# Patient Record
Sex: FEMALE | Race: WHITE | NOT HISPANIC OR LATINO | Employment: FULL TIME | URBAN - METROPOLITAN AREA
[De-identification: names, ages, dates, MRNs, and addresses within clinical notes are randomized per-mention and may not be internally consistent; named-entity substitution may affect disease eponyms.]

---

## 2017-03-01 ENCOUNTER — LAB REQUISITION (OUTPATIENT)
Dept: LAB | Facility: HOSPITAL | Age: 32
End: 2017-03-01
Payer: COMMERCIAL

## 2017-03-01 DIAGNOSIS — N39.0 URINARY TRACT INFECTION: ICD-10-CM

## 2017-03-01 PROCEDURE — 87086 URINE CULTURE/COLONY COUNT: CPT | Performed by: FAMILY MEDICINE

## 2017-03-02 LAB — BACTERIA UR CULT: NORMAL

## 2017-03-08 LAB
BILIRUB UR QL STRIP: NEGATIVE
CLARITY UR: NORMAL
COLOR UR: YELLOW
GLUCOSE (HISTORICAL): NEGATIVE
HGB UR QL STRIP.AUTO: NEGATIVE
KETONES UR STRIP-MCNC: NEGATIVE MG/DL
LEUKOCYTE ESTERASE UR QL STRIP: NORMAL
NITRITE UR QL STRIP: NEGATIVE
PH UR STRIP.AUTO: 6 [PH]
PROT UR STRIP-MCNC: NORMAL MG/DL
SP GR UR STRIP.AUTO: 1.03
UROBILINOGEN UR QL STRIP.AUTO: 0.2

## 2017-03-09 PROCEDURE — 87086 URINE CULTURE/COLONY COUNT: CPT | Performed by: INTERNAL MEDICINE

## 2017-03-10 ENCOUNTER — LAB REQUISITION (OUTPATIENT)
Dept: LAB | Facility: HOSPITAL | Age: 32
End: 2017-03-10
Payer: COMMERCIAL

## 2017-03-10 ENCOUNTER — ALLSCRIPTS OFFICE VISIT (OUTPATIENT)
Dept: OTHER | Facility: OTHER | Age: 32
End: 2017-03-10

## 2017-03-10 DIAGNOSIS — R30.0 DYSURIA: ICD-10-CM

## 2017-03-10 LAB
CLUE CELL (HISTORICAL): NORMAL
HYPHAL YEAST (HISTORICAL): NORMAL
KOH PREP (HISTORICAL): NORMAL
TRICHOMONAS (HISTORICAL): NORMAL

## 2017-03-11 LAB — BACTERIA UR CULT: NORMAL

## 2017-03-13 ENCOUNTER — GENERIC CONVERSION - ENCOUNTER (OUTPATIENT)
Dept: OTHER | Facility: OTHER | Age: 32
End: 2017-03-13

## 2017-03-16 ENCOUNTER — LAB CONVERSION - ENCOUNTER (OUTPATIENT)
Dept: OTHER | Facility: OTHER | Age: 32
End: 2017-03-16

## 2017-03-16 LAB
A. VAGINALIS BY PCR (HISTORICAL): NOT DETECTED
A.VAGINALIS LOG (CELL/ML) (HISTORICAL): <3.25
BVAB 2 (HISTORICAL): DETECTED
C. ALBICANS, DNA OR PCR (HISTORICAL): NOT DETECTED
CANDIDA GENUS (HISTORICAL): NOT DETECTED
CHLAMYDIA, LIQUID-BASED (HISTORICAL): NOT DETECTED
GARDNERELLA BY MOL. METHOD (HISTORICAL): >5.25
GARDNERELLA BY MOL. METHOD (HISTORICAL): DETECTED
GC BY MOL. METHOD (HISTORICAL): NOT DETECTED
LACTOBACILLUS (SPECIES) (HISTORICAL): <3.25
LACTOBACILLUS (SPECIES) (HISTORICAL): NOT DETECTED
MEGASPHAERA TYPE 1 (HISTORICAL): DETECTED
TRICHOMONAS (HISTORICAL): NOT DETECTED

## 2017-03-29 ENCOUNTER — LAB REQUISITION (OUTPATIENT)
Dept: LAB | Facility: HOSPITAL | Age: 32
End: 2017-03-29
Payer: COMMERCIAL

## 2017-03-29 DIAGNOSIS — Z20.2 CONTACT WITH AND (SUSPECTED) EXPOSURE TO INFECTIONS WITH A PREDOMINANTLY SEXUAL MODE OF TRANSMISSION: ICD-10-CM

## 2017-03-29 LAB
CHLAMYDIA DNA CVX QL NAA+PROBE: NORMAL
N GONORRHOEA DNA GENITAL QL NAA+PROBE: NORMAL

## 2017-03-29 PROCEDURE — 87591 N.GONORRHOEAE DNA AMP PROB: CPT | Performed by: FAMILY MEDICINE

## 2017-03-29 PROCEDURE — 87491 CHLMYD TRACH DNA AMP PROBE: CPT | Performed by: FAMILY MEDICINE

## 2017-03-30 ENCOUNTER — GENERIC CONVERSION - ENCOUNTER (OUTPATIENT)
Dept: OTHER | Facility: OTHER | Age: 32
End: 2017-03-30

## 2017-05-17 ENCOUNTER — LAB REQUISITION (OUTPATIENT)
Dept: LAB | Facility: HOSPITAL | Age: 32
End: 2017-05-17
Payer: COMMERCIAL

## 2017-05-17 DIAGNOSIS — Z20.2 CONTACT WITH AND (SUSPECTED) EXPOSURE TO INFECTIONS WITH A PREDOMINANTLY SEXUAL MODE OF TRANSMISSION: ICD-10-CM

## 2017-05-17 LAB
BILIRUB UR QL STRIP: NEGATIVE
CLARITY UR: CLEAR
COLOR UR: YELLOW
GLUCOSE UR STRIP-MCNC: NEGATIVE MG/DL
HGB UR QL STRIP.AUTO: NEGATIVE
KETONES UR STRIP-MCNC: NEGATIVE MG/DL
LEUKOCYTE ESTERASE UR QL STRIP: NEGATIVE
NITRITE UR QL STRIP: NEGATIVE
PH UR STRIP.AUTO: 6.5 [PH] (ref 4.5–8)
PROT UR STRIP-MCNC: NEGATIVE MG/DL
SP GR UR STRIP.AUTO: 1.02 (ref 1–1.03)
UROBILINOGEN UR QL STRIP.AUTO: 0.2 E.U./DL

## 2017-05-17 PROCEDURE — 87086 URINE CULTURE/COLONY COUNT: CPT | Performed by: FAMILY MEDICINE

## 2017-05-17 PROCEDURE — 81003 URINALYSIS AUTO W/O SCOPE: CPT | Performed by: FAMILY MEDICINE

## 2017-05-17 PROCEDURE — 87491 CHLMYD TRACH DNA AMP PROBE: CPT | Performed by: FAMILY MEDICINE

## 2017-05-17 PROCEDURE — 87591 N.GONORRHOEAE DNA AMP PROB: CPT | Performed by: FAMILY MEDICINE

## 2017-05-18 ENCOUNTER — ALLSCRIPTS OFFICE VISIT (OUTPATIENT)
Dept: OTHER | Facility: OTHER | Age: 32
End: 2017-05-18

## 2017-05-18 ENCOUNTER — LAB REQUISITION (OUTPATIENT)
Dept: LAB | Facility: HOSPITAL | Age: 32
End: 2017-05-18
Payer: COMMERCIAL

## 2017-05-18 DIAGNOSIS — F41.8 OTHER SPECIFIED ANXIETY DISORDERS: ICD-10-CM

## 2017-05-18 DIAGNOSIS — E66.9 OBESITY: ICD-10-CM

## 2017-05-18 DIAGNOSIS — Z11.3 ENCOUNTER FOR SCREENING FOR INFECTIONS WITH PREDOMINANTLY SEXUAL MODE OF TRANSMISSION: ICD-10-CM

## 2017-05-18 LAB — BACTERIA UR CULT: NORMAL

## 2017-05-18 PROCEDURE — 87480 CANDIDA DNA DIR PROBE: CPT | Performed by: OBSTETRICS & GYNECOLOGY

## 2017-05-18 PROCEDURE — 87510 GARDNER VAG DNA DIR PROBE: CPT | Performed by: OBSTETRICS & GYNECOLOGY

## 2017-05-18 PROCEDURE — 87660 TRICHOMONAS VAGIN DIR PROBE: CPT | Performed by: OBSTETRICS & GYNECOLOGY

## 2017-05-19 LAB
CHLAMYDIA DNA CVX QL NAA+PROBE: NORMAL
N GONORRHOEA DNA GENITAL QL NAA+PROBE: NORMAL

## 2017-05-20 LAB
CANDIDA RRNA VAG QL PROBE: NEGATIVE
G VAGINALIS RRNA GENITAL QL PROBE: POSITIVE
T VAGINALIS RRNA GENITAL QL PROBE: NEGATIVE

## 2017-05-24 ENCOUNTER — LAB REQUISITION (OUTPATIENT)
Dept: LAB | Facility: HOSPITAL | Age: 32
End: 2017-05-24
Payer: COMMERCIAL

## 2017-05-24 DIAGNOSIS — J02.9 ACUTE PHARYNGITIS: ICD-10-CM

## 2017-05-24 PROCEDURE — 87070 CULTURE OTHR SPECIMN AEROBIC: CPT | Performed by: FAMILY MEDICINE

## 2017-05-27 LAB — BACTERIA THROAT CULT: NORMAL

## 2017-06-01 ENCOUNTER — LAB REQUISITION (OUTPATIENT)
Dept: LAB | Facility: HOSPITAL | Age: 32
End: 2017-06-01
Payer: COMMERCIAL

## 2017-06-01 DIAGNOSIS — Z11.3 ENCOUNTER FOR SCREENING FOR INFECTIONS WITH PREDOMINANTLY SEXUAL MODE OF TRANSMISSION: ICD-10-CM

## 2017-06-01 PROCEDURE — 80074 ACUTE HEPATITIS PANEL: CPT | Performed by: FAMILY MEDICINE

## 2017-06-02 ENCOUNTER — LAB (OUTPATIENT)
Dept: LAB | Facility: HOSPITAL | Age: 32
End: 2017-06-02
Payer: COMMERCIAL

## 2017-06-02 DIAGNOSIS — Z11.3 ENCOUNTER FOR SCREENING FOR INFECTIONS WITH PREDOMINANTLY SEXUAL MODE OF TRANSMISSION: ICD-10-CM

## 2017-06-02 PROCEDURE — 36415 COLL VENOUS BLD VENIPUNCTURE: CPT

## 2017-06-02 PROCEDURE — 86592 SYPHILIS TEST NON-TREP QUAL: CPT

## 2017-06-02 PROCEDURE — 86694 HERPES SIMPLEX NES ANTBDY: CPT

## 2017-06-02 PROCEDURE — 87389 HIV-1 AG W/HIV-1&-2 AB AG IA: CPT

## 2017-06-02 PROCEDURE — 86696 HERPES SIMPLEX TYPE 2 TEST: CPT

## 2017-06-02 PROCEDURE — 86695 HERPES SIMPLEX TYPE 1 TEST: CPT

## 2017-06-03 LAB
HSV1 IGG SER IA-ACNC: <0.91 INDEX (ref 0–0.9)
HSV2 IGG SER IA-ACNC: <0.91 INDEX (ref 0–0.9)

## 2017-06-04 LAB
HAV IGM SER QL: NORMAL
HBV CORE IGM SER QL: NORMAL
HBV SURFACE AG SER QL: NORMAL
HCV AB SER QL: NORMAL

## 2017-06-05 LAB
HIV 1+2 AB+HIV1 P24 AG SERPL QL IA: NORMAL
RPR SER QL: NORMAL

## 2017-06-06 ENCOUNTER — GENERIC CONVERSION - ENCOUNTER (OUTPATIENT)
Dept: OTHER | Facility: OTHER | Age: 32
End: 2017-06-06

## 2017-06-06 LAB — HSV1+2 IGM SER IA-ACNC: <0.91 RATIO (ref 0–0.9)

## 2017-06-08 ENCOUNTER — ALLSCRIPTS OFFICE VISIT (OUTPATIENT)
Dept: OTHER | Facility: OTHER | Age: 32
End: 2017-06-08

## 2017-06-23 ENCOUNTER — APPOINTMENT (OUTPATIENT)
Dept: LAB | Facility: HOSPITAL | Age: 32
End: 2017-06-23
Payer: COMMERCIAL

## 2017-06-23 DIAGNOSIS — F41.8 OTHER SPECIFIED ANXIETY DISORDERS: ICD-10-CM

## 2017-06-23 DIAGNOSIS — E66.9 OBESITY: ICD-10-CM

## 2017-06-23 LAB
EST. AVERAGE GLUCOSE BLD GHB EST-MCNC: 97 MG/DL
HBA1C MFR BLD: 5 % (ref 4.2–6.3)

## 2017-06-23 PROCEDURE — 83036 HEMOGLOBIN GLYCOSYLATED A1C: CPT

## 2017-06-23 PROCEDURE — 36415 COLL VENOUS BLD VENIPUNCTURE: CPT

## 2017-06-26 ENCOUNTER — GENERIC CONVERSION - ENCOUNTER (OUTPATIENT)
Dept: OTHER | Facility: OTHER | Age: 32
End: 2017-06-26

## 2017-07-20 ENCOUNTER — ALLSCRIPTS OFFICE VISIT (OUTPATIENT)
Dept: OTHER | Facility: OTHER | Age: 32
End: 2017-07-20

## 2017-07-20 DIAGNOSIS — E66.9 OBESITY: ICD-10-CM

## 2017-07-20 DIAGNOSIS — R10.31 RIGHT LOWER QUADRANT PAIN: ICD-10-CM

## 2017-07-20 DIAGNOSIS — N89.8 OTHER SPECIFIED NONINFLAMMATORY DISORDER OF VAGINA: ICD-10-CM

## 2017-07-21 ENCOUNTER — GENERIC CONVERSION - ENCOUNTER (OUTPATIENT)
Dept: OTHER | Facility: OTHER | Age: 32
End: 2017-07-21

## 2017-07-21 ENCOUNTER — TRANSCRIBE ORDERS (OUTPATIENT)
Dept: LAB | Facility: HOSPITAL | Age: 32
End: 2017-07-21

## 2017-07-21 ENCOUNTER — APPOINTMENT (OUTPATIENT)
Dept: LAB | Facility: CLINIC | Age: 32
End: 2017-07-21
Payer: COMMERCIAL

## 2017-07-21 ENCOUNTER — TRANSCRIBE ORDERS (OUTPATIENT)
Dept: LAB | Facility: CLINIC | Age: 32
End: 2017-07-21

## 2017-07-21 ENCOUNTER — APPOINTMENT (OUTPATIENT)
Dept: LAB | Facility: HOSPITAL | Age: 32
End: 2017-07-21
Payer: COMMERCIAL

## 2017-07-21 DIAGNOSIS — E66.9 OBESITY: ICD-10-CM

## 2017-07-21 DIAGNOSIS — N89.8 OTHER SPECIFIED NONINFLAMMATORY DISORDER OF VAGINA: ICD-10-CM

## 2017-07-21 DIAGNOSIS — N89.8 OTHER SPECIFIED NONINFLAMMATORY DISORDER OF VAGINA: Primary | ICD-10-CM

## 2017-07-21 LAB
ALBUMIN SERPL BCP-MCNC: 3.6 G/DL (ref 3.5–5)
ALP SERPL-CCNC: 62 U/L (ref 46–116)
ALT SERPL W P-5'-P-CCNC: 31 U/L (ref 12–78)
ANION GAP SERPL CALCULATED.3IONS-SCNC: 9 MMOL/L (ref 4–13)
AST SERPL W P-5'-P-CCNC: 16 U/L (ref 5–45)
BILIRUB SERPL-MCNC: 0.49 MG/DL (ref 0.2–1)
BUN SERPL-MCNC: 8 MG/DL (ref 5–25)
CALCIUM SERPL-MCNC: 8.8 MG/DL (ref 8.3–10.1)
CHLORIDE SERPL-SCNC: 107 MMOL/L (ref 100–108)
CHOLEST SERPL-MCNC: 183 MG/DL (ref 50–200)
CO2 SERPL-SCNC: 27 MMOL/L (ref 21–32)
CREAT SERPL-MCNC: 0.73 MG/DL (ref 0.6–1.3)
GFR SERPL CREATININE-BSD FRML MDRD: >60 ML/MIN/1.73SQ M
GLUCOSE P FAST SERPL-MCNC: 80 MG/DL (ref 65–99)
HDLC SERPL-MCNC: 36 MG/DL (ref 40–60)
LDLC SERPL CALC-MCNC: 130 MG/DL (ref 0–100)
POTASSIUM SERPL-SCNC: 3.9 MMOL/L (ref 3.5–5.3)
PROT SERPL-MCNC: 7.6 G/DL (ref 6.4–8.2)
SODIUM SERPL-SCNC: 143 MMOL/L (ref 136–145)
TRIGL SERPL-MCNC: 85 MG/DL
TSH SERPL DL<=0.05 MIU/L-ACNC: 1.39 UIU/ML (ref 0.36–3.74)

## 2017-07-21 PROCEDURE — 87510 GARDNER VAG DNA DIR PROBE: CPT

## 2017-07-21 PROCEDURE — 80053 COMPREHEN METABOLIC PANEL: CPT

## 2017-07-21 PROCEDURE — 87491 CHLMYD TRACH DNA AMP PROBE: CPT

## 2017-07-21 PROCEDURE — 87660 TRICHOMONAS VAGIN DIR PROBE: CPT

## 2017-07-21 PROCEDURE — 87109 MYCOPLASMA: CPT

## 2017-07-21 PROCEDURE — 36415 COLL VENOUS BLD VENIPUNCTURE: CPT

## 2017-07-21 PROCEDURE — 84443 ASSAY THYROID STIM HORMONE: CPT

## 2017-07-21 PROCEDURE — 87255 GENET VIRUS ISOLATE HSV: CPT

## 2017-07-21 PROCEDURE — 87480 CANDIDA DNA DIR PROBE: CPT

## 2017-07-21 PROCEDURE — 80061 LIPID PANEL: CPT

## 2017-07-21 PROCEDURE — 87591 N.GONORRHOEAE DNA AMP PROB: CPT

## 2017-07-22 LAB
CANDIDA RRNA VAG QL PROBE: NEGATIVE
G VAGINALIS RRNA GENITAL QL PROBE: NEGATIVE
T VAGINALIS RRNA GENITAL QL PROBE: NEGATIVE

## 2017-07-24 ENCOUNTER — GENERIC CONVERSION - ENCOUNTER (OUTPATIENT)
Dept: OTHER | Facility: OTHER | Age: 32
End: 2017-07-24

## 2017-07-24 LAB
CHLAMYDIA DNA CVX QL NAA+PROBE: NORMAL
N GONORRHOEA DNA GENITAL QL NAA+PROBE: NORMAL

## 2017-07-25 ENCOUNTER — GENERIC CONVERSION - ENCOUNTER (OUTPATIENT)
Dept: OTHER | Facility: OTHER | Age: 32
End: 2017-07-25

## 2017-07-25 LAB
BILIRUB UR QL STRIP: NEGATIVE
CLARITY UR: NORMAL
COLOR UR: NORMAL
GLUCOSE (HISTORICAL): NEGATIVE
HGB UR QL STRIP.AUTO: NEGATIVE
KETONES UR STRIP-MCNC: NEGATIVE MG/DL
LEUKOCYTE ESTERASE UR QL STRIP: NEGATIVE
NITRITE UR QL STRIP: NEGATIVE
PH UR STRIP.AUTO: 6.5 [PH]
PROT UR STRIP-MCNC: NORMAL MG/DL
SP GR UR STRIP.AUTO: 1.02
UROBILINOGEN UR QL STRIP.AUTO: 0.2

## 2017-07-26 ENCOUNTER — GENERIC CONVERSION - ENCOUNTER (OUTPATIENT)
Dept: OTHER | Facility: OTHER | Age: 32
End: 2017-07-26

## 2017-07-26 ENCOUNTER — LAB REQUISITION (OUTPATIENT)
Dept: LAB | Facility: HOSPITAL | Age: 32
End: 2017-07-26
Payer: COMMERCIAL

## 2017-07-26 DIAGNOSIS — N89.8 OTHER SPECIFIED NONINFLAMMATORY DISORDER OF VAGINA: ICD-10-CM

## 2017-07-26 LAB
AMORPH URATE CRY URNS QL MICRO: NORMAL /HPF
BACTERIA UR QL AUTO: NORMAL /HPF
BILIRUB UR QL STRIP: ABNORMAL
BILIRUB UR QL STRIP: NORMAL
CLARITY UR: ABNORMAL
CLARITY UR: NORMAL
COLOR UR: ABNORMAL
COLOR UR: YELLOW
GLUCOSE (HISTORICAL): NEGATIVE
GLUCOSE UR STRIP-MCNC: NEGATIVE MG/DL
HGB UR QL STRIP.AUTO: NEGATIVE
HGB UR QL STRIP.AUTO: NORMAL
KETONES UR STRIP-MCNC: ABNORMAL MG/DL
KETONES UR STRIP-MCNC: NEGATIVE MG/DL
LEUKOCYTE ESTERASE UR QL STRIP: ABNORMAL
LEUKOCYTE ESTERASE UR QL STRIP: NORMAL
NITRITE UR QL STRIP: NEGATIVE
NITRITE UR QL STRIP: NEGATIVE
NON-SQ EPI CELLS URNS QL MICRO: NORMAL /HPF
PH UR STRIP.AUTO: 6 [PH]
PH UR STRIP.AUTO: 6 [PH] (ref 4.5–8)
PROT UR STRIP-MCNC: NEGATIVE MG/DL
PROT UR STRIP-MCNC: NORMAL MG/DL
RBC #/AREA URNS AUTO: NORMAL /HPF
SP GR UR STRIP.AUTO: 1.02
SP GR UR STRIP.AUTO: 1.03 (ref 1–1.03)
UROBILINOGEN UR QL STRIP.AUTO: 0.2
UROBILINOGEN UR QL STRIP.AUTO: 1 E.U./DL
WBC #/AREA URNS AUTO: NORMAL /HPF

## 2017-07-26 PROCEDURE — 87086 URINE CULTURE/COLONY COUNT: CPT | Performed by: FAMILY MEDICINE

## 2017-07-26 PROCEDURE — 81001 URINALYSIS AUTO W/SCOPE: CPT | Performed by: FAMILY MEDICINE

## 2017-07-27 LAB — MISCELLANEOUS LAB TEST RESULT: NORMAL

## 2017-07-29 LAB — BACTERIA UR CULT: NORMAL

## 2017-07-31 ENCOUNTER — GENERIC CONVERSION - ENCOUNTER (OUTPATIENT)
Dept: OTHER | Facility: OTHER | Age: 32
End: 2017-07-31

## 2017-07-31 LAB
M HOMINIS SPEC QL CULT: NEGATIVE
U UREALYTICUM SPEC QL CULT: NEGATIVE

## 2017-08-01 ENCOUNTER — TRANSCRIBE ORDERS (OUTPATIENT)
Dept: ADMINISTRATIVE | Facility: HOSPITAL | Age: 32
End: 2017-08-01

## 2017-08-01 ENCOUNTER — HOSPITAL ENCOUNTER (OUTPATIENT)
Dept: CT IMAGING | Facility: HOSPITAL | Age: 32
Discharge: HOME | End: 2017-08-01
Payer: COMMERCIAL

## 2017-08-01 ENCOUNTER — ALLSCRIPTS OFFICE VISIT (OUTPATIENT)
Dept: OTHER | Facility: OTHER | Age: 32
End: 2017-08-01

## 2017-08-01 DIAGNOSIS — R10.31 RIGHT LOWER QUADRANT PAIN: ICD-10-CM

## 2017-08-01 DIAGNOSIS — R10.9 ABDOMINAL PAIN, UNSPECIFIED LOCATION: Primary | ICD-10-CM

## 2017-08-01 DIAGNOSIS — R10.9 ABDOMINAL PAIN, UNSPECIFIED LOCATION: ICD-10-CM

## 2017-08-01 PROCEDURE — 74177 CT ABD & PELVIS W/CONTRAST: CPT

## 2017-08-01 RX ADMIN — IOHEXOL 50 ML: 240 INJECTION, SOLUTION INTRATHECAL; INTRAVASCULAR; INTRAVENOUS; ORAL at 19:56

## 2017-08-01 RX ADMIN — IOHEXOL 100 ML: 350 INJECTION, SOLUTION INTRAVENOUS at 19:56

## 2017-08-02 ENCOUNTER — GENERIC CONVERSION - ENCOUNTER (OUTPATIENT)
Dept: OTHER | Facility: OTHER | Age: 32
End: 2017-08-02

## 2017-08-21 ENCOUNTER — ALLSCRIPTS OFFICE VISIT (OUTPATIENT)
Dept: OTHER | Facility: OTHER | Age: 32
End: 2017-08-21

## 2017-09-26 ENCOUNTER — LAB REQUISITION (OUTPATIENT)
Dept: LAB | Facility: HOSPITAL | Age: 32
End: 2017-09-26
Payer: COMMERCIAL

## 2017-09-26 ENCOUNTER — GENERIC CONVERSION - ENCOUNTER (OUTPATIENT)
Dept: OTHER | Facility: OTHER | Age: 32
End: 2017-09-26

## 2017-09-26 DIAGNOSIS — R35.0 FREQUENCY OF MICTURITION: ICD-10-CM

## 2017-09-26 LAB
BACTERIA UR QL AUTO: ABNORMAL /HPF
BILIRUB UR QL STRIP: NEGATIVE
BILIRUB UR QL STRIP: NORMAL
CLARITY UR: CLEAR
CLARITY UR: NORMAL
COLOR UR: YELLOW
COLOR UR: YELLOW
GLUCOSE (HISTORICAL): NEGATIVE
GLUCOSE UR STRIP-MCNC: NEGATIVE MG/DL
HGB UR QL STRIP.AUTO: NEGATIVE
HGB UR QL STRIP.AUTO: NEGATIVE
HYALINE CASTS #/AREA URNS LPF: ABNORMAL /LPF
KETONES UR STRIP-MCNC: NEGATIVE MG/DL
KETONES UR STRIP-MCNC: NEGATIVE MG/DL
LEUKOCYTE ESTERASE UR QL STRIP: ABNORMAL
LEUKOCYTE ESTERASE UR QL STRIP: NORMAL
NITRITE UR QL STRIP: NEGATIVE
NITRITE UR QL STRIP: NEGATIVE
NON-SQ EPI CELLS URNS QL MICRO: ABNORMAL /HPF
PH UR STRIP.AUTO: 6 [PH]
PH UR STRIP.AUTO: 6 [PH] (ref 4.5–8)
PROT UR STRIP-MCNC: NEGATIVE MG/DL
PROT UR STRIP-MCNC: NORMAL MG/DL
RBC #/AREA URNS AUTO: ABNORMAL /HPF
SP GR UR STRIP.AUTO: 1.02
SP GR UR STRIP.AUTO: 1.02 (ref 1–1.03)
UROBILINOGEN UR QL STRIP.AUTO: 0.2
UROBILINOGEN UR QL STRIP.AUTO: 0.2 E.U./DL
WBC #/AREA URNS AUTO: ABNORMAL /HPF

## 2017-09-26 PROCEDURE — 81001 URINALYSIS AUTO W/SCOPE: CPT | Performed by: PHYSICIAN ASSISTANT

## 2017-09-26 PROCEDURE — 87086 URINE CULTURE/COLONY COUNT: CPT | Performed by: PHYSICIAN ASSISTANT

## 2017-09-27 LAB — BACTERIA UR CULT: NORMAL

## 2017-09-28 ENCOUNTER — GENERIC CONVERSION - ENCOUNTER (OUTPATIENT)
Dept: OTHER | Facility: OTHER | Age: 32
End: 2017-09-28

## 2017-10-05 ENCOUNTER — LAB REQUISITION (OUTPATIENT)
Dept: LAB | Facility: HOSPITAL | Age: 32
End: 2017-10-05
Payer: COMMERCIAL

## 2017-10-05 ENCOUNTER — GENERIC CONVERSION - ENCOUNTER (OUTPATIENT)
Dept: OTHER | Facility: OTHER | Age: 32
End: 2017-10-05

## 2017-10-05 DIAGNOSIS — N39.0 URINARY TRACT INFECTION: ICD-10-CM

## 2017-10-05 DIAGNOSIS — R30.9 PAINFUL MICTURITION: ICD-10-CM

## 2017-10-05 LAB
BILIRUB UR QL STRIP: NORMAL
CLARITY UR: NORMAL
COLOR UR: YELLOW
GLUCOSE (HISTORICAL): NEGATIVE
HGB UR QL STRIP.AUTO: NEGATIVE
KETONES UR STRIP-MCNC: NEGATIVE MG/DL
LEUKOCYTE ESTERASE UR QL STRIP: NORMAL
NITRITE UR QL STRIP: NEGATIVE
PH UR STRIP.AUTO: 5 [PH]
PROT UR STRIP-MCNC: 15 MG/DL
SP GR UR STRIP.AUTO: 1.02
UROBILINOGEN UR QL STRIP.AUTO: 0.2

## 2017-10-05 PROCEDURE — 87591 N.GONORRHOEAE DNA AMP PROB: CPT | Performed by: FAMILY MEDICINE

## 2017-10-05 PROCEDURE — 87086 URINE CULTURE/COLONY COUNT: CPT | Performed by: FAMILY MEDICINE

## 2017-10-05 PROCEDURE — 87186 SC STD MICRODIL/AGAR DIL: CPT | Performed by: FAMILY MEDICINE

## 2017-10-05 PROCEDURE — 87077 CULTURE AEROBIC IDENTIFY: CPT | Performed by: FAMILY MEDICINE

## 2017-10-05 PROCEDURE — 87491 CHLMYD TRACH DNA AMP PROBE: CPT | Performed by: FAMILY MEDICINE

## 2017-10-07 ENCOUNTER — GENERIC CONVERSION - ENCOUNTER (OUTPATIENT)
Dept: OTHER | Facility: OTHER | Age: 32
End: 2017-10-07

## 2017-10-07 LAB — BACTERIA UR CULT: ABNORMAL

## 2017-10-09 LAB
CHLAMYDIA DNA CVX QL NAA+PROBE: NORMAL
N GONORRHOEA DNA GENITAL QL NAA+PROBE: NORMAL

## 2017-10-23 NOTE — PROCEDURES
Assessment  1  Encounter for IUD removal (V25 12) (R90 558)    Plan  Encounter for IUD removal    · Follow-up PRN Evaluation and Treatment  Follow-up  Status: Complete  Done:  74Fma7337   Ordered; For: Encounter for IUD removal; Ordered By: Tristin Holloway Performed:  Due: 28NCM7904  SocHx: Family planning    · Prenatal One Daily 27-0 8 MG Oral Tablet; TAKE 1 TABLET DAILY AS DIRECTED   Rx By: Tristin Holloway; Dispense: 28 Days ; #:28 Tablet; Refill: 1;For: SocHx: Family planning; HOA = N; Record; Last Updated By: Abdulaziz Stephen; 8/21/2017 3:59:53 PM    Discussion/Summary  Discussion Summary:   We discussed her IUD use and her desire now to try to conceive  She is aware that the IUD was removed without difficulty  She has been advised of healthy lifestyle choices such as diet, rest, avoidance of smoking and alcohol  She will begin use of multivitamins or PreNatals  She will observe for the return of her menses and WCB should she miss her menses  Chief Complaint  The pt  presents today for removal of her Mirena IUD  Active Problems  1  Abdominal pain, RLQ (right lower quadrant) (789 03) (R10 31)   2  Depression with anxiety (300 4) (F41 8)   3  Eczema (692 9) (L30 9)   4  IUD contraception (V45 51) (Z97 5)   5  Obesity (BMI 30-39 9) (278 00) (E66 9)   6  Painful urination (788 1) (R30 9)   7  Pap smear abnormality of cervix with LGSIL (795 03) (R87 612)   8  Presence of contraceptive device (V45 59) (Z30 9)   9  Screening for STD (sexually transmitted disease) (V74 5) (Z11 3)   10  Urinary tract infection, site unspecified (599 0) (N39 0)   11  Vaginal irritation (623 9) (N89 8)   12  Manoj-Parkinson-White (WPW) syndrome (426 7) (I45 6)    Current Meds  1  Escitalopram Oxalate 5 MG Oral Tablet; Take 1 tablet daily; Therapy: 27GIM7137 to (Evaluate:08Mar2017); Last Rx:16Toy5571 Ordered   2  LORazepam 0 5 MG Oral Tablet; Take 1/2 to 1 tablet po up to TID prn anxiety;    Therapy: 85SVI7908 to (Last Rx:74Nvb3402) Ordered  3  Saxenda 18 MG/3ML Subcutaneous Solution Pen-injector; Inject 0 6 mg SC once daily x   7 days, 1 2 mg SC daily for week 2, 1 8 mg daily for week 3,   2 4 mg daily for week 4, then increase to 3 mg; Therapy: 15FDS0028 to ([id number])  Requested for: 57XNZ4460; Last   Rx:08Jun2017; Status: ACTIVE - Transmit to Pharmacy - Awaiting Verification Ordered  4  Nitrofurantoin Monohyd Macro 100 MG Oral Capsule; TAKE 1 CAPSULE TWICE DAILY   UNTIL GONE;   Therapy: 24ESU5397 to (Evaluate:91Hwa8460)  Requested for: 43Cbh2892; Last   Rx:93Qwg0780; Status: ACTIVE - Retrospective Authorization Ordered  5  Nystatin-Triamcinolone 526239-5 1 UNIT/GM-% External Cream; APPLY SPARINGLY TO   AFFECTED AREA(S) TWICE DAILY; Therapy: 74OTE8704 to (Evaluate:10Aug2017)  Requested for: 43Yra2070; Last   Rx:60Guz9068; Status: ACTIVE - Retrospective Authorization Ordered  6  Mirena IUD; Therapy: (Recorded:00Oco5831) to Recorded    Allergies  1  Codeine Derivatives   2  Erythromycin Base TABS   3  Sulfa Drugs    Procedure  Procedure: removal of Mirena IUD  Indications for the procedure include desired fertility  Risks and benefits were discussed with the    Procedure Note:   a speculum was placed in the vagina,-- the IUD strings were visualized-- and-- the strings were grasped with forceps and the IUD was removed  The IUD was discarded  Post-Procedure:   the patient tolerated the procedure well  Complications: none  Follow-up in the office as needed  History of Present Illness  The pt  relates that she has had the IUD now for about 3 yrs  She usually does not have any period  She is now ready to try to conceive again and desires to have the IUD removed  Review of Systems  Constitutional : denies fever, fatigue, recent weight gain or loss  : as noted in HPI     Signatures   Electronically signed by : Cheir Bourgeois;  Aug 21 2017  4:16PM EST                       (Author)    Electronically signed by : Oracio Ordaz DO; Aug 22 2017  8:14AM EST

## 2017-10-30 ENCOUNTER — TRANSCRIBE ORDERS (OUTPATIENT)
Dept: LAB | Facility: CLINIC | Age: 32
End: 2017-10-30

## 2017-10-30 ENCOUNTER — APPOINTMENT (OUTPATIENT)
Dept: LAB | Facility: CLINIC | Age: 32
End: 2017-10-30
Payer: COMMERCIAL

## 2017-10-30 DIAGNOSIS — N89.8 OTHER SPECIFIED NONINFLAMMATORY DISORDERS OF VAGINA (CODE): ICD-10-CM

## 2017-10-30 DIAGNOSIS — Z36.89 ENCOUNTER FOR OTHER SPECIFIED ANTENATAL SCREENING (CODE): ICD-10-CM

## 2017-10-30 DIAGNOSIS — N91.2 AMENORRHEA: ICD-10-CM

## 2017-10-30 LAB — B-HCG SERPL-ACNC: 2489 MIU/ML

## 2017-10-30 PROCEDURE — 36415 COLL VENOUS BLD VENIPUNCTURE: CPT

## 2017-10-30 PROCEDURE — 84702 CHORIONIC GONADOTROPIN TEST: CPT

## 2017-10-31 ENCOUNTER — GENERIC CONVERSION - ENCOUNTER (OUTPATIENT)
Dept: OTHER | Facility: OTHER | Age: 32
End: 2017-10-31

## 2017-11-20 ENCOUNTER — ALLSCRIPTS OFFICE VISIT (OUTPATIENT)
Dept: OTHER | Facility: OTHER | Age: 32
End: 2017-11-20

## 2017-11-20 ENCOUNTER — LAB REQUISITION (OUTPATIENT)
Dept: LAB | Facility: HOSPITAL | Age: 32
End: 2017-11-20
Payer: COMMERCIAL

## 2017-11-20 DIAGNOSIS — Z32.01 ENCOUNTER FOR PREGNANCY TEST, RESULT POSITIVE: ICD-10-CM

## 2017-11-20 LAB
ABO GROUP BLD: NORMAL
B-HCG SERPL-ACNC: ABNORMAL MIU/ML
BLD GP AB SCN SERPL QL: NEGATIVE
PROGEST SERPL-MCNC: 16.7 NG/ML
RH BLD: POSITIVE
SPECIMEN EXPIRATION DATE: NORMAL

## 2017-11-20 PROCEDURE — 84144 ASSAY OF PROGESTERONE: CPT | Performed by: OBSTETRICS & GYNECOLOGY

## 2017-11-20 PROCEDURE — 86901 BLOOD TYPING SEROLOGIC RH(D): CPT | Performed by: OBSTETRICS & GYNECOLOGY

## 2017-11-20 PROCEDURE — 86900 BLOOD TYPING SEROLOGIC ABO: CPT | Performed by: OBSTETRICS & GYNECOLOGY

## 2017-11-20 PROCEDURE — 84702 CHORIONIC GONADOTROPIN TEST: CPT | Performed by: OBSTETRICS & GYNECOLOGY

## 2017-11-20 PROCEDURE — 86850 RBC ANTIBODY SCREEN: CPT | Performed by: OBSTETRICS & GYNECOLOGY

## 2017-11-21 ENCOUNTER — GENERIC CONVERSION - ENCOUNTER (OUTPATIENT)
Dept: OTHER | Facility: OTHER | Age: 32
End: 2017-11-21

## 2017-11-21 ENCOUNTER — HOSPITAL ENCOUNTER (OUTPATIENT)
Dept: ULTRASOUND IMAGING | Facility: HOSPITAL | Age: 32
Discharge: HOME | End: 2017-11-21
Attending: OBSTETRICS & GYNECOLOGY
Payer: COMMERCIAL

## 2017-11-21 DIAGNOSIS — Z36.89 ENCOUNTER FOR OTHER SPECIFIED ANTENATAL SCREENING (CODE): ICD-10-CM

## 2017-11-21 PROCEDURE — 76801 OB US < 14 WKS SINGLE FETUS: CPT

## 2017-11-25 ENCOUNTER — GENERIC CONVERSION - ENCOUNTER (OUTPATIENT)
Dept: OTHER | Facility: OTHER | Age: 32
End: 2017-11-25

## 2017-12-01 ENCOUNTER — ALLSCRIPTS OFFICE VISIT (OUTPATIENT)
Dept: OTHER | Facility: OTHER | Age: 32
End: 2017-12-01

## 2017-12-01 ENCOUNTER — LAB REQUISITION (OUTPATIENT)
Dept: LAB | Facility: HOSPITAL | Age: 32
End: 2017-12-01
Payer: COMMERCIAL

## 2017-12-01 DIAGNOSIS — Z34.91 ENCOUNTER FOR SUPERVISION OF NORMAL PREGNANCY IN FIRST TRIMESTER: ICD-10-CM

## 2017-12-01 LAB
ABO GROUP BLD: NORMAL
BASOPHILS # BLD AUTO: 0.06 THOUSANDS/ΜL (ref 0–0.1)
BASOPHILS NFR BLD AUTO: 1 % (ref 0–1)
BILIRUB UR QL STRIP: NEGATIVE
BLD GP AB SCN SERPL QL: NEGATIVE
CLARITY UR: CLEAR
COLOR UR: YELLOW
EOSINOPHIL # BLD AUTO: 0.09 THOUSAND/ΜL (ref 0–0.61)
EOSINOPHIL NFR BLD AUTO: 1 % (ref 0–6)
ERYTHROCYTE [DISTWIDTH] IN BLOOD BY AUTOMATED COUNT: 12.9 % (ref 11.6–15.1)
GLUCOSE UR STRIP-MCNC: NEGATIVE MG/DL
HBV SURFACE AG SER QL: NORMAL
HCT VFR BLD AUTO: 41 % (ref 34.8–46.1)
HGB BLD-MCNC: 13.7 G/DL (ref 11.5–15.4)
HGB UR QL STRIP.AUTO: NEGATIVE
KETONES UR STRIP-MCNC: NEGATIVE MG/DL
LEUKOCYTE ESTERASE UR QL STRIP: NEGATIVE
LYMPHOCYTES # BLD AUTO: 2.98 THOUSANDS/ΜL (ref 0.6–4.47)
LYMPHOCYTES NFR BLD AUTO: 36 % (ref 14–44)
MCH RBC QN AUTO: 29.5 PG (ref 26.8–34.3)
MCHC RBC AUTO-ENTMCNC: 33.4 G/DL (ref 31.4–37.4)
MCV RBC AUTO: 88 FL (ref 82–98)
MONOCYTES # BLD AUTO: 0.63 THOUSAND/ΜL (ref 0.17–1.22)
MONOCYTES NFR BLD AUTO: 8 % (ref 4–12)
NEUTROPHILS # BLD AUTO: 4.62 THOUSANDS/ΜL (ref 1.85–7.62)
NEUTS SEG NFR BLD AUTO: 54 % (ref 43–75)
NITRITE UR QL STRIP: NEGATIVE
NRBC BLD AUTO-RTO: 0 /100 WBCS
PH UR STRIP.AUTO: 7.5 [PH] (ref 4.5–8)
PLATELET # BLD AUTO: 372 THOUSANDS/UL (ref 149–390)
PMV BLD AUTO: 10.5 FL (ref 8.9–12.7)
PROT UR STRIP-MCNC: NEGATIVE MG/DL
RBC # BLD AUTO: 4.64 MILLION/UL (ref 3.81–5.12)
RH BLD: POSITIVE
RUBV IGG SERPL IA-ACNC: 8.8 IU/ML
SP GR UR STRIP.AUTO: 1.02 (ref 1–1.03)
SPECIMEN EXPIRATION DATE: NORMAL
UROBILINOGEN UR QL STRIP.AUTO: 0.2 E.U./DL
WBC # BLD AUTO: 8.4 THOUSAND/UL (ref 4.31–10.16)

## 2017-12-01 PROCEDURE — 87086 URINE CULTURE/COLONY COUNT: CPT | Performed by: NURSE PRACTITIONER

## 2017-12-01 PROCEDURE — 81003 URINALYSIS AUTO W/O SCOPE: CPT | Performed by: NURSE PRACTITIONER

## 2017-12-01 PROCEDURE — 80081 OBSTETRIC PANEL INC HIV TSTG: CPT | Performed by: NURSE PRACTITIONER

## 2017-12-02 LAB — BACTERIA UR CULT: NORMAL

## 2017-12-04 LAB
HIV 1+2 AB+HIV1 P24 AG SERPL QL IA: NORMAL
RPR SER QL: NORMAL

## 2017-12-08 ENCOUNTER — GENERIC CONVERSION - ENCOUNTER (OUTPATIENT)
Dept: OBGYN CLINIC | Facility: CLINIC | Age: 32
End: 2017-12-08

## 2017-12-27 ENCOUNTER — GENERIC CONVERSION - ENCOUNTER (OUTPATIENT)
Dept: OTHER | Facility: OTHER | Age: 32
End: 2017-12-27

## 2017-12-27 ENCOUNTER — ALLSCRIPTS OFFICE VISIT (OUTPATIENT)
Dept: PERINATAL CARE | Facility: CLINIC | Age: 32
End: 2017-12-27
Payer: COMMERCIAL

## 2017-12-27 PROCEDURE — 76813 OB US NUCHAL MEAS 1 GEST: CPT | Performed by: OBSTETRICS & GYNECOLOGY

## 2017-12-29 ENCOUNTER — GENERIC CONVERSION - ENCOUNTER (OUTPATIENT)
Dept: OTHER | Facility: OTHER | Age: 32
End: 2017-12-29

## 2017-12-29 ENCOUNTER — LAB REQUISITION (OUTPATIENT)
Dept: LAB | Facility: HOSPITAL | Age: 32
End: 2017-12-29
Payer: COMMERCIAL

## 2017-12-29 DIAGNOSIS — Z34.01 ENCOUNTER FOR SUPERVISION OF NORMAL FIRST PREGNANCY IN FIRST TRIMESTER: ICD-10-CM

## 2017-12-29 PROCEDURE — 87591 N.GONORRHOEAE DNA AMP PROB: CPT | Performed by: OBSTETRICS & GYNECOLOGY

## 2017-12-29 PROCEDURE — 87624 HPV HI-RISK TYP POOLED RSLT: CPT | Performed by: OBSTETRICS & GYNECOLOGY

## 2017-12-29 PROCEDURE — G0145 SCR C/V CYTO,THINLAYER,RESCR: HCPCS | Performed by: OBSTETRICS & GYNECOLOGY

## 2017-12-29 PROCEDURE — 87491 CHLMYD TRACH DNA AMP PROBE: CPT | Performed by: OBSTETRICS & GYNECOLOGY

## 2018-01-01 LAB
CHLAMYDIA DNA CVX QL NAA+PROBE: NORMAL
N GONORRHOEA DNA GENITAL QL NAA+PROBE: NORMAL

## 2018-01-02 ENCOUNTER — ALLSCRIPTS OFFICE VISIT (OUTPATIENT)
Dept: OTHER | Facility: OTHER | Age: 33
End: 2018-01-02

## 2018-01-02 DIAGNOSIS — R00.2 PALPITATIONS: ICD-10-CM

## 2018-01-02 DIAGNOSIS — I45.6 PRE-EXCITATION SYNDROME: ICD-10-CM

## 2018-01-03 NOTE — CONSULTS
Assessment   1  Manoj-Parkinson-White (WPW) syndrome (426 7) (I45 6)   2  Palpitations (785 1) (R00 2)   3  Tachycardia (785 0) (R00 0)    Plan   Palpitations, Manoj-Parkinson-White (WPW) syndrome    · ECHO COMPLETE WITH CONTRAST IF INDICATED; Status:Need Information - Financial    Authorization; Requested ZBM:[id number]; Perform:[facility]; BHC:82CQI4721;ALLSEHT; For:Palpitations, Manoj-Parkinson-White (WPW) syndrome; Ordered By:Rajeev Coates;   · HOLTER MONITOR - 48 HOUR; Status:Hold For - Scheduling; Requested    AUI:62WCY8830; Perform:[facility]; PCV:31PXM7786;WVHYHXI; For:Palpitations, Manoj-Parkinson-White (WPW) syndrome; Ordered By:Rajeev Coates;    Discussion/Summary      It is my impression that the patient has a history of WPW status post ablation 16 years ago  She has had palpitations since then which I suspect is on the basis of premature beats  She has symptomatology that has increased in the past several weeks during pregnancy  She also gets a racing of the heart rate but not in a range that makes me think that she is having a pathologic tachycardia  I have ordered a Holter monitor to see if we can  the palpitations and heart racing  I also ordered an echocardiogram to rule out any structural heart disease which is doubtful based on her exam and previous normal echocardiograms  I will follow up with her by phone regarding these results  I did tell her if we cannot demonstrate the racing heartbeat on her Holter monitor to get an EKG or rhythm strip during the tachycardia in our office which would be helpful to exclude a pathologic tachycardia  Chief Complaint   Patient here for NP evaluation of WPW  She did have an ablation in the past 15 years ago  History of Present Illness   Cardiology HPI Free Text Note Form [facility]: The patient is a 33 year ago WF with a hx of WPW s/p ablation at [facility] at age 14  She is now 13 weeks pregnant   She has had intermittent palpitations since then but has had increase of those in the past 4-6 weeks  She does feel both racing and skipping  She has had her HR checked and it has been in the 120-130 range when it is racing  She does get these symptoms every other day  The skips she feels in her throat and getting blurry vision with some lightheadedness  It only lasts a few minutes  The racing lasts longer and is not attended by these symptoms  She does get some GARCIA  She denies edema  She denies chest pain  Review of Systems           Cardiac: rhythm problems-- and-- palpitations present , but-- no chest pain,-- no fainting/blackouts,-- no heart murmur present,-- no signs of swelling,-- no syncope/fainting,-- no AM fatigue-- and-- no witnessed apnea episodes  Skin: No complaints of nonhealing sores or skin rash  Genitourinary: patient is pregnant-- (13 weeks), but-- no recurrent urinary tract infections,-- no frequent urination at night,-- no difficult urination,-- no blood in urine,-- no kidney stones,-- no loss of bladder control,-- no kidney problems,-- no birth control or hormone replacement therapy-- and-- not post menopausal      Psychological: anxiety, but-- no depression,-- no panic attacks,-- no difficulty concentrating-- and-- no palpitations present  General: lack of energy/fatigue, but-- no trouble sleeping,-- no appetite changes,-- no changes in weight,-- no fever,-- no night sweats-- and-- no frequent infections  Respiratory: shortness of breath, but-- no cough/sputum,-- no wheezing,-- no phlegm-- and-- no hemoptysis  HEENT: No complaints of serious problems, hearing problems, nose problems, throat problems, or snoring  Gastrointestinal: heartburn, but-- no liver problems,-- no nausea,-- no vomiting,-- no bloody stools,-- no diarrhea,-- no constipation-- and-- no rectal bleeding      Hematologic: No complaints of bleeding disorders, anemia, blood clots, or excessive brusing        Neurological: headaches-- and-- dizziness, but-- no numbness,-- no tingling,-- no weakness,-- no seizures,-- no diplopia-- and-- no daytime sleepiness      Musculoskeletal: No complaints of arthritis, back pain, or painfull swelling  ROS reviewed  Active Problems   1  Encounter for supervision of normal first pregnancy in first trimester (V22 0) (Z34 01)   2  Encounter for ultrasound to determine fetal location (V28 3) (Z36 89)   3  Family planning (V25 09) (Z30 09)   4  IUD contraception (V45 51) (Z97 5)   5  Obesity (BMI 30-39 9) (278 00) (E66 9)   6  Presence of contraceptive device (V45 59) (Z30 9)   7  Manoj-Parkinson-White (WPW) syndrome (426 7) (I45 6)    Past Medical History    · History of Exposure to chlamydia (V01 6) (Z20 2)   · History of Manoj-Parkinson-White (WPW) syndrome (V12 59) (Z86 79)   · History of Sore throat (462) (J02 9)   · History of Yeast infection (112 9) (B37 9)     The active problems and past medical history were reviewed and updated today  Surgical History    · History of Catheter Ablation   · History of Colposcopy   · History of Complete Colonoscopy   · History of Surgically Induced      The surgical history was reviewed and updated today  Family History   Mother    · Family history of Bipolar Disorder   · Family history of Hypertension (V17 49)  Father    · Family history of Glucose Intolerance   · Family history of Hyperlipidemia   · Family history of Hypertension (V17 49)  Maternal Grandmother    · Family history of Breast Cancer (V16 3)  Paternal Grandmother    · Family history of Crohn's Disease  Paternal Grandfather    · Family history of Prostate Cancer (V16 42)  Paternal Aunt    · Family history of Colon Cancer (V16 0)  Family History Reviewed: The family history was reviewed and updated today         Social History    · Being A Social Drinker   · Denied: History of Drug Use   · Family planning (V25 09) (Z30 09)   · IUD contraception (V45 51) (Z97 5)   · Marital History - Single   · Never A Smoker   · Presence of contraceptive device (V45 59) (Z30 9)  The social history was reviewed and updated today  The social history was reviewed and is unchanged  Current Meds    1  Prenatal One Daily 27-0 8 MG Oral Tablet; TAKE 1 TABLET DAILY AS DIRECTED; Therapy: 70Vnn2684 to (Evaluate:2017); Last Rx:23Jzt8823 Ordered     The medication list was reviewed and updated today  Allergies   1  Codeine Derivatives   2  Erythromycin Base TABS   3  Sulfa Drugs    Vitals   Signs   Heart Rate: 80  Pulse Quality: Normal, L Radial  Respiration Quality: Normal  Respiration: 16  Systolic: 279  Diastolic: 68  Height: 5 ft 2 in  Weight: 203 lb 4 oz  BMI Calculated: 37 18  BSA Calculated: 1 92    Physical Exam        Constitutional      General appearance: No acute distress, well appearing and well nourished  Eyes      Conjunctiva and Sclera examination: Conjunctiva pink, sclera anicteric  Ears, Nose, Mouth, and Throat - Oropharynx: Clear, nares are clear, mucous membranes are moist       Neck      Neck and thyroid: Normal, supple, trachea midline, no thyromegaly  Pulmonary      Auscultation of lungs: Clear to auscultation, no rales, no rhonchi, no wheezing, good air movement  Cardiovascular      Auscultation of heart: Normal rate and rhythm, normal S1 and S2, no murmurs  Carotid pulses: Normal, 2+ bilaterally  Pedal pulses: Normal, 2+ bilaterally  Examination of extremities for edema and/or varicosities: Normal        Abdomen      Abdomen: Abnormal  -- pregnant    no mass or tenderness  Liver and spleen: No hepatomegaly or splenomegaly  Future Appointments      Date/Time Provider Specialty Site   2018 08:00 AM  Danish, Schedule  [facility]     End of Encounter Meds   1  Prenatal One Daily 27-0 8 MG Oral Tablet; TAKE 1 TABLET DAILY AS DIRECTED; Therapy: 54Aps5552 to (Evaluate:2017);  Last Rx: 94Omy8842 Ordered    Signatures    Electronically signed by : EUNICE Torres ; Jan 2 2018  4:00PM EST                       (Author)

## 2018-01-05 LAB — HPV RRNA GENITAL QL NAA+PROBE: ABNORMAL

## 2018-01-06 LAB
LAB AP GYN PRIMARY INTERPRETATION: NORMAL
Lab: NORMAL
PATH INTERP SPEC-IMP: NORMAL

## 2018-01-07 ENCOUNTER — GENERIC CONVERSION - ENCOUNTER (OUTPATIENT)
Dept: OTHER | Facility: OTHER | Age: 33
End: 2018-01-07

## 2018-01-09 NOTE — PROGRESS NOTES
Chief Complaint  Pt presents today for a hcg, progesterone and t&s  LMP 9/30  Pt had initial hcg at pcp  Given order to get dating u/s done as well  Pt aware we will call her with results  Active Problems    1  Abdominal pain, RLQ (right lower quadrant) (789 03) (R10 31)   2  Amenorrhea (626 0) (N91 2)   3  Depression with anxiety (300 4) (F41 8)   4  Eczema (692 9) (L30 9)   5  Encounter for IUD removal (V25 12) (Z30 432)   6  Encounter for ultrasound to determine fetal location (V28 3) (Z36 89)   7  Family planning (V25 09) (Z30 09)   8  Flu vaccine need (V04 81) (Z23)   9  Increased frequency of urination (788 41) (R35 0)   10  IUD contraception (V45 51) (Z97 5)   11  Obesity (BMI 30-39 9) (278 00) (E66 9)   12  Painful urination (788 1) (R30 9)   13  Pap smear abnormality of cervix with LGSIL (795 03) (R87 612)   14  Presence of contraceptive device (V45 59) (Z30 9)   15  Screening for STD (sexually transmitted disease) (V74 5) (Z11 3)   16  Urinary tract infection, site unspecified (599 0) (N39 0)   17  Vaginal irritation (623 9) (N89 8)   18  Manoj-Parkinson-White (WPW) syndrome (426 7) (I45 6)    Current Meds   1  Ciprofloxacin HCl - 500 MG Oral Tablet; Take 1 po bid for 2 days; Therapy: 18SHM3384 to (Last Rx:07Oct2017)  Requested for: 52OZE0797 Ordered   2  Ciprofloxacin HCl - 500 MG Oral Tablet; Take 1 po bid for 3 days; Therapy: 29MVQ8199 to (Last Rx:10Oct2017)  Requested for: 56NWL2480 Ordered   3  Escitalopram Oxalate 5 MG Oral Tablet; Take 1 tablet daily; Therapy: 50KAE0330 to (Evaluate:08Mar2017); Last Rx:80Aoe6687 Ordered   4  LORazepam 0 5 MG Oral Tablet; Take 1/2 to 1 tablet po up to TID prn anxiety; Therapy: 09VKH7225 to (Last Rx:77Wxh3724) Ordered   5  Mirena IUD; Therapy: (Recorded:01Yav8172) to Recorded   6   Nitrofurantoin Monohyd Macro 100 MG Oral Capsule (Macrobid); TAKE 1 CAPSULE   TWICE DAILY UNTIL GONE;   Therapy: 87BSS2002 to (Evaluate:30Mqo3878)  Requested for: 33Ozx6137; Last   Rx:50Mxv8253 Ordered   7  Nystatin-Triamcinolone 531966-6 1 UNIT/GM-% External Cream; APPLY SPARINGLY TO   AFFECTED AREA(S) TWICE DAILY; Therapy: 27YIY6763 to (Evaluate:10Aug2017)  Requested for: 98Gnl8588; Last   Rx:55Uzu4467 Ordered   8  Prenatal One Daily 27-0 8 MG Oral Tablet; TAKE 1 TABLET DAILY AS DIRECTED; Therapy: 76Gvw8909 to (Evaluate:16Oct2017); Last Rx:42Tdv2301 Ordered   9  Saxenda 18 MG/3ML Subcutaneous Solution Pen-injector; Inject 0 6 mg SC once daily x   7 days, 1 2 mg SC daily for week 2, 1 8 mg daily for week 3,   2 4 mg daily for week 4, then increase to 3 mg; Therapy: 16WTS8893 to ([phone])  Requested for: 03MBC5748; Last   Rx:08Jun2017; Status: ACTIVE - Transmit to Pharmacy - Awaiting Verification Ordered    Allergies    1  Codeine Derivatives   2  Erythromycin Base TABS   3  Sulfa Drugs    Plan  Encounter for ultrasound to determine fetal location    · US OB < 14 WEEKS SINGLE OR FIRST DESTINATION LEVEL 1; Status:Hold For -  Scheduling,Retrospective By Protocol Authorization;  Requested MGD:20UZQ9659;     Signatures   Electronically signed by : EUNICE Schwab ; Nov 21 2017  5:16PM EST

## 2018-01-09 NOTE — MISCELLANEOUS
Message  Pt called she is c/o of external itching and redness just had cultures done all negative no d/c, no odor  also c/o urinary frequency which is showing leukocytes and blood will treat for UTI  Will give her external cream to use as well  Active Problems    1  Depression with anxiety (300 4) (F41 8)   2  Eczema (692 9) (L30 9)   3  IUD contraception (V45 51) (Z97 5)   4  Obesity (BMI 30-39 9) (278 00) (E66 9)   5  Painful urination (788 1) (R30 9)   6  Pap smear abnormality of cervix with LGSIL (795 03) (R87 612)   7  Presence of contraceptive device (V45 59) (Z30 9)   8  Screening for STD (sexually transmitted disease) (V74 5) (Z11 3)   9  Vaginal irritation (623 9) (N89 8)   10  Manoj-Parkinson-White (WPW) syndrome (426 7) (I45 6)    Current Meds   1  Escitalopram Oxalate 5 MG Oral Tablet; Take 1 tablet daily; Therapy: 68DLG9980 to (Evaluate:08Mar2017); Last Rx:65Yot4415 Ordered   2  LORazepam 0 5 MG Oral Tablet; Take 1/2 to 1 tablet po up to TID prn anxiety; Therapy: 22IWZ7892 to (Last Rx:54Pnb0936) Ordered   3  Mirena IUD; Therapy: (Recorded:72Dwt7587) to Recorded   4  Saxenda 18 MG/3ML Subcutaneous Solution Pen-injector; Inject 0 6 mg SC once daily x   7 days, 1 2 mg SC daily for week 2, 1 8 mg daily for week 3,   2 4 mg daily for week 4, then increase to 3 mg; Therapy: 04GDD9127 to (William Carrillolpine)  Requested for: 07DRE6751; Last   Rx:08Jun2017; Status: ACTIVE - Transmit to Pharmacy - Awaiting Verification Ordered    Allergies    1  Codeine Derivatives   2  Erythromycin Base TABS   3   Sulfa Drugs    Signatures   Electronically signed by : Rafa Diaz, ; Jul 26 2017 11:27AM EST                       (Author)

## 2018-01-10 NOTE — RESULT NOTES
Verified Results  (1) CHLAMYDIA/GC AMPLIFIED DNA, PCR 17GZP1135 65[facility]     Test Name Result Flag Reference   CHLAMYDIA,AMPLIFIED DNA PROBE   C  trachomatis Amplified DNA Negative   C  trachomatis Amplified DNA Negative   N  GONORRHOEAE AMPLIFIED DNA   N  gonorrhoeae Amplified DNA Negative   N  gonorrhoeae Amplified DNA Negative

## 2018-01-10 NOTE — RESULT NOTES
Verified Results  US OB LESS THAN 14 WKS WITH TRANSVAGINAL 90AZL1746 01:46PM Ana Quiroga Order Number: [id number]    - Patient Instructions: To schedule this appointment, please contact Central Scheduling at [phone]  Test Name Result Flag Reference   US OB LESS THAN 14 WEEKS WITH TRANSVAGINAL (Report)     FIRST TRIMESTER OBSTETRIC ULTRASOUND, COMPLETE     INDICATION:  screening, pain  LMP is 2017     COMPARISON: None  TECHNIQUE:  Transabdominal ultrasound of the pelvis was performed  Additional transvaginal imaging was then performed to better assess the gestation, myometrial/endometrial architecture and ovarian parenchymal detail  The study includes volumetric    sweeps and traditional still imaging technique  FINDINGS:     A single live intrauterine gestation is identified  Cardiac activity is present  Heart rate of 160 bpm      YOLK SAC: Present and normal in size and appearance  MEAN GESTATIONAL SAC SIZE: 30 mm = 7 weeks 6 days    MEAN CROWN RUMP LENGTH: 16 mm = 8 weeks 0 days    FETAL ANATOMY: Appropriate for gestational age  AMNIOTIC FLUID/SAC SHAPE: Within expected normal range  PLACENTA: The placenta is appropriate for gestational age  There is no significant subchorionic fluid collection  UTERUS/ADNEXA:    The uterus and ovaries are within normal limits  Right ovarian corpus luteal cyst    The cervix remains closed  No free fluid present  IMPRESSION:     Single live intrauterine gestation at 8 weeks 0 days (range +/- 4)  FIONA of 7/3/2018         Workstation performed: [id number]     Signed by:   Yakelin Gómez MD   17

## 2018-01-10 NOTE — RESULT NOTES
Verified Results  (1) HCG QUANT 09TVF4481 10:14AM Duglas Serrano    Order Number: [id number]     Test Name Result Flag Reference   HCG QUANTITATIVE 2489 mIU/mL H <=6   Expected Ranges:     Approximate               Approximate HCG  Gestation age          Concentration ( mIU/mL)  _____________          ______________________   Elizabeth Schuyler                      HCG values  0 2-1                       5-50  1-2                           2-3                         100-5000  3-4                         500-34350  4-5                         1000-27841  5-6                         07878-045206  6-8                         21708-539522  8-12                        77761-375142

## 2018-01-10 NOTE — RESULT NOTES
Verified Results  (1) URINE CULTURE 00Gyl3812 09:44AM Dee Samuels   TW Order Number: [id number]     Test Name Result Flag Reference   CLINICAL REPORT (Report)     Test:        Urine culture  Specimen Type:   Urine  Specimen Date:   9/26/2017 9:44 AM  Result Date:    9/27/2017 1:59 PM  Result Status:   Final result  Resulting Lab:   [place] [address]            Tel: [phone]      CULTURE                                       ------------------                                   No Growth <1000 cfu/mL

## 2018-01-10 NOTE — RESULT NOTES
Verified Results  (1) HCG QUANT 54CSW2518 01:58PM Arthea Hy     Test Name Result Flag Reference   HCG QUANTITATIVE 28200 mIU/mL H <=6   Expected Ranges:     Approximate               Approximate HCG  Gestation age          Concentration ( mIU/mL)  _____________          ______________________   [place]                      HCG values  0 2-1                       5-50  1-2                           2-3                         100-5000  3-4                         500-77423  4-5                         1000-29820  5-6                         50210-685159  6-8                         44037-205627  8-12                        81483-985598     (1) PROGESTERONE 74GSZ3828 01:58PM Inocencia Cuello     Test Name Result Flag Reference   PROGESTERONE 16 70 ng/mL     Patients taking DHEA-S may have falsely elevated Progesterone levels  Recommend ordering Progesterone, [facility] [id number] to be done by [facility]  PROGESTERONE:     Serum progesterone 5-25 ng/mL should not be the sole determinant in excluding pregnancy  Menstruating Females:    Follicular phase 7 109-7 16 ng/mL   Luteal phase 2 25-24 2 ng/mL   Mid-luteal phase 8 76-21 6 ng/mL   Postmenopausal Females <0 200-0 901 ng/mL     Pregnant Females:   First trimester of pregnancy 11 4-41 0 ng/mL   Second trimester of pregnancy 13 8-156 ng/mL   Third trimester of pregnancy 51 4->200 ng/mL     (1) TYPE & SCREEN 94KEC4714 01:58PM Arthea Hy     Test Name Result Flag Reference   ABO GROUPING A     RH FACTOR Positive     ANTIBODY SCREEN Negative     SPECIMEN EXPIRATION DATE [id number]

## 2018-01-10 NOTE — RESULT NOTES
Verified Results  (1) URINE CULTURE [id number] 03:09PM Mel Coronel     Test Name Result Flag Reference   CLINICAL REPORT (Report)     Test:        Urine culture  Specimen Source:  Urine, Unspecified Source  Specimen Type:   Urine  Specimen Date:   3/29/2016 3:09 PM  Result Date:    3/30/2016 1:45 PM  Result Status:   Final result  Resulting Lab:   [place] [address]            Tel: [phone]                 CULTURE                                       ------------------                                   No Growth <1000 cfu/mL

## 2018-01-11 NOTE — RESULT NOTES
Verified Results  (1) TISSUE EXAM [id number] 70:29IP Mikhail Berkowitz     Test Name Result Flag Reference   LAB AP CASE REPORT (Report)     Surgical Pathology Report             Case: [id number]                   Authorizing Provider: BRYANT Reyes    Collected:      12/08/2016 1607        Pathologist:      Marcy Holden MD    Received:      12/12/2016 1026        Specimen:  Cervix, Cervical biopsy at 5 o'clock position   LAB AP FINAL DIAGNOSIS      A  Cervix, 5 o'clock, biopsy:    - Low grade squamous intraepithelial lesion (LGSIL, CED I, mild   dysplasia) with HPV cytopathic effect  Electronically signed by Marcy Holden MD on 12/14/2016 at 11:18 AM   LAB AP SURGICAL ADDITIONAL INFORMATION (Report)     These tests were developed and their performance characteristics   determined by [facility] or [facility]  They may not be cleared or approved by the [place]  Food and   Drug Administration  The FDA has determined that such clearance or   approval is not necessary  These tests are used for clinical purposes  They should not be regarded as investigational or for research  This   laboratory has been approved by [place] [address], designated as a high-complexity   laboratory and is qualified to perform these tests  - Interpretation performed at [address] [place] [address], [address] [place] [address]   LAB [facility] (Report)     A  The specimen is received in formalin, labeled with the patient's name   and hospital number, and is designated cervical biopsy at five o'clock   position, is a single irregularly shaped fragment of tan-brown and   glistening soft tissue measuring 0 4 cm in greatest dimension  Entirely   submitted  One cassette  Note: The estimated total formalin fixation time based upon information   provided by the submitting clinician and the standard processing schedule   is over 72 hours      RLR

## 2018-01-11 NOTE — RESULT NOTES
Verified Results  (1) SPECIAL TEST [id number] 07:15AM Reinaldo Jerihouse     Test Name Result Flag Reference   TEST RESULT      SEE WRITTEN REPORT FROM [facility]

## 2018-01-11 NOTE — RESULT NOTES
Verified Results  (1) HIV AG/AB Susi Maynard GEN [id number] 78:76FI Amandatiff Holloway   TW Order Number: [id number]     Test Name Result Flag Reference   HIV 1/2 AND P24 Non-Reactive  Non-Reactive   This test detects HIV 1, HIV2 and p24 Antigen       (1) RPR [id number] 58:90NK Naveen Payment Order Number: [id number]     Test Name Result Flag Reference   RPR Non-Reactive  Non-Reactive

## 2018-01-11 NOTE — RESULT NOTES
Verified Results  (1) HEMOGLOBIN A1C 74BLP8819 03:57PM Prachi Mata Order Number: [id number]     Test Name Result Flag Reference   HEMOGLOBIN A1C 5 0 %  4 2-6 3   EST  AVG   GLUCOSE 97 mg/dl

## 2018-01-11 NOTE — RESULT NOTES
Verified Results  (1) URINALYSIS w URINE C/S REFLEX (will reflex a microscopy if leukocytes, occult blood, or nitrites are not within normal limits) [id number] 62:03IM Felicia Avilez     Test Name Result Flag Reference   COLOR Yellow     CLARITY Cloudy     PH UA 6 5  4 5-8 0   LEUKOCYTE ESTERASE UA Large A Negative   NITRITE UA Negative  Negative   PROTEIN UA Negative mg/dl  Negative   GLUCOSE UA Negative mg/dl  Negative   KETONES UA Negative mg/dl  Negative   UROBILINOGEN UA 0 2 E U /dl  0 2, 1 0 E U /dl   BILIRUBIN UA Negative  Negative   BLOOD UA Negative  Negative   SPECIFIC GRAVITY UA 1 025  1 003-1 030     (1) URINALYSIS w URINE C/S REFLEX (will reflex a microscopy if leukocytes, occult blood, or nitrites are not within normal limits) [id number] 04:30WL Felicia Avilez     Test Name Result Flag Reference   BACTERIA Occasional /hpf  None Seen, Occasional   EPITHELIAL CELLS Occasional /hpf  None Seen, Occasional   RBC UA None Seen /hpf  None Seen   WBC UA 4-10 /hpf A None Seen     (1) URINALYSIS w URINE C/S REFLEX (will reflex a microscopy if leukocytes, occult blood, or nitrites are not within normal limits) [id number] 95:33JU Felicia Avilez     Test Name Result Flag Reference   CLINICAL REPORT (Report)     Test:        Urine culture  Specimen Type:   Urine  Specimen Date:   7/18/2016 7:16 PM  Result Date:    7/19/2016 6:21 PM  Result Status:   Final result  Resulting Lab:    [facility]            Tel: [phone]      CULTURE                                       ------------------                                   20,000-29,000 cfu/ml Mixed Contaminants X3     Kettenis- POC [id number] 42:02TJ Felicia Avilez     Test Name Result Flag Reference   CLUE CELL Pos     TRICHOMONAS Neg     YEAST WITH HYPHAE Neg     KOH PREP Pos whiff       Urine Dip Non-Automated- POC [id number] 11:80MI Felicia Avilez     Test Name Result Flag Reference Color Padma     Clarity Hazy     Leukocytes Pos +2     Nitrite Neg     Blood Neg     Bilirubin Neg     Urobilinogen Neg     Protein Neg     Ketone Neg     Glucose Neg         Plan  UTI (urinary tract infection), Vaginal discharge, Vaginal odor    · (1) URINALYSIS w URINE C/S REFLEX (will reflex a microscopy if leukocytes, occult  blood, or nitrites are not within normal limits); Status:Active; Requested for:04Alx7318;   Vaginal discharge, Vaginal odor    · *(Q) SURESWAB(R), VAGINOSIS/VAGINITIS PLUS; Status:Active;  Requested  for:36Wyh9344;

## 2018-01-11 NOTE — MISCELLANEOUS
Message  PC to pt  -- discussed culture results which do confirm BV  STD tests are negative  Pt  states that she is feeling better overall  Plan  UTI (urinary tract infection), Vaginal discharge, Vaginal odor    · (1) URINALYSIS w URINE C/S REFLEX (will reflex a microscopy if leukocytes, occult  blood, or nitrites are not within normal limits); Status:Active; Requested for:49Bjz9116;   Vaginal discharge, Vaginal odor    · *(Q) SURESWAB(R), VAGINOSIS/VAGINITIS PLUS; Status:Active;  Requested  for:30Bxo4034;     Signatures   Electronically signed by : Kiara Rice; Jul 22 2016  2:03PM EST                       (Author)

## 2018-01-12 VITALS — BODY MASS INDEX: 35.59 KG/M2 | WEIGHT: 193.38 LBS | HEIGHT: 62 IN

## 2018-01-12 NOTE — PROGRESS NOTES
Assessment    1  Bacterial vaginitis (616 10,041 9) (N76 0,B96 89)    Plan  Bacterial vaginitis    · Tinidazole 500 MG Oral Tablet; TAKE 4 TABLETS ONCE TODAY 4 TABLETS  TOMORROW   Rx By: Janet Severance; Dispense: 2 Days ; #:8 Tablet; Refill: 0; For: Bacterial vaginitis; HOA = N; Verified Transmission to [address] [place] [address]; Msg to Pharmacy: Pt  to avoid alcohol with treatment ; Last Updated By: System, [employer]; 3/10/2017 10:45:45 AM   · Abdifatah [place]urn [place]- POC; Status:Resulted - Requires Verification;   Done: 02HGB8101 10:30AM   Performed: In Office; TRT:46DAU1920;XGFWDPJ; Today; For:Bacterial vaginitis; Ordered By:Tammy Piper;  UTI (urinary tract infection)    · (1) URINE CULTURE; Source:Urine, Clean Catch; Status: In Progress - Specimen/Data  Collected;   Done: 41ADF5670   Perform:[facility] In Office Collection; KCO:00FON8211; Ordered; For:UTI (urinary tract infection); Ordered By:Martha Lowe;  Vaginal discharge    · (B) VAGINITIS/VAGINOSIS PROFILE W/O PAP, W/O HPV PLUS; Status:Active -  Retrospective By Protocol Authorization; Requested for:10Mar2017;    Perform:[employer]; Due:10Mar2018; Last Updated By:Josh Barros; 3/10/2017 11:08:18 AM;Ordered; For:Vaginal discharge; Ordered By:Sushma Piper;    Discussion/Summary  GYN Discussion and Summary:   Vaginitis-- Impression: bacterial vaginosis  Currently, the condition is unchanged  The differential diagnosis includes candidal vaginitis, bacterial vaginosis, trichomonal vaginitis and sexually transmitted disease  The diagnostic plan includes wet prep  Medication changes are as documented in orders  Patient discussion: discussed with the patient, 50 % of visit was spent in face to face counseling minute visit, greater than half of the time was spent on counseling, Discussed WM results ,treatment, STD concerns and she is aware that the culture results are pending                          Chief Complaint  Chief Complaint Free Text Note Form: Patient presents today c/o vaginal discharge & odor      History of Present Illness  HPI: The pt  relates that over the past several weeks she has again been experiencing a yellowish discharge which is associated with odor  She also had some urinary frequency and did a urine dip at work which did show positive for leukocytes  She is waiting for the urine culture results prior to starting any treatment  She is requesting repeat STD cultures because her partner was unfaithful  Review of Systems   Female ROS: vaginal discharge, vaginal odor and urinary frequency  Focused-Female:   Constitutional: No fever, no chills, feels well, no tiredness, no recent weight gain or loss  Genitourinary: vaginal discharge and vaginal odor, but as noted in HPI  Active Problems    1  Bacterial vaginitis (616 10,041 9) (N76 0,B96 89)   2  Depression with anxiety (300 4) (F41 8)   3  Eczema (692 9) (L30 9)   4  Encounter for gynecological examination with Papanicolaou smear of cervix   (V72 31,V76 2) (Z01 419,Z12 4)   5  Encounter for medical assessment (V70 9) (Z00 8)   6  Fatigue (780 79) (R53 83)   7  Increased frequency of urination (788 41) (R35 0)   8  IUD contraception (V45 51) (Z97 5)   9  Pap smear abnormality of cervix with LGSIL (795 03) (R87 612)   10  Presence of contraceptive device (V45 59) (Z30 9)   11  Screening for STD (sexually transmitted disease) (V74 5) (Z11 3)   12  Screening for STD (sexually transmitted disease) (V74 5) (Z11 3)   13  Skin rash (782 1) (R21)   14  Upper respiratory infection (465 9) (J06 9)   15  UTI (urinary tract infection) (599 0) (N39 0)   16  Vaginal discharge (623 5) (N89 8)   17  Vaginal odor (625 8) (N89 8)   18  Weight gain (783 1) (R63 5)   19  Manoj-Parkinson-White (WPW) syndrome (426 7) (I45 6)    Past Medical History    1  History of Manoj-Parkinson-White (WPW) syndrome (V12 59) (Z86 79)  Active Problems And Past Medical History Reviewed:    The active problems and past medical history were reviewed and updated today  Surgical History    1  History of Catheter Ablation   2  History of Colposcopy   3  History of Complete Colonoscopy   4  History of Surgically Induced     Family History  Mother    1  Family history of Bipolar Disorder   2  Family history of Hypertension (V17 49)  Father    3  Family history of Glucose Intolerance   4  Family history of Hyperlipidemia   5  Family history of Hypertension (V17 49)  Maternal Grandmother    6  Family history of Breast Cancer (V16 3)  Paternal Grandmother    9  Family history of Crohn's Disease  Paternal Grandfather    6  Family history of Prostate Cancer (V16 42)  Paternal Aunt    5  Family history of Colon Cancer (V16 0)    Social History    · Being A Social Drinker   · Denied: History of Drug Use   · IUD contraception (V45 51) (Z97 5)   · Marital History - Single   · Never A Smoker   · Presence of contraceptive device (V45 59) (Z30 9)  Social History Reviewed: The social history was reviewed and is unchanged  Current Meds   1  Escitalopram Oxalate 5 MG Oral Tablet; Take 1 tablet daily; Therapy: 46IAJ1242 to (Evaluate:2017); Last Rx:88Lxf1731 Ordered   2  Hydrocortisone 2 5 % External Cream; APPLY TO AFFECTED AREA TWICE DAILY AS   DIRECTED; Therapy: 70Uyz9514 to (Evaluate:2016)  Requested for: 2016; Last   Rx:03Uwo4608 Ordered   3  LORazepam 0 5 MG Oral Tablet; Take 1/2 to 1 tablet po up to TID prn anxiety; Therapy: 41MLM5245 to (Last Rx:35Mha6637) Ordered   4  Mirena IUD; Therapy: (Recorded:64Jqo9250) to Recorded  Medication List Reviewed: The medication list was reviewed and updated today  Allergies    1  Codeine Derivatives   2  Erythromycin Base TABS   3   Sulfa Drugs    Vitals  Vital Signs    Recorded: 67SRJ7977 00:38BX   Systolic 460   Diastolic 76   Height 5 ft 1 5 in   Weight 203 lb    BMI Calculated 37 74   BSA Calculated 1 91     Physical Exam    Constitutional   General appearance: No acute distress, well appearing and well nourished  Genitourinary   External genitalia: Normal and no lesions appreciated  Vagina: Abnormal   Small amount of tannish discharge  Urethra: Normal     Urethral meatus: Normal     Bladder: Normal, soft, non-tender and no prolapse or masses appreciated  Cervix: Normal, no palpable masses  IUD string noted at os  Uterus: Normal, non-tender, not enlarged, and no palpable masses  Adnexa/parametria: Normal, non-tender and no fullness or masses appreciated  Psychiatric   Orientation to person, place, and time: Normal     Mood and affect: Normal        Health Management  History of Cervical cancer screening   (1) THIN PREP PAP WITH IMAGING; every 1 year; Last 17WDK0200; Next Due: 73JHI5338;  Active    Signatures   Electronically signed by : Luis Maya; Mar 10 2017 11:25AM EST                       (Author)    Electronically signed by : Rosalie Jones DO; Mar 12 2017  7:29PM EST

## 2018-01-12 NOTE — RESULT NOTES
Verified Results  Kettenis- POC 38SBF7174 54:01VW Azyan Cevallos     Test Name Result Flag Reference   CLUE CELL Pos     TRICHOMONAS Neg     YEAST WITH HYPHAE Neg     KOH PREP Pos whiff         Plan  Bacterial vaginitis    · Tinidazole 500 MG Oral Tablet; TAKE 4 TABLETS ONCE TODAY 4 TABLETS  TOMORROW   · Wet Mount- POC; Status:Complete;   Done: 94JQB3802 10:30AM  UTI (urinary tract infection)    · (1) URINE CULTURE; Source:Urine, Clean Catch; Status: In Progress - Specimen/Data  Collected;   Done: 99DGC1760  Vaginal discharge    · (B) VAGINITIS/VAGINOSIS PROFILE W/O PAP, W/O HPV PLUS; Status:Active;   Requested for:10Mar2017;

## 2018-01-13 VITALS
HEIGHT: 62 IN | SYSTOLIC BLOOD PRESSURE: 110 MMHG | WEIGHT: 196 LBS | DIASTOLIC BLOOD PRESSURE: 88 MMHG | BODY MASS INDEX: 36.07 KG/M2 | TEMPERATURE: 98.6 F

## 2018-01-13 VITALS
SYSTOLIC BLOOD PRESSURE: 118 MMHG | WEIGHT: 208.13 LBS | TEMPERATURE: 100 F | BODY MASS INDEX: 38.3 KG/M2 | HEIGHT: 62 IN | DIASTOLIC BLOOD PRESSURE: 70 MMHG

## 2018-01-13 VITALS
DIASTOLIC BLOOD PRESSURE: 76 MMHG | HEIGHT: 62 IN | WEIGHT: 203 LBS | BODY MASS INDEX: 37.36 KG/M2 | SYSTOLIC BLOOD PRESSURE: 110 MMHG

## 2018-01-13 VITALS
SYSTOLIC BLOOD PRESSURE: 108 MMHG | BODY MASS INDEX: 37.73 KG/M2 | WEIGHT: 205 LBS | HEIGHT: 62 IN | DIASTOLIC BLOOD PRESSURE: 76 MMHG

## 2018-01-13 NOTE — RESULT NOTES
Verified Results  (1) VAGINITIS/ VAGINOSIS, DNA ( AFFIRM) 70Kwg5877 07:31AM Roslyn Del Real    Order Number: [id number]     Test Name Result Flag Reference   CANDIDA SPECIES Negative  Negative   GARDNERELLA VAGINALIS Negative  Negative   TRICHOMONAS VAGINALIS Negative  Negative   Performed at:  [facility] [place] [address]  [id number]  : Kaitlynn Romeo MD, Phone:  [phone]

## 2018-01-13 NOTE — MISCELLANEOUS
Message  PC to pt  to discuss results of cultures  Pt  informed that the culture did show BV and she states that she is doing better since she was treated  All other tests are negative        Signatures   Electronically signed by : BRYANT Carbajal; Mar 17 2017  1:17PM EST                       (Author)

## 2018-01-13 NOTE — RESULT NOTES
Verified Results  (1) CHLAMYDIA/GC AMPLIFIED DNA, PCR 78FFT9661 20:72NQ [facility]     Test Name Result Flag Reference   CHLAMYDIA,AMPLIFIED DNA PROBE   C  trachomatis Amplified DNA Negative   C  trachomatis Amplified DNA Negative   N  GONORRHOEAE AMPLIFIED DNA   N  gonorrhoeae Amplified DNA Negative   N  gonorrhoeae Amplified DNA Negative

## 2018-01-13 NOTE — RESULT NOTES
Verified Results  (1) URINALYSIS w URINE C/S REFLEX (will reflex a microscopy if leukocytes, occult blood, or nitrites are not within normal limits) 27Oct2016 08:41AM Pippa Benavides    Order Number: [id number]     Test Name Result Flag Reference   COLOR      This is a corrected result  Previous result was Yellow on 10/27/2016 at 1542 EDT   This is a corrected result  Previous result was Yellow on 10/27/2016 at 1542 EDT   CLARITY      This is a corrected result  Previous result was Turbid on 10/27/2016 at 1542 EDT   This is a corrected result  Previous result was Turbid on 10/27/2016 at 1542 EDT   [place] [address] UA   4 5-8 0   This is a corrected result  Previous result was 5 0 on 10/27/2016 at 1542 EDT   This is a corrected result  Previous result was 5 0 on 10/27/2016 at 1542 EDT   LEUKOCYTE ESTERASE UA   Negative   This is a corrected result  Previous result was Negative on 10/27/2016 at 1542 EDT   This is a corrected result  Previous result was Negative on 10/27/2016 at 1542 EDT   NITRITE UA   Negative   This is a corrected result  Previous result was Negative on 10/27/2016 at 1542 EDT   This is a corrected result  Previous result was Negative on 10/27/2016 at 1542 EDT   PROTEIN UA   Negative   This is a corrected result  Previous result was Negative mg/dl on 10/27/2016 at 1542 EDT mg/dl   This is a corrected result  Previous result was Negative mg/dl on 10/27/2016 at 1542 EDT   GLUCOSE UA   Negative   This is a corrected result  Previous result was Negative mg/dl on 10/27/2016 at 1542 EDT mg/dl   This is a corrected result  Previous result was Negative mg/dl on 10/27/2016 at 1542 EDT   KETONES UA   Negative   This is a corrected result  Previous result was Negative mg/dl on 10/27/2016 at 1542 EDT mg/dl   This is a corrected result  Previous result was Negative mg/dl on 10/27/2016 at 1542 EDT   UROBILINOGEN UA   0 2, 1 0 E U /dl   This is a corrected result   Previous result was 0 2 E U /dl on 10/27/2016 at 1542 EDT E U /dl   This is a corrected result  Previous result was 0 2 E U /dl on 10/27/2016 at 1542 EDT   BILIRUBIN UA   Negative   This is a corrected result  Previous result was Negative on 10/27/2016 at 1542 EDT   This is a corrected result  Previous result was Negative on 10/27/2016 at 1542 EDT   BLOOD UA   Negative   This is a corrected result  Previous result was Negative on 10/27/2016 at 1542 EDT   This is a corrected result  Previous result was Negative on 10/27/2016 at 1542 EDT   SPECIFIC GRAVITY UA   1 003-1 030   This is a corrected result  Previous result was 1 023 on 10/27/2016 at 1542 EDT   This is a corrected result   Previous result was 1 023 on 10/27/2016 at 1542 EDT

## 2018-01-13 NOTE — PROGRESS NOTES
Assessment   1  Depression with anxiety (300 4) (F41 8)    Plan  Depression with anxiety    · Start: Escitalopram Oxalate 5 MG Oral Tablet; Take 1 po daily for 2 weeks then increase  to 2 tabs po daily   · Start: LORazepam 0 5 MG Oral Tablet; Take 1/2 to 1 tablet po up to TID prn anxiety    Discussion/Summary    Patient is here to discuss recent increased depression and anxiety  She has been on Lexapro and Zoloft in the past  She states that her pattern is to discontinue medication as soon as she feels better  We discussed need for possibly a longer trial of medication  I've given her a prescription for Lexapro 5 mg to take one daily for the next 2 weeks  At that point she may increase to 2 per day or stay with one per day  Also gave her a small prescription for Ativan and she will use a half to one tablet as needed for anxiety  We did discuss addictive potential and drowsiness side effects  Warned her not to mix it with alcohol  I also 1  recommended increased exercise such as regular walks  Recommended [employer]'s free guided meditations online  1  We'll plan to recheck in about a month or sooner as needed  1 Amended By: Josefina Summers; Jul 19 2016 3:02 PM EST    Chief Complaint  discuss depressiom      History of Present Illness  HPI: Pt feeling depressed tried treatment in past  Recently cut herself because she was so frustrated  States it is a superficial cut of the left thigh  No SI does not feel worried she will cut herself again  Lots of anxiety and fatigue  Her son's dad cut off communication in December  Has boyfriend and good relationship  Planning for her and her son to move back in with parents to save up to buy home with her boyfriend  Strong FH depression  Her mom is bipolar    Review of Systems    Constitutional: No fever, no chills, feels well, no tiredness, no recent weight gain or loss     Cardiovascular: no complaints of slow or fast heart rate, no chest pain, no palpitations, no leg claudication or lower extremity edema  Respiratory: no complaints of shortness of breath, no wheezing, no dyspnea on exertion, no orthopnea or PND  Active Problems   1  Arthralgia (719 40) (M25 50)  2  Bacterial vaginitis (616 10,041 9) (N76 0,B96 89)  3  Eczema (692 9) (L30 9)  4  Encounter for medical assessment (V70 9) (Z00 8)  5  Fatigue (780 79) (R53 83)  6  IUD contraception (V45 51) (Z97 5)  7  Presence of contraceptive device (V45 59) (Z30 9)  8  Screening for STD (sexually transmitted disease) (V74 5) (Z11 3)  9  Skin rash (782 1) (R21)  10  UTI (urinary tract infection) (599 0) (N39 0)  11  Vaginal discharge (623 5) (N89 8)  12  Vaginal odor (625 8) (N89 8)  13  Weight gain (783 1) (R63 5)  14  Manoj-Parkinson-White (WPW) syndrome (426 7) (I45 6)    Family History  Mother   1  Family history of Bipolar Disorder  2  Family history of Hypertension (V17 49)  Father   3  Family history of Glucose Intolerance  4  Family history of Hyperlipidemia  5  Family history of Hypertension (V17 49)  Maternal Grandmother   6  Family history of Breast Cancer (V16 3)  Paternal Grandmother   9  Family history of Crohn's Disease  Paternal Grandfather   6  Family history of Prostate Cancer (V16 42)  Paternal Aunt   5  Family history of Colon Cancer (V16 0)    Social History    · Being A Social Drinker   · Denied: History of Drug Use   · IUD contraception (V45 51) (Z97 5)   · Marital History - Single   · Never A Smoker   · Presence of contraceptive device (V45 59) (Z30 9)    Surgical History   1  History of Catheter Ablation  2  History of Colposcopy  3  History of Complete Colonoscopy  4  History of Surgically Induced     Current Meds  1  Ciprofloxacin HCl - 500 MG Oral Tablet; TAKE 1 TABLET TWICE DAILY; Therapy: 21QVV9446 to (Evaluate:50Zfg6846)  Requested for: 41OTX5950; Last   Rx:60Wgi8937; Status: ACTIVE - Transmit to [facility] Verification Ordered  2   Hydrocortisone 2 5 % External Cream; APPLY TO AFFECTED AREA TWICE DAILY AS   DIRECTED; Therapy: 10Dzj8944 to (Evaluate:22Oct2016)  Requested for: 22Sam8959; Last   Rx:88Mwi8674 Ordered  3  Mirena IUD; Therapy: (Recorded:18Ncc4592) to Recorded  4  Naproxen Sodium 550 MG Oral Tablet; TAKE 1 TABLET EVERY 12 HOURS; Therapy: 86Kvb4502 to (Evaluate:20Sep2014)  Requested for: 31Opt0626; Last   Rx:01Ate1817 Ordered    Allergies   1  Codeine Derivatives  2  Erythromycin Base TABS  3  Sulfa Drugs    Vitals   Recorded: 25MZM9806 02:20PM Recorded: 18QKG6050 45:08RU   Systolic 033    Diastolic 74    Height  5 ft 1 5 in   Weight  200 lb    BMI Calculated  37 18   BSA Calculated  1 9     Physical Exam    Constitutional   General appearance: No acute distress, well appearing and well nourished  overweight  Pulmonary   Respiratory effort: No increased work of breathing or signs of respiratory distress  Auscultation of lungs: Clear to auscultation  Cardiovascular   Auscultation of heart: Normal rate and rhythm, normal S1 and S2, without murmurs           Signatures   Electronically signed by : Lucrecia Giordano MD; Jul 19 2016  3:02PM EST                       (Author)

## 2018-01-13 NOTE — RESULT NOTES
Verified Results  (1) URINE CULTURE 51Cto1936 08:10AM Linnea Rosaroi   TW Order Number: [id number]     Test Name Result Flag Reference   CLINICAL REPORT (Report) A    Test:        Urine culture  Specimen Type:   Urine  Specimen Date:   10/5/2017 8:10 AM  Result Date:    10/7/2017 3:06 PM  Result Status:   Final result  Abnormal:      Yes  Resulting Lab:   [facility] [address] [place] [address]            Tel: [phone]      CULTURE                                       ------------------                                   >100,000 cfu/ml Escherichia coli (Abnormal)      SUSCEPTIBILITY                                   ------------------                                                       Escherichia coli  METHOD                 KODY  -------------------------------------  --------------------------  AMPICILLIN ($$)             >16 00 ug/ml Resistant  AMPICILLIN + SULBACTAM ($)       >16/8 ug/ml  Resistant  AZTREONAM ($$$)             <=8 ug/ml   Susceptible  CEFAZOLIN ($)              >16 00 ug/ml Resistant  CEFOTAXIME ($)             <=2 ug/ml   Susceptible  CEFTAZIDIME ($$)            <=1 ug/ml   Susceptible  CEFTRIAXONE ($$)            <=8 00 ug/ml Susceptible  CEFUROXIME ($$)             16 ug/ml   Intermediate  CIPROFLOXACIN ($)            <=1 00 ug/ml Susceptible  GENTAMICIN ($$)             <=4 ug/ml   Susceptible  LEVOFLOXACIN ($)            <=2 00 ug/ml Susceptible  NITROFURANTOIN             64 ug/ml   Intermediate  PIPERACILLIN + TAZOBACTAM ($$$)     <=16 ug/ml  Susceptible  TETRACYCLINE              <=4 ug/ml   Susceptible  TOBRAMYCIN ($)             <=4 ug/ml   Susceptible  TRIMETHOPRIM + SULFAMETHOXAZOLE ($$$)  <=2/38 ug/ml Susceptible       Plan  Urinary tract infection, site unspecified    · Ciprofloxacin HCl - 500 MG Oral Tablet;  Take 1 po bid for 2 days

## 2018-01-13 NOTE — RESULT NOTES
Verified Results  (1) COMPREHENSIVE METABOLIC PANEL 76FHY3245 16:80IQ Macarena Braden    Order Number: [id number]     Test Name Result Flag Reference   SODIUM 143 mmol/L  136-145   POTASSIUM 3 9 mmol/L  3 5-5 3   CHLORIDE 107 mmol/L  100-108   CARBON DIOXIDE 27 mmol/L  21-32   ANION GAP (CALC) 9 mmol/L  4-13   BLOOD UREA NITROGEN 8 mg/dL  5-25   CREATININE 0 73 mg/dL  0 60-1 30   Standardized to IDMS reference method   CALCIUM 8 8 mg/dL  8 3-10 1   BILI, TOTAL 0 49 mg/dL  0 20-1 00   ALK PHOSPHATAS 62 U/L     ALT (SGPT) 31 U/L  12-78   AST(SGOT) 16 U/L  5-45   ALBUMIN 3 6 g/dL  3 5-5 0   TOTAL PROTEIN 7 6 g/dL  6 4-8 2   eGFR Non-African American      >60 0 ml/min/1 73sq m   [facility] Disease Education Program recommendations are as follows:  GFR calculation is accurate only with a steady state creatinine  Chronic Kidney disease less than 60 ml/min/1 73 sq  meters  Kidney failure less than 15 ml/min/1 73 sq  meters  GLUCOSE FASTING 80 mg/dL  65-99     (1) TSH WITH FT4 REFLEX 21Jul2017 08:26AM Macarena Braden    Order Number: [id number]     Test Name Result Flag Reference   TSH 1 390 uIU/mL  0 358-3 740   Patients undergoing fluorescein dye angiography may retain small amounts of fluorescein in the body for 48-72 hours post procedure  Samples containing fluorescein can produce falsely depressed TSH values  If the patient had this procedure,a specimen should be resubmitted post fluorescein clearance            The recommended reference ranges for TSH during pregnancy are as follows:  First trimester 0 1 to 2 5 uIU/mL  Second trimester  0 2 to 3 0 uIU/mL  Third trimester 0 3 to 3 0 uIU/m     (1) LIPID PANEL, FASTING 21Jul2017 08:26AM Macarena Braden    Order Number: [id number]     Test Name Result Flag Reference   CHOLESTEROL 183 mg/dL     HDL,DIRECT 36 mg/dL L 40-60   Specimen collection should occur prior to Metamizole administration due to the potential for falsely depressed results  LDL CHOLESTEROL CALCULATED 130 mg/dL H 0-100   Triglyceride:         Normal              <150 mg/dl       Borderline High    150-199 mg/dl       High               200-499 mg/dl       Very High          >499 mg/dl  Cholesterol:         Desirable        <200 mg/dl      Borderline High  200-239 mg/dl      High             >239 mg/dl  HDL Cholesterol:        High    >59 mg/dL      Low     <41 mg/dL  LDL CALCULATED:    This screening LDL is a calculated result  It does not have the accuracy of the Direct Measured LDL in the monitoring of patients with hyperlipidemia and/or statin therapy  Direct Measure LDL (JTE551) must be ordered separately in these patients  TRIGLYCERIDES 85 mg/dL  <=150   Specimen collection should occur prior to N-Acetylcysteine or Metamizole administration due to the potential for falsely depressed results

## 2018-01-13 NOTE — RESULT NOTES
Verified Results  Urine Dip Non-Automated- POC 49Lhq3044 08:21AM Yoko Grande     Test Name Result Flag Reference   Color Yellow     Clarity Transparent     Leukocytes small     Nitrite negative     Blood negative     Bilirubin negative     Urobilinogen 0 2     Protein negative     Ph 6     Specific Gravity 1 020     Ketone negative     Glucose negative         Plan  UTI (urinary tract infection)    · Ciprofloxacin HCl - 250 MG Oral Tablet; TAKE 1 TABLET EVERY 12 HOURS  DAILY   · (1) URINALYSIS (will reflex a microscopy if leukocytes, occult blood, protein or nitrites  are not within normal limits); Status: In Progress - Specimen/Data Collected;   Done:  83YUU0156   · (1) URINE CULTURE; Source:Urine, Clean Catch; Status: In Progress - Specimen/Data  Collected;   Done: 24BGB4156   · Urine Dip Non-Automated- POC; Status:Complete;   Done: 46BUU6324 08:21AM

## 2018-01-14 VITALS — WEIGHT: 195.8 LBS | BODY MASS INDEX: 36.03 KG/M2 | HEIGHT: 62 IN

## 2018-01-14 VITALS
BODY MASS INDEX: 36.07 KG/M2 | SYSTOLIC BLOOD PRESSURE: 110 MMHG | WEIGHT: 196 LBS | DIASTOLIC BLOOD PRESSURE: 88 MMHG | HEIGHT: 62 IN

## 2018-01-14 NOTE — RESULT NOTES
Verified Results  * CT ABDOMEN PELVIS W CONTRAST 06Cbk2660 05:03PM Bhaskar Guerrero Order Number: [id number]    - Patient Instructions: To schedule this appointment, please contact Central Scheduling at [phone]  Test Name Result Flag Reference   CT ABDOMEN PELVIS W CONTRAST (Report)     CT ABDOMEN AND PELVIS WITH IV CONTRAST     INDICATION: Right lower quadrant pain and bloating  COMPARISON: 2/7/2013     TECHNIQUE: CT examination of the abdomen and pelvis was performed  Reformatted images were created in axial, sagittal, and coronal planes  Radiation dose length product (DLP) for this visit: 702 9 mGy-cm   This examination, like all CT scans performed in the [facility], was performed utilizing techniques to minimize radiation dose exposure, including the use of iterative    reconstruction and automated exposure control  IV Contrast: 50 mL of iohexol (OMNIPAQUE)      Enteric Contrast: Enteric contrast was administered  FINDINGS:     ABDOMEN     LOWER CHEST: No significant abnormalities identified in the lower chest      LIVER/BILIARY TREE: Unremarkable  GALLBLADDER: No calcified gallstones  No pericholecystic inflammatory change  SPLEEN: Unremarkable  PANCREAS: Unremarkable  ADRENAL GLANDS: Unremarkable  KIDNEYS/URETERS: Unremarkable  No hydronephrosis  STOMACH AND BOWEL: Unremarkable  APPENDIX: No findings to suggest appendicitis  ABDOMINOPELVIC CAVITY: No ascites or free intraperitoneal air  No lymphadenopathy  VESSELS: Unremarkable for patient's age  PELVIS     REPRODUCTIVE ORGANS: IUD noted  URINARY BLADDER: Unremarkable  ABDOMINAL WALL/INGUINAL REGIONS: Unremarkable  OSSEOUS STRUCTURES: No acute fracture or destructive osseous lesion  IMPRESSION:     No acute inflammatory process identified  Workstation performed: [id number]     Signed by:    Rhiannon Kumar MD   8/1/17

## 2018-01-14 NOTE — RESULT NOTES
Verified Results  (1) URINE CULTURE 69Ocp2880 10:47AM Chestine Gray Order Number: [id number]     Test Name Result Flag Reference   CLINICAL REPORT (Report)     Test:        Urine culture  Specimen Source:  Urine, Unspecified Source  Specimen Type:   Urine  Specimen Date:   10/5/2016 10:47 AM  Result Date:    10/6/2016 2:03 PM  Result Status:   Final result  Resulting Lab:   [place] [address]            Tel: [phone]      CULTURE                                       ------------------                                   No Growth <1000 cfu/mL

## 2018-01-15 NOTE — RESULT NOTES
Verified Results  (1) [facility] CULTURE 37Eqx2837 07:31AM Hussein Vargas    Order Number: [id number]     Test Name Result Flag Reference   MYCOPLASMA HOMINIS Negative     UREAPLASMA UREALYTICUM Negative     Performed at:  [address]  [id number]  : Nam Ch MD, Phone:  [phone]

## 2018-01-16 NOTE — RESULT NOTES
Verified Results  (1) THIN PREP PAP WITH IMAGING 33RBY5048 03:50VZ Felicia Avilez     Test Name Result Flag Reference   LAB AP CASE REPORT (Report)     Gynecologic Cytology Report            Case: [id number]                  Authorizing Provider: BRYANT Aguirre    Collected:      11/08/2016           First Screen:     JITENDRA Vu   Received:      11/14/2016 1016        Pathologist:      Jp Myles DO                               Specimen:  LIQUID-BASED PAP, SCREENING, Cervix   LAB AP GYN PRIMARY INTERPRETATION      Epithelial cell abnormality  Electronically signed by Jp Myles DO on 11/22/2016 at 11:29 AM   LAB AP GYN INTERPRETATION      Low grade squamous intraepithelial lesion   LAB AP GYN SPECIMEN ADEQUACY      Satisfactory for evaluation  Partially obscuring blood  LAB AP GYN NOTE      Interpretation performed at [facility], [address] [address]   LAB AP GYN ADDITIONAL INFORMATION (Report)     [employer]'s FDA approved ,  and ThinPrep Imaging System are   utilized with strict adherence to the 's instruction manual to   prepare gynecologic and non-gynecologic cytology specimens for the   production of ThinPrep slides as well as for gynecologic ThinPrep imaging  These processes have been validated by our laboratory and/or by the     The Pap test is not a diagnostic procedure and should not be used as the   sole means to detect cervical cancer  It is only a screening procedure to   aid in the detection of cervical cancer and its precursors  Both   false-negative and false-positive results have been experienced  Your   patient's test result should be interpreted in this context together with   the history and clinical findings  LAB AP LMP          Plan  PMH: Cervical cancer screening    · (1) THIN PREP PAP WITH IMAGING ; every 1 year;  Last 10BYB0654; Status:Active

## 2018-01-16 NOTE — MISCELLANEOUS
Message  Pt called wants STD testing done ordered Urine test done will go to lab and give sample      Active Problems    1  Arthralgia (719 40) (M25 50)   2  Bacterial vaginitis (616 10,041 9) (N76 0,B96 89)   3  Depression with anxiety (300 4) (F41 8)   4  Eczema (692 9) (L30 9)   5  Encounter for medical assessment (V70 9) (Z00 8)   6  Fatigue (780 79) (R53 83)   7  Increased frequency of urination (788 41) (R35 0)   8  IUD contraception (V45 51) (Z97 5)   9  Presence of contraceptive device (V45 59) (Z30 9)   10  Screening for STD (sexually transmitted disease) (V74 5) (Z11 3)   11  Skin rash (782 1) (R21)   12  Upper respiratory infection (465 9) (J06 9)   13  UTI (urinary tract infection) (599 0) (N39 0)   14  Vaginal discharge (623 5) (N89 8)   15  Vaginal odor (625 8) (N89 8)   16  Weight gain (783 1) (R63 5)   17  Manoj-Parkinson-White (WPW) syndrome (426 7) (I45 6)    Current Meds   1  Escitalopram Oxalate 5 MG Oral Tablet; Take 1 tablet daily; Therapy: 39HFP3270 to (Evaluate:08Mar2017); Last Rx:64Lft3294 Ordered   2  Hydrocortisone 2 5 % External Cream; APPLY TO AFFECTED AREA TWICE DAILY AS   DIRECTED; Therapy: 43Wps4547 to (Evaluate:22Oct2016)  Requested for: 97Syx3278; Last   Rx:85Nvc2319 Ordered   3  LORazepam 0 5 MG Oral Tablet; Take 1/2 to 1 tablet po up to TID prn anxiety; Therapy: 81GCN7382 to (Last Rx:02Usp7866) Ordered   4  Mirena IUD; Therapy: (Recorded:81Ctb7812) to Recorded   5  Naproxen Sodium 550 MG Oral Tablet; TAKE 1 TABLET EVERY 12 HOURS; Therapy: 81Qgo5761 to (Evaluate:82Jpz2422)  Requested for: 81Whj5913; Last   Rx:39Uqf0005 Ordered    Allergies    1  Codeine Derivatives   2  Erythromycin Base TABS   3  Sulfa Drugs    Plan  Screening for STD (sexually transmitted disease), Vaginal discharge    · (1) CHLAMYDIA/GC AMPLIFIED DNA, PCR; Source:Urine, Unspecified Source;  Status:Active - Retrospective Authorization;  Requested BUS:53JNG0911;     Signatures   Electronically signed by Jose Elmore, ; Oct 21 2016  2:53PM EST                       (Author)

## 2018-01-16 NOTE — RESULT NOTES
Verified Results  (1) URINALYSIS w URINE C/S REFLEX (will reflex a microscopy if leukocytes, occult blood, or nitrites are not within normal limits) 27Oct2016 08:41AM Mel Coronel    Order Number: [id number]     Test Name Result Flag Reference   COLOR Yellow     CLARITY Turbid     PH UA 5 0  4 5-8 0   LEUKOCYTE ESTERASE UA Negative  Negative   NITRITE UA Negative  Negative   PROTEIN UA Negative mg/dl  Negative   GLUCOSE UA Negative mg/dl  Negative   KETONES UA Negative mg/dl  Negative   UROBILINOGEN UA 0 2 E U /dl  0 2, 1 0 E U /dl   BILIRUBIN UA Negative  Negative   BLOOD UA Negative  Negative   SPECIFIC GRAVITY UA 1 023  1 003-1 030

## 2018-01-16 NOTE — RESULT NOTES
Verified Results  (1) CHLAMYDIA/GC AMPLIFIED DNA, PCR 70Ncc7630 07:31AM Laly Cost     Test Name Result Flag Reference   CHLAMYDIA,AMPLIFIED DNA PROBE   C  trachomatis Amplified DNA Negative   C  trachomatis Amplified DNA Negative   N  GONORRHOEAE AMPLIFIED DNA   N  gonorrhoeae Amplified DNA Negative   N  gonorrhoeae Amplified DNA Negative       Plan  Painful urination    · Urine Dip Non-Automated- POC; Status:Complete;   Done: 55LEK9342 10:22AM

## 2018-01-16 NOTE — RESULT NOTES
Verified Results  (1) URINALYSIS (will reflex a microscopy if leukocytes, occult blood, protein or nitrites are not within normal limits) 05Oct2016 10:47AM Nina Phlegm   TW Order Number: [id number]     Test Name Result Flag Reference   COLOR Yellow     CLARITY Cloudy     SPECIFIC GRAVITY UA 1 024  1 003-1 030   PH UA 6 0  4 5-8 0   LEUKOCYTE ESTERASE UA Moderate A Negative   NITRITE UA Negative  Negative   PROTEIN UA Negative mg/dl  Negative   GLUCOSE UA Negative mg/dl  Negative   KETONES UA Negative mg/dl  Negative   UROBILINOGEN UA 0 2 E U /dl  0 2, 1 0 E U /dl   BILIRUBIN UA Negative  Negative   BLOOD UA Negative  Negative   BACTERIA Occasional /hpf  None Seen, Occasional   EPITHELIAL CELLS None Seen /hpf  None Seen, Occasional   RBC UA 2-4 /hpf A None Seen   WBC UA 10-20 /hpf A None Seen       Plan  Increased frequency of urination    · Urine Dip Non-Automated- POC; Status:Active - Perform Order; Requested  for:05Oct2016;   UTI (urinary tract infection)    · (1) URINE CULTURE; Source:Urine, Clean Catch; Status: In Progress - Specimen/Data  Collected;   Done: 93YTA9402

## 2018-01-16 NOTE — RESULT NOTES
Verified Results  (1) URINALYSIS (will reflex a microscopy if leukocytes, occult blood, protein or nitrites are not within normal limits) 70Dvm3092 09:44AM Zack Carson    Order Number: [id number]     Test Name Result Flag Reference   COLOR Yellow     CLARITY Clear     SPECIFIC GRAVITY UA 1 022  1 003-1 030   PH UA 6 0  4 5-8 0   LEUKOCYTE ESTERASE UA Small A Negative   NITRITE UA Negative  Negative   PROTEIN UA Negative mg/dl  Negative   GLUCOSE UA Negative mg/dl  Negative   KETONES UA Negative mg/dl  Negative   UROBILINOGEN UA 0 2 E U /dl  0 2, 1 0 E U /dl   BILIRUBIN UA Negative  Negative   BLOOD UA Negative  Negative   BACTERIA Occasional /hpf  None Seen, Occasional   EPITHELIAL CELLS None Seen /hpf  None Seen, Occasional   HYALINE CASTS None Seen /lpf  None Seen   RBC UA None Seen /hpf  None Seen, 0-5   WBC UA 4-10 /hpf A None Seen, 0-5, 5-55, 5-65

## 2018-01-16 NOTE — RESULT NOTES
Verified Results  (1) URINALYSIS (will reflex a microscopy if leukocytes, occult blood, protein or nitrites are not within normal limits) 02Sep2016 09:00AM Felice Ruano    Order Number: [id number]     Test Name Result Flag Reference   COLOR Yellow     CLARITY Clear     SPECIFIC GRAVITY UA 1 016  1 003-1 030   PH UA 6 0  4 5-8 0   LEUKOCYTE ESTERASE UA Small A Negative   NITRITE UA Negative  Negative   PROTEIN UA Negative mg/dl  Negative   GLUCOSE UA Negative mg/dl  Negative   KETONES UA Negative mg/dl  Negative   UROBILINOGEN UA 0 2 E U /dl  0 2, 1 0 E U /dl   BILIRUBIN UA Negative  Negative   BLOOD UA Negative  Negative   BACTERIA Occasional /hpf  None Seen, Occasional   EPITHELIAL CELLS None Seen /hpf  None Seen, Occasional   RBC UA None Seen /hpf  None Seen   WBC UA 4-10 /hpf A None Seen     (1) URINE CULTURE 02Sep2016 09:00AM Felice Ruaon    Order Number: [id number]     Test Name Result Flag Reference   CLINICAL REPORT (Report)     Test:        Urine culture  Specimen Type:   Urine  Specimen Date:   9/2/2016 9:00 AM  Result Date:    9/3/2016 1:04 PM  Result Status:   Final result  Resulting Lab:   [facility] [address] [place] [address]            Tel: [phone]      CULTURE                                       ------------------                                   50,000-59,000 cfu/ml Mixed Contaminants X4

## 2018-01-17 NOTE — MISCELLANEOUS
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 612)   6  Presence of contraceptive device (V45 59) (Z30 9)   7  Manoj-Parkinson-White (WPW) syndrome (426 7) (I45 6)   8  Yeast infection (112 9) (B37 9)    Current Meds   1  Escitalopram Oxalate 5 MG Oral Tablet; Take 1 tablet daily; Therapy: 95WUQ6444 to (Evaluate:08Mar2017); Last Rx:88Ykz9583 Ordered   2  Fluconazole 150 MG Oral Tablet; TAKE 1 TABLET DAILY AS DIRECTED; Therapy: 54CGZ6920 to 0488 74 98 26)  Requested for: 28Mar2017; Last   Rx:28Mar2017; Status: [address] to Pharmacy - Awaiting Verification Ordered   3  LORazepam 0 5 MG Oral Tablet; Take 1/2 to 1 tablet po up to TID prn anxiety; Therapy: 99CFZ6928 to (Last Rx:57Rkz6364) Ordered   4  Mirena IUD; Therapy: (Recorded:07Crf0993) to Recorded    Allergies    1  Codeine Derivatives   2  Erythromycin Base TABS   3   Sulfa Drugs    Plan  Yeast infection    · Fluconazole 150 MG Oral Tablet (Diflucan); TAKE 1 TABLET EVERY 3 DAYS X 4  DOSES    Signatures   Electronically signed by : Su Roberts, ; Mar 30 2017 10:34AM EST                       (Author)

## 2018-01-18 NOTE — RESULT NOTES
Verified Results  Urine Dip Non-Automated- POC 91WTA9762 09:35AM Genora Seal     Test Name Result Flag Reference   Color Yellow     Clarity Hazy     Leukocytes Trace     Nitrite negative     Blood negative     Bilirubin small     Urobilinogen 0 2     Protein trace     Ph 6     Specific Gravity 1 025     Ketone negative     Glucose negative         Plan  Increased frequency of urination    · (1) URINALYSIS (will reflex a microscopy if leukocytes, occult blood, protein or nitrites  are not within normal limits); Status: In Progress - Specimen/Data Collected;   Done:  45TCY8071   · (1) URINE CULTURE; Source:Urine, Clean Catch; Status: In Progress - Specimen/Data  Collected;   Done: 35VAJ2804

## 2018-01-18 NOTE — RESULT NOTES
Verified Results  (1) HIV AG/AB Marice Leonard GEN 35FBO4709 35:52FC Rl [place] Order Number: [id number]     Test Name Result Flag Reference   HIV 1/2 AND P24 Non-Reactive  Non-Reactive   This test detects HIV 1, HIV2 and p24 Antigen       (1) RPR 26HTW3846 26:94HB [facility]l [place] Order Number: [id number]     Test Name Result Flag Reference   RPR Non-Reactive  Non-Reactive

## 2018-01-23 VITALS
HEART RATE: 80 BPM | RESPIRATION RATE: 16 BRPM | HEIGHT: 62 IN | SYSTOLIC BLOOD PRESSURE: 104 MMHG | WEIGHT: 203.25 LBS | BODY MASS INDEX: 37.4 KG/M2 | DIASTOLIC BLOOD PRESSURE: 68 MMHG

## 2018-01-23 NOTE — MISCELLANEOUS
Message  Message Free Text Note Form: 1/76/18 2140: page states belly pain and 14 wks  LMOVM to call back  2147: pt calls back noting belly cramping but not menstrual like  Nausea/emesis, loose stools, works in [employer]  Advised small frequent sips; dehydration precautions reviewed   BEO      Signatures   Electronically signed by : EUNICE Monahan ; Jan 7 2018  3:58PM EST                       (Author)

## 2018-01-23 NOTE — PROGRESS NOTES
DEC 27 2017         RE: Arnoldsamanta Reveles                               To: Ob/Gyn Care   [facility]  Betty   MR#: [id number]                                    [address]   :  [address], [address] [place] [address] Street: [phone]                             Fax: [phone]   (Exam #: [id number])      The LMP of this 33year old,  G3, P1-0-1-1 patient was SEP 30 2017, giving   her an FIONA of 2018 and a current gestational age of 16 weeks 4 days   by dates  A sonographic examination was performed on DEC 27 2017 using   real time equipment  The ultrasound examination was performed using   abdominal technique  The patient has a BMI of 36 6  Her blood pressure   today was 105/73  Earliest US on record:  2018  8w0d  7/3/2018 FIONA Multiple   longitudinal and transverse sections revealed a acosta intrauterine   pregnancy with the fetus in variable presentation  The placenta is   anterior in implantation, grade 0 in appearance, and there is no placenta   previa  Cardiac motion was observed at 148 bpm       INDICATIONS      first trimester genetic screening   obesity      Exam Types      Level I   sequential screen      RESULTS      Fetus # 1 of 1   Variable presentation      MEASUREMENTS (* Included In Average GA)      CRL              7 5 cm        13 weeks 2 days*   Nuchal Trans    1 70 mm      THE AVERAGE GESTATIONAL AGE is 13 weeks 2 days +/- 7 days  ANATOMY COMMENTS      Anatomic detail is limited at this gestational age  The fetal cranium   appeared normal in shape and the nuchal translucency was normal in size   (1 7mm)  The nasal bone appears to be present  The intracranial anatomy   was unremarkable  Evaluation of the spine revealed no obvious evidence   for a neural tube defect  Anatomy of the fetal thorax appeared within   normal limits with a normal cardiac axis   The cardiac rhythm was regular   and documented with M-mode  Within the abdomen, the stomach & bladder   were visualized and the abdominal wall appeared intact  A three vessel   cord appears to be present  Active movement of the fetal body &   extremities was seen  There is no suspicion of a subchorionic bleed  The   placental cord insertion appeared normal    There is no suspicion of a   uterine myoma  Free fluid is not seen in the posterior cul-de-sac  ADNEXA      The left ovary appeared normal and measured 3 2 x 3 5 x 2 1 cm with a   volume of 12 3 cc  The right ovary appeared normal and measured 2 2 x 2 3   x 1 5 cm with a volume of 4 0 cc       AMNIOTIC FLUID         Largest Vertical Pocket = 3 2 cm   Amniotic Fluid: Normal      IMPRESSION      Green IUP   13 weeks and 2 days by this ultrasound  (FIONA=JUL 2 2018)   Variable presentation   Regular fetal heart rate of 148 bpm   Anterior placenta   No placenta previa      CONSULT COMMENT        Thank you very much for requesting a consultation on this very nice   patient for the indication genetic screening  This is the patient's 3rd   pregnancy  She has a history of a previous full-term vaginal delivery 10   years ago without complications  She has had 1 termination of pregnancy   as well  She has been treated for depression in the past but does not   currently require any medications  She had Manoj-Parkinson-White and   underwent an ablation when she was 13years old  She gets occasional   migraines  She has obesity with a BMI of 36  She denies the current use   of tobacco, alcohol, or drugs  She currently takes prenatal vitamins and   Diclegis p r n  Corinne Wilson She has allergies to erythromycin, sulfa, and codeine  Her family medical history is unremarkable  A review of systems is   otherwise negative  On exam, the patient appears well, in no acute   distress, and her abdomen is nontender        We discussed the options for genetic screening, including but not limited   to first trimester screening, second trimester screening, combined first   and second trimester screening, noninvasive prenatal testing (NIPT) for   patients at high risk and diagnostic screening through the use of CVS and   amniocentesis  We discussed the risks and benefits of each approach   including the sensitivities and false positive rates as well as the   difference between a screening test and a diagnostic test   At the   conclusion of our discussion the patient elected Sequential Screening to   delineate her risk for fetal aneuploidy  A maternal blood sample was   obtained on the day of the ultrasound  The first trimester portion of the   screening results, encompassing age, nuchal translucency, and biochemistry   should be available within one week of testing and will be reported from   [facility]   The second stage of sequential screening should be completed   between the 15th and 21st week of pregnancy (ideally between 16-18 weeks)  We will call the patient with any and all results and scan the results   under Genetic counseling  We discussed follow-up in detail and I recommend an anatomy ultrasound be   scheduled for 20 weeks gestation  Thank you very much for allowing us to participate in the care of this   very nice patient  Should you have any questions, please do not hesitate   to contact our office  Please note, in addition to the time spent discussing the results of the   ultrasound, I spent approximately 15 minutes of face-to-face time with the   patient, greater than 50% of which was spent in counseling and the   coordination of care for this patient  Portions of the record may have been created with voice recognition   software  Occasional wrong word or "sound a like" substitutions may have   occurred due to the inherent limitations of voice recognition software  Read the chart carefully and recognize, using context, where substitutions   have occurred        Vickey Mcmullen R D M S Florencia Nyhan, M D     Electronically signed 12/27/17 16:03

## 2018-01-24 VITALS
WEIGHT: 200 LBS | HEIGHT: 62 IN | BODY MASS INDEX: 36.8 KG/M2 | SYSTOLIC BLOOD PRESSURE: 105 MMHG | DIASTOLIC BLOOD PRESSURE: 73 MMHG

## 2018-01-24 VITALS
WEIGHT: 199.5 LBS | HEIGHT: 62 IN | SYSTOLIC BLOOD PRESSURE: 100 MMHG | DIASTOLIC BLOOD PRESSURE: 70 MMHG | BODY MASS INDEX: 36.71 KG/M2

## 2018-01-30 ENCOUNTER — OB ABSTRACT (OUTPATIENT)
Dept: OBGYN CLINIC | Facility: MEDICAL CENTER | Age: 33
End: 2018-01-30

## 2018-01-31 ENCOUNTER — ROUTINE PRENATAL (OUTPATIENT)
Dept: OBGYN CLINIC | Facility: MEDICAL CENTER | Age: 33
End: 2018-01-31

## 2018-01-31 VITALS — SYSTOLIC BLOOD PRESSURE: 98 MMHG | WEIGHT: 201.4 LBS | DIASTOLIC BLOOD PRESSURE: 66 MMHG | BODY MASS INDEX: 36.84 KG/M2

## 2018-01-31 DIAGNOSIS — R87.618 ABNORMAL PAPANICOLAOU SMEAR OF CERVIX WITH POSITIVE HUMAN PAPILLOMA VIRUS (HPV) TEST: ICD-10-CM

## 2018-01-31 DIAGNOSIS — Z34.92 SECOND TRIMESTER PREGNANCY: Primary | ICD-10-CM

## 2018-01-31 DIAGNOSIS — I45.6 WOLFF-PARKINSON-WHITE (WPW) SYNDROME: ICD-10-CM

## 2018-01-31 DIAGNOSIS — F41.8 DEPRESSION WITH ANXIETY: ICD-10-CM

## 2018-01-31 DIAGNOSIS — E66.9 OBESITY (BMI 30-39.9): ICD-10-CM

## 2018-01-31 PROBLEM — R00.0 TACHYCARDIA: Status: ACTIVE | Noted: 2018-01-02

## 2018-01-31 PROCEDURE — PNV: Performed by: OBSTETRICS & GYNECOLOGY

## 2018-01-31 RX ORDER — PNV NO.95/FERROUS FUM/FOLIC AC 28MG-0.8MG
1 TABLET ORAL DAILY
COMMUNITY
Start: 2017-08-21 | End: 2018-07-13 | Stop reason: ALTCHOICE

## 2018-01-31 NOTE — PROGRESS NOTES
Assessment  33 y o  [id number] at 17w4d presenting for routine prenatal visit  Plan  Diagnoses and all orders for this visit:    Second trimester pregnancy  Comments:  second part of sequential collected today  has appointmet at [facility] for level 2     Manoj-Parkinson-White (WPW) syndrome  Comments:  Has not gone for Holter or echocardiogram - encouraged to follow up and importance in pregnancy reviewed     Depression with anxiety    Obesity (BMI 30-39  9)    Abnormal Papanicolaou smear of cervix with positive human papilloma virus (HPV) test  Comments:  ASCUS and HPV+ - colposcopy deferred to 6 weeks PP     Other orders  -     Prenatal Vit-Fe Fumarate-FA (PRENATAL VITAMIN) 27-0 8 MG TABS; Take 1 tablet by mouth daily          ____________________________________________________________        Subjective    Radha Ken is a 33 y o  [id number] at 17w4d who presents for routine prenatal visit  She states occasionally feeling dizzy / also states palpitations resolved since stopping diclegis   She denies contractions, loss of fluid, or vaginal bleeding   Pregnancy Problems:  Patient Active Problem List   Diagnosis    Depression with anxiety    Eczema    Obesity (BMI 30-39  9)    Tachycardia    Manoj-Parkinson-White (WPW) syndrome    Second trimester pregnancy    Abnormal Papanicolaou smear of cervix with positive human papilloma virus (HPV) test         Objective     BP 98/66   Wt 91 4 kg (201 lb 6 4 oz)   LMP 09/30/2017 (Exact Date)   BMI 36 84 kg/m²   FHT: 136 BPM   Uterine Size: size equals dates

## 2018-02-11 ENCOUNTER — TELEPHONE (OUTPATIENT)
Dept: OBGYN CLINIC | Facility: CLINIC | Age: 33
End: 2018-02-11

## 2018-02-11 NOTE — TELEPHONE ENCOUNTER
Late Entry:  Pt pages to report that she has had a very stressful day  She reports she has been very busy and now that she is at home she has some lower abdominal cramping  She reports it is not regular and feels like menstrual cramps  She denies vaginal bleeding or loss of fluid  Pt advised to hydrate with 32 oz of water and observe cramping pattern  If no resolution to call back  Patient agreeable with care plan

## 2018-02-14 ENCOUNTER — ROUTINE PRENATAL (OUTPATIENT)
Dept: OBGYN CLINIC | Facility: MEDICAL CENTER | Age: 33
End: 2018-02-14

## 2018-02-14 ENCOUNTER — TELEPHONE (OUTPATIENT)
Dept: PERINATAL CARE | Facility: CLINIC | Age: 33
End: 2018-02-14

## 2018-02-14 VITALS — DIASTOLIC BLOOD PRESSURE: 74 MMHG | SYSTOLIC BLOOD PRESSURE: 132 MMHG | BODY MASS INDEX: 36.43 KG/M2 | WEIGHT: 199.2 LBS

## 2018-02-14 DIAGNOSIS — IMO0002 ABNORMAL FETAL HEART RATE: ICD-10-CM

## 2018-02-14 DIAGNOSIS — I45.6 WOLFF-PARKINSON-WHITE (WPW) SYNDROME: ICD-10-CM

## 2018-02-14 DIAGNOSIS — Z34.92 SECOND TRIMESTER PREGNANCY: Primary | ICD-10-CM

## 2018-02-14 PROCEDURE — 87480 CANDIDA DNA DIR PROBE: CPT | Performed by: OBSTETRICS & GYNECOLOGY

## 2018-02-14 PROCEDURE — 87510 GARDNER VAG DNA DIR PROBE: CPT | Performed by: OBSTETRICS & GYNECOLOGY

## 2018-02-14 PROCEDURE — 87660 TRICHOMONAS VAGIN DIR PROBE: CPT | Performed by: OBSTETRICS & GYNECOLOGY

## 2018-02-14 PROCEDURE — PNV: Performed by: OBSTETRICS & GYNECOLOGY

## 2018-02-14 NOTE — PROGRESS NOTES
Chet Lopez is a 33y o  year old  at 19w4d for routine prenatal visit    + FM, no vaginal bleeding, contractions, or LOF  Complaints: Yes / here as follow up from [address] [place] [address] (records pending)  States went there yesterday as she was not feeling fetal movement   States US performed there stated placenta previa and irregular fetal heart rate   States today she feel movement but upset at US findings as well as feeling overwhelmed secondary to life stressors  States in the past has been on meds for anxiety but not interested in meds at this time   Today we reviewed 13 week US that did not show placenta previa   I did hear and abnormal fetal heart rate - unable to tell baseline as there was consistently irregular heart rate - called MFM and patient scheduled for US this Friday   Affirm was collected at time of exam as patient states having pelvic cramps - making sure no infection     Encouraged increased hydration and small frequent meals as states has not been eating well

## 2018-02-16 ENCOUNTER — ROUTINE PRENATAL (OUTPATIENT)
Dept: PERINATAL CARE | Facility: CLINIC | Age: 33
End: 2018-02-16
Payer: COMMERCIAL

## 2018-02-16 VITALS
HEART RATE: 95 BPM | HEIGHT: 62 IN | WEIGHT: 202.8 LBS | DIASTOLIC BLOOD PRESSURE: 67 MMHG | BODY MASS INDEX: 37.32 KG/M2 | SYSTOLIC BLOOD PRESSURE: 101 MMHG

## 2018-02-16 DIAGNOSIS — O36.8390 FETAL ARRHYTHMIA AFFECTING PREGNANCY, ANTEPARTUM: ICD-10-CM

## 2018-02-16 DIAGNOSIS — Z36.86 ENCOUNTER FOR ANTENATAL SCREENING FOR CERVICAL LENGTH: ICD-10-CM

## 2018-02-16 DIAGNOSIS — O99.212 OBESITY AFFECTING PREGNANCY, ANTEPARTUM, SECOND TRIMESTER: Primary | ICD-10-CM

## 2018-02-16 PROBLEM — O44.02 PLACENTA PREVIA ANTEPARTUM IN SECOND TRIMESTER: Status: ACTIVE | Noted: 2018-02-16

## 2018-02-16 PROCEDURE — 76811 OB US DETAILED SNGL FETUS: CPT | Performed by: OBSTETRICS & GYNECOLOGY

## 2018-02-16 PROCEDURE — 76817 TRANSVAGINAL US OBSTETRIC: CPT | Performed by: OBSTETRICS & GYNECOLOGY

## 2018-02-16 PROCEDURE — 99212 OFFICE O/P EST SF 10 MIN: CPT | Performed by: OBSTETRICS & GYNECOLOGY

## 2018-02-16 NOTE — PROGRESS NOTES
Please refer to the [facility] ultrasound report for additional information regarding today's visit

## 2018-02-22 ENCOUNTER — ROUTINE PRENATAL (OUTPATIENT)
Dept: PERINATAL CARE | Facility: CLINIC | Age: 33
End: 2018-02-22
Payer: COMMERCIAL

## 2018-02-22 VITALS
BODY MASS INDEX: 37.17 KG/M2 | SYSTOLIC BLOOD PRESSURE: 103 MMHG | WEIGHT: 202 LBS | DIASTOLIC BLOOD PRESSURE: 68 MMHG | HEIGHT: 62 IN | HEART RATE: 94 BPM

## 2018-02-22 DIAGNOSIS — Z36.2 ENCOUNTER FOR OTHER ANTENATAL SCREENING FOLLOW-UP: Primary | ICD-10-CM

## 2018-02-22 DIAGNOSIS — O36.8390 FETAL ARRHYTHMIA AFFECTING PREGNANCY, ANTEPARTUM: ICD-10-CM

## 2018-02-22 DIAGNOSIS — Z3A.20 20 WEEKS GESTATION OF PREGNANCY: ICD-10-CM

## 2018-02-22 PROCEDURE — 99212 OFFICE O/P EST SF 10 MIN: CPT | Performed by: OBSTETRICS & GYNECOLOGY

## 2018-02-22 PROCEDURE — 76816 OB US FOLLOW-UP PER FETUS: CPT | Performed by: OBSTETRICS & GYNECOLOGY

## 2018-02-22 PROCEDURE — 76828 ECHO EXAM OF FETAL HEART: CPT | Performed by: OBSTETRICS & GYNECOLOGY

## 2018-02-22 NOTE — PROGRESS NOTES
Maternal-Fetal Medicine:  Ms Sherri Andersen was seen today in the  [address] today for anatomic survey ultrasound  Please see ultrasound report under "OB Procedures" tab in EPIC   Thank you for the referral and please don't hesitate to contact our office with any concerns or questions  No problem-specific Assessment & Plan notes found for this encounter  Please note, in addition to the time spent discussing the results of the ultrasound, I spent approximately 10 minutes of face-to-face time with the patient, greater than 50% of which was spent in counseling and the coordination of care for this patient      Sincerely,    Jimmie Tavares MD  Attending Physician, Maternal-Fetal Medicine

## 2018-02-23 ENCOUNTER — TELEPHONE (OUTPATIENT)
Dept: OBGYN CLINIC | Facility: MEDICAL CENTER | Age: 33
End: 2018-02-23

## 2018-02-23 DIAGNOSIS — Z20.828 EXPOSURE TO THE FLU: Primary | ICD-10-CM

## 2018-02-23 RX ORDER — OSELTAMIVIR PHOSPHATE 75 MG/1
75 CAPSULE ORAL DAILY
Qty: 10 CAPSULE | Refills: 0 | Status: SHIPPED | OUTPATIENT
Start: 2018-02-23 | End: 2018-03-01 | Stop reason: SDDI

## 2018-02-23 NOTE — TELEPHONE ENCOUNTER
Pt called in stating her son was diagnosed with the flu  Denies any sxs  Should pt be treated as well with tamiflu?

## 2018-02-26 DIAGNOSIS — Z20.828 EXPOSURE TO INFLUENZA: Primary | ICD-10-CM

## 2018-02-26 RX ORDER — OSELTAMIVIR PHOSPHATE 75 MG/1
75 CAPSULE ORAL 2 TIMES DAILY
Qty: 10 CAPSULE | Refills: 0 | Status: SHIPPED | OUTPATIENT
Start: 2018-02-26 | End: 2018-03-01 | Stop reason: SDDI

## 2018-03-01 ENCOUNTER — ROUTINE PRENATAL (OUTPATIENT)
Dept: PERINATAL CARE | Facility: CLINIC | Age: 33
End: 2018-03-01
Payer: COMMERCIAL

## 2018-03-01 ENCOUNTER — ROUTINE PRENATAL (OUTPATIENT)
Dept: OBGYN CLINIC | Facility: MEDICAL CENTER | Age: 33
End: 2018-03-01

## 2018-03-01 VITALS — SYSTOLIC BLOOD PRESSURE: 100 MMHG | DIASTOLIC BLOOD PRESSURE: 58 MMHG | WEIGHT: 202 LBS | BODY MASS INDEX: 36.95 KG/M2

## 2018-03-01 VITALS
SYSTOLIC BLOOD PRESSURE: 98 MMHG | BODY MASS INDEX: 37.65 KG/M2 | HEIGHT: 62 IN | HEART RATE: 87 BPM | DIASTOLIC BLOOD PRESSURE: 66 MMHG | WEIGHT: 204.6 LBS

## 2018-03-01 DIAGNOSIS — Z3A.21 21 WEEKS GESTATION OF PREGNANCY: Primary | ICD-10-CM

## 2018-03-01 DIAGNOSIS — IMO0002 ABNORMAL FETAL HEART RATE: ICD-10-CM

## 2018-03-01 DIAGNOSIS — O36.8390 FETAL ARRHYTHMIA AFFECTING PREGNANCY, ANTEPARTUM: ICD-10-CM

## 2018-03-01 DIAGNOSIS — O99.212 OBESITY AFFECTING PREGNANCY IN SECOND TRIMESTER: ICD-10-CM

## 2018-03-01 DIAGNOSIS — Z34.92 SECOND TRIMESTER PREGNANCY: Primary | ICD-10-CM

## 2018-03-01 PROCEDURE — 76815 OB US LIMITED FETUS(S): CPT | Performed by: OBSTETRICS & GYNECOLOGY

## 2018-03-01 PROCEDURE — 99212 OFFICE O/P EST SF 10 MIN: CPT | Performed by: OBSTETRICS & GYNECOLOGY

## 2018-03-01 PROCEDURE — PNV: Performed by: OBSTETRICS & GYNECOLOGY

## 2018-03-01 NOTE — PROGRESS NOTES
Merlinda Blizzard is a 33y o  year old  at 18w7d for routine prenatal visit    + FM, no vaginal bleeding, contractions, or LOF  Complaints: No   Most recent ultrasound and labs reviewed  Patient has a known placenta previa, following at [facility]   Also, fetal PACs - fetal ECHO and close follow up scheduled  Still waiting for records from [facility] - patient states she has a copy   Will bring at her next visit  Needs intermittent FMLA - will bring at next visit

## 2018-03-01 NOTE — PATIENT INSTRUCTIONS
Future Appointments  Date Time Provider Ngoc Johnston   3/8/2018 9:30 AM  US 2 [facility]   3/30/2018 9:30 AM Linna Koyanagi, CRNP [facility]   2018 9:30 AM Linna Koyanagi, CRNP [facility]   2018 10:30 AM  US [facility]   2018 11:30 AM [address]

## 2018-03-01 NOTE — PROGRESS NOTES
[address]: Ms Pepe Kolb was seen today at 21w5d for heart rate check for history of PACs  See ultrasound report under "OB Procedures" tab  Please don't hesitate to contact our office with any concerns or questions    Bob Cervantes MD

## 2018-03-06 ENCOUNTER — OFFICE VISIT (OUTPATIENT)
Dept: FAMILY MEDICINE CLINIC | Facility: CLINIC | Age: 33
End: 2018-03-06
Payer: COMMERCIAL

## 2018-03-06 ENCOUNTER — DOCUMENTATION (OUTPATIENT)
Dept: FAMILY MEDICINE CLINIC | Facility: CLINIC | Age: 33
End: 2018-03-06

## 2018-03-06 VITALS
HEIGHT: 62 IN | BODY MASS INDEX: 37.17 KG/M2 | DIASTOLIC BLOOD PRESSURE: 80 MMHG | SYSTOLIC BLOOD PRESSURE: 102 MMHG | WEIGHT: 202 LBS | TEMPERATURE: 100 F | HEART RATE: 112 BPM | OXYGEN SATURATION: 97 %

## 2018-03-06 DIAGNOSIS — B34.9 VIRAL ILLNESS: Primary | ICD-10-CM

## 2018-03-06 PROCEDURE — 99213 OFFICE O/P EST LOW 20 MIN: CPT | Performed by: FAMILY MEDICINE

## 2018-03-06 NOTE — PROGRESS NOTES
Assessment/Plan:    Karina Shay has dry cough which is likely due to viral illness  I recommended symptomatic treatment with Tylenol  She may use Robitussin DM as it is approved by her Ob  Encouraged increased water intake  I reassured her that her lungs were clear  Doubt this is influenza as she is not febrile  She should recheck if symptoms worsen  No problem-specific Assessment & Plan notes found for this encounter  There are no diagnoses linked to this encounter  Subjective:   Chief Complaint   Patient presents with    Cough        Patient ID: Gabi Rothman is a 33 y o  female  Patient complains of cough and fatigue which began yesterday  The cough is mostly dry and causes pain in the upper chest   She denies earache or sore throat or nasal congestion  She is 22 weeks pregnant and taking Tylenol for the discomfort  Pregnancy has been complicated by placenta previa and recent rapid fetal heart rate  She is going for weekly visits with her OBGYN and is scheduled to see the  cardiologist    She does note history rapid heart rate and cardiac ablation for WPW when she was a teenager  She denies palpitations, but notes her heart rate is elevated and this occurs whenever she gets sick  She felt feverish last night  No GI or urinary sx  Cough   This is a new problem  The current episode started yesterday  The problem has been gradually worsening  The cough is non-productive  Associated symptoms include chest pain and myalgias  Pertinent negatives include no chills, ear pain, fever, headaches, rash, sore throat, shortness of breath or wheezing  The following portions of the patient's history were reviewed and updated as appropriate: allergies, current medications, past family history, past medical history, past social history, past surgical history and problem list     Review of Systems   Constitutional: Positive for fatigue   Negative for activity change, chills and fever    HENT: Negative for congestion, ear pain, sinus pressure and sore throat  Eyes: Negative for pain and visual disturbance  Respiratory: Positive for cough  Negative for chest tightness, shortness of breath and wheezing  Cardiovascular: Positive for chest pain  Negative for palpitations and leg swelling  Gastrointestinal: Negative for abdominal pain, blood in stool, constipation, diarrhea, nausea and vomiting  Endocrine: Negative for polydipsia and polyuria  Genitourinary: Negative for difficulty urinating, dysuria, frequency and urgency  Musculoskeletal: Positive for myalgias  Negative for arthralgias and joint swelling  Skin: Negative for rash  Neurological: Negative for dizziness, weakness, numbness and headaches  Hematological: Negative for adenopathy  Does not bruise/bleed easily  Psychiatric/Behavioral: Negative for dysphoric mood  The patient is not nervous/anxious  Objective:      /80   Pulse (!) 136   Temp 100 °F (37 8 °C)   Ht 5' 2" (1 575 m)   Wt 91 6 kg (202 lb)   LMP 09/30/2017 (Exact Date)   SpO2 97%   BMI 36 95 kg/m²          Physical Exam   Constitutional: She appears well-developed and well-nourished  HENT:   Head: Normocephalic and atraumatic  Right Ear: External ear normal    Left Ear: External ear normal    Nose: Nose normal    Mouth/Throat: Oropharynx is clear and moist  No oropharyngeal exudate  Eyes: Conjunctivae and EOM are normal  Pupils are equal, round, and reactive to light  Neck: Normal range of motion  Neck supple  No thyromegaly present  Cardiovascular: Normal rate, regular rhythm and normal heart sounds  Pulmonary/Chest: No respiratory distress  She has no wheezes  She has no rales  Frequent dry cough   Abdominal:   Gravid uterus   Musculoskeletal: Normal range of motion  She exhibits no edema  Lymphadenopathy:     She has no cervical adenopathy  Neurological: She is alert  Nursing note and vitals reviewed

## 2018-03-07 NOTE — PROGRESS NOTES
Education  Baby & Me Education 1st Trimester:   First Trimester Education provided:   benefits of breastfeeding, importance of exclusive breastfeeding, early initiation of breastfeeding, exclusive breastfeeding for the first 6 months and Pregnancy Essentials Reference Guide given   The patient is planning on breastfeeding  Prenatal education provided by: Siri hugo      Signatures   Electronically signed by :  Adarsh Mobley; Dec  1 2017 11:44AM EST                       (Author)

## 2018-03-08 ENCOUNTER — ROUTINE PRENATAL (OUTPATIENT)
Dept: PERINATAL CARE | Facility: CLINIC | Age: 33
End: 2018-03-08
Payer: COMMERCIAL

## 2018-03-08 VITALS
HEART RATE: 91 BPM | HEIGHT: 62 IN | BODY MASS INDEX: 37.39 KG/M2 | SYSTOLIC BLOOD PRESSURE: 104 MMHG | WEIGHT: 203.2 LBS | DIASTOLIC BLOOD PRESSURE: 69 MMHG

## 2018-03-08 DIAGNOSIS — O36.8390 FETAL ARRHYTHMIA AFFECTING PREGNANCY, ANTEPARTUM: ICD-10-CM

## 2018-03-08 PROCEDURE — 93325 DOPPLER ECHO COLOR FLOW MAPG: CPT | Performed by: OBSTETRICS & GYNECOLOGY

## 2018-03-08 PROCEDURE — 76827 ECHO EXAM OF FETAL HEART: CPT | Performed by: OBSTETRICS & GYNECOLOGY

## 2018-03-08 PROCEDURE — 76825 ECHO EXAM OF FETAL HEART: CPT | Performed by: OBSTETRICS & GYNECOLOGY

## 2018-03-08 NOTE — PROGRESS NOTES
Yulia Beltran presents for fetal echocardiography  Please refer to the [facility] ultrasound report for additional information

## 2018-03-08 NOTE — LETTER
March 8, 2018     Abby Carroll MD  [address] [address]    Patient: Michelle Summers   YOB: 1985   Date of Visit: 3/8/2018       Dear Dr Nuvia Chery: Thank you for referring Michelle Summers to me for evaluation  Below are my notes for this consultation  If you have questions, please do not hesitate to call me  I look forward to following your patient along with you  Sincerely,        Carlos Byrne MD        CC: No Recipients  Carlos Byrne MD  3/8/2018 11:40 AM  Sign at close encounter  Claudell Loyal presents for fetal echocardiography  Please refer to the [facility] ultrasound report for additional information

## 2018-03-14 ENCOUNTER — ROUTINE PRENATAL (OUTPATIENT)
Dept: PERINATAL CARE | Facility: CLINIC | Age: 33
End: 2018-03-14
Payer: COMMERCIAL

## 2018-03-14 VITALS
HEART RATE: 92 BPM | SYSTOLIC BLOOD PRESSURE: 105 MMHG | HEIGHT: 62 IN | DIASTOLIC BLOOD PRESSURE: 73 MMHG | WEIGHT: 202 LBS | BODY MASS INDEX: 37.17 KG/M2

## 2018-03-14 DIAGNOSIS — IMO0002 ABNORMAL FETAL HEART RATE: ICD-10-CM

## 2018-03-14 DIAGNOSIS — O36.8320 MATERNAL CARE FOR ABNORMALITIES OF THE FETAL HEART RATE OR RHYTHM, SECOND TRIMESTER, NOT APPLICABLE OR UNSPECIFIED: Primary | ICD-10-CM

## 2018-03-14 DIAGNOSIS — O36.8390 FETAL ARRHYTHMIA AFFECTING PREGNANCY, ANTEPARTUM: ICD-10-CM

## 2018-03-14 DIAGNOSIS — E66.9 OBESITY (BMI 30-39.9): ICD-10-CM

## 2018-03-14 DIAGNOSIS — Z3A.23 23 WEEKS GESTATION OF PREGNANCY: ICD-10-CM

## 2018-03-14 PROCEDURE — 76815 OB US LIMITED FETUS(S): CPT | Performed by: OBSTETRICS & GYNECOLOGY

## 2018-03-14 PROCEDURE — 76828 ECHO EXAM OF FETAL HEART: CPT | Performed by: OBSTETRICS & GYNECOLOGY

## 2018-03-22 ENCOUNTER — ROUTINE PRENATAL (OUTPATIENT)
Dept: PERINATAL CARE | Facility: CLINIC | Age: 33
End: 2018-03-22
Payer: COMMERCIAL

## 2018-03-22 VITALS
WEIGHT: 203.2 LBS | BODY MASS INDEX: 37.39 KG/M2 | DIASTOLIC BLOOD PRESSURE: 78 MMHG | SYSTOLIC BLOOD PRESSURE: 112 MMHG | HEIGHT: 62 IN

## 2018-03-22 DIAGNOSIS — O44.42 LOW LYING PLACENTA NOS OR WITHOUT HEMORRHAGE, SECOND TRIMESTER: ICD-10-CM

## 2018-03-22 DIAGNOSIS — E66.9 OBESITY (BMI 30-39.9): ICD-10-CM

## 2018-03-22 DIAGNOSIS — IMO0002 ABNORMAL FETAL HEART RATE: ICD-10-CM

## 2018-03-22 DIAGNOSIS — Z3A.24 24 WEEKS GESTATION OF PREGNANCY: ICD-10-CM

## 2018-03-22 DIAGNOSIS — O36.8390 FETAL ARRHYTHMIA AFFECTING PREGNANCY, ANTEPARTUM: Primary | ICD-10-CM

## 2018-03-22 PROCEDURE — 76816 OB US FOLLOW-UP PER FETUS: CPT | Performed by: OBSTETRICS & GYNECOLOGY

## 2018-03-23 ENCOUNTER — ULTRASOUND (OUTPATIENT)
Dept: PERINATAL CARE | Facility: CLINIC | Age: 33
End: 2018-03-23
Payer: COMMERCIAL

## 2018-03-23 ENCOUNTER — TELEPHONE (OUTPATIENT)
Dept: OBGYN CLINIC | Facility: MEDICAL CENTER | Age: 33
End: 2018-03-23

## 2018-03-23 DIAGNOSIS — IMO0002 ABNORMAL FETAL HEART RATE: ICD-10-CM

## 2018-03-23 DIAGNOSIS — Z3A.24 24 WEEKS GESTATION OF PREGNANCY: ICD-10-CM

## 2018-03-23 DIAGNOSIS — O44.42 LOW LYING PLACENTA NOS OR WITHOUT HEMORRHAGE, SECOND TRIMESTER: Primary | ICD-10-CM

## 2018-03-23 DIAGNOSIS — O36.8390 FETAL ARRHYTHMIA AFFECTING PREGNANCY, ANTEPARTUM: ICD-10-CM

## 2018-03-23 DIAGNOSIS — E66.9 OBESITY (BMI 30-39.9): ICD-10-CM

## 2018-03-23 PROCEDURE — 76816 OB US FOLLOW-UP PER FETUS: CPT | Performed by: OBSTETRICS & GYNECOLOGY

## 2018-03-23 NOTE — PROGRESS NOTES
No further arrhythmia is noted  We can stop her weekly fetal heart rate checks in [facility]  She has a follow up [address] for growth and review of her low lying placenta in 4 weeks      Dolores Negrete MD

## 2018-03-23 NOTE — TELEPHONE ENCOUNTER
Pt called 24 weeks pregnant c/o itchy, rash on trunk x 4 days with no improvement using hydrocortisone and pepe alcala contractions x 2 days 1/hr  Pt just had MFM ultrasound 3/22/18  Pt advised to try Benadryl for rash and to stop applying anything to affected area until next appt  Pt to monitor contractions, stay hydrated and call back with any changes  Appt moved up to 3/27 per Dr Jesse Sol[address]

## 2018-03-27 ENCOUNTER — ROUTINE PRENATAL (OUTPATIENT)
Dept: OBGYN CLINIC | Facility: MEDICAL CENTER | Age: 33
End: 2018-03-27

## 2018-03-27 ENCOUNTER — HOSPITAL ENCOUNTER (OUTPATIENT)
Facility: HOSPITAL | Age: 33
Discharge: HOME | End: 2018-03-27
Attending: OBSTETRICS & GYNECOLOGY | Admitting: OBSTETRICS & GYNECOLOGY
Payer: COMMERCIAL

## 2018-03-27 VITALS
HEART RATE: 81 BPM | TEMPERATURE: 98.8 F | DIASTOLIC BLOOD PRESSURE: 64 MMHG | SYSTOLIC BLOOD PRESSURE: 111 MMHG | RESPIRATION RATE: 18 BRPM

## 2018-03-27 VITALS — BODY MASS INDEX: 37.84 KG/M2 | SYSTOLIC BLOOD PRESSURE: 112 MMHG | DIASTOLIC BLOOD PRESSURE: 60 MMHG | WEIGHT: 206.9 LBS

## 2018-03-27 DIAGNOSIS — IMO0002 ABNORMAL FETAL HEART RATE: ICD-10-CM

## 2018-03-27 DIAGNOSIS — O36.8390 FETAL ARRHYTHMIA AFFECTING PREGNANCY, ANTEPARTUM: ICD-10-CM

## 2018-03-27 DIAGNOSIS — N76.0 BV (BACTERIAL VAGINOSIS): Primary | ICD-10-CM

## 2018-03-27 DIAGNOSIS — E66.9 OBESITY (BMI 30-39.9): ICD-10-CM

## 2018-03-27 DIAGNOSIS — O44.42 LOW LYING PLACENTA NOS OR WITHOUT HEMORRHAGE, SECOND TRIMESTER: ICD-10-CM

## 2018-03-27 DIAGNOSIS — Z34.92 SECOND TRIMESTER PREGNANCY: Primary | ICD-10-CM

## 2018-03-27 DIAGNOSIS — B96.89 BV (BACTERIAL VAGINOSIS): Primary | ICD-10-CM

## 2018-03-27 PROCEDURE — 99202 OFFICE O/P NEW SF 15 MIN: CPT

## 2018-03-27 PROCEDURE — PNV: Performed by: OBSTETRICS & GYNECOLOGY

## 2018-03-27 RX ORDER — METRONIDAZOLE 7.5 MG/G
1 GEL VAGINAL
Qty: 70 G | Refills: 0 | Status: SHIPPED | OUTPATIENT
Start: 2018-03-27 | End: 2018-05-02

## 2018-03-27 RX ADMIN — SODIUM CHLORIDE, SODIUM LACTATE, POTASSIUM CHLORIDE, AND CALCIUM CHLORIDE 1000 ML: .6; .31; .03; .02 INJECTION, SOLUTION INTRAVENOUS at 13:10

## 2018-03-27 NOTE — PROGRESS NOTES
Rob Sigala is a 33y o  year old  at 25w3d for routine prenatal visit    + FM, no vaginal bleeding, contractions, or LOF  Complaints: Yes reports increased cramping, increased bowel movements, depression symptoms  States rash on abdomen is improving  No SI/HI, states she's not feeling herself  Decreased appetite and poor sleeping; reviewed recommendations of counseling and/or medications  Information given on counselor  Most recent ultrasound and labs reviewed  GTT and CBC at next visit  Offer tdap if available     NST done to evaluate uterine activity

## 2018-03-27 NOTE — PROGRESS NOTES
Addendum:  Patient was placed on the NST to evaluate for uterine contractions  She was noted to have contractions every 4-5 minutes  Sterile speculum exam was done which and sides were performed which revealed positive clue cells  There is no evidence of yeast infection  Cervical length was performed as well which revealed a cervical length of 30-40 mm  She was instructed proceed to Labor and delivery for monitoring, IV hydration and possible tocolytic  A prescription for Metrogel was sent to her pharmacy

## 2018-03-28 ENCOUNTER — DOCUMENTATION (OUTPATIENT)
Dept: OBGYN CLINIC | Facility: MEDICAL CENTER | Age: 33
End: 2018-03-28

## 2018-04-13 ENCOUNTER — ROUTINE PRENATAL (OUTPATIENT)
Dept: OBGYN CLINIC | Facility: MEDICAL CENTER | Age: 33
End: 2018-04-13
Payer: COMMERCIAL

## 2018-04-13 VITALS — SYSTOLIC BLOOD PRESSURE: 106 MMHG | DIASTOLIC BLOOD PRESSURE: 78 MMHG | BODY MASS INDEX: 37.42 KG/M2 | WEIGHT: 204.6 LBS

## 2018-04-13 DIAGNOSIS — O44.42 LOW LYING PLACENTA NOS OR WITHOUT HEMORRHAGE, SECOND TRIMESTER: ICD-10-CM

## 2018-04-13 DIAGNOSIS — IMO0002 ABNORMAL FETAL HEART RATE: ICD-10-CM

## 2018-04-13 DIAGNOSIS — O36.8390 FETAL ARRHYTHMIA AFFECTING PREGNANCY, ANTEPARTUM: ICD-10-CM

## 2018-04-13 DIAGNOSIS — E66.9 OBESITY (BMI 30-39.9): ICD-10-CM

## 2018-04-13 DIAGNOSIS — Z3A.24 24 WEEKS GESTATION OF PREGNANCY: ICD-10-CM

## 2018-04-13 DIAGNOSIS — Z23 NEED FOR TDAP VACCINATION: ICD-10-CM

## 2018-04-13 DIAGNOSIS — Z3A.27 27 WEEKS GESTATION OF PREGNANCY: Primary | ICD-10-CM

## 2018-04-13 PROBLEM — Z34.92 SECOND TRIMESTER PREGNANCY: Status: RESOLVED | Noted: 2018-01-31 | Resolved: 2018-04-13

## 2018-04-13 PROCEDURE — PNV: Performed by: NURSE PRACTITIONER

## 2018-04-13 PROCEDURE — 90715 TDAP VACCINE 7 YRS/> IM: CPT | Performed by: NURSE PRACTITIONER

## 2018-04-13 PROCEDURE — 90471 IMMUNIZATION ADMIN: CPT | Performed by: NURSE PRACTITIONER

## 2018-04-13 NOTE — PROGRESS NOTES
Shabbir Matos is a 33y o  year old  at 27w6d for routine prenatal visit    + FM, no vaginal bleeding, contractions, or LOF  Complaints: No   Most recent ultrasound and labs reviewed  Glucose and cbc drawn  Tdap given  Breast pump rx given    Has repeat mfm scan on   Fkc, ptl precautions given

## 2018-04-16 DIAGNOSIS — H57.9 EYE LESION: Primary | ICD-10-CM

## 2018-04-16 PROCEDURE — 87070 CULTURE OTHR SPECIMN AEROBIC: CPT | Performed by: FAMILY MEDICINE

## 2018-04-16 PROCEDURE — 87205 SMEAR GRAM STAIN: CPT | Performed by: FAMILY MEDICINE

## 2018-04-17 ENCOUNTER — TELEPHONE (OUTPATIENT)
Dept: OBGYN CLINIC | Facility: MEDICAL CENTER | Age: 33
End: 2018-04-17

## 2018-04-17 NOTE — TELEPHONE ENCOUNTER
Telephone call to patient to inform of missing 1hour GTT test  Patient advised that specimen will need to be re-collected at patient's earliest convenience    Patient also advised that she could go to lab if more convenient  Patient will call back later today with decision

## 2018-04-18 ENCOUNTER — ULTRASOUND (OUTPATIENT)
Dept: PERINATAL CARE | Facility: CLINIC | Age: 33
End: 2018-04-18
Payer: COMMERCIAL

## 2018-04-18 VITALS
DIASTOLIC BLOOD PRESSURE: 65 MMHG | WEIGHT: 205 LBS | HEART RATE: 92 BPM | HEIGHT: 62 IN | SYSTOLIC BLOOD PRESSURE: 96 MMHG | BODY MASS INDEX: 37.73 KG/M2

## 2018-04-18 DIAGNOSIS — Z36.89 ENCOUNTER FOR ULTRASOUND TO CHECK FETAL GROWTH: Primary | ICD-10-CM

## 2018-04-18 DIAGNOSIS — O44.40 ULTRASOUND SCAN TO RECHECK LOW LYING PLACENTA, ANTEPARTUM: ICD-10-CM

## 2018-04-18 DIAGNOSIS — O44.42 LOW LYING PLACENTA NOS OR WITHOUT HEMORRHAGE, SECOND TRIMESTER: ICD-10-CM

## 2018-04-18 DIAGNOSIS — E66.9 OBESITY (BMI 30-39.9): ICD-10-CM

## 2018-04-18 LAB
BACTERIA WND AEROBE CULT: NORMAL
GRAM STN SPEC: NORMAL

## 2018-04-18 PROCEDURE — 76816 OB US FOLLOW-UP PER FETUS: CPT | Performed by: OBSTETRICS & GYNECOLOGY

## 2018-04-18 PROCEDURE — 99212 OFFICE O/P EST SF 10 MIN: CPT | Performed by: OBSTETRICS & GYNECOLOGY

## 2018-04-18 PROCEDURE — 76817 TRANSVAGINAL US OBSTETRIC: CPT | Performed by: OBSTETRICS & GYNECOLOGY

## 2018-04-18 NOTE — PROGRESS NOTES
[address]: Ms Jaret Anderson was seen today at 28w4d for followup placental location ultrasound with fetal growth measurement  See ultrasound report under "OB Procedures" tab  Please note that she had her pregnancy episode accidentally resolved, and when I went to [place] it, since a new episode had been created already, we are charting under a new episode as opposed to the old one  Please don't hesitate to contact our office with any concerns or questions    Nilam Bee MD

## 2018-04-18 NOTE — PATIENT INSTRUCTIONS
Thank you for choosing [facility] for your  care today  If you have any questions about your ultrasound or care, please do not hesitate to contact us or your primary obstetrician  Please return in 6 weeks for your next ultrasound to check on the placental location

## 2018-04-19 DIAGNOSIS — Z34.93 THIRD TRIMESTER PREGNANCY: Primary | ICD-10-CM

## 2018-04-20 ENCOUNTER — TRANSCRIBE ORDERS (OUTPATIENT)
Dept: LAB | Facility: CLINIC | Age: 33
End: 2018-04-20

## 2018-04-20 ENCOUNTER — APPOINTMENT (OUTPATIENT)
Dept: LAB | Facility: CLINIC | Age: 33
End: 2018-04-20
Payer: COMMERCIAL

## 2018-04-20 LAB
BASOPHILS # BLD AUTO: 0.04 THOUSANDS/ΜL (ref 0–0.1)
BASOPHILS NFR BLD AUTO: 0 % (ref 0–1)
EOSINOPHIL # BLD AUTO: 0.11 THOUSAND/ΜL (ref 0–0.61)
EOSINOPHIL NFR BLD AUTO: 1 % (ref 0–6)
ERYTHROCYTE [DISTWIDTH] IN BLOOD BY AUTOMATED COUNT: 14 % (ref 11.6–15.1)
GLUCOSE 1H P 50 G GLC PO SERPL-MCNC: 104 MG/DL
HCT VFR BLD AUTO: 37.8 % (ref 34.8–46.1)
HGB BLD-MCNC: 12.5 G/DL (ref 11.5–15.4)
LYMPHOCYTES # BLD AUTO: 2.32 THOUSANDS/ΜL (ref 0.6–4.47)
LYMPHOCYTES NFR BLD AUTO: 22 % (ref 14–44)
MCH RBC QN AUTO: 29.7 PG (ref 26.8–34.3)
MCHC RBC AUTO-ENTMCNC: 33.1 G/DL (ref 31.4–37.4)
MCV RBC AUTO: 90 FL (ref 82–98)
MONOCYTES # BLD AUTO: 0.51 THOUSAND/ΜL (ref 0.17–1.22)
MONOCYTES NFR BLD AUTO: 5 % (ref 4–12)
NEUTROPHILS # BLD AUTO: 7.54 THOUSANDS/ΜL (ref 1.85–7.62)
NEUTS SEG NFR BLD AUTO: 72 % (ref 43–75)
NRBC BLD AUTO-RTO: 0 /100 WBCS
PLATELET # BLD AUTO: 287 THOUSANDS/UL (ref 149–390)
PMV BLD AUTO: 10.6 FL (ref 8.9–12.7)
RBC # BLD AUTO: 4.21 MILLION/UL (ref 3.81–5.12)
WBC # BLD AUTO: 10.61 THOUSAND/UL (ref 4.31–10.16)

## 2018-04-20 PROCEDURE — 85025 COMPLETE CBC W/AUTO DIFF WBC: CPT | Performed by: OBSTETRICS & GYNECOLOGY

## 2018-04-20 PROCEDURE — 82950 GLUCOSE TEST: CPT | Performed by: OBSTETRICS & GYNECOLOGY

## 2018-04-20 PROCEDURE — 36415 COLL VENOUS BLD VENIPUNCTURE: CPT | Performed by: OBSTETRICS & GYNECOLOGY

## 2018-04-23 ENCOUNTER — TELEPHONE (OUTPATIENT)
Dept: OBGYN CLINIC | Facility: MEDICAL CENTER | Age: 33
End: 2018-04-23

## 2018-04-23 NOTE — TELEPHONE ENCOUNTER
----- Message from Wellington Jeong MD sent at 4/20/2018  4:30 PM EDT -----  28 week labs normal, please advise

## 2018-04-25 ENCOUNTER — TELEPHONE (OUTPATIENT)
Dept: OBGYN CLINIC | Facility: MEDICAL CENTER | Age: 33
End: 2018-04-25

## 2018-04-25 ENCOUNTER — HOSPITAL ENCOUNTER (OUTPATIENT)
Facility: HOSPITAL | Age: 33
Discharge: HOME | End: 2018-04-25
Attending: OBSTETRICS & GYNECOLOGY | Admitting: OBSTETRICS & GYNECOLOGY
Payer: COMMERCIAL

## 2018-04-25 VITALS — RESPIRATION RATE: 16 BRPM | TEMPERATURE: 98.3 F

## 2018-04-25 PROBLEM — Z3A.29 29 WEEKS GESTATION OF PREGNANCY: Status: ACTIVE | Noted: 2018-04-25

## 2018-04-25 PROBLEM — Z34.90: Status: ACTIVE | Noted: 2018-04-25

## 2018-04-25 PROCEDURE — 76817 TRANSVAGINAL US OBSTETRIC: CPT | Performed by: OBSTETRICS & GYNECOLOGY

## 2018-04-25 PROCEDURE — 99212 OFFICE O/P EST SF 10 MIN: CPT

## 2018-04-25 NOTE — PROCEDURES
Ruben Casillas, a [id number] at 29w4d with an FIONA of 7/7/2018, by Last Menstrual Period, was seen at [address] [facility] for the following procedure(s): $Procedure Type: US - Transvaginal]                   Ultrasound Other  Fetal Presentation: Vertex  Cervical Length: 3 51 and 3 62cm  Funnel: No  Debris: No  Placenta Previa: No  Vasa Previa: No    Low lying placenta visualized  Per MFM, the placenta measures 11 4mm from the internal os  She will return in 4-6 weeks for followup placental location      D/w Dr Elsa Holden

## 2018-04-25 NOTE — PROGRESS NOTES
Triage Note - OB  Sarah Lighter 33 y o  female MRN: [id number]  Unit/Bed#: L&D 846-88 Encounter: [id number]    Chief Complaint: "baby is moving strangely"   FIONA: Estimated Date of Delivery: 18    HPI: Patient is a  at 29w4d here for the above complaint  Per patient, she often gets anxiety attacks, and when these occur she feels the baby shudder and then have several jerking movements  She states she has contractions on and off, but has not had a contraction in several days  She denies LOF, vaginal bleeding, or decreased fetal movement  She states she has chest tightness and shortness of breath with her anxiety attacks but currently denies chest pain, palpitations, shortness of breath, nausea, vomiting, abdominal pain, dysuria, hematuria or calf pain  Vitals:   Temp 98 3 °F (36 8 °C) (Oral)   Resp 16   LMP 2017 (Exact Date)   There is no height or weight on file to calculate BMI  Physical Exam  GEN: NAD   HEENT: ARMINDA GOMES  CARD: NRR, S1 and S2 normal, No MGR  PULM: CTA BL  ABD: Gravid, soft, non-tender, normoactive BS  SVE:      FHT: 130, moderate variability, 15x15 accels, no decels, Cat I  Baseline Rate: 130 bpm  TOCO: q 2-3 minutes       Labs: No results found for this or any previous visit (from the past 24 hour(s))  Imaging: Ultrasound Other  Fetal Presentation: Vertex  Cervical Length: 3 51 and 3 62cm  Funnel: No  Debris: No  Placenta Previa: No  Vasa Previa: No     Low lying placenta visualized  Per MFM, the placenta measures 11 4mm from the internal os  Lab, Imaging and other studies: I have personally reviewed pertinent reports  A/P: 32 yo  @ 29w4d, not in active labor  1) She will return in 4-6 weeks for followup placental location  2) Discharge instructions given to patient and labor precautions reviewed    3) D/W Dr Eulalio Bay MD  2018  7:11 PM

## 2018-04-25 NOTE — DISCHARGE INSTRUCTIONS
Early Labor Signs   WHAT YOU SHOULD KNOW:   Early labor signs are changes in your body that allow your baby to pass through your birth canal   AFTER YOU LEAVE:   Signs and symptoms of early labor:   · Lightening  occurs when your baby drops inside your pelvis  You may feel increased pressure in your pelvis  This may happen a few weeks to a few hours before your labor begins  · Contractions  are cramps and tightening that occur in your uterus to help move the baby through your birth canal  Contractions occur regularly and more often each time  Each one lasts about 30 to 70 seconds, and gets stronger and more painful until you deliver your baby  Contractions do not go away with movement  They start in your lower back and move to the front in your abdomen  · Effacement  occurs when your cervix softens and thins, so it can easily open for the baby  Your primary healthcare provider [facility] or obstetrician will examine your cervix for effacement  · Dilation  is widening of your cervix, also for the baby's passage  Your PHP or obstetrician will examine your cervix for dilation  Your cervix will be fully opened and ready for delivery when it is dilated to 10 centimeters  · Increased discharge  from your vagina may occur  It may be pink, clear, or slightly bloody  This discharge may also be called bloody show  Bloody show is a mucus plug that forms and blocks your cervix during pregnancy  · Rupture of membranes  is a sudden release of clear fluid from your vagina  It is also known as when your water breaks  Your PHP or obstetrician may need to break your water if it does not break on its own  False labor: You may have false labor signs, which are also called Sourav Fonseca contractions  False labor is common and may happen several weeks or days before your actual labor  The contractions are not regular, and do not get closer together  The pain is usually mild, does not worsen, and is felt only in front  Sourav Fonseca contractions may happen later in the day, and stop after you change position, walk, or rest   Contact your PHP or obstetrician if:   · You have pain in your lower back or abdomen  · You have bloody mucus or show  · You have questions or concerns about your condition or care  Seek care immediately or call 911 if:   · You have regular, painful contractions that are less than 5 minutes apart, and last 30 to 70 seconds each  · You have heavy vaginal bleeding  · You have a constant trickle or sudden gush of clear fluid from your vagina  · You notice a sudden decrease in your baby's movement  © 2014 [address] is for End User's use only and may not be sold, redistributed or otherwise used for commercial purposes  All illustrations and images included in CareNotes® are the copyrighted property of [employer]  or Lion Alvarez  The above information is an  only  It is not intended as medical advice for individual conditions or treatments  Talk to your doctor, nurse or pharmacist before following any medical regimen to see if it is safe and effective for you

## 2018-04-25 NOTE — TELEPHONE ENCOUNTER
Telephone call from patient  Patient very upset  States she is having a lot of stress in her life and had 2 panic attacks today  Also states she is worried about baby's movement and describing movement as not normal and "jerking, like she's having a seizure"  Advised to go to L&D      L&D advised of same

## 2018-05-02 ENCOUNTER — ROUTINE PRENATAL (OUTPATIENT)
Dept: OBGYN CLINIC | Facility: MEDICAL CENTER | Age: 33
End: 2018-05-02

## 2018-05-02 VITALS — BODY MASS INDEX: 38.04 KG/M2 | DIASTOLIC BLOOD PRESSURE: 60 MMHG | WEIGHT: 208 LBS | SYSTOLIC BLOOD PRESSURE: 124 MMHG

## 2018-05-02 DIAGNOSIS — Z3A.30 30 WEEKS GESTATION OF PREGNANCY: ICD-10-CM

## 2018-05-02 DIAGNOSIS — O44.42 LOW LYING PLACENTA NOS OR WITHOUT HEMORRHAGE, SECOND TRIMESTER: Primary | ICD-10-CM

## 2018-05-02 DIAGNOSIS — Z34.93: ICD-10-CM

## 2018-05-02 PROBLEM — Z3A.29 29 WEEKS GESTATION OF PREGNANCY: Status: RESOLVED | Noted: 2018-04-25 | Resolved: 2018-05-02

## 2018-05-02 PROCEDURE — PNV: Performed by: OBSTETRICS & GYNECOLOGY

## 2018-05-02 NOTE — PROGRESS NOTES
Pedro groves is a 33y o  year old  at 30w4d for routine prenatal visit    + FM, no vaginal bleeding, contractions, or LOF  Complaints: No   Most recent ultrasound and labs reviewed  Has follow-up with [facility] scheduled  PTL precautions     Had tdap

## 2018-05-11 ENCOUNTER — TELEPHONE (OUTPATIENT)
Dept: OBGYN CLINIC | Facility: MEDICAL CENTER | Age: 33
End: 2018-05-11

## 2018-05-11 ENCOUNTER — TELEPHONE (OUTPATIENT)
Dept: CARDIOLOGY CLINIC | Facility: CLINIC | Age: 33
End: 2018-05-11

## 2018-05-11 ENCOUNTER — HOSPITAL ENCOUNTER (OUTPATIENT)
Facility: HOSPITAL | Age: 33
Discharge: HOME | End: 2018-05-11
Attending: OBSTETRICS & GYNECOLOGY | Admitting: OBSTETRICS & GYNECOLOGY
Payer: COMMERCIAL

## 2018-05-11 VITALS
DIASTOLIC BLOOD PRESSURE: 55 MMHG | TEMPERATURE: 97.8 F | BODY MASS INDEX: 37.91 KG/M2 | WEIGHT: 206 LBS | SYSTOLIC BLOOD PRESSURE: 109 MMHG | HEIGHT: 62 IN | HEART RATE: 99 BPM

## 2018-05-11 DIAGNOSIS — Z34.93: ICD-10-CM

## 2018-05-11 PROBLEM — Z3A.31 31 WEEKS GESTATION OF PREGNANCY: Status: ACTIVE | Noted: 2018-05-11

## 2018-05-11 PROCEDURE — 99202 OFFICE O/P NEW SF 15 MIN: CPT

## 2018-05-11 PROCEDURE — 76817 TRANSVAGINAL US OBSTETRIC: CPT | Performed by: OBSTETRICS & GYNECOLOGY

## 2018-05-11 PROCEDURE — 99213 OFFICE O/P EST LOW 20 MIN: CPT | Performed by: OBSTETRICS & GYNECOLOGY

## 2018-05-11 NOTE — TELEPHONE ENCOUNTER
The patient called in this morning stating that she was having red tinged discharge and that she noted more cramping that usual   She also stated that she noticed that that baby is not as active as she normally is in the morning  After talking to the patient she did note that she did have intercourse that night before as well  She stated that the cramps were in the lower half of the abdomen and then shooting into the pelvis  I spoke with Dr Flakito Asher and she wants that patient to monitor herself for the discharge and try to lie down and get her fetal kick counts  She also stated that if the patient has more than 4 contractions in an hour she will need to go to L&D for evaluation or if she does not get her fetal kick count she is to call back to notify us and she will need to go to L&D for evaluation as well  I then spoke to the patient again and told her what she needs to do and she was alright with that and she will call if there are any other issues

## 2018-05-11 NOTE — TELEPHONE ENCOUNTER
Phone call from patient  She had the holter monitor Dr Lizabeth Najjar ordered at her last ov in January 2018  She did not have the holter monitor done at [facility]  Copy of holter monitor in Dr Nj Shown box  Please call patient with results    [phone]

## 2018-05-11 NOTE — PROGRESS NOTES
Triage Note - OB  Paralee Ahumada 33 y o  female MRN: [id number]  Unit/Bed#: L&D 324 Encounter: [id number]    OB TRIAGE NOTE  Paralee Ahumada  [id number]  2018  1:16 PM  L&D /L&D     ASSESS:  33 y o   31w6d, not in  labor  PLAN  #1   labor:   · Negative  CL 4 16-4 40cm  No placenta previa or low lying placenta  · No vaginal bleeding appreciated on exam   · No vaginal or urinary tract infections present  · Contractions every 2-4 minutes initially - resolved with oral rehydration  · FHT reactive  #2  Discharge:   · Discharge instructions given to patient  Encouraged patient to call with painful CTX, VB, LOF, decreased FM  · She has an appointment scheduled with [facility] for follow up regarding her low lying placenta  D/w Dr Magali Jain  ______________    SUBJECTIVE    FIONA: Estimated Date of Delivery: 18    HPI Chronology:  33 y o  Vicente Jaimes presents with complaint of vaginal bleeding that began around 0630 and ended around [phone]  Patient reports seeing blood tinged mucous but no clots were present  She did not need to use a pad  Around the same time, she also started having contractions every 2-4 minutes  She denies leakage of fluid or decreased fetal movement  She denies pelvic pain  She denies headache, lightheadedness, nausea, vomiting, chest pain, shortness of breath, abdominal pain, lower extremity tenderness  Vitals:   /55   Pulse 99   Temp 97 8 °F (36 6 °C) (Oral)   Ht 5' 2" (1 575 m)   Wt 93 4 kg (206 lb)   LMP 2017 (Exact Date)   BMI 37 68 kg/m²   Body mass index is 37 68 kg/m²  Review of Systems   Constitutional: Negative  Respiratory: Negative  Cardiovascular: Negative  Gastrointestinal: Negative  Genitourinary: Positive for vaginal bleeding  Musculoskeletal: Negative  Physical Exam   Constitutional: She is oriented to person, place, and time  She appears well-developed and well-nourished     Cardiovascular: Normal rate, regular rhythm and normal heart sounds  Pulmonary/Chest: Effort normal and breath sounds normal    Abdominal: Soft  Bowel sounds are normal    Genitourinary: No vaginal discharge found  Neurological: She is alert and oriented to person, place, and time  Vitals reviewed  SSE:  Cervix visually closed  Small amount of leukorrhea noted  KOH/wet mount negative for clue cells, trichomonads, or hyphae  Nitrazine negative    TVUS:  4 16, 4 17, 4 40cm  Vertex presentation  No placenta previa noted (pt has h/o of low-lying placenta 11 4 mm from internal os)    FHT:  Baseline Rate: 135 bpm  Variability: Moderate 6-25 bpm  Accelerations: 10 x 10 (<32 weeks), At variable times  TOCO:   Contraction Frequency (minutes): 2-3  Contraction Duration (seconds): 60-80  Contraction Quality: Mild    Labs: No results found for this or any previous visit (from the past 24 hour(s))  Lab, Imaging and other studies: I have personally reviewed pertinent reports          Daniel Walls MD  5/11/2018  1:16 PM

## 2018-05-11 NOTE — DISCHARGE INSTRUCTIONS
Pregnancy at 31 to 34 Weeks   AMBULATORY CARE:   What changes are happening to your body: You may continue to have symptoms such as shortness of breath, heartburn, contractions, or swelling of your ankles and feet  You may be gaining about 1 pound a week now  Seek care immediately if:   · You develop a severe headache that does not go away  · You have new or increased vision changes, such as blurred or spotted vision  · You have new or increased swelling in your face or hands  · You have vaginal spotting or bleeding  · Your water broke or you feel warm water gushing or trickling from your vagina  Contact your healthcare provider if:   · You have more than 5 contractions in 1 hour  · You notice any changes in your baby's movements  · You have abdominal cramps, pressure, or tightening  · You have a change in vaginal discharge  · You have chills or a fever  · You have vaginal itching, burning, or pain  · You have yellow, green, white, or foul-smelling vaginal discharge  · You have pain or burning when you urinate, less urine than usual, or pink or bloody urine  · You have questions or concerns about your condition or care  How to care for yourself at this stage of your pregnancy:   · Eat a variety of healthy foods  Healthy foods include fruits, vegetables, whole-grain breads, low-fat dairy foods, beans, lean meats, and fish  Drink liquids as directed  Ask how much liquid to drink each day and which liquids are best for you  Limit caffeine to less than 200 milligrams each day  Limit your intake of fish to 2 servings each week  Choose fish low in mercury such as canned light tuna, shrimp, salmon, cod, or tilapia  Do not  eat fish high in mercury such as swordfish, tilefish, enzo mackerel, and shark  · Manage heartburn  by eating 4 or 5 small meals each day instead of large meals  Avoid spicy food  · Manage swelling  by lying down and putting your feet up       · Take prenatal vitamins as directed  Your need for certain vitamins and minerals, such as folic acid, increases during pregnancy  Prenatal vitamins provide some of the extra vitamins and minerals you need  Prenatal vitamins may also help to decrease the risk of certain birth defects  · Talk to your healthcare provider about exercise  Moderate exercise can help you stay fit  Your healthcare provider will help you plan an exercise program that is safe for you during pregnancy  · Do not smoke  If you smoke, it is never too late to quit  Smoking increases your risk of a miscarriage and other health problems during your pregnancy  Smoking can cause your baby to be born too early or weigh less at birth  Ask your healthcare provider for information if you need help quitting  · Do not drink alcohol  Alcohol passes from your body to your baby through the placenta  It can affect your baby's brain development and cause fetal alcohol syndrome (FAS)  FAS is a group of conditions that causes mental, behavior, and growth problems  · Talk to your healthcare provider before you take any medicines  Many medicines may harm your baby if you take them when you are pregnant  Do not take any medicines, vitamins, herbs, or supplements without first talking to your healthcare provider  Never use illegal or street drugs (such as marijuana or cocaine) while you are pregnant  Safety tips during pregnancy:   · Avoid hot tubs and saunas  Do not use a hot tub or sauna while you are pregnant, especially during your first trimester  Hot tubs and saunas may raise your baby's temperature and increase the risk of birth defects  · Avoid toxoplasmosis  This is an infection caused by eating raw meat or being around infected cat feces  It can cause birth defects, miscarriages, and other problems  Wash your hands after you touch raw meat  Make sure any meat is well-cooked before you eat it  Avoid raw eggs and unpasteurized milk   Use gloves or ask someone else to clean your cat's litter box while you are pregnant  Changes that are happening with your baby:  By 34 weeks, your baby may weigh more than 5 pounds  Your baby will be about 12 ½ inches long from the top of the head to the rump (baby's bottom)  Your baby is gaining about ½ pound a week  Your baby's eyes open and close now  Your baby's kicks and movements are more forceful at this time  What you need to know about prenatal care: Your healthcare provider will check your blood pressure and weight  You may also need the following:  · A urine test  may also be done to check for sugar and protein  These can be signs of gestational diabetes or infection  Protein in your urine may also be a sign of preeclampsia  Preeclampsia is a condition that can develop during week 20 or later of your pregnancy  It causes high blood pressure, and it can cause problems with your kidneys and other organs  · A Tdap vaccine  may be recommended by your healthcare provider  · Fundal height  is a measurement of your uterus to check your baby's growth  This number is usually the same as the number of weeks that you have been pregnant  Your healthcare provider may also check your baby's position  · Your baby's heart rate  will be checked  © 2017 [address]  Information is for End User's use only and may not be sold, redistributed or otherwise used for commercial purposes  All illustrations and images included in CareNotes® are the copyrighted property of [employer]  or Lion Alvarez  The above information is an  only  It is not intended as medical advice for individual conditions or treatments  Talk to your doctor, nurse or pharmacist before following any medical regimen to see if it is safe and effective for you

## 2018-05-11 NOTE — PROCEDURES
tiff Peterson [id number] at [address] with an FIONA of 7/7/2018, by Last Menstrual Period, was seen at [facility] for the following procedure(s): $Procedure Type: US - Transvaginal]                   Ultrasound Other  Fetal Presentation: Vertex  Cervical Length: 4 16, 4 17, 4 40cm  Funnel: No  Debris: No  Placenta Previa: No  Vasa Previa: No       Low lying placenta measuring 11 4mm from internal os seems to have resolved  Patient has follow up appointment with [facility] for re-evaluation on 5/22/2018      Britton Godfrey MD  5/11/2018  2:48 PM

## 2018-05-11 NOTE — TELEPHONE ENCOUNTER
Returned call to patient  States that bleeding has stopped  She did complete kick count  States she has had 5 contractions in last 45 minutes    As per note, patient sent to L&D for eval

## 2018-05-12 NOTE — TELEPHONE ENCOUNTER
Called pt   ML about Holter findings    Will message  for appointment in one year    Didn't have echo    She will call if she wants to get this    Holter showed only 2 PACS and normal HR range  Kristan Black

## 2018-05-16 ENCOUNTER — ROUTINE PRENATAL (OUTPATIENT)
Dept: OBGYN CLINIC | Facility: MEDICAL CENTER | Age: 33
End: 2018-05-16

## 2018-05-16 VITALS — BODY MASS INDEX: 37.86 KG/M2 | SYSTOLIC BLOOD PRESSURE: 102 MMHG | DIASTOLIC BLOOD PRESSURE: 56 MMHG | WEIGHT: 207 LBS

## 2018-05-16 DIAGNOSIS — Z34.93: ICD-10-CM

## 2018-05-16 DIAGNOSIS — Z3A.32 32 WEEKS GESTATION OF PREGNANCY: Primary | ICD-10-CM

## 2018-05-16 PROBLEM — Z3A.31 31 WEEKS GESTATION OF PREGNANCY: Status: RESOLVED | Noted: 2018-05-11 | Resolved: 2018-05-16

## 2018-05-16 PROCEDURE — PNV: Performed by: NURSE PRACTITIONER

## 2018-05-16 NOTE — PROGRESS NOTES
Jodie Leal is a 33y o  year old  at 32w4d for routine prenatal visit    + FM, no vaginal bleeding, contractions, or LOF  Complaints: No, had diarrhea yesterday but feeling better today  Most recent ultrasound and labs reviewed  Had Tdap, given birth plan and pbv info  fkc, ptl precautions reviewed

## 2018-05-23 DIAGNOSIS — B96.89 BV (BACTERIAL VAGINOSIS): Primary | ICD-10-CM

## 2018-05-23 DIAGNOSIS — N76.0 BV (BACTERIAL VAGINOSIS): Primary | ICD-10-CM

## 2018-05-23 RX ORDER — METRONIDAZOLE 7.5 MG/G
GEL TOPICAL 2 TIMES DAILY
Qty: 45 G | Refills: 0 | Status: SHIPPED | OUTPATIENT
Start: 2018-05-23 | End: 2018-06-15 | Stop reason: HOSPADM

## 2018-05-30 ENCOUNTER — ROUTINE PRENATAL (OUTPATIENT)
Dept: OBGYN CLINIC | Facility: MEDICAL CENTER | Age: 33
End: 2018-05-30

## 2018-05-30 ENCOUNTER — ULTRASOUND (OUTPATIENT)
Dept: PERINATAL CARE | Facility: CLINIC | Age: 33
End: 2018-05-30
Payer: COMMERCIAL

## 2018-05-30 VITALS
WEIGHT: 209 LBS | DIASTOLIC BLOOD PRESSURE: 61 MMHG | HEART RATE: 98 BPM | BODY MASS INDEX: 38.46 KG/M2 | HEIGHT: 62 IN | SYSTOLIC BLOOD PRESSURE: 94 MMHG

## 2018-05-30 VITALS — SYSTOLIC BLOOD PRESSURE: 112 MMHG | BODY MASS INDEX: 37.68 KG/M2 | DIASTOLIC BLOOD PRESSURE: 56 MMHG | WEIGHT: 206 LBS

## 2018-05-30 DIAGNOSIS — IMO0002 ABNORMAL FETAL HEART RATE: ICD-10-CM

## 2018-05-30 DIAGNOSIS — O44.42 LOW LYING PLACENTA NOS OR WITHOUT HEMORRHAGE, SECOND TRIMESTER: ICD-10-CM

## 2018-05-30 DIAGNOSIS — Z03.72 PLACENTAL PROBLEM SUSPECTED BUT NOT FOUND: ICD-10-CM

## 2018-05-30 DIAGNOSIS — E66.9 OBESITY (BMI 30-39.9): ICD-10-CM

## 2018-05-30 DIAGNOSIS — Z3A.34 34 WEEKS GESTATION OF PREGNANCY: ICD-10-CM

## 2018-05-30 DIAGNOSIS — O36.8390 FETAL ARRHYTHMIA AFFECTING PREGNANCY, ANTEPARTUM: ICD-10-CM

## 2018-05-30 DIAGNOSIS — N89.8 VAGINAL DISCHARGE: ICD-10-CM

## 2018-05-30 DIAGNOSIS — Z34.93 THIRD TRIMESTER PREGNANCY: Primary | ICD-10-CM

## 2018-05-30 DIAGNOSIS — O99.213 OBESITY AFFECTING PREGNANCY, ANTEPARTUM, THIRD TRIMESTER: Primary | ICD-10-CM

## 2018-05-30 DIAGNOSIS — R10.2 PELVIC PRESSURE IN FEMALE: ICD-10-CM

## 2018-05-30 PROCEDURE — PNV: Performed by: OBSTETRICS & GYNECOLOGY

## 2018-05-30 PROCEDURE — 76816 OB US FOLLOW-UP PER FETUS: CPT | Performed by: OBSTETRICS & GYNECOLOGY

## 2018-05-30 PROCEDURE — 76817 TRANSVAGINAL US OBSTETRIC: CPT | Performed by: OBSTETRICS & GYNECOLOGY

## 2018-05-30 PROCEDURE — 87660 TRICHOMONAS VAGIN DIR PROBE: CPT | Performed by: OBSTETRICS & GYNECOLOGY

## 2018-05-30 PROCEDURE — 87480 CANDIDA DNA DIR PROBE: CPT | Performed by: OBSTETRICS & GYNECOLOGY

## 2018-05-30 PROCEDURE — 87510 GARDNER VAG DNA DIR PROBE: CPT | Performed by: OBSTETRICS & GYNECOLOGY

## 2018-05-30 NOTE — PROGRESS NOTES
Tanya Glass is a 33y o  year old  at 34w4d for routine prenatal visit    + FM, no vaginal bleeding, contractions, or LOF  Complaints: Yes - vaginal discharge - affirm collected   Most recent ultrasound and labs reviewed    Has repeat US today scheduled to follow placentation  Birth plan reviewed and signed

## 2018-05-30 NOTE — PATIENT INSTRUCTIONS
Kick Counts in Pregnancy   AMBULATORY CARE:   Kick counts  measure how much your baby is moving in your womb  A kick from your baby can be felt as a twist, turn, swish, roll, or jab  It is common to feel your baby kicking at 26 to 28 weeks of pregnancy  You may feel your baby kick as early as 20 weeks of pregnancy  Seek care immediately if:   · You feel your baby kick less as the day goes on      · You do not feel any kicks in a day  Contact your healthcare provider if:   · You feel a change in the number of kicks or movements of your baby  · You feel fewer than 10 kicks within 2 hours after counting twice  · You have questions or concerns about your baby's movements  Why measure kick counts:  Your baby's movement may provide information about your baby's health  He may move less, or not at all, if there are problems  He may move less if he does not have enough room to grow in your uterus (womb)  He may also move less if he is not getting enough oxygen or nutrition from the placenta  Tell your healthcare provider as soon as you feel a change in your baby's movements  Problems that are found earlier are easier to treat  When to measure kick counts:   · Measure kick counts at the same time every day  · Measure kick counts when your baby is awake and most active  Your baby may be most active in the evening  · Measure kick counts after a meal or snack  Your baby may be more active after you eat  Wait 2 hours after you drink liquids that contain caffeine  Caffeine can make your baby more active than usual     · You should not smoke while you are pregnant  Smoking increases the risk of health problems for you and for your baby during your pregnancy  If you do smoke, wait 2 hours to measure kick counts  Nicotine can make your baby more active than usual   How to measure kick counts:  Check that your baby is awake before you measure kick counts   You can wake up your baby by lightly pushing on your belly, walking, or drinking something cold  Your healthcare provider may tell you different ways to measure kick counts  He may tell you to do the following:  · Use a chart or clock to keep track of the time you start and finish counting  · Sit in a chair or lie on your left side  · Place your hands on the largest part of your belly  · Count until you reach 10 kicks  Write down how much time it takes to count 10 kicks  · It may take 30 minutes to 2 hours to count 10 kicks  It should not take more than 2 hours to count 10 kicks  · If you do not feel 10 kicks within 2 hours, wait 1 hour and count again  Your baby can sleep for up to 40 minutes at one time  Follow up with your healthcare provider as directed:  Write down your questions so you remember to ask them during your visits  © 2017 [address] Information is for End User's use only and may not be sold, redistributed or otherwise used for commercial purposes  All illustrations and images included in CareNotes® are the copyrighted property of A D A M , Inc  or Lion Alvarez  The above information is an  only  It is not intended as medical advice for individual conditions or treatments  Talk to your doctor, nurse or pharmacist before following any medical regimen to see if it is safe and effective for you

## 2018-05-30 NOTE — PROGRESS NOTES
A transvaginal ultrasound was performed  Sonographer note on use of High Level Disinfection Process (Trophon) for transvaginal probe# 1 used, serial [id number]    Samantha Beckford RDMS

## 2018-06-11 ENCOUNTER — TELEPHONE (OUTPATIENT)
Dept: OBGYN CLINIC | Facility: MEDICAL CENTER | Age: 33
End: 2018-06-11

## 2018-06-11 NOTE — LETTER
June 11, 2018     Patient: Sudheer Rodríguez   YOB: 1985   Date of Visit: 6/11/2018       To Whom it May Concern:    Sudheer Rodríguez is under my professional care  Please excuse from work today  If you have any questions or concerns, please don't hesitate to call           Sincerely,          Kendal Gordon RN        CC: No Recipients

## 2018-06-11 NOTE — TELEPHONE ENCOUNTER
Did not go to work today because she was feeling dizzy  Requesting note  Has appt  On 6/13-encouraged to keep that appt   Call back with any changes

## 2018-06-13 ENCOUNTER — ROUTINE PRENATAL (OUTPATIENT)
Dept: OBGYN CLINIC | Facility: MEDICAL CENTER | Age: 33
End: 2018-06-13

## 2018-06-13 VITALS — BODY MASS INDEX: 38.04 KG/M2 | WEIGHT: 208 LBS | SYSTOLIC BLOOD PRESSURE: 112 MMHG | DIASTOLIC BLOOD PRESSURE: 72 MMHG

## 2018-06-13 DIAGNOSIS — Z34.93 ENCOUNTER FOR PREGNANCY RELATED EXAMINATION IN THIRD TRIMESTER: Primary | ICD-10-CM

## 2018-06-13 DIAGNOSIS — E66.9 OBESITY (BMI 30-39.9): ICD-10-CM

## 2018-06-13 DIAGNOSIS — IMO0002 ABNORMAL FETAL HEART RATE: ICD-10-CM

## 2018-06-13 DIAGNOSIS — O36.8390 FETAL ARRHYTHMIA AFFECTING PREGNANCY, ANTEPARTUM: ICD-10-CM

## 2018-06-13 DIAGNOSIS — O99.213 OBESITY AFFECTING PREGNANCY, ANTEPARTUM, THIRD TRIMESTER: ICD-10-CM

## 2018-06-13 PROCEDURE — 87653 STREP B DNA AMP PROBE: CPT | Performed by: OBSTETRICS & GYNECOLOGY

## 2018-06-13 PROCEDURE — PNV: Performed by: OBSTETRICS & GYNECOLOGY

## 2018-06-13 NOTE — PROGRESS NOTES
Spring Ross is a 33y o  year old  at 37w2d for routine prenatal visit  GBS done Yes  PCN allergy No  Labor precautions given  [address] reviewed     CVX: /3, soft mid

## 2018-06-14 ENCOUNTER — HOSPITAL ENCOUNTER (OUTPATIENT)
Facility: HOSPITAL | Age: 33
Discharge: HOME | End: 2018-06-15
Attending: OBSTETRICS & GYNECOLOGY | Admitting: OBSTETRICS & GYNECOLOGY
Payer: COMMERCIAL

## 2018-06-14 VITALS
DIASTOLIC BLOOD PRESSURE: 57 MMHG | WEIGHT: 208 LBS | TEMPERATURE: 98.8 F | HEART RATE: 80 BPM | HEIGHT: 62 IN | BODY MASS INDEX: 38.28 KG/M2 | SYSTOLIC BLOOD PRESSURE: 100 MMHG | RESPIRATION RATE: 18 BRPM

## 2018-06-14 NOTE — LETTER
[address] AND DELIVERY  [facility]  Dept: [phone]    Kiki 15, 2018     Patient: Manuel Nichols   YOB: 1985   Date of Visit: 6/14/2018       To Whom it May Concern:    Manuel Nichols is under my professional care  She was seen in the hospital from 6/14/2018   to 06/15/18  She may return to work on 6/15/18  If you have any questions or concerns, please don't hesitate to call           Sincerely,          Rabia Wesley MD

## 2018-06-14 NOTE — LETTER
[address] AND DELIVERY  [facility]  Dept: [phone]    Kiki 15, 2018     Patient: Kaitlynn Godwin   YOB: 1985   Date of Visit: 6/14/2018       To Whom it May Concern:    Kaitlynn Godwin is under my professional care  She was seen in the hospital from 6/14/2018   to 06/15/18  She may return to work on 6/16/18  If you have any questions or concerns, please don't hesitate to call           Sincerely,          Zaki Ibarra MD

## 2018-06-15 LAB — GP B STREP DNA SPEC QL NAA+PROBE: NORMAL

## 2018-06-15 PROCEDURE — 99213 OFFICE O/P EST LOW 20 MIN: CPT

## 2018-06-15 NOTE — PROGRESS NOTES
Triage Note - OB  Saroj Mccormack 33 y o  female MRN: [id number]  Unit/Bed#: L&D 321-01 Encounter: [id number]    Chief Complaint:   Chief Complaint   Patient presents with    sharp pain     pt c/o of sharp pain and pressure since      FIONA: Estimated Date of Delivery: 18    HPI: 33 y o  female  at 36w5d presents with sharp pain to the left of her umbilicus that radiates to her vagina  She rates it a 3/10 and it is worse with walking and better with laying down  Pt also having contractions  Denies vaginal bleeding or loss of fluid  Reports positive fetal movement  Pt has hx of low-lying placenta that resolved  Pt also has hx of WPW syndrome s/p ablation at 16yo  Vitals: Last menstrual period 2017, unknown if currently breastfeeding  ,There is no height or weight on file to calculate BMI  Physical Exam  GEN: well developed and well nourished, alert, oriented times 3 and appears comfortable  Abd: soft, non tender and gravid  SVE: 2-3/60/-3, unchanged  After 2 hrs    FHR: 125bpm, moderate variability, positive accels, no decels, reactive  [place]: q2-3mins    Labs:   No visits with results within 1 Day(s) from this visit  Latest known visit with results is:   Routine Prenatal on 2018   Component Date Value    Candida Species 2018 Negative     Gardnerella vaginalis 2018 Negative     Trichomonas vaginalis 2018 Negative        Lab, Imaging and other studies: I have personally reviewed pertinent reports  A/P: 33 y o  female  at 44w9d with sharp vaginal pain/contractions, not in PTL  1)False labor  SVE 2-3/60/-3  2hr recheck, pt to ambulate  SVE: unchanged, contractions spaced out  Encouraged conservative treatment with tylenol, increased hydration, warm showers  2) Discharge instructions given to patient and labor precautions reviewed      D/w Dr Sakshi Saldivar MD  OBGYN, PGY-1  6/15/2018 2:12 AM

## 2018-06-22 ENCOUNTER — ROUTINE PRENATAL (OUTPATIENT)
Dept: OBGYN CLINIC | Facility: MEDICAL CENTER | Age: 33
End: 2018-06-22

## 2018-06-22 VITALS — WEIGHT: 211.6 LBS | DIASTOLIC BLOOD PRESSURE: 68 MMHG | SYSTOLIC BLOOD PRESSURE: 92 MMHG | BODY MASS INDEX: 38.7 KG/M2

## 2018-06-22 DIAGNOSIS — Z3A.37 37 WEEKS GESTATION OF PREGNANCY: ICD-10-CM

## 2018-06-22 DIAGNOSIS — Z34.93 THIRD TRIMESTER PREGNANCY: Primary | ICD-10-CM

## 2018-06-22 PROCEDURE — PNV: Performed by: OBSTETRICS & GYNECOLOGY

## 2018-06-22 NOTE — PROGRESS NOTES
Nicole Shetty is a 33y o  year old  at 37w6d for routine prenatal visit    + FM, no vaginal bleeding, contractions, or LOF  Complaints: irregular contractions, increased pressure  Most recent ultrasound and labs reviewed    cvx 3+/60/-2  Labor precautions

## 2018-06-29 ENCOUNTER — ROUTINE PRENATAL (OUTPATIENT)
Dept: OBGYN CLINIC | Facility: MEDICAL CENTER | Age: 33
End: 2018-06-29

## 2018-06-29 VITALS — WEIGHT: 209 LBS | DIASTOLIC BLOOD PRESSURE: 62 MMHG | BODY MASS INDEX: 38.23 KG/M2 | SYSTOLIC BLOOD PRESSURE: 110 MMHG

## 2018-06-29 DIAGNOSIS — O36.8390 FETAL ARRHYTHMIA AFFECTING PREGNANCY, ANTEPARTUM: ICD-10-CM

## 2018-06-29 DIAGNOSIS — E66.9 OBESITY (BMI 30-39.9): ICD-10-CM

## 2018-06-29 DIAGNOSIS — Z3A.38 38 WEEKS GESTATION OF PREGNANCY: ICD-10-CM

## 2018-06-29 DIAGNOSIS — O99.213 OBESITY AFFECTING PREGNANCY, ANTEPARTUM, THIRD TRIMESTER: Primary | ICD-10-CM

## 2018-06-29 DIAGNOSIS — IMO0002 ABNORMAL FETAL HEART RATE: ICD-10-CM

## 2018-06-29 PROCEDURE — PNV: Performed by: OBSTETRICS & GYNECOLOGY

## 2018-06-29 NOTE — PROGRESS NOTES
Beata Rice is a 33y o  year old  at 38w7d for routine prenatal visit    + FM, no vaginal bleeding, contractions, or LOF  Complaints: No   Most recent ultrasound and labs reviewed    Interested in stripping membranes  Will call if decides to come in [facility] [place]

## 2018-07-02 ENCOUNTER — ROUTINE PRENATAL (OUTPATIENT)
Dept: OBGYN CLINIC | Facility: MEDICAL CENTER | Age: 33
End: 2018-07-02

## 2018-07-02 VITALS — DIASTOLIC BLOOD PRESSURE: 74 MMHG | SYSTOLIC BLOOD PRESSURE: 108 MMHG | BODY MASS INDEX: 38.41 KG/M2 | WEIGHT: 210 LBS

## 2018-07-02 DIAGNOSIS — Z34.93 THIRD TRIMESTER PREGNANCY: Primary | ICD-10-CM

## 2018-07-02 PROCEDURE — PNV: Performed by: OBSTETRICS & GYNECOLOGY

## 2018-07-02 NOTE — PROGRESS NOTES
Magdy Vincent is a 33y o  year old  at 39w2d for routine prenatal visit    + FM, no vaginal bleeding or LOF  Complaints: Yes - occasional contractions / made appointment to be stripped as discussed with Dr ASH last visit   Most recent ultrasound and labs reviewed

## 2018-07-05 ENCOUNTER — ROUTINE PRENATAL (OUTPATIENT)
Dept: OBGYN CLINIC | Facility: MEDICAL CENTER | Age: 33
End: 2018-07-05

## 2018-07-05 VITALS — WEIGHT: 209 LBS | SYSTOLIC BLOOD PRESSURE: 110 MMHG | BODY MASS INDEX: 38.23 KG/M2 | DIASTOLIC BLOOD PRESSURE: 82 MMHG

## 2018-07-05 DIAGNOSIS — Z3A.39 39 WEEKS GESTATION OF PREGNANCY: ICD-10-CM

## 2018-07-05 DIAGNOSIS — E66.9 OBESITY (BMI 30-39.9): ICD-10-CM

## 2018-07-05 DIAGNOSIS — O36.8390 FETAL ARRHYTHMIA AFFECTING PREGNANCY, ANTEPARTUM: ICD-10-CM

## 2018-07-05 DIAGNOSIS — O99.213 OBESITY AFFECTING PREGNANCY, ANTEPARTUM, THIRD TRIMESTER: Primary | ICD-10-CM

## 2018-07-05 DIAGNOSIS — IMO0002 ABNORMAL FETAL HEART RATE: ICD-10-CM

## 2018-07-05 PROCEDURE — PNV: Performed by: OBSTETRICS & GYNECOLOGY

## 2018-07-05 NOTE — PROGRESS NOTES
Pedro groves is a 33y o  year old  at 39w5d for routine prenatal visit    + FM, no vaginal bleeding, contractions, or LOF  Complaints: Yes - contractions every 5-8 minutes, comes and goes with intensity  Concerned re: LOF, + pooling, nitrazine and ferning negative  Most recent ultrasound and labs reviewed    Labor precautions

## 2018-07-06 ENCOUNTER — ANESTHESIA EVENT (INPATIENT)
Dept: LABOR AND DELIVERY | Facility: HOSPITAL | Age: 33
End: 2018-07-06
Payer: COMMERCIAL

## 2018-07-06 ENCOUNTER — HOSPITAL ENCOUNTER (INPATIENT)
Facility: HOSPITAL | Age: 33
LOS: 2 days | Discharge: HOME | End: 2018-07-08
Attending: OBSTETRICS & GYNECOLOGY | Admitting: OBSTETRICS & GYNECOLOGY
Payer: COMMERCIAL

## 2018-07-06 ENCOUNTER — ANESTHESIA (INPATIENT)
Dept: LABOR AND DELIVERY | Facility: HOSPITAL | Age: 33
End: 2018-07-06
Payer: COMMERCIAL

## 2018-07-06 DIAGNOSIS — Z3A.39 39 WEEKS GESTATION OF PREGNANCY: ICD-10-CM

## 2018-07-06 PROBLEM — Z34.90: Status: RESOLVED | Noted: 2018-04-25 | Resolved: 2018-07-06

## 2018-07-06 PROBLEM — O42.90 PREMATURE RUPTURE OF MEMBRANES: Status: ACTIVE | Noted: 2018-07-06

## 2018-07-06 LAB
ABO GROUP BLD: NORMAL
APTT PPP: 27 SECONDS (ref 24–36)
APTT PPP: 39 SECONDS (ref 24–36)
BASE EXCESS BLDCOA CALC-SCNC: -4.6 MMOL/L (ref 3–11)
BASE EXCESS BLDCOV CALC-SCNC: -4.6 MMOL/L (ref 1–9)
BASOPHILS # BLD AUTO: 0.04 THOUSANDS/ΜL (ref 0–0.1)
BASOPHILS # BLD MANUAL: 0 THOUSAND/UL (ref 0–0.1)
BASOPHILS # BLD MANUAL: 0.09 THOUSAND/UL (ref 0–0.1)
BASOPHILS NFR BLD AUTO: 0 % (ref 0–1)
BASOPHILS NFR MAR MANUAL: 0 % (ref 0–1)
BASOPHILS NFR MAR MANUAL: 1 % (ref 0–1)
BLD GP AB SCN SERPL QL: NEGATIVE
EOSINOPHIL # BLD AUTO: 0.09 THOUSAND/ΜL (ref 0–0.61)
EOSINOPHIL # BLD MANUAL: 0 THOUSAND/UL (ref 0–0.4)
EOSINOPHIL # BLD MANUAL: 0.23 THOUSAND/UL (ref 0–0.4)
EOSINOPHIL NFR BLD AUTO: 1 % (ref 0–6)
EOSINOPHIL NFR BLD MANUAL: 0 % (ref 0–6)
EOSINOPHIL NFR BLD MANUAL: 1 % (ref 0–6)
ERYTHROCYTE [DISTWIDTH] IN BLOOD BY AUTOMATED COUNT: 14 % (ref 11.6–15.1)
ERYTHROCYTE [DISTWIDTH] IN BLOOD BY AUTOMATED COUNT: 14 % (ref 11.6–15.1)
ERYTHROCYTE [DISTWIDTH] IN BLOOD BY AUTOMATED COUNT: 14.2 % (ref 11.6–15.1)
FIBRINOGEN PPP-MCNC: 154 MG/DL (ref 227–495)
FIBRINOGEN PPP-MCNC: 445 MG/DL (ref 227–495)
HCO3 BLDCOA-SCNC: 25 MMOL/L (ref 17.3–27.3)
HCO3 BLDCOV-SCNC: 22.1 MMOL/L (ref 12.2–28.6)
HCT VFR BLD AUTO: 18.4 % (ref 34.8–46.1)
HCT VFR BLD AUTO: 35.4 % (ref 34.8–46.1)
HCT VFR BLD AUTO: 38.3 % (ref 34.8–46.1)
HGB BLD-MCNC: 11.7 G/DL (ref 11.5–15.4)
HGB BLD-MCNC: 12.7 G/DL (ref 11.5–15.4)
HGB BLD-MCNC: 5.9 G/DL (ref 11.5–15.4)
INR PPP: 1.03 (ref 0.86–1.17)
INR PPP: 1.68 (ref 0.86–1.17)
LYMPHOCYTES # BLD AUTO: 1.09 THOUSAND/UL (ref 0.6–4.47)
LYMPHOCYTES # BLD AUTO: 1.9 THOUSANDS/ΜL (ref 0.6–4.47)
LYMPHOCYTES # BLD AUTO: 10 % (ref 14–44)
LYMPHOCYTES # BLD AUTO: 12 % (ref 14–44)
LYMPHOCYTES # BLD AUTO: 2.35 THOUSAND/UL (ref 0.6–4.47)
LYMPHOCYTES NFR BLD AUTO: 18 % (ref 14–44)
MCH RBC QN AUTO: 29.4 PG (ref 26.8–34.3)
MCH RBC QN AUTO: 29.8 PG (ref 26.8–34.3)
MCH RBC QN AUTO: 29.8 PG (ref 26.8–34.3)
MCHC RBC AUTO-ENTMCNC: 32.1 G/DL (ref 31.4–37.4)
MCHC RBC AUTO-ENTMCNC: 33.1 G/DL (ref 31.4–37.4)
MCHC RBC AUTO-ENTMCNC: 33.2 G/DL (ref 31.4–37.4)
MCV RBC AUTO: 90 FL (ref 82–98)
MCV RBC AUTO: 90 FL (ref 82–98)
MCV RBC AUTO: 92 FL (ref 82–98)
MONOCYTES # BLD AUTO: 0 THOUSAND/UL (ref 0–1.22)
MONOCYTES # BLD AUTO: 0.09 THOUSAND/UL (ref 0–1.22)
MONOCYTES # BLD AUTO: 0.56 THOUSAND/ΜL (ref 0.17–1.22)
MONOCYTES NFR BLD AUTO: 5 % (ref 4–12)
MONOCYTES NFR BLD: 0 % (ref 4–12)
MONOCYTES NFR BLD: 1 % (ref 4–12)
NEUTROPHILS # BLD AUTO: 7.8 THOUSANDS/ΜL (ref 1.85–7.62)
NEUTROPHILS # BLD MANUAL: 20.89 THOUSAND/UL (ref 1.85–7.62)
NEUTROPHILS # BLD MANUAL: 7.81 THOUSAND/UL (ref 1.85–7.62)
NEUTS BAND NFR BLD MANUAL: 1 % (ref 0–8)
NEUTS BAND NFR BLD MANUAL: 1 % (ref 0–8)
NEUTS SEG NFR BLD AUTO: 75 % (ref 43–75)
NEUTS SEG NFR BLD AUTO: 85 % (ref 43–75)
NEUTS SEG NFR BLD AUTO: 88 % (ref 43–75)
NRBC BLD AUTO-RTO: 0 /100 WBCS
O2 CT VFR BLDCOA CALC: 12.5 ML/DL
OXYHGB MFR BLDCOA: 57 %
OXYHGB MFR BLDCOV: 64.9 %
PCO2 BLDCOA: 65.2 MM[HG] (ref 30–60)
PCO2 BLDCOV: 47 MM HG (ref 27–43)
PH BLDCOA: 7.2 [PH] (ref 7.23–7.43)
PH BLDCOV: 7.29 [PH] (ref 7.19–7.49)
PLATELET # BLD AUTO: 122 THOUSANDS/UL (ref 149–390)
PLATELET # BLD AUTO: 228 THOUSANDS/UL (ref 149–390)
PLATELET # BLD AUTO: 231 THOUSANDS/UL (ref 149–390)
PLATELET BLD QL SMEAR: ABNORMAL
PLATELET BLD QL SMEAR: ADEQUATE
PMV BLD AUTO: 11.3 FL (ref 8.9–12.7)
PMV BLD AUTO: 11.7 FL (ref 8.9–12.7)
PMV BLD AUTO: 12.1 FL (ref 8.9–12.7)
PO2 BLDCOA: 24.8 MM HG (ref 5–25)
PO2 BLDCOV: 27.4 MM HG (ref 15–45)
PROTHROMBIN TIME: 13.6 SECONDS (ref 11.8–14.2)
PROTHROMBIN TIME: 19.9 SECONDS (ref 11.8–14.2)
RBC # BLD AUTO: 2.01 MILLION/UL (ref 3.81–5.12)
RBC # BLD AUTO: 3.93 MILLION/UL (ref 3.81–5.12)
RBC # BLD AUTO: 4.26 MILLION/UL (ref 3.81–5.12)
RBC MORPH BLD: NORMAL
RBC MORPH BLD: NORMAL
RH BLD: POSITIVE
RPR SER QL: NORMAL
SAO2 % BLDCOV: 14.5 ML/DL
SPECIMEN EXPIRATION DATE: NORMAL
TOTAL CELLS COUNTED SPEC: 100
TOTAL CELLS COUNTED SPEC: 100
WBC # BLD AUTO: 10.39 THOUSAND/UL (ref 4.31–10.16)
WBC # BLD AUTO: 23.47 THOUSAND/UL (ref 4.31–10.16)
WBC # BLD AUTO: 9.08 THOUSAND/UL (ref 4.31–10.16)

## 2018-07-06 PROCEDURE — 59400 OBSTETRICAL CARE: CPT | Performed by: OBSTETRICS & GYNECOLOGY

## 2018-07-06 PROCEDURE — 99203 OFFICE O/P NEW LOW 30 MIN: CPT

## 2018-07-06 PROCEDURE — 85384 FIBRINOGEN ACTIVITY: CPT | Performed by: OBSTETRICS & GYNECOLOGY

## 2018-07-06 PROCEDURE — 82805 BLOOD GASES W/O2 SATURATION: CPT | Performed by: OBSTETRICS & GYNECOLOGY

## 2018-07-06 PROCEDURE — 86592 SYPHILIS TEST NON-TREP QUAL: CPT | Performed by: OBSTETRICS & GYNECOLOGY

## 2018-07-06 PROCEDURE — 86901 BLOOD TYPING SEROLOGIC RH(D): CPT | Performed by: OBSTETRICS & GYNECOLOGY

## 2018-07-06 PROCEDURE — 86850 RBC ANTIBODY SCREEN: CPT | Performed by: OBSTETRICS & GYNECOLOGY

## 2018-07-06 PROCEDURE — 86900 BLOOD TYPING SEROLOGIC ABO: CPT | Performed by: OBSTETRICS & GYNECOLOGY

## 2018-07-06 PROCEDURE — 85610 PROTHROMBIN TIME: CPT | Performed by: OBSTETRICS & GYNECOLOGY

## 2018-07-06 PROCEDURE — 85007 BL SMEAR W/DIFF WBC COUNT: CPT | Performed by: OBSTETRICS & GYNECOLOGY

## 2018-07-06 PROCEDURE — 85730 THROMBOPLASTIN TIME PARTIAL: CPT | Performed by: OBSTETRICS & GYNECOLOGY

## 2018-07-06 PROCEDURE — 86920 COMPATIBILITY TEST SPIN: CPT | Performed by: ANESTHESIOLOGY

## 2018-07-06 PROCEDURE — 85027 COMPLETE CBC AUTOMATED: CPT | Performed by: OBSTETRICS & GYNECOLOGY

## 2018-07-06 PROCEDURE — 85025 COMPLETE CBC W/AUTO DIFF WBC: CPT | Performed by: OBSTETRICS & GYNECOLOGY

## 2018-07-06 PROCEDURE — 6A550ZT PHERESIS OF CORD BLOOD STEM CELLS, SINGLE: ICD-10-PCS | Performed by: OBSTETRICS & GYNECOLOGY

## 2018-07-06 RX ORDER — SODIUM CHLORIDE, SODIUM LACTATE, POTASSIUM CHLORIDE, CALCIUM CHLORIDE 600; 310; 30; 20 MG/100ML; MG/100ML; MG/100ML; MG/100ML
125 INJECTION, SOLUTION INTRAVENOUS CONTINUOUS
Status: DISCONTINUED | OUTPATIENT
Start: 2018-07-06 | End: 2018-07-06

## 2018-07-06 RX ORDER — DOCUSATE SODIUM 100 MG/1
100 CAPSULE, LIQUID FILLED ORAL 2 TIMES DAILY
Status: DISCONTINUED | OUTPATIENT
Start: 2018-07-06 | End: 2018-07-08 | Stop reason: HOSPADM

## 2018-07-06 RX ORDER — DIAPER,BRIEF,INFANT-TODD,DISP
1 EACH MISCELLANEOUS AS NEEDED
Status: DISCONTINUED | OUTPATIENT
Start: 2018-07-06 | End: 2018-07-08 | Stop reason: HOSPADM

## 2018-07-06 RX ORDER — METHYLERGONOVINE MALEATE 0.2 MG/ML
INJECTION INTRAVENOUS
Status: COMPLETED
Start: 2018-07-06 | End: 2018-07-06

## 2018-07-06 RX ORDER — CARBOPROST TROMETHAMINE 250 UG/ML
INJECTION, SOLUTION INTRAMUSCULAR
Status: COMPLETED
Start: 2018-07-06 | End: 2018-07-06

## 2018-07-06 RX ORDER — OXYTOCIN/RINGER'S LACTATE 30/500 ML
PLASTIC BAG, INJECTION (ML) INTRAVENOUS
Status: COMPLETED
Start: 2018-07-06 | End: 2018-07-06

## 2018-07-06 RX ORDER — OXYTOCIN/RINGER'S LACTATE 30/500 ML
PLASTIC BAG, INJECTION (ML) INTRAVENOUS
Status: COMPLETED
Start: 2018-07-06 | End: 2018-07-07

## 2018-07-06 RX ORDER — ACETAMINOPHEN 325 MG/1
650 TABLET ORAL EVERY 6 HOURS PRN
Status: DISCONTINUED | OUTPATIENT
Start: 2018-07-06 | End: 2018-07-08 | Stop reason: HOSPADM

## 2018-07-06 RX ORDER — DIPHENHYDRAMINE HCL 25 MG
25 TABLET ORAL EVERY 6 HOURS PRN
Status: DISCONTINUED | OUTPATIENT
Start: 2018-07-06 | End: 2018-07-08 | Stop reason: HOSPADM

## 2018-07-06 RX ORDER — ROPIVACAINE HYDROCHLORIDE 2 MG/ML
INJECTION, SOLUTION EPIDURAL; INFILTRATION; PERINEURAL
Status: DISPENSED
Start: 2018-07-06 | End: 2018-07-07

## 2018-07-06 RX ORDER — ONDANSETRON 2 MG/ML
4 INJECTION INTRAMUSCULAR; INTRAVENOUS EVERY 8 HOURS PRN
Status: DISCONTINUED | OUTPATIENT
Start: 2018-07-06 | End: 2018-07-08 | Stop reason: HOSPADM

## 2018-07-06 RX ORDER — BUTORPHANOL TARTRATE 1 MG/ML
1 INJECTION, SOLUTION INTRAMUSCULAR; INTRAVENOUS ONCE
Status: COMPLETED | OUTPATIENT
Start: 2018-07-06 | End: 2018-07-06

## 2018-07-06 RX ORDER — IBUPROFEN 600 MG/1
600 TABLET ORAL EVERY 6 HOURS PRN
Status: DISCONTINUED | OUTPATIENT
Start: 2018-07-06 | End: 2018-07-08 | Stop reason: HOSPADM

## 2018-07-06 RX ORDER — ROPIVACAINE HYDROCHLORIDE 2 MG/ML
INJECTION, SOLUTION EPIDURAL; INFILTRATION; PERINEURAL AS NEEDED
Status: DISCONTINUED | OUTPATIENT
Start: 2018-07-06 | End: 2018-07-06 | Stop reason: SURG

## 2018-07-06 RX ORDER — ONDANSETRON 2 MG/ML
4 INJECTION INTRAMUSCULAR; INTRAVENOUS EVERY 6 HOURS PRN
Status: DISCONTINUED | OUTPATIENT
Start: 2018-07-06 | End: 2018-07-06

## 2018-07-06 RX ORDER — CALCIUM CARBONATE 200(500)MG
1000 TABLET,CHEWABLE ORAL DAILY PRN
Status: DISCONTINUED | OUTPATIENT
Start: 2018-07-06 | End: 2018-07-08 | Stop reason: HOSPADM

## 2018-07-06 RX ADMIN — SODIUM CHLORIDE, SODIUM LACTATE, POTASSIUM CHLORIDE, AND CALCIUM CHLORIDE 999 ML/HR: .6; .31; .03; .02 INJECTION, SOLUTION INTRAVENOUS at 14:39

## 2018-07-06 RX ADMIN — ACETAMINOPHEN 650 MG: 325 TABLET, FILM COATED ORAL at 22:37

## 2018-07-06 RX ADMIN — METHYLERGONOVINE MALEATE 0.2 MG: 0.2 INJECTION, SOLUTION INTRAMUSCULAR; INTRAVENOUS at 17:08

## 2018-07-06 RX ADMIN — MISOPROSTOL 1000 MCG: 100 TABLET ORAL at 17:11

## 2018-07-06 RX ADMIN — SODIUM CHLORIDE, SODIUM LACTATE, POTASSIUM CHLORIDE, AND CALCIUM CHLORIDE 125 ML/HR: .6; .31; .03; .02 INJECTION, SOLUTION INTRAVENOUS at 15:27

## 2018-07-06 RX ADMIN — SODIUM CHLORIDE, SODIUM LACTATE, POTASSIUM CHLORIDE, AND CALCIUM CHLORIDE 999 ML/HR: .6; .31; .03; .02 INJECTION, SOLUTION INTRAVENOUS at 17:45

## 2018-07-06 RX ADMIN — ROPIVACAINE HYDROCHLORIDE 5 ML: 2 INJECTION, SOLUTION EPIDURAL; INFILTRATION at 15:08

## 2018-07-06 RX ADMIN — BUTORPHANOL TARTRATE 1 MG: 1 INJECTION, SOLUTION INTRAMUSCULAR; INTRAVENOUS at 17:09

## 2018-07-06 RX ADMIN — DOCUSATE SODIUM 100 MG: 100 CAPSULE, LIQUID FILLED ORAL at 19:58

## 2018-07-06 RX ADMIN — Medication 250 UNITS: at 16:58

## 2018-07-06 RX ADMIN — ONDANSETRON HYDROCHLORIDE 4 MG: 2 INJECTION, SOLUTION INTRAVENOUS at 18:00

## 2018-07-06 RX ADMIN — CEFAZOLIN SODIUM 2000 MG: 2 SOLUTION INTRAVENOUS at 18:02

## 2018-07-06 RX ADMIN — IBUPROFEN 600 MG: 600 TABLET ORAL at 22:37

## 2018-07-06 RX ADMIN — CARBOPROST TROMETHAMINE 250 MCG: 250 INJECTION, SOLUTION INTRAMUSCULAR at 17:18

## 2018-07-06 RX ADMIN — ROPIVACAINE HYDROCHLORIDE 5 ML: 2 INJECTION, SOLUTION EPIDURAL; INFILTRATION at 15:06

## 2018-07-06 RX ADMIN — Medication 62.5 MILLI-UNITS/MIN: at 18:35

## 2018-07-06 RX ADMIN — Medication 999 UNITS: at 17:35

## 2018-07-06 NOTE — OB LABOR/OXYTOCIN SAFETY PROGRESS
Labor Progress Note - Aleksey Patel 33 y o  female MRN: [id number]    Unit/Bed#: L&D 322-01 Encounter: [id number]    Obstetric History       T1      L1     SAB0   TAB1   Ectopic0   Multiple0   Live Births1      Gestational Age: 37w11d     Contraction Frequency (minutes): 3  Contraction Quality: Mild  Tachysystole: No   Dilation: 5-6        Effacement (%): 80  Station: -1  Baseline Rate: 140 bpm     FHR Category: Category I          Notes/comments:      Pt unchanged, AROM forebag at 1057 for minimal pink fluid  Dr Teri John aware, pt will walk for comfort and return to bed for intermittent FHR tracing  Currently category 1 strip  Continue exp mgmt         Janene Pérez DO 2018 11:02 AM

## 2018-07-06 NOTE — ANESTHESIA PROCEDURE NOTES
Epidural Block    Patient location during procedure: OB  Start time: 7/6/2018 2:58 PM  Reason for block: primary anesthetic  Staffing  Anesthesiologist: Bennie Blind  Performed: anesthesiologist   Preanesthetic Checklist  Completed: patient identified, site marked, surgical consent, pre-op evaluation, timeout performed, IV checked, risks and benefits discussed and monitors and equipment checked  Epidural  Patient position: sitting  Prep: Betadine  Patient monitoring: frequent blood pressure checks  Approach: midline  Location: lumbar (1-5)  Injection technique: ANA saline  Needle  Needle type: Tuohy   Needle gauge: 18 G  Catheter type: side hole  Catheter size: 20 G  Test dose: negative  Assessment  Sensory level: [id number] aspiration for CSF, negative aspiration for heme and no paresthesia on injection  patient tolerated the procedure well with no immediate complications

## 2018-07-06 NOTE — H&P
H&P Exam - Obstetrics   Michaela Capps 33 y o  female MRN: [id number]  Unit/Bed#: L&D 322-01 Encounter: [id number]      History of Present Illness     Chief Complaint: Active labor    HPI:  Michaela Capps is a 33 y o   female with an FIONA of 2018, by Last Menstrual Period at 39w6d weeks gestation who is being admitted for premature rupture of membranes  Patient reports loss of fluid since 9 am yesterday, noted to be clear  Her pregnancy has been complicated by obesity, and WPW syndrome  She is not currently on any medical management  Contractions: yes  Loss of fluid: yes  Vaginal bleeding: no  Fetal movement: yes    She is a patient of [address]       PREGNANCY COMPLICATIONS:   1) Obesity  2) Manoj-Parkinson-White syndrome s/p ablation in    Patient evaluated by cardiology in this pregnancy, last seen on 2018, no further medical management at this time    OB History    Para Term  AB Living   3 1 1 0 1 1   SAB TAB Ectopic Multiple Live Births   0 1 0 0 1      # Outcome Date GA Lbr Jonathon/2nd Weight Sex Delivery Anes PTL Lv   3 Current            2 Term 07    M Vag-Spont EPI  ALEXY   1 TAB 01     TAB             Baby complications/comments: reactive on FHT and measuring approximately 7 pounds by [facility]    Review of Systems      Historical Information   Past Medical History:   Diagnosis Date    Abnormal Pap smear of cervix     Exposure to chlamydia     Exposure to chlamydia     Resolved 17    Sore throat     Resolved 17    Manoj-Parkinson-White (WPW) syndrome     Yeast infection     Resolved 17     Past Surgical History:   Procedure Laterality Date    COLONOSCOPY      COLPOSCOPY      INDUCED       OTHER SURGICAL HISTORY      catheter ablation- WPW age 13     Social History   History   Alcohol Use    Yes     Comment: social- per allscripts     History   Drug Use No     History   Smoking Status    Never Smoker   Smokeless Tobacco    Never Used     Family History:   Family History   Problem Relation Age of Onset    Bipolar disorder Mother     Hypertension Mother    Sarah Lease deficiency Father     Hyperlipidemia Father     Hypertension Father     No Known Problems Sister     No Known Problems Brother     Breast cancer Maternal Grandmother         dx approx age early 42's   Celina Cristina No Known Problems Maternal Grandfather     Crohn's disease Paternal Grandmother     Prostate cancer Paternal Grandfather     Colon cancer Paternal Aunt        Meds/Allergies      Prescriptions Prior to Admission   Medication    Prenatal Vit-Fe Fumarate-FA (PRENATAL VITAMIN) 27-0 8 MG TABS        Allergies   Allergen Reactions    Codeine Other (See Comments)     dry heaves    Sulfa Antibiotics Hives and Rash     Per pt as achild    Erythromycin Hives     Per as a child       OBJECTIVE:    Vitals: Temperature 98 1 °F (36 7 °C), temperature source Oral, resp  rate 18, height 5' 2" (1 575 m), weight 94 3 kg (208 lb), last menstrual period 09/30/2017, currently breastfeeding  Body mass index is 38 04 kg/m²      Physical Exam      Cervix:  Grossly ruptured, nitrazine positive  Dilation: 5-6  Effacement (%): 80  Station: -1    Fetal heart rate:    140/moderate variability/accels 15x15/ no decels    [place]:    Contractions every 4-5 minutes    GBS: negative    Prenatal Labs:   Blood Type:   Lab Results   Component Value Date/Time    ABO Grouping A 12/01/2017 01:36 PM     , D (Rh type):   Lab Results   Component Value Date/Time    Rh Factor Positive 12/01/2017 01:36 PM     , Antibody Screen:   Lab Results   Component Value Date/Time    Antibody Screen Negative 12/01/2017 01:36 PM    , HCT/HGB:   Lab Results   Component Value Date/Time    Hematocrit 38 3 07/06/2018 09:53 AM    Hematocrit 44 6 08/11/2014 09:40 PM    Hemoglobin 12 7 07/06/2018 09:53 AM    Hemoglobin 14 9 08/11/2014 09:40 PM      , MCV:   Lab Results   Component Value Date/Time    MCV 90 2018 09:53 AM    MCV 86 2014 09:40 PM      , Platelets:   Lab Results   Component Value Date/Time    Platelets 433  09:53 AM    Platelets 493 838 09:40 PM      , 1 hour Glucola:   Lab Results   Component Value Date/Time    Glucose 104 2018 09:54 AM   , 3 hour GTT: No results found for: UJPNNAO7RI, Varicella: No results found for: VARICELLAIGG    , Rubella:   Lab Results   Component Value Date/Time    Rubella IgG Quant 8 8 (L) 2017 01:36 PM        , VDRL/RPR:   Lab Results   Component Value Date/Time    RPR Non-Reactive 2017 01:36 PM      , Urine Culture/Screen:   Lab Results   Component Value Date/Time    Urine Culture No Growth <1000 cfu/mL 2017 01:36 PM       , Urine Drug Screen: No results found for: AMPHETUR, BARBTUR, BDZUR, THCUR, COCAINEUR, METHADONEUR, OPIATEUR, PCPUR, MTHAMUR, ECSTASYUR, TRICYCLICSUR, Hep B:   Lab Results   Component Value Date/Time    Hepatitis B Surface Ag Non-reactive 2017 01:36 PM     , Hep C: No components found for: HEPCSAG, EXTHEPCSAG   , HIV:   Lab Results   Component Value Date/Time    HIV-1/HIV-2 Ab Non-Reactive 2017 01:36 PM     , Chlamydia: No results found for: EXTCHLAMYDIA  , Gonorrhea:   Lab Results   Component Value Date/Time    N GONORRHOEAE, AMPLIFIED DNA Negative 2015 06:02 AM    N gonorrhoeae, DNA Probe N  gonorrhoeae Amplified DNA Negative 2017 10:09 PM     , Group B Strep:    Lab Results   Component Value Date/Time    Strep Grp B PCR Negative for Beta Hemolytic Strep Grp B by PCR 2018 09:50 AM          Invasive Devices          No matching active lines, drains, or airways            Assessment/Plan     ASSESSMENT:  32 yo  at 39w6d weeks gestation who is being admitted for premature rupture of membranes      PLAN:   1) Admit   2) CBC, RPR, Blood Type   3) Start with LR at 125 cc/hr, pitocin   4) GBS negative status    5) Analgesia and/or epidural at patient request   6) Anticipate    7) Discussed with Dr Pérez Mckeon      This patient will be an INPATIENT  and I certify the anticipated length of stay is >2 Midnights      Mandy Bravo MD  OBGYN PGY1  2018  10:24 AM

## 2018-07-06 NOTE — LETTER
[address] AND DELIVERY  Jocelin Camara  Dept: [phone]    July 7, 2018     Patient: Judson Norman   YOB: 1985   Date of Visit: 7/6/2018       To Whom it May Concern:      Kalie Longoria was here in the hospital for Judson Norman who is under my professional care  She was seen in the hospital from 7/6/2018 to 07/07/18  Please excuse her from missing her work  If you have any questions or concerns, please don't hesitate to call           Sincerely,          Sofie Campbell MD

## 2018-07-06 NOTE — ANESTHESIA PREPROCEDURE EVALUATION
Review of Systems/Medical History  Patient summary reviewed        Cardiovascular  Negative cardio ROS Dysrhythmias (WPW) ,    Pulmonary  Negative pulmonary ROS        GI/Hepatic  Negative GI/hepatic ROS          Negative  ROS        Endo/Other  Negative endo/other ROS      GYN       Hematology  Negative hematology ROS      Musculoskeletal  Negative musculoskeletal ROS        Neurology  Negative neurology ROS      Psychology   Negative psychology ROS              Physical Exam    Airway    Mallampati score: I  TM Distance: >3 FB  Neck ROM: full     Dental       Cardiovascular  Comment: Negative ROS, Rhythm: regular, Rate: normal, Cardiovascular exam normal    Pulmonary  Pulmonary exam normal     Other Findings        Anesthesia Plan  ASA Score- 2     Anesthesia Type- epidural with ASA Monitors  Additional Monitors:   Airway Plan:         Plan Factors-    Induction-     Postoperative Plan-     Informed Consent- Anesthetic plan and risks discussed with patient

## 2018-07-06 NOTE — DISCHARGE SUMMARY
Discharge Summary - Timi Delaney 33 y o  female MRN: [id number]    Unit/Bed#: L&D 322-01 Encounter: [id number]    Admission Date: 2018     Discharge Date: 18    Admitting Diagnosis:   1  Pregnancy at 39w6d  2  PROM  3  Obesity  4  WPW syndrome s/p ablation   5  Single fetus    Discharge Diagnosis: same, delivered    Procedures: spontaneous vaginal delivery    Attending: Georgia Fleming MD    Hospital Course:     Timi Delaney is a 33 y o   at 39w6d wks who was initially admitted for PROM  She was 5-/-1  She had a forebag that was AROMed for minimal pink fluid  She delivered a viable female  on 2018 at 1656  Weight 6lbs 15oz via spontaneous vaginal delivery  Apgars were 9 (1 min) and 9 (5 min)  Patient tolerated the procedure well and was transferred to recovery in stable condition  Her postpartum course was complicated by postpartum hemorrhage requiring pitocin, methergine, hemabate, cytotec, and pitocin  Preoperative hemaglobin was 12 7, postoperative was 9 5  Her postpartum pain was well controlled with oral analgesics  Pt complained of Right calf pain on PPD #1, VSS and LE dopplers were negative for VTE  On day of discharge, she was ambulating and able to reasonably perform all ADLs  She was voiding and had appropriate bowel function  Pain was well controlled  She was discharged home on postpartum day #2 without complications  Patient was instructed to follow up with her OB as an outpatient and was given appropriate warnings to call provider if she develops signs of infection or uncontrolled pain  Complications: none apparent    Condition at discharge: stable     Discharge instructions/Information to patient and family:   See after visit summary for information provided to patient and family  Provisions for Follow-Up Care:  See after visit summary for information related to follow-up care and any pertinent home health orders        Disposition: Home    Planned Readmission: No    Ellen Mock MD  OBGYN, PGY-2  7/8/2018 7:15 AM

## 2018-07-06 NOTE — OB LABOR/OXYTOCIN SAFETY PROGRESS
Labor Progress Note - Theron Emmanuel 33 y o  female MRN: [id number]    Unit/Bed#: L&D 322-01 Encounter: [id number]    Obstetric History       T1      L1     SAB0   TAB1   Ectopic0   Multiple0   Live Births1      Gestational Age: 37w11d     Contraction Frequency (minutes): 1-3  Contraction Quality: Moderate  Tachysystole: No   Dilation: Lip/rim (Comment)        Effacement (%): 100  Station: 1  Baseline Rate: 120 bpm     FHR Category: Category I        Feeling pressure, will try to push through lip   Notes/comments:             Reji Lehman MD 2018 4:11 PM

## 2018-07-06 NOTE — OB LABOR/OXYTOCIN SAFETY PROGRESS
Labor Progress Note - Aleksey Patel 33 y o  female MRN: [id number]    Unit/Bed#: L&D 322-01 Encounter: [id number]    Obstetric History       T1      L1     SAB0   TAB1   Ectopic0   Multiple0   Live Births1      Gestational Age: 37w11d     Contraction Frequency (minutes): 1 5-3  Contraction Quality: Mild  Tachysystole: No   Dilation: 6        Effacement (%): 90  Station: -2  Baseline Rate: 140 bpm     FHTs: Category II, intermittent lates resolved with repositioning  CTXs: q 3-5 minutes  Consider pitocin when category I           Notes/comments:             Mikey Mac MD 2018 1:58 PM

## 2018-07-06 NOTE — L&D DELIVERY NOTE
Delivery Summary - OB/GYN   Abram Guidry 33 y o  female MRN: [id number]  Unit/Bed#: L&D 322-01 Encounter: [id number]    Pre-delivery Diagnosis:   1  39w6d pregnancy  2  PROM    Post-delivery Diagnosis: same, delivered  3  Postpartum hemorrhage    Attending: Khushboo     Assistant(s): Geneva Morfin    Procedure: , insertion of Bakri Balloon    Anesthesia: epidural    Estimated Blood Loss:  1000 mL    Specimens:   1  Arterial and venous cord gases  2  Cord blood  3  Segment of umbilical cord  4  Placenta to storage     Complications:  None apparent    Findings:  1  Viable female  delivered at 1656 weighing 6lbs 15oz; Apgar scores of 9 at one minute and 9 at five minutes  2  Spontaneous delivery of placenta with paracentrally inserted 3-vessel cord at 1703  3  No lacerations  4  Postpartum hemorrhage due to atony     Umbilical Cord Gases     pH Base Excess   Arterial   Results from last 7 days  Lab Units 18  1658   PH COA  7 201*       Results from last 7 days  Lab Units 18  1658   BASE EXC COA mmol/L -4 6*      Venous   Results from last 7 days  Lab Units 18  1658   PH COV  7 291       Results from last 7 days  Lab Units 18  1658   BASE EXC COV mmol/L -4 6*             Disposition: Patient tolerated the procedure well and was recovering in labor and delivery room with family and  before being transferred to the post-partum floor  Procedure Details     Description of procedure    At 1472 patient delivered a viable female , weighing 6lbs 15 oz, Apgars of 9 (1 min) and 9 (5 min)  The fetal vertex delivered spontaneously  There was no nuchal cord  The anterior shoulder delivered atraumatically with maternal expulsive forces and the assistance of downward traction  The posterior shoulder delivered with maternal expulsive forces and the assistance of upward traction  The remainder of the fetus delivered spontaneously       Upon delivery, the infant was placed on the mothers abdomen and the cord was clamped and cut after a 30 second delay  The infant was noted to cry spontaneously and was moving all extremities appropriately  There was no evidence for injury  Awaiting nurse resuscitators evaluated the  at bedside  Arterial and venous cord blood gases and cord blood was collected for analysis  These were promptly sent to the lab  In the immediate post-partum, 30 units of IV pitocin was administered  The placenta delivered spontaneously at 1703 and was noted to have a paracentrally inserted 3 vessel cord  The vagina, cervix, and perineum were inspected and there was noted to be intact  There was noted to be a postpartum hemorrhage  Upon identifying the hemorrhage, the uterus was rigorously massaged  The pitocin infusion was increased  Methergine was administered at 1708 followed by Cytotec 1 mg rectally at 1711  With massage, the bleeding would improve but return  Help was called to the room  At 1718, hemabate was administered  A large Purvi was used to identify the cervix  The edge of the cervix was grasped with sponge sticks and followed around the perimeter  There were no lacerations noted  There were no lacerations noted in the vagina as well  The uterine fundus was firm, but the lower uterine segment remained atonic  A joshi catheter was placed at 1725 followed by a Bakri Balloon at 1728  This was performed under ultrasound guidance  Vaginal packing was placed to help provide stability  A total of 160 cc was placed into the balloon  At the conclusion of the delivery, all needle, sponge, and instrument counts were noted to be correct

## 2018-07-07 ENCOUNTER — APPOINTMENT (INPATIENT)
Dept: NON INVASIVE DIAGNOSTICS | Facility: HOSPITAL | Age: 33
End: 2018-07-07
Payer: COMMERCIAL

## 2018-07-07 LAB
BASOPHILS # BLD AUTO: 0.03 THOUSANDS/ΜL (ref 0–0.1)
BASOPHILS NFR BLD AUTO: 0 % (ref 0–1)
EOSINOPHIL # BLD AUTO: 0.06 THOUSAND/ΜL (ref 0–0.61)
EOSINOPHIL NFR BLD AUTO: 0 % (ref 0–6)
ERYTHROCYTE [DISTWIDTH] IN BLOOD BY AUTOMATED COUNT: 14 % (ref 11.6–15.1)
HCT VFR BLD AUTO: 29.2 % (ref 34.8–46.1)
HGB BLD-MCNC: 9.5 G/DL (ref 11.5–15.4)
LYMPHOCYTES # BLD AUTO: 3.03 THOUSANDS/ΜL (ref 0.6–4.47)
LYMPHOCYTES NFR BLD AUTO: 18 % (ref 14–44)
MCH RBC QN AUTO: 29.2 PG (ref 26.8–34.3)
MCHC RBC AUTO-ENTMCNC: 32.5 G/DL (ref 31.4–37.4)
MCV RBC AUTO: 90 FL (ref 82–98)
MONOCYTES # BLD AUTO: 1.34 THOUSAND/ΜL (ref 0.17–1.22)
MONOCYTES NFR BLD AUTO: 8 % (ref 4–12)
NEUTROPHILS # BLD AUTO: 12.69 THOUSANDS/ΜL (ref 1.85–7.62)
NEUTS SEG NFR BLD AUTO: 74 % (ref 43–75)
NRBC BLD AUTO-RTO: 0 /100 WBCS
PLATELET # BLD AUTO: 209 THOUSANDS/UL (ref 149–390)
PMV BLD AUTO: 11.4 FL (ref 8.9–12.7)
RBC # BLD AUTO: 3.25 MILLION/UL (ref 3.81–5.12)
WBC # BLD AUTO: 17.15 THOUSAND/UL (ref 4.31–10.16)

## 2018-07-07 PROCEDURE — 85025 COMPLETE CBC W/AUTO DIFF WBC: CPT | Performed by: OBSTETRICS & GYNECOLOGY

## 2018-07-07 PROCEDURE — 93971 EXTREMITY STUDY: CPT | Performed by: SURGERY

## 2018-07-07 PROCEDURE — 93971 EXTREMITY STUDY: CPT

## 2018-07-07 RX ORDER — OXYCODONE HYDROCHLORIDE AND ACETAMINOPHEN 5; 325 MG/1; MG/1
1 TABLET ORAL EVERY 4 HOURS PRN
Status: DISCONTINUED | OUTPATIENT
Start: 2018-07-07 | End: 2018-07-08 | Stop reason: HOSPADM

## 2018-07-07 RX ORDER — OXYCODONE HYDROCHLORIDE AND ACETAMINOPHEN 5; 325 MG/1; MG/1
2 TABLET ORAL EVERY 4 HOURS PRN
Status: DISCONTINUED | OUTPATIENT
Start: 2018-07-07 | End: 2018-07-08 | Stop reason: HOSPADM

## 2018-07-07 RX ADMIN — CEFAZOLIN SODIUM 2000 MG: 2 SOLUTION INTRAVENOUS at 17:27

## 2018-07-07 RX ADMIN — OXYCODONE HYDROCHLORIDE AND ACETAMINOPHEN 1 TABLET: 5; 325 TABLET ORAL at 20:03

## 2018-07-07 RX ADMIN — OXYCODONE HYDROCHLORIDE AND ACETAMINOPHEN 1 TABLET: 5; 325 TABLET ORAL at 07:13

## 2018-07-07 RX ADMIN — DOCUSATE SODIUM 100 MG: 100 CAPSULE, LIQUID FILLED ORAL at 17:27

## 2018-07-07 RX ADMIN — CEFAZOLIN SODIUM 2000 MG: 2 SOLUTION INTRAVENOUS at 02:07

## 2018-07-07 RX ADMIN — CEFAZOLIN SODIUM 2000 MG: 2 SOLUTION INTRAVENOUS at 11:00

## 2018-07-07 RX ADMIN — IBUPROFEN 600 MG: 600 TABLET ORAL at 17:51

## 2018-07-07 RX ADMIN — IBUPROFEN 600 MG: 600 TABLET ORAL at 05:19

## 2018-07-07 RX ADMIN — OXYCODONE HYDROCHLORIDE AND ACETAMINOPHEN 2 TABLET: 5; 325 TABLET ORAL at 02:22

## 2018-07-07 RX ADMIN — DOCUSATE SODIUM 100 MG: 100 CAPSULE, LIQUID FILLED ORAL at 11:01

## 2018-07-07 NOTE — LACTATION NOTE
This note was copied from a baby's chart  CONSULT - LACTATION  Baby Girl Alirio Marcelo 1 days female MRN: [id number]    [facility] NURSERY Room / Bed: L&D 322(N)/L&D 322(N) Encounter: [id number]    Maternal Information     MOTHER:  Erasto Schneider  Maternal Age: 33 y o    OB History: #: 1, Date: 01, Sex: None, Weight: None, GA: None, Delivery: Therapeutic , Apgar1: None, Apgar5: None, Living: None, Birth Comments: None    #: 2, Date: 07, Sex: Male, Weight: None, GA: None, Delivery: Vaginal, Spontaneous Delivery, Apgar1: None, Apgar5: None, Living: Living, Birth Comments: None    #: 3, Date: 18, Sex: Female, Weight: 3147 g (6 lb 15 oz), GA: 39w6d, Delivery: Vaginal, Spontaneous Delivery, Apgar1: 9, Apgar5: 9, Living: Living, Birth Comments: None   Previouse breast reduction surgery?  No    Lactation history:   Has patient previously breast fed: Yes   How long had patient previously breast fed: Did not breast feed first child   Previous breast feeding complications:       Past Surgical History:   Procedure Laterality Date    COLONOSCOPY      COLPOSCOPY      INDUCED       OTHER SURGICAL HISTORY      catheter ablation- WPW age 13       Birth information:  YOB: 2017   Time of birth: 4:56 PM   Sex: female   Delivery type: Vaginal, Spontaneous Delivery   Birth Weight: 3147 g (6 lb 15 oz)   Percent of Weight Change: 0%     Gestational Age: 37w11d   [unfilled]    Assessment     Breast and nipple assessment: normal assessment     Assessment: normal assessment    Feeding assessment: feeding well  LATCH:  Latch: Grasps breast, tongue down, lips flanged, rhythmic sucking   Audible Swallowing: Spontaneous and intermittent (24 hours old)   Type of Nipple: Everted (After stimulation)   Comfort (Breast/Nipple): Soft/non-tender   Hold (Positioning): Full assist, teach one side, mother does other, staff holds   [facility] Score: 9          Feeding recommendations:  breast feed on demand    Rick Halsted, RN 7/7/2018 1:37 PM

## 2018-07-07 NOTE — PROGRESS NOTES
Patient c/o pain in right calf  Dr Andreas Sotelo is notified of positive homans sign & is in room to examine patient  Swelling is noted in both legs, however, swelling is equal bilaterally  Dr Andreas Sotelo measures both calves & both are equal  No warmth or redness in right calf noted  Dr Andreas Sotelo orders lower limb venous duplex dopplers  Patient

## 2018-07-07 NOTE — PROGRESS NOTES
Evaluated patient at bedside for right calf pain  She states that the pain feels dull and crampy on right side and travels down the leg  Denies swelling or erythema on one side more than the other  Denies chest pain, shortness of breath  BP 94/52 (BP Location: Right arm)   Pulse 95   Temp 98 1 °F (36 7 °C) (Oral)   Resp 16   Ht 5' 2" (1 575 m)   Wt 94 3 kg (208 lb)   LMP 09/30/2017 (Exact Date)   SpO2 98%   Breastfeeding? Yes   BMI 38 04 kg/m²     Physical exam  General: resting comfortably  Heart: RRR  Lung: CTAB  Abd: uterus firm at umbilicus  Extremities: both calves width measured 44cm  Negative Benita's sign bilaterally  No warm or erythema noted  A/P: Will order LE doppler for right calf pain  Continue to monitor for worsening signs/symptoms

## 2018-07-07 NOTE — LACTATION NOTE
This note was copied from a baby's chart  Fortino Angelucci is a primalacta with a [age] year old child who was bottle fed  Called to patient room by primary nurse because Fortino Angelucci was not able to achieve a deep latch because infant would not open mouth  Gave suggestions on how to accomplish deep latch by starting latch with infant's nose at the nipple  Then, stroke the upper lip with the nipple  As infant opens mouth, insert nipple in on an upward angle so that the nipple impacts with the soft palate to increase comfort with the feeding and to keep infant interested in the feeding longer  Spent time working on different positions that would facilitate better transfer of breastmilk  Information on hand expression given  Discussed benefits of knowing how to manually express breast including stimulating milk supply, softening nipple for latch and evacuating breast in the event of engorgement  Met with mother  Provided mother with Ready, Set, Baby booklet  Discussed Skin to Skin contact an benefits to mom and baby  Talked about the delay of the first bath until baby has adjusted  Spoke about the benefits of rooming in  Feeding on cue and what that means for recognizing infant's hunger  Avoidance of pacifiers for the first month discussed  Talked about exclusive breastfeeding for the first 6 months  Positioning and latch reviewed as well as showing images of other feeding positions  Discussed the properties of a good latch in any position  Reviewed hand/manual expression  Discussed s/s that baby is getting enough milk and some s/s that breastfeeding dyad may need further help  Gave information on common concerns, what to expect the first few weeks after delivery, preparing for other caregivers, and how partners can help  Resources for support also provided  [employer]cor[employer] MOB and FOB to call for assistance, questions and concerns  Extension number for inpatient lactation support provided

## 2018-07-07 NOTE — PROGRESS NOTES
Progress Note - OB/GYN   Karthik Cheung 33 y o  female MRN: [id number]  Unit/Bed#: L&D 322-01 Encounter: [id number]    Assessment:  Post partum Day #1 s/p , stable    Plan:  1  Continue routine post partum care   -Encourage ambulation   -Encourage breastfeeding  2  [facility] with THEO atony: EBL: 1000cc   -s/p methergine x1, cytotec x1, hemabate x1, Bakri balloon in place with 160cc   -predelivery hb: 12 7 -> 11 7 -> 9 5   -coags wnl   -Will remove fluid gradually from bakri balloon throughout the day   -Continue to monitor for sxs and vitals  3  Joshi still in place   -adequate urine output: 68cc/hr   -Consider removing joshi once bakri balloon is removed   -Continue to monitor for sxs and vitals    Subjective/Objective   Chief Complaint:     Post delivery, patient states when she went to clean herself up in the bathroom, she had a moment of lightheadedness  However, she doesn't have these symptoms while in bed  Subjective:     Pain: yes, cramping, improved with meds  Tolerating PO: yes  Voiding: joshi in place  Flatus: yes  BM: no  Ambulating: yes  Breastfeeding:  Yes  Chest pain: no  Shortness of breath: no  Leg pain: no  Lochia: minimal    Objective:     Vitals: BP 94/52 (BP Location: Right arm)   Pulse 95   Temp 98 1 °F (36 7 °C) (Oral)   Resp 16   Ht 5' 2" (1 575 m)   Wt 94 3 kg (208 lb)   LMP 2017 (Exact Date)   SpO2 98%   Breastfeeding?  Yes   BMI 38 04 kg/m²       Intake/Output Summary (Last 24 hours) at 18 0736  Last data filed at 18 0530   Gross per 24 hour   Intake                0 ml   Output             1550 ml   Net            -1550 ml       Lab Results   Component Value Date    WBC 17 15 (H) 2018    HGB 9 5 (L) 2018    HCT 29 2 (L) 2018    MCV 90 2018     2018       Current Facility-Administered Medications   Medication Dose Route Frequency    acetaminophen (TYLENOL) tablet 650 mg  650 mg Oral Q6H PRN    benzocaine-menthol-lanolin-aloe (DERMOPLAST) 20-0 5 % topical spray   Topical 4x Daily PRN    calcium carbonate (TUMS) chewable tablet 1,000 mg  1,000 mg Oral Daily PRN    ceFAZolin (ANCEF) IVPB (premix) 2,000 mg  2,000 mg Intravenous Q8H    diphenhydrAMINE (BENADRYL) tablet 25 mg  25 mg Oral Q6H PRN    docusate sodium (COLACE) capsule 100 mg  100 mg Oral BID    hydrocortisone 1 % cream 1 application  1 application Topical PRN    ibuprofen (MOTRIN) tablet 600 mg  600 mg Oral Q6H PRN    ondansetron (ZOFRAN) injection 4 mg  4 mg Intravenous Q8H PRN    oxyCODONE-acetaminophen (PERCOCET) 5-325 mg per tablet 1 tablet  1 tablet Oral Q4H PRN    oxyCODONE-acetaminophen (PERCOCET) 5-325 mg per tablet 2 tablet  2 tablet Oral Q4H PRN    witch hazel-glycerin (TUCKS) topical pad 1 pad  1 pad Topical PRN       Physical Exam:     Gen: AAOx3, NAD  CV: RRR  Lungs: CTA b/l  Abd: Soft, non-tender, non-distended, no rebound or guarding  Uterine fundus firm and non-tender, at umbilicus  Ext: Non tender    Danisha Orellana, PGY-2  OB/GYN  7/7/2018  7:36 AM

## 2018-07-08 VITALS
HEIGHT: 62 IN | BODY MASS INDEX: 38.28 KG/M2 | WEIGHT: 208 LBS | TEMPERATURE: 98.4 F | HEART RATE: 80 BPM | DIASTOLIC BLOOD PRESSURE: 49 MMHG | RESPIRATION RATE: 18 BRPM | SYSTOLIC BLOOD PRESSURE: 99 MMHG | OXYGEN SATURATION: 98 %

## 2018-07-08 PROCEDURE — 90707 MMR VACCINE SC: CPT | Performed by: OBSTETRICS & GYNECOLOGY

## 2018-07-08 RX ORDER — DOCUSATE SODIUM 100 MG/1
100 CAPSULE, LIQUID FILLED ORAL 2 TIMES DAILY
Qty: 10 CAPSULE | Refills: 0 | Status: SHIPPED | OUTPATIENT
Start: 2018-07-08 | End: 2018-11-26

## 2018-07-08 RX ORDER — ACETAMINOPHEN 325 MG/1
650 TABLET ORAL EVERY 6 HOURS PRN
Qty: 30 TABLET | Refills: 0 | Status: SHIPPED | OUTPATIENT
Start: 2018-07-08 | End: 2018-11-26

## 2018-07-08 RX ORDER — DIAPER,BRIEF,INFANT-TODD,DISP
1 EACH MISCELLANEOUS AS NEEDED
Qty: 30 G | Refills: 0 | Status: SHIPPED | OUTPATIENT
Start: 2018-07-08 | End: 2018-07-13 | Stop reason: ALTCHOICE

## 2018-07-08 RX ORDER — IBUPROFEN 600 MG/1
600 TABLET ORAL EVERY 6 HOURS PRN
Qty: 30 TABLET | Refills: 0 | Status: SHIPPED | OUTPATIENT
Start: 2018-07-08 | End: 2018-11-26

## 2018-07-08 RX ADMIN — OXYCODONE HYDROCHLORIDE AND ACETAMINOPHEN 1 TABLET: 5; 325 TABLET ORAL at 01:20

## 2018-07-08 RX ADMIN — MEASLES, MUMPS, AND RUBELLA VIRUS VACCINE LIVE 0.5 ML: 1000; 12500; 1000 INJECTION, POWDER, LYOPHILIZED, FOR SUSPENSION SUBCUTANEOUS at 11:28

## 2018-07-08 RX ADMIN — DOCUSATE SODIUM 100 MG: 100 CAPSULE, LIQUID FILLED ORAL at 09:50

## 2018-07-08 RX ADMIN — IBUPROFEN 600 MG: 600 TABLET ORAL at 05:34

## 2018-07-08 NOTE — PROGRESS NOTES
Progress Note - OB/GYN   Theron Emmanuel 33 y o  female MRN: [id number]  Unit/Bed#: L&D 313-01 Encounter: [id number]    Assessment:  Post partum Day #2 s/p , stable    Plan:  1  Continue routine post partum care   -Encourage ambulation   -Encourage breastfeeding  2  [facility] with [facility]: EBL: 1000cc   -s/p methergine x1, cytotec x1, hemabate x1, s/p Bakri balloon    -predelivery hb: 12 7 -> 11 7 -> 9 5   -coags wnl  3  Right calf pain reported yesterday: LE dopplers negative for VTE  4  Dispo: VSS, d/c home today    Subjective/Objective   Chief Complaint:     Post delivery, no complaints this AM    Subjective:     Pain: yes, cramping, improved with meds  Tolerating PO: yes  Voiding: yes  Flatus: yes  BM: no  Ambulating: yes  Breastfeeding:  Yes  Chest pain: no  Shortness of breath: no  Leg pain: no  Lochia: minimal    Objective:     Vitals: BP 94/58 (BP Location: Right arm)   Pulse 87   Temp 97 8 °F (36 6 °C) (Oral)   Resp 16   Ht 5' 2" (1 575 m)   Wt 94 3 kg (208 lb)   LMP 2017 (Exact Date)   SpO2 98%   Breastfeeding?  Yes   BMI 38 04 kg/m²       Intake/Output Summary (Last 24 hours) at 18 0711  Last data filed at 18 1601   Gross per 24 hour   Intake                0 ml   Output             1220 ml   Net            -1220 ml       Lab Results   Component Value Date    WBC 17 15 (H) 2018    HGB 9 5 (L) 2018    HCT 29 2 (L) 2018    MCV 90 2018     2018       Current Facility-Administered Medications   Medication Dose Route Frequency    acetaminophen (TYLENOL) tablet 650 mg  650 mg Oral Q6H PRN    benzocaine-menthol-lanolin-aloe (DERMOPLAST) 20-0 5 % topical spray   Topical 4x Daily PRN    calcium carbonate (TUMS) chewable tablet 1,000 mg  1,000 mg Oral Daily PRN    diphenhydrAMINE (BENADRYL) tablet 25 mg  25 mg Oral Q6H PRN    docusate sodium (COLACE) capsule 100 mg  100 mg Oral BID    hydrocortisone 1 % cream 1 application  1 application Topical PRN  ibuprofen (MOTRIN) tablet 600 mg  600 mg Oral Q6H PRN    ondansetron (ZOFRAN) injection 4 mg  4 mg Intravenous Q8H PRN    oxyCODONE-acetaminophen (PERCOCET) 5-325 mg per tablet 1 tablet  1 tablet Oral Q4H PRN    oxyCODONE-acetaminophen (PERCOCET) 5-325 mg per tablet 2 tablet  2 tablet Oral Q4H PRN    witch hazel-glycerin (TUCKS) topical pad 1 pad  1 pad Topical PRN       Physical Exam:     Gen: AAOx3, NAD  CV: RRR  Lungs: CTA b/l  Abd: Soft, non-tender, non-distended, no rebound or guarding  Uterine fundus firm and non-tender, at umbilicus  Ext: Non tender, negative christine's sign    Zainab Paul MD  OBGYN, PGY-2  7/8/2018 7:13 AM

## 2018-07-08 NOTE — DISCHARGE INSTRUCTIONS
Vaginal Delivery   WHAT YOU SHOULD KNOW:   A vaginal delivery is the birth of your baby through your vagina (birth canal)  AFTER YOU LEAVE:   Medicines:  · NSAIDs  help decrease swelling and pain or fever  This medicine is available with or without a doctor's order  NSAIDs can cause stomach bleeding or kidney problems in certain people  If you take blood thinner medicine, always ask your healthcare provider if NSAIDs are safe for you  Always read the medicine label and follow directions  · Take your medicine as directed  Call your healthcare provider if you think your medicine is not helping or if you have side effects  Tell him if you are allergic to any medicine  Keep a list of the medicines, vitamins, and herbs you take  Include the amounts, and when and why you take them  Bring the list or the pill bottles to follow-up visits  Carry your medicine list with you in case of an emergency  Follow up with your primary healthcare provider:  Most women need to return 6 weeks after a vaginal delivery  Ask about how to care for your wounds or stitches  Write down your questions so you remember to ask them during your visits  Activity:  Rest as much as possible  Try to keep all activities short  You may be able to do some exercise soon after you have your baby  Talk with your primary healthcare provider before you start exercising  If you work outside the home, ask when you can return to your job  Kegel exercises:  Kegel exercises may help your vaginal and rectal muscles heal faster  You can do Kegel exercises by tightening and relaxing the muscles around your vagina  Kegel exercises help make the muscles stronger  Breast care:  When your milk comes in, your breasts may feel full and hard  Ask how to care for your breasts, even if you are not breastfeeding  Constipation:  Do not try to push the bowel movement out if it is too hard   High-fiber foods, extra liquids, and regular exercise can help you prevent constipation  Examples of high-fiber foods are fruit and bran  Prune juice and water are good liquids to drink  Regular exercise helps your digestive system work  You may also be told to take over-the-counter fiber and stool softener medicines  Take these items as directed  Hemorrhoids:  Pregnancy can cause severe hemorrhoids  You may have rectal pain because of the hemorrhoids  Ask how to prevent or treat hemorrhoids  Perineum care: Your perineum is the area between your vagina and anus  Keep the area clean and dry to help it heal and to prevent infection  Wash the area gently with soap and water when you bathe or shower  Rinse your perineum with warm water when you use the toilet  Your primary healthcare provider may suggest you use a warm sitz bath to help decrease pain  A sitz bath is a bathtub or basin filled to hip level  Stay in the sitz bath for 20 to 30 minutes, or as directed  Vaginal discharge: You will have vaginal discharge, called lochia, after your delivery  The lochia is bright red the first day or two after the birth  By the fourth day, the amount decreases, and it turns red-brown  Use a sanitary pad rather than a tampon to prevent a vaginal infection  It is normal to have lochia up to 8 weeks after your baby is born  Monthly periods: Your period may start again within 7 to 12 weeks after your baby is born  If you are breastfeeding, it may take longer for your period to start again  You can still get pregnant again even though you do not have your monthly period  Talk with your primary healthcare provider about a birth control method that will be good for you if you do not want to get pregnant  Mood changes: Many new mothers have some kind of mood changes after delivery  Some of these changes occur because of lack of sleep, hormone changes, and caring for a new baby  Some mood changes can be more serious, such as postpartum depression   Talk with your primary healthcare provider if you feel unable to care for yourself or your baby  Sexual activity:  You may need to avoid sex for 6 to 7 weeks after you have your baby  You may notice you have a decreased desire for sex, or sex may be painful  You may need to use a vaginal lubricant (gel) to help make sex more comfortable  Contact your primary healthcare provider if:   · You have heavy vaginal bleeding that fills 1 or more sanitary pads in 1 hour  · You have a fever  · Your pain does not go away, or gets worse  · The skin between your vagina and rectum is swollen, warm, or red  · You have swollen, hard, or painful breasts  · You feel very sad or depressed  · You feel more tired than usual      · You have questions or concerns about your condition or care  Seek care immediately or call 911 if:   · You have pus or yellow drainage coming from your vagina or wound  · You are urinating very little, or not at all  · Your arm or leg feels warm, tender, and painful  It may look swollen and red  · You feel lightheaded, have sudden and worsening chest pain, or trouble breathing  You may have more pain when you take deep breaths or cough, or you may cough up blood  © 2014 [address] is for End User's use only and may not be sold, redistributed or otherwise used for commercial purposes  All illustrations and images included in CareNotes® are the copyrighted property of A D A M , Inc  or Lion Alvarez  The above information is an  only  It is not intended as medical advice for individual conditions or treatments  Talk to your doctor, nurse or pharmacist before following any medical regimen to see if it is safe and effective for you  Postpartum Perineal Care   WHAT YOU NEED TO KNOW:   Postpartum perineal care is care for your perineum after you have a baby  The perineum is your vagina and anus     DISCHARGE INSTRUCTIONS:   Care for your perineum:  Healthcare providers will give you a small squirt bottle and show you how to use it  Do the following after you use the toilet and before you put on a new pad:  · Remove the soiled pad    · Use the squirt bottle to rinse your perineum from front to back while you sit on the toilet     · Pat the area dry from front to back with toilet paper or a cotton cloth     · Put on a fresh pad    · Wash your hands  Decrease pain:  Ask your healthcare provider about these and other ways to decrease perineal pain:  · Sitz baths:  Healthcare providers may give you a portable sitz bath  This is a small tub that fits in the toilet  Fill the sitz bath or bathtub with 4 to 6 inches of warm water  Sit in the warm water for 20 minutes 2 to 3 times a day  · Ice:  Ice helps decrease swelling and pain  Ice may also help prevent tissue damage  Use an ice pack, or put crushed ice in a plastic bag  Cover it with a towel and place it on your perineum for 15 to 20 minutes every hour, or as directed  · Medicine spray, wipes, or pads:  Healthcare providers may give you a medicine spray or wipes soaked with numbing medicine to decrease the pain  Pads that contain an herb called witch hazel may also help reduce pain  Use these after perineal care or a sitz bath  Follow up with your healthcare provider as directed:  Write down your questions so you remember to ask them during your visits  Contact your healthcare provider if:   · You have heavy vaginal bleeding that fills 1 or more sanitary pads in 1 hour  · You have foul-smelling vaginal discharge  · You feel weak or lightheaded  · You have questions or concerns about your condition or care  Seek care immediately or call 911 if:   · You have large blood clots or bright red blood coming from your vagina  · You have abdominal pain, vomiting, and a fever    © 2017 [address] Information is for End User's use only and may not be sold, redistributed or otherwise used for commercial purposes  All illustrations and images included in CareNotes® are the copyrighted property of A MUKUND [employer]  or Lion Alvarez  The above information is an  only  It is not intended as medical advice for individual conditions or treatments  Talk to your doctor, nurse or pharmacist before following any medical regimen to see if it is safe and effective for you  Vaginal Delivery   WHAT YOU NEED TO KNOW:   A vaginal delivery occurs when your baby is born through your vagina (birth canal)  DISCHARGE INSTRUCTIONS:   Seek care immediately if:   · Your leg feels warm, tender, and painful  It may look swollen and red  · You have a fever  · You are urinating very little, or not at all  · You have heavy vaginal bleeding that fills 1 or more sanitary pads in 1 hour  · You feel weak, dizzy, or faint  Contact your healthcare provider if:   · Your abdominal or perineal pain does not go away, or gets worse  · You feel depressed  · You have questions or concerns about your condition or care  Medicines:  · NSAIDs , such as ibuprofen, help decrease swelling, pain, and fever  This medicine is available with or without a doctor's order  NSAIDs can cause stomach bleeding or kidney problems in certain people  If you take blood thinner medicine, always ask your healthcare provider if NSAIDs are safe for you  Always read the medicine label and follow directions  · Stool softeners  make it easier for you to have a bowel movement  You may need this medicine to treat or prevent constipation  · Take your medicine as directed  Contact your healthcare provider if you think your medicine is not helping or if you have side effects  Tell him or her if you are allergic to any medicine  Keep a list of the medicines, vitamins, and herbs you take  Include the amounts, and when and why you take them  Bring the list or the pill bottles to follow-up visits   Carry your medicine list with you in case of an emergency  Follow up with your healthcare provider:  Most women need to return 6 weeks after a vaginal delivery  Ask your healthcare provider how to care for your wounds or stitches, if you have them  Write down your questions so you remember to ask them during your visits  Activity:  Rest as much as possible  Try to keep all activities short  You may be able to do some exercise soon after you have your baby  Talk with your healthcare provider before you start exercising  If you work outside the home, ask when you can return to your job  Kegel exercises:  Kegel exercises may help your vaginal and rectal muscles heal faster  You can do Kegel exercises by tightening and relaxing the muscles around your vagina  Kegel exercises help make the muscles stronger  Breast care:  When your milk comes in, your breasts may feel full and hard  Ask how to care for your breasts, even if you are not breastfeeding  Constipation:  You may have constipation for a period of time after you have your baby  Do not try to push the bowel movement out if it is too hard  High-fiber foods and extra liquids can help you prevent constipation  Examples of high-fiber foods are fruit and bran  Prune juice and water are good liquids to drink  You may also be told to take over-the-counter fiber and stool softener medicines  Take these items as directed  Ask how to prevent or treat hemorrhoids  Perineum care: Your perineum is the area between your vagina and anus  Keep the area clean and dry  This will help it heal and prevent infection  Wash the area gently with soap and water when you bathe or shower  Rinse your perineum with warm water after you urinate or have a bowel movement  Your healthcare provider may suggest you use a warm sitz bath to help decrease pain  To take a sitz bath, fill a bathtub with 4 to 6 inches of warm water  You may also use a sitz bath pan that fits inside the toilet   Sit in the sitz bath for 20 minutes  Do this 2 to 3 times a day, or as directed  The warm water can help decrease pain and swelling  Vaginal discharge: You will have vaginal discharge, called lochia, after your delivery  The lochia is red or dark brown with clots for 1 to 3 days after the birth  The amount will decrease and turn pale pink or brown for 3 to 10 days  It will turn white or yellow on the 10th or 14th day  Lochia is usually gone within 3 weeks  Use a sanitary pad rather than a tampon to prevent a vaginal infection  You will have lochia for up to 3 weeks after your baby is born  Monthly periods: Your period may start again within 7 to 9 weeks after your baby is born  If you are breastfeeding, it may take longer for your period to start again  You can still get pregnant again even though you do not have your monthly period  Talk with your healthcare provider about a birth control method if you do not want to get pregnant  Mood changes: Many new mothers have some kind of mood changes after delivery  Some of these changes occur because of lack of sleep, hormone changes, and caring for a new baby  Some mood changes can be more serious, such as postpartum depression  Talk with your healthcare provider if you feel unable to care for yourself or your baby  Sexual activity:  Do not have sex until your healthcare provider says it is okay  You may notice you have a decreased desire for sex, or sex may be painful  You may need to use a vaginal lubricant (gel) to help make sex more comfortable  © 2017 [address]  Information is for End User's use only and may not be sold, redistributed or otherwise used for commercial purposes  All illustrations and images included in CareNotes® are the copyrighted property of A [employer]  or Lion Alvarez  The above information is an  only  It is not intended as medical advice for individual conditions or treatments   Talk to your doctor, nurse or pharmacist before following any medical regimen to see if it is safe and effective for you

## 2018-07-08 NOTE — SOCIAL WORK
CM received notification that patient is discharging home today and will need a breast pump  CM met with patient who states a script was already sent in to [facility] prior to admission  CM found script in EPIC and made referral to [facility]  Patient requested Medela Breast pump which was delivered to her prior to discharge  CM explained there is no way of knowing if there will be a co-pay at this time but does not anticipate this as she has [address]  [employer], patient states she understands       Shreyas Walton RN

## 2018-07-09 LAB
ABO GROUP BLD BPU: NORMAL
BPU ID: NORMAL
UNIT DISPENSE STATUS: NORMAL
UNIT PRODUCT CODE: NORMAL
UNIT RH: NORMAL

## 2018-07-09 NOTE — CASE MANAGEMENT
Notification of Maternity Inpatient Admission/Maternity Inpatient Authorization Request  This is a Notification of Maternity Inpatient Admission/Maternity Inpatient Authorization Request to our facility [facility]  Please be advised that this patient is currently in our facility under Inpatient Status  Below you will find the Birth/[place] Summary, Attending Physician and Facilitys information including NPI# and contact for the Utilization  assigned to the [facility] where the patient is receiving services  Please feel free to contact the Utilization Review Department with any questions  Mothers Information:  Taurus Miner  MRN: [id number]  YOB: 1985  Admission Date: 2018  8:19 AM  Discharge Date: 2018  1:00 PM  Disposition: Home/Self Care  Admitting Diagnosis:   O80 VAGINAL DELIVERY   Information:  Estimated Date of Delivery: 18  Information for the patient's :  Lowell Mcleod [[id number]]      Delivery Information:  Sex: female  Delivered 2018 4:56 PM by Vaginal, Spontaneous Delivery; Gestational Age: 37w11d     Measurements:  Weight: 6 lb 15 oz (3147 g); Height: 20"    APGAR 1 minute 5 minutes 10 minutes   Totals: 9 9      OB History      Para Term  AB Living    3 2 2 0 1 2    SAB TAB Ectopic Multiple Live Births    0 1 0 0 2        Attending Physician:  EUNICE Gutierrez  Specialty- Obstetrics and Gynecology  [facility] ID- [id number]  [address] [place] [address], [address]   Phone 1: [phone]  Fax: [phone]    Facility: [address] [place] [address], [address]   [phone]  Tax ID: [id number]  NPI: [id number]    [address] [facility] in the [facility] by Lion Alvarez for 2017  Network Utilization Review Department  Phone: [phone];  Fax [phone]  ATTENTION: The Network Utilization Review Department is now centralized for our 7 Facilities  Make a note that we have a new phone and fax numbers for our Department  Please call with any questions or concerns to [phone] and carefully follow the prompts so that you are directed to the right person  All voicemails are confidential  Fax any determinations, approvals, denials, and requests for initial or continue stay review clinical to [phone]  **Due to HIGH CALL volume, it would be easier if you could please send daily logs  This will expedite your requests and in part, help us provide discharge notifications faster   **

## 2018-07-10 ENCOUNTER — APPOINTMENT (OUTPATIENT)
Dept: LAB | Facility: CLINIC | Age: 33
End: 2018-07-10
Payer: COMMERCIAL

## 2018-07-10 LAB
ABO GROUP BLD BPU: NORMAL
ABO GROUP BLD BPU: NORMAL
BPU ID: NORMAL
BPU ID: NORMAL
CROSSMATCH: NORMAL
CROSSMATCH: NORMAL
ERYTHROCYTE [DISTWIDTH] IN BLOOD BY AUTOMATED COUNT: 13.9 % (ref 11.6–15.1)
HCT VFR BLD AUTO: 26.9 % (ref 34.8–46.1)
HGB BLD-MCNC: 8.8 G/DL (ref 11.5–15.4)
MCH RBC QN AUTO: 30.6 PG (ref 26.8–34.3)
MCHC RBC AUTO-ENTMCNC: 32.7 G/DL (ref 31.4–37.4)
MCV RBC AUTO: 93 FL (ref 82–98)
PLATELET # BLD AUTO: 345 THOUSANDS/UL (ref 149–390)
PMV BLD AUTO: 10.9 FL (ref 8.9–12.7)
RBC # BLD AUTO: 2.88 MILLION/UL (ref 3.81–5.12)
UNIT DISPENSE STATUS: NORMAL
UNIT DISPENSE STATUS: NORMAL
UNIT PRODUCT CODE: NORMAL
UNIT PRODUCT CODE: NORMAL
UNIT RH: NORMAL
UNIT RH: NORMAL
WBC # BLD AUTO: 8.7 THOUSAND/UL (ref 4.31–10.16)

## 2018-07-10 PROCEDURE — 85027 COMPLETE CBC AUTOMATED: CPT | Performed by: PHYSICIAN ASSISTANT

## 2018-07-10 PROCEDURE — 36415 COLL VENOUS BLD VENIPUNCTURE: CPT | Performed by: PHYSICIAN ASSISTANT

## 2018-07-12 ENCOUNTER — POSTPARTUM VISIT (OUTPATIENT)
Dept: OBGYN CLINIC | Facility: MEDICAL CENTER | Age: 33
End: 2018-07-12

## 2018-07-12 VITALS — WEIGHT: 198 LBS | DIASTOLIC BLOOD PRESSURE: 62 MMHG | SYSTOLIC BLOOD PRESSURE: 100 MMHG | BODY MASS INDEX: 36.21 KG/M2

## 2018-07-12 PROCEDURE — 99024 POSTOP FOLLOW-UP VISIT: CPT | Performed by: OBSTETRICS & GYNECOLOGY

## 2018-07-12 NOTE — PROGRESS NOTES
Assessment Isaías Ramirez was seen today for follow-up  Diagnoses and all orders for this visit:    Postpartum anemia    Continue Iron  Follow up if symptoms worsen       Plan    Subjective   Taurus Spurfatmata is a 33 y o  female here for a follow up visit  Isaías Ramirez delivered 34 via , complicated by postpartum hemorrhage with EBL 1L; managed with medications and Bakri balloon  Patient did not require transfusion  Was seen at [facility] and found to be anemic (8 8)  Postpartum Hb was 9 5  Was instructed to follow up  States her bleeding has improved since leaving the hospital  Denies syncopal or near syncopal episodes, SOB or chest pain  Does report dizziness, fatigue     Today's fingerstick - 9 8  Patient Active Problem List   Diagnosis    Depression with anxiety    Eczema    Obesity (BMI 30-39  9)    Tachycardia    Manoj-Parkinson-White (WPW) syndrome    Abnormal Papanicolaou smear of cervix with positive human papilloma virus (HPV) test    Abnormal fetal heart rate    Obesity affecting pregnancy, antepartum, third trimester    Fetal arrhythmia affecting pregnancy, antepartum    Pap smear abnormality of cervix with LGSIL    39 weeks gestation of pregnancy    Premature rupture of membranes    Normal spontaneous vaginal delivery     (spontaneous vaginal delivery)       Gynecologic History  Patient's last menstrual period was 2017 (exact date)  The current method of family planning is none      Past Medical History:   Diagnosis Date    Abnormal Pap smear of cervix     Exposure to chlamydia     Exposure to chlamydia     Resolved 17    Sore throat     Resolved 17    Manoj-Parkinson-White (WPW) syndrome     Yeast infection     Resolved 17     Past Surgical History:   Procedure Laterality Date    COLONOSCOPY      COLPOSCOPY      INDUCED       OTHER SURGICAL HISTORY      catheter ablation- WPW age 13     Family History   Problem Relation Age of Onset    Bipolar disorder Mother     Hypertension Mother    Lonn Gaucher deficiency Father     Hyperlipidemia Father     Hypertension Father     No Known Problems Sister     No Known Problems Brother     Breast cancer Maternal Grandmother         dx approx age early 42's   Elise Mcgowan No Known Problems Maternal Grandfather     Crohn's disease Paternal Grandmother     Prostate cancer Paternal Grandfather     Colon cancer Paternal Aunt      Social History     Social History    Marital status: Single     Spouse name: N/A    Number of children: N/A    Years of education: N/A     Occupational History    Not on file       Social History Main Topics    Smoking status: Never Smoker    Smokeless tobacco: Never Used    Alcohol use Yes      Comment: social- per allscripts    Drug use: No    Sexual activity: Yes     Birth control/ protection: IUD     Other Topics Concern    Not on file     Social History Narrative    Family planning    IUD contraception         Allergies   Allergen Reactions    Codeine Other (See Comments)     dry heaves    Sulfa Antibiotics Hives and Rash     Per pt as achild    Erythromycin Hives     Per as a child       Current Outpatient Prescriptions:     docusate sodium (COLACE) 100 mg capsule, Take 1 capsule (100 mg total) by mouth 2 (two) times a day, Disp: 10 capsule, Rfl: 0    Prenatal Vit-Fe Fumarate-FA (PRENATAL VITAMIN) 27-0 8 MG TABS, Take 1 tablet by mouth daily, Disp: , Rfl:     acetaminophen (TYLENOL) 325 mg tablet, Take 2 tablets (650 mg total) by mouth every 6 (six) hours as needed for mild pain, headaches or fever, Disp: 30 tablet, Rfl: 0    benzocaine-menthol-lanolin-aloe (DERMOPLAST) 20-0 5 % topical spray, Apply 1 application topically 4 (four) times a day as needed for mild pain, Disp: , Rfl: 0    hydrocortisone 1 % cream, Apply 1 application topically as needed (hemorrhoids), Disp: 30 g, Rfl: 0    ibuprofen (MOTRIN) 600 mg tablet, Take 1 tablet (600 mg total) by mouth every 6 (six) hours as needed for mild pain, Disp: 30 tablet, Rfl: 0    witch hazel-glycerin (TUCKS) topical pad, Apply 1 pad topically as needed for irritation, Disp: 40 each, Rfl: 0    Review of Systems  Constitutional :no fever, feels well, no tiredness, no recent weight gain or loss  ENT: no ear ache, no loss of hearing, no nosebleeds or nasal discharge, no sore throat or hoarseness  Cardiovascular: no complaints of slow or fast heart beat, no chest pain, no palpitations, no leg claudication or lower extremity edema  Respiratory: no complaints of shortness of shortness of breath, no GARCIA  Breasts:no complaints of breast pain, breast lump, or nipple discharge  Gastrointestinal: no complaints of abdominal pain, constipation, nausea, vomiting, or diarrhea or bloody stools  Genitourinary : no complaints of dysuria, incontinence, pelvic pain, no dysmenorrhea, vaginal discharge or abnormal vaginal bleeding and as noted in HPI  Musculoskeletal: no complaints of arthralgia, no myalgia, no joint swelling or stiffness, no limb pain or swelling    Integumentary: no complaints of skin rash or lesion, itching or dry skin  Neurological: no complaints of headache, no confusion, no numbness or tingling, no dizziness or fainting     Objective     /62   Wt 89 8 kg (198 lb)   LMP 09/30/2017 (Exact Date)   BMI 36 21 kg/m²

## 2018-07-13 ENCOUNTER — OFFICE VISIT (OUTPATIENT)
Dept: FAMILY MEDICINE CLINIC | Facility: CLINIC | Age: 33
End: 2018-07-13

## 2018-07-13 ENCOUNTER — OFFICE VISIT (OUTPATIENT)
Dept: POSTPARTUM | Facility: CLINIC | Age: 33
End: 2018-07-13
Payer: COMMERCIAL

## 2018-07-13 VITALS — DIASTOLIC BLOOD PRESSURE: 66 MMHG | SYSTOLIC BLOOD PRESSURE: 94 MMHG | TEMPERATURE: 98.9 F

## 2018-07-13 DIAGNOSIS — R52 PAIN AGGRAVATED BY BREAST FEEDING: ICD-10-CM

## 2018-07-13 DIAGNOSIS — Z71.89 ENCOUNTER FOR BREAST FEEDING COUNSELING: Primary | ICD-10-CM

## 2018-07-13 DIAGNOSIS — O92.79 PAIN AGGRAVATED BY BREAST FEEDING: ICD-10-CM

## 2018-07-13 DIAGNOSIS — O92.13 CRACKED NIPPLE ASSOCIATED WITH LACTATION: ICD-10-CM

## 2018-07-13 DIAGNOSIS — Z53.8 APPOINTMENT CANCELED BY HOSPITAL: Primary | ICD-10-CM

## 2018-07-13 PROCEDURE — 99404 PREV MED CNSL INDIV APPRX 60: CPT | Performed by: PEDIATRICS

## 2018-07-13 RX ORDER — PNV NO.95/FERROUS FUM/FOLIC AC 28MG-0.8MG
TABLET ORAL
COMMUNITY
Start: 2018-07-12 | End: 2018-11-26

## 2018-07-13 NOTE — PATIENT INSTRUCTIONS
Plan for breastfeeding    Reassurance and support given  Reviewed early signs of hunger, including tensing of hands and shoulders - no need to wait for open eyes  Crying is a late hunger sign  If Elda Cardona is crying, soothe her first before attempting to latch  Reviewed normal sucking patterns: transition from stimulation to nutritive to release or non-nutritive  Watch and listen for sustained periods of suck swallow breath  Reviewed normal nursing pattern: infant should nurse for at least 5 minutes or until releases on own  Discussed difference in sensation of non-nutritive v nutritive sucking  Expresses breastmilk for comfort only  offering both breasts at each feeding will prevent one breast from feeling uncomfortably full  If Elda Cardona only takes on breast and the other feels too full, pump or hand express for comfort onl  For a deep and painless latch, Compress your breast to make it narrow in the same direction your baby's  mouth is positioned  Move your baby onto your breast so their chin touches first and their head tips back  Your nipple should be at their nose  When they open their mouth wide, move them onto the breast so your nipple enters their mouth pointing upward  If the latch hurts, stop and try again  Make sure your baby's ear shoulder and hip are in alignment and her body is close to yours  Make sure you are comfortable and relaxed in your chair before attempting to latch  Bring baby to your breast, not your breast to your baby  To help your nipples heal, in addition to paying close attention to latch, apply protective ointment after feeding or pumping and cover with an occlusive dressing like wax paper  Do this until your nipples have completely healed  If you continue to have pain and nipple damage in spite of a proper latch, or if Elda Cardona struggles to gain weight, call for further evaluation for possible tongue tie with Dr Leticia Yan  Please call with any questions or concerns

## 2018-07-13 NOTE — PROGRESS NOTES
INITIAL BREAST FEEDING EVALUATION    Informant/Relationship: Joanne Toledo    Discussion of General Lactation Issues: Breastfeeding has been painful since [address] was born  Joanne Toledo has damage to both nipples at this time  Infant is 9days old today   History:  Fertility Problem:no  Breast changes:yes - areola got larger and darker  : yes - not induced  Full term:yes - 39 6/7 weeks    labor:no  First nursing/attempt < 1 hour after birth:yes - went well  Skin to skin following delivery:yes - briefly due to maternal complications    When mom was stable, baby was again placed skin to skin  Breast changes after delivery:yes - Milk came in on DOL #4  Rooming in (infant in room with mother with exception of procedures, eg  Circumcision: no Spent several hours in the nursery both nights  Blood sugar issues:no  NICU stay:no  Jaundice:yes - mild  Phototherapy:no  Supplement given: (list supplement and method used as well as reason(s):no    Past Medical History:   Diagnosis Date    Abnormal Pap smear of cervix     Exposure to chlamydia     Exposure to chlamydia     Resolved 17    Sore throat     Resolved 17    Manoj-Parkinson-White (WPW) syndrome     Yeast infection     Resolved 17         Current Outpatient Prescriptions:     acetaminophen (TYLENOL) 325 mg tablet, Take 2 tablets (650 mg total) by mouth every 6 (six) hours as needed for mild pain, headaches or fever, Disp: 30 tablet, Rfl: 0    benzocaine-menthol-lanolin-aloe (DERMOPLAST) 20-0 5 % topical spray, Apply 1 application topically 4 (four) times a day as needed for mild pain, Disp: , Rfl: 0    docusate sodium (COLACE) 100 mg capsule, Take 1 capsule (100 mg total) by mouth 2 (two) times a day, Disp: 10 capsule, Rfl: 0    Ferrous Sulfate (IRON) 325 (65 Fe) MG TABS, , Disp: , Rfl:     ibuprofen (MOTRIN) 600 mg tablet, Take 1 tablet (600 mg total) by mouth every 6 (six) hours as needed for mild pain, Disp: 30 tablet, Rfl: 0    Allergies   Allergen Reactions    Codeine Other (See Comments)     dry heaves    Sulfa Antibiotics Hives and Rash     Per pt as achild    Erythromycin Hives     Per as a child       History   Drug Use No       Social History Never a smoker    Interval Breastfeeding History:    Frequency of breast feeding: Every 2 hours on demand  Does mother feel breastfeeding is effective: Yes  Does infant appear satisfied after nursing:Yes  Stooling pattern normal: lYes  Urinating frequently:Yes  Using shield or shells: No    Alternative/Artificial Feedings:   Bottle: No  Cup: No  Syringe/Finger: No           Formula Type: none                     Amount: n/a            Breast Milk:                      Amount: n/a  Elimination Problems: No      Equipment:  Nipple Shield             Type: none             Size: n/a             Frequency of Use: n/a  Pump            Type: Medela Pump in Style             Frequency of Use: none  Shells            Type: none            Frequency of use: n/a    Equipment Problems: no    Mom:  Breast: large symmetrical breasts,  All four quadrants full  Nipple Assessment in General: Normal: elongated/eraser  Right nipple with scabbed abrasion on the face  Left nipple also abraded on the face  Mother's Awareness of Feeding Cues                 Recognizes: Yes                  Verbalizes: Yes  Support System: FOB  History of Breastfeeding: none prior  Did not breast feed older child  Changes/Stressors/Violence: -DV  Concerns/Goals: Fortino Angelucci would like to breastfeed for a year  Problems with Mom: Painful, damaged nipples  Physical Exam   Constitutional: She is oriented to person, place, and time  She appears well-developed and well-nourished  HENT:   Head: Normocephalic and atraumatic  Neck: Normal range of motion  Cardiovascular: Normal rate, regular rhythm, normal heart sounds and intact distal pulses      Pulmonary/Chest: Effort normal and breath sounds normal  Musculoskeletal: Normal range of motion  She exhibits no edema  Neurological: She is alert and oriented to person, place, and time  Skin: Skin is warm and dry  Psychiatric: She has a normal mood and affect  Her behavior is normal  Judgment and thought content normal        Infant:  Behaviors: Alert  Color: Pink  Birth weight: 3147gram  Current weight: 3210gram    Problems with infant: None      General Appearance:  Alert, active, no distress                            Head:  Normocephalic, AFOF, sutures opposed                            Eyes:   Conjunctiva clear, no drainage                            Ears:   Normally placed, no anomolies                           Nose:   no drainage or erythema                          Mouth:  No lesions  High anterior palate  See Hazelbaker assessment  Neck:  Supple, symmetrical, trachea midline                Respiratory:  No grunting, flaring, retractions, breath sounds clear and equal           Cardiovascular:  Regular rate and rhythm  No murmur  Adequate perfusion/capillary refill  Femoral pulse present                  Abdomen:    Soft, non-tender, no masses, bowel sounds present, no HSM            Genitourinary:  Normal female genitalia, anus patent                         Spine:   No abnormalities noted       Musculoskeletal:   Full range of motion         Skin/Hair/Nails:   Skin warm, dry, and intact, no rashes or abnormal dyspigmentation or lesions               Neurologic:   No abnormal movement, tone appropriate for gestational age    [facility] Assessment for Lingual Frenulum Function    Appearance Items Function Items   Appearance of tongue when lifted  2: Round or square   Lateralization  2: Complete   Elasticity of frenulum  1: Moderately elastic   Lift of tongue  2: Tip to mid-mouth     Length of lingual frenulum when tongue lifted  lingual frenulum length: 1: 1 cm     Extension of tongue  2:  Tip over lower lip   Attachment of lingual frenulum to tongue  2: Posterior to tip   Spread of anterior tongue  1: Moderate of partial   Attachment of lingual frenulum to inferior alveolar ridge  1: Attached just below ridge Cupping  2: Entire edge, firm cup   Ankyloglossia Grading:  Class I: mild, 12-16 mm  Class II: moderate, 8-11 mm  Class III: severe, 3-7 mm  ClassIV: complete, less than 3 mm Peristalsis  2: Complete, anterior to posterior       SCORE:    Appearance: 7 (<8=ankyloglossia)  Function: 13 (<11=ankyloglossia) Snapback  2: None         [place] Latch:  Efficiency:               Lips Flanged: Yes              Depth of latch: fair              Audible Swallow: Yes              Visible Milk: Yes              Wide Open/ Asymmetrical: Yes              Suck Swallow Cycle: Breathing: unlabored, Coordinated: yes  Nipple Assessment after latch: vertical compression of the nipple  Latch Problems: Latch is fair with proper positioning  Unable to obtain a wider latch  Jodie Leal reported that this feeding was more comfortable than what she is accustomed to and had no pain after the first few sucks  [address] [place] [address] nursed contentedly until she fell asleep  Throughout the feeding, several sucks were required before swallowing noted  Position:  Infant's Ergonomics/Body               Body Alignment: Yes, after repositioning               Head Supported: Yes               Close to Mom's body/ Lifted/ Supported: Yes, after repositioning               Mom's Ergonomics/Body: Yes, after repositioning                           Supported: Yes, after repositioning                           Sitting Back: Yes, eventually                           Brings Baby to her breast: Yes, eventually  Positioning Problems: Lowell Flower leaned over [address] [place] [address] and attempted to place her breast in the baby's mouth  She appeared very uncomfortable and admitted that she was when asked  [address] [place] [address]' body was also rotated away from her mother   After discussion and demonstration, Jodie Leal was able to adjust her and her baby's position so they were both comfortable and the feeding proceeded  Handouts:   Storing human milk, Paced bottle feeding, Hands on pumping, Hand expression and Latch check list    Education:  Reviewed Latch: Demonstrated how to gently compress the breast and align the baby so that his nose is just above the nipple with his lower lip and chin touching the breast to encourage the deepest, widest, off-center latch  Reviewed Positioning for Dyad: Position your baby close to your body with their ear shoulder and hip in alignment  Bring baby to your breast so their nose is in line with your nipple and move them onto the breast when their mouth opens wide  Reviewed Frequency/Supply & Demand: Continuing to feed your baby on demand will help you establish a healthy milk supply  Reviewed Infant:Cues and varied States of Awareness  Reviewed Mom/Breast care: Moist wound healing for damaged nipples  Plan for breastfeeding    Reassurance and support given  Reviewed early signs of hunger, including tensing of hands and shoulders - no need to wait for open eyes  Crying is a late hunger sign  If Obed Chacon is crying, soothe her first before attempting to latch  Reviewed normal sucking patterns: transition from stimulation to nutritive to release or non-nutritive  Watch and listen for sustained periods of suck swallow breath  Reviewed normal nursing pattern: infant should nurse for at least 5 minutes or until releases on own  Discussed difference in sensation of non-nutritive v nutritive sucking  Expresses breastmilk for comfort only  offering both breasts at each feeding will prevent one breast from feeling uncomfortably full  If Obed Chacon only takes on breast and the other feels too full, pump or hand express for comfort onl  For a deep and painless latch, Compress your breast to make it narrow in the same direction your baby's  mouth is positioned    Move your baby onto your breast so their chin touches first and their head tips back  Your nipple should be at their nose  When they open their mouth wide, move them onto the breast so your nipple enters their mouth pointing upward  If the latch hurts, stop and try again  Make sure your baby's ear shoulder and hip are in alignment and her body is close to yours  Make sure you are comfortable and relaxed in your chair before attempting to latch  Bring baby to your breast, not your breast to your baby  To help your nipples heal, in addition to paying close attention to latch, apply protective ointment after feeding or pumping and cover with an occlusive dressing like wax paper  Do this until your nipples have completely healed  If you continue to have pain and nipple damage in spite of a proper latch, or if Laureano Jacome struggles to gain weight, call for further evaluation for possible tongue tie with Dr Marifer Albarado  Please call with any questions or concerns  I have spent 60 minutes with Patient and family today in which greater than 50% of this time was spent in counseling/coordination of care regarding Intructions for management

## 2018-07-13 NOTE — PROGRESS NOTES
Patient was not seen for a visit today as her hemoglobin has been improving and she was seen by gynecology yesterday

## 2018-07-22 NOTE — PROGRESS NOTES
I have reviewed the notes, assessments, and/or procedures performed by Nallely Mars, RN, IBCLC, I concur with her/his documentation of Idania Constantino

## 2018-07-23 DIAGNOSIS — O26.86 PUPP (PRURITIC URTICARIAL PAPULES AND PLAQUES OF PREGNANCY): Primary | ICD-10-CM

## 2018-07-23 RX ORDER — TRIAMCINOLONE ACETONIDE 0.25 MG/G
CREAM TOPICAL 2 TIMES DAILY PRN
Qty: 30 G | Refills: 0 | Status: SHIPPED | OUTPATIENT
Start: 2018-07-23 | End: 2018-11-26

## 2018-07-24 LAB — PLACENTA IN STORAGE: NORMAL

## 2018-08-08 NOTE — PROGRESS NOTES
[place] [facility] PR[facility]       NAME: Sachi Farah  AGE: 33 y o  SEX: female       : 1985        MRN: [id number]    DATE: 2018  TIME: 11:29 AM    Assessment and Plan     Problem List Items Addressed This Visit     Postpartum depression - Primary       I reviewed with the patient that she does appear to have postpartum depression  She fortunately does not have any symptoms of postpartum psychosis  We discussed possible treatment option would include postpartum depression support groups and medication  We reviewed that Zoloft is the SSRI of twice to be used when treating postpartum depression with medication  She believes that she might have had some side effects with the medication in the past but is unsure  She states that if she did it was probably some grogginess  She has decided to pursue the postpartum support group and take a paper script for Zoloft  If she decides to start the medication, she will start with a half tablet daily for a week and then increase to 1 tablet daily  We reviewed the safety profile of the medication which dates that there is no known risk to the infant based upon studies and studies have been inconclusive regarding its affect on breast milk production  She was encouraged to call if she has any problems or concerns  Otherwise she will follow up in 1 month for recheck  I would also like her to check labs in the interim to follow-up on her anemia and assess for any other contributing factors to her depressive symptoms  Relevant Medications    sertraline (ZOLOFT) 50 mg tablet    Other Relevant Orders    CBC    Basic metabolic panel    TSH, 3rd generation with Free T4 reflex          Postpartum depression    I reviewed with the patient that she does appear to have postpartum depression  She fortunately does not have any symptoms of postpartum psychosis   We discussed possible treatment option would include postpartum depression support groups and medication  We reviewed that Zoloft is the SSRI of twice to be used when treating postpartum depression with medication  She believes that she might have had some side effects with the medication in the past but is unsure  She states that if she did it was probably some grogginess  She has decided to pursue the postpartum support group and take a paper script for Zoloft  If she decides to start the medication, she will start with a half tablet daily for a week and then increase to 1 tablet daily  We reviewed the safety profile of the medication which dates that there is no known risk to the infant based upon studies and studies have been inconclusive regarding its affect on breast milk production  She was encouraged to call if she has any problems or concerns  Otherwise she will follow up in 1 month for recheck  I would also like her to check labs in the interim to follow-up on her anemia and assess for any other contributing factors to her depressive symptoms  Chief Complaint     Chief Complaint   Patient presents with    Depression       History of Present Illness   Jason Hollins is a 33y o -year-old female who presents for depressive symptoms  The patient presents today to the office with concerns of feeling depressed  She notes that she has been feeling this way since her daughter was born a month ago  She feels that it has gotten worse though especially over the last week  She notes that she is feeling down and short tempered  She states that she will say mean things to her boyfriend and sometimes finds it difficult to stop herself when she gets started with things such things  She finds herself raising her voice and yelling is finding that she is getting close to the point of doing this with her boyfriend's children even though she knows that she should not  She denies any trouble with bonding with the baby    She does not have any thoughts of hurting herself or anyone else  She denies any hallucinations or delusions  She does note some difficulty with her concentration and memory though  She has trouble remembering what time she last nursed the baby and also states that she at times forgets things that she spoke about in conversation with her boyfriend  Sometimes she is able to remember them once he tells her what her response was to something that he said to her, but sometimes she does not recall the conversation  She has been on antidepressants in the past   She believes that she was on Lexapro most recently  Review of Systems   Review of Systems   Psychiatric/Behavioral: Positive for agitation, decreased concentration and dysphoric mood  Negative for hallucinations and suicidal ideas  Active Problem List     Patient Active Problem List   Diagnosis    Depression with anxiety    Eczema    Obesity (BMI 30-39  9)    Tachycardia    Manoj-Parkinson-White (WPW) syndrome    Abnormal Papanicolaou smear of cervix with positive human papilloma virus (HPV) test    Abnormal fetal heart rate    Obesity affecting pregnancy, antepartum, third trimester    Fetal arrhythmia affecting pregnancy, antepartum    Pap smear abnormality of cervix with LGSIL    39 weeks gestation of pregnancy    Premature rupture of membranes    Normal spontaneous vaginal delivery     (spontaneous vaginal delivery)    Postpartum depression         Social History:  Social History     Social History    Marital status: Single     Spouse name: N/A    Number of children: N/A    Years of education: N/A     Occupational History    Not on file       Social History Main Topics    Smoking status: Never Smoker    Smokeless tobacco: Never Used    Alcohol use Yes      Comment: social- per allscripts    Drug use: No    Sexual activity: Not Currently     Birth control/ protection: None     Other Topics Concern    Not on file     Social History Narrative    Family planning    IUD contraception           Objective     Vitals:    08/09/18 0859   BP: 110/70   Pulse: 89   SpO2: 96%     Wt Readings from Last 3 Encounters:   08/09/18 89 4 kg (197 lb)   07/12/18 89 8 kg (198 lb)   07/06/18 94 3 kg (208 lb)       Physical Exam   Constitutional: She appears well-developed and well-nourished  No distress  Pulmonary/Chest: Effort normal    Psychiatric: She has a normal mood and affect  Her behavior is normal    Vitals reviewed  Pertinent Laboratory/Diagnostic Studies:  Results for orders placed or performed in visit on 07/10/18   CBC   Result Value Ref Range    WBC 8 70 4 31 - 10 16 Thousand/uL    RBC 2 88 (L) 3 81 - 5 12 Million/uL    Hemoglobin 8 8 (L) 11 5 - 15 4 g/dL    Hematocrit 26 9 (L) 34 8 - 46 1 %    MCV 93 82 - 98 fL    MCH 30 6 26 8 - 34 3 pg    MCHC 32 7 31 4 - 37 4 g/dL    RDW 13 9 11 6 - 15 1 %    Platelets 164 808 - 511 Thousands/uL    MPV 10 9 8 9 - 12 7 fL         ALLERGIES:  Allergies   Allergen Reactions    Codeine Other (See Comments)     dry heaves    Sulfa Antibiotics Hives and Rash     Per pt as achild    Erythromycin Hives     Per as a child       Current Medications     Current Outpatient Prescriptions   Medication Sig Dispense Refill    acetaminophen (TYLENOL) 325 mg tablet Take 2 tablets (650 mg total) by mouth every 6 (six) hours as needed for mild pain, headaches or fever 30 tablet 0    benzocaine-menthol-lanolin-aloe (DERMOPLAST) 20-0 5 % topical spray Apply 1 application topically 4 (four) times a day as needed for mild pain  0    docusate sodium (COLACE) 100 mg capsule Take 1 capsule (100 mg total) by mouth 2 (two) times a day 10 capsule 0    Ferrous Sulfate (IRON) 325 (65 Fe) MG TABS       ibuprofen (MOTRIN) 600 mg tablet Take 1 tablet (600 mg total) by mouth every 6 (six) hours as needed for mild pain 30 tablet 0    sertraline (ZOLOFT) 50 mg tablet Take 1/2 tablet daily x 1 week  Then increase to 1 tablet daily   80 tablet 1    triamcinolone (KENALOG) 0 025 % cream Apply topically 2 (two) times a day as needed for rash 30 g 0     No current facility-administered medications for this visit            Marielle Brady PA-C  8/9/2018 11:29 AM  [facility]

## 2018-08-09 ENCOUNTER — OFFICE VISIT (OUTPATIENT)
Dept: FAMILY MEDICINE CLINIC | Facility: CLINIC | Age: 33
End: 2018-08-09
Payer: COMMERCIAL

## 2018-08-09 VITALS
DIASTOLIC BLOOD PRESSURE: 70 MMHG | SYSTOLIC BLOOD PRESSURE: 110 MMHG | OXYGEN SATURATION: 96 % | HEART RATE: 89 BPM | BODY MASS INDEX: 36.25 KG/M2 | HEIGHT: 62 IN | WEIGHT: 197 LBS

## 2018-08-09 PROCEDURE — 3008F BODY MASS INDEX DOCD: CPT | Performed by: PHYSICIAN ASSISTANT

## 2018-08-09 PROCEDURE — 99213 OFFICE O/P EST LOW 20 MIN: CPT | Performed by: PHYSICIAN ASSISTANT

## 2018-08-09 NOTE — PATIENT INSTRUCTIONS
Postpartum depression    I reviewed with the patient that she does appear to have postpartum depression  She fortunately does not have any symptoms of postpartum psychosis  We discussed possible treatment option would include postpartum depression support groups and medication  We reviewed that Zoloft is the SSRI of twice to be used when treating postpartum depression with medication  She believes that she might have had some side effects with the medication in the past but is unsure  She states that if she did it was probably some grogginess  She has decided to pursue the postpartum support group and take a paper script for Zoloft  If she decides to start the medication, she will start with a half tablet daily for a week and then increase to 1 tablet daily  We reviewed the safety profile of the medication which dates that there is no known risk to the infant based upon studies and studies have been inconclusive regarding its affect on breast milk production  She was encouraged to call if she has any problems or concerns  Otherwise she will follow up in 1 month for recheck  I would also like her to check labs in the interim to follow-up on her anemia and assess for any other contributing factors to her depressive symptoms

## 2018-08-09 NOTE — ASSESSMENT & PLAN NOTE
I reviewed with the patient that she does appear to have postpartum depression  She fortunately does not have any symptoms of postpartum psychosis  We discussed possible treatment option would include postpartum depression support groups and medication  We reviewed that Zoloft is the SSRI of twice to be used when treating postpartum depression with medication  She believes that she might have had some side effects with the medication in the past but is unsure  She states that if she did it was probably some grogginess  She has decided to pursue the postpartum support group and take a paper script for Zoloft  If she decides to start the medication, she will start with a half tablet daily for a week and then increase to 1 tablet daily  We reviewed the safety profile of the medication which dates that there is no known risk to the infant based upon studies and studies have been inconclusive regarding its affect on breast milk production  She was encouraged to call if she has any problems or concerns  Otherwise she will follow up in 1 month for recheck  I would also like her to check labs in the interim to follow-up on her anemia and assess for any other contributing factors to her depressive symptoms

## 2018-08-24 ENCOUNTER — POSTPARTUM VISIT (OUTPATIENT)
Dept: OBGYN CLINIC | Facility: MEDICAL CENTER | Age: 33
End: 2018-08-24

## 2018-08-24 ENCOUNTER — APPOINTMENT (OUTPATIENT)
Dept: LAB | Facility: CLINIC | Age: 33
End: 2018-08-24
Payer: COMMERCIAL

## 2018-08-24 VITALS — SYSTOLIC BLOOD PRESSURE: 100 MMHG | DIASTOLIC BLOOD PRESSURE: 56 MMHG | BODY MASS INDEX: 35.12 KG/M2 | WEIGHT: 192 LBS

## 2018-08-24 DIAGNOSIS — F41.8 POSTPARTUM ANXIETY: ICD-10-CM

## 2018-08-24 DIAGNOSIS — R87.618 ABNORMAL PAPANICOLAOU SMEAR OF CERVIX WITH POSITIVE HUMAN PAPILLOMA VIRUS (HPV) TEST: ICD-10-CM

## 2018-08-24 PROBLEM — IMO0002 ABNORMAL FETAL HEART RATE: Status: RESOLVED | Noted: 2018-02-14 | Resolved: 2018-08-24

## 2018-08-24 PROBLEM — O99.213 OBESITY AFFECTING PREGNANCY, ANTEPARTUM, THIRD TRIMESTER: Status: RESOLVED | Noted: 2018-02-16 | Resolved: 2018-08-24

## 2018-08-24 PROBLEM — Z3A.39 39 WEEKS GESTATION OF PREGNANCY: Status: RESOLVED | Noted: 2018-07-06 | Resolved: 2018-08-24

## 2018-08-24 PROBLEM — O42.90 PREMATURE RUPTURE OF MEMBRANES: Status: RESOLVED | Noted: 2018-07-06 | Resolved: 2018-08-24

## 2018-08-24 LAB
ANION GAP SERPL CALCULATED.3IONS-SCNC: 5 MMOL/L (ref 4–13)
BUN SERPL-MCNC: 9 MG/DL (ref 5–25)
CALCIUM SERPL-MCNC: 9.4 MG/DL (ref 8.3–10.1)
CHLORIDE SERPL-SCNC: 105 MMOL/L (ref 100–108)
CO2 SERPL-SCNC: 28 MMOL/L (ref 21–32)
CREAT SERPL-MCNC: 0.76 MG/DL (ref 0.6–1.3)
ERYTHROCYTE [DISTWIDTH] IN BLOOD BY AUTOMATED COUNT: 13.1 % (ref 11.6–15.1)
GFR SERPL CREATININE-BSD FRML MDRD: 105 ML/MIN/1.73SQ M
GLUCOSE SERPL-MCNC: 78 MG/DL (ref 65–140)
HCT VFR BLD AUTO: 40.7 % (ref 34.8–46.1)
HGB BLD-MCNC: 12.2 G/DL (ref 11.5–15.4)
MCH RBC QN AUTO: 26 PG (ref 26.8–34.3)
MCHC RBC AUTO-ENTMCNC: 30 G/DL (ref 31.4–37.4)
MCV RBC AUTO: 87 FL (ref 82–98)
PLATELET # BLD AUTO: 409 THOUSANDS/UL (ref 149–390)
PMV BLD AUTO: 11 FL (ref 8.9–12.7)
POTASSIUM SERPL-SCNC: 3.7 MMOL/L (ref 3.5–5.3)
RBC # BLD AUTO: 4.69 MILLION/UL (ref 3.81–5.12)
SODIUM SERPL-SCNC: 138 MMOL/L (ref 136–145)
TSH SERPL DL<=0.05 MIU/L-ACNC: 1.7 UIU/ML (ref 0.36–3.74)
WBC # BLD AUTO: 6.64 THOUSAND/UL (ref 4.31–10.16)

## 2018-08-24 PROCEDURE — G0145 SCR C/V CYTO,THINLAYER,RESCR: HCPCS | Performed by: PATHOLOGY

## 2018-08-24 PROCEDURE — 87624 HPV HI-RISK TYP POOLED RSLT: CPT | Performed by: OBSTETRICS & GYNECOLOGY

## 2018-08-24 PROCEDURE — G0124 SCREEN C/V THIN LAYER BY MD: HCPCS | Performed by: PATHOLOGY

## 2018-08-24 PROCEDURE — 85027 COMPLETE CBC AUTOMATED: CPT | Performed by: PHYSICIAN ASSISTANT

## 2018-08-24 PROCEDURE — 99024 POSTOP FOLLOW-UP VISIT: CPT | Performed by: OBSTETRICS & GYNECOLOGY

## 2018-08-24 PROCEDURE — 84443 ASSAY THYROID STIM HORMONE: CPT | Performed by: PHYSICIAN ASSISTANT

## 2018-08-24 PROCEDURE — 36415 COLL VENOUS BLD VENIPUNCTURE: CPT | Performed by: PHYSICIAN ASSISTANT

## 2018-08-24 PROCEDURE — 80048 BASIC METABOLIC PNL TOTAL CA: CPT

## 2018-08-24 NOTE — PROGRESS NOTES
OB POSTPARTUM VISIT PROGRESS NOTE  Date of Encounter: 2018    Madeline Lantigua    : 1985  (33 y o )  MR: [id number]    Wilfred groves is in for her postpartum visit  She is now [id number]  She delivered by normal spontaneous vaginal delivery  She delivered a Female on 17  Infant's name is Spencer Washington  She's generally doing well, denies current pain or bleeding issues, and has no significant depression issues  She is breast feeding exclusively  History of diabetes in pregnancy No  Complications in pregnancy: No, postpartum hemorrhage  We discussed all appropriate contraceptive options and she chooses Nexplanon  Scored 18 on EDS today  States she has a lot of anxiety regarding her daughter  She has no homicidal or suicidal ideations  [facility]  She is just worried that something bad will happen to her or her family  [facility]  Declined medical management at this time  Offered counseling - will see how she does with group therapy before going to one-on-one  Objective     /56   Wt 87 1 kg (192 lb)   LMP 2017 (Exact Date)   BMI 35 12 kg/m²     General:   appears stated age, cooperative, alert normal mood and affect   Neck: Neck: normal, supple,trachea midline, no masses   Heart: regular rate and rhythm, S1, S2 normal, no murmur, click, rub or gallop   Lungs: clear to auscultation bilaterally   Breasts: Deferred, breastfeeding   Abdomen: soft, non-tender, without masses or organomegaly   Vulva: normal   Vagina: normal vagina, no discharge, exudate, lesion, or erythema   Urethra: normal   Cervix: Normal, no discharge  PAP done     Uterus: normal size, contour, position, consistency, mobility, non-tender   Adnexa: normal adnexa     Assessment/Plan   Diagnoses and all orders for this visit:    Abnormal Papanicolaou smear of cervix with positive human papilloma virus (HPV) test  - Liquid-based pap, screening        Reji Lehman MD

## 2018-08-27 LAB
HPV HR 12 DNA CVX QL NAA+PROBE: POSITIVE
HPV16 DNA CVX QL NAA+PROBE: NEGATIVE
HPV18 DNA CVX QL NAA+PROBE: NEGATIVE

## 2018-08-28 ENCOUNTER — TELEPHONE (OUTPATIENT)
Dept: OBGYN CLINIC | Facility: MEDICAL CENTER | Age: 33
End: 2018-08-28

## 2018-08-28 NOTE — TELEPHONE ENCOUNTER
----- Message from Ruth Mari sent at 8/28/2018 11:12 AM EDT -----  Spoke with pt  States at the moment she is not interested in nexplanon  Will cb if she changes her mind   ----- Message -----  From: Ruth Mari  Sent: 8/28/2018  10:56 AM  To: Ruth catherinem to cb   ----- Message -----  From: Ivan Hopkins MD  Sent: 8/24/2018  12:29 PM  To: Ruth Ross interested in [facility], but losing insurance by the end of the month  Any way we could precert and schedule for placement before 8/31?

## 2018-08-29 ENCOUNTER — TELEPHONE (OUTPATIENT)
Dept: OBGYN CLINIC | Facility: MEDICAL CENTER | Age: 33
End: 2018-08-29

## 2018-08-29 NOTE — TELEPHONE ENCOUNTER
----- Message from Juana Monsalve sent at 8/28/2018 11:12 AM EDT -----  Spoke with pt  States at the moment she is not interested in nexplanon  Will cb if she changes her mind   ----- Message -----  From: Juana Monsalve  Sent: 8/28/2018  10:56 AM  To: Juana catherine to cb   ----- Message -----  From: Tasha Cerna MD  Sent: 8/24/2018  12:29 PM  To: Juana Ross interested in [facility], but losing insurance by the end of the month  Any way we could precert and schedule for placement before 8/31?

## 2018-08-30 LAB
LAB AP GYN PRIMARY INTERPRETATION: NORMAL
Lab: NORMAL
PATH INTERP SPEC-IMP: NORMAL

## 2018-10-30 ENCOUNTER — IMMUNIZATION (OUTPATIENT)
Dept: FAMILY MEDICINE CLINIC | Facility: CLINIC | Age: 33
End: 2018-10-30

## 2018-10-30 DIAGNOSIS — Z23 NEED FOR INFLUENZA VACCINATION: Primary | ICD-10-CM

## 2018-10-30 PROCEDURE — 90471 IMMUNIZATION ADMIN: CPT

## 2018-10-30 PROCEDURE — 90686 IIV4 VACC NO PRSV 0.5 ML IM: CPT

## 2018-11-01 ENCOUNTER — PROCEDURE VISIT (OUTPATIENT)
Dept: OBGYN CLINIC | Facility: MEDICAL CENTER | Age: 33
End: 2018-11-01
Payer: COMMERCIAL

## 2018-11-01 VITALS — SYSTOLIC BLOOD PRESSURE: 118 MMHG | BODY MASS INDEX: 34.39 KG/M2 | WEIGHT: 188 LBS | DIASTOLIC BLOOD PRESSURE: 72 MMHG

## 2018-11-01 DIAGNOSIS — R87.618 ABNORMAL PAPANICOLAOU SMEAR OF CERVIX WITH POSITIVE HUMAN PAPILLOMA VIRUS (HPV) TEST: Primary | ICD-10-CM

## 2018-11-01 DIAGNOSIS — R10.32 LLQ PAIN: ICD-10-CM

## 2018-11-01 PROCEDURE — 57455 BIOPSY OF CERVIX W/SCOPE: CPT | Performed by: OBSTETRICS & GYNECOLOGY

## 2018-11-01 PROCEDURE — 88305 TISSUE EXAM BY PATHOLOGIST: CPT | Performed by: PATHOLOGY

## 2018-11-01 NOTE — PROGRESS NOTES
Colposcopy Procedure Note    Indications: Pap smear 3 months ago showed: ASCUS, +HPV  The prior pap showed ASCUS, +HPV  Prior cervical/vaginal disease: CED 1  Prior cervical treatment: no treatment  Complains of LLQ pain since delivery in July, 2018  Will check ultrasound  Procedure Details   The risks (incl bleeding,infection and pain) and benefits (determination of extent of disease if any) of the procedure were reviewed and Written informed consent was obtained  The cervix was isolated easily; 5% acetic acid was liberally applied; the cervix was inspected with the findings noted below  The SCJ was seen in its entirety  A colposcopically directed biopsy was  taken at 1000  An endocervical curettage was not done  Hemostasis was achieved with pressure  The patient tolerated the procedure well  Findings:  Cervical findings: acetowhite lesion(s) noted at 10:00 o'clock   Vaginal findings: normal without visible lesions  Vulvar findings: normal mucosa without lesions     Specimens:   A: 10:00  B:  C:  D:    Complications: none      Plan:  Please call for temperature > 100 4 *F or 38*C, worsening pain, foul smelling discharge, or heavy vaginal bleeding and Please place nothing in your vagina for 2-3 days  RTO prn

## 2018-11-26 ENCOUNTER — OFFICE VISIT (OUTPATIENT)
Dept: FAMILY MEDICINE CLINIC | Facility: CLINIC | Age: 33
End: 2018-11-26
Payer: COMMERCIAL

## 2018-11-26 VITALS
HEART RATE: 85 BPM | OXYGEN SATURATION: 98 % | RESPIRATION RATE: 16 BRPM | DIASTOLIC BLOOD PRESSURE: 62 MMHG | SYSTOLIC BLOOD PRESSURE: 104 MMHG | BODY MASS INDEX: 34.89 KG/M2 | HEIGHT: 62 IN | WEIGHT: 189.6 LBS

## 2018-11-26 PROBLEM — O36.8390 FETAL ARRHYTHMIA AFFECTING PREGNANCY, ANTEPARTUM: Status: RESOLVED | Noted: 2018-02-16 | Resolved: 2018-11-26

## 2018-11-26 PROCEDURE — 1036F TOBACCO NON-USER: CPT | Performed by: PHYSICIAN ASSISTANT

## 2018-11-26 PROCEDURE — 99213 OFFICE O/P EST LOW 20 MIN: CPT | Performed by: PHYSICIAN ASSISTANT

## 2018-11-26 PROCEDURE — 3725F SCREEN DEPRESSION PERFORMED: CPT | Performed by: PHYSICIAN ASSISTANT

## 2018-11-26 PROCEDURE — 3008F BODY MASS INDEX DOCD: CPT | Performed by: PHYSICIAN ASSISTANT

## 2018-11-26 NOTE — PATIENT INSTRUCTIONS
Postpartum Depression   WHAT YOU NEED TO KNOW:   What is postpartum depression? Postpartum depression is a mood disorder that occurs after giving birth  A mood is an emotion or a feeling  Moods affect your behavior and how you feel about yourself and life in general  Depression is a sad mood that you cannot control  Women often feel sad, afraid, or nervous after their baby is born  These feelings are called postpartum blues or baby blues, and they usually go away in 1 to 2 weeks  With postpartum depression, these symptoms get worse and continue for more than 2 weeks  Postpartum depression is a serious condition that affects your daily activities and relationships  What causes postpartum depression? Healthcare providers do not know exactly what causes postpartum depression  It may be caused by a sudden drop in hormone levels after childbirth  A previous episode of postpartum depression or a family history of depression may increase your risk  Several things may trigger postpartum depression:  · Lack of support from the baby's father or other family members    · Feeling more tired than usual    · Stress, a poor diet, or lack of sleep    · Pain after childbirth or pain during breastfeeding    · Sudden change in lifestyle  How is postpartum depression diagnosed? Postpartum depression affects your daily activities and your relationships with other people  Healthcare providers will ask you questions about your signs and symptoms and how they are affecting your life  The symptoms of postpartum depression usually begin within 1 month after childbirth  You feel depressed or lose interest in activities you enjoy nearly every day for at least 2 weeks  You also have 4 or more of the following symptoms:  · You feel tired or have less energy than usual      · You feel unimportant or guilty most of the time  · You think about hurting or killing yourself  · Your appetite changes   You may lose your appetite and lose weight without trying  Your appetite may also increase and you may gain weight  · You are restless, irritable, or withdrawn  · You have trouble concentrating and remembering things  You have trouble doing daily tasks or making decisions  · You have trouble sleeping, even after the baby is asleep  How is postpartum depression treated? · Psychotherapy:  During therapy, you will talk with healthcare providers about how to cope with your feelings and moods  This can be done alone or in a group  It may also be done with family members or your partner  · Antidepressants: This medicine is given to decrease or stop the symptoms of depression  You usually need to take antidepressants for several weeks before you begin to feel better  Do not stop taking antidepressants unless your healthcare provider tells you to  Healthcare providers may try a different antidepressant if one type does not work  What can I do to feel better? · Rest:  Do not try to do everything all at the same time  Do only what is needed and let other things wait until later  Ask your family or friends for help, especially if you have other children  Ask your partner to help with night feedings or other baby care  Try to sleep when the baby naps  · Get emotional support:  Share your feelings with your partner, a friend, or another mother  · Take care of yourself:  Shower and dress each day  Do not skip meals  Try to get out of the house a little each day  Get regular exercise  Eat a healthy diet  Avoid alcohol because it can make your depression worse  Do not isolate yourself  Go for a walk or meet with a friend  It is also important that you have some time by yourself each day  How do I find support and more information?    · [address] [place] [address], Public Information & Communication Branch  [address] [place] [address] W, [address] [facility], [address]  [place] [address]   Phone: [phone]  Phone: [phone]  Web Address: [place] tn  When should I contact my healthcare provider? · You cannot make it to your next visit  · Your depression does not get better with treatment or it gets worse  · You have questions or concerns about your condition or care  When should I seek immediate care or call 911? · You think about hurting or killing yourself, your baby, or someone else  · You feel like other people want to hurt you  · You hear voices telling you to hurt yourself or your baby  CARE AGREEMENT:   You have the right to help plan your care  Learn about your health condition and how it may be treated  Discuss treatment options with your caregivers to decide what care you want to receive  You always have the right to refuse treatment  The above information is an  only  It is not intended as medical advice for individual conditions or treatments  Talk to your doctor, nurse or pharmacist before following any medical regimen to see if it is safe and effective for you  © 2017 [address] [employer] Information is for End User's use only and may not be sold, redistributed or otherwise used for commercial purposes  All illustrations and images included in CareNotes® are the copyrighted property of [employer]  or [employer]  Suicide Prevention for Adults   WHAT YOU NEED TO KNOW:   A person may see suicide as the only way to escape emotional or physical pain and suffering  You can help provide emotional support for him or her and get the help he or she needs  Learn to recognize warning signs that the person may be considering suicide  Find resources to help prevent him or her from attempting to take his or her life  DISCHARGE INSTRUCTIONS:   Call 911 if:   · The person has done something on purpose to hurt himself or herself  · The person tries to commit suicide    Return to the emergency department if:   · The person tells you he or she made a plan to commit suicide  · The person acts out in anger, is reckless, or is abusing alcohol or drugs  · The person has serious thoughts of suicide, even with treatment  Contact the person's healthcare provider or therapist if:   · You begin to see warning signs that the person may be considering suicide  · The person has intense feelings of sadness, anger, revenge, or despair, or he cannot make decisions easily  · The person tells you he or she has more thoughts of suicide when they are alone  · The person withdraws from others  · The person stops eating, or begins to smoke or drink heavily  · The person feels he or she is a burden because of a disability or disease  · The person has trouble dealing with stress, such as a breakup or a job loss  · You have questions or concerns about the person's condition or care  What to do if the person is having thoughts of suicide:  Call 911 if you feel the person is at immediate risk of suicide, or if he or she talks about an active suicide plan  Assume that the person intends to carry out his or her plan  Resources are available to help you and the person  The following are some things you can do:  · Call the [address] [place] [address] at [phone] ([phone])   · Call the Suicide Hotline at 1-625-OECELZK (8-432.371.2783)   · Contact the person's therapist  His or her healthcare provider can give you a list of therapists if he or she does not have one  · Keep medicines, weapons, and alcohol out of the person's reach  Do not leave the person alone if he or she says they want to commit suicide  Do not leave the person alone if you think he or she may try it  Make sure you do not put yourself at risk if the person has a weapon  · Do not be afraid to ask if the person is thinking of ending his or her life  Ask if the person has a plan for hurting or killing himself or herself    Warning signs to watch for:   · Talking about a plan for committing suicide, or suddenly deciding to make a will    · Cutting himself or herself, burning the skin with cigarettes, or driving recklessly    · Drug or alcohol use, not taking prescribed medicine, or taking too much prescribed medicine    · Sudden anger, lashing out at others, or seeming hopeless, anxious, or angry and then suddenly becoming happy or peaceful    · Not wanting to spend time with others or doing things he or she usually enjoys    · Trouble at work, or not showing up for work    · A change in the way he or she eats, sleeps, or dresses    · Weight gain or loss or having less energy than usual    · Trouble sleeping or spending a lot of time sleeping    · Giving away or throwing away his or her belongings  Follow up with the person's healthcare provider and therapist as directed:  Write down your questions so you remember to ask them during your visits  Treatments the person may need:   · Medicines  may be given to prevent mood swings, or to decrease anxiety or depression  The person will need to take all medicines as directed  A sudden stop can be harmful  It may take 4 to 6 weeks for the medicine to help him or her feel better  · A therapist  can help the person identify and change negative feelings or beliefs about himself or herself  This may also help change the way the he or she feels and acts  A therapist can also help the person find ways to cope with things that cannot be changed  What you can do to help the person:   · Encourage the person to seek help for drug or alcohol abuse  Drugs and alcohol can increase suicidal thoughts and make the person more likely to act on them  · Help the person connect with others  Encourage him or her to become involved in the community   Some examples include tutoring a young student, volunteering at a [employer], or joining a group exercise program  The person may need help setting up a computer or creating an e-mail account to help him or her remain connected to others  · Exercise with the person  Exercise can lift his or her mood, increase energy, and make it easier to sleep at night  · Encourage the person to try new things  Adults who are open to new experiences handle stress and change better than those who are not  · Call, visit, or send postcards to the person often  Check on him or her after the loss of a pet, longtime friend, or child  Holidays, birthdays, and anniversaries can be difficult for a person after a loss  The loss of a spouse can be especially painful and lonely  · Help the person schedule a visit with his or her Adventism or spiritual leader  A Adventism or spiritual leader may be able to offer additional support and resources to the person  · Help the person get equipment that will increase his or her comfort and mobility  Examples are hearing aids, glasses, large print books, and walkers  These can help him or her enjoy activities and feel more independent  · Encourage the person to continue taking medicine and going to therapy  Medicine and therapy can help improve his or her mental health  For support and more information:   · [address] [place] [address]  Phone: 1- 225 [phone] [phone] (01 [phone])  Web Address: [employer]  · Suicide Awareness Vo[employer] of Education  [address] [place] [address] [address] , [address]  Phone: 7- 667 - [phone]  Web Address: [employer]  org  © 2017 [address] [employer] Information is for End User's use only and may not be sold, redistributed or otherwise used for commercial purposes  All illustrations and images included in CareNotes® are the copyrighted property of [employer] M , Inc  or Lion Alvarez  The above information is an  only  It is not intended as medical advice for individual conditions or treatments   Talk to your doctor, nurse or pharmacist before following any medical regimen to see if it is safe and effective for you

## 2018-11-26 NOTE — PROGRESS NOTES
[facility]  FAMILY PRACTICE OFFICE VISIT       NAME: Oleg Jensen  AGE: 29 y o  SEX: female       : 1985        MRN: [id number]    DATE: 2018  TIME: 10:50 AM    Assessment and Plan     Problem List Items Addressed This Visit     Postpartum depression - Primary     The patient presents today with recurring symptoms of postpartum depression  She was given a script to start the Zoloft  She was directed to take half tablet daily x1 week and then increase to 1 tablet daily  She was encouraged to look into therapists that are participating with her insurance  She should return in 1 month for recheck  She was encouraged to call with any problems or concerns in the interim  She was encouraged to call the suicide prevention hotline if she feels that she is having thoughts of hurting herself  She currently contracts for safety/states that she would not act upon these thoughts  Relevant Medications    sertraline (ZOLOFT) 50 mg tablet          Postpartum depression  The patient presents today with recurring symptoms of postpartum depression  She was given a script to start the Zoloft  She was directed to take half tablet daily x1 week and then increase to 1 tablet daily  She was encouraged to look into therapists that are participating with her insurance  She should return in 1 month for recheck  She was encouraged to call with any problems or concerns in the interim  She was encouraged to call the suicide prevention hotline if she feels that she is having thoughts of hurting herself  She currently contracts for safety/states that she would not act upon these thoughts  Chief Complaint     Chief Complaint   Patient presents with    Follow-up       History of Present Illness   Oleg Jensen is a 29y o -year-old female who presents for postpartum depression       PHQ-9 Depression Screening    PHQ-9:    Frequency of the following problems over the past two weeks:       Little interest or pleasure in doing things:  2 - more than half the days  Feeling down, depressed, or hopeless:  3 - nearly every day  Trouble falling or staying asleep, or sleeping too much:  1 - several days  Feeling tired or having little energy:  0 - not at all  Poor appetite or overeatin - not at all  Feeling bad about yourself - or that you are a failure or have let   yourself or your family down:  1 - several days  Trouble concentrating on things, such as reading the newspaper or watching   television:  3 - nearly every day  Moving or speaking so slowly that other people could have noticed  Or the   opposite - being so fidgety or restless that you have been moving around a   lot more than usual:  0 - not at all  Thoughts that you would be better off dead, or of hurting yourself in some   way:  1 - several days  PHQ-2 Score:  5  PHQ-9 Score:  11           Depression   This is a recurrent problem  Episode onset: began after birth of daughter about 4 5 months ago but has previous hx of depression and anxiety  The problem has been rapidly worsening (seems to be worsening)  Pertinent negatives include no anorexia  Treatments tried: was started on Zoloft about 3 5 months ago but never picked it up  Review of Systems   Review of Systems   Gastrointestinal: Negative for anorexia  Psychiatric/Behavioral: Positive for depression, dysphoric mood and sleep disturbance (the last few nights)  Active Problem List     Patient Active Problem List   Diagnosis    Depression with anxiety    Eczema    Obesity (BMI 30-39  9)    Tachycardia    Manoj-Parkinson-White (WPW) syndrome    Abnormal Papanicolaou smear of cervix with positive human papilloma virus (HPV) test    Pap smear abnormality of cervix with LGSIL    Postpartum depression         Past Medical History:  Past Medical History:   Diagnosis Date    Abnormal Pap smear of cervix     Exposure to chlamydia     Exposure to chlamydia     Resolved 17    Postpartum depression 2018    Sore throat     Resolved 17    Manoj-Parkinson-White (WPW) syndrome     Yeast infection     Resolved 17       Past Surgical History:  Past Surgical History:   Procedure Laterality Date    COLONOSCOPY      COLPOSCOPY      INDUCED       OTHER SURGICAL HISTORY      catheter ablation- WPW age 13       Family History:  Family History   Problem Relation Age of Onset    Bipolar disorder Mother     Hypertension Mother     Hyperlipidemia Mother    [employer] Glucose-6-phos deficiency Father     Hyperlipidemia Father     Hypertension Father     No Known Problems Sister     No Known Problems Brother     Breast cancer Maternal Grandmother         dx approx age early 42's   [employer] No Known Problems Maternal Grandfather     Crohn's disease Paternal Grandmother     Prostate cancer Paternal Grandfather     Colon cancer Paternal Aunt        Social History:  Social History     Social History    Marital status: Single     Spouse name: N/A    Number of children: N/A    Years of education: N/A     Occupational History    Not on file  Social History Main Topics    Smoking status: Never Smoker    Smokeless tobacco: Never Used    Alcohol use Yes      Comment: social- per allscripts    Drug use: No    Sexual activity: Not Currently     Birth control/ protection: None     Other Topics Concern    Not on file     Social History Narrative    Family planning    IUD contraception           Objective     Vitals:    18 1015   BP: 104/62   BP Location: Left arm   Patient Position: Sitting   Cuff Size: Standard   Pulse: 85   Resp: 16   SpO2: 98%   Weight: 86 kg (189 lb 9 6 oz)   Height: 5' 2" (1 575 m)     Wt Readings from Last 3 Encounters:   18 86 kg (189 lb 9 6 oz)   18 85 3 kg (188 lb)   18 87 1 kg (192 lb)       Physical Exam   Constitutional: She appears well-developed and well-nourished  No distress     Neck: Normal range of motion  Neck supple  No thyromegaly present  Cardiovascular: Normal rate, normal heart sounds and intact distal pulses  No murmur heard  Pulmonary/Chest: Effort normal and breath sounds normal  She has no wheezes  She has no rales  Lymphadenopathy:     She has no cervical adenopathy  Psychiatric:   Dysphoric and tearful   Vitals reviewed  Pertinent Laboratory/Diagnostic Studies:  Lab Results   Component Value Date    GLUCOSE 81 08/11/2014    BUN 9 08/24/2018    CREATININE 0 76 08/24/2018    CALCIUM 9 4 08/24/2018     08/11/2014    K 3 7 08/24/2018    CO2 28 08/24/2018     08/24/2018     Lab Results   Component Value Date    ALT 31 07/21/2017    AST 16 07/21/2017    ALKPHOS 62 07/21/2017    BILITOT 0 4 08/11/2014       Lab Results   Component Value Date    WBC 6 64 08/24/2018    HGB 12 2 08/24/2018    HCT 40 7 08/24/2018    MCV 87 08/24/2018     (H) 08/24/2018       Lab Results   Component Value Date    CHOL 193 08/07/2015     Lab Results   Component Value Date    TRIG 85 07/21/2017     Lab Results   Component Value Date    HDL 36 (L) 07/21/2017     Lab Results   Component Value Date    LDLCALC 130 (H) 07/21/2017     Lab Results   Component Value Date    HGBA1C 5 0 06/23/2017         ALLERGIES:  Allergies   Allergen Reactions    Codeine Other (See Comments)     dry heaves    Sulfa Antibiotics Hives and Rash     Per pt as achild    Erythromycin Hives     Per as a child       Current Medications     Current Outpatient Prescriptions   Medication Sig Dispense Refill    sertraline (ZOLOFT) 50 mg tablet Take 1/2 tablet daily x 1 week  Then increase to 1 tablet daily  30 tablet 1     No current facility-administered medications for this visit            Health Maintenance     Health Maintenance   Topic Date Due    PAP SMEAR  08/24/2019    DTaP,Tdap,and Td Vaccines (2 - Td) 04/13/2028    INFLUENZA VACCINE  Completed     Immunization History   Administered Date(s) Administered     Influenza (IM) Preservative Free 10/16/2013    H1N1, All Formulations 11/04/2009    HPV Quadrivalent 01/01/2007, 03/01/2007, 07/18/2010    Hep B, adult 04/15/1997, 10/01/1997, 11/01/1997    Influenza Quadrivalent Preservative Free 3 years and older IM 10/02/2014, 10/08/2015, 10/05/2016, 10/11/2017    Influenza TIV (IM) 12/01/2007, 10/17/2008, 10/19/2009, 10/04/2010, 10/12/2011    Influenza, injectable, quadrivalent, preservative free 0 5 mL 10/30/2018    MMR 07/08/2018    Tdap 04/13/2018    Tuberculin Skin Test-PPD Intradermal 10/12/2011       Nandini Brunner PA-C  11/26/2018 10:50 AM  [facility]

## 2018-11-26 NOTE — ASSESSMENT & PLAN NOTE
The patient presents today with recurring symptoms of postpartum depression  She was given a script to start the Zoloft  She was directed to take half tablet daily x1 week and then increase to 1 tablet daily  She was encouraged to look into therapists that are participating with her insurance  She should return in 1 month for recheck  She was encouraged to call with any problems or concerns in the interim  She was encouraged to call the suicide prevention hotline if she feels that she is having thoughts of hurting herself  She currently contracts for safety/states that she would not act upon these thoughts

## 2019-02-05 ENCOUNTER — OFFICE VISIT (OUTPATIENT)
Dept: FAMILY MEDICINE CLINIC | Facility: CLINIC | Age: 34
End: 2019-02-05
Payer: COMMERCIAL

## 2019-02-05 VITALS
DIASTOLIC BLOOD PRESSURE: 72 MMHG | HEART RATE: 102 BPM | HEIGHT: 62 IN | WEIGHT: 186.25 LBS | BODY MASS INDEX: 34.27 KG/M2 | SYSTOLIC BLOOD PRESSURE: 98 MMHG | OXYGEN SATURATION: 98 % | TEMPERATURE: 99.4 F

## 2019-02-05 DIAGNOSIS — J01.90 ACUTE SINUSITIS, RECURRENCE NOT SPECIFIED, UNSPECIFIED LOCATION: Primary | ICD-10-CM

## 2019-02-05 PROCEDURE — 99213 OFFICE O/P EST LOW 20 MIN: CPT | Performed by: PHYSICIAN ASSISTANT

## 2019-02-05 PROCEDURE — 3008F BODY MASS INDEX DOCD: CPT | Performed by: PHYSICIAN ASSISTANT

## 2019-02-05 PROCEDURE — 1036F TOBACCO NON-USER: CPT | Performed by: PHYSICIAN ASSISTANT

## 2019-02-05 RX ORDER — FLUTICASONE PROPIONATE 50 MCG
1 SPRAY, SUSPENSION (ML) NASAL 2 TIMES DAILY
Qty: 16 G | Refills: 0 | Status: SHIPPED | OUTPATIENT
Start: 2019-02-05 | End: 2019-03-04 | Stop reason: SDUPTHER

## 2019-02-05 NOTE — PROGRESS NOTES
[place] [facility]  FAMILY PRACTICE ACUTE OFFICE VISIT       NAME: Richard Concepcion  AGE: 29 y o  SEX: female       : 1985        MRN: [id number]    DATE: 2019  TIME: 10:44 AM    Assessment and Plan     Problem List Items Addressed This Visit     None      Visit Diagnoses     Acute sinusitis, recurrence not specified, unspecified location    -  Primary    Relevant Medications    fluticasone (FLONASE) 50 mcg/act nasal spray        Patient Instructions   I reviewed with the patient that she does appear to be starting with a sinus infection  We reviewed that this is often times viral   It can run its course over 7-10 days  We will hold off on antibiotics since her main symptoms truly seem to have started yesterday  She was encouraged to use Flonase 1 spray per nostril twice daily and to increase fluids and rest   We reviewed that this is felt to be safe to use during breastfeeding  She was directed to continue with saline spray throughout the day as needed as well as [place]  She should call if her symptoms are worsening or failing to improve  Chief Complaint     Chief Complaint   Patient presents with    URI     coughing, running nose, sinus pressure  History of Present Illness   Richard Concepcion is a 29y o -year-old female who presents for cold symptoms  URI    This is a new problem  Episode onset: a few days ago  Progression since onset: worse over the last day  Associated symptoms include congestion, rhinorrhea (slight intermittently) and a sore throat  Pertinent negatives include no coughing, diarrhea, ear pain (but pressure when blows her nose), nausea, vomiting or wheezing  Associated symptoms comments: Pain in the roof of the mouth and TMJ flaring  Treatments tried: [place] and saline nasal spray  The treatment provided no relief  Review of Systems   Review of Systems   Constitutional: Positive for fever (off and on overnight - no readings)  HENT: Positive for congestion, rhinorrhea (slight intermittently), sinus pressure and sore throat  Negative for ear pain (but pressure when blows her nose) and postnasal drip  Respiratory: Negative for cough, shortness of breath and wheezing  Gastrointestinal: Negative for diarrhea, nausea and vomiting  Neurological: Negative for dizziness and light-headedness  Active Problem List     Patient Active Problem List   Diagnosis    Depression with anxiety    Eczema    Obesity (BMI 30-39  9)    Tachycardia    Manoj-Parkinson-White (WPW) syndrome    Abnormal Papanicolaou smear of cervix with positive human papilloma virus (HPV) test    Pap smear abnormality of cervix with LGSIL    Postpartum depression         Social History:  Social History     Social History    Marital status: Single     Spouse name: N/A    Number of children: N/A    Years of education: N/A     Occupational History    Not on file  Social History Main Topics    Smoking status: Never Smoker    Smokeless tobacco: Never Used    Alcohol use Yes      Comment: social- per allscripts    Drug use: No    Sexual activity: Not Currently     Birth control/ protection: None     Other Topics Concern    Not on file     Social History Narrative    Family planning    IUD contraception           Objective     Vitals:    02/05/19 1011   BP: 98/72   BP Location: Left arm   Patient Position: Sitting   Cuff Size: Standard   Pulse: 102   Temp: 99 4 °F (37 4 °C)   SpO2: 98%   Weight: 84 5 kg (186 lb 4 oz)   Height: 5' 2" (1 575 m)     Wt Readings from Last 3 Encounters:   02/05/19 84 5 kg (186 lb 4 oz)   11/26/18 86 kg (189 lb 9 6 oz)   11/01/18 85 3 kg (188 lb)       Physical Exam   Constitutional: She appears well-developed and well-nourished  No distress  HENT:   Head: Normocephalic and atraumatic     Right Ear: Tympanic membrane, external ear and ear canal normal    Left Ear: Tympanic membrane, external ear and ear canal normal    Nose: Mucosal edema (bilaterally) present  Right sinus exhibits no maxillary sinus tenderness and no frontal sinus tenderness  Left sinus exhibits maxillary sinus tenderness and frontal sinus tenderness  Mouth/Throat: Oropharynx is clear and moist  No posterior oropharyngeal edema or posterior oropharyngeal erythema  Neck: Normal range of motion  Neck supple  No thyromegaly present  Cardiovascular: Normal rate, regular rhythm, normal heart sounds and intact distal pulses  No murmur heard  Pulmonary/Chest: Effort normal and breath sounds normal  She has no wheezes  She has no rales  Lymphadenopathy:     She has cervical adenopathy (anterior bilaterally)  Psychiatric: She has a normal mood and affect  Her behavior is normal            ALLERGIES:  Allergies   Allergen Reactions    Codeine Other (See Comments)     dry heaves    Sulfa Antibiotics Hives and Rash     Per pt as achild    Erythromycin Hives     Per as a child       Current Medications     Current Outpatient Prescriptions   Medication Sig Dispense Refill    sertraline (ZOLOFT) 50 mg tablet Take 1/2 tablet daily x 1 week  Then increase to 1 tablet daily  (Patient taking differently: Take 50 mg by mouth daily  ) 30 tablet 1    fluticasone (FLONASE) 50 mcg/act nasal spray 1 spray into each nostril 2 (two) times a day 16 g 0     No current facility-administered medications for this visit            Magnolia Haas PA-C  2/5/2019 10:44 AM  [facility]

## 2019-02-05 NOTE — PATIENT INSTRUCTIONS
I reviewed with the patient that she does appear to be starting with a sinus infection  We reviewed that this is often times viral   It can run its course over 7-10 days  We will hold off on antibiotics since her main symptoms truly seem to have started yesterday  She was encouraged to use Flonase 1 spray per nostril twice daily and to increase fluids and rest   We reviewed that this is felt to be safe to use during breastfeeding  She was directed to continue with saline spray throughout the day as needed as well as [place]  She should call if her symptoms are worsening or failing to improve

## 2019-02-14 ENCOUNTER — TRANSCRIBE ORDERS (OUTPATIENT)
Dept: URGENT CARE | Facility: MEDICAL CENTER | Age: 34
End: 2019-02-14

## 2019-02-14 ENCOUNTER — APPOINTMENT (OUTPATIENT)
Dept: LAB | Facility: MEDICAL CENTER | Age: 34
End: 2019-02-14

## 2019-02-14 ENCOUNTER — APPOINTMENT (OUTPATIENT)
Dept: URGENT CARE | Facility: MEDICAL CENTER | Age: 34
End: 2019-02-14

## 2019-02-14 DIAGNOSIS — Z02.1 PRE-EMPLOYMENT EXAMINATION: ICD-10-CM

## 2019-02-14 DIAGNOSIS — Z02.1 PRE-EMPLOYMENT EXAMINATION: Primary | ICD-10-CM

## 2019-02-14 LAB — RUBV IGG SERPL IA-ACNC: 31.5 IU/ML

## 2019-02-14 PROCEDURE — 86787 VARICELLA-ZOSTER ANTIBODY: CPT

## 2019-02-14 PROCEDURE — 86735 MUMPS ANTIBODY: CPT

## 2019-02-14 PROCEDURE — 36415 COLL VENOUS BLD VENIPUNCTURE: CPT

## 2019-02-14 PROCEDURE — 86480 TB TEST CELL IMMUN MEASURE: CPT

## 2019-02-14 PROCEDURE — 86765 RUBEOLA ANTIBODY: CPT

## 2019-02-14 PROCEDURE — 86762 RUBELLA ANTIBODY: CPT

## 2019-02-18 LAB
GAMMA INTERFERON BACKGROUND BLD IA-ACNC: 0.03 IU/ML
M TB IFN-G BLD-IMP: NEGATIVE
M TB IFN-G CD4+ BCKGRND COR BLD-ACNC: 0 IU/ML
M TB IFN-G CD4+ BCKGRND COR BLD-ACNC: 0 IU/ML
MITOGEN IGNF BCKGRD COR BLD-ACNC: >10 IU/ML

## 2019-02-19 LAB
MEV IGG SER QL: NORMAL
MUV IGG SER QL: NORMAL
VZV IGG SER IA-ACNC: NORMAL

## 2019-03-04 DIAGNOSIS — J01.90 ACUTE SINUSITIS, RECURRENCE NOT SPECIFIED, UNSPECIFIED LOCATION: ICD-10-CM

## 2019-03-04 RX ORDER — FLUTICASONE PROPIONATE 50 MCG
SPRAY, SUSPENSION (ML) NASAL
Qty: 16 G | Refills: 0 | Status: SHIPPED | OUTPATIENT
Start: 2019-03-04 | End: 2019-07-12

## 2019-03-27 NOTE — PROGRESS NOTES
[place] [facility]  FAMILY PRACTICE ACUTE OFFICE VISIT       NAME: Amado Arnold  AGE: 29 y o  SEX: female       : 1985        MRN: [id number]    DATE: 3/28/2019  TIME: 8:37 PM    Assessment and Plan     Problem List Items Addressed This Visit     None      Visit Diagnoses     Acute right-sided low back pain without sciatica    -  Primary    Relevant Medications    cephalexin (KEFLEX) 500 mg capsule    Fatigue, unspecified type        Relevant Orders    Mononucleosis screen    CBC and differential    Comprehensive metabolic panel    TSH, 3rd generation with Free T4 reflex    EBV acute panel    Flu-like symptoms        Relevant Orders    Mononucleosis screen    CBC and differential    Comprehensive metabolic panel    TSH, 3rd generation with Free T4 reflex    EBV acute panel    INFLUENZA A/B AND RSV, PCR    Abnormal urine        Relevant Medications    cephalexin (KEFLEX) 500 mg capsule    Other Relevant Orders    POCT urine dip (Completed)    Urine culture    History of mononucleosis        Relevant Orders    Mononucleosis screen    CBC and differential    Comprehensive metabolic panel    TSH, 3rd generation with Free T4 reflex    EBV acute panel        Patient Instructions   Reviewed with the patient that her urine dip was positive for leukocytes  She was given a script for cephalexin to hold on to in case her lying pain worsened over the next few days until we get the culture back  We discussed the possibility of pyelonephritis; however, this seems unlikely given that her urine was not positive for nitrates  She was swabbed for influenza given her cough over the last few days with fever and recent exposure to a patient at work  She was additionally given a slip for labs to check her blood cell counts, thyroid, and to reassess for mononucleosis given previous episodes of this and her ongoing fatigue over the last few weeks    She was encouraged to call with any worsening symptoms or failure to improve  Chief Complaint     Chief Complaint   Patient presents with    Cold Like Symptoms     coughing, headaches for 3 days       History of Present Illness   Sarah Valdez is a 29y o -year-old female who presents for feeling poorly  Concerned about positive mono or flu - has been tired in the past weeks and pain in the right lower back under ribs - stabbing pain - no urinary symptoms - had mono a few times in the past - also notes that she swabbed a lady for flu a few days ago and she coughed right in her face    URI    This is a new problem  Episode onset: 3 days  The maximum temperature recorded prior to her arrival was 100 4 - 100 9 F (up to 100 8)  The fever has been present for 3 to 4 days (last 3 days)  Associated symptoms include coughing (dry), diarrhea (thinks from reincorporating dairy), headaches (frontal area) and nausea  Pertinent negatives include no congestion, dysuria, ear pain, rhinorrhea, sore throat (only raspy from coughing), vomiting or wheezing  Review of Systems   Review of Systems   Constitutional: Positive for fever  Negative for chills  HENT: Negative for congestion, ear pain, postnasal drip, rhinorrhea, sinus pressure and sore throat (only raspy from coughing)  Respiratory: Positive for cough (dry)  Negative for chest tightness, shortness of breath and wheezing  Gastrointestinal: Positive for diarrhea (thinks from reincorporating dairy) and nausea  Negative for vomiting  Genitourinary: Negative for dysuria, frequency and hematuria  No stronger odor either   Musculoskeletal: Positive for back pain  Negative for myalgias  Neurological: Positive for light-headedness and headaches (frontal area)  Negative for dizziness  Active Problem List     Patient Active Problem List   Diagnosis    Depression with anxiety    Eczema    Obesity (BMI 30-39  9)    Tachycardia    Manoj-Parkinson-White (WPW) syndrome    Abnormal Papanicolaou smear of cervix with positive human papilloma virus (HPV) test    Pap smear abnormality of cervix with LGSIL    Postpartum depression         Social History:  Social History     Socioeconomic History    Marital status: Single     Spouse name: Not on file    Number of children: Not on file    Years of education: Not on file    Highest education level: Not on file   Occupational History    Not on file   Social Needs    Financial resource strain: Not on file    Food insecurity:     Worry: Not on file     Inability: Not on file    Transportation needs:     Medical: Not on file     Non-medical: Not on file   Tobacco Use    Smoking status: Never Smoker    Smokeless tobacco: Never Used   Substance and Sexual Activity    Alcohol use: Yes     Comment: social- per allscripts    Drug use: No    Sexual activity: Not Currently     Birth control/protection: None   Lifestyle    Physical activity:     Days per week: Not on file     Minutes per session: Not on file    Stress: Not on file   Relationships    Social connections:     Talks on phone: Not on file     Gets together: Not on file     Attends Church service: Not on file     Active member of club or organization: Not on file     Attends meetings of clubs or organizations: Not on file     Relationship status: Not on file    Intimate partner violence:     Fear of current or ex partner: Not on file     Emotionally abused: Not on file     Physically abused: Not on file     Forced sexual activity: Not on file   Other Topics Concern    Not on file   Social History Narrative    Family planning    IUD contraception       Objective     Vitals:    03/28/19 1647   BP: 102/70   BP Location: Right arm   Patient Position: Sitting   Cuff Size: Standard   Pulse: 102   Temp: 98 3 °F (36 8 °C)   SpO2: 97%   Weight: 88 1 kg (194 lb 4 oz)   Height: 5' 2" (1 575 m)     Wt Readings from Last 3 Encounters:   03/28/19 88 1 kg (194 lb 4 oz)   02/05/19 84 5 kg (186 lb 4 oz)   11/26/18 86 kg (189 lb 9 6 oz)       Physical Exam   Constitutional: She appears well-developed and well-nourished  No distress  HENT:   Head: Normocephalic  Right Ear: External ear normal    Left Ear: External ear normal    Nose: Nose normal    Mouth/Throat: Oropharynx is clear and moist  No oropharyngeal exudate  Neck: Neck supple  No thyromegaly present  Cardiovascular: Normal rate, regular rhythm, normal heart sounds and intact distal pulses  No murmur heard  Pulmonary/Chest: Effort normal and breath sounds normal  She has no wheezes  She has no rales  Abdominal: Soft  Bowel sounds are normal  She exhibits no distension  There is no tenderness  But has tenderness in the right back with abdominal palpation in the LUQ and RUQ   Musculoskeletal: She exhibits no edema  Lymphadenopathy:     She has cervical adenopathy (slight anterior bilateral and single in the right posterior cervical area (that one is chronic per patient))  Skin: Skin is warm and dry  Psychiatric: She has a normal mood and affect  Vitals reviewed        Pertinent Laboratory/Diagnostic Studies:  Results for orders placed or performed in visit on 03/28/19   POCT urine dip   Result Value Ref Range    LEUKOCYTE ESTERASE,UA Moderate     NITRITE,UA Negative     SL AMB POCT UROBILINOGEN 0 2     POCT URINE PROTEIN negative      PH,UA 30+++     BLOOD,UA Negative     SPECIFIC GRAVITY,UA 1 010     KETONES,UA Negative     BILIRUBIN,UA Negative     GLUCOSE, UA Negative      COLOR,UA Dark yellow     CLARITY,UA Clear        Orders Placed This Encounter   Procedures    INFLUENZA A/B AND RSV, PCR    Urine culture    Mononucleosis screen    CBC and differential    Comprehensive metabolic panel    TSH, 3rd generation with Free T4 reflex    EBV acute panel    POCT urine dip       ALLERGIES:  Allergies   Allergen Reactions    Codeine Other (See Comments)     dry heaves    Sulfa Antibiotics Hives and Rash     Per pt as achild  Erythromycin Hives     Per as a child       Current Medications     Current Outpatient Medications   Medication Sig Dispense Refill    cephalexin (KEFLEX) 500 mg capsule Take 1 capsule (500 mg total) by mouth every 12 (twelve) hours for 10 days 20 capsule 0    fluticasone (FLONASE) 50 mcg/act nasal spray SPRAY 1 SPRAY INTO EACH NOSTRIL TWICE A DAY (Patient not taking: Reported on 3/28/2019) 16 g 0    sertraline (ZOLOFT) 50 mg tablet Take 1 tablet (50 mg total) by mouth daily Take 1/2 tablet daily x 1 week  Then increase to 1 tablet daily  (Patient not taking: Reported on 3/28/2019) 30 tablet 3     No current facility-administered medications for this visit            Flor Yun PA-C  3/28/2019 8:37 PM  [facility]

## 2019-03-28 ENCOUNTER — OFFICE VISIT (OUTPATIENT)
Dept: FAMILY MEDICINE CLINIC | Facility: CLINIC | Age: 34
End: 2019-03-28
Payer: COMMERCIAL

## 2019-03-28 VITALS
WEIGHT: 194.25 LBS | TEMPERATURE: 98.3 F | DIASTOLIC BLOOD PRESSURE: 70 MMHG | HEIGHT: 62 IN | OXYGEN SATURATION: 97 % | HEART RATE: 102 BPM | SYSTOLIC BLOOD PRESSURE: 102 MMHG | BODY MASS INDEX: 35.75 KG/M2

## 2019-03-28 DIAGNOSIS — R68.89 FLU-LIKE SYMPTOMS: ICD-10-CM

## 2019-03-28 DIAGNOSIS — Z86.19 HISTORY OF MONONUCLEOSIS: ICD-10-CM

## 2019-03-28 DIAGNOSIS — M54.50 ACUTE RIGHT-SIDED LOW BACK PAIN WITHOUT SCIATICA: Primary | ICD-10-CM

## 2019-03-28 DIAGNOSIS — R82.90 ABNORMAL URINE: ICD-10-CM

## 2019-03-28 DIAGNOSIS — R53.83 FATIGUE, UNSPECIFIED TYPE: ICD-10-CM

## 2019-03-28 LAB
SL AMB  POCT GLUCOSE, UA: NEGATIVE
SL AMB LEUKOCYTE ESTERASE,UA: ABNORMAL
SL AMB POCT BILIRUBIN,UA: NEGATIVE
SL AMB POCT BLOOD,UA: NEGATIVE
SL AMB POCT CLARITY,UA: CLEAR
SL AMB POCT COLOR,UA: ABNORMAL
SL AMB POCT KETONES,UA: NEGATIVE
SL AMB POCT NITRITE,UA: NEGATIVE
SL AMB POCT PH,UA: ABNORMAL
SL AMB POCT SPECIFIC GRAVITY,UA: 1.01
SL AMB POCT URINE PROTEIN: NEGATIVE
SL AMB POCT UROBILINOGEN: 0.2

## 2019-03-28 PROCEDURE — 87086 URINE CULTURE/COLONY COUNT: CPT | Performed by: PHYSICIAN ASSISTANT

## 2019-03-28 PROCEDURE — 99213 OFFICE O/P EST LOW 20 MIN: CPT | Performed by: PHYSICIAN ASSISTANT

## 2019-03-28 PROCEDURE — 1036F TOBACCO NON-USER: CPT | Performed by: PHYSICIAN ASSISTANT

## 2019-03-28 PROCEDURE — 87631 RESP VIRUS 3-5 TARGETS: CPT | Performed by: PHYSICIAN ASSISTANT

## 2019-03-28 PROCEDURE — 81002 URINALYSIS NONAUTO W/O SCOPE: CPT | Performed by: PHYSICIAN ASSISTANT

## 2019-03-28 PROCEDURE — 3008F BODY MASS INDEX DOCD: CPT | Performed by: PHYSICIAN ASSISTANT

## 2019-03-28 RX ORDER — CEPHALEXIN 500 MG/1
500 CAPSULE ORAL EVERY 12 HOURS SCHEDULED
Qty: 20 CAPSULE | Refills: 0 | Status: SHIPPED | OUTPATIENT
Start: 2019-03-28 | End: 2019-04-07

## 2019-03-29 ENCOUNTER — APPOINTMENT (OUTPATIENT)
Dept: LAB | Facility: CLINIC | Age: 34
End: 2019-03-29
Payer: COMMERCIAL

## 2019-03-29 DIAGNOSIS — R68.89 FLU-LIKE SYMPTOMS: ICD-10-CM

## 2019-03-29 DIAGNOSIS — Z86.19 HISTORY OF MONONUCLEOSIS: ICD-10-CM

## 2019-03-29 DIAGNOSIS — R53.83 FATIGUE, UNSPECIFIED TYPE: ICD-10-CM

## 2019-03-29 LAB
ALBUMIN SERPL BCP-MCNC: 3.6 G/DL (ref 3.5–5)
ALP SERPL-CCNC: 84 U/L (ref 46–116)
ALT SERPL W P-5'-P-CCNC: 25 U/L (ref 12–78)
ANION GAP SERPL CALCULATED.3IONS-SCNC: 3 MMOL/L (ref 4–13)
AST SERPL W P-5'-P-CCNC: 14 U/L (ref 5–45)
BACTERIA UR CULT: NORMAL
BASOPHILS # BLD AUTO: 0.08 THOUSANDS/ΜL (ref 0–0.1)
BASOPHILS NFR BLD AUTO: 1 % (ref 0–1)
BILIRUB SERPL-MCNC: 0.4 MG/DL (ref 0.2–1)
BUN SERPL-MCNC: 12 MG/DL (ref 5–25)
CALCIUM SERPL-MCNC: 8 MG/DL (ref 8.3–10.1)
CHLORIDE SERPL-SCNC: 108 MMOL/L (ref 100–108)
CO2 SERPL-SCNC: 27 MMOL/L (ref 21–32)
CREAT SERPL-MCNC: 0.68 MG/DL (ref 0.6–1.3)
EOSINOPHIL # BLD AUTO: 0.13 THOUSAND/ΜL (ref 0–0.61)
EOSINOPHIL NFR BLD AUTO: 2 % (ref 0–6)
ERYTHROCYTE [DISTWIDTH] IN BLOOD BY AUTOMATED COUNT: 12.6 % (ref 11.6–15.1)
FLUAV AG SPEC QL: NOT DETECTED
FLUBV AG SPEC QL: NOT DETECTED
GFR SERPL CREATININE-BSD FRML MDRD: 116 ML/MIN/1.73SQ M
GLUCOSE P FAST SERPL-MCNC: 87 MG/DL (ref 65–99)
HCT VFR BLD AUTO: 43.5 % (ref 34.8–46.1)
HGB BLD-MCNC: 13.9 G/DL (ref 11.5–15.4)
IMM GRANULOCYTES # BLD AUTO: 0.02 THOUSAND/UL (ref 0–0.2)
IMM GRANULOCYTES NFR BLD AUTO: 0 % (ref 0–2)
LYMPHOCYTES # BLD AUTO: 2.68 THOUSANDS/ΜL (ref 0.6–4.47)
LYMPHOCYTES NFR BLD AUTO: 35 % (ref 14–44)
MCH RBC QN AUTO: 28.4 PG (ref 26.8–34.3)
MCHC RBC AUTO-ENTMCNC: 32 G/DL (ref 31.4–37.4)
MCV RBC AUTO: 89 FL (ref 82–98)
MONOCYTES # BLD AUTO: 0.5 THOUSAND/ΜL (ref 0.17–1.22)
MONOCYTES NFR BLD AUTO: 7 % (ref 4–12)
NEUTROPHILS # BLD AUTO: 4.23 THOUSANDS/ΜL (ref 1.85–7.62)
NEUTS SEG NFR BLD AUTO: 55 % (ref 43–75)
NRBC BLD AUTO-RTO: 0 /100 WBCS
PLATELET # BLD AUTO: 211 THOUSANDS/UL (ref 149–390)
PMV BLD AUTO: 10.6 FL (ref 8.9–12.7)
POTASSIUM SERPL-SCNC: 3.7 MMOL/L (ref 3.5–5.3)
PROT SERPL-MCNC: 7.3 G/DL (ref 6.4–8.2)
RBC # BLD AUTO: 4.89 MILLION/UL (ref 3.81–5.12)
RSV B RNA SPEC QL NAA+PROBE: NOT DETECTED
SODIUM SERPL-SCNC: 138 MMOL/L (ref 136–145)
TSH SERPL DL<=0.05 MIU/L-ACNC: 1.57 UIU/ML (ref 0.36–3.74)
WBC # BLD AUTO: 7.64 THOUSAND/UL (ref 4.31–10.16)

## 2019-03-29 PROCEDURE — 86308 HETEROPHILE ANTIBODY SCREEN: CPT

## 2019-03-29 PROCEDURE — 85025 COMPLETE CBC W/AUTO DIFF WBC: CPT | Performed by: PHYSICIAN ASSISTANT

## 2019-03-29 PROCEDURE — 80053 COMPREHEN METABOLIC PANEL: CPT

## 2019-03-29 PROCEDURE — 86665 EPSTEIN-BARR CAPSID VCA: CPT

## 2019-03-29 PROCEDURE — 86664 EPSTEIN-BARR NUCLEAR ANTIGEN: CPT

## 2019-03-29 PROCEDURE — 36415 COLL VENOUS BLD VENIPUNCTURE: CPT

## 2019-03-29 PROCEDURE — 86663 EPSTEIN-BARR ANTIBODY: CPT

## 2019-03-29 PROCEDURE — 84443 ASSAY THYROID STIM HORMONE: CPT

## 2019-03-29 NOTE — PATIENT INSTRUCTIONS
Reviewed with the patient that her urine dip was positive for leukocytes  She was given a script for cephalexin to hold on to in case her lying pain worsened over the next few days until we get the culture back  We discussed the possibility of pyelonephritis; however, this seems unlikely given that her urine was not positive for nitrates  She was swabbed for influenza given her cough over the last few days with fever and recent exposure to a patient at work  She was additionally given a slip for labs to check her blood cell counts, thyroid, and to reassess for mononucleosis given previous episodes of this and her ongoing fatigue over the last few weeks  She was encouraged to call with any worsening symptoms or failure to improve

## 2019-03-30 LAB
EBV EA IGG SER-ACNC: 144 U/ML (ref 0–8.9)
EBV NA IGG SER IA-ACNC: >600 U/ML (ref 0–17.9)
EBV PATRN SPEC IB-IMP: ABNORMAL
EBV VCA IGG SER IA-ACNC: 550 U/ML (ref 0–17.9)
EBV VCA IGM SER IA-ACNC: <36 U/ML (ref 0–35.9)
HETEROPH AB SER QL: NEGATIVE

## 2019-04-01 DIAGNOSIS — E83.51 LOW CALCIUM LEVELS: Primary | ICD-10-CM

## 2019-04-05 ENCOUNTER — TELEPHONE (OUTPATIENT)
Dept: FAMILY MEDICINE CLINIC | Facility: CLINIC | Age: 34
End: 2019-04-05

## 2019-04-18 ENCOUNTER — OFFICE VISIT (OUTPATIENT)
Dept: FAMILY MEDICINE CLINIC | Facility: CLINIC | Age: 34
End: 2019-04-18
Payer: COMMERCIAL

## 2019-04-18 VITALS
HEART RATE: 98 BPM | HEIGHT: 62 IN | BODY MASS INDEX: 36.22 KG/M2 | DIASTOLIC BLOOD PRESSURE: 60 MMHG | OXYGEN SATURATION: 98 % | WEIGHT: 196.8 LBS | TEMPERATURE: 99.2 F | SYSTOLIC BLOOD PRESSURE: 110 MMHG

## 2019-04-18 DIAGNOSIS — M26.623 BILATERAL TEMPOROMANDIBULAR JOINT PAIN: ICD-10-CM

## 2019-04-18 DIAGNOSIS — G44.209 TENSION HEADACHE: Primary | ICD-10-CM

## 2019-04-18 PROCEDURE — 1036F TOBACCO NON-USER: CPT | Performed by: FAMILY MEDICINE

## 2019-04-18 PROCEDURE — 3008F BODY MASS INDEX DOCD: CPT | Performed by: FAMILY MEDICINE

## 2019-04-18 PROCEDURE — 99213 OFFICE O/P EST LOW 20 MIN: CPT | Performed by: FAMILY MEDICINE

## 2019-04-18 RX ORDER — IBUPROFEN 600 MG/1
TABLET ORAL
Qty: 30 TABLET | Refills: 1 | Status: SHIPPED | OUTPATIENT
Start: 2019-04-18 | End: 2019-08-26 | Stop reason: SDUPTHER

## 2019-07-12 ENCOUNTER — OFFICE VISIT (OUTPATIENT)
Dept: FAMILY MEDICINE CLINIC | Facility: CLINIC | Age: 34
End: 2019-07-12
Payer: COMMERCIAL

## 2019-07-12 VITALS
HEART RATE: 88 BPM | HEIGHT: 62 IN | DIASTOLIC BLOOD PRESSURE: 60 MMHG | WEIGHT: 196.38 LBS | OXYGEN SATURATION: 98 % | BODY MASS INDEX: 36.14 KG/M2 | SYSTOLIC BLOOD PRESSURE: 110 MMHG

## 2019-07-12 DIAGNOSIS — M25.532 LEFT WRIST PAIN: ICD-10-CM

## 2019-07-12 DIAGNOSIS — F33.2 SEVERE EPISODE OF RECURRENT MAJOR DEPRESSIVE DISORDER, WITHOUT PSYCHOTIC FEATURES (HCC): Primary | ICD-10-CM

## 2019-07-12 PROCEDURE — 99213 OFFICE O/P EST LOW 20 MIN: CPT | Performed by: PHYSICIAN ASSISTANT

## 2019-07-12 RX ORDER — SERTRALINE HYDROCHLORIDE 25 MG/1
TABLET, FILM COATED ORAL
Qty: 60 TABLET | Refills: 0 | Status: SHIPPED | OUTPATIENT
Start: 2019-07-12 | End: 2019-07-15 | Stop reason: CLARIF

## 2019-07-12 NOTE — ASSESSMENT & PLAN NOTE
Patient is currently experiencing severe symptoms of depression at this time  She was started on Zoloft 25 mg daily  She will take 1 pill daily for the 1st week and then increase to 2 tablets daily  She was encouraged to follow up in about 2 weeks to make sure that she is doing okay on this medication  She was encouraged to call with any problems or concerns  She contracts for safety  She has a suicide prevention hotline number though in case that is needed  We discussed the possibility of her having bipolar disorder as there have been times in the past which she has felt high energy and impulsively spends money  She does have a family history of bipolar disorder in her mother  She was encouraged to contact me if she felt that she was experiencing any of these symptoms after starting the Zoloft  If she does, we should consider starting a mood stabilizer  She is currently breast feeding, but states that she is trying to wean at this time  Of note, we also discussed the intensive day program called [employer]  She does not feel that this is currently an option for her since she does not consistent  for her children

## 2019-07-12 NOTE — PATIENT INSTRUCTIONS
Depression   WHAT YOU NEED TO KNOW:   Depression is a medical condition that causes feelings of sadness or hopelessness that do not go away  Depression may cause you to lose interest in things you used to enjoy  These feelings may interfere with your daily life  DISCHARGE INSTRUCTIONS:   Call 911 for any of the following:   · You think about harming yourself or someone else  Contact your healthcare provider if:   · Your symptoms do not improve  · You cannot make it to your next appointment  · You have new symptoms  · You have questions or concerns about your condition or care  Medicines:   · Antidepressants  may be given to improve or balance your mood  You may need to take this medicine for several weeks before you begin to feel better  · Take your medicine as directed  Contact your healthcare provider if you think your medicine is not helping or if you have side effects  Tell him of her if you are allergic to any medicine  Keep a list of the medicines, vitamins, and herbs you take  Include the amounts, and when and why you take them  Bring the list or the pill bottles to follow-up visits  Carry your medicine list with you in case of an emergency  Therapy  may be used to treat your depression  A therapist will help you learn to cope with your thoughts and feelings  This can be done alone or in a group  It may also be done with family members or a significant other  Self-care:   · Get regular physical activity  Try to exercise for 30 minutes, 3 to 5 days a week  Work with your healthcare provider to develop an exercise plan that you enjoy  Physical activity may improve your symptoms  · Get enough sleep  Create a routine to help you relax before bed  You can listen to music, read, or do yoga  Try to go to bed and wake up at the same time every day  Sleep is important for emotional health  · Eat a variety of healthy foods from all of the food groups    A healthy meal plan is low in fat, salt, and added sugar  Ask your healthcare provider for more information about a meal plan that is right for you  · Do not drink alcohol or use drugs  Alcohol and drugs can make your symptoms worse  Follow up with your healthcare provider as directed: Your healthcare provider will monitor your progress at follow-up visits  He or she will also monitor your medicine if you take antidepressants  Your healthcare provider will ask if the medicine is helping  Tell him or her about any side effects or problems you may have with your medicine  The type or amount of medicine may need to be changed  Write down your questions so you remember to ask them during your visits  © 2017 [employer] [employer] Information is for End User's use only and may not be sold, redistributed or otherwise used for commercial purposes  All illustrations and images included in CareNotes® are the copyrighted property of A D A M [employer]  or Lion Alvarez  The above information is an  only  It is not intended as medical advice for individual conditions or treatments  Talk to your doctor, nurse or pharmacist before following any medical regimen to see if it is safe and effective for you  Suicide Prevention for Adults   WHAT YOU NEED TO KNOW:   A person may see suicide as the only way to escape emotional or physical pain and suffering  You can help provide emotional support for him or her and get the help he or she needs  Learn to recognize warning signs that the person may be considering suicide  Find resources to help prevent him or her from attempting to take his or her life  DISCHARGE INSTRUCTIONS:   Call 911 if:   · The person has done something on purpose to hurt himself or herself  · The person tries to commit suicide  Return to the emergency department if:   · The person tells you he or she made a plan to commit suicide      · The person acts out in anger, is reckless, or is abusing alcohol or drugs     · The person has serious thoughts of suicide, even with treatment  Contact the person's healthcare provider or therapist if:   · You begin to see warning signs that the person may be considering suicide  · The person has intense feelings of sadness, anger, revenge, or despair, or he cannot make decisions easily  · The person tells you he or she has more thoughts of suicide when they are alone  · The person withdraws from others  · The person stops eating, or begins to smoke or drink heavily  · The person feels he or she is a burden because of a disability or disease  · The person has trouble dealing with stress, such as a breakup or a job loss  · You have questions or concerns about the person's condition or care  What to do if the person is having thoughts of suicide:  Call 911 if you feel the person is at immediate risk of suicide, or if he or she talks about an active suicide plan  Assume that the person intends to carry out his or her plan  Resources are available to help you and the person  The following are some things you can do:  · Call the [address] [place] [address] at 0-867-200-TALK (6293)   · Call the Suicide Hotline at 4-952-FWIOIDH (1-593.396.5937)   · Contact the person's therapist  His or her healthcare provider can give you a list of therapists if he or she does not have one  · Keep medicines, weapons, and alcohol out of the person's reach  Do not leave the person alone if he or she says they want to commit suicide  Do not leave the person alone if you think he or she may try it  Make sure you do not put yourself at risk if the person has a weapon  · Do not be afraid to ask if the person is thinking of ending his or her life  Ask if the person has a plan for hurting or killing himself or herself    Warning signs to watch for:   · Talking about a plan for committing suicide, or suddenly deciding to make a will    · Cutting himself or herself, burning the skin with cigarettes, or driving recklessly    · Drug or alcohol use, not taking prescribed medicine, or taking too much prescribed medicine    · Sudden anger, lashing out at others, or seeming hopeless, anxious, or angry and then suddenly becoming happy or peaceful    · Not wanting to spend time with others or doing things he or she usually enjoys    · Trouble at work, or not showing up for work    · A change in the way he or she eats, sleeps, or dresses    · Weight gain or loss or having less energy than usual    · Trouble sleeping or spending a lot of time sleeping    · Giving away or throwing away his or her belongings  Follow up with the person's healthcare provider and therapist as directed:  Write down your questions so you remember to ask them during your visits  Treatments the person may need:   · Medicines  may be given to prevent mood swings, or to decrease anxiety or depression  The person will need to take all medicines as directed  A sudden stop can be harmful  It may take 4 to 6 weeks for the medicine to help him or her feel better  · A therapist  can help the person identify and change negative feelings or beliefs about himself or herself  This may also help change the way the he or she feels and acts  A therapist can also help the person find ways to cope with things that cannot be changed  What you can do to help the person:   · Encourage the person to seek help for drug or alcohol abuse  Drugs and alcohol can increase suicidal thoughts and make the person more likely to act on them  · Help the person connect with others  Encourage him or her to become involved in the community  Some examples include tutoring a young student, volunteering at a [employer], or joining a group exercise program  The person may need help setting up a computer or creating an e-mail account to help him or her remain connected to others  · Exercise with the person    Exercise can lift his or her mood, increase energy, and make it easier to sleep at night  · Encourage the person to try new things  Adults who are open to new experiences handle stress and change better than those who are not  · Call, visit, or send postcards to the person often  Check on him or her after the loss of a pet, longtime friend, or child  Holidays, birthdays, and anniversaries can be difficult for a person after a loss  The loss of a spouse can be especially painful and lonely  · Help the person schedule a visit with his or her Latter day or spiritual leader  A Latter day or spiritual leader may be able to offer additional support and resources to the person  · Help the person get equipment that will increase his or her comfort and mobility  Examples are hearing aids, glasses, large print books, and walkers  These can help him or her enjoy activities and feel more independent  · Encourage the person to continue taking medicine and going to therapy  Medicine and therapy can help improve his or her mental health  For support and more information:   · [facility] , [address] [place] [address]  Phone: 5- 333 - 076-MLBN (01 33 43 04 02)  Web Address: HealthTap  · Suicide Awareness Voices of Education  [address] [place] [address] [address] , [address]  Phone: 9- 800 - 210-9214  Web Address: [employer]  org  © 2017 [address] [employer] Information is for End User's use only and may not be sold, redistributed or otherwise used for commercial purposes  All illustrations and images included in CareNotes® are the copyrighted property of [employer] , [employer]  or Lion Alvarez  The above information is an  only  It is not intended as medical advice for individual conditions or treatments  Talk to your doctor, nurse or pharmacist before following any medical regimen to see if it is safe and effective for you

## 2019-07-12 NOTE — PROGRESS NOTES
[place] [facility]  FAMILY PRACTICE ACUTE OFFICE VISIT       NAME: Mary Sevilla  AGE: 29 y o  SEX: female       : 1985        MRN: [id number]    DATE: 2019  TIME: 6:22 PM    Assessment and Plan     Problem List Items Addressed This Visit        Other    Severe episode of recurrent major depressive disorder, without psychotic features ([address] ) - Primary     Patient is currently experiencing severe symptoms of depression at this time  She was started on Zoloft 25 mg daily  She will take 1 pill daily for the 1st week and then increase to 2 tablets daily  She was encouraged to follow up in about 2 weeks to make sure that she is doing okay on this medication  She was encouraged to call with any problems or concerns  She contracts for safety  She has a suicide prevention hotline number though in case that is needed  We discussed the possibility of her having bipolar disorder as there have been times in the past which she has felt high energy and impulsively spends money  She does have a family history of bipolar disorder in her mother  She was encouraged to contact me if she felt that she was experiencing any of these symptoms after starting the Zoloft  If she does, we should consider starting a mood stabilizer  She is currently breast feeding, but states that she is trying to wean at this time  Of note, we also discussed the intensive day program called [employer]  She does not feel that this is currently an option for her since she does not consistent  for her children  Relevant Medications    sertraline (ZOLOFT) 25 mg tablet    Other Relevant Orders    Ambulatory referral to Psychiatry    Left wrist pain     Patient reports having left pain status post trauma when she was having an episode of extreme anger and believes that she had the wall with her forearm    She does have point tenderness over her dorsal left wrist   She was sent for an x-ray of the wrist to assess for possible fracture in the area of point tenderness  Relevant Orders    XR wrist 3+ vw left          Severe episode of recurrent major depressive disorder, without psychotic features [facility])  Patient is currently experiencing severe symptoms of depression at this time  She was started on Zoloft 25 mg daily  She will take 1 pill daily for the 1st week and then increase to 2 tablets daily  She was encouraged to follow up in about 2 weeks to make sure that she is doing okay on this medication  She was encouraged to call with any problems or concerns  She contracts for safety  She has a suicide prevention hotline number though in case that is needed  We discussed the possibility of her having bipolar disorder as there have been times in the past which she has felt high energy and impulsively spends money  She does have a family history of bipolar disorder in her mother  She was encouraged to contact me if she felt that she was experiencing any of these symptoms after starting the Zoloft  If she does, we should consider starting a mood stabilizer  She is currently breast feeding, but states that she is trying to wean at this time  Of note, we also discussed the intensive day program called [employer]  She does not feel that this is currently an option for her since she does not consistent  for her children  Left wrist pain  Patient reports having left pain status post trauma when she was having an episode of extreme anger and believes that she had the wall with her forearm  She does have point tenderness over her dorsal left wrist   She was sent for an x-ray of the wrist to assess for possible fracture in the area of point tenderness              Chief Complaint     Chief Complaint   Patient presents with    Wrist Pain     left wrist     Depression       History of Present Illness   Tiny Cos is a 29y o -year-old female who presents for depression and wrist pain  Notes that she has pain in the left wrist - notes that she had a possible trauma and it was swollen and bruised - began a month ago - tnigling is in the 4th and 5th fingers - has chronic pain in central left wrist - is right-handed - no treatment tried    The patient also notes that she has been having a lot of trouble with her depression lately  She is feeling very down and having a lot trouble with her anger  She feels that she is taking out on her children with correcting them with more screaming then she feels is appropriate  She relates that she usually is someone to talk to them rather than punished them when they are doing something needs addressing, but she finds herself having the site in a corner and yelling at them  She denies any physical harm being cause to the children  She does relate having 2 episodes where she had extreme anger for about an hour while her boyfriend was home  She was yelling and throwing things without caring who was around her  She states that she does have dreams at night that involve causing harm to her kids as well as arms herself  She states that she does these thoughts of hurting her children well she was awake  She does have thoughts that times of harming herself while she is awake by means of cutting  She has done this in the past but has not done it recently  She denies any thoughts of killing herself or anyone else  She feels that in the past when she was on Zoloft, she did much better  She states that when she feels better though she stops the medication  Her mother does have bipolar disorder and she states that sometimes she does feel high energy and can spend money that she shouldn't be spending  She is not feeling as why currently and has not been previously diagnosed with bipolar disorder herself    She reports that she is under a lot of stress right now due to financial problems, stress with not having consistent  for her children, and difficulty with her boyfriend having mental health problems as well  She states that he is not physically abusive but does say things that are mean  Review of Systems   Review of Systems   Musculoskeletal: Positive for arthralgias ( as noted in HPI)  Negative for joint swelling ( resolved)  Skin: Negative for color change ( resolved)  Psychiatric/Behavioral: Positive for behavioral problems ( anger explosions as noted in HPI), dysphoric mood and sleep disturbance (Due to her dreams as noted in HPI)  Negative for self-injury and suicidal ideas  The patient is nervous/anxious  Active Problem List     Patient Active Problem List   Diagnosis    Severe episode of recurrent major depressive disorder, without psychotic features (HCC)    Eczema    Obesity (BMI 30-39  9)    Tachycardia    Manoj-Parkinson-White (WPW) syndrome    Abnormal Papanicolaou smear of cervix with positive human papilloma virus (HPV) test    Pap smear abnormality of cervix with LGSIL    Postpartum depression    Left wrist pain         Social History:  Social History     Socioeconomic History    Marital status: Single     Spouse name: Not on file    Number of children: Not on file    Years of education: Not on file    Highest education level: Not on file   Occupational History    Not on file   Social Needs    Financial resource strain: Not on file    Food insecurity:     Worry: Not on file     Inability: Not on file    Transportation needs:     Medical: Not on file     Non-medical: Not on file   Tobacco Use    Smoking status: Never Smoker    Smokeless tobacco: Never Used   Substance and Sexual Activity    Alcohol use: Yes     Comment: social- per allscripts   Pt might drink every 2 months    Drug use: No    Sexual activity: Yes     Birth control/protection: Condom Male   Lifestyle    Physical activity:     Days per week: Not on file     Minutes per session: Not on file    Stress: Not on file Relationships    Social connections:     Talks on phone: Not on file     Gets together: Not on file     Attends Judaism service: Not on file     Active member of club or organization: Not on file     Attends meetings of clubs or organizations: Not on file     Relationship status: Not on file    Intimate partner violence:     Fear of current or ex partner: Not on file     Emotionally abused: Not on file     Physically abused: Not on file     Forced sexual activity: Not on file   Other Topics Concern    Not on file   Social History Narrative    Family planning    IUD contraception       Objective     Vitals:    07/12/19 1605   BP: 110/60   BP Location: Left arm   Patient Position: Sitting   Cuff Size: Large   Pulse: 88   SpO2: 98%   Weight: 89 1 kg (196 lb 6 oz)   Height: 5' 2" (1 575 m)     Wt Readings from Last 3 Encounters:   07/12/19 89 1 kg (196 lb 6 oz)   04/18/19 89 3 kg (196 lb 12 8 oz)   03/28/19 88 1 kg (194 lb 4 oz)       Physical Exam   Constitutional: She appears well-developed and well-nourished  No distress  Cardiovascular: Normal rate, regular rhythm, normal heart sounds and intact distal pulses  No murmur heard  Pulmonary/Chest: Effort normal and breath sounds normal  She has no wheezes  She has no rales  Musculoskeletal: She exhibits tenderness (over the dorsal left wrist without any erythema or swelling note)  FROM in the wrists bilaterally   Skin: Skin is warm and dry  No erythema  Psychiatric:   Depressed and tearful   Vitals reviewed              Orders Placed This Encounter   Procedures    XR wrist 3+ vw left    Ambulatory referral to Psychiatry       ALLERGIES:  Allergies   Allergen Reactions    Codeine Other (See Comments)     dry heaves    Sulfa Antibiotics Hives and Rash     Per pt as achild    Erythromycin Hives     Per as a child       Current Medications     Current Outpatient Medications   Medication Sig Dispense Refill    ibuprofen (MOTRIN) 600 mg tablet Take 1 p o  Q 6 hours p r n  Pain  30 tablet 1    sertraline (ZOLOFT) 25 mg tablet Take 1 tablet daily x 1 week and then increase to 2 tabs daily  60 tablet 0     No current facility-administered medications for this visit            Leopoldo Mocha, PA-C  7/12/2019 6:22 PM  [facility]

## 2019-07-12 NOTE — ASSESSMENT & PLAN NOTE
Patient reports having left pain status post trauma when she was having an episode of extreme anger and believes that she had the wall with her forearm  She does have point tenderness over her dorsal left wrist   She was sent for an x-ray of the wrist to assess for possible fracture in the area of point tenderness

## 2019-07-13 ENCOUNTER — HOSPITAL ENCOUNTER (OUTPATIENT)
Dept: RADIOLOGY | Facility: HOSPITAL | Age: 34
Discharge: HOME | End: 2019-07-13
Payer: COMMERCIAL

## 2019-07-13 DIAGNOSIS — M25.532 LEFT WRIST PAIN: ICD-10-CM

## 2019-07-13 PROCEDURE — 73110 X-RAY EXAM OF WRIST: CPT

## 2019-07-15 ENCOUNTER — TELEPHONE (OUTPATIENT)
Dept: FAMILY MEDICINE CLINIC | Facility: CLINIC | Age: 34
End: 2019-07-15

## 2019-07-15 DIAGNOSIS — F33.2 SEVERE EPISODE OF RECURRENT MAJOR DEPRESSIVE DISORDER, WITHOUT PSYCHOTIC FEATURES (HCC): Primary | ICD-10-CM

## 2019-07-15 NOTE — TELEPHONE ENCOUNTER
Pt's ins will only pay for 1 1/2 tabs of sertraline daily  Will need a new script for increased dosing of 50mg tabs , 1 QD

## 2019-07-18 ENCOUNTER — TELEPHONE (OUTPATIENT)
Dept: FAMILY MEDICINE CLINIC | Facility: CLINIC | Age: 34
End: 2019-07-18

## 2019-07-18 DIAGNOSIS — M25.532 LEFT WRIST PAIN: Primary | ICD-10-CM

## 2019-07-18 NOTE — TELEPHONE ENCOUNTER
----- Message from Abdirizak Garcia MD sent at 7/18/2019  4:50 PM EDT -----  Call patient regarding test  No fracture - consider ortho eval if worse

## 2019-07-24 ENCOUNTER — TELEPHONE (OUTPATIENT)
Dept: BEHAVIORAL/MENTAL HEALTH CLINIC | Facility: CLINIC | Age: 34
End: 2019-07-24

## 2019-07-24 NOTE — TELEPHONE ENCOUNTER
Behavorial Health Outpatient Intake Questions    Referred by: Julieta Gomez    Please advised interviewee that they need to answer all questions truthfully to allow for best care and any misrepresentations of information may affect their ability to be seen at this clinic   => Was this discussed? Yes     Behavorial Health Outpatient Intake History -     Presenting Problem (in patient's words): FAMILY HISTORY OF BIPOLAR AND NOT SURE SHE IS HAVING SYMPTOMS OF THIS  BAD DREAMS, BAD THOUGHTS ABOUT HERSELF, MOOD SWINGS, ANGER ISSUES  Has the patient ever seen or is currently seeing a psychiatrist? NO  If yes who/when? If seen as outpatient, have they been seen here (and by whom)? If not seen here, which provider(s) did the patient see and for how long? Has the patient ever seen or currently see a therapist? Yes If yes who/when? 5 YEARS AGO    Has a member of the patient's family been in therapy here? No  If yes, with whom? Has the patient been hospitalized for mental health? No   If yes, how long ago was last hospitalization and where was it? Substance Abuse:No concerns of substance abuse are reported  Does the patient have ICM or CTT? No    Is the patient taking injectable psychiatric medications? No    => If yes, patient cannot be seen here  Communications  Are there any developmental disabilities? No    Does the patient have hearing impairment? No       History-    Has the patient served in the [place] [address]? No    If yes, have you had combat services? No    Was the patient activated into federal active duty as a member of the [employer] or reserve? No    Legal History-     Does the patient have any history of arrests, MCC/FDC time, or DUIs? No  If Yes-  1) What types of charges? 2) When were they last incarcerated? 3) Are they currently on parole or probation? Minor Child-    Who has custody of the child? Is there a custody agreement?      If there is a custody agreement remind parent that they must bring a copy to the first appt or they will not be seen  Intake Team, please check with provider before scheduling if flags come up such as:  - complex case  - legal history (other than DUI)  - communication barrier concerns are present  - if, in your judgment, this needs further review    ACCEPTED as a patient Yes  => Appointment Date: 10/03/2019 W/ SUSANA STEPHEN    Referred Elsewhere?  No     INS: [facility]  ID #: [id number]

## 2019-07-26 ENCOUNTER — OFFICE VISIT (OUTPATIENT)
Dept: FAMILY MEDICINE CLINIC | Facility: CLINIC | Age: 34
End: 2019-07-26
Payer: COMMERCIAL

## 2019-07-26 VITALS
OXYGEN SATURATION: 97 % | HEART RATE: 104 BPM | WEIGHT: 196.5 LBS | BODY MASS INDEX: 36.16 KG/M2 | HEIGHT: 62 IN | DIASTOLIC BLOOD PRESSURE: 82 MMHG | SYSTOLIC BLOOD PRESSURE: 110 MMHG

## 2019-07-26 DIAGNOSIS — F33.2 SEVERE EPISODE OF RECURRENT MAJOR DEPRESSIVE DISORDER, WITHOUT PSYCHOTIC FEATURES (HCC): Primary | ICD-10-CM

## 2019-07-26 DIAGNOSIS — M25.532 LEFT WRIST PAIN: ICD-10-CM

## 2019-07-26 PROCEDURE — 99213 OFFICE O/P EST LOW 20 MIN: CPT | Performed by: PHYSICIAN ASSISTANT

## 2019-07-26 PROCEDURE — 3725F SCREEN DEPRESSION PERFORMED: CPT | Performed by: PHYSICIAN ASSISTANT

## 2019-07-26 NOTE — ASSESSMENT & PLAN NOTE
Stable - not yet significantly improved  She will continue with Zoloft 50 mg daily and return in another 4 weeks for recheck  She was encouraged to call with any worsening or new symptoms

## 2019-07-26 NOTE — PROGRESS NOTES
[place] [facility]  FAMILY PRACTICE OFFICE VISIT       NAME: Fabiola Dominique  AGE: 29 y o  SEX: female       : 1985        MRN: [id number]    DATE: 2019  TIME: 5:49 PM    Assessment and Plan     Problem List Items Addressed This Visit        Other    Severe episode of recurrent major depressive disorder, without psychotic features ([facility] [address] ) - Primary     Stable - not yet significantly improved  She will continue with Zoloft 50 mg daily and return in another 4 weeks for recheck  She was encouraged to call with any worsening or new symptoms  Left wrist pain     Patient has persistent pain on the left wrist   Reviewed that her x-ray did not show any fracture  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   She was encouraged to follow up with Orthopedics as already scheduled if the pain persists over the next 2 weeks  She should call if it worsens in the interim  Left wrist pain  Patient has persistent pain on the left wrist   Reviewed that her x-ray did not show any fracture  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   She was encouraged to follow up with Orthopedics as already scheduled if the pain persists over the next 2 weeks  She should call if it worsens in the interim  Severe episode of recurrent major depressive disorder, without psychotic features ([facility] [address] )  Stable - not yet significantly improved  She will continue with Zoloft 50 mg daily and return in another 4 weeks for recheck  She was encouraged to call with any worsening or new symptoms  Chief Complaint     Chief Complaint   Patient presents with    Follow-up     depression        History of Present Illness   Fabiola Dominique is a 29y o -year-old female who presents for 2 week follow-up on depresssion       The patient reports that recently anxiety/depression level has been stable and poorly controlled  Daily medication includes Zoloft 50 mg daily (just increased to this dose 3 days ago)  The patient denies SI/HI  Today's PHQ9 score was 16  She denies any manic symptoms since last visit  She has not had thoughts of hurting self and confirms only one dream of harming her kids  She denies any further anger outbursts with throwing things  She has made appointments with therapist and psychiatrist through [place] [id number] but not able to be seen until November - about 3 5 months from now  She had concern of a left wrist fracture at her last visit  The x-ray ordered did not show a fracture but noted lunotriquetral carpal coalition  Her wrist pain has persisted  She has an Ortho appointment in a few weeks  Review of Systems   Review of Systems   Musculoskeletal: Positive for arthralgias (as in HPI)  Psychiatric/Behavioral: Positive for dysphoric mood  Negative for self-injury and suicidal ideas  Active Problem List     Patient Active Problem List   Diagnosis    Severe episode of recurrent major depressive disorder, without psychotic features (HCC)    Eczema    Obesity (BMI 30-39  9)    Tachycardia    Manoj-Parkinson-White (WPW) syndrome    Abnormal Papanicolaou smear of cervix with positive human papilloma virus (HPV) test    Pap smear abnormality of cervix with LGSIL    Postpartum depression    Left wrist pain         Past Medical History:  Past Medical History:   Diagnosis Date    Abnormal Pap smear of cervix     Exposure to chlamydia     Exposure to chlamydia     Resolved 17    Postpartum depression 2018    Sore throat     Resolved 17    Manoj-Parkinson-White (WPW) syndrome     Yeast infection     Resolved 17       Past Surgical History:  Past Surgical History:   Procedure Laterality Date    COLONOSCOPY      COLPOSCOPY      INDUCED       OTHER SURGICAL HISTORY      catheter ablation- WPW age 13 Family History:  Family History   Problem Relation Age of Onset    Bipolar disorder Mother     Hypertension Mother     Hyperlipidemia Mother    Jose Armando Fleet deficiency Father     Hyperlipidemia Father     Hypertension Father     No Known Problems Sister     No Known Problems Brother     Breast cancer Maternal Grandmother         dx approx age early 42's   Slater No Known Problems Maternal Grandfather     Crohn's disease Paternal Grandmother     Prostate cancer Paternal Grandfather     Colon cancer Paternal Aunt        Social History:  Social History     Socioeconomic History    Marital status: Single     Spouse name: Not on file    Number of children: Not on file    Years of education: Not on file    Highest education level: Not on file   Occupational History    Not on file   Social Needs    Financial resource strain: Not on file    Food insecurity:     Worry: Not on file     Inability: Not on file    Transportation needs:     Medical: Not on file     Non-medical: Not on file   Tobacco Use    Smoking status: Never Smoker    Smokeless tobacco: Never Used   Substance and Sexual Activity    Alcohol use: Yes     Comment: social- per allscripts   Pt might drink every 2 months    Drug use: No    Sexual activity: Yes     Birth control/protection: Condom Male   Lifestyle    Physical activity:     Days per week: Not on file     Minutes per session: Not on file    Stress: Not on file   Relationships    Social connections:     Talks on phone: Not on file     Gets together: Not on file     Attends [employer] service: Not on file     Active member of club or organization: Not on file     Attends meetings of clubs or organizations: Not on file     Relationship status: Not on file    Intimate partner violence:     Fear of current or ex partner: Not on file     Emotionally abused: Not on file     Physically abused: Not on file     Forced sexual activity: Not on file   Other Topics Concern    Not on file   Social History Narrative    Family planning    IUD contraception       Objective     Vitals:    07/26/19 1532   BP: 110/82   BP Location: Left arm   Patient Position: Sitting   Cuff Size: Standard   Pulse: 104   SpO2: 97%   Weight: 89 1 kg (196 lb 8 oz)   Height: 5' 2" (1 575 m)     Wt Readings from Last 3 Encounters:   07/26/19 89 1 kg (196 lb 8 oz)   07/12/19 89 1 kg (196 lb 6 oz)   04/18/19 89 3 kg (196 lb 12 8 oz)       Physical Exam   Constitutional: She appears well-developed and well-nourished  No distress  Neck: Neck supple  No thyromegaly present  Cardiovascular: Normal rate, regular rhythm, normal heart sounds and intact distal pulses  No murmur heard  Pulmonary/Chest: Effort normal and breath sounds normal  She has no wheezes  She has no rales  Musculoskeletal: She exhibits no edema  Lymphadenopathy:     She has no cervical adenopathy  Skin: Skin is warm and dry  Psychiatric:   Dysphoric    Vitals reviewed        Pertinent Laboratory/Diagnostic Studies:  Lab Results   Component Value Date    GLUCOSE 81 08/11/2014    BUN 12 03/29/2019    CREATININE 0 68 03/29/2019    CALCIUM 8 0 (L) 03/29/2019     08/11/2014    K 3 7 03/29/2019    CO2 27 03/29/2019     03/29/2019     Lab Results   Component Value Date    ALT 25 03/29/2019    AST 14 03/29/2019    ALKPHOS 84 03/29/2019    BILITOT 0 4 08/11/2014       Lab Results   Component Value Date    WBC 7 64 03/29/2019    HGB 13 9 03/29/2019    HCT 43 5 03/29/2019    MCV 89 03/29/2019     03/29/2019     Lab Results   Component Value Date    CHOL 193 08/07/2015     Lab Results   Component Value Date    TRIG 85 07/21/2017     Lab Results   Component Value Date    HDL 36 (L) 07/21/2017     Lab Results   Component Value Date    LDLCALC 130 (H) 07/21/2017     Lab Results   Component Value Date    HGBA1C 5 0 06/23/2017       Results for orders placed or performed in visit on 03/29/19   Mononucleosis screen   Result Value Ref Range Monotest Negative Negative   Comprehensive metabolic panel   Result Value Ref Range    Sodium 138 136 - 145 mmol/L    Potassium 3 7 3 5 - 5 3 mmol/L    Chloride 108 100 - 108 mmol/L    CO2 27 21 - 32 mmol/L    ANION GAP 3 (L) 4 - 13 mmol/L    BUN 12 5 - 25 mg/dL    Creatinine 0 68 0 60 - 1 30 mg/dL    Glucose, Fasting 87 65 - 99 mg/dL    Calcium 8 0 (L) 8 3 - 10 1 mg/dL    AST 14 5 - 45 U/L    ALT 25 12 - 78 U/L    Alkaline Phosphatase 84 46 - 116 U/L    Total Protein 7 3 6 4 - 8 2 g/dL    Albumin 3 6 3 5 - 5 0 g/dL    Total Bilirubin 0 40 0 20 - 1 00 mg/dL    eGFR 116 ml/min/1 73sq m   TSH, 3rd generation with Free T4 reflex   Result Value Ref Range    TSH 3RD GENERATON 1 570 0 358 - 3 740 uIU/mL   EBV acute panel   Result Value Ref Range    EBV Early Antigen Ab, IgG 144 0 (H) 0 0 - 8 9 U/mL    EBV VCA IgG 550 0 (H) 0 0 - 17 9 U/mL    EBV VCA IgM <36 0 0 0 - 35 9 U/mL    EBV Nuclear Ag Ab >600 0 (H) 0 0 - 17 9 U/mL    EBV Interp  Comment          ALLERGIES:  Allergies   Allergen Reactions    Codeine Other (See Comments)     dry heaves    Sulfa Antibiotics Hives and Rash     Per pt as achild    Erythromycin Hives     Per as a child       Current Medications     Current Outpatient Medications   Medication Sig Dispense Refill    ibuprofen (MOTRIN) 600 mg tablet Take 1 p o  Q 6 hours p r n  Pain  30 tablet 1    sertraline (ZOLOFT) 50 mg tablet Take 1/2 tablet daily by mouth x7 days and then increase to 1 tablet daily  90 tablet 1     No current facility-administered medications for this visit            Health Maintenance     Health Maintenance   Topic Date Due    HEPATITIS B VACCINES (4 of 4 - 4-dose series) 12/27/1997    BMI: Followup Plan  09/13/2004    INFLUENZA VACCINE  07/01/2019    PAP SMEAR  08/24/2019    BMI: Adult  07/26/2020    DTaP,Tdap,and Td Vaccines (2 - Td) 04/13/2028    Pneumococcal Vaccine: 65+ Years (1 of 2 - PCV13) 09/13/2051    Pneumococcal Vaccine: Pediatrics (0 to 5 Years) and At-Risk Patients (6 to 59 Years)  Aged [employer] History   Administered Date(s) Administered     Influenza (IM) Preservative Free 10/16/2013    H1N1, All Formulations 11/04/2009    HPV Quadrivalent 01/01/2007, 03/01/2007, 07/18/2010    Hep B, adult 04/15/1997, 10/01/1997, 11/01/1997    Influenza Quadrivalent Preservative Free 3 years and older IM 10/02/2014, 10/08/2015, 10/05/2016, 10/11/2017    Influenza TIV (IM) 12/01/2007, 10/17/2008, 10/19/2009, 10/04/2010, 10/12/2011    Influenza, injectable, quadrivalent, preservative free 0 5 mL 10/30/2018    MMR 07/08/2018    Tdap 04/13/2018    Tuberculin Skin Test-PPD Intradermal 10/12/2011       Yassine Burris PA-C  7/26/2019 5:49 PM  [facility]

## 2019-07-26 NOTE — ASSESSMENT & PLAN NOTE
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

## 2019-08-08 ENCOUNTER — TELEPHONE (OUTPATIENT)
Dept: FAMILY MEDICINE CLINIC | Facility: CLINIC | Age: 34
End: 2019-08-08

## 2019-08-08 NOTE — TELEPHONE ENCOUNTER
Message reviewed with patient, she is using  Cool compresses ,   Rash in her Arms hands and stomach

## 2019-08-08 NOTE — TELEPHONE ENCOUNTER
Pt called today, c/o itchy and rash skin, Pt was expose to the sun since Tuesday (vacation ), pt would like to know if she can take benadryl

## 2019-08-08 NOTE — TELEPHONE ENCOUNTER
It is acceptable to use short-term but it is also recommended to consider an alternative  It could make the baby drowsy and could potentially decrease milk production  The same warnings apply to any of the antihistamines like Claritin, Zyrtec and Allegra  How much area is affected by the rash? Has she try cool compresses?

## 2019-08-14 ENCOUNTER — CONSULT (OUTPATIENT)
Dept: OBGYN CLINIC | Facility: HOSPITAL | Age: 34
End: 2019-08-14
Payer: COMMERCIAL

## 2019-08-14 ENCOUNTER — OFFICE VISIT (OUTPATIENT)
Dept: FAMILY MEDICINE CLINIC | Facility: CLINIC | Age: 34
End: 2019-08-14
Payer: COMMERCIAL

## 2019-08-14 VITALS
TEMPERATURE: 98.6 F | HEART RATE: 60 BPM | DIASTOLIC BLOOD PRESSURE: 76 MMHG | WEIGHT: 198.6 LBS | OXYGEN SATURATION: 97 % | HEIGHT: 62 IN | RESPIRATION RATE: 18 BRPM | BODY MASS INDEX: 36.55 KG/M2 | SYSTOLIC BLOOD PRESSURE: 110 MMHG

## 2019-08-14 VITALS
HEART RATE: 89 BPM | BODY MASS INDEX: 36.33 KG/M2 | DIASTOLIC BLOOD PRESSURE: 70 MMHG | SYSTOLIC BLOOD PRESSURE: 104 MMHG | WEIGHT: 197.4 LBS | HEIGHT: 62 IN

## 2019-08-14 DIAGNOSIS — M25.532 LEFT WRIST PAIN: ICD-10-CM

## 2019-08-14 DIAGNOSIS — L30.1 DYSHIDROTIC ECZEMA: Primary | ICD-10-CM

## 2019-08-14 DIAGNOSIS — M77.8 LEFT WRIST TENDONITIS: Primary | ICD-10-CM

## 2019-08-14 PROCEDURE — 1036F TOBACCO NON-USER: CPT | Performed by: INTERNAL MEDICINE

## 2019-08-14 PROCEDURE — 99213 OFFICE O/P EST LOW 20 MIN: CPT | Performed by: INTERNAL MEDICINE

## 2019-08-14 PROCEDURE — 99203 OFFICE O/P NEW LOW 30 MIN: CPT | Performed by: ORTHOPAEDIC SURGERY

## 2019-08-14 PROCEDURE — 3008F BODY MASS INDEX DOCD: CPT | Performed by: INTERNAL MEDICINE

## 2019-08-14 RX ORDER — SERTRALINE HYDROCHLORIDE 25 MG/1
TABLET, FILM COATED ORAL
COMMUNITY
Start: 2019-08-08 | End: 2019-08-14 | Stop reason: SDUPTHER

## 2019-08-14 NOTE — PROGRESS NOTES
Assessment/Plan:       Diagnoses and all orders for this visit:    Dyshidrotic eczema  -     triamcinolone (KENALOG) 0 1 % ointment; Apply topically 2 (two) times a day     I would like her to try the above for the rash  In terms of the rash of the arm, I am going to have her try Zyrtec during the day as well and see if this helps  Otherwise no other changes were made  Subjective:      Patient ID: Belia Lemus is a 29 y o  female  Chandler Grandchild is here today for evaluation of a rash  She reports that she has had this for some time  She has a particular rash of the right hand and reports trying OTC topical steroids without much relief  She also reports noticing a rash of the arms that seems to be present more in the summer months  These are also itchy in nature  She denies fevers, Chills, or other systemic complaints  The following portions of the patient's history were reviewed and updated as appropriate: allergies, current medications, past family history, past medical history, past social history, past surgical history and problem list     Review of Systems   Constitutional: Negative for chills, fatigue and fever  Respiratory: Negative for cough, shortness of breath and wheezing  Cardiovascular: Negative for chest pain and palpitations  Skin: Positive for rash  Objective:      /76   Pulse 60   Temp 98 6 °F (37 °C)   Resp 18   Ht 5' 2" (1 575 m)   Wt 90 1 kg (198 lb 9 6 oz)   SpO2 97%   BMI 36 32 kg/m²          Physical Exam   Constitutional: She is oriented to person, place, and time  She appears well-developed and well-nourished  No distress  HENT:   Head: Normocephalic and atraumatic  Eyes: Conjunctivae and EOM are normal    Cardiovascular: Normal rate, regular rhythm and normal heart sounds  Pulmonary/Chest: Effort normal and breath sounds normal    Neurological: She is alert and oriented to person, place, and time  Skin: Skin is warm and dry   She is not diaphoretic     Rash consistent with dyshidrotic eczema noted on the right hand  Rash noted on the heel of the foot as well  Papular lesions noted on the upper arm

## 2019-08-14 NOTE — PROGRESS NOTES
ASSESSMENT/PLAN:    Assessment:   L ECU tendonitis    Plan:   Watch and wait vs steroid injection  Patient defers injection today  Wrist brace to use as needed provided today  Explained pain may last up to 6 months  Follow Up:  PRN      _____________________________________________________  CHIEF COMPLAINT:  Chief Complaint   Patient presents with    Left Wrist - Follow-up         SUBJECTIVE:  Christoph Liu is a 29 y o  female who presents with left wrist pain  Pain is located on the ulnar wrist  No clicking/popping  She states this started approximately 3 month's ago after hitting her forearm accidentally on a wall  She also describes some tingling in the small finger at times       PAST MEDICAL HISTORY:  Past Medical History:   Diagnosis Date    Abnormal Pap smear of cervix     Exposure to chlamydia     Exposure to chlamydia     Resolved 17    Postpartum depression 2018    Sore throat     Resolved 17    Manoj-Parkinson-White (WPW) syndrome     Yeast infection     Resolved 17       PAST SURGICAL HISTORY:  Past Surgical History:   Procedure Laterality Date    COLONOSCOPY      COLPOSCOPY      INDUCED       OTHER SURGICAL HISTORY      catheter ablation- WPW age 13       FAMILY HISTORY:  Family History   Problem Relation Age of Onset    Bipolar disorder Mother     Hypertension Mother     Hyperlipidemia Mother    Slater Glucose-6-phos deficiency Father     Hyperlipidemia Father     Hypertension Father     No Known Problems Sister     No Known Problems Brother     Breast cancer Maternal Grandmother         dx approx age early 42's   Slater No Known Problems Maternal Grandfather     Crohn's disease Paternal Grandmother     Prostate cancer Paternal Grandfather     Colon cancer Paternal Aunt        SOCIAL HISTORY:  Social History     Tobacco Use    Smoking status: Never Smoker    Smokeless tobacco: Never Used   Substance Use Topics    Alcohol use: Yes     Comment: social- per allscripts  Pt might drink every 2 months    Drug use: No       MEDICATIONS:    Current Outpatient Medications:     ibuprofen (MOTRIN) 600 mg tablet, Take 1 p o  Q 6 hours p r n  Pain , Disp: 30 tablet, Rfl: 1    sertraline (ZOLOFT) 50 mg tablet, Take 1/2 tablet daily by mouth x7 days and then increase to 1 tablet daily  , Disp: 90 tablet, Rfl: 1    ALLERGIES:  Allergies   Allergen Reactions    Codeine Other (See Comments)     dry heaves    Sulfa Antibiotics Hives and Rash     Per pt as achild    Erythromycin Hives     Per as a child       REVIEW OF SYSTEMS:  Pertinent items are noted in HPI  A comprehensive review of systems was negative  LABS:  HgA1c:   Lab Results   Component Value Date    HGBA1C 5 0 06/23/2017     BMP:   Lab Results   Component Value Date    GLUCOSE 81 08/11/2014    CALCIUM 8 0 (L) 03/29/2019     08/11/2014    K 3 7 03/29/2019    CO2 27 03/29/2019     03/29/2019    BUN 12 03/29/2019    CREATININE 0 68 03/29/2019         _____________________________________________________  PHYSICAL EXAMINATION:  Vital signs: /70   Pulse 89   Ht 5' 2" (1 575 m)   Wt 89 5 kg (197 lb 6 4 oz)   BMI 36 10 kg/m²   General: well developed and well nourished, alert, oriented times 3 and appears comfortable  Psychiatric: Normal  HEENT: Trachea Midline, No torticollis  Cardiovascular: No discernable arrhythmia  Pulmonary: No wheezing or stridor  Skin: No masses, erthema, lacerations, fluctation, ulcerations  Neurovascular: Sensation Intact to the Median, Ulnar, Radial Nerve, Motor Intact to the Median, Ulnar, Radial Nerve and Pulses Intact    MUSCULOSKELETAL EXAMINATION:  LEFT SIDE:  Pt with ttp along ECU tendon  No ttp along TFCC, DRUJ, minimal prestyloid recess pain  No crepitation with flexion/extension/radial/ulnar deviation  Click over scapholunate with TFCC circumduction   + synergy test     _____________________________________________________  STUDIES REVIEWED:  Images were reviewd in PACS: 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

## 2019-08-20 ENCOUNTER — TELEPHONE (OUTPATIENT)
Dept: POSTPARTUM | Facility: CLINIC | Age: 34
End: 2019-08-20

## 2019-08-20 NOTE — TELEPHONE ENCOUNTER
Libertad's almost 16 month old is currently nursing 2-3 times during the day and "lives at the breast" all night  They are co-sleeping currently  Magdy Vincent is wanting to night wean but is unsure of how to proceed  She would also like to transition her child to her crib to sleep at night  We discussed transitioning baby to her crib first and then weaning when she is sleeping consistently there vs  Weaning first while continuing to co-sleep which may be challenging  We talked about continuing to meet her child's needs for reassurance by offering comfort measures other than nursing  I encouraged Magdy Vincent to be mindful of her needs as well and to express milk as needed to prevent engorgement during the weaning process (only if needed)  Magdy Barthrupal verbalized understanding and will call back as needed

## 2019-08-26 DIAGNOSIS — G44.209 TENSION HEADACHE: ICD-10-CM

## 2019-08-26 DIAGNOSIS — M26.623 BILATERAL TEMPOROMANDIBULAR JOINT PAIN: ICD-10-CM

## 2019-08-26 RX ORDER — IBUPROFEN 600 MG/1
TABLET ORAL
Qty: 30 TABLET | Refills: 0 | Status: SHIPPED | OUTPATIENT
Start: 2019-08-26 | End: 2020-01-22 | Stop reason: SDUPTHER
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
## 2019-09-09 ENCOUNTER — OFFICE VISIT (OUTPATIENT)
Dept: FAMILY MEDICINE CLINIC | Facility: CLINIC | Age: 34
End: 2019-09-09
Payer: COMMERCIAL

## 2019-09-09 ENCOUNTER — TELEPHONE (OUTPATIENT)
Dept: FAMILY MEDICINE CLINIC | Facility: CLINIC | Age: 34
End: 2019-09-09

## 2019-09-09 VITALS
OXYGEN SATURATION: 98 % | HEART RATE: 88 BPM | WEIGHT: 197.38 LBS | DIASTOLIC BLOOD PRESSURE: 70 MMHG | BODY MASS INDEX: 36.32 KG/M2 | HEIGHT: 62 IN | SYSTOLIC BLOOD PRESSURE: 110 MMHG

## 2019-09-09 DIAGNOSIS — F33.2 SEVERE EPISODE OF RECURRENT MAJOR DEPRESSIVE DISORDER, WITHOUT PSYCHOTIC FEATURES (HCC): Primary | ICD-10-CM

## 2019-09-09 DIAGNOSIS — Z23 FLU VACCINE NEED: ICD-10-CM

## 2019-09-09 PROCEDURE — 90471 IMMUNIZATION ADMIN: CPT | Performed by: PHYSICIAN ASSISTANT

## 2019-09-09 PROCEDURE — 99213 OFFICE O/P EST LOW 20 MIN: CPT | Performed by: PHYSICIAN ASSISTANT

## 2019-09-09 PROCEDURE — 90686 IIV4 VACC NO PRSV 0.5 ML IM: CPT | Performed by: PHYSICIAN ASSISTANT

## 2019-09-09 RX ORDER — SERTRALINE HYDROCHLORIDE 100 MG/1
100 TABLET, FILM COATED ORAL DAILY
Qty: 30 TABLET | Refills: 5 | Status: SHIPPED | OUTPATIENT
Start: 2019-09-09 | End: 2020-02-25 | Stop reason: SDUPTHER

## 2019-09-09 NOTE — PATIENT INSTRUCTIONS
Severe episode of recurrent major depressive disorder, without psychotic features ([facility] [address] )  Patient has slightly improved since her last visit with a decrease in PHQ-9 score from 15 to 12 today  She was encouraged to continue with her restarted Zoloft 50 mg daily and increase to 100 mg daily in 2 weeks if needed  Will try to get her appointments with therapist and psychiatrist moved up due to my concern for her continued severity of symptoms and recent substance combining episode  She states that she is currently safe and does not want to harm herself currently  She was provided the suicide prevention hotline number  She was encouraged to follow-up here in 1 month and directed to call with any problems or concerns prior to her next visit  She was encouraged to wean from breastfeeding since it appears that adjuvant medication would like be beneficial  We discussed working on coping mechanisms and creating a list so that she has a plan in place for when she becomes agitated/irritated  She was encouraged to involve her boyfriend in this plan as well so that he can provide her assistance when needed  Depression   WHAT YOU NEED TO KNOW:   Depression is a medical condition that causes feelings of sadness or hopelessness that do not go away  Depression may cause you to lose interest in things you used to enjoy  These feelings may interfere with your daily life  DISCHARGE INSTRUCTIONS:   Call 911 for any of the following:   · You think about harming yourself or someone else  Contact your healthcare provider if:   · Your symptoms do not improve  · You cannot make it to your next appointment  · You have new symptoms  · You have questions or concerns about your condition or care  Medicines:   · Antidepressants  may be given to improve or balance your mood  You may need to take this medicine for several weeks before you begin to feel better  · Take your medicine as directed    Contact your healthcare provider if you think your medicine is not helping or if you have side effects  Tell him of her if you are allergic to any medicine  Keep a list of the medicines, vitamins, and herbs you take  Include the amounts, and when and why you take them  Bring the list or the pill bottles to follow-up visits  Carry your medicine list with you in case of an emergency  Therapy  may be used to treat your depression  A therapist will help you learn to cope with your thoughts and feelings  This can be done alone or in a group  It may also be done with family members or a significant other  Self-care:   · Get regular physical activity  Try to exercise for 30 minutes, 3 to 5 days a week  Work with your healthcare provider to develop an exercise plan that you enjoy  Physical activity may improve your symptoms  · Get enough sleep  Create a routine to help you relax before bed  You can listen to music, read, or do yoga  Try to go to bed and wake up at the same time every day  Sleep is important for emotional health  · Eat a variety of healthy foods from all of the food groups  A healthy meal plan is low in fat, salt, and added sugar  Ask your healthcare provider for more information about a meal plan that is right for you  · Do not drink alcohol or use drugs  Alcohol and drugs can make your symptoms worse  Follow up with your healthcare provider as directed: Your healthcare provider will monitor your progress at follow-up visits  He or she will also monitor your medicine if you take antidepressants  Your healthcare provider will ask if the medicine is helping  Tell him or her about any side effects or problems you may have with your medicine  The type or amount of medicine may need to be changed  Write down your questions so you remember to ask them during your visits    © 2017 [address] [employer] Information is for End User's use only and may not be sold, redistributed or otherwise used for commercial purposes  All illustrations and images included in CareNotes® are the copyrighted property of [employer]  or Lion Alvarez  The above information is an  only  It is not intended as medical advice for individual conditions or treatments  Talk to your doctor, nurse or pharmacist before following any medical regimen to see if it is safe and effective for you  Suicide Prevention for Adults   WHAT YOU NEED TO KNOW:   A person may see suicide as the only way to escape emotional or physical pain and suffering  You can help provide emotional support for him or her and get the help he or she needs  Learn to recognize warning signs that the person may be considering suicide  Find resources to help prevent him or her from attempting to take his or her life  DISCHARGE INSTRUCTIONS:   Call 911 if:   · The person has done something on purpose to hurt himself or herself  · The person tries to commit suicide  Return to the emergency department if:   · The person tells you he or she made a plan to commit suicide  · The person acts out in anger, is reckless, or is abusing alcohol or drugs  · The person has serious thoughts of suicide, even with treatment  Contact the person's healthcare provider or therapist if:   · You begin to see warning signs that the person may be considering suicide  · The person has intense feelings of sadness, anger, revenge, or despair, or he cannot make decisions easily  · The person tells you he or she has more thoughts of suicide when they are alone  · The person withdraws from others  · The person stops eating, or begins to smoke or drink heavily  · The person feels he or she is a burden because of a disability or disease  · The person has trouble dealing with stress, such as a breakup or a job loss  · You have questions or concerns about the person's condition or care    What to do if the person is having thoughts of suicide:  Call 911 if you feel the person is at immediate risk of suicide, or if he or she talks about an active suicide plan  Assume that the person intends to carry out his or her plan  Resources are available to help you and the person  The following are some things you can do:  · Call the [address] [place] [address] at 9-134-282-TALK (2260)   · Call the Suicide Hotline at 8-167-VFZQJZY (9-600.181.4792)   · Contact the person's therapist  His or her healthcare provider can give you a list of therapists if he or she does not have one  · Keep medicines, weapons, and alcohol out of the person's reach  Do not leave the person alone if he or she says they want to commit suicide  Do not leave the person alone if you think he or she may try it  Make sure you do not put yourself at risk if the person has a weapon  · Do not be afraid to ask if the person is thinking of ending his or her life  Ask if the person has a plan for hurting or killing himself or herself  Warning signs to watch for:   · Talking about a plan for committing suicide, or suddenly deciding to make a will    · Cutting himself or herself, burning the skin with cigarettes, or driving recklessly    · Drug or alcohol use, not taking prescribed medicine, or taking too much prescribed medicine    · Sudden anger, lashing out at others, or seeming hopeless, anxious, or angry and then suddenly becoming happy or peaceful    · Not wanting to spend time with others or doing things he or she usually enjoys    · Trouble at work, or not showing up for work    · A change in the way he or she eats, sleeps, or dresses    · Weight gain or loss or having less energy than usual    · Trouble sleeping or spending a lot of time sleeping    · Giving away or throwing away his or her belongings  Follow up with the person's healthcare provider and therapist as directed:  Write down your questions so you remember to ask them during your visits    Treatments the person may need: · Medicines  may be given to prevent mood swings, or to decrease anxiety or depression  The person will need to take all medicines as directed  A sudden stop can be harmful  It may take 4 to 6 weeks for the medicine to help him or her feel better  · A therapist  can help the person identify and change negative feelings or beliefs about himself or herself  This may also help change the way the he or she feels and acts  A therapist can also help the person find ways to cope with things that cannot be changed  What you can do to help the person:   · Encourage the person to seek help for drug or alcohol abuse  Drugs and alcohol can increase suicidal thoughts and make the person more likely to act on them  · Help the person connect with others  Encourage him or her to become involved in the community  Some examples include tutoring a young student, volunteering at a [employer], or joining a group exercise program  The person may need help setting up a computer or creating an e-mail account to help him or her remain connected to others  · Exercise with the person  Exercise can lift his or her mood, increase energy, and make it easier to sleep at night  · Encourage the person to try new things  Adults who are open to new experiences handle stress and change better than those who are not  · Call, visit, or send postcards to the person often  Check on him or her after the loss of a pet, longtime friend, or child  Holidays, birthdays, and anniversaries can be difficult for a person after a loss  The loss of a spouse can be especially painful and lonely  · Help the person schedule a visit with his or her Yarsanism or spiritual leader  A Yarsanism or spiritual leader may be able to offer additional support and resources to the person  · Help the person get equipment that will increase his or her comfort and mobility  Examples are hearing aids, glasses, large print books, and walkers  These can help him or her enjoy activities and feel more independent  · Encourage the person to continue taking medicine and going to therapy  Medicine and therapy can help improve his or her mental health  For support and more information:   · [facility] , [address] [place] [address] Road  Phone: 3- 543 - 643-XXLM (01 [phone] 04 02)  Web Address: [employer]  · Suicide Awareness [employer]  [address] , [address]  Phone: 7- 815 - 943-4460  Web Address: [employer]  org  © 2017 [address] Information is for End User's use only and may not be sold, redistributed or otherwise used for commercial purposes  All illustrations and images included in CareNotes® are the copyrighted property of [employer]  or Lion Alvarez  The above information is an  only  It is not intended as medical advice for individual conditions or treatments  Talk to your doctor, nurse or pharmacist before following any medical regimen to see if it is safe and effective for you  Stress   WHAT YOU NEED TO KNOW:   Stress is a feeling of tension or strain related to the events and pressures of everyday life  Learn to cope and control your stress to help you function in a healthy way  DISCHARGE INSTRUCTIONS:   Call 911 for any of the following:   · You feel like hurting yourself or someone else  · You feel you are overwhelmed and can no longer handle things by yourself  Contact your healthcare provider if:   · You have trouble coping with your stress  · Your symptoms cause problems in your relationships  · You feel depressed  · You have trouble controlling your anger  · You have started to use alcohol, illegal drugs, or prescription medicines, or you increase your current use  · You have questions or concerns about your condition or care  Ways to manage your stress:  Learn what causes you stress  Not all stress can be avoided  Instead, change how you cope with stress by doing any of the following:  · Learn relaxation techniques, such as yoga, meditation, or listening to music  Take at least 30 minutes a day to do something you enjoy  This may include taking a bath or reading a book  · Do deep breathing exercises during times of increased stress  Sit up straight and take a slow, deep breath in through your nose  Then breathe out slowly through your mouth  Take twice as long to breathe out as you do when you breathe in  Repeat this a few times until you feel calmer or more focused  · Set realistic goals for yourself  Make a list of tasks and prioritize them  Focus on one task at a time  · Talk to someone about things that upset you  Talk to a trusted friend, family member, or support group  Try to stop yourself when you think negative, angry, or discouraging thoughts  · Take time to exercise  Start slowly, such as walking 1 to 2 blocks each day  Stretch and relax your muscles often  Ask about the best exercise plan for you  · Eat a variety of healthy foods  Healthy foods include fruits, vegetables, whole-grain breads, low-fat dairy products, beans, lean meats, and fish  Follow up with your healthcare provider as directed:  Write down your questions so you remember to ask them during your visits  © 2017 [address] [employer] Information is for End User's use only and may not be sold, redistributed or otherwise used for commercial purposes  All illustrations and images included in CareNotes® are the copyrighted property of [employer]  or Lion Alvarez  The above information is an  only  It is not intended as medical advice for individual conditions or treatments  Talk to your doctor, nurse or pharmacist before following any medical regimen to see if it is safe and effective for you

## 2019-09-09 NOTE — ASSESSMENT & PLAN NOTE
Patient has slightly improved since her last visit with a decrease in PHQ-9 score from 15 to 12 today  She was encouraged to continue with her restarted Zoloft 50 mg daily and increase to 100 mg daily in 2 weeks if needed  Will try to get her appointments with therapist and psychiatrist moved up due to my concern for her continued severity of symptoms and recent substance combining episode  She states that she is currently safe and does not want to harm herself currently  She was provided the suicide prevention hotline number  She was encouraged to follow-up here in 1 month and directed to call with any problems or concerns prior to her next visit  She was encouraged to wean from breastfeeding since it appears that adjuvant medication would like be beneficial  We discussed working on coping mechanisms and creating a list so that she has a plan in place for when she becomes agitated/irritated  She was encouraged to involve her boyfriend in this plan as well so that he can provide her assistance when needed

## 2019-10-02 ENCOUNTER — TELEPHONE (OUTPATIENT)
Dept: BEHAVIORAL/MENTAL HEALTH CLINIC | Facility: CLINIC | Age: 34
End: 2019-10-02

## 2019-10-02 NOTE — TELEPHONE ENCOUNTER
T/C to confirm appointment scheduled for October 3, 2019 @ 8:30am  Jorge A Haines did not auto-confirm this appointment; therefore, I made a direct confirmation call  No answer  Left message on voicemail  Provided my direct extension and asked for a call back if unable to keep scheduled appointment

## 2019-10-03 ENCOUNTER — SOCIAL WORK (OUTPATIENT)
Dept: BEHAVIORAL/MENTAL HEALTH CLINIC | Facility: CLINIC | Age: 34
End: 2019-10-03
Payer: COMMERCIAL

## 2019-10-03 DIAGNOSIS — F33.2 SEVERE EPISODE OF RECURRENT MAJOR DEPRESSIVE DISORDER, WITHOUT PSYCHOTIC FEATURES (HCC): Primary | ICD-10-CM

## 2019-10-03 PROCEDURE — 90834 PSYTX W PT 45 MINUTES: CPT | Performed by: SOCIAL WORKER
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
## 2019-10-03 NOTE — BH TREATMENT PLAN
Lane Bailey  1985       Date of Initial Treatment Plan: October 3, 2019   Date of Current Treatment Plan: 10/03/19    Treatment Plan Number 1     Strengths/Personal Resources for Self Care: Insight into need for treatment; Relationship with children; Motivation to remain alive to care for her children    Diagnosis:   1  Severe episode of recurrent major depressive disorder, without psychotic features ([facility] [address] )         Area of Needs: Depression, mood swings, anger managment      Long Term Goal 1: "I want inner peace  I want to find myself  I want to be able to be stable "    Target Date: January 15, 2020  Completion Date: to be determined         Short Term Objectives for Goal 1:     1  Daina Jones will identify triggers and prompting events that increase symptoms of anger, depression, and anxiety   2  Daina Jones will learn and exhibit understanding of a minimum of three distress tolerance skills to assist with symptom reduction   3  Daina Jones will learn and exhibit understanding of effective communication skills (using a DBT-informed perspective), especially with her boyfriend  4  Daina Jones will identify and maintain personal limits and boundaries in relationships with her boyfriend and others, as needed  Any boundary crossings will be discussed in therapy sessions  5  When appropriate, the clinician and Daina Jones will begin to identify target areas to explore and process past trauma that is effecting current relationships and functioning   6  Clinician will provide psychoeducation regarding options for processing past trauma (including, but not limited to, prolonged exposure, bilateral stimulation)   7  Daina Jones will maintain a level of anxiety/depression that does not surpass a 3/10 on most days  Incidents that surpass this limit will be process in therapy sessions         GOAL 1: Modality: Individual 1-2 x per month   Completion Date to be determined, Medication Management and The person(s) responsible for carrying out the plan is  Neela Juarez (client); Rosalva Bruno (clinician); psychiatrist (Dr Edgar Ramirez)    Clinician will use client-centered therapy, mindfulness-based strategies, DBT-informed skills, CBT techniques and solution-focused therapy to address Libertad's symptoms of anxiety and depression  Neela Juarez will practice skills between sessions and will report back, during subsequent sessions regarding successes and barriers  Behavioral Health Treatment Plan [facility]: Diagnosis and Treatment Plan explained to Shanna Bee relates understanding diagnosis and is agreeable to Treatment Plan         Client Comments : Please share your thoughts, feelings, need and/or experiences regarding your treatment plan: "I feel anxiety, but I know I have to do it, so I will "

## 2019-10-09 NOTE — PROGRESS NOTES
FAMILY PRACTICE OFFICE VISIT  [facility] - [facility] PRIMARY CARE       NAME: Angelica Nicole  AGE: 36 y o  SEX: female       : 1985        MRN: [id number]    DATE: 10/11/2019  TIME: 6:23 PM    Assessment and Plan     Problem List Items Addressed This Visit        Cardiovascular and Mediastinum    Migraine without aura and without status migrainosus, not intractable     Encouraged patient to try magnesium 400 milligrams daily  Can use Excedrin tension if needed for relief of migraines  Reviewed that she cannot use Excedrin migraine due to the aspirin and the fact that she is breast-feeding  We will reassess at next visit  Relevant Medications    magnesium oxide (MAG-OX) 400 mg       Other    Severe episode of recurrent major depressive disorder, without psychotic features ([facility] [address] ) - Primary     Improved  She will continue with Zoloft 100 milligrams daily  She will continue with when able to  Keep upcoming appointment with Psychiatry next month as well  Depression Screening Follow-up Plan: Patient's depression screening was positive with a PHQ-2 score of 2  Their PHQ-9 score was 7  Patient assessed for underlying major depression  They have no active suicidal ideations  Brief counseling provided and recommend additional follow-up/re-evaluation next office visit  Migraine without aura and without status migrainosus, not intractable  Encouraged patient to try magnesium 400 milligrams daily  Can use Excedrin tension if needed for relief of migraines  Reviewed that she cannot use Excedrin migraine due to the aspirin and the fact that she is breast-feeding  We will reassess at next visit  Severe episode of recurrent major depressive disorder, without psychotic features ([facility] [address] )  Improved  She will continue with Zoloft 100 milligrams daily  She will continue with when able to    Keep upcoming appointment with Psychiatry next month as well     Depression Screening Follow-up Plan: Patient's depression screening was positive with a PHQ-2 score of 2  Their PHQ-9 score was 7  Patient assessed for underlying major depression  They have no active suicidal ideations  Brief counseling provided and recommend additional follow-up/re-evaluation next office visit  Chief Complaint     Chief Complaint   Patient presents with    Follow-up     depression        History of Present Illness   Laure Correia is a 36y o -year-old female who presents for follow-up on depression  The patient reports that recently depression level has been improved  Daily medication includes Zoloft 100 mg daily  Care team includes a therapist whom she started with last week and has an upcoming appointment with a psychiatrist in 1 month  The patient denies panic attacks  The patient denies SI/HI  Today's PHQ9 score was 7  She notes that she has been having a lot of headaches and notes that she gets them 1-2 times a week  She notes that it is usually around the right eye and has photophobia and phonophobia  She notes that she has been having it this often for the last 1-2 month  She has nausea but no vomiting  She has blurred vision  She has no numbness or tingling  She has no aura - notes that she woken with it or it comes out of nowhere  She has taken ibuprofen and tried to sleep them off but has had some last 3 days  She confirms FamHx of migraines  Review of Systems   Review of Systems   Neurological: Positive for headaches (worsening)  Psychiatric/Behavioral: Positive for dysphoric mood (improved)  Negative for suicidal ideas  Active Problem List     Patient Active Problem List   Diagnosis    Severe episode of recurrent major depressive disorder, without psychotic features (HCC)    Eczema    Obesity (BMI 30-39  9)    Tachycardia    Manoj-Parkinson-White (WPW) syndrome    Abnormal Papanicolaou smear of cervix with positive human papilloma virus (HPV) test    Pap smear abnormality of cervix with LGSIL    Postpartum depression    Left wrist pain    Left wrist tendonitis    Migraine without aura and without status migrainosus, not intractable         Past Medical History:  Past Medical History:   Diagnosis Date    Abnormal Pap smear of cervix     Exposure to chlamydia     Exposure to chlamydia     Resolved 17    Migraine without aura and without status migrainosus, not intractable 10/11/2019    Postpartum depression 2018    Sore throat     Resolved 17    Manoj-Parkinson-White (WPW) syndrome     Yeast infection     Resolved 17       Past Surgical History:  Past Surgical History:   Procedure Laterality Date    COLONOSCOPY      COLPOSCOPY      INDUCED       OTHER SURGICAL HISTORY      catheter ablation- WPW age 13       Family History:  Family History   Problem Relation Age of Onset    Bipolar disorder Mother     Hypertension Mother     Hyperlipidemia Mother    [facility] Glucose-6-phos deficiency Father     Hyperlipidemia Father     Hypertension Father     No Known Problems Sister     No Known Problems Brother     Breast cancer Maternal Grandmother         dx approx age early 42's   [facility] No Known Problems Maternal Grandfather     Crohn's disease Paternal Grandmother     Prostate cancer Paternal Grandfather     Colon cancer Paternal Aunt        Social History:  Social History     Socioeconomic History    Marital status: Single     Spouse name: Not on file    Number of children: Not on file    Years of education: Not on file    Highest education level: Not on file   Occupational History    Not on file   Social Needs    Financial resource strain: Not on file    Food insecurity:     Worry: Not on file     Inability: Not on file    Transportation needs:     Medical: Not on file     Non-medical: Not on file   Tobacco Use    Smoking status: Never Smoker    Smokeless tobacco: Never Used   Substance and Sexual Activity    Alcohol use: Yes     Comment: social- per allscripts  Pt might drink every 2 months    Drug use: No    Sexual activity: Yes     Birth control/protection: Condom Male   Lifestyle    Physical activity:     Days per week: Not on file     Minutes per session: Not on file    Stress: Not on file   Relationships    Social connections:     Talks on phone: Not on file     Gets together: Not on file     Attends [employer] service: Not on file     Active member of club or organization: Not on file     Attends meetings of clubs or organizations: Not on file     Relationship status: Not on file    Intimate partner violence:     Fear of current or ex partner: Not on file     Emotionally abused: Not on file     Physically abused: Not on file     Forced sexual activity: Not on file   Other Topics Concern    Not on file   Social History Narrative    Family planning    IUD contraception       Objective     Vitals:    10/11/19 1326   BP: 118/60   BP Location: Left arm   Patient Position: Sitting   Cuff Size: Standard   Temp: 98 °F (36 7 °C)   Weight: 90 8 kg (200 lb 4 oz)   Height: 5' 2" (1 575 m)     Wt Readings from Last 3 Encounters:   10/11/19 90 8 kg (200 lb 4 oz)   09/09/19 89 5 kg (197 lb 6 oz)   08/14/19 90 1 kg (198 lb 9 6 oz)       Physical Exam   Constitutional: She appears well-developed and well-nourished  No distress  Neck: Neck supple  No thyromegaly present  Cardiovascular: Normal rate, regular rhythm, normal heart sounds and intact distal pulses  No murmur heard  Pulmonary/Chest: Effort normal and breath sounds normal  She has no wheezes  She has no rales  Musculoskeletal: She exhibits no edema  Lymphadenopathy:     She has no cervical adenopathy  Skin: Skin is warm and dry  Psychiatric: She has a normal mood and affect  Vitals reviewed        Pertinent Laboratory/Diagnostic Studies:  Lab Results   Component Value Date    GLUCOSE 81 08/11/2014    BUN 12 03/29/2019 CREATININE 0 68 03/29/2019    CALCIUM 8 0 (L) 03/29/2019     08/11/2014    K 3 7 03/29/2019    CO2 27 03/29/2019     03/29/2019     Lab Results   Component Value Date    ALT 25 03/29/2019    AST 14 03/29/2019    ALKPHOS 84 03/29/2019    BILITOT 0 4 08/11/2014       Lab Results   Component Value Date    WBC 7 64 03/29/2019    HGB 13 9 03/29/2019    HCT 43 5 03/29/2019    MCV 89 03/29/2019     03/29/2019     Lab Results   Component Value Date    CHOL 193 08/07/2015     Lab Results   Component Value Date    TRIG 85 07/21/2017     Lab Results   Component Value Date    HDL 36 (L) 07/21/2017     Lab Results   Component Value Date    LDLCALC 130 (H) 07/21/2017     Lab Results   Component Value Date    HGBA1C 5 0 06/23/2017       Results for orders placed or performed in visit on 03/29/19   Mononucleosis screen   Result Value Ref Range    Monotest Negative Negative   Comprehensive metabolic panel   Result Value Ref Range    Sodium 138 136 - 145 mmol/L    Potassium 3 7 3 5 - 5 3 mmol/L    Chloride 108 100 - 108 mmol/L    CO2 27 21 - 32 mmol/L    ANION GAP 3 (L) 4 - 13 mmol/L    BUN 12 5 - 25 mg/dL    Creatinine 0 68 0 60 - 1 30 mg/dL    Glucose, Fasting 87 65 - 99 mg/dL    Calcium 8 0 (L) 8 3 - 10 1 mg/dL    AST 14 5 - 45 U/L    ALT 25 12 - 78 U/L    Alkaline Phosphatase 84 46 - 116 U/L    Total Protein 7 3 6 4 - 8 2 g/dL    Albumin 3 6 3 5 - 5 0 g/dL    Total Bilirubin 0 40 0 20 - 1 00 mg/dL    eGFR 116 ml/min/1 73sq m   TSH, 3rd generation with Free T4 reflex   Result Value Ref Range    TSH 3RD GENERATON 1 570 0 358 - 3 740 uIU/mL   EBV acute panel   Result Value Ref Range    EBV Early Antigen Ab, IgG 144 0 (H) 0 0 - 8 9 U/mL    EBV VCA IgG 550 0 (H) 0 0 - 17 9 U/mL    EBV VCA IgM <36 0 0 0 - 35 9 U/mL    EBV Nuclear Ag Ab >600 0 (H) 0 0 - 17 9 U/mL    EBV Interp   Comment        ALLERGIES:  Allergies   Allergen Reactions    Codeine Other (See Comments)     dry heaves    Sulfa Antibiotics Hives and Rash Per pt as achild    Erythromycin Hives     Per as a child       Current Medications     Current Outpatient Medications   Medication Sig Dispense Refill    ibuprofen (MOTRIN) 600 mg tablet Take 1 p o  Q 6 hours p r n  Pain  30 tablet 0    sertraline (ZOLOFT) 100 mg tablet Take 1 tablet (100 mg total) by mouth daily 30 tablet 5    triamcinolone (KENALOG) 0 1 % ointment Apply topically 2 (two) times a day 30 g 1    magnesium oxide (MAG-OX) 400 mg Take 1 tablet (400 mg total) by mouth daily 30 tablet 5     No current facility-administered medications for this visit            Health Maintenance     Health Maintenance   Topic Date Due    HEPATITIS B VACCINES (4 of 4 - 4-dose series) 12/27/1997    BMI: Followup Plan  09/13/2004    Cervical Cancer Screening  08/24/2019    BMI: Adult  10/11/2020    DTaP,Tdap,and Td Vaccines (2 - Td) 04/13/2028    Pneumococcal Vaccine: 65+ Years (1 of 2 - PCV13) 09/13/2051    Hepatitis C Screening  Completed    INFLUENZA VACCINE  Completed    Pneumococcal Vaccine: Pediatrics (0 to 5 Years) and At-Risk Patients (6 to 59 Years)  Aged [employer] History   Administered Date(s) Administered     Influenza (IM) Preservative Free 10/16/2013    H1N1, All Formulations 11/04/2009    HPV Quadrivalent 01/01/2007, 03/01/2007, 07/18/2010    Hep B, adult 04/15/1997, 10/01/1997, 11/01/1997    Influenza Quadrivalent Preservative Free 3 years and older IM 10/02/2014, 10/08/2015, 10/05/2016, 10/11/2017    Influenza TIV (IM) 12/01/2007, 10/17/2008, 10/19/2009, 10/04/2010, 10/12/2011    Influenza, injectable, quadrivalent, preservative free 0 5 mL 10/30/2018, 09/09/2019    MMR 07/08/2018    Tdap 04/13/2018    Tuberculin Skin Test-PPD Intradermal 10/12/2011       Trev Schwab PA-C  10/11/2019 6:23 PM  [place] [address]  [facility]

## 2019-10-11 ENCOUNTER — OFFICE VISIT (OUTPATIENT)
Dept: FAMILY MEDICINE CLINIC | Facility: CLINIC | Age: 34
End: 2019-10-11
Payer: COMMERCIAL

## 2019-10-11 VITALS
DIASTOLIC BLOOD PRESSURE: 60 MMHG | HEIGHT: 62 IN | BODY MASS INDEX: 36.85 KG/M2 | SYSTOLIC BLOOD PRESSURE: 118 MMHG | WEIGHT: 200.25 LBS | TEMPERATURE: 98 F

## 2019-10-11 DIAGNOSIS — G43.009 MIGRAINE WITHOUT AURA AND WITHOUT STATUS MIGRAINOSUS, NOT INTRACTABLE: ICD-10-CM

## 2019-10-11 DIAGNOSIS — F33.2 SEVERE EPISODE OF RECURRENT MAJOR DEPRESSIVE DISORDER, WITHOUT PSYCHOTIC FEATURES (HCC): Primary | ICD-10-CM

## 2019-10-11 PROCEDURE — 99213 OFFICE O/P EST LOW 20 MIN: CPT | Performed by: PHYSICIAN ASSISTANT

## 2019-10-11 PROCEDURE — 3008F BODY MASS INDEX DOCD: CPT | Performed by: PHYSICIAN ASSISTANT

## 2019-10-11 NOTE — ASSESSMENT & PLAN NOTE
Improved  She will continue with Zoloft 100 milligrams daily  She will continue with when able to  Keep upcoming appointment with Psychiatry next month as well  Depression Screening Follow-up Plan: Patient's depression screening was positive with a PHQ-2 score of 2  Their PHQ-9 score was 7  Patient assessed for underlying major depression  They have no active suicidal ideations  Brief counseling provided and recommend additional follow-up/re-evaluation next office visit

## 2019-10-11 NOTE — ASSESSMENT & PLAN NOTE
Encouraged patient to try magnesium 400 milligrams daily  Can use Excedrin tension if needed for relief of migraines  Reviewed that she cannot use Excedrin migraine due to the aspirin and the fact that she is breast-feeding  We will reassess at next visit
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
## 2019-11-11 ENCOUNTER — OFFICE VISIT (OUTPATIENT)
Dept: PSYCHIATRY | Facility: CLINIC | Age: 34
End: 2019-11-11
Payer: COMMERCIAL

## 2019-11-11 VITALS
DIASTOLIC BLOOD PRESSURE: 70 MMHG | WEIGHT: 199 LBS | HEART RATE: 91 BPM | SYSTOLIC BLOOD PRESSURE: 104 MMHG | BODY MASS INDEX: 36.62 KG/M2 | HEIGHT: 62 IN

## 2019-11-11 DIAGNOSIS — F31.5 BIPOLAR I DISORDER, MOST RECENT EPISODE DEPRESSED, SEVERE WITH PSYCHOTIC FEATURES (HCC): Primary | Chronic | ICD-10-CM

## 2019-11-11 DIAGNOSIS — F41.1 GAD (GENERALIZED ANXIETY DISORDER): Chronic | ICD-10-CM

## 2019-11-11 DIAGNOSIS — G47.09 OTHER INSOMNIA: Chronic | ICD-10-CM

## 2019-11-11 DIAGNOSIS — Z79.899 LONG-TERM USE OF HIGH-RISK MEDICATION: Chronic | ICD-10-CM

## 2019-11-11 DIAGNOSIS — F42.9 OBSESSIVE-COMPULSIVE DISORDER, UNSPECIFIED TYPE: Chronic | ICD-10-CM

## 2019-11-11 PROCEDURE — 90791 PSYCH DIAGNOSTIC EVALUATION: CPT | Performed by: PSYCHIATRY & NEUROLOGY

## 2019-11-11 RX ORDER — LANOLIN ALCOHOL/MO/W.PET/CERES
3 CREAM (GRAM) TOPICAL
Qty: 30 TABLET | Refills: 2 | Status: SHIPPED | OUTPATIENT
Start: 2019-11-11 | End: 2020-02-09

## 2019-11-11 RX ORDER — ARIPIPRAZOLE 5 MG/1
5 TABLET ORAL
Qty: 30 TABLET | Refills: 2 | Status: SHIPPED | OUTPATIENT
Start: 2019-11-11 | End: 2020-11-24 | Stop reason: ALTCHOICE

## 2019-11-12 ENCOUNTER — ANNUAL EXAM (OUTPATIENT)
Dept: OBGYN CLINIC | Facility: MEDICAL CENTER | Age: 34
End: 2019-11-12
Payer: COMMERCIAL

## 2019-11-12 VITALS
WEIGHT: 195 LBS | BODY MASS INDEX: 35.88 KG/M2 | SYSTOLIC BLOOD PRESSURE: 100 MMHG | HEIGHT: 62 IN | DIASTOLIC BLOOD PRESSURE: 70 MMHG

## 2019-11-12 DIAGNOSIS — Z80.3 FAMILY HISTORY OF BREAST CANCER: ICD-10-CM

## 2019-11-12 DIAGNOSIS — Z30.015 ENCOUNTER FOR INITIAL PRESCRIPTION OF VAGINAL RING HORMONAL CONTRACEPTIVE: ICD-10-CM

## 2019-11-12 DIAGNOSIS — Z01.419 ENCOUNTER FOR WELL WOMAN EXAM WITH ROUTINE GYNECOLOGICAL EXAM: Primary | ICD-10-CM

## 2019-11-12 PROCEDURE — 99395 PREV VISIT EST AGE 18-39: CPT | Performed by: OBSTETRICS & GYNECOLOGY

## 2019-11-12 PROCEDURE — G0145 SCR C/V CYTO,THINLAYER,RESCR: HCPCS | Performed by: OBSTETRICS & GYNECOLOGY

## 2019-11-12 PROCEDURE — 87624 HPV HI-RISK TYP POOLED RSLT: CPT | Performed by: OBSTETRICS & GYNECOLOGY

## 2019-11-12 NOTE — PROGRESS NOTES
Assessment   36 y o  [id number] with regular menses who is sexually active and currently using contraception (condoms) presenting for annual exam      Plan   Diagnoses and all orders for this visit:    Encounter for well woman exam with routine gynecological exam  -     Liquid-based pap, screening  - Clinicians breast exam and teaching done  - Return in 1yr for annual    Family history of breast cancer  -     Ambulatory Referral to Breast Surgery (for risk assessment and genetic screening)  - Encouraged breast self awareness and self examination    Encounter for initial prescription of vaginal ring hormonal contraceptive  -     etonogestrel-ethinyl estradiol (NUVARING) 0 12-0 015 MG/24HR vaginal ring; Insert vaginally and leave in place for 3 consecutive weeks, then remove for 1 week  - Return after 3mo of use for follow up (will call, as not yet weaned)      __________________________________________________________________      Sahara Artie is a 36 y o  [id number] with regular menses who is sexually active and currently using contraception (condoms) presenting for annual exam  She is without complaint  Desires contraception  Currently breastfeeding but will be weaning soon  Prior good experiences with Nuvaring  Did not like IUD due to frequent vaginal infections  GYN  Complaints: denies  Denies change in menstrual cycle, dysmenorrhea, dyspareunia, genital discharge, genital ulcers, irregular/heavy menses, pelvic pain and vulvar/vaginal symptoms  Periods are regular every 28-30 days, lasting 3 days  Dysmenorrhea: cramping and back pain     Cyclic symptoms include headache  Sexually active: Yes - single partner - male  Hx STI: reports hx chlamydia and HPV+ pap  Hx Abnormal pap: reports  Last pap: last few, s/p colpo last year    OB  [id number]   Pregnancy complications: placenta previa  Undecided on future fertility      Complaints: denies  Denies urinary frequency, hematuria, urinary incontinence and dysuria    BREAST  Complaints: breastfeeding but will be weaning  Also changing mole  Denies: breast lump, breast tenderness, dryness, nipple discharge, pruritus, rash, skin color change and skin lesion(s)  Last mammogram: n/a  Personal hx: denies  Family hx: denies fhx of uterine or ovarian cancers  MGM prior to 43yo (breast ca)  M-aunt in 45s with breast cancer  No BRCA but had "uterine cancer gene"  Multiple aunts/cousins on her maternal side with early breast ca  Patient does do regular self-exams    GENERAL  PMH reviewed/updated and is as below  Patient does follow with a PCP  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, part time at [employer]  Denies domestic violence  Exercise: nothing specific  Diet: nothing specific    SCREENING  Cervical Ca: pap collected  Breast Ca: clinicians exam and teaching done, family hx of early breast ca noted  Colon Ca: not indicated by age        Past Medical History:   Diagnosis Date    Abnormal Pap smear of cervix     Eczema     Exposure to chlamydia     Exposure to chlamydia     Resolved 17    Migraine without aura and without status migrainosus, not intractable 10/11/2019    Obesity     Postpartum depression 2018    Sore throat     Resolved 17    Tachycardia     Manoj-Parkinson-White (WPW) syndrome     Yeast infection     Resolved 17       Past Surgical History:   Procedure Laterality Date    COLONOSCOPY      COLPOSCOPY      INDUCED       OTHER SURGICAL HISTORY      catheter ablation- WPW age 13         Current Outpatient Medications:     ARIPiprazole (ABILIFY) 5 mg tablet, Take 1 tablet (5 mg total) by mouth daily at bedtime, Disp: 30 tablet, Rfl: 2    ibuprofen (MOTRIN) 600 mg tablet, Take 1 p o  Q 6 hours p r n   Pain , Disp: 30 tablet, Rfl: 0    melatonin 3 mg, Take 1 tablet (3 mg total) by mouth daily at bedtime as needed (insomnia), Disp: 30 tablet, Rfl: 2    sertraline (ZOLOFT) 100 mg tablet, Take 1 tablet (100 mg total) by mouth daily, Disp: 30 tablet, Rfl: 5    triamcinolone (KENALOG) 0 1 % ointment, Apply topically 2 (two) times a day, Disp: 30 g, Rfl: 1    etonogestrel-ethinyl estradiol (NUVARING) 0 12-0 015 MG/24HR vaginal ring, Insert vaginally and leave in place for 3 consecutive weeks, then remove for 1 week , Disp: 3 each, Rfl: 1    magnesium oxide (MAG-OX) 400 mg, Take 1 tablet (400 mg total) by mouth daily (Patient not taking: Reported on 11/11/2019), Disp: 30 tablet, Rfl: 5    Allergies   Allergen Reactions    Codeine Other (See Comments)     dry heaves    Sulfa Antibiotics Hives and Rash     Per pt as achild    Erythromycin Hives     Per as a child       Social History     Tobacco Use    Smoking status: Never Smoker    Smokeless tobacco: Never Used   Substance Use Topics    Alcohol use: Yes     Frequency: Monthly or less     Drinks per session: 1 or 2     Binge frequency: Never    Drug use: No           Objective  /70 (BP Location: Left arm, Patient Position: Sitting, Cuff Size: Standard)   Ht 5' 2" (1 575 m)   Wt 88 5 kg (195 lb)   LMP 10/19/2019   BMI 35 67 kg/m²      Physical Exam:  Physical Exam   Constitutional: She is oriented to person, place, and time  She appears well-developed and well-nourished  No distress  HENT:   Head: Normocephalic and atraumatic  Eyes: No scleral icterus  Cardiovascular: Normal rate  Pulmonary/Chest: Effort normal  No accessory muscle usage  No respiratory distress  Right breast exhibits no inverted nipple, no mass, no nipple discharge, no skin change and no tenderness  Left breast exhibits no inverted nipple, no mass, no nipple discharge, no skin change and no tenderness  Abdominal: Soft  She exhibits no distension and no mass  There is no tenderness  There is no rebound and no guarding  Genitourinary: Uterus normal  There is no rash, tenderness or lesion on the right labia  There is no rash, tenderness or lesion on the left labia  Uterus is not enlarged, not fixed and not tender  Cervix exhibits no motion tenderness and no discharge  Right adnexum displays no mass, no tenderness and no fullness  Left adnexum displays no mass, no tenderness and no fullness  No erythema or tenderness in the vagina  No signs of injury around the vagina  No vaginal discharge found  Musculoskeletal: She exhibits no edema  Neurological: She is alert and oriented to person, place, and time  Skin: Skin is warm and dry  No rash noted  No erythema  Psychiatric: She has a normal mood and affect   Her behavior is normal  Thought content normal

## 2019-11-13 RX ORDER — ETONOGESTREL AND ETHINYL ESTRADIOL 11.7; 2.7 MG/1; MG/1
INSERT, EXTENDED RELEASE VAGINAL
Qty: 3 EACH | Refills: 1 | Status: SHIPPED | OUTPATIENT
Start: 2019-11-13 | End: 2020-11-13 | Stop reason: SDUPTHER

## 2019-11-14 LAB
HPV HR 12 DNA CVX QL NAA+PROBE: NEGATIVE
HPV16 DNA CVX QL NAA+PROBE: NEGATIVE
HPV18 DNA CVX QL NAA+PROBE: NEGATIVE

## 2019-11-15 ENCOUNTER — TELEPHONE (OUTPATIENT)
Dept: SURGICAL ONCOLOGY | Facility: CLINIC | Age: 34
End: 2019-11-15

## 2019-11-19 ENCOUNTER — CONSULT (OUTPATIENT)
Dept: GYNECOLOGIC ONCOLOGY | Facility: CLINIC | Age: 34
End: 2019-11-19
Payer: COMMERCIAL

## 2019-11-19 VITALS
SYSTOLIC BLOOD PRESSURE: 106 MMHG | WEIGHT: 195.5 LBS | BODY MASS INDEX: 35.98 KG/M2 | DIASTOLIC BLOOD PRESSURE: 70 MMHG | TEMPERATURE: 98.7 F | HEART RATE: 107 BPM | HEIGHT: 62 IN

## 2019-11-19 DIAGNOSIS — Z13.79 VISIT FOR GENETIC SCREENING: ICD-10-CM

## 2019-11-19 DIAGNOSIS — Z80.3 FAMILY HISTORY OF BREAST CANCER: Primary | ICD-10-CM

## 2019-11-19 LAB
LAB AP GYN PRIMARY INTERPRETATION: NORMAL
Lab: NORMAL

## 2019-11-19 PROCEDURE — 36415 COLL VENOUS BLD VENIPUNCTURE: CPT | Performed by: PHYSICIAN ASSISTANT

## 2019-11-19 PROCEDURE — 99242 OFF/OP CONSLTJ NEW/EST SF 20: CPT | Performed by: PHYSICIAN ASSISTANT

## 2019-11-19 NOTE — PROGRESS NOTES
Assessment/Plan:    Problem List Items Addressed This Visit        Other    Family history of breast cancer - Primary     78-year-old with a significant family history of breast cancer in her maternal aunt and maternal grandmother, both diagnosed in their 45s  The patient does not have a personal history of cancer  We discussed risk and benefits of testing  She understands the possible outcomes of testing including no pathogenic mutation, a positive pathogenic mutation and VUS  We also discussed prophylactic procedures/screening in the event a genetic mutation identified, as well as implications for patient's family members if mutation identified  Patient agrees to proceed with testing  Venous blood sample collected and sent to [facility]  I will call the patient with results in 2-4 weeks  The patient will undergo formal genetic counseling and appropriate referrals will be made in the event of a positive finding  Visit for genetic screening        I have spent 30 minutes with patient today in which greater than 50% of this time was spent in counseling/coordination of care regarding genetic testing risks, benefits and potential outcomes  CHIEF COMPLAINT:   Genetic testing consultation    Problem:  Family history of breast cancer      Patient ID: Jonh Lake is a 36 y o  female  who presents to the office for genetic testing consultation  The patient was referred by Dr Tessy Vale  She does not have a personal history of cancer  The patient has a significant family history of breast cancer in her maternal aunt at age 40, maternal grandmother at age 43 and a maternal great grandmother with uterine cancer  The patient denies family history of ovarian cancer  She is without acute complaints         Review of Systems   Unable to perform ROS: Other       Current Outpatient Medications   Medication Sig Dispense Refill    ARIPiprazole (ABILIFY) 5 mg tablet Take 1 tablet (5 mg total) by mouth daily at bedtime 30 tablet 2    etonogestrel-ethinyl estradiol (NUVARING) 0 12-0 015 MG/24HR vaginal ring Insert vaginally and leave in place for 3 consecutive weeks, then remove for 1 week  3 each 1    ibuprofen (MOTRIN) 600 mg tablet Take 1 p o  Q 6 hours p r n  Pain  30 tablet 0    magnesium oxide (MAG-OX) 400 mg Take 1 tablet (400 mg total) by mouth daily (Patient not taking: Reported on 2019) 30 tablet 5    melatonin 3 mg Take 1 tablet (3 mg total) by mouth daily at bedtime as needed (insomnia) 30 tablet 2    sertraline (ZOLOFT) 100 mg tablet Take 1 tablet (100 mg total) by mouth daily 30 tablet 5    triamcinolone (KENALOG) 0 1 % ointment Apply topically 2 (two) times a day 30 g 1     No current facility-administered medications for this visit          Allergies   Allergen Reactions    Codeine Other (See Comments)     dry heaves    Sulfa Antibiotics Hives and Rash     Per pt as achild    Erythromycin Hives     Per as a child       Past Medical History:   Diagnosis Date    Abnormal Pap smear of cervix     Eczema     Exposure to chlamydia     Resolved 17    Migraine without aura and without status migrainosus, not intractable 10/11/2019    Obesity     Postpartum depression 2018    Manoj-Parkinson-White (WPW) syndrome        Past Surgical History:   Procedure Laterality Date    COLONOSCOPY      COLPOSCOPY      INDUCED       OTHER SURGICAL HISTORY      catheter ablation- WPW age 13       OB History        3    Para   2    Term   2       0    AB   1    Living   2       SAB   0    TAB   1    Ectopic   0    Multiple   0    Live Births   2                 Family History   Problem Relation Age of Onset    Bipolar disorder Mother     Hypertension Mother     Hyperlipidemia Mother     Suicide Attempts Mother     Colon polyps Mother    Jerrilyn Bulla deficiency Father     Hyperlipidemia Father     Hypertension Father     No Known Problems Sister    Enzo Seymour Anxiety disorder Brother     Breast cancer Maternal Grandmother         dx approx age early 39's    Colon polyps Maternal Grandfather     Crohn's disease Paternal Grandmother     Prostate cancer Paternal Grandfather     Pancreatic cancer Paternal Grandfather     Colon cancer Paternal Aunt     Mental illness Brother     Drug abuse Brother     Breast cancer Maternal Aunt 40       The following portions of the patient's history were reviewed and updated as appropriate: past family history, past medical history and problem list       Objective:    Blood pressure 106/70, pulse (!) 107, temperature 98 7 °F (37 1 °C), temperature source Oral, height 5' 2" (1 575 m), weight 88 7 kg (195 lb 8 oz), currently breastfeeding  Body mass index is 35 76 kg/m²  Physical Exam   Constitutional: She is oriented to person, place, and time  She appears well-developed and well-nourished  Pulmonary/Chest: Effort normal    Neurological: She is alert and oriented to person, place, and time  Psychiatric: She has a normal mood and affect   Her behavior is normal  Judgment and thought content normal          No results found for:   Lab Results   Component Value Date    WBC 7 64 03/29/2019    HGB 13 9 03/29/2019    HCT 43 5 03/29/2019    MCV 89 03/29/2019     03/29/2019     Lab Results   Component Value Date     08/11/2014    K 3 7 03/29/2019     03/29/2019    CO2 27 03/29/2019    ANIONGAP 12 08/11/2014    BUN 12 03/29/2019    CREATININE 0 68 03/29/2019    GLUCOSE 81 08/11/2014    GLUF 87 03/29/2019    CALCIUM 8 0 (L) 03/29/2019    AST 14 03/29/2019    ALT 25 03/29/2019    ALKPHOS 84 03/29/2019    PROT 7 9 08/11/2014    BILITOT 0 4 08/11/2014    EGFR 116 03/29/2019

## 2019-11-19 NOTE — ASSESSMENT & PLAN NOTE
67-year-old with a significant family history of breast cancer in her maternal aunt and maternal grandmother, both diagnosed in their 45s  The patient does not have a personal history of cancer  We discussed risk and benefits of testing  She understands the possible outcomes of testing including no pathogenic mutation, a positive pathogenic mutation and VUS  We also discussed prophylactic procedures/screening in the event a genetic mutation identified, as well as implications for patient's family members if mutation identified  Patient agrees to proceed with testing  Venous blood sample collected and sent to [facility]  I will call the patient with results in 2-4 weeks  The patient will undergo formal genetic counseling and appropriate referrals will be made in the event of a positive finding

## 2019-12-02 ENCOUNTER — TELEPHONE (OUTPATIENT)
Dept: GYNECOLOGIC ONCOLOGY | Facility: CLINIC | Age: 34
End: 2019-12-02

## 2019-12-02 NOTE — TELEPHONE ENCOUNTER
Patient notified of genetic testing results  [facility] Breast panel wild-type  Results mailed to patient and scanned into Epic

## 2020-01-08 NOTE — PROGRESS NOTES
FAMILY PRACTICE OFFICE VISIT  [facility]       NAME: Poonam Edouard  AGE: 36 y o  SEX: female       : 1985        MRN: [id number]    DATE: 1/10/2020  TIME: 5:19 PM    Assessment and Plan     Problem List Items Addressed This Visit        Cardiovascular and Mediastinum    Migraine without aura and without status migrainosus, not intractable     Patient is currently getting migraines average once a week  She was encouraged to start the magnesium, which she has not yet tried  She states that she will try and continue with ibuprofen as needed for treatment the migraines they occur  We will reassess at next visit  Musculoskeletal and Integument    Tinea pedis of left foot     Reviewed with patient rash appears consistent with tinea pedis  She was given a script for Lotrisone to try twice daily for the next 2 weeks  If it does not improve, I would recommend follow-up with dermatology for further evaluation  Relevant Medications    clotrimazole-betamethasone (LOTRISONE) 1-0 05 % cream       Other    SHELLY (generalized anxiety disorder) - Primary (Chronic)     Patient feels it has worse recently  She did not start the Abilify prescribed by Psychiatry because she would like see how things with doing therapy  She be starting therapy next week and is scheduled for visits every other forward  She will continue with Psychiatry as well  Obsessive-compulsive disorder, unspecified (Chronic)     Will look for improvement with starting therapy  She will continue with the Zoloft 100 mg daily  She is choosing to hold off on starting Abilify now  She will continue with Psychiatry  Chest wall pain, chronic     We reviewed that her pain appears to be muscular in nature  She was given a slip for physical therapy to start  She should call with any problems or concerns  Otherwise we will reassess at next visit  Relevant Orders    Ambulatory referral to Physical Therapy          Migraine without aura and without status migrainosus, not intractable  Patient is currently getting migraines average once a week  She was encouraged to start the magnesium, which she has not yet tried  She states that she will try and continue with ibuprofen as needed for treatment the migraines they occur  We will reassess at next visit  SHELLY (generalized anxiety disorder)  Patient feels it has worse recently  She did not start the Abilify prescribed by Psychiatry because she would like see how things with doing therapy  She be starting therapy next week and is scheduled for visits every other forward  She will continue with Psychiatry as well  Obsessive-compulsive disorder, unspecified  Will look for improvement with starting therapy  She will continue with the Zoloft 100 mg daily  She is choosing to hold off on starting Abilify now  She will continue with Psychiatry  Chest wall pain, chronic  We reviewed that her pain appears to be muscular in nature  She was given a slip for physical therapy to start  She should call with any problems or concerns  Otherwise we will reassess at next visit  Tinea pedis of left foot  Reviewed with patient rash appears consistent with tinea pedis  She was given a script for Lotrisone to try twice daily for the next 2 weeks  If it does not improve, I would recommend follow-up with dermatology for further evaluation  Chief Complaint     Chief Complaint   Patient presents with    Follow-up     depression        History of Present Illness   Coco Hardy is a 36y o -year-old female who presents for three month follow-up on chronic medical problems  The patient reports that recently depression level has been worsened  Daily medication includes Zoloft 100 mg daily and Abilify 5 mg daily at bedtime through Psychiatry    Care team includes a therapist/psychiatrist    The patient denies panic attacks  The patient denies SI/HI  Today's PHQ9 score was 14  The patient reports that migraines have been well-controlled  Migraines occur about 1 times per week  Magnesium 400 mg daily is not used yet for prophylaxis and Excedrin or ibuprofen is used for treatment  She notes that she has trouble with left foot for years having an itchy rash  She notes that it has not improved with triamcinolone or antifungal creams  It began years ago  She tries to moisturize with lotion at home but no help  She notes that she has continued with random pain in the right side and goes to the back  Seems worse with movement but sometimes eating  She denies shoulder pain  She denies certain food triggers  It hurts all the way to the front as well  She has had for 6 months  She denies any trauma to the area  Review of Systems   Review of Systems   Cardiovascular: Positive for chest pain ( right-sided chest wall pain as noted in HPI)  Skin: Positive for rash (As noted in HPI)  Neurological: Positive for headaches  Psychiatric/Behavioral: Positive for dysphoric mood  Negative for suicidal ideas  The patient is nervous/anxious  Active Problem List     Patient Active Problem List   Diagnosis    Bipolar I disorder, most recent episode depressed, severe with psychotic features ([facility] [address] )    Eczema    Obesity (BMI 30-39  9)    Tachycardia    Manoj-Parkinson-White (WPW) syndrome    Abnormal Papanicolaou smear of cervix with positive human papilloma virus (HPV) test    Pap smear abnormality of cervix with LGSIL    Left wrist pain    Left wrist tendonitis    Migraine without aura and without status migrainosus, not intractable    SHELLY (generalized anxiety disorder)    Obsessive-compulsive disorder, unspecified    Other insomnia    Long-term use of high-risk medication    Family history of breast cancer    Visit for genetic screening    Chest wall pain, chronic    Tinea pedis of left foot         Past Medical History:  Past Medical History:   Diagnosis Date    Abnormal Pap smear of cervix     Eczema     Exposure to chlamydia     Resolved 17    Migraine without aura and without status migrainosus, not intractable 10/11/2019    Obesity     Postpartum depression 2018    Manoj-Parkinson-White (WPW) syndrome        Past Surgical History:  Past Surgical History:   Procedure Laterality Date    COLONOSCOPY      COLPOSCOPY      INDUCED       OTHER SURGICAL HISTORY      catheter ablation- WPW age 13       Family History:  Family History   Problem Relation Age of Onset    Bipolar disorder Mother     Hypertension Mother     Hyperlipidemia Mother     Suicide Attempts Mother     Colon polyps Mother    Philkiara Corrente deficiency Father     Hyperlipidemia Father     Hypertension Father     No Known Problems Sister     Anxiety disorder Brother     Breast cancer Maternal Grandmother         dx approx age early 39's    Colon polyps Maternal Grandfather     Crohn's disease Paternal Grandmother     Prostate cancer Paternal Grandfather     Pancreatic cancer Paternal Grandfather     Colon cancer Paternal Aunt     Mental illness Brother     Drug abuse Brother     Breast cancer Maternal Aunt 40       Social History:  Social History     Socioeconomic History    Marital status: Single     Spouse name: Not on file    Number of children: 2    Years of education: Not on file    Highest education level: Some college, no degree   Occupational History    Occupation: works in retail   Social Needs    Financial resource strain: Somewhat hard    Food insecurity:     Worry: Sometimes true     Inability: Sometimes true    Transportation needs:     Medical: Yes     Non-medical: Yes   Tobacco Use    Smoking status: Never Smoker    Smokeless tobacco: Never Used   Substance and Sexual Activity    Alcohol use: Yes     Frequency: Monthly or less     Drinks per session: 1 or 2 Binge frequency: Never    Drug use: No    Sexual activity: Yes     Partners: Male     Birth control/protection: Condom Male   Lifestyle    Physical activity:     Days per week: 0 days     Minutes per session: 0 min    Stress: Rather much   Relationships    Social connections:     Talks on phone: Once a week     Gets together: Once a week     Attends Mor[employer] service: Never     Active member of club or organization: No     Attends meetings of clubs or organizations: Never     Relationship status: Never     Intimate partner violence:     Fear of current or ex partner: No     Emotionally abused: No     Physically abused: No     Forced sexual activity: No   Other Topics Concern    Not on file   Social History Narrative    Education: high school graduate and some college    Learning Disabilities: none    Marital History: single    Children: 1 daughter, 1 son    Living Arrangement: lives in home with boyfriend, 2 children and boyfriend's 2 children    Occupational History: works part time at [employer] "[employer] and [employer] works    Functioning Relationships: boyfriend is supportive    Legal History: none     History: None         Family planning    IUD contraception       Objective     Vitals:    01/10/20 1256   BP: 102/70   BP Location: Left arm   Patient Position: Sitting   Cuff Size: Standard   Temp: 99 6 °F (37 6 °C)   Weight: 90 4 kg (199 lb 4 oz)   Height: 5' 2" (1 575 m)     Wt Readings from Last 3 Encounters:   01/10/20 90 4 kg (199 lb 4 oz)   11/19/19 88 7 kg (195 lb 8 oz)   11/12/19 88 5 kg (195 lb)       Physical Exam   Constitutional: She appears well-developed and well-nourished  No distress  Neck: Neck supple  No thyromegaly present  Cardiovascular: Normal rate, regular rhythm, normal heart sounds and intact distal pulses  No murmur heard  Pulmonary/Chest: Effort normal and breath sounds normal  She has no wheezes  She has no rales   She exhibits tenderness (over the right side - worse in between ribs)  Abdominal: Soft  Bowel sounds are normal  She exhibits no distension  There is no tenderness  Lymphadenopathy:     She has no cervical adenopathy  Skin: Skin is warm and dry  Psychiatric: She has a normal mood and affect  Vitals reviewed        Pertinent Laboratory/Diagnostic Studies:  Lab Results   Component Value Date    GLUCOSE 81 08/11/2014    BUN 12 03/29/2019    CREATININE 0 68 03/29/2019    CALCIUM 8 0 (L) 03/29/2019     08/11/2014    K 3 7 03/29/2019    CO2 27 03/29/2019     03/29/2019     Lab Results   Component Value Date    ALT 25 03/29/2019    AST 14 03/29/2019    ALKPHOS 84 03/29/2019    BILITOT 0 4 08/11/2014       Lab Results   Component Value Date    WBC 7 64 03/29/2019    HGB 13 9 03/29/2019    HCT 43 5 03/29/2019    MCV 89 03/29/2019     03/29/2019     Lab Results   Component Value Date    CHOL 193 08/07/2015     Lab Results   Component Value Date    TRIG 85 07/21/2017     Lab Results   Component Value Date    HDL 36 (L) 07/21/2017     Lab Results   Component Value Date    LDLCALC 130 (H) 07/21/2017     Lab Results   Component Value Date    HGBA1C 5 0 06/23/2017       Results for orders placed or performed in visit on 11/12/19   HPV High Risk   Result Value Ref Range    HPV Other HR Negative Negative    HPV16 Negative Negative    HPV18 Negative Negative   Liquid-based pap, screening   Result Value Ref Range    Case Report       Gynecologic Cytology Report                       Case: [id number]                                  Authorizing Provider:  Timmy Araujo MD       Collected:           11/12/2019 1838              Ordering Location:     [facility]  Received:            11/12/2019 [address]                                                           First Screen:          JITENDRA Allen                                                         Specimen:    LIQUID-BASED PAP, SCREENING, Cervix, Endocervical                                          Primary Interpretation Negative for intraepithelial lesion or malignancy     Specimen Adequacy       Satisfactory for evaluation  Endocervical/transformation zone component present  Additional Information       Kids Note's FDA approved ,  and ThinPrep Imaging Duo System are utilized with strict adherence to the 's instruction manual to prepare gynecologic and non-gynecologic cytology specimens for the production of ThinPrep slides as well as for gynecologic ThinPrep imaging  These processes have been validated by our laboratory and/or by the   The Pap test is not a diagnostic procedure and should not be used as the sole means to detect cervical cancer  It is only a screening procedure to aid in the detection of cervical cancer and its precursors  Both false-negative and false-positive results have been experienced  Your patient's test result should be interpreted in this context together with the history and clinical findings  ALLERGIES:  Allergies   Allergen Reactions    Codeine Other (See Comments)     dry heaves    Sulfa Antibiotics Hives and Rash     Per pt as achild    Erythromycin Hives     Per as a child       Current Medications     Current Outpatient Medications   Medication Sig Dispense Refill    ibuprofen (MOTRIN) 600 mg tablet Take 1 p o  Q 6 hours p r n  Pain  30 tablet 0    sertraline (ZOLOFT) 100 mg tablet Take 1 tablet (100 mg total) by mouth daily 30 tablet 5    ARIPiprazole (ABILIFY) 5 mg tablet Take 1 tablet (5 mg total) by mouth daily at bedtime (Patient not taking: Reported on 1/10/2020) 30 tablet 2    clotrimazole-betamethasone (LOTRISONE) 1-0 05 % cream Apply topically 2 (two) times a day 30 g 0    etonogestrel-ethinyl estradiol (NUVARING) 0 12-0 015 MG/24HR vaginal ring Insert vaginally and leave in place for 3 consecutive weeks, then remove for 1 week   (Patient not taking: Reported on 1/10/2020) 3 each 1    magnesium oxide (MAG-OX) 400 mg Take 1 tablet (400 mg total) by mouth daily (Patient not taking: Reported on 1/10/2020) 30 tablet 5    melatonin 3 mg Take 1 tablet (3 mg total) by mouth daily at bedtime as needed (insomnia) (Patient not taking: Reported on 1/10/2020) 30 tablet 2     No current facility-administered medications for this visit            Health Maintenance     Health Maintenance   Topic Date Due    Hepatitis B Vaccine (4 of 4 - 4-dose series) 12/27/1997    BMI: Followup Plan  09/13/2004    Cervical Cancer Screening  11/12/2020    BMI: Adult  01/10/2021    DTaP,Tdap,and Td Vaccines (2 - Td) 04/13/2028    Pneumococcal Vaccine: 65+ Years (1 of 2 - PCV13) 09/13/2051    HIV Screening  Completed    Hepatitis C Screening  Completed    Influenza Vaccine  Completed    HPV Vaccine  Completed    Pneumococcal Vaccine: Pediatrics (0 to 5 Years) and At-Risk Patients (6 to 59 Years)  Aged Out    HIB Vaccine  Aged Out    IPV Vaccine  Aged Out    Hepatitis A Vaccine  Aged Out    Meningococcal ACWY Vaccine  Aged [employer] History   Administered Date(s) Administered     Influenza (IM) Preservative Free 10/16/2013    H1N1, All Formulations 11/04/2009    HPV Quadrivalent 01/01/2007, 03/01/2007, 07/18/2010    Hep B, adult 04/15/1997, 10/01/1997, 11/01/1997    Influenza Quadrivalent Preservative Free 3 years and older IM 10/02/2014, 10/08/2015, 10/05/2016, 10/11/2017    Influenza TIV (IM) 12/01/2007, 10/17/2008, 10/19/2009, 10/04/2010, 10/12/2011    Influenza, injectable, quadrivalent, preservative free 0 5 mL 10/30/2018, 09/09/2019    MMR 07/08/2018    Tdap 04/13/2018    Tuberculin Skin Test-PPD Intradermal 10/12/2011       Alba Nath PA-C  1/10/2020 5:19 PM  [facility]

## 2020-01-10 ENCOUNTER — OFFICE VISIT (OUTPATIENT)
Dept: FAMILY MEDICINE CLINIC | Facility: CLINIC | Age: 35
End: 2020-01-10
Payer: COMMERCIAL

## 2020-01-10 ENCOUNTER — TELEPHONE (OUTPATIENT)
Dept: BEHAVIORAL/MENTAL HEALTH CLINIC | Facility: CLINIC | Age: 35
End: 2020-01-10

## 2020-01-10 VITALS
SYSTOLIC BLOOD PRESSURE: 102 MMHG | BODY MASS INDEX: 36.67 KG/M2 | WEIGHT: 199.25 LBS | DIASTOLIC BLOOD PRESSURE: 70 MMHG | HEIGHT: 62 IN | TEMPERATURE: 99.6 F

## 2020-01-10 DIAGNOSIS — B35.3 TINEA PEDIS OF LEFT FOOT: ICD-10-CM

## 2020-01-10 DIAGNOSIS — F42.9 OBSESSIVE-COMPULSIVE DISORDER, UNSPECIFIED TYPE: Chronic | ICD-10-CM

## 2020-01-10 DIAGNOSIS — F41.1 GAD (GENERALIZED ANXIETY DISORDER): Primary | Chronic | ICD-10-CM

## 2020-01-10 DIAGNOSIS — G89.29 CHEST WALL PAIN, CHRONIC: ICD-10-CM

## 2020-01-10 DIAGNOSIS — R07.89 CHEST WALL PAIN, CHRONIC: ICD-10-CM

## 2020-01-10 DIAGNOSIS — G43.009 MIGRAINE WITHOUT AURA AND WITHOUT STATUS MIGRAINOSUS, NOT INTRACTABLE: ICD-10-CM

## 2020-01-10 PROCEDURE — 3008F BODY MASS INDEX DOCD: CPT | Performed by: PHYSICIAN ASSISTANT

## 2020-01-10 PROCEDURE — 3725F SCREEN DEPRESSION PERFORMED: CPT | Performed by: PHYSICIAN ASSISTANT

## 2020-01-10 PROCEDURE — 99214 OFFICE O/P EST MOD 30 MIN: CPT | Performed by: PHYSICIAN ASSISTANT

## 2020-01-10 RX ORDER — CLOTRIMAZOLE AND BETAMETHASONE DIPROPIONATE 10; .64 MG/G; MG/G
CREAM TOPICAL 2 TIMES DAILY
Qty: 30 G | Refills: 0 | Status: SHIPPED | OUTPATIENT
Start: 2020-01-10 | End: 2020-03-24 | Stop reason: ALTCHOICE

## 2020-01-10 NOTE — ASSESSMENT & PLAN NOTE
Patient is currently getting migraines average once a week  She was encouraged to start the magnesium, which she has not yet tried  She states that she will try and continue with ibuprofen as needed for treatment the migraines they occur  We will reassess at next visit

## 2020-01-10 NOTE — ASSESSMENT & PLAN NOTE
Will look for improvement with starting therapy  She will continue with the Zoloft 100 mg daily  She is choosing to hold off on starting Abilify now  She will continue with Psychiatry

## 2020-01-10 NOTE — ASSESSMENT & PLAN NOTE
Reviewed with patient rash appears consistent with tinea pedis  She was given a script for Lotrisone to try twice daily for the next 2 weeks  If it does not improve, I would recommend follow-up with dermatology for further evaluation

## 2020-01-10 NOTE — TELEPHONE ENCOUNTER
T/C to confirm appointment scheduled for 1/13/2020 @ 4:00pm  [place] did not auto-confirm this appointment; therefore, I made a direct confirmation call  Spoke with client  Confirmed they will be in attendance at the scheduled time

## 2020-01-10 NOTE — ASSESSMENT & PLAN NOTE
We reviewed that her pain appears to be muscular in nature  She was given a slip for physical therapy to start  She should call with any problems or concerns  Otherwise we will reassess at next visit

## 2020-01-10 NOTE — ASSESSMENT & PLAN NOTE
Patient feels it has worse recently  She did not start the Abilify prescribed by Psychiatry because she would like see how things with doing therapy  She be starting therapy next week and is scheduled for visits every other forward  She will continue with Psychiatry as well

## 2020-01-24 ENCOUNTER — TELEPHONE (OUTPATIENT)
Dept: BEHAVIORAL/MENTAL HEALTH CLINIC | Facility: CLINIC | Age: 35
End: 2020-01-24

## 2020-01-24 NOTE — TELEPHONE ENCOUNTER
T/C to confirm appointment scheduled for 1/27/2020 @ 11:30am  Tanner Jorg[facility] did not auto-confirm this appointment; therefore, I made a direct confirmation call  No answer  Left message on voicemail  Provided my direct extension and asked for a call back if unable to keep scheduled appointment

## 2020-01-27 ENCOUNTER — SOCIAL WORK (OUTPATIENT)
Dept: BEHAVIORAL/MENTAL HEALTH CLINIC | Facility: CLINIC | Age: 35
End: 2020-01-27
Payer: COMMERCIAL

## 2020-01-27 DIAGNOSIS — F42.9 OBSESSIVE-COMPULSIVE DISORDER, UNSPECIFIED TYPE: Chronic | ICD-10-CM

## 2020-01-27 DIAGNOSIS — F31.5 BIPOLAR I DISORDER, MOST RECENT EPISODE DEPRESSED, SEVERE WITH PSYCHOTIC FEATURES (HCC): Primary | Chronic | ICD-10-CM

## 2020-01-27 DIAGNOSIS — F41.1 GAD (GENERALIZED ANXIETY DISORDER): Chronic | ICD-10-CM

## 2020-01-27 PROCEDURE — 90834 PSYTX W PT 45 MINUTES: CPT | Performed by: SOCIAL WORKER
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
## 2020-02-07 ENCOUNTER — TELEPHONE (OUTPATIENT)
Dept: BEHAVIORAL/MENTAL HEALTH CLINIC | Facility: CLINIC | Age: 35
End: 2020-02-07

## 2020-02-07 NOTE — TELEPHONE ENCOUNTER
T/C to confirm appointment scheduled for 2/10/2020 @ 10:30am  Leopold Bell did not auto-confirm this appointment; therefore, I made a direct confirmation call  No answer  Left message on voicemail  Provided my direct extension and asked for a call back if unable to keep scheduled appointment

## 2020-02-10 ENCOUNTER — SOCIAL WORK (OUTPATIENT)
Dept: BEHAVIORAL/MENTAL HEALTH CLINIC | Facility: CLINIC | Age: 35
End: 2020-02-10
Payer: COMMERCIAL

## 2020-02-10 DIAGNOSIS — F41.1 GAD (GENERALIZED ANXIETY DISORDER): Chronic | ICD-10-CM

## 2020-02-10 DIAGNOSIS — F42.9 OBSESSIVE-COMPULSIVE DISORDER, UNSPECIFIED TYPE: Chronic | ICD-10-CM

## 2020-02-10 DIAGNOSIS — F31.5 BIPOLAR I DISORDER, MOST RECENT EPISODE DEPRESSED, SEVERE WITH PSYCHOTIC FEATURES (HCC): Primary | Chronic | ICD-10-CM

## 2020-02-10 PROCEDURE — 90834 PSYTX W PT 45 MINUTES: CPT | Performed by: SOCIAL WORKER

## 2020-02-10 NOTE — PSYCH
Psychotherapy Provided: Individual Psychotherapy 45 minutes     Length of time in session: 45 minutes, follow up in 2 week    Goals addressed in session: Goal 1     Pain:      moderate to severe-- anxiety related to family stressors    Current suicide risk : Low     DATA: Met with Francisco Javier Freitas for scheduled individual session  "I am stressed out and worried about my mother " She talked about her brother's legal situation and a recent court issue  He was re-arrested and is currently incarcerated  When he was rearrested, Libertad's mother attempted to ask questions and intervene  Her mother was arrested at the courthouse  Francisco Javier Freitas, her boyfriend, and her father posted bail  Her mother is currently out of longterm and is awaiting trial  Francisco Javier Freitas is upset with her younger brother, because he did not offer any financial assistance or emotional support  We spent significant time talking about Libertad's tendency to care for others  "Most of my worry is about everyone else " Francisco Javier Freitas admits that she struggles to take time for herself  We spent some time reviewing and revising Libertad's treatment plan  She states she wants to learn how to manage her anxiety and not have physical manifestations of her mental health symptoms  She is willing to work on building mindfulness skills and is open to regular therapy sessions to help her learn how to set appropriate limits and boundaries with her family members  We will work on Libertad's ability to set aside some time for herself  ASSESSMENT: Francisco Javier Freitas presents with a primarily anxious mood  Her affect is normal and mood-congruent  Francisco Javier Freitas exhibits a growing positive rapport with this clinician  Francisco Javier Freitas appears to have normal insight and judgment  Francisco Javier Freitas continues to exhibit willingness to work on treatment goals and objectives  PLAN: Francisco Javier Freitas will return in two weeks for the next scheduled session   Between sessions, Francisco Javier Freitas will work on setting limits and boundaries regarding time spent with extended family  She will increase her use of self-care and will report back during the next session re: successes and barriers  At the next session, this clinician will use client-centered therapy, mindfulness-based strategies, DBT-informed skills and solution-focused therapy to address her anxiety and ability to set limits with family members, in an effort to [address] with meeting treatment goals  Behavioral Health Treatment Plan [facility]: Diagnosis and Treatment Plan explained to Diony Roque relates understanding diagnosis and is agreeable to Treatment Plan   Yes

## 2020-02-10 NOTE — BH TREATMENT PLAN
Jigar Beach  1985       Date of Initial Treatment Plan: October 3, 2019   Date of Current Treatment Plan: 02/10/20    Treatment Plan Number 2     Strengths/Personal Resources for Self Care: Supportive boyfriend; Good family relationships;     Diagnosis:   1  Bipolar I disorder, most recent episode depressed, severe with psychotic features ([facility] [address] )     2  SHELLY (generalized anxiety disorder)     3  Obsessive-compulsive disorder, unspecified type         Area of Needs: Anxiety and mood regulation      Long Term Goal 1: "I want to have less chaos  I want to be able to deal with my life, with less anxiety  I don't want to react and take it out on people "    Target Date:  Completion Date:          Short Term Objectives for Goal 1:          1  Sami العراقيer will identify triggers and prompting events that increase symptoms of anger, depression, and anxiety              2  Sami العراقيer will learn and exhibit understanding of a minimum of three distress tolerance skills to assist with symptom reduction              3  Sami العراقيer will learn and exhibit understanding of effective communication skills (using a DBT-informed perspective), especially with her boyfriend  4  Sami العراقيer will identify and maintain personal limits and boundaries in relationships with her boyfriend and others, as needed  Any boundary crossings will be discussed in therapy sessions  5  When appropriate, the clinician and Mylesmorena العراقيer will begin to identify target areas to explore and process past trauma that is effecting current relationships and functioning              6  Clinician will provide psychoeducation regarding options for processing past trauma (including, but not limited to, prolonged exposure, bilateral stimulation)              7  Bunatemorena العراقيer will maintain a level of anxiety/depression that does not surpass a 3/10 on most days  Incidents that surpass this limit will be process in therapy sessions        GOAL 1: Modality: Individual 1-2x per month   Completion Date to be determined and The person(s) responsible for carrying out the plan is  Yolande Weinstein (client) and Milana Frausto (clinician)    Clinician will use client-centered therapy, mindfulness-based strategies, DBT-informed skills and solution-focused therapy to address Libertad's symptoms of anxiety and mood regulation  Yolande Weinstein will practice skills between sessions and will report back, during subsequent sessions regarding successes and barriers  Behavioral Health Treatment Plan [facility]: Diagnosis and Treatment Plan explained to Ester Juares relates understanding diagnosis and is agreeable to Treatment Plan         Client Comments : Please share your thoughts, feelings, need and/or experiences regarding your treatment plan: "I feel like I am getting comfortable "

## 2020-02-10 NOTE — PSYCH
Treatment Plan Tracking    # 2  Treatment Plan not completed within required time limits due to: Leopold Bell cancelled session on 1/13/2020  She came to 1/27/2020 session, and we needed to review her current life situation to develop her new treatment plan   Treatment plan was able to be completed today (2/10/2020)

## 2020-02-11 ENCOUNTER — OFFICE VISIT (OUTPATIENT)
Dept: FAMILY MEDICINE CLINIC | Facility: CLINIC | Age: 35
End: 2020-02-11
Payer: COMMERCIAL

## 2020-02-11 VITALS
HEART RATE: 106 BPM | TEMPERATURE: 98.5 F | OXYGEN SATURATION: 98 % | BODY MASS INDEX: 36.64 KG/M2 | HEIGHT: 62 IN | WEIGHT: 199.13 LBS | DIASTOLIC BLOOD PRESSURE: 80 MMHG | SYSTOLIC BLOOD PRESSURE: 110 MMHG

## 2020-02-11 DIAGNOSIS — T25.132A: Primary | ICD-10-CM

## 2020-02-11 DIAGNOSIS — J06.9 VIRAL URI: ICD-10-CM

## 2020-02-11 PROCEDURE — 99213 OFFICE O/P EST LOW 20 MIN: CPT | Performed by: PHYSICIAN ASSISTANT

## 2020-02-11 PROCEDURE — 1036F TOBACCO NON-USER: CPT | Performed by: PHYSICIAN ASSISTANT

## 2020-02-11 RX ORDER — GINSENG 100 MG
CAPSULE ORAL
Qty: 15 G | Refills: 0 | Status: SHIPPED | OUTPATIENT
Start: 2020-02-11 | End: 2020-03-24 | Stop reason: ALTCHOICE
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
## 2020-02-19 ENCOUNTER — LAB (OUTPATIENT)
Dept: LAB | Facility: CLINIC | Age: 35
End: 2020-02-19
Payer: COMMERCIAL

## 2020-02-19 DIAGNOSIS — Z79.899 LONG-TERM USE OF HIGH-RISK MEDICATION: Chronic | ICD-10-CM

## 2020-02-19 DIAGNOSIS — E83.51 LOW CALCIUM LEVELS: ICD-10-CM

## 2020-02-19 DIAGNOSIS — F31.5 BIPOLAR I DISORDER, MOST RECENT EPISODE DEPRESSED, SEVERE WITH PSYCHOTIC FEATURES (HCC): Chronic | ICD-10-CM

## 2020-02-19 LAB
ALBUMIN SERPL BCP-MCNC: 3.6 G/DL (ref 3.5–5)
ALP SERPL-CCNC: 68 U/L (ref 46–116)
ALT SERPL W P-5'-P-CCNC: 30 U/L (ref 12–78)
ANION GAP SERPL CALCULATED.3IONS-SCNC: 4 MMOL/L (ref 4–13)
AST SERPL W P-5'-P-CCNC: 14 U/L (ref 5–45)
BASOPHILS # BLD AUTO: 0.1 THOUSANDS/ΜL (ref 0–0.1)
BASOPHILS NFR BLD AUTO: 1 % (ref 0–1)
BILIRUB SERPL-MCNC: 0.29 MG/DL (ref 0.2–1)
BUN SERPL-MCNC: 10 MG/DL (ref 5–25)
CALCIUM SERPL-MCNC: 8.5 MG/DL (ref 8.3–10.1)
CHLORIDE SERPL-SCNC: 108 MMOL/L (ref 100–108)
CHOLEST SERPL-MCNC: 199 MG/DL (ref 50–200)
CO2 SERPL-SCNC: 25 MMOL/L (ref 21–32)
CREAT SERPL-MCNC: 0.68 MG/DL (ref 0.6–1.3)
EOSINOPHIL # BLD AUTO: 0.15 THOUSAND/ΜL (ref 0–0.61)
EOSINOPHIL NFR BLD AUTO: 2 % (ref 0–6)
ERYTHROCYTE [DISTWIDTH] IN BLOOD BY AUTOMATED COUNT: 12.6 % (ref 11.6–15.1)
EST. AVERAGE GLUCOSE BLD GHB EST-MCNC: 103 MG/DL
GFR SERPL CREATININE-BSD FRML MDRD: 115 ML/MIN/1.73SQ M
GLUCOSE P FAST SERPL-MCNC: 83 MG/DL (ref 65–99)
HBA1C MFR BLD: 5.2 %
HCG SERPL QL: NEGATIVE
HCT VFR BLD AUTO: 44.6 % (ref 34.8–46.1)
HDLC SERPL-MCNC: 53 MG/DL
HGB BLD-MCNC: 14 G/DL (ref 11.5–15.4)
IMM GRANULOCYTES # BLD AUTO: 0.05 THOUSAND/UL (ref 0–0.2)
IMM GRANULOCYTES NFR BLD AUTO: 1 % (ref 0–2)
LDLC SERPL CALC-MCNC: 124 MG/DL (ref 0–100)
LYMPHOCYTES # BLD AUTO: 3.27 THOUSANDS/ΜL (ref 0.6–4.47)
LYMPHOCYTES NFR BLD AUTO: 40 % (ref 14–44)
MCH RBC QN AUTO: 27.9 PG (ref 26.8–34.3)
MCHC RBC AUTO-ENTMCNC: 31.4 G/DL (ref 31.4–37.4)
MCV RBC AUTO: 89 FL (ref 82–98)
MONOCYTES # BLD AUTO: 0.62 THOUSAND/ΜL (ref 0.17–1.22)
MONOCYTES NFR BLD AUTO: 8 % (ref 4–12)
NEUTROPHILS # BLD AUTO: 4.04 THOUSANDS/ΜL (ref 1.85–7.62)
NEUTS SEG NFR BLD AUTO: 48 % (ref 43–75)
NONHDLC SERPL-MCNC: 146 MG/DL
NRBC BLD AUTO-RTO: 0 /100 WBCS
PLATELET # BLD AUTO: 243 THOUSANDS/UL (ref 149–390)
PMV BLD AUTO: 10 FL (ref 8.9–12.7)
POTASSIUM SERPL-SCNC: 4.1 MMOL/L (ref 3.5–5.3)
PROT SERPL-MCNC: 7.4 G/DL (ref 6.4–8.2)
RBC # BLD AUTO: 5.02 MILLION/UL (ref 3.81–5.12)
SODIUM SERPL-SCNC: 137 MMOL/L (ref 136–145)
TRIGL SERPL-MCNC: 109 MG/DL
TSH SERPL DL<=0.05 MIU/L-ACNC: 1.83 UIU/ML (ref 0.36–3.74)
WBC # BLD AUTO: 8.23 THOUSAND/UL (ref 4.31–10.16)

## 2020-02-19 PROCEDURE — 84703 CHORIONIC GONADOTROPIN ASSAY: CPT

## 2020-02-19 PROCEDURE — 80053 COMPREHEN METABOLIC PANEL: CPT

## 2020-02-19 PROCEDURE — 85025 COMPLETE CBC W/AUTO DIFF WBC: CPT

## 2020-02-19 PROCEDURE — 80061 LIPID PANEL: CPT

## 2020-02-19 PROCEDURE — 36415 COLL VENOUS BLD VENIPUNCTURE: CPT

## 2020-02-19 PROCEDURE — 84443 ASSAY THYROID STIM HORMONE: CPT

## 2020-02-19 PROCEDURE — 83036 HEMOGLOBIN GLYCOSYLATED A1C: CPT

## 2020-02-24 ENCOUNTER — SOCIAL WORK (OUTPATIENT)
Dept: BEHAVIORAL/MENTAL HEALTH CLINIC | Facility: CLINIC | Age: 35
End: 2020-02-24
Payer: COMMERCIAL

## 2020-02-24 DIAGNOSIS — F31.5 BIPOLAR I DISORDER, MOST RECENT EPISODE DEPRESSED, SEVERE WITH PSYCHOTIC FEATURES (HCC): Primary | Chronic | ICD-10-CM

## 2020-02-24 DIAGNOSIS — F42.9 OBSESSIVE-COMPULSIVE DISORDER, UNSPECIFIED TYPE: Chronic | ICD-10-CM

## 2020-02-24 DIAGNOSIS — F41.1 GAD (GENERALIZED ANXIETY DISORDER): Chronic | ICD-10-CM

## 2020-02-24 PROCEDURE — 90834 PSYTX W PT 45 MINUTES: CPT | Performed by: SOCIAL WORKER

## 2020-02-25 DIAGNOSIS — F33.2 SEVERE EPISODE OF RECURRENT MAJOR DEPRESSIVE DISORDER, WITHOUT PSYCHOTIC FEATURES (HCC): ICD-10-CM

## 2020-02-25 DIAGNOSIS — M26.623 BILATERAL TEMPOROMANDIBULAR JOINT PAIN: ICD-10-CM

## 2020-02-25 DIAGNOSIS — G44.209 TENSION HEADACHE: ICD-10-CM

## 2020-02-25 RX ORDER — IBUPROFEN 600 MG/1
TABLET ORAL
Qty: 30 TABLET | Refills: 0 | Status: SHIPPED | OUTPATIENT
Start: 2020-02-25 | End: 2020-05-11 | Stop reason: SDUPTHER

## 2020-02-25 RX ORDER — SERTRALINE HYDROCHLORIDE 100 MG/1
100 TABLET, FILM COATED ORAL DAILY
Qty: 30 TABLET | Refills: 5 | Status: SHIPPED | OUTPATIENT
Start: 2020-02-25 | End: 2020-11-24 | Stop reason: ALTCHOICE
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
## 2020-03-13 ENCOUNTER — TELEPHONE (OUTPATIENT)
Dept: FAMILY MEDICINE CLINIC | Facility: CLINIC | Age: 35
End: 2020-03-13

## 2020-03-13 DIAGNOSIS — Z20.828 EXPOSURE TO THE FLU: Primary | ICD-10-CM

## 2020-03-13 RX ORDER — OSELTAMIVIR PHOSPHATE 75 MG/1
75 CAPSULE ORAL DAILY
Qty: 10 CAPSULE | Refills: 0 | Status: SHIPPED | OUTPATIENT
Start: 2020-03-13 | End: 2020-03-24 | Stop reason: ALTCHOICE

## 2020-03-24 ENCOUNTER — TELEMEDICINE (OUTPATIENT)
Dept: PSYCHIATRY | Facility: CLINIC | Age: 35
End: 2020-03-24
Payer: COMMERCIAL

## 2020-03-24 VITALS — HEIGHT: 62 IN | WEIGHT: 200 LBS | BODY MASS INDEX: 36.8 KG/M2

## 2020-03-24 DIAGNOSIS — F42.9 OBSESSIVE-COMPULSIVE DISORDER, UNSPECIFIED TYPE: Chronic | ICD-10-CM

## 2020-03-24 DIAGNOSIS — F31.5 BIPOLAR I DISORDER, MOST RECENT EPISODE DEPRESSED, SEVERE WITH PSYCHOTIC FEATURES (HCC): Primary | Chronic | ICD-10-CM

## 2020-03-24 DIAGNOSIS — G47.09 OTHER INSOMNIA: Chronic | ICD-10-CM

## 2020-03-24 DIAGNOSIS — F41.1 GAD (GENERALIZED ANXIETY DISORDER): Chronic | ICD-10-CM

## 2020-03-24 PROCEDURE — 99213 OFFICE O/P EST LOW 20 MIN: CPT | Performed by: PSYCHIATRY & NEUROLOGY

## 2020-03-24 PROCEDURE — 3008F BODY MASS INDEX DOCD: CPT | Performed by: INTERNAL MEDICINE
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
## 2020-03-24 NOTE — PSYCH
Virtual Regular Visit    Problem List Items Addressed This Visit        Other    Bipolar I disorder, most recent episode depressed, severe with psychotic features ([facility] [address] ) - Primary (Chronic)    SHELLY (generalized anxiety disorder) (Chronic)    Obsessive-compulsive disorder, unspecified (Chronic)    Other insomnia (Chronic)          [unfilled]    Reason for visit is ***    Encounter provider Temo Miner MD    Provider located at [facility]      @@     After connecting through [employer] the patient was identified by name and date of birth  Shell Escobedo was informed that this is a telemedicine visit and that the visit is being conducted through {AMB CORONAVIRUS VISIT [facility]:[id number]} which may not be secure and therefore, might not be HIPAA-compliant  {Telemedicine confidentiality :63064} {Telemedicine participants:[id number]}  She acknowledged consent and understanding of privacy and security of the video platform  The patient has agreed to participate and understands they can discontinue the visit at any time  Subjective  Shell Escobedo is a 36 y o  female ***        Past Medical History:   Diagnosis Date    Abnormal Pap smear of cervix     Eczema     Exposure to chlamydia     Resolved 17    Migraine without aura and without status migrainosus, not intractable 10/11/2019    Obesity     Postpartum depression 2018    Manoj-Parkinson-White (WPW) syndrome        Past Surgical History:   Procedure Laterality Date    ABLATION OF DYSRHYTHMIC FOCUS      WPW ablation age 15    COLONOSCOPY      COLPOSCOPY      INDUCED       OTHER SURGICAL HISTORY      catheter ablation- WPW age 13       Current Outpatient Medications   Medication Sig Dispense Refill    ibuprofen (MOTRIN) 600 mg tablet Take 1 p o  Q 6 hours p r n headache 30 tablet 0    sertraline (ZOLOFT) 100 mg tablet Take 1 tablet (100 mg total) by mouth daily 30 tablet 5    ARIPiprazole (ABILIFY) 5 mg tablet Take 1 tablet (5 mg total) by mouth daily at bedtime (Patient not taking: Reported on 1/10/2020) 30 tablet 2    etonogestrel-ethinyl estradiol (NUVARING) 0 12-0 015 MG/24HR vaginal ring Insert vaginally and leave in place for 3 consecutive weeks, then remove for 1 week  (Patient not taking: Reported on 1/10/2020) 3 each 1     No current facility-administered medications for this visit  Allergies   Allergen Reactions    Codeine Other (See Comments)     dry heaves    Sulfa Antibiotics Hives and Rash     Per pt as achild    Erythromycin Hives     Per as a child       Review of Systems      I spent *** minutes with the patient during this visit

## 2020-03-30 ENCOUNTER — TELEMEDICINE (OUTPATIENT)
Dept: BEHAVIORAL/MENTAL HEALTH CLINIC | Facility: CLINIC | Age: 35
End: 2020-03-30
Payer: COMMERCIAL

## 2020-03-30 ENCOUNTER — TELEPHONE (OUTPATIENT)
Dept: BEHAVIORAL/MENTAL HEALTH CLINIC | Facility: CLINIC | Age: 35
End: 2020-03-30

## 2020-03-30 DIAGNOSIS — F31.5 BIPOLAR I DISORDER, MOST RECENT EPISODE DEPRESSED, SEVERE WITH PSYCHOTIC FEATURES (HCC): Primary | Chronic | ICD-10-CM

## 2020-03-30 DIAGNOSIS — F42.9 OBSESSIVE-COMPULSIVE DISORDER, UNSPECIFIED TYPE: Chronic | ICD-10-CM

## 2020-03-30 DIAGNOSIS — F41.1 GAD (GENERALIZED ANXIETY DISORDER): Chronic | ICD-10-CM

## 2020-03-30 PROCEDURE — 90834 PSYTX W PT 45 MINUTES: CPT | Performed by: SOCIAL WORKER

## 2020-03-30 NOTE — TELEPHONE ENCOUNTER
T/C to Mark Cotton to ask her for her preferred method of communication for today's session  She did not answer  LM on her voicemail and requested a call back to determine her preference for today's communication  Provided her with my desk telephone number for a return call
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
## 2020-04-16 ENCOUNTER — OFFICE VISIT (OUTPATIENT)
Dept: FAMILY MEDICINE CLINIC | Facility: CLINIC | Age: 35
End: 2020-04-16
Payer: COMMERCIAL

## 2020-04-16 VITALS
HEART RATE: 100 BPM | BODY MASS INDEX: 36.58 KG/M2 | SYSTOLIC BLOOD PRESSURE: 110 MMHG | HEIGHT: 62 IN | DIASTOLIC BLOOD PRESSURE: 60 MMHG | OXYGEN SATURATION: 98 % | TEMPERATURE: 98.1 F | RESPIRATION RATE: 18 BRPM

## 2020-04-16 DIAGNOSIS — N30.00 ACUTE CYSTITIS WITHOUT HEMATURIA: Primary | ICD-10-CM

## 2020-04-16 DIAGNOSIS — R35.0 URINE FREQUENCY: ICD-10-CM

## 2020-04-16 LAB
SL AMB  POCT GLUCOSE, UA: NEGATIVE
SL AMB LEUKOCYTE ESTERASE,UA: ABNORMAL
SL AMB POCT BILIRUBIN,UA: ABNORMAL
SL AMB POCT BLOOD,UA: ABNORMAL
SL AMB POCT CLARITY,UA: ABNORMAL
SL AMB POCT COLOR,UA: ABNORMAL
SL AMB POCT KETONES,UA: NEGATIVE
SL AMB POCT NITRITE,UA: NEGATIVE
SL AMB POCT PH,UA: 6.5
SL AMB POCT SPECIFIC GRAVITY,UA: 1.02
SL AMB POCT URINE PROTEIN: 0.2
SL AMB POCT UROBILINOGEN: 0.2

## 2020-04-16 PROCEDURE — 87086 URINE CULTURE/COLONY COUNT: CPT | Performed by: INTERNAL MEDICINE

## 2020-04-16 PROCEDURE — 81002 URINALYSIS NONAUTO W/O SCOPE: CPT | Performed by: INTERNAL MEDICINE

## 2020-04-16 PROCEDURE — 87186 SC STD MICRODIL/AGAR DIL: CPT | Performed by: INTERNAL MEDICINE

## 2020-04-16 PROCEDURE — 99213 OFFICE O/P EST LOW 20 MIN: CPT | Performed by: INTERNAL MEDICINE

## 2020-04-16 PROCEDURE — 1036F TOBACCO NON-USER: CPT | Performed by: INTERNAL MEDICINE

## 2020-04-16 PROCEDURE — 87077 CULTURE AEROBIC IDENTIFY: CPT | Performed by: INTERNAL MEDICINE

## 2020-04-16 RX ORDER — CEPHALEXIN 250 MG/1
250 CAPSULE ORAL EVERY 6 HOURS SCHEDULED
Qty: 20 CAPSULE | Refills: 0 | Status: SHIPPED | OUTPATIENT
Start: 2020-04-16 | End: 2020-04-20 | Stop reason: ALTCHOICE

## 2020-04-18 LAB — BACTERIA UR CULT: ABNORMAL

## 2020-04-19 ENCOUNTER — TELEPHONE (OUTPATIENT)
Dept: OTHER | Facility: OTHER | Age: 35
End: 2020-04-19

## 2020-04-19 ENCOUNTER — TELEPHONE (OUTPATIENT)
Dept: FAMILY MEDICINE CLINIC | Facility: CLINIC | Age: 35
End: 2020-04-19

## 2020-04-20 ENCOUNTER — TELEMEDICINE (OUTPATIENT)
Dept: FAMILY MEDICINE CLINIC | Facility: CLINIC | Age: 35
End: 2020-04-20
Payer: COMMERCIAL

## 2020-04-20 VITALS — TEMPERATURE: 99 F

## 2020-04-20 DIAGNOSIS — Z20.828 EXPOSURE TO SARS-ASSOCIATED CORONAVIRUS: ICD-10-CM

## 2020-04-20 DIAGNOSIS — Z20.828 EXPOSURE TO SARS-ASSOCIATED CORONAVIRUS: Primary | ICD-10-CM

## 2020-04-20 DIAGNOSIS — N30.00 ACUTE CYSTITIS WITHOUT HEMATURIA: ICD-10-CM

## 2020-04-20 PROCEDURE — 87635 SARS-COV-2 COVID-19 AMP PRB: CPT

## 2020-04-20 PROCEDURE — 99213 OFFICE O/P EST LOW 20 MIN: CPT | Performed by: INTERNAL MEDICINE

## 2020-04-20 RX ORDER — NITROFURANTOIN 25; 75 MG/1; MG/1
100 CAPSULE ORAL 2 TIMES DAILY
Qty: 10 CAPSULE | Refills: 0 | Status: SHIPPED | OUTPATIENT
Start: 2020-04-20 | End: 2020-04-25

## 2020-04-21 LAB — SARS-COV-2 RNA SPEC QL NAA+PROBE: NOT DETECTED

## 2020-04-27 ENCOUNTER — TELEMEDICINE (OUTPATIENT)
Dept: BEHAVIORAL/MENTAL HEALTH CLINIC | Facility: CLINIC | Age: 35
End: 2020-04-27
Payer: COMMERCIAL

## 2020-04-27 DIAGNOSIS — F31.5 BIPOLAR I DISORDER, MOST RECENT EPISODE DEPRESSED, SEVERE WITH PSYCHOTIC FEATURES (HCC): Primary | Chronic | ICD-10-CM

## 2020-04-27 DIAGNOSIS — F41.1 GAD (GENERALIZED ANXIETY DISORDER): Chronic | ICD-10-CM

## 2020-04-27 DIAGNOSIS — F42.9 OBSESSIVE-COMPULSIVE DISORDER, UNSPECIFIED TYPE: Chronic | ICD-10-CM

## 2020-04-27 PROCEDURE — 90834 PSYTX W PT 45 MINUTES: CPT | Performed by: SOCIAL WORKER

## 2020-05-11 DIAGNOSIS — G44.209 TENSION HEADACHE: ICD-10-CM

## 2020-05-11 DIAGNOSIS — M26.623 BILATERAL TEMPOROMANDIBULAR JOINT PAIN: ICD-10-CM

## 2020-05-12 RX ORDER — IBUPROFEN 600 MG/1
TABLET ORAL
Qty: 30 TABLET | Refills: 0 | Status: SHIPPED | OUTPATIENT
Start: 2020-05-12 | End: 2020-10-13 | Stop reason: ALTCHOICE

## 2020-06-01 ENCOUNTER — TELEMEDICINE (OUTPATIENT)
Dept: BEHAVIORAL/MENTAL HEALTH CLINIC | Facility: CLINIC | Age: 35
End: 2020-06-01
Payer: COMMERCIAL

## 2020-06-01 DIAGNOSIS — F31.5 BIPOLAR I DISORDER, MOST RECENT EPISODE DEPRESSED, SEVERE WITH PSYCHOTIC FEATURES (HCC): Primary | Chronic | ICD-10-CM

## 2020-06-01 DIAGNOSIS — F41.1 GAD (GENERALIZED ANXIETY DISORDER): Chronic | ICD-10-CM

## 2020-06-01 PROCEDURE — 90834 PSYTX W PT 45 MINUTES: CPT | Performed by: SOCIAL WORKER

## 2020-06-29 ENCOUNTER — TELEMEDICINE (OUTPATIENT)
Dept: BEHAVIORAL/MENTAL HEALTH CLINIC | Facility: CLINIC | Age: 35
End: 2020-06-29
Payer: COMMERCIAL

## 2020-06-29 DIAGNOSIS — F42.9 OBSESSIVE-COMPULSIVE DISORDER, UNSPECIFIED TYPE: Chronic | ICD-10-CM

## 2020-06-29 DIAGNOSIS — F41.1 GAD (GENERALIZED ANXIETY DISORDER): Chronic | ICD-10-CM

## 2020-06-29 DIAGNOSIS — F31.5 BIPOLAR I DISORDER, MOST RECENT EPISODE DEPRESSED, SEVERE WITH PSYCHOTIC FEATURES (HCC): Primary | Chronic | ICD-10-CM

## 2020-06-29 PROCEDURE — 90834 PSYTX W PT 45 MINUTES: CPT | Performed by: SOCIAL WORKER

## 2020-07-13 ENCOUNTER — TELEMEDICINE (OUTPATIENT)
Dept: BEHAVIORAL/MENTAL HEALTH CLINIC | Facility: CLINIC | Age: 35
End: 2020-07-13
Payer: COMMERCIAL

## 2020-07-13 DIAGNOSIS — F41.1 GAD (GENERALIZED ANXIETY DISORDER): Chronic | ICD-10-CM

## 2020-07-13 DIAGNOSIS — F31.5 BIPOLAR I DISORDER, MOST RECENT EPISODE DEPRESSED, SEVERE WITH PSYCHOTIC FEATURES (HCC): Primary | Chronic | ICD-10-CM

## 2020-07-13 DIAGNOSIS — F42.9 OBSESSIVE-COMPULSIVE DISORDER, UNSPECIFIED TYPE: Chronic | ICD-10-CM

## 2020-07-13 PROCEDURE — 90834 PSYTX W PT 45 MINUTES: CPT | Performed by: SOCIAL WORKER

## 2020-07-13 NOTE — PSYCH
Virtual Regular Visit      Assessment/Plan:    Problem List Items Addressed This Visit        Other    Bipolar I disorder, most recent episode depressed, severe with psychotic features ([facility] [address] ) - Primary (Chronic)    SHELLY (generalized anxiety disorder) (Chronic)    Obsessive-compulsive disorder, unspecified (Chronic)               Reason for visit is Behavioral Health session, conducted through video, due to COVID-19 precautions       Encounter provider CHARLIE Ovalles    Provider located at [address] [place] [address] Rd [id number]      Recent Visits  No visits were found meeting these conditions  Showing recent visits within past 7 days and meeting all other requirements     Future Appointments  No visits were found meeting these conditions  Showing future appointments within next 150 days and meeting all other requirements        The patient was identified by name and date of birth  Charissa Farmer was informed that this is a telemedicine visit and that the visit is being conducted through [employer]  My office door was closed  No one else was in the room  She acknowledged consent and understanding of privacy and security of the video platform  The patient has agreed to participate and understands they can discontinue the visit at any time  Patient is aware this is a billable service  Subjective  Charissa Farmer is a 36 y o  female  DATA: Met with Dale Orr for scheduled individual session  "Things are not going well at all " Dale Orr discussed her relationship with her boyfriend  She states that he is not taking his psychiatric medications at this point  She states that the two of them have not been getting along as well as they had been  She cited an incident when she felt very disrespected by him  She made a decision to leave the home and stayed with a friend overnight  We discussed Libertad's plans for today   She is unsure what her current plans are  She states that she would like to work things out with him, but "It's not on me  He needs to change " I asked her specifically what he needs to do to show that he is working on himself " She states that she needs for him to start taking his medications again, he needs to find a job, and he needs to be more respectful to her  We discussed her urge to either go out and spend money or eat, to deal with her anger  We discussed some pros/cons of engaging in these behaviors  We also discussed pros/cons of her returning home today  She agreed that she would take some time to be mindful and get her thoughts together  I encouraged her to write down her limits and boundaries, so (when she is calmer) she can engage in a productive conversation with him  She states she will be returning to the home later, as he needs the car for an appointment  We scheduled a follow up session for one week from today  She acknowledged that she has my phone number and will call for additional support, if needed  This clinician reviewed her safety  She states that she does not feel physically unsafe if she returns to the home  She feels angry and frustrated and does not want to engage in another argument with him  She identified that she has the option of staying at her mother's house if she needs a safe place to stay  Of note-- Alana Maria was out with a friend while conducting this session  I encouraged her to be in a place where she can have privacy for next week's session, as she appeared to be very distracted during this session  She acknowledged understanding and agreed  ASSESSMENT: Alana Maria presents with a sad, irritable, and anxious mood  Her affect is mood-congruent  Alana Maria exhibits a positive therapeutic rapport with this clinician  Alana Maria appears to have normal insight and judgment  Alana Maria continues to exhibit willingness to work on treatment goals and objectives         PLAN: Alana Maria will return in one week for the next scheduled session  Between sessions, Cesar Faye will use her mindfulness-based skills to regain her composure prior to going home  She will utilize effective communication skills to interact with her boyfriend and will report back during the next session re: successes and barriers  At the next session, this clinician will use client-centered therapy, mindfulness-based strategies, DBT-informed skills and solution-focused therapy to address her mood regulation, in an effort to assist Cesar Faye with meeting treatment goals  HPI     Past Medical History:   Diagnosis Date    Abnormal Pap smear of cervix     Eczema     Exposure to chlamydia     Resolved 17    Migraine without aura and without status migrainosus, not intractable 10/11/2019    Obesity     Postpartum depression 2018    Manoj-Parkinson-White (WPW) syndrome        Past Surgical History:   Procedure Laterality Date    ABLATION OF DYSRHYTHMIC FOCUS      WPW ablation age 15    COLONOSCOPY      COLPOSCOPY      INDUCED       OTHER SURGICAL HISTORY      catheter ablation- WPW age 13       Current Outpatient Medications   Medication Sig Dispense Refill    ARIPiprazole (ABILIFY) 5 mg tablet Take 1 tablet (5 mg total) by mouth daily at bedtime (Patient not taking: Reported on 1/10/2020) 30 tablet 2    etonogestrel-ethinyl estradiol (NUVARING) 0 12-0 015 MG/24HR vaginal ring Insert vaginally and leave in place for 3 consecutive weeks, then remove for 1 week  3 each 1    ibuprofen (MOTRIN) 600 mg tablet Take 1 p o  Q 6 hours p r n headache 30 tablet 0    sertraline (ZOLOFT) 100 mg tablet Take 1 tablet (100 mg total) by mouth daily 30 tablet 5     No current facility-administered medications for this visit           Allergies   Allergen Reactions    Codeine Other (See Comments)     dry heaves    Sulfa Antibiotics Hives and Rash     Per pt as achild    Erythromycin Hives     Per as a child       Review of Systems    Video Exam    There were no vitals filed for this visit  Physical Exam     I spent 50 minutes directly with the patient during this visit      VIRTUAL VISIT DISCLAIMER    Keiliam Darian acknowledges that she has consented to an online visit or consultation  She understands that the online visit is based solely on information provided by her, and that, in the absence of a face-to-face physical evaluation by the physician, the diagnosis she receives is both limited and provisional in terms of accuracy and completeness  This is not intended to replace a full medical face-to-face evaluation by the physician  Charissa Farmer understands and accepts these terms

## 2020-07-21 ENCOUNTER — TELEMEDICINE (OUTPATIENT)
Dept: BEHAVIORAL/MENTAL HEALTH CLINIC | Facility: CLINIC | Age: 35
End: 2020-07-21
Payer: COMMERCIAL

## 2020-07-21 DIAGNOSIS — F41.1 GAD (GENERALIZED ANXIETY DISORDER): Chronic | ICD-10-CM

## 2020-07-21 DIAGNOSIS — F31.5 BIPOLAR I DISORDER, MOST RECENT EPISODE DEPRESSED, SEVERE WITH PSYCHOTIC FEATURES (HCC): Primary | Chronic | ICD-10-CM

## 2020-07-21 DIAGNOSIS — F42.9 OBSESSIVE-COMPULSIVE DISORDER, UNSPECIFIED TYPE: Chronic | ICD-10-CM

## 2020-07-21 DIAGNOSIS — G47.09 OTHER INSOMNIA: Chronic | ICD-10-CM

## 2020-07-21 PROCEDURE — 90834 PSYTX W PT 45 MINUTES: CPT | Performed by: SOCIAL WORKER

## 2020-07-21 NOTE — PSYCH
Virtual Regular Visit      Assessment/Plan:    Problem List Items Addressed This Visit        Other    Bipolar I disorder, most recent episode depressed, severe with psychotic features ([facility] [address] ) - Primary (Chronic)    SHELLY (generalized anxiety disorder) (Chronic)    Obsessive-compulsive disorder, unspecified (Chronic)    Other insomnia (Chronic)               Reason for visit is Behavioral Health session, conducted through video, due to COVID-19 precautions       Encounter provider CHARLIE Gabriel    Provider located at [address] [place] [address] [address] [phone]      Recent Visits  No visits were found meeting these conditions  Showing recent visits within past 7 days and meeting all other requirements     Future Appointments  No visits were found meeting these conditions  Showing future appointments within next 150 days and meeting all other requirements        The patient was identified by name and date of birth  Estefany Fernandez was informed that this is a telemedicine visit and that the visit is being conducted through [employer]  My office door was closed  No one else was in the room  She acknowledged consent and understanding of privacy and security of the video platform  The patient has agreed to participate and understands they can discontinue the visit at any time  Patient is aware this is a billable service  Subjective  Estefany Fernandez is a [address] [place]  female  DATA: Met with Rojelio Giron for scheduled individual session  "Things are are just ok  They're not good  They're ok " We discussed the events that have occurred since our last session  Rojelio Giron states that she talked with her SO and told him that she does not want to just pretend that things are fine  She asked him about participating in a couple's session   He will be discussing this with his therapist  Rojelio Giron expressed frustration about her partner not working and not bringing in an income  She states that he is not currently taking his medications  She states, "He works for a few months and then he says that he can't work " she states that he has sleep apnea and this impacts his energy and his moods  He is also telling her that he needs to take a break from school, because the jobs he is applying for do not match with his school schedule  She states that he had applied for short-term disability, but it was just denied  Mae Umaña is currently getting unemployment; however, that will end soon  She states that, at this point, she and her boyfriend are not really spending much time talking with one another  She is now working approximately every other day, and he cares for the children while she is at work  When this clinician asked her how committed she was to the relationship she stated that she was about 95% committed  I stated that it is important that she find a way to let him know how important it is for him to schedule the couple's session  We spent some time reviewing and revising her treatment plan  She provided verbal consent for the treatment plan update  We discussed the possibility of Mae Umaña coming into the office for her next session, as we can focus better on building her effective communication skills during an in-person session (where she is not distracted by her children or other daily activities)  She is agreeable to the COVID precautions, including wearing a mask and maintaining a 6' social distance during sessions  ASSESSMENT: Mae Umaña presents with a sad mood  Her affect is mood-congruent  Mae Umaña exhibits a positive therapeutic rapport with this clinician  aMe Umaña appears to have normal insight and judgment  Mae Umaña continues to exhibit willingness to work on treatment goals and objectives  PLAN: Mae Umaña will return in one week for the next scheduled session   Between sessions, Mae Umaña will work on finding a way to effectively communicate the importance of a couple's session with her SO and will report back during the next session re: successes and barriers  At the next session, this clinician will use client-centered therapy, mindfulness-based strategies, DBT-informed skills and solution-focused therapy to address her mood regulation and effective communication skills, in an effort to [address] with meeting treatment goals  HPI     Past Medical History:   Diagnosis Date    Abnormal Pap smear of cervix     Eczema     Exposure to chlamydia     Resolved 17    Migraine without aura and without status migrainosus, not intractable 10/11/2019    Obesity     Postpartum depression 2018    Manoj-Parkinson-White (WPW) syndrome        Past Surgical History:   Procedure Laterality Date    ABLATION OF DYSRHYTHMIC FOCUS      WPW ablation age 15    COLONOSCOPY      COLPOSCOPY      INDUCED       OTHER SURGICAL HISTORY      catheter ablation- WPW age 13       Current Outpatient Medications   Medication Sig Dispense Refill    ARIPiprazole (ABILIFY) 5 mg tablet Take 1 tablet (5 mg total) by mouth daily at bedtime (Patient not taking: Reported on 1/10/2020) 30 tablet 2    etonogestrel-ethinyl estradiol (NUVARING) 0 12-0 015 MG/24HR vaginal ring Insert vaginally and leave in place for 3 consecutive weeks, then remove for 1 week  3 each 1    ibuprofen (MOTRIN) 600 mg tablet Take 1 p o  Q 6 hours p r n headache 30 tablet 0    sertraline (ZOLOFT) 100 mg tablet Take 1 tablet (100 mg total) by mouth daily 30 tablet 5     No current facility-administered medications for this visit  Allergies   Allergen Reactions    Codeine Other (See Comments)     dry heaves    Sulfa Antibiotics Hives and Rash     Per pt as achild    Erythromycin Hives     Per as a child       Review of Systems    Video Exam    There were no vitals filed for this visit      Physical Exam     I spent 50 minutes directly with the patient during this visit      VIRTUAL VISIT DISCLAIMER    Lynn Almanzar acknowledges that she has consented to an online visit or consultation  She understands that the online visit is based solely on information provided by her, and that, in the absence of a face-to-face physical evaluation by the physician, the diagnosis she receives is both limited and provisional in terms of accuracy and completeness  This is not intended to replace a full medical face-to-face evaluation by the physician  Lynn Almanzar understands and accepts these terms

## 2020-07-21 NOTE — BH TREATMENT PLAN
Zoie Tracy  1985         Date of Initial Treatment Plan: October 3, 2019   Date of Current Treatment Plan: July 21, 2020     Treatment Plan Number 3      Strengths/Personal Resources for Self Care: Some family supports (mom); commitment to working on relationship issues     Diagnosis:   1  Bipolar I disorder, most recent episode depressed, severe with psychotic features ([facility] [address] )      2  SHELLY (generalized anxiety disorder)      3  Obsessive-compulsive disorder, unspecified type            Area of Needs: Anxiety and mood regulation        Long Term Goal 1: "I want to learn how to manage my moods better  I want to feel better about myself and my life "      Target Date:    November 18, 2020  Treatment Plan Expiration Date:  January 17, 2021  Completion Date: To be determined         Short Term Objectives for Goal 1:                  1  Rojas Radha identify triggers and prompting events that increase symptoms of anger, depression, and anxiety    Update 7/21/2020: At this point, Janki Light identifies that her relationship significantly impacts her symptoms  She will continue to identify triggers and prompting events that exacerbate her symptoms  [facility] Hank learn and exhibit understanding of a minimum of three distress tolerance skills to assist with symptom reduction    Update 7/21/2020: Janki Light states that her primary coping skill is to stay in her room, go to sleep, and isolate  She states she wants to learn new ways to manage her moods and tolerate distress                  3  Libertad will learn and exhibit understanding of effective communication skills (using a DBT-informed perspective), especially with her boyfriend  Update 7/21/2020: Tanishaomaira Light has asked her boyfriend to engage in couple's sessions  He has verbalized an agreement to do so; however, he has not yet followed through with this   We will continue to work on developing her skills, as an individual      [id number]  Libertad will identify and maintain personal limits and boundaries in relationships with her boyfriend and others, as needed  Any boundary crossings will be discussed in therapy sessions  Update 7/21/2020: [place] is working on identifying her personal limits and boundaries  She has identified that she wants to improve her communication with her boyfriend  She has set a limit regarding her desire to have a couple's session  She is working on maintaining this objective                  5  When appropriate, the clinician and Heidi Lopez begin to identify target areas to explore and process past trauma that is effecting current relationships and functioning    Update 7/21/2020: This objective has not been addressed, due to the current COVID precautions                  6  Clinician will provide psychoeducation regarding options for processing past trauma (including, but not limited to, prolonged exposure, bilateral stimulation)    Update 7/21/2020: This objective has not been addressed, due to the current COVID precautions                  7  Heidi Lopez maintain a level of anxiety/depression that does not surpass a 3/10 on most days  Incidents that surpass this limit will be process in therapy sessions  Update 7/21/2020: [place] states that her current anxiety is approximate 7/10  She reports that her level of depression is approximately a 3/10  Her increased anxiety is significantly related to her relationship concerns          GOAL 1: Modality: Individual 1-2x per month   Completion Date to be determined and The person(s) responsible for carrying out the plan is  Pedro groves (client) and Willis Varner (clinician)     Clinician will use client-centered therapy, mindfulness-based strategies, DBT-informed skills and solution-focused therapy to address Libertad's symptoms of anxiety and mood regulation   [place] will practice skills between sessions and will report back, during subsequent sessions regarding successes and barriers          Behavioral Health Treatment Plan [facility]: Diagnosis and Treatment Plan explained to Lucas Ferraro relates understanding diagnosis and is agreeable to Treatment Plan          Client Comments : Please share your thoughts, feelings, need and/or experiences regarding your treatment plan: Not at this time  This treatment plan was created between this clinician and this client on 07/21/2020  Due to the current COVID-19 precautions, this treatment plan was created during a Virtual Visit (through the use of [employer])  The client provided verbal consent at the time of the actual session  The treatment plan was transcribed into the Electronic Health Record at a later date

## 2020-09-10 ENCOUNTER — TELEMEDICINE (OUTPATIENT)
Dept: BEHAVIORAL/MENTAL HEALTH CLINIC | Facility: CLINIC | Age: 35
End: 2020-09-10
Payer: COMMERCIAL

## 2020-09-10 DIAGNOSIS — F31.5 BIPOLAR I DISORDER, MOST RECENT EPISODE DEPRESSED, SEVERE WITH PSYCHOTIC FEATURES (HCC): Primary | Chronic | ICD-10-CM

## 2020-09-10 DIAGNOSIS — F42.9 OBSESSIVE-COMPULSIVE DISORDER, UNSPECIFIED TYPE: Chronic | ICD-10-CM

## 2020-09-10 DIAGNOSIS — F41.1 GAD (GENERALIZED ANXIETY DISORDER): Chronic | ICD-10-CM

## 2020-09-10 PROCEDURE — 90834 PSYTX W PT 45 MINUTES: CPT | Performed by: SOCIAL WORKER

## 2020-09-10 NOTE — PSYCH
Virtual Regular Visit      Assessment/Plan:    Problem List Items Addressed This Visit        Other    Bipolar I disorder, most recent episode depressed, severe with psychotic features ([facility] [address] ) - Primary (Chronic)    SHELLY (generalized anxiety disorder) (Chronic)    Obsessive-compulsive disorder, unspecified (Chronic)             Reason for visit is Reason for visit is Behavioral Health session, conducted through video, due to COVID-19 precautions  Claudell Loyal has verbalized a preference to continue with virtual sessions at this time  Claudell Loyal has been offered in-person sessions and has declined  Encounter provider CHARLIE Harmon    Provider located at [address] [place] [address] [address]  [facility] [id number]      Recent Visits  No visits were found meeting these conditions  Showing recent visits within past 7 days and meeting all other requirements     Future Appointments  No visits were found meeting these conditions  Showing future appointments within next 150 days and meeting all other requirements        The patient was identified by name and date of birth  Cortes Pro was informed that this is a telemedicine visit and that the visit is being conducted through [employer]  My office door was closed  No one else was in the room  She acknowledged consent and understanding of privacy and security of the video platform  The patient has agreed to participate and understands they can discontinue the visit at any time  Patient is aware this is a billable service  Subjective  Cortes Pro is a 36 y o  female  DATA: Met with Claudell Loyal for scheduled individual session  "We have to move out of our place by the end of this month " Claudell Loyal states that her landlord gave them a 60-day notice that they will no longer be able to rent to them  Claudell Loyal states that they do not have enough money to get a new apartment   She plans to move into her mother's home with her son and her baby  Her boyfriend's children will go to live with their mother, and her boyfriend will live in his car  He will remain in school until he gets his certification  At that time, the plan is for him to get a job and to save money to get an apartment for their family  This clinician utilized some solution-focused strategies to help her develop some potential resources for housing and financial assistance for her family  She states that the children are participating in remote-learning  She states that it is going well so far  We discussed Libertad's activities for the day  She states that she is downstairs with the kids more, so she is able to get things done a little more than in the past  Her boyfriend is in a hybrid program, so he is doing some learning at home and some at the school  He will be done in November  "We're getting along, but I've just kind of given up " She states she does not like arguing, so she does not engage in conversations that might lead to an argument  Dread Rodriguez states that her mood is anxious and somewhat depressed  She denies any suicidal ideation or thoughts of self harm  She states that her sleep is "not good " She reports middle insomnia, which is primarily resulting from her anxiety and worry thoughts  She states that "I fall asleep fast, and then I wake up " Her primary concerns, at this time, relate to her financial and housing situation  ASSESSMENT: Dread Rodriguez presents with a anxious, depressed mood  Her affect is mood-congruent  Gideonlen Rodriguez exhibits a positive therapeutic rapport with this clinician  Dread Rodriguez appears to have normal insight and judgment  Dread Rodriguez continues to exhibit willingness to work on treatment goals and objectives  PLAN: Dread Rodriguez will return in three weeks for the next scheduled session   Between sessions, Dread Rodriguez will move in with her mother, reach out to various social service agencies for financial and logistical support and will report back during the next session re: successes and barriers  At the next session, this clinician will use client-centered therapy, mindfulness-based strategies, DBT-informed skills, Motivational Interviewing and solution-focused therapy to address her mood regulation and anxiety management, in an effort to assist Thais Cha with meeting treatment goals  HPI     Past Medical History:   Diagnosis Date    Abnormal Pap smear of cervix     Eczema     Exposure to chlamydia     Resolved 17    Migraine without aura and without status migrainosus, not intractable 10/11/2019    Obesity     Postpartum depression 2018    Manoj-Parkinson-White (WPW) syndrome        Past Surgical History:   Procedure Laterality Date    ABLATION OF DYSRHYTHMIC FOCUS      WPW ablation age 15    COLONOSCOPY      COLPOSCOPY      INDUCED       OTHER SURGICAL HISTORY      catheter ablation- WPW age 13       Current Outpatient Medications   Medication Sig Dispense Refill    ARIPiprazole (ABILIFY) 5 mg tablet Take 1 tablet (5 mg total) by mouth daily at bedtime (Patient not taking: Reported on 1/10/2020) 30 tablet 2    etonogestrel-ethinyl estradiol (NUVARING) 0 12-0 015 MG/24HR vaginal ring Insert vaginally and leave in place for 3 consecutive weeks, then remove for 1 week  3 each 1    ibuprofen (MOTRIN) 600 mg tablet Take 1 p o  Q 6 hours p r n headache 30 tablet 0    sertraline (ZOLOFT) 100 mg tablet Take 1 tablet (100 mg total) by mouth daily 30 tablet 5     No current facility-administered medications for this visit  Allergies   Allergen Reactions    Codeine Other (See Comments)     dry heaves    Sulfa Antibiotics Hives and Rash     Per pt as achild    Erythromycin Hives     Per as a child       Review of Systems    Video Exam    There were no vitals filed for this visit      Physical Exam     I spent 50 minutes directly with the patient during this visit      VIRTUAL VISIT DISCLAIMER    Luis Duncan 70 Alex Fuller acknowledges that she has consented to an online visit or consultation  She understands that the online visit is based solely on information provided by her, and that, in the absence of a face-to-face physical evaluation by the physician, the diagnosis she receives is both limited and provisional in terms of accuracy and completeness  This is not intended to replace a full medical face-to-face evaluation by the physician  Adis Buchanan understands and accepts these terms

## 2020-10-13 ENCOUNTER — TELEMEDICINE (OUTPATIENT)
Dept: BEHAVIORAL/MENTAL HEALTH CLINIC | Facility: CLINIC | Age: 35
End: 2020-10-13
Payer: COMMERCIAL

## 2020-10-13 ENCOUNTER — OFFICE VISIT (OUTPATIENT)
Dept: FAMILY MEDICINE CLINIC | Facility: CLINIC | Age: 35
End: 2020-10-13
Payer: COMMERCIAL

## 2020-10-13 VITALS
OXYGEN SATURATION: 94 % | DIASTOLIC BLOOD PRESSURE: 60 MMHG | HEART RATE: 105 BPM | HEIGHT: 62 IN | WEIGHT: 218 LBS | TEMPERATURE: 97.8 F | BODY MASS INDEX: 40.12 KG/M2 | SYSTOLIC BLOOD PRESSURE: 110 MMHG

## 2020-10-13 DIAGNOSIS — F31.5 BIPOLAR I DISORDER, MOST RECENT EPISODE DEPRESSED, SEVERE WITH PSYCHOTIC FEATURES (HCC): Primary | Chronic | ICD-10-CM

## 2020-10-13 DIAGNOSIS — M22.2X2 PATELLOFEMORAL DISORDER, LEFT: Primary | ICD-10-CM

## 2020-10-13 DIAGNOSIS — F41.1 GAD (GENERALIZED ANXIETY DISORDER): Chronic | ICD-10-CM

## 2020-10-13 DIAGNOSIS — F42.9 OBSESSIVE-COMPULSIVE DISORDER, UNSPECIFIED TYPE: Chronic | ICD-10-CM

## 2020-10-13 PROCEDURE — 99213 OFFICE O/P EST LOW 20 MIN: CPT | Performed by: INTERNAL MEDICINE

## 2020-10-13 PROCEDURE — 1036F TOBACCO NON-USER: CPT | Performed by: INTERNAL MEDICINE

## 2020-10-13 PROCEDURE — 90834 PSYTX W PT 45 MINUTES: CPT | Performed by: SOCIAL WORKER

## 2020-10-13 RX ORDER — CELECOXIB 200 MG/1
200 CAPSULE ORAL 2 TIMES DAILY
Qty: 20 CAPSULE | Refills: 0 | Status: SHIPPED | OUTPATIENT
Start: 2020-10-13 | End: 2020-10-13

## 2020-10-13 RX ORDER — CELECOXIB 200 MG/1
200 CAPSULE ORAL 2 TIMES DAILY
Qty: 20 CAPSULE | Refills: 0 | Status: SHIPPED | OUTPATIENT
Start: 2020-10-13 | End: 2020-11-24 | Stop reason: ALTCHOICE

## 2020-10-27 ENCOUNTER — SOCIAL WORK (OUTPATIENT)
Dept: BEHAVIORAL/MENTAL HEALTH CLINIC | Facility: CLINIC | Age: 35
End: 2020-10-27
Payer: COMMERCIAL

## 2020-10-27 DIAGNOSIS — F31.5 BIPOLAR I DISORDER, MOST RECENT EPISODE DEPRESSED, SEVERE WITH PSYCHOTIC FEATURES (HCC): Primary | Chronic | ICD-10-CM

## 2020-10-27 DIAGNOSIS — F42.9 OBSESSIVE-COMPULSIVE DISORDER, UNSPECIFIED TYPE: Chronic | ICD-10-CM

## 2020-10-27 DIAGNOSIS — F41.1 GAD (GENERALIZED ANXIETY DISORDER): Chronic | ICD-10-CM

## 2020-10-27 PROCEDURE — 90834 PSYTX W PT 45 MINUTES: CPT | Performed by: SOCIAL WORKER

## 2020-11-06 ENCOUNTER — SOCIAL WORK (OUTPATIENT)
Dept: BEHAVIORAL/MENTAL HEALTH CLINIC | Facility: CLINIC | Age: 35
End: 2020-11-06
Payer: COMMERCIAL

## 2020-11-06 DIAGNOSIS — F41.1 GAD (GENERALIZED ANXIETY DISORDER): Chronic | ICD-10-CM

## 2020-11-06 DIAGNOSIS — F31.5 BIPOLAR I DISORDER, MOST RECENT EPISODE DEPRESSED, SEVERE WITH PSYCHOTIC FEATURES (HCC): Primary | Chronic | ICD-10-CM

## 2020-11-06 PROCEDURE — 90834 PSYTX W PT 45 MINUTES: CPT | Performed by: SOCIAL WORKER

## 2020-11-13 DIAGNOSIS — Z30.015 ENCOUNTER FOR INITIAL PRESCRIPTION OF VAGINAL RING HORMONAL CONTRACEPTIVE: ICD-10-CM

## 2020-11-13 RX ORDER — ETONOGESTREL AND ETHINYL ESTRADIOL 11.7; 2.7 MG/1; MG/1
INSERT, EXTENDED RELEASE VAGINAL
Qty: 3 EACH | Refills: 1 | Status: SHIPPED | OUTPATIENT
Start: 2020-11-13 | End: 2020-12-10 | Stop reason: SDUPTHER

## 2020-11-16 ENCOUNTER — HOSPITAL ENCOUNTER (EMERGENCY)
Facility: HOSPITAL | Age: 35
Discharge: HOME | End: 2020-11-16
Attending: EMERGENCY MEDICINE | Admitting: EMERGENCY MEDICINE
Payer: COMMERCIAL

## 2020-11-16 VITALS
WEIGHT: 220.46 LBS | TEMPERATURE: 98.1 F | DIASTOLIC BLOOD PRESSURE: 58 MMHG | HEART RATE: 90 BPM | SYSTOLIC BLOOD PRESSURE: 121 MMHG | RESPIRATION RATE: 18 BRPM | OXYGEN SATURATION: 98 % | BODY MASS INDEX: 40.32 KG/M2

## 2020-11-16 DIAGNOSIS — Z20.822 ENCOUNTER FOR SCREENING LABORATORY TESTING FOR COVID-19 VIRUS: ICD-10-CM

## 2020-11-16 DIAGNOSIS — R51.9 ACUTE HEADACHE: Primary | ICD-10-CM

## 2020-11-16 DIAGNOSIS — B34.9 VIRAL SYNDROME: ICD-10-CM

## 2020-11-16 LAB
EXT PREG TEST URINE: NORMAL
EXT. CONTROL ED NAV: NORMAL

## 2020-11-16 PROCEDURE — 99283 EMERGENCY DEPT VISIT LOW MDM: CPT

## 2020-11-16 PROCEDURE — 81025 URINE PREGNANCY TEST: CPT | Performed by: PHYSICIAN ASSISTANT

## 2020-11-16 PROCEDURE — 87637 SARSCOV2&INF A&B&RSV AMP PRB: CPT | Performed by: PHYSICIAN ASSISTANT

## 2020-11-16 PROCEDURE — 99284 EMERGENCY DEPT VISIT MOD MDM: CPT | Performed by: PHYSICIAN ASSISTANT

## 2020-11-16 PROCEDURE — 96372 THER/PROPH/DIAG INJ SC/IM: CPT

## 2020-11-16 RX ORDER — ONDANSETRON 4 MG/1
4 TABLET, ORALLY DISINTEGRATING ORAL EVERY 6 HOURS PRN
Qty: 4 TABLET | Refills: 0 | Status: SHIPPED | OUTPATIENT
Start: 2020-11-16 | End: 2020-11-24 | Stop reason: ALTCHOICE

## 2020-11-16 RX ORDER — KETOROLAC TROMETHAMINE 30 MG/ML
15 INJECTION, SOLUTION INTRAMUSCULAR; INTRAVENOUS ONCE
Status: COMPLETED | OUTPATIENT
Start: 2020-11-16 | End: 2020-11-16

## 2020-11-16 RX ORDER — DIPHENHYDRAMINE HCL 25 MG
25 TABLET ORAL ONCE
Status: COMPLETED | OUTPATIENT
Start: 2020-11-16 | End: 2020-11-16

## 2020-11-16 RX ORDER — METOCLOPRAMIDE 10 MG/1
10 TABLET ORAL ONCE
Status: COMPLETED | OUTPATIENT
Start: 2020-11-16 | End: 2020-11-16

## 2020-11-16 RX ADMIN — KETOROLAC TROMETHAMINE 15 MG: 30 INJECTION, SOLUTION INTRAMUSCULAR at 13:13

## 2020-11-16 RX ADMIN — METOCLOPRAMIDE 10 MG: 10 TABLET ORAL at 13:13

## 2020-11-16 RX ADMIN — DIPHENHYDRAMINE HCL 25 MG: 25 TABLET ORAL at 13:13

## 2020-11-18 LAB
FLUAV RNA NPH QL NAA+PROBE: NOT DETECTED
FLUBV RNA NPH QL NAA+PROBE: NOT DETECTED
RSV RNA NPH QL NAA+PROBE: NOT DETECTED
SARS-COV-2 RNA NPH QL NAA+PROBE: NOT DETECTED

## 2020-11-24 ENCOUNTER — TELEPHONE (OUTPATIENT)
Dept: HEMATOLOGY ONCOLOGY | Facility: CLINIC | Age: 35
End: 2020-11-24

## 2020-11-24 ENCOUNTER — ANNUAL EXAM (OUTPATIENT)
Dept: OBGYN CLINIC | Facility: MEDICAL CENTER | Age: 35
End: 2020-11-24
Payer: COMMERCIAL

## 2020-11-24 VITALS
SYSTOLIC BLOOD PRESSURE: 108 MMHG | DIASTOLIC BLOOD PRESSURE: 70 MMHG | HEIGHT: 62 IN | WEIGHT: 221 LBS | BODY MASS INDEX: 40.67 KG/M2

## 2020-11-24 DIAGNOSIS — Z01.419 ENCNTR FOR GYN EXAM (GENERAL) (ROUTINE) W/O ABN FINDINGS: ICD-10-CM

## 2020-11-24 DIAGNOSIS — D68.59 HYPERCOAGULOPATHY (HCC): Primary | ICD-10-CM

## 2020-11-24 DIAGNOSIS — Z83.2 FAMILY HISTORY OF FACTOR V DEFICIENCY: Primary | ICD-10-CM

## 2020-11-24 PROCEDURE — 99395 PREV VISIT EST AGE 18-39: CPT | Performed by: NURSE PRACTITIONER

## 2020-11-24 PROCEDURE — 3008F BODY MASS INDEX DOCD: CPT | Performed by: INTERNAL MEDICINE

## 2020-12-07 ENCOUNTER — SOCIAL WORK (OUTPATIENT)
Dept: BEHAVIORAL/MENTAL HEALTH CLINIC | Facility: CLINIC | Age: 35
End: 2020-12-07
Payer: COMMERCIAL

## 2020-12-07 ENCOUNTER — APPOINTMENT (OUTPATIENT)
Dept: LAB | Facility: CLINIC | Age: 35
End: 2020-12-07
Payer: COMMERCIAL

## 2020-12-07 DIAGNOSIS — F31.5 BIPOLAR I DISORDER, MOST RECENT EPISODE DEPRESSED, SEVERE WITH PSYCHOTIC FEATURES (HCC): Primary | Chronic | ICD-10-CM

## 2020-12-07 DIAGNOSIS — F41.1 GAD (GENERALIZED ANXIETY DISORDER): Chronic | ICD-10-CM

## 2020-12-07 DIAGNOSIS — F42.9 OBSESSIVE-COMPULSIVE DISORDER, UNSPECIFIED TYPE: Chronic | ICD-10-CM

## 2020-12-07 DIAGNOSIS — D68.59 HYPERCOAGULOPATHY (HCC): ICD-10-CM

## 2020-12-07 PROCEDURE — 81241 F5 GENE: CPT

## 2020-12-07 PROCEDURE — 36415 COLL VENOUS BLD VENIPUNCTURE: CPT

## 2020-12-07 PROCEDURE — 90834 PSYTX W PT 45 MINUTES: CPT | Performed by: SOCIAL WORKER

## 2020-12-09 ENCOUNTER — TELEPHONE (OUTPATIENT)
Dept: PSYCHIATRY | Facility: CLINIC | Age: 35
End: 2020-12-09

## 2020-12-09 LAB — F5 GENE MUT ANL BLD/T: NORMAL

## 2020-12-10 DIAGNOSIS — Z30.015 ENCOUNTER FOR INITIAL PRESCRIPTION OF VAGINAL RING HORMONAL CONTRACEPTIVE: ICD-10-CM

## 2020-12-10 RX ORDER — ETONOGESTREL AND ETHINYL ESTRADIOL 11.7; 2.7 MG/1; MG/1
INSERT, EXTENDED RELEASE VAGINAL
Qty: 3 EACH | Refills: 1 | Status: SHIPPED | OUTPATIENT
Start: 2020-12-10 | End: 2021-01-08 | Stop reason: SDUPTHER

## 2020-12-18 DIAGNOSIS — Z30.015 ENCOUNTER FOR INITIAL PRESCRIPTION OF VAGINAL RING HORMONAL CONTRACEPTIVE: ICD-10-CM

## 2020-12-19 RX ORDER — ETONOGESTREL AND ETHINYL ESTRADIOL 11.7; 2.7 MG/1; MG/1
INSERT, EXTENDED RELEASE VAGINAL
Qty: 9 EACH | Refills: 1 | Status: CANCELLED | OUTPATIENT
Start: 2020-12-19

## 2021-01-03 ENCOUNTER — APPOINTMENT (EMERGENCY)
Dept: RADIOLOGY | Facility: HOSPITAL | Age: 36
End: 2021-01-03
Payer: COMMERCIAL

## 2021-01-03 ENCOUNTER — HOSPITAL ENCOUNTER (EMERGENCY)
Facility: HOSPITAL | Age: 36
Discharge: HOME | End: 2021-01-04
Attending: EMERGENCY MEDICINE | Admitting: EMERGENCY MEDICINE
Payer: COMMERCIAL

## 2021-01-03 VITALS
OXYGEN SATURATION: 97 % | BODY MASS INDEX: 41.05 KG/M2 | DIASTOLIC BLOOD PRESSURE: 77 MMHG | RESPIRATION RATE: 20 BRPM | WEIGHT: 224.43 LBS | SYSTOLIC BLOOD PRESSURE: 128 MMHG | HEART RATE: 104 BPM | TEMPERATURE: 98.1 F

## 2021-01-03 DIAGNOSIS — S90.30XA CONTUSION OF FOOT: Primary | ICD-10-CM

## 2021-01-03 DIAGNOSIS — S97.82XA CRUSH INJURY OF LEFT FOOT: ICD-10-CM

## 2021-01-03 PROCEDURE — 96372 THER/PROPH/DIAG INJ SC/IM: CPT

## 2021-01-03 PROCEDURE — 99284 EMERGENCY DEPT VISIT MOD MDM: CPT | Performed by: EMERGENCY MEDICINE

## 2021-01-03 PROCEDURE — 73630 X-RAY EXAM OF FOOT: CPT

## 2021-01-03 PROCEDURE — 99283 EMERGENCY DEPT VISIT LOW MDM: CPT

## 2021-01-03 RX ORDER — KETOROLAC TROMETHAMINE 30 MG/ML
30 INJECTION, SOLUTION INTRAMUSCULAR; INTRAVENOUS ONCE
Status: COMPLETED | OUTPATIENT
Start: 2021-01-03 | End: 2021-01-03

## 2021-01-03 RX ADMIN — KETOROLAC TROMETHAMINE 30 MG: 30 INJECTION, SOLUTION INTRAMUSCULAR at 23:40

## 2021-01-03 NOTE — Clinical Note
Que Lyn was seen and treated in our emergency department on 1/3/2021  Diagnosis:     Daina Jones  may return to work on return date  She may return on this date: 01/05/2021         If you have any questions or concerns, please don't hesitate to call        Rivas Byrne, DO    ______________________________           _______________          _______________  Hospital Representative                              Date                                Time

## 2021-01-03 NOTE — Clinical Note
Verdel Osler was seen and treated in our emergency department on 1/3/2021  Diagnosis:     [place]  may return to work on return date  She may return on this date: 01/05/2021         If you have any questions or concerns, please don't hesitate to call        Segundo Driver DO    ______________________________           _______________          _______________  Hospital Representative                              Date                                Time

## 2021-01-04 ENCOUNTER — SOCIAL WORK (OUTPATIENT)
Dept: BEHAVIORAL/MENTAL HEALTH CLINIC | Facility: CLINIC | Age: 36
End: 2021-01-04
Payer: COMMERCIAL

## 2021-01-04 DIAGNOSIS — F41.1 GAD (GENERALIZED ANXIETY DISORDER): Chronic | ICD-10-CM

## 2021-01-04 DIAGNOSIS — F31.5 BIPOLAR I DISORDER, MOST RECENT EPISODE DEPRESSED, SEVERE WITH PSYCHOTIC FEATURES (HCC): Primary | Chronic | ICD-10-CM

## 2021-01-04 DIAGNOSIS — F42.9 OBSESSIVE-COMPULSIVE DISORDER, UNSPECIFIED TYPE: Chronic | ICD-10-CM

## 2021-01-04 PROCEDURE — 90834 PSYTX W PT 45 MINUTES: CPT | Performed by: SOCIAL WORKER

## 2021-01-04 RX ORDER — IBUPROFEN 800 MG/1
800 TABLET ORAL EVERY 8 HOURS PRN
Qty: 30 TABLET | Refills: 0 | Status: SHIPPED | OUTPATIENT
Start: 2021-01-04 | End: 2021-06-12

## 2021-01-04 NOTE — ED PROVIDER NOTES
History  Chief Complaint   Patient presents with    Foot Injury     patient states she closed a door on L foot  c/o pain on top of L foot  29 yo female who was stepping out door when her bare left foot got caught between floor and door when it closed on her foot a few hours ago  Pt immediately placed ice on area that was swollen  Small abrasion noted, no bleeding  NV intact distally  History provided by:  Patient   used: No    Foot Injury - Major  Location:  Foot  Time since incident:  3 hours  Injury: yes    Mechanism of injury: crush    Crush:     Mechanism:  Door    Duration of crushing force:  3 hours  Foot location:  L foot and dorsum of L foot  Pain details:     Quality:  Aching    Severity:  Moderate    Onset quality:  Sudden    Duration:  3 hours    Timing:  Constant    Progression:  Unchanged  Chronicity:  New  Dislocation: no    Tetanus status:  Up to date  Prior injury to area:  No  Relieved by:  Nothing  Worsened by:  Nothing  Ineffective treatments: Ice  Associated symptoms: swelling    Associated symptoms: no back pain, no fatigue, no fever and no neck pain        Prior to Admission Medications   Prescriptions Last Dose Informant Patient Reported? Taking?   etonogestrel-ethinyl estradiol (NUVARING) 0 12-0 015 MG/24HR vaginal ring   No No   Sig: Insert vaginally and leave in place for 3 consecutive weeks, then remove for 1 week        Facility-Administered Medications: None       Past Medical History:   Diagnosis Date    Abnormal Pap smear of cervix     Eczema     Exposure to chlamydia     Resolved 17    Migraine without aura and without status migrainosus, not intractable 10/11/2019    Obesity     Postpartum depression 2018    Manoj-Parkinson-White (WPW) syndrome        Past Surgical History:   Procedure Laterality Date    ABLATION OF DYSRHYTHMIC FOCUS      WPW ablation age 15    COLONOSCOPY      COLPOSCOPY      INDUCED       OTHER SURGICAL HISTORY      catheter ablation- WPW age 13       Family History   Problem Relation Age of Onset    Bipolar disorder Mother     Hypertension Mother     Hyperlipidemia Mother     Suicide Attempts Mother     Colon polyps Mother     Factor V Leiden deficiency Mother     Glucose-6-phos deficiency Father     Hyperlipidemia Father     Hypertension Father     No Known Problems Sister     Anxiety disorder Brother     Factor V Leiden deficiency Brother     Breast cancer Maternal Grandmother         dx approx age early 39's    Colon polyps Maternal Grandfather     Crohn's disease Paternal Grandmother     Prostate cancer Paternal Grandfather     Pancreatic cancer Paternal Grandfather     Colon cancer Paternal Aunt     Mental illness Brother     Drug abuse Brother     Breast cancer Maternal Aunt 36    Factor V Leiden deficiency Cousin      I have reviewed and agree with the history as documented  E-Cigarette/Vaping    E-Cigarette Use Never User      E-Cigarette/Vaping Substances    Nicotine No     THC No     CBD No     Flavoring No     Other No     Unknown No      Social History     Tobacco Use    Smoking status: Never Smoker    Smokeless tobacco: Never Used   Substance Use Topics    Alcohol use: Yes     Frequency: Monthly or less     Drinks per session: 1 or 2     Binge frequency: Never    Drug use: No       Review of Systems   Constitutional: Negative for appetite change, chills, fatigue and fever  HENT: Negative for sore throat  Eyes: Negative for visual disturbance  Respiratory: Negative for shortness of breath  Cardiovascular: Negative for chest pain  Gastrointestinal: Negative for abdominal pain, diarrhea, nausea and vomiting  Genitourinary: Negative for dysuria, frequency, vaginal bleeding and vaginal discharge  Musculoskeletal: Negative for back pain, neck pain and neck stiffness     Skin: Positive for wound (abrasion and swelling noted dorsal L foot prox to great and 2nd toe)  Negative for pallor and rash  Allergic/Immunologic: Negative for immunocompromised state  Neurological: Negative for light-headedness and headaches  Psychiatric/Behavioral: Negative for confusion  All other systems reviewed and are negative  Physical Exam  Physical Exam  Vitals signs and nursing note reviewed  Constitutional:       General: She is not in acute distress  Appearance: She is well-developed  HENT:      Head: Normocephalic and atraumatic  Right Ear: External ear normal       Left Ear: External ear normal    Neck:      Musculoskeletal: Neck supple  Cardiovascular:      Rate and Rhythm: Normal rate  Pulmonary:      Effort: Pulmonary effort is normal    Musculoskeletal: Normal range of motion  General: Tenderness (abrasion and swelling noted dorsal L foot prox to great and 2nd toe - NV distally intact) present  Skin:     General: Skin is warm  Coloration: Skin is not pale  Findings: No rash  Neurological:      Mental Status: She is alert and oriented to person, place, and time     Psychiatric:         Behavior: Behavior normal          Vital Signs  ED Triage Vitals   Temperature Pulse Respirations Blood Pressure SpO2   01/03/21 2233 01/03/21 2233 01/03/21 2233 01/03/21 2236 01/03/21 2233   98 1 °F (36 7 °C) 104 20 128/77 97 %      Temp Source Heart Rate Source Patient Position - Orthostatic VS BP Location FiO2 (%)   01/03/21 2233 01/03/21 2233 01/03/21 2233 01/03/21 2233 --   Temporal Monitor Sitting Right arm       Pain Score       01/03/21 2233       7           Vitals:    01/03/21 2233 01/03/21 2236   BP:  128/77   Pulse: 104    Patient Position - Orthostatic VS: Sitting Sitting         Visual Acuity      ED Medications  Medications   ketorolac (TORADOL) injection 30 mg (30 mg Intramuscular Given 1/3/21 2790)       Diagnostic Studies  Results Reviewed     None                 XR foot 3+ views LEFT    (Results Pending) Procedures  Procedures         ED Course  ED Course as of Jan 04 0020   Billy Cooper Jan 03, 2021   2316 Pt seen and examined  29 yo female who was stepping out door when her bare left foot got caught between floor and door when it closed on her foot a few hours ago  Pt immediately placed ice on area that was swollen  Small abrasion noted, no bleeding  NV intact distally  L 2nd toe feels "tight"  Will give IM toradol and check xray, place ice on L foot  0069 Xray neg for acute findings  Will place in ace wrap and on crutches to use as needed  MDM    Disposition  Final diagnoses:   Contusion of foot   Crush injury of left foot     Time reflects when diagnosis was documented in both MDM as applicable and the Disposition within this note     Time User Action Codes Description Comment    1/4/2021 12:00 AM Venancio DAWSON Add [S90 30XA] Contusion of foot     1/4/2021 12:00 AM Elizabeth Moreno Add [H74 67ET] Crush injury of left foot       ED Disposition     ED Disposition Condition Date/Time Comment    Discharge Stable Mon Jan 4, 2021 12:00 AM Arv[place] Other discharge to home/self care  Follow-up Information     Follow up With Specialties Details Why Beck Leone MD Family Medicine  As needed [address]    [address] [facility] [place], [place]  [phone]            Discharge Medication List as of 1/4/2021 12:02 AM      START taking these medications    Details   ibuprofen (MOTRIN) 800 mg tablet Take 1 tablet (800 mg total) by mouth every 8 (eight) hours as needed for mild pain or moderate pain for up to 10 days, Starting Mon 1/4/2021, Until u 1/14/2021, Print         CONTINUE these medications which have NOT CHANGED    Details   etonogestrel-ethinyl estradiol (NUVARING) 0 12-0 015 MG/24HR vaginal ring Insert vaginally and leave in place for 3 consecutive weeks, then remove for 1 week , Normal           No discharge procedures on file      PDMP Review       Value Time User    PDMP Reviewed  Yes 11/11/2019  3:09 PM Angel Hoyos MD          ED Provider  Electronically Signed by           Kacy Malik DO  01/04/21 0020

## 2021-01-04 NOTE — ED NOTES
Ace wrap applied to L foot at this time  Patient fitted for and supplied with crutches  Tolerated well and adequate return demonstration noted with ambulation using crutches        Tiffanie Cross RN  01/04/21 0010

## 2021-01-04 NOTE — BH TREATMENT PLAN
Lane Bailey  1985      Date of Initial Treatment Plan: October 3, 2019   Date of Current Treatment Plan: January 4, 2021     Treatment Plan Number 4      Strengths/Personal Resources for Self Care: Some family supports (mom)   Diagnosis:   1  Bipolar I disorder, most recent episode depressed, severe with psychotic features ([facility] [address] )      2  SHELLY (generalized anxiety disorder)      3  Obsessive-compulsive disorder, unspecified type            Area of Needs: Anxiety and mood regulation      Long Term Goal 1: "I need to learn how to deal with my anxiety and my feeling that I need to control things or need to know everything "     Target Date:                                     May 4, 2021           Treatment Plan Expiration Date:   July 3, 2021           Completion Date: To be determined         Short Term Objectives for Goal 1:                  1  Roxann Bennett identify triggers and prompting events that increase symptoms of anger, depression, and anxiety    Update 1/4/2021: Daina Jones exhibits the ability to identify triggers and prompting events that trigger her anger and mood dysregulation                   2  Libertad will learn and exhibit understanding of a minimum of three distress tolerance skills to assist with symptom reduction    Update 1/4/2021: Daina Jones identifies that her ability to find a job that provides her with a sense of purpose has helped her to better manage her distress and regulate her emotions  Daina Jones will work on clarifying messages (especially from her boyfriend) to help her regulate her emotions                   3  Libertad will learn and exhibit understanding of effective communication skills (using a DBT-informed perspective), especially with her boyfriend  Update 1/4/2021: Daina Jones exhibits a desire to learn new ways to improve her communication with her boyfriend  She states that she is a person who likes to talk things out   She has encouraged her boyfriend to participate in couple's counseling sessions  This clinician will continue to educate Burton Montoya regarding DBT-informed skills that she can use to improve her role in the communication with her boyfriend                   4  Ayana Whitfield identify and maintain personal limits and boundaries in relationships with her boyfriend and others, as needed  Any boundary crossings will be discussed in therapy sessions  Update 1/4/2021: Burton Montoya has identified some personal limits regarding her relationship with her family, especially with her boyfriend  She has found a job and has identified that she needs to maintain a personal limit regarding the importance of her job  At times, she states that she feels that setting limits might be viewed as her being "selfish"  This clinician will continue to help her to identify the difference between being selfish and maintaining appropriate health limits--which is helping her to achieve better self-care       5  Burton Montoya will make a decision regarding her future plans for her relationship with her significant other  New objective 1/4/2021  This clinician will assist Burton Montoya with evaluating her current relationship and building the strength to move forward with her life (with or without her SO) in a healthy way                 6  Libertad will maintain a level of anxiety/depression that does not surpass a 3/10 on most days  Incidents that surpass this limit will be process in therapy sessions  Update 1/4/2021: Burton Montoya reports her current level of anxiety is approximately a 7/10  She states that her current level of depression is approximately a 7/10  She verbalizes that she has no thoughts of suicide        7  Burton Montoya will consider addressing trauma, as it is appropriate to her treatment  At this point, she does not want to engage in pointed trauma-focused therapy  We will continue to address trauma in a trauma-informed approach, using DBT-informed care       Revised objective as of 1/4/2021       GOAL 1: Modality: Individual 1-2x per month   Completion Date to be determined and The person(s) responsible for carrying out the plan is  Libertad (client) and Mariel Colon (clinician)     Clinician will use client-centered therapy, mindfulness-based strategies, DBT-informed skills and solution-focused therapy to address Libertad's symptoms of anxiety and mood regulation  Libertad will practice skills between sessions and will report back, during subsequent sessions regarding successes and barriers          [address] [place] [address]: Diagnosis and Treatment Plan explained to Libertad Maurer relates understanding diagnosis and is agreeable to Treatment Plan          Client Comments : Please share your thoughts, feelings, need and/or experiences regarding your treatment plan: "I feel like this is working well with me "

## 2021-01-05 ENCOUNTER — OFFICE VISIT (OUTPATIENT)
Dept: FAMILY MEDICINE CLINIC | Facility: CLINIC | Age: 36
End: 2021-01-05
Payer: COMMERCIAL

## 2021-01-05 VITALS
BODY MASS INDEX: 41.59 KG/M2 | HEART RATE: 92 BPM | WEIGHT: 226 LBS | DIASTOLIC BLOOD PRESSURE: 78 MMHG | OXYGEN SATURATION: 98 % | HEIGHT: 62 IN | SYSTOLIC BLOOD PRESSURE: 122 MMHG | TEMPERATURE: 98 F | RESPIRATION RATE: 18 BRPM

## 2021-01-05 DIAGNOSIS — E66.9 OBESITY (BMI 30-39.9): ICD-10-CM

## 2021-01-05 DIAGNOSIS — M22.2X2 PATELLOFEMORAL DISORDER OF LEFT KNEE: Primary | ICD-10-CM

## 2021-01-05 PROCEDURE — 99213 OFFICE O/P EST LOW 20 MIN: CPT | Performed by: FAMILY MEDICINE

## 2021-01-05 PROCEDURE — 3725F SCREEN DEPRESSION PERFORMED: CPT | Performed by: FAMILY MEDICINE

## 2021-01-05 NOTE — PROGRESS NOTES
Assessment/Plan:    Patellofemoral disorder of left knee    Will give orthopedics referral   Discussed likely need for  physical therapy, but will start with Ortho evaluation  Rec ice packs and topicals such as Aspercreme with lido         Diagnoses and all orders for this visit:    Patellofemoral disorder of left knee  -     Ambulatory referral to Orthopedic Surgery; Future    Obesity (BMI 30-39 9)  -     Ambulatory referral to Weight Management; Future          Subjective:      Patient ID: Abel Hernandez is a 30 y o  female  Bary Haines is here with ongoing pain in the left knee  She had onset of pain in October after moving and carrying heavy boxes  She was seen here October 13th and it was thought to be patellofemoral disorder  She tried anti-inflammatories along with icing and heating but it has not really helped  She denies swelling  The knee hurts with various movements  Sometimes it wakes her up at night  The pain is located along the lateral portion of the knee  Sometimes it feels like it gives out  Steps are painful    She is working in property management at sedentary job  The following portions of the patient's history were reviewed and updated as appropriate: allergies, current medications, past family history, past medical history, past social history, past surgical history and problem list     Review of Systems   Constitutional: Positive for activity change  Negative for chills, fatigue and fever  Musculoskeletal: Positive for arthralgias  Objective:      /78   Pulse 92   Temp 98 °F (36 7 °C)   Resp 18   Ht 5' 2" (1 575 m)   Wt 103 kg (226 lb)   LMP 12/30/2020 (Approximate)   SpO2 98%   BMI 41 34 kg/m²          Physical Exam  Vitals signs and nursing note reviewed  Constitutional:       Appearance: She is obese  HENT:      Head: Normocephalic and atraumatic  Cardiovascular:      Rate and Rhythm: Normal rate and regular rhythm        Pulses: Normal pulses  Heart sounds: Normal heart sounds  Musculoskeletal: Normal range of motion  Comments: Left knee without swelling or ecchymosis  There is full range of motion but discomfort with full flexion  No ligament laxity on Johanne's testing  Negative drawer  Neurological:      Mental Status: She is alert     Psychiatric:         Mood and Affect: Mood normal          Behavior: Behavior normal

## 2021-01-05 NOTE — ASSESSMENT & PLAN NOTE
Will give orthopedics referral   Discussed likely need for  physical therapy, but will start with Ortho evaluation    Rec ice packs and topicals such as Aspercreme with lido
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
## 2021-01-08 DIAGNOSIS — Z30.015 ENCOUNTER FOR INITIAL PRESCRIPTION OF VAGINAL RING HORMONAL CONTRACEPTIVE: ICD-10-CM

## 2021-01-08 RX ORDER — ETONOGESTREL AND ETHINYL ESTRADIOL 11.7; 2.7 MG/1; MG/1
INSERT, EXTENDED RELEASE VAGINAL
Qty: 3 EACH | Refills: 1 | Status: SHIPPED | OUTPATIENT
Start: 2021-01-08 | End: 2021-03-08 | Stop reason: SDUPTHER

## 2021-01-17 DIAGNOSIS — Z30.015 ENCOUNTER FOR INITIAL PRESCRIPTION OF VAGINAL RING HORMONAL CONTRACEPTIVE: ICD-10-CM

## 2021-01-17 RX ORDER — ETONOGESTREL AND ETHINYL ESTRADIOL 11.7; 2.7 MG/1; MG/1
INSERT, EXTENDED RELEASE VAGINAL
Qty: 3 EACH | Refills: 0 | Status: CANCELLED | OUTPATIENT
Start: 2021-01-17

## 2021-01-18 ENCOUNTER — CONSULT (OUTPATIENT)
Dept: OBGYN CLINIC | Facility: MEDICAL CENTER | Age: 36
End: 2021-01-18
Attending: FAMILY MEDICINE
Payer: COMMERCIAL

## 2021-01-18 ENCOUNTER — APPOINTMENT (OUTPATIENT)
Dept: RADIOLOGY | Facility: MEDICAL CENTER | Age: 36
End: 2021-01-18
Payer: COMMERCIAL

## 2021-01-18 VITALS
BODY MASS INDEX: 41.59 KG/M2 | HEART RATE: 97 BPM | DIASTOLIC BLOOD PRESSURE: 77 MMHG | SYSTOLIC BLOOD PRESSURE: 137 MMHG | HEIGHT: 62 IN | WEIGHT: 226 LBS

## 2021-01-18 DIAGNOSIS — M25.362 PATELLAR INSTABILITY OF LEFT KNEE: ICD-10-CM

## 2021-01-18 DIAGNOSIS — M25.562 LEFT KNEE PAIN, UNSPECIFIED CHRONICITY: ICD-10-CM

## 2021-01-18 DIAGNOSIS — M22.2X2 PATELLOFEMORAL DISORDER OF LEFT KNEE: Primary | ICD-10-CM

## 2021-01-18 PROCEDURE — 3008F BODY MASS INDEX DOCD: CPT | Performed by: ORTHOPAEDIC SURGERY

## 2021-01-18 PROCEDURE — 1036F TOBACCO NON-USER: CPT | Performed by: ORTHOPAEDIC SURGERY

## 2021-01-18 PROCEDURE — 99213 OFFICE O/P EST LOW 20 MIN: CPT | Performed by: ORTHOPAEDIC SURGERY

## 2021-01-18 PROCEDURE — 73564 X-RAY EXAM KNEE 4 OR MORE: CPT

## 2021-01-18 NOTE — PROGRESS NOTES
Ortho Sports Medicine Knee New Patient Visit     Assesment:   30 y o  female left knee patellofemoral pain   With patellar instability     Plan:    Conservative treatment:    Ice to knee for 20 minutes at least 1-2 times daily  PT for ROM/strengthening to knee, hip and core  OTC NSAIDS prn for pain  Imaging: All imaging from today was reviewed by myself and explained to the patient  Injection:    No Injection planned at this time  Surgery:     No surgery is recommended at this point, continue with conservative treatment plan as noted  Follow up:    Return in about 6 weeks (around 3/1/2021) for Recheck  If no improvement consider MRI  Chief Complaint   Patient presents with    Left Knee - Pain       History of Present Illness: The patient is a 30 y o  female whose occupation is unknown, referred to me by their primary care physician, seen in clinic for consultation of left knee pain  Pain is located anterior, lateral   The patient rates the pain as a 6/10  The pain has been present for 3 months  The patient sustained an injury about 3 months ago when moving  Patient states that she was lifting a box on moving and felt a burning sensation the anterior aspect of the knee  She states that since then she has had pain in the anterior aspect of the knee and at times laterally  She denies any new treatment such as physical therapy or corticosteroid injections patient denies prior surgery  Patient states that as a kid she had loose patella as in which she had to do bracing and physical therapy to strengthen the knee  Patient states while at a cheer competition the left kneecap did dislocate and she had to push it back into place  She states that a daily basis that the kneecap do feel the loose as though there is slipping within the joint  She does experiencing clicking of the patella  The pain is characterized as dull, achy, burning  The pain is present daily        Pain is improved by rest   Pain is aggravated by stairs, squatting and sitting  Symptoms include clicking and instability  The patient has tried rest, ice and NSAIDS  Knee Surgical History:  None    Past Medical, Social and Family History:  Past Medical History:   Diagnosis Date    Abnormal Pap smear of cervix     Eczema     Exposure to chlamydia     Resolved 17    Migraine without aura and without status migrainosus, not intractable 10/11/2019    Obesity     Postpartum depression 2018    Manoj-Parkinson-White (WPW) syndrome      Past Surgical History:   Procedure Laterality Date    ABLATION OF DYSRHYTHMIC FOCUS      WPW ablation age 15    COLONOSCOPY      COLPOSCOPY      INDUCED       OTHER SURGICAL HISTORY      catheter ablation- WPW age 13     Allergies   Allergen Reactions    Codeine Other (See Comments)     dry heaves    Sulfa Antibiotics Hives and Rash     Per pt as achild    Erythromycin Hives     Per as a child     Current Outpatient Medications on File Prior to Visit   Medication Sig Dispense Refill    etonogestrel-ethinyl estradiol (NUVARING) 0 12-0 015 MG/24HR vaginal ring Insert vaginally and leave in place for 3 consecutive weeks, then remove for 1 week  3 each 1    ibuprofen (MOTRIN) 800 mg tablet Take 1 tablet (800 mg total) by mouth every 8 (eight) hours as needed for mild pain or moderate pain for up to 10 days 30 tablet 0     No current facility-administered medications on file prior to visit        Social History     Socioeconomic History    Marital status: Single     Spouse name: Not on file    Number of children: 2    Years of education: Not on file    Highest education level: Some college, no degree   Occupational History    Occupation: works in [employer] strain: Somewhat hard   Pinstant Karma insecurity     Worry: Sometimes true     Inability: Sometimes true    Transportation needs     Medical: Yes     Non-medical: Yes   Tobacco Use    Smoking status: Never Smoker    Smokeless tobacco: Never Used   Substance and Sexual Activity    Alcohol use: Yes     Frequency: Monthly or less     Drinks per session: 1 or 2     Binge frequency: Never    Drug use: No    Sexual activity: Yes     Partners: Male     Birth control/protection: Condom Male   Lifestyle    Physical activity     Days per week: 0 days     Minutes per session: 0 min    Stress: Rather much   Relationships    Social connections     Talks on phone: Once a week     Gets together: Once a week     Attends Nondenominational service: Never     Active member of club or organization: No     Attends meetings of clubs or organizations: Never     Relationship status: Never     Intimate partner violence     Fear of current or ex partner: No     Emotionally abused: No     Physically abused: No     Forced sexual activity: No   Other Topics Concern    Not on file   Social History Narrative    Education: high school graduate and some college    Learning Disabilities: none    Marital History: single    Children: 1 daughter, 1 son    Living Arrangement: lives in home with boyfriend, 2 children and boyfriend's 2 children    Occupational History: works part time at [employer] works    Functioning Relationships: boyfriend is supportive    Legal History: none     History: None         Family planning    IUD contraception         I have reviewed the past medical, surgical, social and family history, medications and allergies as documented in the EMR  Review of systems: ROS is negative other than that noted in the HPI  Constitutional: Negative for fatigue and fever  HENT: Negative for sore throat  Respiratory: Negative for shortness of breath  Cardiovascular: Negative for chest pain  Gastrointestinal: Negative for abdominal pain  Endocrine: Negative for cold intolerance and heat intolerance  Genitourinary: Negative for flank pain     Musculoskeletal: Negative for back pain  Skin: Negative for rash  Allergic/Immunologic: Negative for immunocompromised state  Neurological: Negative for dizziness  Psychiatric/Behavioral: Negative for agitation  Physical Exam:    Blood pressure 137/77, pulse 97, height 5' 2" (1 575 m), weight 103 kg (226 lb), last menstrual period 12/30/2020, currently breastfeeding  General/Constitutional: NAD, well developed, well nourished  HENT: Normocephalic, atraumatic  CV: Intact distal pulses, regular rate  Resp: No respiratory distress or labored breathing  Lymphatic: No lymphadenopathy palpated  Neuro: Alert and Oriented x 3, no focal deficits  Psych: Normal mood, normal affect, normal judgement, normal behavior  Skin: Warm, dry, no rashes, no erythema      Knee Exam (focused): RIGHT LEFT   ROM:   0-130 0-130   Palpation: Effusion negative negative     MJL tenderness Negative Positive     LJL tenderness Negative Negative   Meniscus: Johanne Negative Negative    Apley's Compression Negative Negative   Instability: Varus stable stable     Valgus stable stable   Special Tests: Lachman Negative Negative     Posterior drawer Negative Negative     Anterior drawer Negative Negative     Pivot shift not tested not tested     Dial not tested not tested   Patella: Palpation no tenderness Lateral facet     Mobility 1/4 1/2     Apprehension Negative Positive   Other: Single leg 1/4 squat not tested not tested      LE NV Exam: +2 DP/PT pulses bilaterally  Sensation intact to light touch L2-S1 bilaterally     Bilateral hip ROM demonstrates no pain actively or passively    No calf tenderness to palpation bilaterally    Knee Imaging    X-rays of the left knee were reviewed, which demonstrate minimal patellofemoral osteoarthritis  I have reviewed the radiology report and do not currently have a radiology reading from  [facility], but will check the result once the reading is performed

## 2021-01-19 ENCOUNTER — SOCIAL WORK (OUTPATIENT)
Dept: BEHAVIORAL/MENTAL HEALTH CLINIC | Facility: CLINIC | Age: 36
End: 2021-01-19
Payer: COMMERCIAL

## 2021-01-19 DIAGNOSIS — F41.1 GAD (GENERALIZED ANXIETY DISORDER): Chronic | ICD-10-CM

## 2021-01-19 DIAGNOSIS — F31.5 BIPOLAR I DISORDER, MOST RECENT EPISODE DEPRESSED, SEVERE WITH PSYCHOTIC FEATURES (HCC): Primary | Chronic | ICD-10-CM

## 2021-01-19 DIAGNOSIS — F42.9 OBSESSIVE-COMPULSIVE DISORDER, UNSPECIFIED TYPE: Chronic | ICD-10-CM

## 2021-01-19 PROCEDURE — 90834 PSYTX W PT 45 MINUTES: CPT | Performed by: SOCIAL WORKER

## 2021-01-19 NOTE — PSYCH
This note was not shared with the patient due to this is a psychotherapy note      Psychotherapy Provided: Individual Psychotherapy 50 minutes     Length of time in session: 50 minutes, follow up in 2 weeks    Goals addressed in session: Goal 1     Pain:      none    Current suicide risk : Low     DATA: Met with Daina Jones for scheduled individual session  Topics of discussion included physical health issues; relationship with significant other; using work as treatment; finding ways to have self-care time; fears about leaving her daughter with other people  Dainabola Jones states that her boyfriend started a new job last week  She has some anxiety about his willingness to stay at the job  She states that he is willing to come in for a couple's session, to work on improving their communication, until they can get into a separate couple's counselor  We discussed the pros, cons, and limitations of having a couple's session with this clinician (due to existing individual therapy relationship)  Client shows evidence of utilizing her work schedule to help her manage her mental health  She states that she needs to have some time to herself to refresh and regroup  We spent some time talking about her busy schedule (and the added stress of 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)  We spent some time discussing Libertad's fears of leaving her daughter in someone else's care  We talked about her fears and her intrusive thoughts of "bad things" that could happen when they are out of her sight  During this session, this clinician used the following therapeutic modalities: supportive psychotherapy, client-centered therapy, mindfulness-based strategies, DBT-informed skills, Motivational Interviewing and solution-focused therapy  Clinician provided psychoeducation regarding use of exposure therapy and techniques   The clinician assigned the following for the client to complete prior to the next session: work on scheduling a time for her boyfriend's parents to watch their daughter  ASSESSMENT: Pedro groves presents with a primarily euthymic mood  Her affect is normal range and intensity, appropriate  [place] exhibits good therapeutic rapport with this clinician  [place] continues to exhibit willingness to work on treatment goals and objectives  [place] presents with a minimal risk of suicide, minimal risk of self-harm, and minimal risk of harm to others  PLAN: [place] will return in two weeks for the next scheduled session  Between sessions, Pedro grvoes will talk to her boyfriend about scheduling a "date night" and having his parents watch their daughter  She will report back during the next session re: successes and barriers  At the next session, this clinician will use supportive psychotherapy, client-centered therapy, mindfulness-based strategies, DBT-informed skills, Motivational Interviewing and solution-focused therapy to address her mood regulation and relationship issues, in an effort to assist [place] with meeting treatment goals  Behavioral Health Treatment Plan [facility]: Diagnosis and Treatment Plan explained to Enoc Kern relates understanding diagnosis and is agreeable to Treatment Plan   Yes

## 2021-02-04 ENCOUNTER — EVALUATION (OUTPATIENT)
Dept: PHYSICAL THERAPY | Facility: REHABILITATION | Age: 36
End: 2021-02-04
Payer: COMMERCIAL

## 2021-02-04 DIAGNOSIS — M22.2X2 PATELLOFEMORAL DISORDER OF LEFT KNEE: Primary | ICD-10-CM

## 2021-02-04 PROCEDURE — 97112 NEUROMUSCULAR REEDUCATION: CPT | Performed by: PHYSICAL THERAPIST

## 2021-02-04 PROCEDURE — 97162 PT EVAL MOD COMPLEX 30 MIN: CPT | Performed by: PHYSICAL THERAPIST

## 2021-02-04 NOTE — PROGRESS NOTES
PT Evaluation     Today's date: 2021  Patient name: Eamon Quinn  : 1985  MRN: [id number]  Referring provider: Salma Ac  Dx:   Encounter Diagnosis     ICD-10-CM    1  Patellofemoral disorder of left knee  M22 2X2                   Assessment  Assessment details: Pt is a pleasant 30 y o  female presenting to outpatient physical therapy with patellofemoral disorder of left knee  Pt presents with pain, decreased range of motion, decreased strength, and decreased tolerance to activity  Pt is a good candidate for outpatient physical therapy and would benefit from skilled physical therapy to address limitations and to achieve goals  Thank you for this referral    Impairments: abnormal coordination, abnormal or restricted ROM, activity intolerance, impaired physical strength and pain with function  Understanding of Dx/Px/POC: good   Prognosis: good    Goals  ST  Patient will report 25% decrease in pain in 4 weeks  2  Patient will demonstrate 25% improvement in ROM in 4 weeks  3  Patient will demonstrate 1/2 grade improvement in strength in 4 weeks  LT  Patient will be able to perform IADLS without restriction or pain by discharge  2  Patient will be independent in HEP by discharge  3  Patient will be able to return to recreational/work duties without restriction or pain by discharge        Plan  Patient would benefit from: PT eval and skilled PT  Planned modality interventions: cryotherapy and thermotherapy: hydrocollator packs  Planned therapy interventions: IADL retraining, body mechanics training, flexibility, functional ROM exercises, home exercise program, neuromuscular re-education, manual therapy, postural training, strengthening, stretching, therapeutic activities, therapeutic exercise and joint mobilization  Frequency: 2x week  Duration in visits: 8  Duration in weeks: 4  Treatment plan discussed with: patient        Subjective Evaluation    History of Present Illness  Mechanism of injury: Pt is a 30year old female presenting to PT with chronic history of bilateral knee pain  Pt reports she has always had issues with her knees, even when she was younger and in cheerleading  Pt reports she remembers her "kneecaps coming out of place" and "having to push them back in" when she was younger  She reports she had physical therapy and symptoms improved, however, her pain and instability never fully resolved  Pt reports she lifted a box in September and felt a pop and had excruciating pain  Pt reports she had swelling in her knee the following day  Pt went to Dr Paitence Klein for further evaluation, x-ray performed with the following results: There is no acute fracture or dislocation  There is no joint effusion  Mild narrowing of the patellofemoral compartment  No lytic or blastic osseous lesion  Soft tissues are unremarkable  Pt referred to [facility]  Pt works for [email or web address] most of the day  She reports her knee pain doesn't impair her job performance  Pain Location: left anterior knee, patellofemoral region    Pain Type: aching, sharp    Pain Intensity:  Current: 3  Best: 3  Worst: 8      Pt reports increased pain and/or difficulty with: standing, walking, stairs, kneeling, squatting  Pt reports sleep disturbance secondary to pain waking several times/night  Pt reports decreased pain with: ibuprofen, rest            Recurrent probem    Quality of life: good      Diagnostic Tests  X-ray: abnormal  Patient Goals  Patient goals for therapy: decreased pain, improved balance, increased motion, increased strength, independence with ADLs/IADLs, return to sport/leisure activities and decreased edema          Objective     Observations   Left Knee   Positive for effusion  Additional Observation Details  Significantly increased Q angle bilaterally, genu recurvatum and valgum bilaterally      Tenderness   Left Knee   Tenderness in the medial joint line and medial retinaculum  Neurological Testing     Sensation     Knee   Left Knee   Intact: light touch    Right Knee   Intact: light touch     Active Range of Motion   Left Knee   Hyperextension  Flexion: 130 degrees     Right Knee   Hyperextension   Flexion: 130 degrees     Mobility   Patellar Mobility:   Left Knee   WFL: medial, superior and inferior  Hypermobile: left lateral      Right Knee   WFL: medial, superior and inferior  Hypermobile: lateral     Patellar Static Positioning   Left Knee: lateral tilt  Right Knee: lateral tilt    Strength/Myotome Testing     Left Hip   Planes of Motion   Flexion: 4-  Extension: 4-  Abduction: 4-  Adduction: 4  External rotation: 3+  Internal rotation: 3+    Right Hip   Planes of Motion   Flexion: 5  Extension: 5  Abduction: 4+  Adduction: 5  External rotation: 4+  Internal rotation: 4+    Left Knee   Flexion: 4-  Extension: 4-    Right Knee   Flexion: 5  Extension: 5    Tests     Left Hip   Positive Nav  Sam: Positive  90/90 SLR: Positive  Right Hip   Positive Nav  Sam: Positive  90/90 SLR: Positive  Left Knee   Positive medial Johanne and patellar apprehension  Negative anterior Lachman and lateral Johanne                Precautions: WPW syndrome, migraines, obesity    Daily Treatment Diary      Assessment  2/4                     Eval/Denae HUGGINS                     FOTO  36/60       **         **   POC Signed                       HEP Issued                        Manuals  2/4                      B HF/Q Stretch                       Exercise Diary   2/4                      Bike Warm-Up                        Hamstring Stretch  30"x3                      Gastroc Stretch  30"x3                      ITB Stretch                        SAQ + iso hip add                        LAQ + iso hip ADD                        SLR + 45* ER Modalities

## 2021-02-08 ENCOUNTER — OFFICE VISIT (OUTPATIENT)
Dept: PHYSICAL THERAPY | Facility: REHABILITATION | Age: 36
End: 2021-02-08
Payer: COMMERCIAL

## 2021-02-08 DIAGNOSIS — M22.2X2 PATELLOFEMORAL DISORDER OF LEFT KNEE: Primary | ICD-10-CM

## 2021-02-08 PROCEDURE — 97140 MANUAL THERAPY 1/> REGIONS: CPT

## 2021-02-08 PROCEDURE — 97110 THERAPEUTIC EXERCISES: CPT

## 2021-02-08 PROCEDURE — 97112 NEUROMUSCULAR REEDUCATION: CPT

## 2021-02-08 NOTE — PROGRESS NOTES
Daily Note     Today's date: 2021  Patient name: Alethea Forbes  : 1985  MRN: [id number]  Referring provider: Brady Tripp  Dx:   Encounter Diagnosis     ICD-10-CM    1  Patellofemoral disorder of left knee  M22 2X2                   Subjective: Pt reports with c/o increased pain for 2-3 days following last visit  She reported experincing sharp, stabbing pain in anterior aspect of knee  She reported use of ibuprofen which provided some relief  She further noted compliance with HEP and experiences increased pain with doing so  Objective: See treatment diary below      Assessment: Tolerated treatment well  Pt denied increased pain with performance of exercises  Intermittent cues to co-contract with exercises, but no complaints doing so  Patient demonstrated fatigue post treatment, exhibited good technique with therapeutic exercises and would benefit from continued PT  Pt advised to continue with current HEP 1-2x/day  Plan: Continue per plan of care  Progress treatment as tolerated         Precautions: WPW syndrome, migraines, obesity    Daily Treatment Diary      Assessment                     Eval/Reval  MD  TE                   FOTO  36/60       **         **   POC Signed                       HEP Issued                        Manuals                      B HF/Q Stretch                       Exercise Diary                       Bike Warm-Up    5 min                    Hamstring Stretch  30"x3  90/90  30"x3                    Gastroc Stretch  30"x3  stand 30"x3                    ITB Stretch                        SAQ + iso hip add    5" 2x10                    LAQ + iso hip ADD    5" 2x10                    SLR + 45* ER    x10 Modalities

## 2021-02-10 ENCOUNTER — OFFICE VISIT (OUTPATIENT)
Dept: PHYSICAL THERAPY | Facility: REHABILITATION | Age: 36
End: 2021-02-10
Payer: COMMERCIAL

## 2021-02-10 DIAGNOSIS — M22.2X2 PATELLOFEMORAL DISORDER OF LEFT KNEE: Primary | ICD-10-CM

## 2021-02-10 PROCEDURE — 97140 MANUAL THERAPY 1/> REGIONS: CPT

## 2021-02-10 PROCEDURE — 97110 THERAPEUTIC EXERCISES: CPT

## 2021-02-10 PROCEDURE — 97112 NEUROMUSCULAR REEDUCATION: CPT

## 2021-02-10 NOTE — PROGRESS NOTES
Daily Note     Today's date: 2/10/2021  Patient name: Jigar Beach  : 1985  MRN: [id number]  Referring provider: Maricel Menon  Dx:   Encounter Diagnosis     ICD-10-CM    1  Patellofemoral disorder of left knee  M22 2X2                   Subjective: Patient stated the she is in a 4/10 anterio-medial knee pain that started this AM, she thinks that she slept in weird position  She was feeling better otherwise  Objective: See treatment diary below      Assessment: Tolerated treatment well  Lag noted with SLR  Patient demonstrated good form throughout session, she was able to complete all tasks without exacerbation  Trialed ITB and HF/Q self stretch this session, with good tolerance  HF/Q with moderate results for pain reduction and improving mobility, 2/10  Patient would benefit from continued PT  Plan: continue per plan care        Precautions: WPW syndrome, migraines, obesity    Daily Treatment Diary      Assessment  2/4  2/8  2/10                 Tarsha/Reval  MD  TE                   FOTO  36/60       **         **   POC Signed                       HEP Issued                        Manuals  2/4  2/8  2/10                  B HF/Q Stretch      MB                 Exercise Diary   2/4  2/8  2/10                  Bike Warm-Up    5 min  5 min                  Hamstring Stretch  30"x3  90/90  30"x3   90/90  30"x3                  Gastroc Stretch  30"x3  stand 30"x3  stand 3x30"                  ITB Stretch      3x30"                  SAQ + iso hip add    5" 2x10  5" 2x10                  LAQ + iso hip ADD    5" 2x10  5" 2x10                  SLR + 45* ER    x10  2x10                                                                                                                                                                                                                                         Modalities

## 2021-02-12 ENCOUNTER — TELEPHONE (OUTPATIENT)
Dept: BEHAVIORAL/MENTAL HEALTH CLINIC | Facility: CLINIC | Age: 36
End: 2021-02-12

## 2021-02-17 ENCOUNTER — SOCIAL WORK (OUTPATIENT)
Dept: BEHAVIORAL/MENTAL HEALTH CLINIC | Facility: CLINIC | Age: 36
End: 2021-02-17
Payer: COMMERCIAL

## 2021-02-17 DIAGNOSIS — F31.5 BIPOLAR I DISORDER, MOST RECENT EPISODE DEPRESSED, SEVERE WITH PSYCHOTIC FEATURES (HCC): Primary | Chronic | ICD-10-CM

## 2021-02-17 DIAGNOSIS — F41.1 GAD (GENERALIZED ANXIETY DISORDER): Chronic | ICD-10-CM

## 2021-02-17 PROCEDURE — 90834 PSYTX W PT 45 MINUTES: CPT | Performed by: SOCIAL WORKER

## 2021-02-17 NOTE — PSYCH
This note was not shared with the patient due to this is a psychotherapy note    Psychotherapy Provided: Family Therapy     Length of time in session: 50 minutes, follow up in 1 week    Encounter Diagnosis     ICD-10-CM    1  Bipolar I disorder, most recent episode depressed, severe with psychotic features ([facility] [address] )  F31 5    2  SHELLY (generalized anxiety disorder)  F41 1          Goals addressed in session: Goal 1     Pain:      moderate to severe-- anxiety regarding parenting    Current suicide risk : Low     DATA: Met with Leopold Bell for scheduled individual session  This session was attended by Andreas Lewis, Student Intern  Leopold Bell provided verbal consent for the Student Intern to sit in on this and future sessions  She acknowledges understanding that she can opt out of student observations/participation at any time  Leopold Bell brought her son to the session today, for a family session  Topics of discussion included discovery that her son was on the [employer] , communicating with a girl (presumably his own age)  There were discussions regarding potential self-harm behaviors  During the session, Troy Cheney endorsed biting his arm  He states that he did not break the skin when he engaged in this self-harm behavior  He reports that he has no thoughts of suicide and states that he did not engage in self-harm by cutting  Leopold Bell reassured him that he was not in trouble and that she wanted him to find the help that he needs  She asked how to address the issue with him regarding trust, without being overly "helicoptering" with him  She identifies that he is getting older and does not always want his mother to be his primary support; however, she wants him to be able to be open and honest with her  The clinician and intern both validated how hard it is for adolescents to be at home and without their support network, due to covid and virtual learning   Troy Cheney states that he is willing to find a therapist of his own, and Leopold Bell acknowledged that the therapy would be private for him (the clinician verified that issues directly related to safety would not necessarily be protected by confidentiality laws, due to his age and the seriousness of safety-related concerns)  Client shows evidence of utilizing effective communication skills to manage mental health symptoms  During this session, this clinician used the following therapeutic modalities: engagement strategies, supportive psychotherapy, client-centered therapy, DBT-informed skills, solution-focused therapy and family therapy  ASSESSMENT: Scott Lovett presents with a primarily anxious mood  Her affect is normal range and intensity, appropriate  Scott Lovett exhibits good therapeutic rapport with this clinician  Scott Lovett continues to exhibit willingness to work on treatment goals and objectives  Scott Lovett presents with a minimal risk of suicide, minimal risk of self-harm, and minimal risk of harm to others  PLAN: Scott Lovett will return in one week for the next scheduled session  Between sessions, Scott Lovett will continue to maintain open communication with her son and will report back during the next session re: successes and barriers  At the next session, this clinician will use supportive psychotherapy, client-centered therapy, mindfulness-based strategies, DBT-informed skills, Motivational Interviewing and solution-focused therapy to address her mood regulation and relationship concerns, in an effort to assist Scott Lovett with meeting treatment goals  Behavioral Health Treatment Plan [facility]: Diagnosis and Treatment Plan explained to Chon Aparicio relates understanding diagnosis and is agreeable to Treatment Plan   Yes

## 2021-02-25 ENCOUNTER — APPOINTMENT (OUTPATIENT)
Dept: PHYSICAL THERAPY | Facility: REHABILITATION | Age: 36
End: 2021-02-25
Payer: COMMERCIAL

## 2021-03-08 ENCOUNTER — OFFICE VISIT (OUTPATIENT)
Dept: OBGYN CLINIC | Facility: MEDICAL CENTER | Age: 36
End: 2021-03-08
Payer: COMMERCIAL

## 2021-03-08 VITALS
BODY MASS INDEX: 41.59 KG/M2 | SYSTOLIC BLOOD PRESSURE: 118 MMHG | TEMPERATURE: 98 F | HEART RATE: 103 BPM | HEIGHT: 62 IN | WEIGHT: 226 LBS | DIASTOLIC BLOOD PRESSURE: 84 MMHG

## 2021-03-08 DIAGNOSIS — Z30.015 ENCOUNTER FOR INITIAL PRESCRIPTION OF VAGINAL RING HORMONAL CONTRACEPTIVE: ICD-10-CM

## 2021-03-08 DIAGNOSIS — M25.362 PATELLAR INSTABILITY OF LEFT KNEE: Primary | ICD-10-CM

## 2021-03-08 DIAGNOSIS — M23.92 INTERNAL DERANGEMENT OF LEFT KNEE: ICD-10-CM

## 2021-03-08 PROCEDURE — 99213 OFFICE O/P EST LOW 20 MIN: CPT | Performed by: ORTHOPAEDIC SURGERY

## 2021-03-08 RX ORDER — ETONOGESTREL AND ETHINYL ESTRADIOL 11.7; 2.7 MG/1; MG/1
INSERT, EXTENDED RELEASE VAGINAL
Qty: 3 EACH | Refills: 2 | Status: SHIPPED | OUTPATIENT
Start: 2021-03-08 | End: 2021-08-24

## 2021-03-08 NOTE — PROGRESS NOTES
Ortho Sports Medicine Knee Visit     Assesment:     left knee patellofemoral pain with patellar instability    Plan:    Conservative treatment:    Ice to knee for 20 minutes at least 1-2 times daily  OTC NSAIDS prn for pain  The patient has tried and failed the following conservative measures: Activity modification and formal physical therapy/home exercise program     Imaging:    No imaging was available for review today  We will obtain an MRI of the knee to rule out MPFL tear due to persistent patellar instability and pain after PT/HEP  Follow up in 1-2 weeks for MRI review  Will make a definitive treatment plan based on the results of the MRI  Injection:    No Injection planned at this time  May consider future corticosteroid injection depending on clinical exam/imaging  Surgery:     No surgery is recommended at this point, continue with conservative treatment plan as noted  History of Present Illness: The patient is returns for follow up of patellofemoral pain syndrome with patellar instability  Since the prior visit, She reports no improvement  She states that as a kid she had loose patella as in which she had to do bracing and physical therapy  Patient states that while at a cheer competition the left kneecap did dislocate and she had to push it back into place  She states that on a daily basis that the kneecap feels loose as though there is slipping within the joint  Pain is located anterior, medial      Pain is improved by rest and ice  Pain is aggravated by stairs, weight bearing, running, walking and standing  Symptoms include clicking and catching  The patient has tried rest, ice, NSAIDS, physical therapy and bracing  I have reviewed the past medical, surgical, social and family history, medications and allergies as documented in the EMR  Review of systems: ROS is negative other than that noted in the HPI  Constitutional: Negative for fatigue and fever  Cardiovascular: Negative for chest pain  Pulmonary: negative for shortness of breath    PMH/PSH:  Past Medical History:   Diagnosis Date    Abnormal Pap smear of cervix     Eczema     Exposure to chlamydia     Resolved 17    Migraine without aura and without status migrainosus, not intractable 10/11/2019    Obesity     Postpartum depression 2018    Manoj-Parkinson-White (WPW) syndrome      Past Surgical History:   Procedure Laterality Date    ABLATION OF DYSRHYTHMIC FOCUS      WPW ablation age 15    COLONOSCOPY      COLPOSCOPY      INDUCED       OTHER SURGICAL HISTORY      catheter ablation- WPW age 13        Physical Exam:    Blood pressure 118/84, pulse 103, temperature 98 °F (36 7 °C), height 5' 2" (1 575 m), weight 103 kg (226 lb), currently breastfeeding  General/Constitutional: NAD, well developed, well nourished  HENT: Normocephalic, atraumatic  CV: Intact distal pulses, regular rate  Resp: No respiratory distress or labored breathing  Lymphatic: No lymphadenopathy palpated  Neuro: Alert and Oriented x 3, no focal deficits  Psych: Normal mood, normal affect, normal judgement, normal behavior  Skin: Warm, dry, no rashes, no erythema       Knee Exam (focused):                   RIGHT LEFT   ROM:   0-130 0-130   Palpation: Effusion negative negative     MJL tenderness Negative Positive     LJL tenderness Negative Negative   Instability: Varus stable stable     Valgus stable stable   Special Tests: Lachman Negative Negative     Posterior drawer Negative Negative     Anterior drawer Negative Negative     Pivot shift not tested not tested     Dial not tested not tested   Patella: Palpation no tenderness medial facet ttp and no tenderness     Mobility 1/4 1/2     Apprehension Negative Positive   Other: Single leg 1/4 squat not tested not tested      LE NV Exam: +2 DP/PT pulses bilaterally  Sensation intact to light touch L2-S1 bilaterally    No calf tenderness to palpation bilaterally      Knee Imaging    None was performed today    Scribe Attestation    I,:  Farzana Solomon am acting as a scribe while in the presence of the attending physician :       I,:  Becka Patel DO personally performed the services described in this documentation    as scribed in my presence :

## 2021-03-19 ENCOUNTER — HOSPITAL ENCOUNTER (OUTPATIENT)
Dept: MRI IMAGING | Facility: HOSPITAL | Age: 36
Discharge: HOME | End: 2021-03-19
Payer: COMMERCIAL

## 2021-03-19 DIAGNOSIS — M23.92 INTERNAL DERANGEMENT OF LEFT KNEE: ICD-10-CM

## 2021-03-19 DIAGNOSIS — M25.362 PATELLAR INSTABILITY OF LEFT KNEE: ICD-10-CM

## 2021-03-19 PROCEDURE — 73721 MRI JNT OF LWR EXTRE W/O DYE: CPT

## 2021-03-19 PROCEDURE — G1004 CDSM NDSC: HCPCS

## 2021-03-22 ENCOUNTER — OFFICE VISIT (OUTPATIENT)
Dept: OBGYN CLINIC | Facility: MEDICAL CENTER | Age: 36
End: 2021-03-22
Payer: COMMERCIAL

## 2021-03-22 ENCOUNTER — TELEMEDICINE (OUTPATIENT)
Dept: BEHAVIORAL/MENTAL HEALTH CLINIC | Facility: CLINIC | Age: 36
End: 2021-03-22
Payer: COMMERCIAL

## 2021-03-22 VITALS
TEMPERATURE: 98.7 F | WEIGHT: 220 LBS | HEIGHT: 62 IN | HEART RATE: 103 BPM | BODY MASS INDEX: 40.48 KG/M2 | SYSTOLIC BLOOD PRESSURE: 126 MMHG | DIASTOLIC BLOOD PRESSURE: 83 MMHG

## 2021-03-22 DIAGNOSIS — F41.1 GAD (GENERALIZED ANXIETY DISORDER): Chronic | ICD-10-CM

## 2021-03-22 DIAGNOSIS — M25.362 PATELLAR INSTABILITY OF LEFT KNEE: Primary | ICD-10-CM

## 2021-03-22 DIAGNOSIS — F31.5 BIPOLAR I DISORDER, MOST RECENT EPISODE DEPRESSED, SEVERE WITH PSYCHOTIC FEATURES (HCC): Primary | Chronic | ICD-10-CM

## 2021-03-22 DIAGNOSIS — F42.9 OBSESSIVE-COMPULSIVE DISORDER, UNSPECIFIED TYPE: Chronic | ICD-10-CM

## 2021-03-22 PROCEDURE — 99214 OFFICE O/P EST MOD 30 MIN: CPT | Performed by: ORTHOPAEDIC SURGERY

## 2021-03-22 PROCEDURE — 90834 PSYTX W PT 45 MINUTES: CPT | Performed by: SOCIAL WORKER

## 2021-03-22 PROCEDURE — 20610 DRAIN/INJ JOINT/BURSA W/O US: CPT | Performed by: ORTHOPAEDIC SURGERY

## 2021-03-22 PROCEDURE — 3008F BODY MASS INDEX DOCD: CPT | Performed by: INTERNAL MEDICINE

## 2021-03-22 RX ORDER — METHYLPREDNISOLONE ACETATE 40 MG/ML
1 INJECTION, SUSPENSION INTRA-ARTICULAR; INTRALESIONAL; INTRAMUSCULAR; SOFT TISSUE
Status: COMPLETED | OUTPATIENT
Start: 2021-03-22 | End: 2021-03-22

## 2021-03-22 RX ORDER — LIDOCAINE HYDROCHLORIDE 10 MG/ML
3 INJECTION, SOLUTION INFILTRATION; PERINEURAL
Status: COMPLETED | OUTPATIENT
Start: 2021-03-22 | End: 2021-03-22

## 2021-03-22 RX ADMIN — METHYLPREDNISOLONE ACETATE 1 ML: 40 INJECTION, SUSPENSION INTRA-ARTICULAR; INTRALESIONAL; INTRAMUSCULAR; SOFT TISSUE at 17:39

## 2021-03-22 RX ADMIN — LIDOCAINE HYDROCHLORIDE 3 ML: 10 INJECTION, SOLUTION INFILTRATION; PERINEURAL at 17:39

## 2021-03-22 NOTE — PROGRESS NOTES
Ortho Sports Medicine Knee Follow Up Visit     Assesment:   30 y o  female left knee patella instability    Plan:    Conservative treatment:    Ice to knee for 20 minutes at least 1-2 times daily  PT for ROM/strengthening to knee, hip and core  J brace ordered    Imaging: All imaging and mri from today was reviewed by myself and explained to the patient  Injection:    The risks and benefits of the injection (which include but are not limited to: infection, bleeding,damage to nerve/artery, need for further intervention), as well as the risks and benefits of all alternative treatments were explained and understood  The patient elected to proceed with injection  The procedure was done with aseptic technique, and the patient tolerated the procedure well with no complications  A corticosteroid injection was performed  The patient should take 1-2 days off of activity, with gradual return to activity as tolerated  Ice to the knee 1-2 times daily for 20 minutes, for next 24-48 hrs  Surgery:     No surgery is recommended at this point, continue with conservative treatment plan as noted  Discussed all risks and benefits of surgery as well as non surgical options and all questions answered  Follow up:    Return in about 6 weeks (around 5/3/2021) for Recheck  Chief Complaint   Patient presents with    Left Knee - Follow-up     History of Present Illness: The patient is returns for follow up of left knee patellofemoral pain with patellar instability  Since the prior visit, She reports no improvement  She states that she feels the patella is unstable when she is going up and down the stairs  She feels a click in the knee which is sometimes painful  She also has difficulty playing on the ground with her children  Pain is located anterior, posterior, medial      Pain is improved by rest, ice, NSAIDS and physical therapy   Pain is aggravated by stairs, squatting, weight bearing, walking, sitting, standing and pivoting on a planted foot  Symptoms include clicking, catching and popping  The patient has tried rest, ice, NSAIDS and physical therapy  Knee Surgical History:  None    Past Medical, Social and Family History:  Past Medical History:   Diagnosis Date    Abnormal Pap smear of cervix     Eczema     Exposure to chlamydia     Resolved 17    Migraine without aura and without status migrainosus, not intractable 10/11/2019    Obesity     Postpartum depression 2018    Manoj-Parkinson-White (WPW) syndrome      Past Surgical History:   Procedure Laterality Date    ABLATION OF DYSRHYTHMIC FOCUS      WPW ablation age 15    COLONOSCOPY      COLPOSCOPY      INDUCED       OTHER SURGICAL HISTORY      catheter ablation- WPW age 13     Allergies   Allergen Reactions    Codeine Other (See Comments)     dry heaves    Sulfa Antibiotics Hives and Rash     Per pt as achild    Erythromycin Hives     Per as a child     Current Outpatient Medications on File Prior to Visit   Medication Sig Dispense Refill    etonogestrel-ethinyl estradiol (NUVARING) 0 12-0 015 MG/24HR vaginal ring Insert vaginally and leave in place for 3 consecutive weeks, then remove for 1 week  3 each 2    ibuprofen (MOTRIN) 800 mg tablet Take 1 tablet (800 mg total) by mouth every 8 (eight) hours as needed for mild pain or moderate pain for up to 10 days 30 tablet 0     No current facility-administered medications on file prior to visit        Social History     Socioeconomic History    Marital status: Single     Spouse name: Not on file    Number of children: 2    Years of education: Not on file    Highest education level: Some college, no degree   Occupational History    Occupation: works in [employer] resource strain: Somewhat hard   [employer] insecurity     Worry: Sometimes true     Inability: Sometimes true    Transportation needs     Medical: Yes     Non-medical: Yes   Tobacco Use    Smoking status: Never Smoker    Smokeless tobacco: Never Used   Substance and Sexual Activity    Alcohol use: Yes     Frequency: Monthly or less     Drinks per session: 1 or 2     Binge frequency: Never    Drug use: No    Sexual activity: Yes     Partners: Male     Birth control/protection: Condom Male   Lifestyle    Physical activity     Days per week: 0 days     Minutes per session: 0 min    Stress: Rather much   Relationships    Social connections     Talks on phone: Once a week     Gets together: Once a week     Attends Hinduism service: Never     Active member of club or organization: No     Attends meetings of clubs or organizations: Never     Relationship status: Never     Intimate partner violence     Fear of current or ex partner: No     Emotionally abused: No     Physically abused: No     Forced sexual activity: No   Other Topics Concern    Not on file   Social History Narrative    Education: high school graduate and some college    Learning Disabilities: none    Marital History: single    Children: 1 daughter, 1 son    Living Arrangement: lives in home with boyfriend, 2 children and boyfriend's 2 children    Occupational History: works part time at [employer] works    Functioning Relationships: boyfriend is supportive    Legal History: none     History: None         Family planning    IUD contraception     I have reviewed the past medical, surgical, social and family history, medications and allergies as documented in the EMR  Review of systems: ROS is negative other than that noted in the HPI  Constitutional: Negative for fatigue and fever  Physical Exam:    Blood pressure 126/83, pulse 103, temperature 98 7 °F (37 1 °C), height 5' 2" (1 575 m), weight 99 8 kg (220 lb), currently breastfeeding      General/Constitutional: NAD, well developed, well nourished  HENT: Normocephalic, atraumatic  CV: Intact distal pulses, regular rate  Resp: No respiratory distress or labored breathing  Lymphatic: No lymphadenopathy palpated  Neuro: Alert and Oriented x 3, no focal deficits  Psych: Normal mood, normal affect, normal judgement, normal behavior  Skin: Warm, dry, no rashes, no erythema    Knee Exam (focused): RIGHT LEFT   ROM:   0-130 0-130   Palpation: Effusion negative negative     MJL tenderness Negative Negative     LJL tenderness Negative Negative   Meniscus: Johanne Negative Negative    Apley's Compression Negative Negative   Instability: Varus stable stable     Valgus stable stable   Special Tests: Lachman Negative Negative     Posterior drawer Negative Negative     Anterior drawer Negative Negative     Pivot shift not tested not tested     Dial not tested not tested   Patella: Palpation no tenderness medial facet ttp     Mobility 1/4 3/4     Apprehension Negative Positive   Other: Single leg 1/4 squat not tested not tested    Large joint arthrocentesis: L knee  Universal Protocol:  Consent: Verbal consent obtained  Risks and benefits: risks, benefits and alternatives were discussed  Consent given by: patient and parent  Time out: Immediately prior to procedure a "time out" was called to verify the correct patient, procedure, equipment, support staff and site/side marked as required  Patient understanding: patient states understanding of the procedure being performed  Patient consent: the patient's understanding of the procedure matches consent given  Procedure consent: procedure consent matches procedure scheduled  Relevant documents: relevant documents present and verified  Test results: test results available and properly labeled  Site marked: the operative site was marked  Radiology Images displayed and confirmed   If images not available, report reviewed: imaging studies available  Patient identity confirmed: verbally with patient    Supporting Documentation  Indications: pain and joint swelling   Procedure Details  Location: knee - L knee  Needle size: 25 G  Ultrasound guidance: no  Approach: anterolateral  Medications administered: 3 mL lidocaine 1 %; 1 mL methylPREDNISolone acetate 40 mg/mL    Patient tolerance: patient tolerated the procedure well with no immediate complications  Dressing:  Sterile dressing applied          LE NV Exam: +2 DP/PT pulses bilaterally  Sensation intact to light touch L2-S1 bilaterally    No calf tenderness to palpation bilaterally    Knee Imaging    MRI of the left knee demonstrates slight patella Jennifer with increase in lateral tilt  Patella translation 9 mm   TTTG measures 14mm,  I reviewed the radiology report and agree with impression    Scribe Attestation    I,:  Nando Moscoso am acting as a scribe while in the presence of the attending physician :       I,:  Indira Rutledge DO personally performed the services described in this documentation    as scribed in my presence :

## 2021-03-23 ENCOUNTER — TELEMEDICINE (OUTPATIENT)
Dept: FAMILY MEDICINE CLINIC | Facility: CLINIC | Age: 36
End: 2021-03-23
Payer: COMMERCIAL

## 2021-03-23 DIAGNOSIS — Z20.822 EXPOSURE TO COVID-19 VIRUS: Primary | ICD-10-CM

## 2021-03-23 PROCEDURE — 1036F TOBACCO NON-USER: CPT | Performed by: INTERNAL MEDICINE

## 2021-03-23 PROCEDURE — 99212 OFFICE O/P EST SF 10 MIN: CPT | Performed by: INTERNAL MEDICINE

## 2021-03-23 NOTE — PROGRESS NOTES
COVID-19 Virtual Visit     Assessment/Plan:    Problem List Items Addressed This Visit     None      Visit Diagnoses     Exposure to COVID-19 virus    -  Primary    Relevant Orders    Novel Coronavirus (Covid-19),PCR [facility] - Collected at Mobile V[facility] or [facility]         Disposition:     Patient was exposed to COVID-19 positive individual on 03/20/2021  He will be tested in 5 days  He will be on isolation as for now  I have spent 7 minutes directly with the patient  Encounter provider Anusha Gooden MD    Provider located at [address] [id number] [phone]    Recent Visits  Date Type Provider Dept   03/23/21 Basil Gonsales MD [place] [id number] [facility]   Showing recent visits within past 7 days and meeting all other requirements     Future Appointments  No visits were found meeting these conditions  Showing future appointments within next 150 days and meeting all other requirements      This virtual check-in was done via Microsoft Teams and patient was informed that this is a secure, HIPAA-compliant platform  She agrees to proceed  Patient agrees to participate in a virtual check in via telephone or video visit instead of presenting to the office to address urgent/immediate medical needs  Patient is aware this is a billable service  After connecting through [facility], the patient was identified by name and date of birth  Mahogany Richards was informed that this was a telemedicine visit and that the exam was being conducted confidentially over secure lines  My office door was closed  Mahogany Richards acknowledged consent and understanding of privacy and security of the telemedicine visit  I informed the patient that I have reviewed her record in Epic and presented the opportunity for her to ask any questions regarding the visit today   The patient agreed to participate  Subjective:   Valarie King is a 30 y o  female who is concerned about COVID-19  Patient denies fever, chills, congestion, sore throat, cough, shortness of breath, nausea, vomiting, diarrhea and myalgias  Lab Results   Component Value Date    SARSCOV2 Not Detected 2020    SARSCOV2 Not Detected 2020     Past Medical History:   Diagnosis Date    Abnormal Pap smear of cervix     Eczema     Exposure to chlamydia     Resolved 17    Migraine without aura and without status migrainosus, not intractable 10/11/2019    Obesity     Postpartum depression 2018    Manoj-Parkinson-White (WPW) syndrome      Past Surgical History:   Procedure Laterality Date    ABLATION OF DYSRHYTHMIC FOCUS      WPW ablation age 15    COLONOSCOPY      COLPOSCOPY      INDUCED       OTHER SURGICAL HISTORY      catheter ablation- WPW age 13     Current Outpatient Medications   Medication Sig Dispense Refill    etonogestrel-ethinyl estradiol (NUVARING) 0 12-0 015 MG/24HR vaginal ring Insert vaginally and leave in place for 3 consecutive weeks, then remove for 1 week  3 each 2    ibuprofen (MOTRIN) 800 mg tablet Take 1 tablet (800 mg total) by mouth every 8 (eight) hours as needed for mild pain or moderate pain for up to 10 days 30 tablet 0     No current facility-administered medications for this visit  Allergies   Allergen Reactions    Codeine Other (See Comments)     dry heaves    Sulfa Antibiotics Hives and Rash     Per pt as achild    Erythromycin Hives     Per as a child       Review of Systems   Constitutional: Negative for chills and fever  HENT: Negative for congestion and sore throat  Respiratory: Negative for cough, shortness of breath and wheezing  Gastrointestinal: Negative for diarrhea, nausea and vomiting  Musculoskeletal: Negative for arthralgias and myalgias  Psychiatric/Behavioral: Negative for confusion  Objective:     There were no vitals filed for this visit  Physical Exam  Constitutional:       General: She is not in acute distress  Neurological:      Mental Status: She is alert  VIRTUAL VISIT DISCLAIMER    Lane Bailey acknowledges that she has consented to an online visit or consultation  She understands that the online visit is based solely on information provided by her, and that, in the absence of a face-to-face physical evaluation by the physician, the diagnosis she receives is both limited and provisional in terms of accuracy and completeness  This is not intended to replace a full medical face-to-face evaluation by the physician  Lane Bailey understands and accepts these terms

## 2021-03-24 DIAGNOSIS — Z20.822 EXPOSURE TO COVID-19 VIRUS: ICD-10-CM

## 2021-03-24 PROCEDURE — U0005 INFEC AGEN DETEC AMPLI PROBE: HCPCS | Performed by: INTERNAL MEDICINE

## 2021-03-24 PROCEDURE — U0003 INFECTIOUS AGENT DETECTION BY NUCLEIC ACID (DNA OR RNA); SEVERE ACUTE RESPIRATORY SYNDROME CORONAVIRUS 2 (SARS-COV-2) (CORONAVIRUS DISEASE [COVID-19]), AMPLIFIED PROBE TECHNIQUE, MAKING USE OF HIGH THROUGHPUT TECHNOLOGIES AS DESCRIBED BY CMS-2020-01-R: HCPCS | Performed by: INTERNAL MEDICINE

## 2021-03-25 LAB — SARS-COV-2 RNA RESP QL NAA+PROBE: NEGATIVE

## 2021-03-27 NOTE — PSYCH
Virtual Regular Visit    This note was not shared with the patient due to this is a psychotherapy note    Assessment/Plan:    Problem List Items Addressed This Visit        Other    Bipolar I disorder, most recent episode depressed, severe with psychotic features ([facility] [address] ) - Primary (Chronic)    SHELLY (generalized anxiety disorder) (Chronic)    Obsessive-compulsive disorder, unspecified (Chronic)           Reason for visit is Behavioral Health session, conducted through video, due to COVID-19 precautions  Sami Hayes has verbalized a preference to continue with virtual sessions at this time  Sami Hayes has been offered in-person sessions and has declined  Encounter provider CHARLIE Bailey    Provider located at [address] [place] [id number] [phone]      Recent Visits  No visits were found meeting these conditions  Showing recent visits within past 7 days and meeting all other requirements     Future Appointments  No visits were found meeting these conditions  Showing future appointments within next 150 days and meeting all other requirements        The patient was identified by name and date of birth  Jigar Beach was informed that this is a telemedicine visit and that the visit is being conducted through [employer] and patient was informed that this is a secure, HIPAA-compliant platform  She agrees to proceed     My office door was closed  The patient was notified the following individuals were present in the room: Laura Valles, student intern  She acknowledged consent and understanding of privacy and security of the video platform  The patient has agreed to participate and understands they can discontinue the visit at any time  Patient is aware this is a billable service  Subjective  Jigar Beach is a 30 y o  female      DATA: Met with Sami Hayes for scheduled individual session  This session was attended by Belén Orr, Student Intern  Zaki Calixto provided verbal consent for the Student Intern to sit in on this and future sessions  She acknowledges understanding that she can opt out of student observations/participation at any time  Topics of discussion included Kip concerns related to her two step-children that were recently found on inappropriate websites  Zaki Calixto is frustrated because she believes her fiancé is not taking it seriously and comparing it to her son Jamar's recent conversations on Disc[employer]  Zaki Calixto states that she feels that the websites that her stepchildren are on are far more concerning, and she would like them to be punished in accordance with the level of seriousness  She states this issue is causing an increase in conflict between she and her SO  Zaki Calixto discussed her feelings of being overwhelmed with having to care for all of the children on her day off  She expressed that she is looking forward to when the children will be able to return to school full-time  Zaki Calixto states that she might be open to considering medication to assist her with managing her anxiety  During this session, this clinician utilized the following modalities:supportive psychotherapy, client-centered therapy, mindfulness-based strategies, DBT-informed skills, Motivational Interviewing and solution-focused therapy  Prior to the next session, Zaki Calixto agrees to speak with her SO regarding a possible couple's session  ASSESSMENT: Zaki Calixto presents with a primarily anxious mood  Zaki Calixto has a full-range of affect is congruent with mood  Zaki Calixto exhibits a good rapport with this clinician by evidence of sharing very personal and sensitive information  Zaki Calixto continues to exhibit willingness to work on treatment goals and objectives  Zaki Calixto presents with a minimal risk of suicide, minimal risk of self-harm, and minimal risk of harm to others          PLAN: Zaki Calixto will return in three weeks for the next scheduled session  Between sessions, Rl Rodriguez will work on her coping skills and will report back during the next session re: successes and barriers  At the next session, this clinician will use client-centered and solution-focus to address Kip coping skills, in an effort to assist Rl Rodriguez with meeting treatment goals  HPI     Past Medical History:   Diagnosis Date    Abnormal Pap smear of cervix     Eczema     Exposure to chlamydia     Resolved 17    Migraine without aura and without status migrainosus, not intractable 10/11/2019    Obesity     Postpartum depression 2018    Manoj-Parkinson-White (WPW) syndrome        Past Surgical History:   Procedure Laterality Date    ABLATION OF DYSRHYTHMIC FOCUS      WPW ablation age 15    COLONOSCOPY      COLPOSCOPY      INDUCED       OTHER SURGICAL HISTORY      catheter ablation- WPW age 13       Current Outpatient Medications   Medication Sig Dispense Refill    etonogestrel-ethinyl estradiol (NUVARING) 0 12-0 015 MG/24HR vaginal ring Insert vaginally and leave in place for 3 consecutive weeks, then remove for 1 week  3 each 2    ibuprofen (MOTRIN) 800 mg tablet Take 1 tablet (800 mg total) by mouth every 8 (eight) hours as needed for mild pain or moderate pain for up to 10 days 30 tablet 0     No current facility-administered medications for this visit  Allergies   Allergen Reactions    Codeine Other (See Comments)     dry heaves    Sulfa Antibiotics Hives and Rash     Per pt as achild    Erythromycin Hives     Per as a child       Review of Systems    Video Exam    There were no vitals filed for this visit  Physical Exam     I spent 50 minutes directly with the patient during this visit      VIRTUAL VISIT DISCLAIMER    Shellgaye Escobedo acknowledges that she has consented to an online visit or consultation   She understands that the online visit is based solely on information provided by her, and that, in the absence of a face-to-face physical evaluation by the physician, the diagnosis she receives is both limited and provisional in terms of accuracy and completeness  This is not intended to replace a full medical face-to-face evaluation by the physician  Angelia Arango understands and accepts these terms

## 2021-04-14 ENCOUNTER — TELEMEDICINE (OUTPATIENT)
Dept: BEHAVIORAL/MENTAL HEALTH CLINIC | Facility: CLINIC | Age: 36
End: 2021-04-14
Payer: COMMERCIAL

## 2021-04-14 DIAGNOSIS — F42.9 OBSESSIVE-COMPULSIVE DISORDER, UNSPECIFIED TYPE: Chronic | ICD-10-CM

## 2021-04-14 DIAGNOSIS — F41.1 GAD (GENERALIZED ANXIETY DISORDER): Chronic | ICD-10-CM

## 2021-04-14 DIAGNOSIS — F31.5 BIPOLAR I DISORDER, MOST RECENT EPISODE DEPRESSED, SEVERE WITH PSYCHOTIC FEATURES (HCC): Primary | Chronic | ICD-10-CM

## 2021-04-14 PROCEDURE — 90847 FAMILY PSYTX W/PT 50 MIN: CPT | Performed by: SOCIAL WORKER

## 2021-04-17 NOTE — PSYCH
Virtual Regular Visit    This note was not shared with the patient due to this is a psychotherapy note      Assessment/Plan:    Problem List Items Addressed This Visit        Other    Bipolar I disorder, most recent episode depressed, severe with psychotic features ([facility] [address] ) - Primary (Chronic)    SHELLY (generalized anxiety disorder) (Chronic)    Obsessive-compulsive disorder, unspecified (Chronic)               Reason for visit is No chief complaint on file  Encounter provider CHARLIE John    Provider located at [address] [place] [id number]  [phone]      Recent Visits  No visits were found meeting these conditions  Showing recent visits within past 7 days and meeting all other requirements     Future Appointments  No visits were found meeting these conditions  Showing future appointments within next 150 days and meeting all other requirements        The patient was identified by name and date of birth  Richard Concepcion was informed that this is a telemedicine visit and that the visit is being conducted through [employer] and patient was informed that this is a secure, HIPAA-compliant platform  She agrees to proceed     My office door was closed  No one else was in the room  She acknowledged consent and understanding of privacy and security of the video platform  The patient has agreed to participate and understands they can discontinue the visit at any time  Patient is aware this is a billable service  Subjective  Richard Concepcion is a 30 y o  female  DATA: Met with Ciera Rice for scheduled family session  Topics of discussion included relationship and communication issues; co-parenting; management of mental health symptoms  Ciera Krystal was accompanied by her significant other during this session   This was a family session to address their communication and ability to maintain their relationship and their family  Mae Umaña and Daksha Schuster both report that they are committed to maintaining their relationship  They were able to verbalize reasons that they fell in love and also to identify things that they still love about one another  Mae Umaña states that she has significant anger  She states that her anger and feelings of detachment are worse at home, but she endorses these feelings across all areas of her life  We discussed what her partner can do to help her, and she was able to identify that "sometimes I just want him to hold me " We discussed Libertad's ability to ask for what she needs  Daksha Schuster states that he does not like to feel rejected, so he often avoids her and does not approach her with any physical affection  At the urging of this clinician, Mae Umaña agreed to talk with her primary care physician about possibly starting her antidepressant therapy  We will consider a referral to a prescriber in the future, if needed  Client shows evidence of utilizing effective communication skills during this session  During this session, this clinician used the following therapeutic modalities: supportive psychotherapy, client-centered therapy, mindfulness-based strategies, DBT-informed skills, Motivational Interviewing, solution-focused therapy and family therapy  Clinician provided psychoeducation regarding use of effective communication skills  The clinician assigned the following for the client to complete prior to the next session: Mae Umaña will honestly ask for what she needs and Daksha Schuster will attempt to provide physical affection (hugs) when Mae Umaña requests them  Mae Umaña will speak with her PCP regarding medications  ASSESSMENT: Mae Umaña presents with a primarily euthymic mood  her affect is normal range and intensity, appropriate  Mae Umaña exhibits good therapeutic rapport with this clinician  Mae Umaña continues to exhibit willingness to work on treatment goals and objectives   Mae Umaña presents with a minimal risk of suicide, minimal risk of self-harm, and minimal risk of harm to others  PLAN: Mae Umaña will return in two weeks for the next scheduled session  Between sessions, Mae Umaña will verbalize her needs to her partner, monitor her moods, call her doctor for medications and will report back during the next session re: successes and barriers  At the next session, this clinician will use supportive psychotherapy, client-centered therapy, mindfulness-based strategies, DBT-informed skills, Motivational Interviewing and solution-focused therapy to address her mood regulation and relationship concerns, in an effort to assist Mae Umaña with meeting treatment goals  HPI     Past Medical History:   Diagnosis Date    Abnormal Pap smear of cervix     Eczema     Exposure to chlamydia     Resolved 17    Migraine without aura and without status migrainosus, not intractable 10/11/2019    Obesity     Postpartum depression 2018    Manoj-Parkinson-White (WPW) syndrome        Past Surgical History:   Procedure Laterality Date    ABLATION OF DYSRHYTHMIC FOCUS      WPW ablation age 15    COLONOSCOPY      COLPOSCOPY      INDUCED       OTHER SURGICAL HISTORY      catheter ablation- WPW age 13       Current Outpatient Medications   Medication Sig Dispense Refill    etonogestrel-ethinyl estradiol (NUVARING) 0 12-0 015 MG/24HR vaginal ring Insert vaginally and leave in place for 3 consecutive weeks, then remove for 1 week  3 each 2    ibuprofen (MOTRIN) 800 mg tablet Take 1 tablet (800 mg total) by mouth every 8 (eight) hours as needed for mild pain or moderate pain for up to 10 days 30 tablet 0     No current facility-administered medications for this visit           Allergies   Allergen Reactions    Codeine Other (See Comments)     dry heaves    Sulfa Antibiotics Hives and Rash     Per pt as achild    Erythromycin Hives     Per as a child       Review of Systems    Video Exam    There were no vitals filed for this visit  Physical Exam     I spent 50 minutes directly with the patient during this visit      VIRTUAL VISIT DISCLAIMER    Michaelle Jane acknowledges that she has consented to an online visit or consultation  She understands that the online visit is based solely on information provided by her, and that, in the absence of a face-to-face physical evaluation by the physician, the diagnosis she receives is both limited and provisional in terms of accuracy and completeness  This is not intended to replace a full medical face-to-face evaluation by the physician  Michaelle Jane understands and accepts these terms

## 2021-04-27 ENCOUNTER — SOCIAL WORK (OUTPATIENT)
Dept: BEHAVIORAL/MENTAL HEALTH CLINIC | Facility: CLINIC | Age: 36
End: 2021-04-27
Payer: COMMERCIAL

## 2021-04-27 DIAGNOSIS — F42.9 OBSESSIVE-COMPULSIVE DISORDER, UNSPECIFIED TYPE: Chronic | ICD-10-CM

## 2021-04-27 DIAGNOSIS — F31.5 BIPOLAR I DISORDER, MOST RECENT EPISODE DEPRESSED, SEVERE WITH PSYCHOTIC FEATURES (HCC): Primary | Chronic | ICD-10-CM

## 2021-04-27 DIAGNOSIS — F41.1 GAD (GENERALIZED ANXIETY DISORDER): Chronic | ICD-10-CM

## 2021-04-27 PROCEDURE — 90834 PSYTX W PT 45 MINUTES: CPT | Performed by: SOCIAL WORKER

## 2021-04-27 NOTE — PSYCH
This note was not shared with the patient due to this is a psychotherapy note    Psychotherapy Provided: Individual Psychotherapy 50 minutes     Length of time in session: 50 minutes, follow up in three weeks    Encounter Diagnosis     ICD-10-CM    1  Bipolar I disorder, most recent episode depressed, severe with psychotic features ([facility] [address] )  F31 5    2  SHELLY (generalized anxiety disorder)  F41 1    3  Obsessive-compulsive disorder, unspecified type  F42 9        Goals addressed in session: Goal 1     Pain:      none    Current suicide risk : Low     DATA: Met with Jose Gavin for scheduled individual session  Topics of discussion included relationship with SO; concern about her son  Jose Gavin states that she feels that her relationship with her SO is still tense  She states she is trying to be more open about what she needs from their relationship  Jose Gavin states that she believes that her boyfriend would be willing to participate in a couples session in the future  She states that the primary issue that they argue about is the care of their children  She states that she is tired of caring for his ex's children (who are neither hers nor his)  She states that she has set a limit that she will not care for his ex's new baby (she is pregnant again)  Jose Gavin states that she is concerned about her son  She states that she hopes he is talking about his relationship (or lack thereof) with his father  Jose Gavin states that he has had no contact in approximately four years  Jose Gavin discussed a recent text conversation she had with Jamar's father  I informed her that, although I cannot share with her specifics about what Mary Coronel is discussing with Philip Needs in his therapy sessions, he will probably take a while to build rapport enough to talk about issues that are difficult for him  She acknowledged understanding  Client shows evidence of utilizing some mindfulness-based skills to manage mental health symptoms   During this session, this clinician used the following therapeutic modalities: supportive psychotherapy, client-centered therapy, mindfulness-based strategies, DBT-informed skills, Motivational Interviewing and solution-focused therapy  Clinician provided psychoeducation regarding working with adolescents in therapy and taking time to build rapport  ASSESSMENT: Cesar Faye presents with a primarily euthymic mood  her affect is normal range and intensity, appropriate  Cesar Faye exhibits good therapeutic rapport with this clinician  Cesar Faye continues to exhibit willingness to work on treatment goals and objectives  Cesar Faye presents with a minimal risk of suicide, minimal risk of self-harm, and minimal risk of harm to others  PLAN: Cesar Faye will return in three weeks for the next scheduled session  Between sessions, Cesar Faye will continue to practice effective communication skills (especially with her SO) and will report back during the next session re: successes and barriers  At the next session, this clinician will use supportive psychotherapy, client-centered therapy, mindfulness-based strategies, DBT-informed skills, Motivational Interviewing and solution-focused therapy to address her mood regulation and relationship concerns, in an effort to assist Cesar Faye with meeting treatment goals  Behavioral Health Treatment Plan [facility]: Diagnosis and Treatment Plan explained to José Miguel Pederson relates understanding diagnosis and is agreeable to Treatment Plan   Yes

## 2021-05-18 ENCOUNTER — TELEPHONE (OUTPATIENT)
Dept: PSYCHIATRY | Facility: CLINIC | Age: 36
End: 2021-05-18

## 2021-05-18 NOTE — TELEPHONE ENCOUNTER
----- Message from Elena Cooper sent at 5/18/2021 12:49 PM EDT -----  Regarding: Patient No Show  Lan Walker No Showed 05/18/21  for Ashley Woodard LCSW, CAADC and did not call with proper notice to Cx appt   They are marked as a No Show for today's visit

## 2021-05-19 ENCOUNTER — TELEPHONE (OUTPATIENT)
Dept: PSYCHIATRY | Facility: CLINIC | Age: 36
End: 2021-05-19

## 2021-05-19 NOTE — TELEPHONE ENCOUNTER
NO-SHOW LETTER MAILED TO Real Tyson    ADDRESS: [address] [facility] [address] [facility]

## 2021-05-25 ENCOUNTER — OFFICE VISIT (OUTPATIENT)
Dept: FAMILY MEDICINE CLINIC | Facility: CLINIC | Age: 36
End: 2021-05-25
Payer: COMMERCIAL

## 2021-05-25 VITALS
TEMPERATURE: 97.8 F | WEIGHT: 219 LBS | HEART RATE: 91 BPM | SYSTOLIC BLOOD PRESSURE: 104 MMHG | BODY MASS INDEX: 40.3 KG/M2 | DIASTOLIC BLOOD PRESSURE: 76 MMHG | OXYGEN SATURATION: 97 % | HEIGHT: 62 IN

## 2021-05-25 DIAGNOSIS — R05.9 COUGH: ICD-10-CM

## 2021-05-25 DIAGNOSIS — R09.81 SINUS CONGESTION: ICD-10-CM

## 2021-05-25 DIAGNOSIS — R05.9 COUGH: Primary | ICD-10-CM

## 2021-05-25 PROCEDURE — 99214 OFFICE O/P EST MOD 30 MIN: CPT | Performed by: FAMILY MEDICINE

## 2021-05-25 PROCEDURE — U0003 INFECTIOUS AGENT DETECTION BY NUCLEIC ACID (DNA OR RNA); SEVERE ACUTE RESPIRATORY SYNDROME CORONAVIRUS 2 (SARS-COV-2) (CORONAVIRUS DISEASE [COVID-19]), AMPLIFIED PROBE TECHNIQUE, MAKING USE OF HIGH THROUGHPUT TECHNOLOGIES AS DESCRIBED BY CMS-2020-01-R: HCPCS | Performed by: FAMILY MEDICINE

## 2021-05-25 PROCEDURE — U0005 INFEC AGEN DETEC AMPLI PROBE: HCPCS | Performed by: FAMILY MEDICINE

## 2021-05-25 RX ORDER — AMOXICILLIN 500 MG/1
CAPSULE ORAL
Qty: 30 CAPSULE | Refills: 0 | Status: SHIPPED | OUTPATIENT
Start: 2021-05-25 | End: 2021-06-04

## 2021-05-25 NOTE — PATIENT INSTRUCTIONS
For coughing - if needed - can use robitussin (plain) -   Plenty of fluids and rest  Fill rx for antibiotic If covid testing (-)  Go today for covid test   No work until symptoms improving and make sure no fever

## 2021-05-25 NOTE — PROGRESS NOTES
FAMILY PRACTICE OFFICE VISIT    NAME: Richard Concepcion    AGE: 30 y o  SEX: female  : 1985   MRN: [id number]    DATE: 2021  TIME: 2:25 PM    Assessment and Plan   1  Cough  Supportive care  R/o covid  Suspect coughing from post-nasal drip - so can try robitussin  Avoid decongestants as pt with underlying WPW     Recommendation to increase fluids - particularly water, rest, saline nasal spray and humidified air    For coughing - if needed - can use robitussin (plain) -   Plenty of fluids and rest  Fill rx for antibiotic If covid testing (-)  Go today for covid test   No work until symptoms improving and make sure no fever           - Novel Coronavirus (Covid-19),PCR [facility] - Collected at [place] [address] or [facility]; Future  - amoxicillin (AMOXIL) 500 mg capsule; Take 1 capsule po tid for 10 days with food; can decrease effect of estrogen vaginal ring; use backup method of birth control X 1 month; eat yogurt while taking  Dispense: 30 capsule; Refill: 0    2  Sinus congestion  Saline nasal spray      - Novel Coronavirus (Covid-19),PCR [facility] - Collected at [place] [address] or [facility]; Future  - amoxicillin (AMOXIL) 500 mg capsule; Take 1 capsule po tid for 10 days with food; can decrease effect of estrogen vaginal ring; use backup method of birth control X 1 month; eat yogurt while taking  Dispense: 30 capsule; Refill: 0        Chief Complaint   No chief complaint on file  History of Present Illness   Richard Concepcion is a 30y o -year-old female who presents today with possible sinus infection  Son was in to see me last week and dx with sinusitis and put on antibiotics (is improving)  Pt's symptoms began X 4 days ago - coughing, congestion, runny nose, headache  Has tried taking coriciden hbp  Drinking plenty of fluids  No known exposure to covid  Has not yet had vaccine  Review of Systems   Review of Systems   Constitutional: Positive for chills  Negative for fever          Feels tired  Has chills  HENT: Positive for congestion, sinus pressure and sinus pain  Negative for sore throat  Had sore throat at onset but not since  Respiratory: Positive for cough and shortness of breath  Negative for wheezing  Does get SOB with coughing  Nonsmoker  No h/o asthma     Cardiovascular: Negative for chest pain and palpitations  Gastrointestinal:        Appetite down  No loss of taste or smell  Genitourinary:        Drinking plenty of fluids  Menses within the last month - has birth control - estrogen vaginal ring  Neurological: Positive for headaches  Active Problem List     Patient Active Problem List   Diagnosis    Bipolar I disorder, most recent episode depressed, severe with psychotic features ([facility] [address] )    Eczema    Obesity (BMI 30-39  9)    Tachycardia    Manoj-Parkinson-White (WPW) syndrome    Abnormal Papanicolaou smear of cervix with positive human papilloma virus (HPV) test    Pap smear abnormality of cervix with LGSIL    Left wrist pain    Left wrist tendonitis    Migraine without aura and without status migrainosus, not intractable    SHELLY (generalized anxiety disorder)    Obsessive-compulsive disorder, unspecified    Other insomnia    Long-term use of high-risk medication    Family history of breast cancer    Visit for genetic screening    Chest wall pain, chronic    Tinea pedis of left foot    Patellofemoral disorder of left knee         Past Medical History:  Past Medical History:   Diagnosis Date    Abnormal Pap smear of cervix     Eczema     Exposure to chlamydia     Resolved 06/09/17    Migraine without aura and without status migrainosus, not intractable 10/11/2019    Obesity     Postpartum depression 8/9/2018    Manoj-Parkinson-White (WPW) syndrome        Past Surgical History:  Past Surgical History:   Procedure Laterality Date    ABLATION OF DYSRHYTHMIC FOCUS      WPW ablation age 14    COLONOSCOPY      COLPOSCOPY      INDUCED       OTHER SURGICAL HISTORY      catheter ablation- WPW age 13       Family History:  Family History   Problem Relation Age of Onset    Bipolar disorder Mother     Hypertension Mother     Hyperlipidemia Mother     Suicide Attempts Mother     Colon polyps Mother     Factor V Leiden deficiency Mother     Glucose-6-phos deficiency Father     Hyperlipidemia Father     Hypertension Father     No Known Problems Sister     Anxiety disorder Brother     Factor V Leiden deficiency Brother     Breast cancer Maternal Grandmother         dx approx age early 39's    Colon polyps Maternal Grandfather     Crohn's disease Paternal Grandmother     Prostate cancer Paternal Grandfather     Pancreatic cancer Paternal Grandfather     Colon cancer Paternal Aunt     Mental illness Brother     Drug abuse Brother     Breast cancer Maternal Aunt 36    Factor V Leiden deficiency Cousin        Social History:  Social History     Socioeconomic History    Marital status: Single     Spouse name: Not on file    Number of children: 2    Years of education: Not on file    Highest education level: Some college, no degree   Occupational History    Occupation: works in retail   Social Needs    Financial resource strain: Somewhat hard    Food insecurity     Worry: Sometimes true     Inability: Sometimes true    Transportation needs     Medical: Yes     Non-medical: Yes   Tobacco Use    Smoking status: Never Smoker    Smokeless tobacco: Never Used   Substance and Sexual Activity    Alcohol use: Yes     Frequency: Monthly or less     Drinks per session: 1 or 2     Binge frequency: Never    Drug use: No    Sexual activity: Yes     Partners: Male     Birth control/protection: Condom Male   Lifestyle    Physical activity     Days per week: 0 days     Minutes per session: 0 min    Stress: Rather much   Relationships    Social connections     Talks on phone: Once a week     Gets together: Once a week Attends Congregational service: Never     Active member of club or organization: No     Attends meetings of clubs or organizations: Never     Relationship status: Never     Intimate partner violence     Fear of current or ex partner: No     Emotionally abused: No     Physically abused: No     Forced sexual activity: No   Other Topics Concern    Not on file   Social History Narrative    Education: high school graduate and some college    Learning Disabilities: none    Marital History: single    Children: 1 daughter, 1 son    Living Arrangement: lives in home with boyfriend, 2 children and boyfriend's 2 children    Occupational History: works part time at [employer] and [employer] works    Functioning Relationships: boyfriend is supportive    Legal History: none     History: None         Family planning    IUD contraception       Objective     Vitals:    05/25/21 1410   BP: 104/76   Pulse: 91   Temp: 97 8 °F (36 6 °C)   SpO2: 97%     Wt Readings from Last 3 Encounters:   05/25/21 99 3 kg (219 lb)   03/22/21 99 8 kg (220 lb)   03/08/21 103 kg (226 lb)       Physical Exam  Vitals signs and nursing note reviewed  Constitutional:       General: She is not in acute distress  Appearance: She is not ill-appearing or toxic-appearing  Comments: Sounds mildly congested  Appears slightly tired but nontoxic  HENT:      Right Ear: Tympanic membrane normal       Left Ear: Tympanic membrane normal       Nose: Congestion present  Comments: Nares - edematous and erythematous  No discharge  Mouth/Throat:      Mouth: Mucous membranes are moist       Pharynx: No oropharyngeal exudate  Comments: Mild post-nasal dripping  Eyes:      General:         Right eye: No discharge  Left eye: No discharge  Conjunctiva/sclera: Conjunctivae normal       Pupils: Pupils are equal, round, and reactive to light  Cardiovascular:      Rate and Rhythm: Normal rate and regular rhythm        Pulses: Normal pulses  Heart sounds: Normal heart sounds  No murmur  Pulmonary:      Effort: Pulmonary effort is normal  No respiratory distress  Breath sounds: Normal breath sounds  No stridor  No wheezing, rhonchi or rales  Comments: No use of accessory respiratory muscles    Slight coughing during visit  Musculoskeletal:         General: No swelling  Right lower leg: No edema  Left lower leg: No edema  Lymphadenopathy:      Cervical: No cervical adenopathy  Skin:     General: Skin is warm  Neurological:      General: No focal deficit present  Mental Status: She is alert and oriented to person, place, and time  Psychiatric:         Mood and Affect: Mood normal          Behavior: Behavior normal          Thought Content:  Thought content normal          Judgment: Judgment normal          Pertinent Laboratory/Diagnostic Studies:  Lab Results   Component Value Date    GLUCOSE 81 08/11/2014    BUN 10 02/19/2020    CREATININE 0 68 02/19/2020    CALCIUM 8 5 02/19/2020     08/11/2014    K 4 1 02/19/2020    CO2 25 02/19/2020     02/19/2020     Lab Results   Component Value Date    ALT 30 02/19/2020    AST 14 02/19/2020    ALKPHOS 68 02/19/2020    BILITOT 0 4 08/11/2014       Lab Results   Component Value Date    WBC 8 23 02/19/2020    HGB 14 0 02/19/2020    HCT 44 6 02/19/2020    MCV 89 02/19/2020     02/19/2020       No results found for: TSH    Lab Results   Component Value Date    CHOL 193 08/07/2015     Lab Results   Component Value Date    TRIG 109 02/19/2020     Lab Results   Component Value Date    HDL 53 02/19/2020     Lab Results   Component Value Date    LDLCALC 124 (H) 02/19/2020     Lab Results   Component Value Date    HGBA1C 5 2 02/19/2020       Results for orders placed or performed in visit on 03/24/21   Novel Coronavirus (Covid-19),PCR [facility] - Collected at   [address] or [facility]    Specimen: Nose; Nares   Result Value Ref Range    SARS-CoV-2 Negative Negative No orders of the defined types were placed in this encounter  ALLERGIES:  Allergies   Allergen Reactions    Codeine Other (See Comments)     dry heaves    Sulfa Antibiotics Hives and Rash     Per pt as achild    Erythromycin Hives     Per as a child       Current Medications     Current Outpatient Medications   Medication Sig Dispense Refill    etonogestrel-ethinyl estradiol (NUVARING) 0 12-0 015 MG/24HR vaginal ring Insert vaginally and leave in place for 3 consecutive weeks, then remove for 1 week  3 each 2    ibuprofen (MOTRIN) 800 mg tablet Take 1 tablet (800 mg total) by mouth every 8 (eight) hours as needed for mild pain or moderate pain for up to 10 days 30 tablet 0     No current facility-administered medications for this visit            Health Maintenance     Health Maintenance   Topic Date Due    Hepatitis B Vaccine (4 of 4 - 4-dose series) 11/26/1997    COVID-19 Vaccine (1) Never done    Cervical Cancer Screening  11/12/2020    BMI: Followup Plan  01/22/2021    PT PLAN OF CARE  03/09/2021    Annual Physical  11/24/2021    BMI: Adult  05/25/2022    DTaP,Tdap,and Td Vaccines (2 - Td) 04/13/2028    HIV Screening  Completed    Hepatitis C Screening  Completed    Influenza Vaccine  Completed    HPV Vaccine  Completed    Pneumococcal Vaccine: Pediatrics (0 to 5 Years) and At-Risk Patients (6 to 59 Years)  Aged Out    HIB Vaccine  Aged Out    IPV Vaccine  Aged Out    Hepatitis A Vaccine  Aged Out    Meningococcal ACWY Vaccine  Aged [employer] History   Administered Date(s) Administered    H1N1, All Formulations 11/04/2009    HPV Quadrivalent 01/01/2007, 03/01/2007, 07/18/2010    Hep B, adult 04/15/1997, 10/01/1997, 11/01/1997    INFLUENZA 09/04/2020    Influenza Quadrivalent Preservative Free 3 years and older IM 10/02/2014, 10/08/2015, 10/05/2016, 10/11/2017    Influenza, injectable, quadrivalent, preservative free 0 5 mL 10/30/2018, 09/09/2019    Influenza, seasonal, injectable 12/01/2007, 10/17/2008, 10/19/2009, 10/04/2010, 10/12/2011    Influenza, seasonal, injectable, preservative free 10/16/2013    MMR 07/08/2018    Tdap 04/13/2018    Tuberculin Skin Test-PPD Intradermal 10/12/2011          Osvaldo Silva DO

## 2021-05-26 LAB — SARS-COV-2 RNA RESP QL NAA+PROBE: NEGATIVE

## 2021-05-26 NOTE — RESULT ENCOUNTER NOTE
Will inform pt thru the portal that covid testing was NEGATIVE  If you are not improving - or you are worsening - then I would fill Rx for antibiotic  Have a great weekend!

## 2021-06-08 ENCOUNTER — APPOINTMENT (EMERGENCY)
Dept: RADIOLOGY | Facility: HOSPITAL | Age: 36
End: 2021-06-08
Payer: COMMERCIAL

## 2021-06-08 ENCOUNTER — HOSPITAL ENCOUNTER (EMERGENCY)
Facility: HOSPITAL | Age: 36
Discharge: HOME | End: 2021-06-08
Attending: EMERGENCY MEDICINE | Admitting: EMERGENCY MEDICINE
Payer: COMMERCIAL

## 2021-06-08 VITALS
OXYGEN SATURATION: 98 % | DIASTOLIC BLOOD PRESSURE: 64 MMHG | HEART RATE: 104 BPM | RESPIRATION RATE: 18 BRPM | TEMPERATURE: 98 F | SYSTOLIC BLOOD PRESSURE: 105 MMHG

## 2021-06-08 DIAGNOSIS — M25.532 LEFT WRIST PAIN: Primary | ICD-10-CM

## 2021-06-08 PROCEDURE — 73110 X-RAY EXAM OF WRIST: CPT

## 2021-06-08 PROCEDURE — 99284 EMERGENCY DEPT VISIT MOD MDM: CPT | Performed by: PHYSICIAN ASSISTANT

## 2021-06-08 PROCEDURE — 99283 EMERGENCY DEPT VISIT LOW MDM: CPT

## 2021-06-08 PROCEDURE — 73130 X-RAY EXAM OF HAND: CPT

## 2021-06-08 PROCEDURE — 29125 APPL SHORT ARM SPLINT STATIC: CPT | Performed by: PHYSICIAN ASSISTANT

## 2021-06-08 RX ORDER — IBUPROFEN 400 MG/1
400 TABLET ORAL ONCE
Status: COMPLETED | OUTPATIENT
Start: 2021-06-08 | End: 2021-06-08

## 2021-06-08 RX ADMIN — IBUPROFEN 400 MG: 400 TABLET ORAL at 08:53

## 2021-06-08 NOTE — Clinical Note
Balwinder Reveles was seen and treated in our emergency department on 6/8/2021  Diagnosis:     Chet Lopez  may return to work on return date  She may return on this date: 06/09/2021         If you have any questions or concerns, please don't hesitate to call        Leida Gonzalez PA-C    ______________________________           _______________          _______________  Hospital Representative                              Date                                Time

## 2021-06-08 NOTE — ED PROVIDER NOTES
History  Chief Complaint   Patient presents with    Hand Pain     pt reports L hand pain since closing hand in door yesterday  pt reports pain with movement      Patient is a 27-year-old female, right-hand dominant, presents emergency department for evaluation of left hand/wrist injury  Patient states yesterday she accidentally closed her left in a door  Patient states she has had gradually worsening pain  Patient woke up around 230 in morning to take pain relieving medication  Patient states pain is worse with movement  Patient without fever, swelling, abrasion or laceration present          Prior to Admission Medications   Prescriptions Last Dose Informant Patient Reported? Taking?   etonogestrel-ethinyl estradiol (NUVARING) 0 12-0 015 MG/24HR vaginal ring   No No   Sig: Insert vaginally and leave in place for 3 consecutive weeks, then remove for 1 week     ibuprofen (MOTRIN) 800 mg tablet  Self No No   Sig: Take 1 tablet (800 mg total) by mouth every 8 (eight) hours as needed for mild pain or moderate pain for up to 10 days      Facility-Administered Medications: None       Past Medical History:   Diagnosis Date    Abnormal Pap smear of cervix     Eczema     Exposure to chlamydia     Resolved 17    Migraine without aura and without status migrainosus, not intractable 10/11/2019    Obesity     Postpartum depression 2018    Manoj-Parkinson-White (WPW) syndrome        Past Surgical History:   Procedure Laterality Date    ABLATION OF DYSRHYTHMIC FOCUS      WPW ablation age 14    COLONOSCOPY      COLPOSCOPY      INDUCED       OTHER SURGICAL HISTORY      catheter ablation- WPW age 13       Family History   Problem Relation Age of Onset    Bipolar disorder Mother     Hypertension Mother     Hyperlipidemia Mother     Suicide Attempts Mother     Colon polyps Mother     Factor V Leiden deficiency Mother     Glucose-6-phos deficiency Father     Hyperlipidemia Father    Yolanda Hernandez Hypertension Father     No Known Problems Sister     Anxiety disorder Brother     Factor V Leiden deficiency Brother    [facility] Breast cancer Maternal Grandmother         dx approx age early 39's    Colon polyps Maternal Grandfather     Crohn's disease Paternal Grandmother     Prostate cancer Paternal Grandfather     Pancreatic cancer Paternal Grandfather     Colon cancer Paternal Aunt     Mental illness Brother     Drug abuse Brother     Breast cancer Maternal Aunt 36    Factor V Leiden deficiency Cousin      I have reviewed and agree with the history as documented  E-Cigarette/Vaping    E-Cigarette Use Never User      E-Cigarette/Vaping Substances    Nicotine No     THC No     CBD No     Flavoring No     Other No     Unknown No      Social History     Tobacco Use    Smoking status: Never Smoker    Smokeless tobacco: Never Used   Substance Use Topics    Alcohol use: Yes     Frequency: Monthly or less     Drinks per session: 1 or 2     Binge frequency: Never    Drug use: No       Review of Systems   Musculoskeletal:        Wrist/hand injury   All other systems reviewed and are negative  Physical Exam  Physical Exam  Constitutional:       Appearance: Normal appearance  HENT:      Head: Normocephalic and atraumatic  Right Ear: External ear normal       Left Ear: External ear normal       Nose: Nose normal       Mouth/Throat:      Lips: Pink  Mouth: Mucous membranes are moist    Eyes:      Extraocular Movements: Extraocular movements intact  Conjunctiva/sclera: Conjunctivae normal    Neck:      Musculoskeletal: Normal range of motion and neck supple  Pulmonary:      Effort: No tachypnea or respiratory distress  Musculoskeletal:      Left wrist: She exhibits decreased range of motion, tenderness and bony tenderness  She exhibits no swelling, no effusion, no crepitus, no deformity and no laceration  Left hand: She exhibits decreased range of motion and tenderness   She exhibits no bony tenderness, normal capillary refill, no deformity, no laceration and no swelling  Normal sensation noted  Normal strength noted  Comments: Neurovascularly intact distally  5/5 strength bilateral upper extremities  Radial pulse 2 +  Cap refill <2 seconds  Sensation intact  Skin:     General: Skin is warm  Capillary Refill: Capillary refill takes less than 2 seconds  Neurological:      Mental Status: She is alert and oriented to person, place, and time  GCS: GCS eye subscore is 4  GCS verbal subscore is 5  GCS motor subscore is 6  Psychiatric:         Mood and Affect: Mood and affect normal          Speech: Speech normal          Vital Signs  ED Triage Vitals   Temperature Pulse Respirations Blood Pressure SpO2   06/08/21 0747 06/08/21 0745 06/08/21 0747 06/08/21 0745 06/08/21 0745   98 °F (36 7 °C) 104 18 105/64 94 %      Temp Source Heart Rate Source Patient Position - Orthostatic VS BP Location FiO2 (%)   06/08/21 0747 06/08/21 0747 06/08/21 0747 06/08/21 0747 --   Oral Monitor Sitting Right arm       Pain Score       --                  Vitals:    06/08/21 0745 06/08/21 0747   BP: 105/64 105/64   Pulse: 104 104   Patient Position - Orthostatic VS:  Sitting         Visual Acuity      ED Medications  Medications   ibuprofen (MOTRIN) tablet 400 mg (400 mg Oral Given 6/8/21 2558)       Diagnostic Studies  Results Reviewed     None                 XR wrist 3+ views LEFT   ED Interpretation by Graham Lee PA-C (06/08 8058)   No acute osseous abnormality seen by me          XR hand 3+ views LEFT   ED Interpretation by Graham Lee PA-C (06/08 5460)   No acute osseous abnormality seen by me                   Procedures  Splint application    Date/Time: 6/8/2021 9:30 AM  Performed by: Graham Lee PA-C  Authorized by: Graham Lee PA-C   [facility] Protocol:  Procedure performed by: (ED tech)  Consent: Verbal consent obtained    Risks and benefits: risks, benefits and alternatives were discussed  Consent given by: patient  Patient identity confirmed: verbally with patient      Pre-procedure details:     Sensation:  Normal  Procedure details:     Laterality:  Left    Location:  Wrist    Wrist:  L wristCast type:  Short arm      Splint type:  Thumb spica    Supplies:  Cotton padding, elastic bandage and Ortho-Glass  Post-procedure details:     Pain:  Improved    Sensation:  Normal    Patient tolerance of procedure: Tolerated well, no immediate complications  Comments:      Splint was applied by technician, good position, neurovascularly intact distally, good capillary refill  Evaluated by me prior to discharge  ED Course                             SBIRT 20yo+      Most Recent Value   SBIRT (22 yo +)   In order to provide better care to our patients, we are screening all of our patients for alcohol and drug use  Would it be okay to ask you these screening questions? No Filed at: 06/08/2021 0748   Initial Alcohol Screen: US AUDIT-C    1  How often do you have a drink containing alcohol?  0 Filed at: 06/08/2021 0748   2  How many drinks containing alcohol do you have on a typical day you are drinking? 0 Filed at: 06/08/2021 0748   3a  Male UNDER 65: How often do you have five or more drinks on one occasion? 0 Filed at: 06/08/2021 0748   3b  FEMALE Any Age, or MALE 65+: How often do you have 4 or more drinks on one occassion? 0 Filed at: 06/08/2021 0748   Audit-C Score  0 Filed at: 06/08/2021 3138   CECI: How many times in the past year have you    Used an illegal drug or used a prescription medication for non-medical reasons? Never Filed at: 06/08/2021 1142                    MDM  Number of Diagnoses or Management Options  Left wrist pain: new and requires workup  Diagnosis management comments: Patient is a 71-year-old female, right-hand dominant, presents emergency department for evaluation of left hand/wrist injury    Patient states yesterday she accidentally closed her left in a door  X-ray left hand and wrist show no acute osseous abnormality seen by me, however the film will be reviewed by a radiologist   I informed the patient of this and if there is any discrepancy, the patient will be contacted  Given area of pain, thumb spica splint applied by ED tech, neurovascularly intact distally post splint application  Advised patient to wear splint and follow-up with hand surgery if symptoms do not improved  Patient verbalizes understanding and agrees with plan  The management plan was discussed in detail with the patient at bedside and all questions were answered  Prior to discharge, I provided both verbal and written instructions  I discussed with the patient the signs and symptoms for which to return to the emergency department  All questions were answered and patient was comfortable with the plan of care and discharged to home  The patient agrees to return to the Emergency Department for concerns and/or progression of illness  Disposition  Final diagnoses:   Left wrist pain     Time reflects when diagnosis was documented in both MDM as applicable and the Disposition within this note     Time User Action Codes Description Comment    6/8/2021  9:29 AM Bandar Mcgowan Add [M25 532] Left wrist pain       ED Disposition     ED Disposition Condition Date/Time Comment    Discharge Stable Tue Jun 8, 2021  9:29 AM Sarah Valdez discharge to home/self care  Follow-up Information     Follow up With Specialties Details Why Contact Info    Wang Walters MD Orthopedic Surgery, Hand Surgery Schedule an appointment as soon as possible for a visit   [facility]  [phone]            Patient's Medications   Discharge Prescriptions    No medications on file     No discharge procedures on file      PDMP Review       Value Time User    PDMP Reviewed  Yes 11/11/2019  3:09 PM Alyssa Melendez MD ED Provider  Electronically Signed by           Tg Peter PA-C  06/08/21 4801

## 2021-06-10 ENCOUNTER — TELEPHONE (OUTPATIENT)
Dept: OBGYN CLINIC | Facility: HOSPITAL | Age: 36
End: 2021-06-10

## 2021-06-10 NOTE — TELEPHONE ENCOUNTER
Patient is in splint and was seen in the ED and was told to follow up with Dr Bridgette English next week , sent office request to schedule

## 2021-06-12 ENCOUNTER — OFFICE VISIT (OUTPATIENT)
Dept: OBGYN CLINIC | Facility: MEDICAL CENTER | Age: 36
End: 2021-06-12
Payer: COMMERCIAL

## 2021-06-12 VITALS
RESPIRATION RATE: 16 BRPM | SYSTOLIC BLOOD PRESSURE: 116 MMHG | WEIGHT: 215 LBS | BODY MASS INDEX: 39.56 KG/M2 | HEART RATE: 105 BPM | HEIGHT: 62 IN | DIASTOLIC BLOOD PRESSURE: 83 MMHG

## 2021-06-12 DIAGNOSIS — M25.532 LEFT WRIST PAIN: Primary | ICD-10-CM

## 2021-06-12 DIAGNOSIS — Q74.0 CONGENITAL FUSION OF CARPAL BONE: ICD-10-CM

## 2021-06-12 DIAGNOSIS — M65.4 TENOSYNOVITIS, DE QUERVAIN: ICD-10-CM

## 2021-06-12 PROCEDURE — 99214 OFFICE O/P EST MOD 30 MIN: CPT | Performed by: STUDENT IN AN ORGANIZED HEALTH CARE EDUCATION/TRAINING PROGRAM

## 2021-06-12 RX ORDER — NAPROXEN 500 MG/1
500 TABLET ORAL 2 TIMES DAILY WITH MEALS
Qty: 60 TABLET | Refills: 0 | Status: SHIPPED | OUTPATIENT
Start: 2021-06-12 | End: 2021-09-29

## 2021-06-12 NOTE — PROGRESS NOTES
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  Otherwise no acute osseous abnormalities  2  X-ray left wrist 2021: No acute osseous abnormalities  IMPRESSION:  Acute on chronic left wrist pain after strain from attempting to close a [employer] - reports this pain has improved back to her baseline pain  - At baseline, patient reports discomfort of wrists bilaterally  - On imaging, there is 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  - Patient demonstrates full left wrist ROM with aggravated dorsal wrist pain during resisted extension and also +Finkelsteins - possible contributing factor of DeQuervain's tenosynovitis    PLAN:  I have discussed with the patient the pathophysiology of this diagnosis and reviewed how the examination correlates with this diagnosis  Treatment options were discussed at length and after discussing these treatment options,  Patient was agreeable to start NSAIDs consistently for the next week and then as needed afterwards  I prescribed her naproxen 500 mg p o  b i d  to replace her ibuprofen  In addition, a consider prescribing her a wrist brace patient reports having 1 of her own already  I recommended she use the wrist brace during activities and take off at night  I have also provided her home exercises for de Quervain tenosynovitis I recommended she do daily  I will follow-up with her in 2 weeks for re-evaluation and consider giving a palpation versus ultrasound-guided injection for her-acquire veins tenosynovitis    I did  that a degree of her chronic wrist pain may be due to her 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  Return in about 2 weeks (around 6/26/2021)  CHIEF COMPLAINT:  Left wrist injury    HPI:  Leticia Caraballo is a 30 y o  female  who presents for       Visit 06/12/2021 :  Initial evaluation of left wrist pain: Of note, patient reports baseline bilateral wrist discomfort for years without a precipitating injury  She reports she recently had an injury where she strained her left wrist when attempting to close a [place] door that was stuck - aggravated the dorsal aspect of her wrist and forearm but denies a popping sensation, swelling, bruising, numbness/tinlging  Denies a door was closed on her wrist  She subsequently went to the ER and had imaging done as noted above  Currently, she reports pain has mostly improved back to her baseline wrist pain that she has chronically  Pain is located over the general aspect of her dorsal wrist and can radiate to her thumb and index finger when using her upper extremity to lift/twist/pull  Denies swelling, bruising, N/T, clicking/popping, inadvertently dropping objects, fevers/chills, stiffness, and other ROS as per below  She has intermittently been ibuprofen since her injury which provides some relief  She has a wrist brace already but does not use  Denies pain at night that wakes her  ER visit summary 06/08/2021:   Patient had gone to the ER after an injury her left hand / wrist   The day prior she accidentally closed a door onto her left wrist / hand and pain have progressively been worsening  Imaging was obtained as noted above  Patient was placed in a thumb spica splint        Review of Systems   Constitutional: Negative for chills, fatigue and fever  Respiratory: Negative for cough and shortness of breath  Gastrointestinal: Negative for abdominal pain, nausea and vomiting  Musculoskeletal:        As per HPI   Skin: Negative for rash and wound     Neurological:        As per HPI         Medical, Surgical, Family, and Social History    Past Medical History:   Diagnosis Date    Abnormal Pap smear of cervix     Eczema     Exposure to chlamydia     Resolved 17    Migraine without aura and without status migrainosus, not intractable 10/11/2019    Obesity     Postpartum depression 2018    Manoj-Parkinson-White (WPW) syndrome      Past Surgical History:   Procedure Laterality Date    ABLATION OF DYSRHYTHMIC FOCUS      WPW ablation age 15    COLONOSCOPY      COLPOSCOPY      INDUCED       OTHER SURGICAL HISTORY      catheter ablation- WPW age 13     Social History   Social History     Substance and Sexual Activity   Alcohol Use Yes    Frequency: Monthly or less    Drinks per session: 1 or 2    Binge frequency: Never     Social History     Substance and Sexual Activity   Drug Use No     Social History     Tobacco Use   Smoking Status Never Smoker   Smokeless Tobacco Never Used     Family History   Problem Relation Age of Onset    Bipolar disorder Mother     Hypertension Mother     Hyperlipidemia Mother     Suicide Attempts Mother     Colon polyps Mother     Factor V Leiden deficiency Mother     Glucose-6-phos deficiency Father     Hyperlipidemia Father     Hypertension Father     No Known Problems Sister     Anxiety disorder Brother     Factor V Leiden deficiency Brother     Breast cancer Maternal Grandmother         dx approx age early 42's    Colon polyps Maternal Grandfather     Crohn's disease Paternal Grandmother     Prostate cancer Paternal Grandfather     Pancreatic cancer Paternal Grandfather     Colon cancer Paternal Aunt     Mental illness Brother     Drug abuse Brother     Breast cancer Maternal Aunt 36    Factor V Leiden deficiency Cousin      Allergies   Allergen Reactions    Codeine Other (See Comments)     dry heaves    Sulfa Antibiotics Hives and Rash     Per pt as achild    Erythromycin Hives     Per as a child          Physical Exam  /83   Pulse 105   Resp 16 Ht 5' 2" (1 575 m)   Wt 97 5 kg (215 lb)   BMI 39 32 kg/m²     Constitutional:  see vital signs  Gen: well-developed, normocephalic/atraumatic, well-groomed  Eyes: No inflammation or discharge of conjunctiva or lids; sclera clear   Pharynx: no inflammation, lesion, or mass of lips  Neck: supple, no masses, non-distended  MSK: no inflammation, lesion, mass, or clubbing of nails and digits except for other than mentioned below  SKIN: no visible rashes or skin lesions  Pulmonary/Chest: Effort normal  No respiratory distress  NEURO: cranial nerves grossly intact  PSYCH:  Alert and oriented to person, place, and time; recent and remote memory intact; mood normal, no depression, anxiety, or agitation, judgment and insight good and intact     Left Hand Exam     Tenderness   Left hand tenderness location: no-specific tenderness over dorsal aspect of wrist and distal extensor musculature  Range of Motion   The patient has normal left wrist ROM  Wrist   Extension: 50   Flexion: 90   Pronation: 90   Supination: 90     Muscle Strength   Left wrist normal muscle strength: resisted wrist extension aggravates pain  Wrist extension: 5/5   Wrist flexion: 5/5   :  5/5     Tests   Phalens Sign: negative  Tinel's sign (median nerve): negative  Finkelstein's test: positive    Other   Erythema: absent  Scars: absent  Sensation: normal  Pulse: present (2+radius)    Comments:  No swelling, bruising/erythema on inspection   Negative   Froment sign:  normal  OK sign:  Normal  Thumb extension:  5/5

## 2021-06-15 ENCOUNTER — OFFICE VISIT (OUTPATIENT)
Dept: CARDIOLOGY CLINIC | Facility: CLINIC | Age: 36
End: 2021-06-15
Payer: COMMERCIAL

## 2021-06-15 ENCOUNTER — SOCIAL WORK (OUTPATIENT)
Dept: BEHAVIORAL/MENTAL HEALTH CLINIC | Facility: CLINIC | Age: 36
End: 2021-06-15
Payer: COMMERCIAL

## 2021-06-15 VITALS
HEIGHT: 62 IN | HEART RATE: 78 BPM | DIASTOLIC BLOOD PRESSURE: 78 MMHG | RESPIRATION RATE: 16 BRPM | BODY MASS INDEX: 39.86 KG/M2 | WEIGHT: 216.6 LBS | SYSTOLIC BLOOD PRESSURE: 108 MMHG

## 2021-06-15 DIAGNOSIS — F31.5 BIPOLAR I DISORDER, MOST RECENT EPISODE DEPRESSED, SEVERE WITH PSYCHOTIC FEATURES (HCC): Primary | Chronic | ICD-10-CM

## 2021-06-15 DIAGNOSIS — E66.9 OBESITY (BMI 30-39.9): ICD-10-CM

## 2021-06-15 DIAGNOSIS — R00.0 TACHYCARDIA: ICD-10-CM

## 2021-06-15 DIAGNOSIS — Z98.890 HISTORY OF RADIOFREQUENCY ABLATION PROCEDURE FOR CARDIAC ARRHYTHMIA: Primary | ICD-10-CM

## 2021-06-15 DIAGNOSIS — I49.3 PVC'S (PREMATURE VENTRICULAR CONTRACTIONS): ICD-10-CM

## 2021-06-15 DIAGNOSIS — F41.1 GAD (GENERALIZED ANXIETY DISORDER): Chronic | ICD-10-CM

## 2021-06-15 PROCEDURE — 1036F TOBACCO NON-USER: CPT | Performed by: INTERNAL MEDICINE

## 2021-06-15 PROCEDURE — 90834 PSYTX W PT 45 MINUTES: CPT | Performed by: SOCIAL WORKER

## 2021-06-15 PROCEDURE — 99243 OFF/OP CNSLTJ NEW/EST LOW 30: CPT | Performed by: INTERNAL MEDICINE

## 2021-06-15 PROCEDURE — 93000 ELECTROCARDIOGRAM COMPLETE: CPT | Performed by: INTERNAL MEDICINE

## 2021-06-15 NOTE — PSYCH
Psychotherapy Provided: Individual Psychotherapy 45 minutes     Length of time in session: 45 minutes, follow up in 1 week    Encounter Diagnosis     ICD-10-CM    1  Bipolar I disorder, most recent episode depressed, severe with psychotic features ([facility] )  F31 5    2  SHELLY (generalized anxiety disorder)  F41 1        Goals addressed in session: Goal 1     Pain:      moderate to severe-- depression/anxiety (completed suicide assessment during session  Positive for suicidal thoughts  No plan  No intent  Has protective factors (most notably her children) and future thinking  Current suicide risk : Low     DATA: Met with Karina Shay for scheduled individual session  Topics of discussion included family stressors, relationships with family, work-related stress and treatment planning  Karina Shay states that she has been experiencing an increase in her symptoms of depression  She reports that she has been having some suicidal thoughts  She denies plan and intent to act on these thoughts  We discussed her willingness to see a psychiatrist  Prior to getting into psychiatry, she will speak with her primary care physician regarding possibly starting her on some mood stabilizing medications  We also discussed the possibility of her attending the [facility], to get additional support and to also get medications more quickly  She is ambivalent about [facility] to [facility], due to her work schedule and financial insecurity  She discussed her frustration with her significant other and her difficulty with caring for all of his children  We agreed to meet again next week, for a check-in and to see where she is in relation to starting medications and/or needing a higher level of care  Karinasummer Shay participated in updating her treatment plan during this session  ASSESSMENT: Karina Shay presents with a depressed mood  Her affect is tearful, mood-congruent  Karina Shay exhibits good therapeutic rapport with this clinician   Karinasummer Shay continues to exhibit willingness to work on treatment goals and objectives  Mikhail Smith presents with a minimal risk of suicide, minimal risk of self-harm, and minimal risk of harm to others  PLAN: Mikhail Smith will return in one week for the next scheduled session  Between sessions, Mikhail Smith will call her PCP re: medications and consider her options regarding [facility]  Mikhail Smith will monitor her moods and will reach out to crisis, if needed  This clinician provided crisis numbers to her  She will report back during the next session re: successes and barriers  At the next session, this clinician will use supportive psychotherapy, client-centered therapy, mindfulness-based strategies, DBT-informed skills, Motivational Interviewing and solution-focused therapy to address her mood regulation and relationship concerns, in an effort to assist Mikhail Smith with meeting treatment goals  Behavioral Health Treatment Plan [facility]: Diagnosis and Treatment Plan explained to Angella Joaquin relates understanding diagnosis and is agreeable to Treatment Plan   Yes

## 2021-06-15 NOTE — BH TREATMENT PLAN
Shyann Alvarez  1985      Date of Initial Treatment Plan: October 3, 2019   Date of Current Treatment Plan: Kiki 15, 2021     Treatment Plan Number 5     Strengths/Personal Resources for Self Care: Desire to be a good mother; Some family support; Current job    Diagnosis:   1  Bipolar I disorder, most recent episode depressed, severe with psychotic features ([facility] [address] )      2  SHELLY (generalized anxiety disorder)      3  Obsessive-compulsive disorder, unspecified type            Area of Needs: Anxiety and mood regulation      Long Term Goal 1: "I want to manage my emotions  "      Target Date:                                     October 13, 2021          Treatment Plan Expiration Date:   December 12, 2021          Completion Date:                          To be determined         Short Term Objectives for Goal 1:                  1  Libertad will identify triggers and prompting events that increase symptoms of anger, depression, and anxiety    Update 6/15/2021: Damaris Vergara exhibits the ability to identify some triggers and prompting events that trigger her mood dysregulation-- She identifies that her relationship with her SO and his children have a significant impact on her moods  We will continue to review the triggers/prompting events that impact her mood regulation                   2  Libertad will learn and exhibit understanding of a minimum of three distress tolerance skills to assist with symptom reduction    Update 6/15/2021: Damaris Ursula states that she is considering getting a medicinal marijuana card to help her manage her anxiety  She also identifies that work helps her to distract her from her mood dysregulation                   3  Libertad will learn and exhibit understanding of effective communication skills (using a DBT-informed perspective), especially with her boyfriend  Update 6/15/2021: Damaris Ursula states that she is very good with initially expressing her feelings and thoughts   She states that, when she does not feel heard, she then loses her temper and yells  She states that she and her boyfriend are not currently communicating with one another                   4  Jayme Corrales identify and maintain personal limits and boundaries in relationships with her boyfriend and others, as needed  Any boundary crossings will be discussed in therapy sessions  Update 6/15/2021: Jose Gavin states that she feels that people in her life (e g , her boyfriend and his children) are not respectful of her boundaries  She reports that she wants to continue to work on [address] in our therapy sessions  When possible, we will include her significant other in sessions, to work on boundary settings                   5  Jose Gavin will make a decision regarding her future plans for her relationship with her significant other  Update 6/15/2021: Jose Gavin will continue to work on processing her feelings about her current relationship  We will use family counseling techniques, DBT-informed skills, and use of mindfulness-based strategies to address her feelings about this relaitonship                   6  Libertad will maintain a level of anxiety/depression that does not surpass a 3/10 on most days  Incidents that surpass this limit will be process in therapy sessions  Update 6/15/2021: This objective will be revised to use the PHQ-9 and SHELLY-7 screenings to measure her level of depression and anxiety  The objective is to maintain a "mild" level of anxiety and depression, as indicated on these screening tools               7  Jose Gavin will consider addressing trauma, as it is appropriate to her treatment  At this point, she does not want to engage in pointed trauma-focused therapy  We will continue to address trauma in a trauma-informed approach, using DBT-informed care  Update 6/15/2021: Jose Gavin addresses her history of trauma as it impacts her daily life   We are not currently engaging in "trauma-processing therapy"; however, we are addressing issues at they come up  6  Sofi Neal will meet with a psychiatrist/AP and determine if medications will be a helpful tool to manage her moods  New objective 6/15/2021     GOAL 1: Modality: Individual 1-2x per month   Completion Date to be determined and The person(s) responsible for carrying out the plan is  Libertad (client) and Eleni Thomas (clinician)     Clinician will use client-centered therapy, mindfulness-based strategies, DBT-informed skills and solution-focused therapy to address Libertad's symptoms of anxiety and mood regulation  Libertad will practice skills between sessions and will report back, during subsequent sessions regarding successes and barriers          [address] [place] [address]: Diagnosis and Treatment Plan explained to Libertad Maurer relates understanding diagnosis and is agreeable to Treatment Plan          Client Comments : Please share your thoughts, feelings, need and/or experiences regarding your treatment plan: Not at this time

## 2021-06-15 NOTE — PROGRESS NOTES
Cardiology Consultation     Christian Nazario  [id number]  1985  [address] [place] [address] [place] [id number]      1  History of radiofrequency ablation procedure for W-P-W  POCT ECG    AMB extended holter monitor   2  Obesity (BMI 30-39 9)     3  Tachycardia  AMB extended holter monitor   4  PVC's (premature ventricular contractions)  AMB extended holter monitor       02/19/2020 lipid profile:  Cholesterol 199, triglycerides 109, HDL 53, LDL calculated 124,  non HDL cholesterol 146  HPI: Patient is a 61-year-old female who had a WPW ablation at age 14 performed in [place] at [address] of the [address] [place] [address]  She is presently experiencing 2 types of arrhythmias  She is experiencing a constant tachycardia which can last for minutes to days  4-6 weeks ago she had an episode which lasted for 4 days and states that her heart rate was in the 120s  Although she has had these symptoms for a long time, she believes that they are becoming more frequent and more severe  She has never previously had an episode which lasted for 4 days  Other than the racing heartbeat, she has no associated symptoms with these feelings of tachycardia    The 2nd type of arrhythmia has the description of a PVC  She feels lightheaded and short of breath  She then experiences a day jolt or vigorous heartbeat in her chest   There may be some anxiety accompanied with these episodes since and she does interpret them as scary  They occur several times a week  She also believes that these symptoms have been more frequent and more severe  She has mild hyperlipidemia and is bordering on morbid obesity , BMI 39 6  She has a family history of coronary artery disease but no history of death at an early age  or heart disease at an early age  She is a lifetime nonsmoker    She has a family history of factor 5 Leiden deficiency but has been tested herself and is negative      Discussion/Summary:  1  2 week ZIO Patch monitor   2  Return in 2 weeks but after the ZIO Patch monitor report is available   3  If patient has an episode of sustained tachycardia she, Monday through Friday during working hours she should call the office and have a nursing visit with an EKG performed ASAP  4  If her sustained tachycardia occurs after an hours or on weekend, I suggest that she go to the ED and have an EKG performed  Would not feel that she would need to stay in the ED for prolonged period of time or be admitted but that is up to the discretion of the ED  5  Consider echocardiogram in the future    Patient Active Problem List   Diagnosis    Bipolar I disorder, most recent episode depressed, severe with psychotic features ([facility] [address] )    Eczema    Obesity (BMI 30-39  9)    Tachycardia    Manoj-Parkinson-White (WPW) syndrome    Abnormal Papanicolaou smear of cervix with positive human papilloma virus (HPV) test    Pap smear abnormality of cervix with LGSIL    Left wrist pain    Left wrist tendonitis    Migraine without aura and without status migrainosus, not intractable    SHELLY (generalized anxiety disorder)    Obsessive-compulsive disorder, unspecified    Other insomnia    Long-term use of high-risk medication    Family history of breast cancer    Visit for genetic screening    Chest wall pain, chronic    Tinea pedis of left foot    Patellofemoral disorder of left knee    Congenital fusion of carpal bone    History of radiofrequency ablation procedure for W-P-W     Past Medical History:   Diagnosis Date    Abnormal Pap smear of cervix     Eczema     Exposure to chlamydia     Resolved 06/09/17    Migraine without aura and without status migrainosus, not intractable 10/11/2019    Obesity     Postpartum depression 8/9/2018    Manoj-Parkinson-White (WPW) syndrome      Social History     Socioeconomic History    Marital status: Single     Spouse name: Not on file    Number of children: 2    Years of education: Not on file    Highest education level: Some college, no degree   Occupational History    Occupation: works in retail   Tobacco Use    Smoking status: Never Smoker    Smokeless tobacco: Never Used   Vaping Use    Vaping Use: Never used   Substance and Sexual Activity    Alcohol use: Yes    Drug use: No    Sexual activity: Yes     Partners: Male     Birth control/protection: Condom Male   Other Topics Concern    Not on file   Social History Narrative    Education: high school graduate and some college    Learning Disabilities: none    Marital History: single    Children: 1 daughter, 1 son    Living Arrangement: lives in home with boyfriend, 2 children and boyfriend's 2 children    Occupational History: works part time at [employer] works    Functioning Relationships: boyfriend is supportive    Legal History: none     History: None         Family planning    IUD contraception     Social Determinants of Health     Financial Resource Strain: Medium Risk    Difficulty of Paying Living Expenses: Somewhat hard   Food Insecurity: Food Insecurity Present    Worried About [address] in the Last Year: Sometimes true    [address] in the Last Year: Sometimes true   Transportation Needs: Unmet Transportation Needs    Lack of Transportation (Medical):  Yes    Lack of Transportation (Non-Medical): Yes   Physical Activity:     Days of Exercise per Week:     Minutes of Exercise per Session:    Stress:     Feeling of Stress :    Social Connections:     Frequency of Communication with Friends and Family:     Frequency of Social Gatherings with Friends and Family:     Attends Rastafari Services:     Active Member of Clubs or Organizations:     Attends Club or Organization Meetings:     Marital Status:    Intimate Partner Violence:     Fear of Current or Ex-Partner:     Emotionally Abused:     Physically Abused:     Sexually Abused:       Family History   Problem Relation Age of Onset    Bipolar disorder Mother     Hypertension Mother     Hyperlipidemia Mother     Suicide Attempts Mother     Colon polyps Mother     Factor V Leiden deficiency Mother     Glucose-6-phos deficiency Father     Hyperlipidemia Father     Hypertension Father     No Known Problems Sister     Anxiety disorder Brother     Factor V Leiden deficiency Brother     Breast cancer Maternal Grandmother         dx approx age early 39's    Colon polyps Maternal Grandfather     Crohn's disease Paternal Grandmother     Prostate cancer Paternal Grandfather     Pancreatic cancer Paternal Grandfather     Colon cancer Paternal Aunt     Mental illness Brother     Drug abuse Brother     Breast cancer Maternal Aunt 36    Factor V Leiden deficiency Cousin      Past Surgical History:   Procedure Laterality Date    ABLATION OF DYSRHYTHMIC FOCUS      WPW ablation age 15    COLONOSCOPY      COLPOSCOPY      INDUCED       OTHER SURGICAL HISTORY      catheter ablation- WPW age 13       Current Outpatient Medications:     etonogestrel-ethinyl estradiol (NUVARING) 0 12-0 015 MG/24HR vaginal ring, Insert vaginally and leave in place for 3 consecutive weeks, then remove for 1 week , Disp: 3 each, Rfl: 2    naproxen (NAPROSYN) 500 mg tablet, Take 1 tablet (500 mg total) by mouth 2 (two) times a day with meals, Disp: 60 tablet, Rfl: 0  Allergies   Allergen Reactions    Codeine Other (See Comments)     dry heaves    Sulfa Antibiotics Hives and Rash     Per pt as achild    Erythromycin Hives     Per as a child       Results for orders placed or performed in visit on 06/15/21   POCT ECG    Narrative     Normal sinus rhythm at a rate of 78 beats per minute  Normal EKG             Review of Systems:  Review of Systems    Physical Exam:  /78   Pulse 78   Resp 16   Ht 5' 2" (1 575 m)   Wt 98 2 kg (216 lb 9 6 oz)   BMI 39 62 kg/m²   Physical Exam    -------------------------------------------------------------------------------  TREADMILL STRESS  No results found for this or any previous visit      ----------------------------------------------------------------------------------------------  NUCLEAR STRESS TEST: No results found for this or any previous visit  No results found for this or any previous visit       --------------------------------------------------------------------------------  CATH:  No results found for this or any previous visit     --------------------------------------------------------------------------------  ECHO:   No results found for this or any previous visit  No results found for this or any previous visit     --------------------------------------------------------------------------------  HOLTER  No results found for this or any previous visit      No results found for this or any previous visit     --------------------------------------------------------------------------------  CAROTIDS  No results found for this or any previous visit      ======================================================  Lab Results   Component Value Date    WBC 8 23 02/19/2020    HGB 14 0 02/19/2020    HCT 44 6 02/19/2020    MCV 89 02/19/2020     02/19/2020      Lab Results   Component Value Date    SODIUM 137 02/19/2020    K 4 1 02/19/2020     02/19/2020    CO2 25 02/19/2020    BUN 10 02/19/2020    CREATININE 0 68 02/19/2020    GLUC 78 08/24/2018    CALCIUM 8 5 02/19/2020      Lab Results   Component Value Date    HGBA1C 5 2 02/19/2020      Lab Results   Component Value Date    CHOL 193 08/07/2015    CHOL 215 08/26/2014     Lab Results   Component Value Date    HDL 53 02/19/2020    HDL 36 (L) 07/21/2017    HDL 41 06/24/2016     Lab Results   Component Value Date    LDLCALC 124 (H) 02/19/2020    LDLCALC 130 (H) 07/21/2017    LDLCALC 127 (H) 06/24/2016     Lab Results   Component Value Date    TRIG 109 02/19/2020    TRIG 85 07/21/2017    TRIG 126 06/24/2016     No results found for: [place], [place]   Lab Results   Component Value Date    INR 1 03 07/06/2018    INR 1 68 (H) 07/06/2018    PROTIME 13 6 07/06/2018    PROTIME 19 9 (H) 07/06/2018        Imaging: XR wrist 3+ views LEFT    Result Date: 6/8/2021  Narrative: LEFT WRIST INDICATION:   injury  COMPARISON:  7/13/2019 VIEWS:  XR WRIST 3+ VW LEFT FINDINGS: There is no acute fracture or dislocation  No significant degenerative changes  No lytic or blastic osseous lesion  Soft tissues are unremarkable  Redemonstration congenital fusion lunate and triquetral bones  Impression: No acute osseous abnormality  Workstation performed: [id number]     XR hand 3+ views LEFT    Result Date: 6/8/2021  Narrative: LEFT HAND INDICATION:   injury  COMPARISON:  None VIEWS:  XR HAND 3+ VW LEFT For the purposes of institution wide universal language the following terms will apply: (thumb=1st digit/finger, index finger=2nd digit/finger, long finger=3rd digit/finger, ring=4th digit/finger and small finger=5th digit/finger) FINDINGS: There is no acute fracture or dislocation  No significant degenerative changes  No lytic or blastic osseous lesion  Soft tissues are unremarkable  Congenital fusion lunate and triquetral bones  Impression: No acute osseous abnormality   Workstation performed: [id number]

## 2021-06-17 ENCOUNTER — OFFICE VISIT (OUTPATIENT)
Dept: FAMILY MEDICINE CLINIC | Facility: CLINIC | Age: 36
End: 2021-06-17
Payer: COMMERCIAL

## 2021-06-17 VITALS
SYSTOLIC BLOOD PRESSURE: 108 MMHG | TEMPERATURE: 99.2 F | BODY MASS INDEX: 40.19 KG/M2 | WEIGHT: 218.4 LBS | OXYGEN SATURATION: 98 % | HEART RATE: 90 BPM | DIASTOLIC BLOOD PRESSURE: 74 MMHG | HEIGHT: 62 IN | RESPIRATION RATE: 18 BRPM

## 2021-06-17 DIAGNOSIS — F31.9 BIPOLAR DEPRESSION (HCC): Primary | ICD-10-CM

## 2021-06-17 DIAGNOSIS — I45.6 WOLFF-PARKINSON-WHITE (WPW) SYNDROME: ICD-10-CM

## 2021-06-17 PROCEDURE — 3008F BODY MASS INDEX DOCD: CPT | Performed by: INTERNAL MEDICINE

## 2021-06-17 PROCEDURE — 99214 OFFICE O/P EST MOD 30 MIN: CPT | Performed by: FAMILY MEDICINE

## 2021-06-17 RX ORDER — LAMOTRIGINE 25 MG/1
TABLET ORAL
Qty: 70 TABLET | Refills: 1 | Status: SHIPPED | OUTPATIENT
Start: 2021-06-17 | End: 2021-08-20

## 2021-06-17 NOTE — ASSESSMENT & PLAN NOTE
She had ablation a few years ago, but recently she notes increased PVCs    She had follow-up visit with her cardiologist 2 days ago and will be proceeding with further w/u

## 2021-06-17 NOTE — ASSESSMENT & PLAN NOTE
We had lengthy discussion about her symptoms and decided to start mood stabilizer Lamictal 25 mg daily x 2 weeks, then 50 mg daily for 2 weeks, then reassess  Discussed rare SE of SJS  Stop medication and call immediately if rash develops    F/u in 4-6 weeks

## 2021-06-17 NOTE — PROGRESS NOTES
Assessment/Plan:    Manoj-Parkinson-White (WPW) syndrome    She had ablation a few years ago, but recently she notes increased PVCs  She had follow-up visit with her cardiologist 2 days ago and will be proceeding with further w/u    Bipolar I disorder, most recent episode depressed, severe with psychotic features ([facility] [address] )    We had lengthy discussion about her symptoms and decided to start mood stabilizer Lamictal 25 mg daily x 2 weeks, then 50 mg daily for 2 weeks, then reassess  Discussed rare SE of SJS  Stop medication and call immediately if rash develops  F/u in 4-6 weeks       Diagnoses and all orders for this visit:    [facility]  -     lamoTRIgine (LaMICtal) 25 mg tablet; Take 1 po daily x 2 weeks, then 2 po daily    Manoj-Parkinson-White (WPW) syndrome          Subjective:      Patient ID: Madison Calvert is a 30 y o  female  She is here for follow up for depression which has gotten intense past month  She states that she has felt suicidal recently but would never act on this feeling due to her children  She used to take Zoloft which had been titrated to 100 mg and she felt comfortable on this  It seemed to help her depression without side effects  In 2019 she had 1 visit with a psychiatrist who prescribed Abilify but she did not like the doctor so she never filled the medication and never returned to the office  She has never taken a mood stabilizer  She denies manic episodes but she states her mood is always changing and she tends to snap at her family  She has been seeing a therapist on a regular basis and she is trying to get appointment with psychiatry  Her therapist diagnosed her as having bipolar depression        The following portions of the patient's history were reviewed and updated as appropriate: allergies, current medications, past family history, past medical history, past social history, past surgical history and problem list     Review of Systems Constitutional: Negative for activity change  Respiratory: Negative for shortness of breath  Cardiovascular: Positive for palpitations  Neurological: Negative for dizziness and headaches  Psychiatric/Behavioral: Positive for dysphoric mood and sleep disturbance  Negative for suicidal ideas  The patient is nervous/anxious  Objective:      /74 (BP Location: Left arm, Patient Position: Sitting, Cuff Size: Large)   Pulse 90   Temp 99 2 °F (37 3 °C) (Tympanic)   Resp 18   Ht 5' 2" (1 575 m)   Wt 99 1 kg (218 lb 6 4 oz)   LMP 05/17/2021   SpO2 98%   BMI 39 95 kg/m²          Physical Exam  Constitutional:       Appearance: She is obese  HENT:      Head: Normocephalic and atraumatic  Cardiovascular:      Rate and Rhythm: Normal rate and regular rhythm  Pulses: Normal pulses  Heart sounds: Normal heart sounds  Skin:     General: Skin is warm and dry  Neurological:      Mental Status: She is alert  Psychiatric:         Mood and Affect: Mood normal          Behavior: Behavior normal        I have spent 28 minutes with Patient  today in which greater than 50% of this time was spent in counseling/coordination of care regarding Intructions for management

## 2021-06-22 ENCOUNTER — SOCIAL WORK (OUTPATIENT)
Dept: BEHAVIORAL/MENTAL HEALTH CLINIC | Facility: CLINIC | Age: 36
End: 2021-06-22
Payer: COMMERCIAL

## 2021-06-22 DIAGNOSIS — F41.1 GAD (GENERALIZED ANXIETY DISORDER): Chronic | ICD-10-CM

## 2021-06-22 DIAGNOSIS — F42.9 OBSESSIVE-COMPULSIVE DISORDER, UNSPECIFIED TYPE: Chronic | ICD-10-CM

## 2021-06-22 DIAGNOSIS — F31.5 BIPOLAR I DISORDER, MOST RECENT EPISODE DEPRESSED, SEVERE WITH PSYCHOTIC FEATURES (HCC): Primary | Chronic | ICD-10-CM

## 2021-06-22 PROCEDURE — 90834 PSYTX W PT 45 MINUTES: CPT | Performed by: SOCIAL WORKER

## 2021-07-28 ENCOUNTER — CLINICAL SUPPORT (OUTPATIENT)
Dept: CARDIOLOGY CLINIC | Facility: CLINIC | Age: 36
End: 2021-07-28
Payer: COMMERCIAL

## 2021-07-28 DIAGNOSIS — Z98.890 HISTORY OF RADIOFREQUENCY ABLATION PROCEDURE FOR CARDIAC ARRHYTHMIA: ICD-10-CM

## 2021-07-28 DIAGNOSIS — R00.0 TACHYCARDIA: ICD-10-CM

## 2021-07-28 DIAGNOSIS — I49.3 PVC'S (PREMATURE VENTRICULAR CONTRACTIONS): ICD-10-CM

## 2021-07-28 PROCEDURE — 93248 EXT ECG>7D<15D REV&INTERPJ: CPT | Performed by: INTERNAL MEDICINE

## 2021-08-01 NOTE — RESULT ENCOUNTER NOTE
Predominant underlying rhythm was Sinus Rhythm  Patient had a min HR of 60 bpm, max HR of 168 bpm, and avg HR of 100 bpm  Slight P wave  morphology changes were noted  Isolated SVEs were rare (<1 0%), SVE  Couplets were rare (<1 0%), and no SVE Triplets were present  Isolated  VEs were rare (<1 0%), and no VE Couplets or VE Triplets were present

## 2021-08-06 PROBLEM — I49.3 PVC'S (PREMATURE VENTRICULAR CONTRACTIONS): Status: ACTIVE | Noted: 2021-08-06

## 2021-08-09 ENCOUNTER — OFFICE VISIT (OUTPATIENT)
Dept: CARDIOLOGY CLINIC | Facility: CLINIC | Age: 36
End: 2021-08-09
Payer: COMMERCIAL

## 2021-08-09 VITALS
DIASTOLIC BLOOD PRESSURE: 80 MMHG | WEIGHT: 214.6 LBS | HEART RATE: 80 BPM | SYSTOLIC BLOOD PRESSURE: 110 MMHG | BODY MASS INDEX: 39.25 KG/M2

## 2021-08-09 DIAGNOSIS — R00.0 TACHYCARDIA: ICD-10-CM

## 2021-08-09 DIAGNOSIS — Z98.890 HISTORY OF RADIOFREQUENCY ABLATION PROCEDURE FOR CARDIAC ARRHYTHMIA: Primary | ICD-10-CM

## 2021-08-09 DIAGNOSIS — I49.3 PVC'S (PREMATURE VENTRICULAR CONTRACTIONS): ICD-10-CM

## 2021-08-09 PROCEDURE — 99214 OFFICE O/P EST MOD 30 MIN: CPT | Performed by: INTERNAL MEDICINE

## 2021-08-09 PROCEDURE — 1036F TOBACCO NON-USER: CPT | Performed by: INTERNAL MEDICINE

## 2021-08-09 NOTE — PROGRESS NOTES
[facility]  [address] [facility]   [address]  [address]  Phone#  [phone]   Fax#  [phone]  *-*-*-*-*-*-*-*-*-*-*-*-*-*-*-*-*-*-*-*-*-*-*-*-*-*-*-*-*-*-*-*-*-*-*-*-*-*-*-*-*-*-*-*-*-*-*-*-*-*-*-*-*-*                                   Cardiology Follow Up      ENCOUNTER DATE: 21 11:34 AM  PATIENT NAME: Belia Lemus   : 1985    MRN: [id number]  AGE:35 y o  SEX: female  Jo-Ann Piña MD     PRIMARY CARE PHYSICIAN: Chavez Sanabria MD    ACTIVE DIAGNOSIS THIS VISIT  1  History of radiofrequency ablation procedure for W-P-W     2  PVC's (premature ventricular contractions)     3  Tachycardia       ACTIVE PROBLEM LIST  Patient Active Problem List   Diagnosis    Bipolar I disorder, most recent episode depressed, severe with psychotic features ([address] )    Eczema    Obesity (BMI 30-39  9)    Tachycardia    Abnormal Papanicolaou smear of cervix with positive human papilloma virus (HPV) test    Pap smear abnormality of cervix with LGSIL    Left wrist pain    Left wrist tendonitis    Migraine without aura and without status migrainosus, not intractable    SHELLY (generalized anxiety disorder)    Obsessive-compulsive disorder, unspecified    Other insomnia    Long-term use of high-risk medication    Family history of breast cancer    Visit for genetic screening    Chest wall pain, chronic    Tinea pedis of left foot    Patellofemoral disorder of left knee    Congenital fusion of carpal bone    History of radiofrequency ablation procedure for W-P-W    PVC's (premature ventricular contractions)       CARDIOLOGY SPECIALTY COMMENTS  Patient was 1st seen on Kiki 15, 2021  Patient is a 28-year-old female who had a WPW ablation at age 14 performed in [place] at [address] of the [address] [place] [address]      She is presently experiencing 2 types of arrhythmias  She is experiencing a constant tachycardia which can last for minutes to days    4-6 weeks ago she had an episode which lasted for 4 days and states that her heart rate was in the 120s  Although she has had these symptoms for a long time, she believes that they are becoming more frequent and more severe  She has never previously had an episode which lasted for 4 days  Other than the racing heartbeat, she has no associated symptoms with these feelings of tachycardia     The 2nd type of arrhythmia has the description of a PVC  She feels lightheaded and short of breath  She then experiences a day jolt or vigorous heartbeat in her chest   There may be some anxiety accompanied with these episodes since and she does interpret them as scary  They occur several times a week  She also believes that these symptoms have been more frequent and more severe      She has mild hyperlipidemia and is bordering on morbid obesity , BMI 39 6  She has a family history of coronary artery disease but no history of death at an early age  or heart disease at an early age  She is a lifetime nonsmoker  She has a family history of factor 5 Leiden deficiency but has been tested herself and is negative    INTERVAL HISTORY:      Patient with history of palpitations and an ablation for WPW at age 14 returns  She then ZIO patch monitor for 2 weeks  She stated that she had palpitations while she had the monitor on  Review of the monitor demonstrates less than 1% supraventricular ectopy without supraventricular run and less than 1% ventricular ectopy without couplets triplets or ventricular runs  There was no evidence of WPW  DISCUSSION/PLAN:        1  Discussed treatment of her ectopy verses excepting it and realizing that there was no serious repercussions of her arrhythmias  I preferred not treating her ectopy in she is agreeable  2  Did not give patient a return appointment but the door is always open if she needs further evaluation    If she feels some concern risk several years she could make an appointment and have a ZIO Patch monitor performed for 2 weeks   3  Recommend that she lose weight  Suggested weight watchers to her  Sometimes obesity is related to a higher incidence of atrial fibrillation and I do not feel she needs another arrhythmia to contend with      Lab Studies:    Lab Results   Component Value Date    CHOLESTEROL 199 02/19/2020    CHOLESTEROL 183 07/21/2017    CHOLESTEROL 193 06/24/2016     Lab Results   Component Value Date    TRIG 109 02/19/2020    TRIG 85 07/21/2017    TRIG 126 06/24/2016     Lab Results   Component Value Date    HDL 53 02/19/2020    HDL 36 (L) 07/21/2017    HDL 41 06/24/2016     Lab Results   Component Value Date    LDLCALC 124 (H) 02/19/2020    LDLCALC 130 (H) 07/21/2017    LDLCALC 127 (H) 06/24/2016       Lab Results   Component Value Date    HGBA1C 5 2 02/19/2020      Lab Results   Component Value Date    EGFR 115 02/19/2020    EGFR 116 03/29/2019    EGFR 105 08/24/2018    SODIUM 137 02/19/2020    SODIUM 138 03/29/2019    SODIUM 138 08/24/2018    K 4 1 02/19/2020    K 3 7 03/29/2019    K 3 7 08/24/2018     02/19/2020     03/29/2019     08/24/2018    CO2 25 02/19/2020    CO2 27 03/29/2019    CO2 28 08/24/2018    ANIONGAP 12 08/11/2014    BUN 10 02/19/2020    BUN 12 03/29/2019    BUN 9 08/24/2018    CREATININE 0 68 02/19/2020    CREATININE 0 68 03/29/2019    CREATININE 0 76 08/24/2018       Lab Results   Component Value Date    WBC 8 23 02/19/2020    WBC 7 64 03/29/2019    WBC 6 64 08/24/2018    HGB 14 0 02/19/2020    HGB 13 9 03/29/2019    HGB 12 2 08/24/2018    HCT 44 6 02/19/2020    HCT 43 5 03/29/2019    HCT 40 7 08/24/2018    MCV 89 02/19/2020    MCV 89 03/29/2019    MCV 87 08/24/2018    MCH 27 9 02/19/2020    MCH 28 4 03/29/2019    MCH 26 0 (L) 08/24/2018    MCHC 31 4 02/19/2020    MCHC 32 0 03/29/2019    MCHC 30 0 (L) 08/24/2018     02/19/2020     03/29/2019     (H) 08/24/2018      Lab Results   Component Value Date    GLUCOSE 81 08/11/2014 CALCIUM 8 5 02/19/2020    CALCIUM 8 0 (L) 03/29/2019    CALCIUM 9 4 08/24/2018    AST 14 02/19/2020    AST 14 03/29/2019    AST 16 07/21/2017    ALT 30 02/19/2020    ALT 25 03/29/2019    ALT 31 07/21/2017    ALKPHOS 68 02/19/2020    ALKPHOS 84 03/29/2019    ALKPHOS 62 07/21/2017    PROT 7 9 08/11/2014    BILITOT 0 4 08/11/2014   No results found for this visit on 08/09/21  Current Outpatient Medications:     etonogestrel-ethinyl estradiol (NUVARING) 0 12-0 015 MG/24HR vaginal ring, Insert vaginally and leave in place for 3 consecutive weeks, then remove for 1 week , Disp: 3 each, Rfl: 2    lamoTRIgine (LaMICtal) 25 mg tablet, Take 1 po daily x 2 weeks, then 2 po daily, Disp: 70 tablet, Rfl: 1    naproxen (NAPROSYN) 500 mg tablet, Take 1 tablet (500 mg total) by mouth 2 (two) times a day with meals, Disp: 60 tablet, Rfl: 0  Allergies   Allergen Reactions    Codeine Other (See Comments)     dry heaves    Sulfa Antibiotics Hives and Rash     Per pt as achild    Erythromycin Hives     Per as a child       Past Medical History:   Diagnosis Date    Abnormal Pap smear of cervix     Eczema     Exposure to chlamydia     Resolved 06/09/17    Migraine without aura and without status migrainosus, not intractable 10/11/2019    Obesity     Postpartum depression 8/9/2018    Manoj-Parkinson-White (WPW) syndrome      Social History     Socioeconomic History    Marital status: Single     Spouse name: Not on file    Number of children: 2    Years of education: Not on file    Highest education level: Some college, no degree   Occupational History    Occupation: works in retail   Tobacco Use    Smoking status: Never Smoker    Smokeless tobacco: Never Used   Vaping Use    Vaping Use: Never used   Substance and Sexual Activity    Alcohol use:  Yes    Drug use: No    Sexual activity: Yes     Partners: Male     Birth control/protection: Condom Male   Other Topics Concern    Not on file   Social History Narrative Education: high school graduate and some college    Learning Disabilities: none    Marital History: single    Children: 1 daughter, 1 son    Living Arrangement: lives in home with boyfriend, 2 children and boyfriend's 2 children    Occupational History: works part time at [employer] and [employer] works    Functioning Relationships: boyfriend is supportive    Legal History: none     History: None         Family planning    IUD contraception     Social Determinants of Health     Financial Resource Strain: Medium Risk    Difficulty of Paying Living Expenses: Somewhat hard   Food Insecurity: Food Insecurity Present    Worried About [address] [facility] in the Last Year: Sometimes true    [address] [facility] [address] in the Last Year: Sometimes true   Transportation Needs: Unmet Transportation Needs    Lack of Transportation (Medical):  Yes    Lack of Transportation (Non-Medical): Yes   Physical Activity:     Days of Exercise per Week:     Minutes of Exercise per Session:    Stress:     Feeling of Stress :    Social Connections:     Frequency of Communication with Friends and Family:     Frequency of Social Gatherings with Friends and Family:     Attends Cheondoism Services:     Active Member of Clubs or Organizations:     Attends Club or Organization Meetings:     Marital Status:    Intimate Partner Violence:     Fear of Current or Ex-Partner:     Emotionally Abused:     Physically Abused:     Sexually Abused:       Family History   Problem Relation Age of Onset    Bipolar disorder Mother     Hypertension Mother     Hyperlipidemia Mother     Suicide Attempts Mother     Colon polyps Mother     Factor V Leiden deficiency Mother     Glucose-6-phos deficiency Father     Hyperlipidemia Father     Hypertension Father     No Known Problems Sister     Anxiety disorder Brother     Factor V Leiden deficiency Brother     Breast cancer Maternal Grandmother         dx approx age early 42's    Colon polyps Maternal Grandfather     Crohn's disease Paternal Grandmother     Prostate cancer Paternal Grandfather     Pancreatic cancer Paternal Grandfather     Colon cancer Paternal Aunt     Mental illness Brother     Drug abuse Brother     Breast cancer Maternal Aunt 36    Factor V Leiden deficiency Cousin      Past Surgical History:   Procedure Laterality Date    ABLATION OF DYSRHYTHMIC FOCUS      WPW ablation age 15    COLONOSCOPY      COLPOSCOPY      INDUCED       OTHER SURGICAL HISTORY      catheter ablation- WPW age 13       Review of Systems:  Review of Systems   Respiratory: Negative for cough, choking, chest tightness, shortness of breath and wheezing  Cardiovascular: Negative for chest pain, palpitations and leg swelling  Musculoskeletal: Negative for gait problem  Skin: Negative for rash  Neurological: Negative for dizziness, tremors, syncope, weakness, light-headedness, numbness and headaches  Psychiatric/Behavioral: Negative for agitation and behavioral problems  The patient is not hyperactive  Physical Exam:  /80 (BP Location: Right arm, Patient Position: Sitting, Cuff Size: Large)   Pulse 80   Wt 97 3 kg (214 lb 9 6 oz)   BMI 39 25 kg/m²     PREVIOUS WEIGHTS:   Wt Readings from Last 10 Encounters:   21 97 3 kg (214 lb 9 6 oz)   21 99 1 kg (218 lb 6 4 oz)   06/15/21 98 2 kg (216 lb 9 6 oz)   21 97 5 kg (215 lb)   21 99 3 kg (219 lb)   21 99 8 kg (220 lb)   21 103 kg (226 lb)   21 103 kg (226 lb)   21 103 kg (226 lb)   21 102 kg (224 lb 6 9 oz)      Physical Exam  Constitutional:       General: She is not in acute distress  Appearance: She is well-developed  HENT:      Head: Normocephalic and atraumatic  Neck:      Thyroid: No thyromegaly  Vascular: No carotid bruit or JVD  Trachea: No tracheal deviation  Cardiovascular:      Rate and Rhythm: Normal rate and regular rhythm  Pulses: Normal pulses  Heart sounds: Normal heart sounds  No murmur heard  No friction rub  No gallop  Pulmonary:      Effort: Pulmonary effort is normal  No respiratory distress  Breath sounds: Normal breath sounds  No wheezing, rhonchi or rales  Chest:      Chest wall: No tenderness  Abdominal:      General: Bowel sounds are normal  There is no distension  Palpations: Abdomen is soft  Tenderness: There is no abdominal tenderness  Musculoskeletal:         General: Normal range of motion  Cervical back: Normal range of motion and neck supple  Right lower leg: No edema  Left lower leg: No edema  Skin:     General: Skin is warm and dry  Neurological:      General: No focal deficit present  Mental Status: She is alert and oriented to person, place, and time  Gait: Gait normal    Psychiatric:         Mood and Affect: Mood normal          Behavior: Behavior normal          Thought Content: Thought content normal          Judgment: Judgment normal          -------------------------------------------------------------------------------     AMB extended holter monitor  Predominant underlying rhythm was Sinus Rhythm  Patient had a min HR of 60 bpm, max HR of 168 bpm, and avg HR of 100 bpm  Slight P wave  morphology changes were noted  Isolated SVEs were rare (<1 0%), SVE  Couplets were rare (<1 0%), and no SVE Triplets were present  Isolated  VEs were rare (<1 0%), and no VE Couplets or VE Triplets were present   --------------------------------------------------------------------------------    RLower Extremity Venous Duplex  Results for orders placed during the hospital encounter of 07/06/18  VAS lower limb venous duplex study, unilateral/limited    Narrative  [facility] REPORT  CLINICAL:  Indications:  Patient presents with a dull crampy feeling in her right calf s/p giving birth  x <24hrs    Denies swelling or significant pain and reports her leg felt ok at  time of exam   Operative History:  Patient denies any cardiovascular surgeries  Risk Factors  The patient has history of Obesity  CONCLUSION:  Impression:  RIGHT LOWER LIMB  No evidence of acute or chronic deep vein thrombosis   No evidence of superficial thrombophlebitis noted  Doppler evaluation shows a normal response to augmentation maneuvers  Popliteal, posterior tibial and anterior tibial arterial Doppler waveforms are  triphasic  LEFT LOWER LIMB LIMITED  Evaluation shows no evidence of thrombus in the common femoral vein  Doppler evaluation shows a normal response to augmentation maneuvers  Technical findings were given to [address] at completion of the exam     SIGNATURE:  Electronically Signed by: Cornell Braswell MD, [address] on 2018-07-07 05:05:01 PM      ======================================================  Imaging:   I have personally reviewed pertinent reports  Portions of the record may have been created with voice recognition software  Occasional wrong word or "sound a like" substitutions may have occurred due to the inherent limitations of voice recognition software  Read the chart carefully and recognize, using context, where substitutions have occurred      SIGNATURES:   Berna Araya MD

## 2021-08-20 DIAGNOSIS — F31.9 BIPOLAR DEPRESSION (HCC): ICD-10-CM

## 2021-08-20 RX ORDER — LAMOTRIGINE 25 MG/1
TABLET ORAL
Qty: 70 TABLET | Refills: 1 | Status: SHIPPED | OUTPATIENT
Start: 2021-08-20 | End: 2021-10-20

## 2021-08-23 ENCOUNTER — APPOINTMENT (OUTPATIENT)
Dept: LAB | Facility: CLINIC | Age: 36
End: 2021-08-23
Payer: COMMERCIAL

## 2021-08-23 ENCOUNTER — SOCIAL WORK (OUTPATIENT)
Dept: BEHAVIORAL/MENTAL HEALTH CLINIC | Facility: CLINIC | Age: 36
End: 2021-08-23
Payer: COMMERCIAL

## 2021-08-23 DIAGNOSIS — G47.09 OTHER INSOMNIA: Chronic | ICD-10-CM

## 2021-08-23 DIAGNOSIS — F42.9 OBSESSIVE-COMPULSIVE DISORDER, UNSPECIFIED TYPE: Chronic | ICD-10-CM

## 2021-08-23 DIAGNOSIS — F41.1 GAD (GENERALIZED ANXIETY DISORDER): Chronic | ICD-10-CM

## 2021-08-23 DIAGNOSIS — F31.5 BIPOLAR I DISORDER, MOST RECENT EPISODE DEPRESSED, SEVERE WITH PSYCHOTIC FEATURES (HCC): Primary | Chronic | ICD-10-CM

## 2021-08-23 DIAGNOSIS — R53.83 FATIGUE, UNSPECIFIED TYPE: ICD-10-CM

## 2021-08-23 LAB
ALBUMIN SERPL BCP-MCNC: 3.6 G/DL (ref 3.5–5)
ALP SERPL-CCNC: 64 U/L (ref 46–116)
ALT SERPL W P-5'-P-CCNC: 57 U/L (ref 12–78)
ANION GAP SERPL CALCULATED.3IONS-SCNC: 2 MMOL/L (ref 4–13)
AST SERPL W P-5'-P-CCNC: 34 U/L (ref 5–45)
BASOPHILS # BLD AUTO: 0.07 THOUSANDS/ΜL (ref 0–0.1)
BASOPHILS NFR BLD AUTO: 1 % (ref 0–1)
BILIRUB SERPL-MCNC: 0.38 MG/DL (ref 0.2–1)
BUN SERPL-MCNC: 10 MG/DL (ref 5–25)
CALCIUM SERPL-MCNC: 8.9 MG/DL (ref 8.3–10.1)
CHLORIDE SERPL-SCNC: 110 MMOL/L (ref 100–108)
CO2 SERPL-SCNC: 26 MMOL/L (ref 21–32)
CREAT SERPL-MCNC: 0.69 MG/DL (ref 0.6–1.3)
EOSINOPHIL # BLD AUTO: 0.11 THOUSAND/ΜL (ref 0–0.61)
EOSINOPHIL NFR BLD AUTO: 2 % (ref 0–6)
ERYTHROCYTE [DISTWIDTH] IN BLOOD BY AUTOMATED COUNT: 12.6 % (ref 11.6–15.1)
GFR SERPL CREATININE-BSD FRML MDRD: 114 ML/MIN/1.73SQ M
GLUCOSE SERPL-MCNC: 71 MG/DL (ref 65–140)
HCT VFR BLD AUTO: 45.1 % (ref 34.8–46.1)
HGB BLD-MCNC: 14.7 G/DL (ref 11.5–15.4)
IMM GRANULOCYTES # BLD AUTO: 0.02 THOUSAND/UL (ref 0–0.2)
IMM GRANULOCYTES NFR BLD AUTO: 0 % (ref 0–2)
LYMPHOCYTES # BLD AUTO: 2.67 THOUSANDS/ΜL (ref 0.6–4.47)
LYMPHOCYTES NFR BLD AUTO: 42 % (ref 14–44)
MCH RBC QN AUTO: 28.9 PG (ref 26.8–34.3)
MCHC RBC AUTO-ENTMCNC: 32.6 G/DL (ref 31.4–37.4)
MCV RBC AUTO: 89 FL (ref 82–98)
MONOCYTES # BLD AUTO: 0.63 THOUSAND/ΜL (ref 0.17–1.22)
MONOCYTES NFR BLD AUTO: 10 % (ref 4–12)
NEUTROPHILS # BLD AUTO: 2.79 THOUSANDS/ΜL (ref 1.85–7.62)
NEUTS SEG NFR BLD AUTO: 45 % (ref 43–75)
NRBC BLD AUTO-RTO: 0 /100 WBCS
PLATELET # BLD AUTO: 329 THOUSANDS/UL (ref 149–390)
PMV BLD AUTO: 10.5 FL (ref 8.9–12.7)
POTASSIUM SERPL-SCNC: 3.7 MMOL/L (ref 3.5–5.3)
PROT SERPL-MCNC: 7.5 G/DL (ref 6.4–8.2)
RBC # BLD AUTO: 5.08 MILLION/UL (ref 3.81–5.12)
SODIUM SERPL-SCNC: 138 MMOL/L (ref 136–145)
TSH SERPL DL<=0.05 MIU/L-ACNC: 1.67 UIU/ML (ref 0.36–3.74)
WBC # BLD AUTO: 6.29 THOUSAND/UL (ref 4.31–10.16)

## 2021-08-23 PROCEDURE — 90834 PSYTX W PT 45 MINUTES: CPT | Performed by: SOCIAL WORKER

## 2021-08-23 PROCEDURE — 36415 COLL VENOUS BLD VENIPUNCTURE: CPT

## 2021-08-23 PROCEDURE — 85025 COMPLETE CBC W/AUTO DIFF WBC: CPT

## 2021-08-23 PROCEDURE — 84443 ASSAY THYROID STIM HORMONE: CPT

## 2021-08-23 PROCEDURE — 80053 COMPREHEN METABOLIC PANEL: CPT

## 2021-08-23 NOTE — TELEPHONE ENCOUNTER
I called patient advised her to go get her blood work done as soon as possible and she needs to make a f/u appt  Patient stated she will call back to make her f/u appt that she is at a completely different appt  Thank you!

## 2021-08-23 NOTE — PSYCH
Psychotherapy Provided: Individual Psychotherapy 45 minutes     Length of time in session: 45 minutes, follow up in 1 week    Encounter Diagnosis     ICD-10-CM    1  Bipolar I disorder, most recent episode depressed, severe [facility] )  F31 5    2  SHELLY (generalized anxiety disorder)  F41 1    3  Obsessive-compulsive disorder, unspecified type  F42 9    4  Other insomnia  G47 09        Goals addressed in session: Goal 1     Pain:      moderate to severe-- significant anxiety and acute stress reaction    Current suicide risk : Low     DATA: Met with Quinten Finley for scheduled individual session  Topics of discussion included family stressors, marital stress, work-related stress and parenting concerns  "I'm not good at all " Quinten Finley states that she is not sleeping  She is having a great deal of anxiety regarding the safety of her daughter, ever since her daughter was put under anesthesia for a dental procedure  Quinten Finley states that she continues to have thoughts/visions of her daughter's face just before the surgery and after the surgery  She states that her daughter had to have four teeth removed (her four front teeth) and had to have seven caps  She states that she and her SO were not expecting this level of dental work  Quinten Finley states, "I feel like I should have protected her " Quinten Finley states that she is "basically not sleeping " She states that she does not know what to do to stop these thoughts  Quinten Finley expressed that she is feeling guilty about the entire situation  "If I would have paid more attention to what she ate, to brushing her teeth   it wouldn't have gotten this bad " Quinten Finley states that Padma Belcher has been physically ill recently  She states that he has days of having fevers, followed by days when he is feeling totally normal  She states that there are times when he reports chest pain and/or abdominal pain  She has consulted with his primary care doctor and has also taken him to the ED   She reports that he has tested negative for covid  She just took him for more blood tests today and a urine  She has a follow up appointment with his primary care physician tomorrow  She also reports that Aron Pelayo recently came out to her as bisexual  She states that she and her SO have questions about his age and his ability to determine his sexual orientation at this age  She states that she is very supportive of him; however, she is concerned for his physical and emotional safety  We discussed the age-appropriateness of his behaviors and his exploration of sexual orientation and sexuality  We agreed to discuss this issue in future sessions, as needed  Client shows evidence of utilizing some basic distress tolerance skills to manage mental health symptoms; however, she is struggling to manage her distress and is experiencing significant hypervigilance and some intrusive thoughts regarding her daughter's safety  During this session, this clinician used the following therapeutic modalities: supportive psychotherapy, client-centered therapy, mindfulness-based strategies, DBT-informed skills, Motivational Interviewing and solution-focused therapy  ASSESSMENT: Caryle Purser presents with a anxious mood  Her affect is tearful  Caryle Purser exhibits good therapeutic rapport with this clinician  Caryle Purser continues to exhibit willingness to work on treatment goals and objectives  Caryle Purser presents with a minimal risk of suicide, minimal risk of self-harm, and minimal risk of harm to others  PLAN: Caryle Purser will return in one week for the next scheduled session  Between sessions, Caryle Purser will continue to monitor her moods and will report back during the next session re: successes and barriers   At the next session, this clinician will use supportive psychotherapy, client-centered therapy, mindfulness-based strategies, DBT-informed skills, Motivational Interviewing and solution-focused therapy to address her mood regulation, anxiety management and relationship concerns, in an effort to assist Shabbir Matos with meeting treatment goals  Behavioral Health Treatment Plan [facility]: Diagnosis and Treatment Plan explained to Lida Cousin relates understanding diagnosis and is agreeable to Treatment Plan   Yes

## 2021-08-30 ENCOUNTER — SOCIAL WORK (OUTPATIENT)
Dept: BEHAVIORAL/MENTAL HEALTH CLINIC | Facility: CLINIC | Age: 36
End: 2021-08-30
Payer: COMMERCIAL

## 2021-08-30 DIAGNOSIS — F41.1 GAD (GENERALIZED ANXIETY DISORDER): Chronic | ICD-10-CM

## 2021-08-30 DIAGNOSIS — F31.5 BIPOLAR I DISORDER, MOST RECENT EPISODE DEPRESSED, SEVERE WITH PSYCHOTIC FEATURES (HCC): Primary | Chronic | ICD-10-CM

## 2021-08-30 PROCEDURE — 90834 PSYTX W PT 45 MINUTES: CPT | Performed by: SOCIAL WORKER

## 2021-08-31 NOTE — PSYCH
Psychotherapy Provided: Individual Psychotherapy 45 minutes     Length of time in session: 45 minutes, follow up in 1 week    Encounter Diagnosis     ICD-10-CM    1  Bipolar I disorder, most recent episode depressed, severe with psychotic features ([facility] )  F31 5    2  SHELLY (generalized anxiety disorder)  F41 1        Goals addressed in session: Goal 1     Pain:      none    Current suicide risk : Low     DATA: Met with Cally Danielle for scheduled individual session  Topics of discussion included family stressors, relationships with family, trauma history and parenting concerns  In preparation for bilateral stim technique, this clinician invited the client to imagine a place where she can feel totally relaxed and calm  She had significant difficulty with identifying such a place  We discussed making this an imaginary place, where she was completely safe and where she knew that everyone in her life was completely safe  Despite this set-up, the client had significant difficulty getting to a point of calm and relaxation  Using a SUDS scale of 1-10, Cally Danielle was never able to get below a 5 while practicing the calm exercise  We decided to abandon EMDR-informed techniques at this time, as she has some complex trauma impacting her ability to relax to a target point  We discussed changing techniques  At the next session, we will discuss the use of CPT, to help her integrate her feelings of guilt/shame regarding negative things in her life  We will work toward her decreasing her hypervigilance and increase her ability to relax and find calm within herself  Client shows evidence of utilizing distress tolerance skills skills to manage mental health symptoms  During this session, this clinician used the following therapeutic modalities: supportive psychotherapy, client-centered therapy, mindfulness-based strategies, DBT-informed skills, Motivational Interviewing, solution-focused therapy and bilateral stimulation       ASSESSMENT: Cally Shashi presents with a anxious mood  Her affect is reactive  Naveed Rosales exhibits good therapeutic rapport with this clinician  Naveed Rosales continues to exhibit willingness to work on treatment goals and objectives  Naveed Rosales presents with a minimal risk of suicide, minimal risk of self-harm, and minimal risk of harm to others  PLAN: Naveed Rosales will return in one week for the next scheduled session  Between sessions, Naveed Rosales will continue to monitor her mood, nightmares, and incidents of hypervigilance  She will report back during the next session re: successes and barriers  At the next session, this clinician will use supportive psychotherapy, client-centered therapy, mindfulness-based strategies, DBT-informed skills, Motivational Interviewing, solution-focused therapy, prolonged exposure and CPT techniques to address her mood regulation, relationship concerns, and history of trauma, in an effort to assist Naveed Rosales with meeting treatment goals  Behavioral Health Treatment Plan [facility]: Diagnosis and Treatment Plan explained to Alex Brasher relates understanding diagnosis and is agreeable to Treatment Plan   Yes

## 2021-09-07 ENCOUNTER — SOCIAL WORK (OUTPATIENT)
Dept: BEHAVIORAL/MENTAL HEALTH CLINIC | Facility: CLINIC | Age: 36
End: 2021-09-07
Payer: COMMERCIAL

## 2021-09-07 DIAGNOSIS — F41.1 GAD (GENERALIZED ANXIETY DISORDER): Chronic | ICD-10-CM

## 2021-09-07 DIAGNOSIS — F31.5 BIPOLAR I DISORDER, MOST RECENT EPISODE DEPRESSED, SEVERE WITH PSYCHOTIC FEATURES (HCC): Primary | Chronic | ICD-10-CM

## 2021-09-07 PROCEDURE — 90834 PSYTX W PT 45 MINUTES: CPT | Performed by: SOCIAL WORKER

## 2021-09-09 NOTE — PSYCH
Psychotherapy Provided: Individual Psychotherapy 45 minutes     Length of time in session: 45 minutes, follow up in 2 weeks    Encounter Diagnosis     ICD-10-CM    1  Bipolar I disorder, most recent episode depressed, severe with psychotic features ([facility] [address] )  F31 5    2  SHELLY (generalized anxiety disorder)  F41 1        Goals addressed in session: Goal 1     Pain:      none    Current suicide risk : Low     DATA: Met with Dinh Porter for scheduled individual session  Topics of discussion included family stressors, trauma history and parenting concerns  Dinh Porter reports that she has been doing ok  She continues to endorse being hypervigilant regarding her care/concern for her daughter  We spent time during this session creating a timeline of traumatic events from Libertad's life  She states that she does not like to talk about these issues, but she feels like she needs to do so to be able to move forward and get better  We did not delve into any of the issues we listed; however, she was able to identify several incidents of trauma during her early and middle childhood  She agreed to continue the building of the timeline into the next session  She will continue to monitor her moods between sessions and will reach out to this writer for additional support if needed  She states that she feels that her SO is being as supportive as he is able at the current time  Client shows evidence of utilizing distress tolerance skills skills to manage mental health symptoms  During this session, this clinician used the following therapeutic modalities: supportive psychotherapy, client-centered therapy, DBT-informed skills, Motivational Interviewing, solution-focused therapy and prolonged exposure  ASSESSMENT: Dinh Porter presents with a dysphoric mood  Her affect is normal range and intensity, appropriate  Dinh Porter exhibits good therapeutic rapport with this clinician  Dinh Porter continues to exhibit willingness to work on treatment goals and objectives  Beaat Rice presents with a minimal risk of suicide, minimal risk of self-harm, and minimal risk of harm to others  PLAN: Beata Rice will return in two weeks for the next scheduled session  Between sessions, Beata Rice will continue to monitor her mood and practice distraction to manage her moods and will report back during the next session re: successes and barriers  At the next session, this clinician will use supportive psychotherapy, client-centered therapy, mindfulness-based strategies, DBT-informed skills, Motivational Interviewing and solution-focused therapy to address her mood regulation and relationship concerns  We will continue to build her trauma timeline, in an effort to [address] with meeting treatment goals  Behavioral Health Treatment Plan [facility]: Diagnosis and Treatment Plan explained to Migdalia Reva relates understanding diagnosis and is agreeable to Treatment Plan   Yes

## 2021-09-20 ENCOUNTER — SOCIAL WORK (OUTPATIENT)
Dept: BEHAVIORAL/MENTAL HEALTH CLINIC | Facility: CLINIC | Age: 36
End: 2021-09-20
Payer: COMMERCIAL

## 2021-09-20 ENCOUNTER — TELEPHONE (OUTPATIENT)
Dept: OBGYN CLINIC | Facility: MEDICAL CENTER | Age: 36
End: 2021-09-20

## 2021-09-20 DIAGNOSIS — F31.5 BIPOLAR I DISORDER, MOST RECENT EPISODE DEPRESSED, SEVERE WITH PSYCHOTIC FEATURES (HCC): Primary | Chronic | ICD-10-CM

## 2021-09-20 DIAGNOSIS — F41.1 GAD (GENERALIZED ANXIETY DISORDER): Chronic | ICD-10-CM

## 2021-09-20 DIAGNOSIS — Z30.015 ENCOUNTER FOR INITIAL PRESCRIPTION OF VAGINAL RING HORMONAL CONTRACEPTIVE: Primary | ICD-10-CM

## 2021-09-20 PROCEDURE — 90834 PSYTX W PT 45 MINUTES: CPT | Performed by: SOCIAL WORKER

## 2021-09-20 RX ORDER — ETONOGESTREL AND ETHINYL ESTRADIOL 11.7; 2.7 MG/1; MG/1
INSERT, EXTENDED RELEASE VAGINAL
Qty: 3 EACH | Refills: 0 | Status: SHIPPED | OUTPATIENT
Start: 2021-09-20 | End: 2021-12-15 | Stop reason: SDUPTHER

## 2021-09-20 NOTE — PSYCH
Psychotherapy Provided: Individual Psychotherapy 45 minutes     Length of time in session: 45 minutes, follow up in 1 week    Encounter Diagnosis     ICD-10-CM    1  Bipolar I disorder, most recent episode depressed, severe with psychotic features [facility]  F31 5    2  SHELLY (generalized anxiety disorder)  F41 1        Goals addressed in session: Goal 1     Pain:      none    Current suicide risk : Low     DATA: Met with Federiconabil Morrison for scheduled individual session  Topics of discussion included family stressors, marital stress, relationships with family, trauma history and parenting concerns  We discussed Libertad's response to creating her trauma timeline during our last session  She states that she felt some anger toward her mother and had to keep herself emotionally in-check when interacting with her mother  She discussed her thoughts/feelings about her ongoing relationship with her mother and the difficulty she has with interactions with her mother  Federiconabil Morrison states that she feels comfortable to continue with the creation of the trauma timeline  She discussed the years following her graduation from high school, including the years with her son's father  She states that she and her current SO had a "breakthrough" in their communication, and she is requesting a session with him for next week  Client shows evidence of utilizing emotion regulation skills and distress tolerance skills skills to manage mental health symptoms  During this session, this clinician used the following therapeutic modalities: supportive psychotherapy, client-centered therapy, mindfulness-based strategies, DBT-informed skills, Motivational Interviewing, solution-focused therapy and prolonged exposure  ASSESSMENT: Federico Morrison presents with a normal mood  Her affect is normal range and intensity, appropriate  Federico Morrison exhibits good therapeutic rapport with this clinician  Federico Morrison continues to exhibit willingness to work on treatment goals and objectives   Federico Morrison presents with a minimal risk of suicide, minimal risk of self-harm, and minimal risk of harm to others  PLAN: Richard Peng will return in one week for the next scheduled session  Between sessions, Richard Peng will continue to work on regulating her moods and will report back during the next session re: successes and barriers  At the next session, this clinician will use supportive psychotherapy, client-centered therapy, mindfulness-based strategies, DBT-informed skills, Motivational Interviewing and solution-focused therapy to address her mood regulation and relationship concerns, in an effort to assist Richard Peng with meeting treatment goals  Behavioral Health Treatment Plan [facility]: Diagnosis and Treatment Plan explained to Meg Quesada relates understanding diagnosis and is agreeable to Treatment Plan   Yes

## 2021-09-20 NOTE — TELEPHONE ENCOUNTER
Pt called in pharmacy does not have her medication in stock and ask that we [facility] the [facility]  [facility] [address] [facility]  Please review and send for pt

## 2021-09-27 ENCOUNTER — APPOINTMENT (OUTPATIENT)
Dept: LAB | Facility: CLINIC | Age: 36
End: 2021-09-27
Payer: COMMERCIAL

## 2021-09-27 DIAGNOSIS — Z13.220 SCREENING FOR HYPERLIPIDEMIA: ICD-10-CM

## 2021-09-27 LAB
CHOLEST SERPL-MCNC: 205 MG/DL (ref 50–200)
HDLC SERPL-MCNC: 44 MG/DL
LDLC SERPL CALC-MCNC: 127 MG/DL (ref 0–100)
NONHDLC SERPL-MCNC: 161 MG/DL
TRIGL SERPL-MCNC: 170 MG/DL

## 2021-09-27 PROCEDURE — 36415 COLL VENOUS BLD VENIPUNCTURE: CPT

## 2021-09-27 PROCEDURE — 80061 LIPID PANEL: CPT

## 2021-09-29 ENCOUNTER — OFFICE VISIT (OUTPATIENT)
Dept: FAMILY MEDICINE CLINIC | Facility: CLINIC | Age: 36
End: 2021-09-29
Payer: COMMERCIAL

## 2021-09-29 VITALS
HEART RATE: 109 BPM | SYSTOLIC BLOOD PRESSURE: 112 MMHG | DIASTOLIC BLOOD PRESSURE: 84 MMHG | BODY MASS INDEX: 39.53 KG/M2 | RESPIRATION RATE: 18 BRPM | HEIGHT: 62 IN | TEMPERATURE: 99.4 F | WEIGHT: 214.8 LBS | OXYGEN SATURATION: 98 %

## 2021-09-29 DIAGNOSIS — G47.33 OBSTRUCTIVE SLEEP APNEA SYNDROME: ICD-10-CM

## 2021-09-29 DIAGNOSIS — Z00.00 ANNUAL PHYSICAL EXAM: Primary | ICD-10-CM

## 2021-09-29 DIAGNOSIS — Z23 NEED FOR INFLUENZA VACCINATION: ICD-10-CM

## 2021-09-29 DIAGNOSIS — F31.5 BIPOLAR I DISORDER, MOST RECENT EPISODE DEPRESSED, SEVERE WITH PSYCHOTIC FEATURES (HCC): Chronic | ICD-10-CM

## 2021-09-29 DIAGNOSIS — E66.9 OBESITY (BMI 30-39.9): ICD-10-CM

## 2021-09-29 DIAGNOSIS — G43.009 MIGRAINE WITHOUT AURA AND WITHOUT STATUS MIGRAINOSUS, NOT INTRACTABLE: ICD-10-CM

## 2021-09-29 DIAGNOSIS — L30.9 DERMATITIS: ICD-10-CM

## 2021-09-29 PROCEDURE — 90686 IIV4 VACC NO PRSV 0.5 ML IM: CPT

## 2021-09-29 PROCEDURE — 90471 IMMUNIZATION ADMIN: CPT

## 2021-09-29 PROCEDURE — 99395 PREV VISIT EST AGE 18-39: CPT | Performed by: FAMILY MEDICINE

## 2021-09-29 RX ORDER — NAPROXEN 500 MG/1
500 TABLET ORAL 2 TIMES DAILY WITH MEALS
Qty: 30 TABLET | Refills: 0 | Status: SHIPPED | OUTPATIENT
Start: 2021-09-29

## 2021-09-29 NOTE — ASSESSMENT & PLAN NOTE
She has noted improvement in mood swings on lamotrigine 50 mg daily  She will continue this dose along with visits with her therapist every 2 weeks  She did have discussion with the pharmacist that this could decreased effectiveness of her NuvaRing birth control  We discussed using alternative birth control and she will meet with gynecologist in December

## 2021-09-29 NOTE — ASSESSMENT & PLAN NOTE
She gets migraine headaches sometimes up to twice per week  Some relief from high-dose ibuprofen, but does not take the headache away  She has not tried naproxen or triptans  Recommended magnesium, vitamin B2 and melatonin at bedtime  Gave script for naproxen 500 mg to try to b i d  with food

## 2021-09-29 NOTE — PROGRESS NOTES
ADULT ANNUAL [facility]    NAME: Marzena Rossi  AGE: 29 y o  SEX: female  : 1985     DATE: 2021     Assessment and Plan:     Problem List Items Addressed This Visit        Cardiovascular and Mediastinum    Migraine without aura and without status migrainosus, not intractable       She gets migraine headaches sometimes up to twice per week  Some relief from high-dose ibuprofen, but does not take the headache away  She has not tried naproxen or triptans  Recommended magnesium, vitamin B2 and melatonin at bedtime  Gave script for naproxen 500 mg to try to b i d  with food  Musculoskeletal and Integument    Dermatitis     Left heel dermatitis,  Will give hydrocortisone to and 0 5% cream to apply b i d  Other    Bipolar I disorder, most recent episode depressed, severe with psychotic features ([address] ) (Chronic)     She has noted improvement in mood swings on lamotrigine 50 mg daily  She will continue this dose along with visits with her therapist every 2 weeks  She did have discussion with the pharmacist that this could decreased effectiveness of her NuvaRing birth control  We discussed using alternative birth control and she will meet with gynecologist in December  Other Visit Diagnoses     Annual physical exam    -  Primary    Need for influenza vaccination        Relevant Orders    FLUZONE: influenza vaccine, quadrivalent, 0 5 mL (Completed)          Immunizations and preventive care screenings were discussed with patient today  Appropriate education was printed on patient's after visit summary  Counseling:  · Exercise: the importance of regular exercise/physical activity was discussed  Recommend exercise 3-5 times per week for at least 30 minutes  BMI Counseling: Body mass index is 39 29 kg/m²   The BMI is above normal  Nutrition recommendations include decreasing portion sizes and encouraging healthy choices of fruits and vegetables  Exercise recommendations include moderate physical activity 150 minutes/week  Patient referred to weight management  Rationale for BMI follow-up plan is due to patient being overweight or obese  No follow-ups on file  Chief Complaint:     Chief Complaint   Patient presents with    Annual Exam      History of Present Illness:     Adult Annual Physical   Patient here for a comprehensive physical exam  The patient reports problems - bipolar and anger episodes much better on lamotrigine  Diet and Physical Activity  · Diet/Nutrition: well balanced diet, limited junk food and consuming 3-5 servings of fruits/vegetables daily  · Exercise: no formal exercise  Depression Screening  PHQ-9 Depression Screening    PHQ-9:   Frequency of the following problems over the past two weeks:           General Health  · Sleep: sleeps poorly, gets 4-6 hours of sleep on average and  notes snoring  · Hearing: normal - bilateral   · Vision: most recent eye exam <1 year ago and wears glasses  · Dental: regular dental visits, brushes teeth twice daily and floss daily  /GYN Health  · Last menstrual period: September 1st week  · Contraceptive method: Nuva Ring  · History of STDs?: no      Review of Systems:     Review of Systems   Constitutional: Positive for fatigue  Negative for activity change, chills and fever  HENT: Negative for congestion, ear pain, sinus pressure and sore throat  Eyes: Negative for pain and visual disturbance  Respiratory: Negative for cough, chest tightness, shortness of breath and wheezing  Cardiovascular: Negative for chest pain, palpitations and leg swelling  Gastrointestinal: Negative for abdominal pain, blood in stool, constipation, diarrhea, nausea and vomiting  Endocrine: Negative for polydipsia and polyuria  Genitourinary: Negative for difficulty urinating, dysuria, frequency and urgency  Musculoskeletal: Negative for arthralgias, joint swelling and myalgias  Skin: Negative for rash  Neurological: Positive for headaches  Negative for dizziness, weakness and numbness  Hematological: Negative for adenopathy  Does not bruise/bleed easily  Psychiatric/Behavioral: Negative for dysphoric mood  The patient is not nervous/anxious  Past Medical History:     Past Medical History:   Diagnosis Date    Abnormal Pap smear of cervix     Eczema     Exposure to chlamydia     Resolved 17    Migraine without aura and without status migrainosus, not intractable 10/11/2019    Obesity     Postpartum depression 2018    Manoj-Parkinson-White (WPW) syndrome       Past Surgical History:     Past Surgical History:   Procedure Laterality Date    ABLATION OF DYSRHYTHMIC FOCUS      WPW ablation age 15    COLONOSCOPY      COLPOSCOPY      INDUCED       OTHER SURGICAL HISTORY      catheter ablation- WPW age 13      Social History:     Social History     Socioeconomic History    Marital status: Single     Spouse name: None    Number of children: 2    Years of education: None    Highest education level: Some college, no degree   Occupational History    Occupation: works in retail   Tobacco Use    Smoking status: Never Smoker    Smokeless tobacco: Never Used   Vaping Use    Vaping Use: Never used   Substance and Sexual Activity    Alcohol use:  Yes    Drug use: No    Sexual activity: Yes     Partners: Male     Birth control/protection: Condom Male   Other Topics Concern    None   Social History Narrative    Education: high school graduate and some college    Learning Disabilities: none    Marital History: single    Children: 1 daughter, 1 son    Living Arrangement: lives in home with boyfriend, 2 children and boyfriend's 2 children    Occupational History: works part time at [employer] and [employer] works    Functioning Relationships: boyfriend is supportive    Legal History: none  History: None         Family planning    IUD contraception     Social Determinants of Health     Financial Resource Strain: Medium Risk    Difficulty of Paying Living Expenses: Somewhat hard   Food Insecurity: Food Insecurity Present    Worried About Running Out of Food in the Last Year: Sometimes true    Naina of Food in the Last Year: Sometimes true   Transportation Needs: Unmet Transportation Needs    Lack of Transportation (Medical):  Yes    Lack of Transportation (Non-Medical): Yes   Physical Activity:     Days of Exercise per Week:     Minutes of Exercise per Session:    Stress:     Feeling of Stress :    Social Connections:     Frequency of Communication with Friends and Family:     Frequency of Social Gatherings with Friends and Family:     Attends Rastafari Services:     Active Member of Clubs or Organizations:     Attends Club or Organization Meetings:     Marital Status:    Intimate Partner Violence:     Fear of Current or Ex-Partner:     Emotionally Abused:     Physically Abused:     Sexually Abused:       Family History:     Family History   Problem Relation Age of Onset    Bipolar disorder Mother     Hypertension Mother     Hyperlipidemia Mother     Suicide Attempts Mother     Colon polyps Mother     Factor V Leiden deficiency Mother     Glucose-6-phos deficiency Father     Hyperlipidemia Father     Hypertension Father     No Known Problems Sister     Anxiety disorder Brother     Factor V Leiden deficiency Brother     Breast cancer Maternal Grandmother         dx approx age early 42's    Colon polyps Maternal Grandfather     Crohn's disease Paternal Grandmother     Prostate cancer Paternal Grandfather     Pancreatic cancer Paternal Grandfather     Colon cancer Paternal Aunt     Mental illness Brother     Drug abuse Brother     Breast cancer Maternal Aunt 36    Factor V Leiden deficiency Cousin       Current Medications:     Current Outpatient Medications   Medication Sig Dispense Refill    etonogestrel-ethinyl estradiol (NUVARING) 0 12-0 015 MG/24HR vaginal ring Insert vaginally and leave in place for 3 consecutive weeks, then remove for 1 week  3 each 0    lamoTRIgine (LaMICtal) 25 mg tablet TAKE 1 TABLET DAILY FOR 2 WEEKS, THEN 2 TABLETS EVERY DAY 70 tablet 1    NuvaRing 0 12-0 015 MG/24HR vaginal ring INSERT 1 RING VAGINALLY AS DIRECTED  REMOVE AFTER 3 WEEKS & WAIT 7 DAYS BEFORE INSERTING A NEW RING 3 each 1     No current facility-administered medications for this visit  Allergies: Allergies   Allergen Reactions    Codeine Other (See Comments)     dry heaves    Sulfa Antibiotics Hives and Rash     Per pt as achild    Erythromycin Hives     Per as a child      Physical Exam:     /84 (BP Location: Left arm, Patient Position: Sitting, Cuff Size: Large)   Pulse (!) 109   Temp 99 4 °F (37 4 °C) (Tympanic)   Resp 18   Ht 5' 2" (1 575 m)   Wt 97 4 kg (214 lb 12 8 oz)   LMP 09/08/2021 (Approximate)   SpO2 98%   BMI 39 29 kg/m²     Physical Exam  Vitals and nursing note reviewed  Constitutional:       Appearance: She is obese  HENT:      Head: Normocephalic and atraumatic  Right Ear: Tympanic membrane, ear canal and external ear normal       Left Ear: Tympanic membrane, ear canal and external ear normal       Mouth/Throat:      Mouth: Mucous membranes are moist    Eyes:      Pupils: Pupils are equal, round, and reactive to light  Neck:      Vascular: No carotid bruit  Cardiovascular:      Rate and Rhythm: Normal rate and regular rhythm  Pulses: Normal pulses  Heart sounds: Normal heart sounds  Pulmonary:      Effort: Pulmonary effort is normal       Breath sounds: Normal breath sounds  Abdominal:      General: Bowel sounds are normal       Palpations: There is no mass  Tenderness: There is no guarding  Hernia: No hernia is present  Musculoskeletal:         General: Normal range of motion  Cervical back: Normal range of motion and neck supple  Right lower leg: No edema  Left lower leg: No edema  Lymphadenopathy:      Cervical: No cervical adenopathy  Skin:     General: Skin is warm and dry  Comments: Erythema and dryness left heel   Neurological:      General: No focal deficit present  Mental Status: She is alert and oriented to person, place, and time     Psychiatric:         Mood and Affect: Mood normal          Behavior: Behavior normal           MD [id number]

## 2021-09-29 NOTE — PATIENT INSTRUCTIONS

## 2021-10-11 ENCOUNTER — SOCIAL WORK (OUTPATIENT)
Dept: BEHAVIORAL/MENTAL HEALTH CLINIC | Facility: CLINIC | Age: 36
End: 2021-10-11
Payer: COMMERCIAL

## 2021-10-11 DIAGNOSIS — F41.1 GAD (GENERALIZED ANXIETY DISORDER): Chronic | ICD-10-CM

## 2021-10-11 DIAGNOSIS — F31.5 BIPOLAR I DISORDER, MOST RECENT EPISODE DEPRESSED, SEVERE WITH PSYCHOTIC FEATURES (HCC): Primary | Chronic | ICD-10-CM

## 2021-10-11 DIAGNOSIS — F42.9 OBSESSIVE-COMPULSIVE DISORDER, UNSPECIFIED TYPE: Chronic | ICD-10-CM

## 2021-10-11 PROCEDURE — 90834 PSYTX W PT 45 MINUTES: CPT | Performed by: SOCIAL WORKER

## 2021-10-20 DIAGNOSIS — F31.9 BIPOLAR DEPRESSION (HCC): ICD-10-CM

## 2021-10-20 RX ORDER — LAMOTRIGINE 25 MG/1
TABLET ORAL
Qty: 70 TABLET | Refills: 1 | Status: SHIPPED | OUTPATIENT
Start: 2021-10-20 | End: 2022-01-19

## 2021-11-17 ENCOUNTER — OFFICE VISIT (OUTPATIENT)
Dept: BARIATRICS | Facility: CLINIC | Age: 36
End: 2021-11-17

## 2021-11-17 VITALS — WEIGHT: 216.1 LBS | BODY MASS INDEX: 39.77 KG/M2 | HEIGHT: 62 IN

## 2021-11-17 DIAGNOSIS — E66.9 OBESITY (BMI 30-39.9): ICD-10-CM

## 2021-11-17 DIAGNOSIS — R63.5 ABNORMAL WEIGHT GAIN: ICD-10-CM

## 2021-11-17 PROCEDURE — WMDI30

## 2021-11-17 PROCEDURE — RECHECK

## 2021-11-22 ENCOUNTER — TELEMEDICINE (OUTPATIENT)
Dept: BEHAVIORAL/MENTAL HEALTH CLINIC | Facility: CLINIC | Age: 36
End: 2021-11-22
Payer: COMMERCIAL

## 2021-11-22 DIAGNOSIS — F41.1 GAD (GENERALIZED ANXIETY DISORDER): Chronic | ICD-10-CM

## 2021-11-22 DIAGNOSIS — F31.5 BIPOLAR I DISORDER, MOST RECENT EPISODE DEPRESSED, SEVERE WITH PSYCHOTIC FEATURES (HCC): Primary | Chronic | ICD-10-CM

## 2021-11-22 DIAGNOSIS — F42.9 OBSESSIVE-COMPULSIVE DISORDER, UNSPECIFIED TYPE: Chronic | ICD-10-CM

## 2021-11-22 PROCEDURE — 90834 PSYTX W PT 45 MINUTES: CPT | Performed by: SOCIAL WORKER

## 2021-12-13 ENCOUNTER — SOCIAL WORK (OUTPATIENT)
Dept: BEHAVIORAL/MENTAL HEALTH CLINIC | Facility: CLINIC | Age: 36
End: 2021-12-13
Payer: COMMERCIAL

## 2021-12-13 DIAGNOSIS — F41.1 GAD (GENERALIZED ANXIETY DISORDER): Chronic | ICD-10-CM

## 2021-12-13 DIAGNOSIS — F43.10 POST TRAUMATIC STRESS DISORDER: ICD-10-CM

## 2021-12-13 DIAGNOSIS — F31.5 BIPOLAR I DISORDER, MOST RECENT EPISODE DEPRESSED, SEVERE WITH PSYCHOTIC FEATURES (HCC): Primary | Chronic | ICD-10-CM

## 2021-12-13 PROCEDURE — 90834 PSYTX W PT 45 MINUTES: CPT | Performed by: SOCIAL WORKER

## 2021-12-15 ENCOUNTER — ANNUAL EXAM (OUTPATIENT)
Dept: OBGYN CLINIC | Facility: MEDICAL CENTER | Age: 36
End: 2021-12-15
Payer: COMMERCIAL

## 2021-12-15 VITALS — SYSTOLIC BLOOD PRESSURE: 120 MMHG | DIASTOLIC BLOOD PRESSURE: 80 MMHG | WEIGHT: 216 LBS | BODY MASS INDEX: 40.15 KG/M2

## 2021-12-15 DIAGNOSIS — Z01.419 ENCOUNTER FOR GYNECOLOGICAL EXAMINATION: Primary | ICD-10-CM

## 2021-12-15 DIAGNOSIS — Z30.015 ENCOUNTER FOR INITIAL PRESCRIPTION OF VAGINAL RING HORMONAL CONTRACEPTIVE: ICD-10-CM

## 2021-12-15 DIAGNOSIS — Z80.3 FAMILY HISTORY OF BREAST CANCER: ICD-10-CM

## 2021-12-15 DIAGNOSIS — Z12.31 ENCOUNTER FOR SCREENING MAMMOGRAM FOR MALIGNANT NEOPLASM OF BREAST: ICD-10-CM

## 2021-12-15 PROCEDURE — G0145 SCR C/V CYTO,THINLAYER,RESCR: HCPCS | Performed by: PATHOLOGY

## 2021-12-15 PROCEDURE — G0476 HPV COMBO ASSAY CA SCREEN: HCPCS | Performed by: STUDENT IN AN ORGANIZED HEALTH CARE EDUCATION/TRAINING PROGRAM

## 2021-12-15 PROCEDURE — 99395 PREV VISIT EST AGE 18-39: CPT | Performed by: STUDENT IN AN ORGANIZED HEALTH CARE EDUCATION/TRAINING PROGRAM

## 2021-12-15 PROCEDURE — 0503F POSTPARTUM CARE VISIT: CPT | Performed by: STUDENT IN AN ORGANIZED HEALTH CARE EDUCATION/TRAINING PROGRAM

## 2021-12-15 PROCEDURE — G0124 SCREEN C/V THIN LAYER BY MD: HCPCS | Performed by: PATHOLOGY

## 2021-12-15 RX ORDER — ETONOGESTREL AND ETHINYL ESTRADIOL 11.7; 2.7 MG/1; MG/1
INSERT, EXTENDED RELEASE VAGINAL
Qty: 3 EACH | Refills: 4 | Status: SHIPPED | OUTPATIENT
Start: 2021-12-15 | End: 2022-02-22 | Stop reason: SDUPTHER

## 2021-12-23 ENCOUNTER — TELEPHONE (OUTPATIENT)
Dept: OBGYN CLINIC | Facility: MEDICAL CENTER | Age: 36
End: 2021-12-23

## 2021-12-23 LAB
LAB AP GYN PRIMARY INTERPRETATION: ABNORMAL
Lab: ABNORMAL
PATH INTERP SPEC-IMP: ABNORMAL

## 2021-12-27 ENCOUNTER — SOCIAL WORK (OUTPATIENT)
Dept: BEHAVIORAL/MENTAL HEALTH CLINIC | Facility: CLINIC | Age: 36
End: 2021-12-27
Payer: COMMERCIAL

## 2021-12-27 DIAGNOSIS — F31.5 BIPOLAR I DISORDER, MOST RECENT EPISODE DEPRESSED, SEVERE WITH PSYCHOTIC FEATURES (HCC): Primary | Chronic | ICD-10-CM

## 2021-12-27 DIAGNOSIS — F41.1 GAD (GENERALIZED ANXIETY DISORDER): Chronic | ICD-10-CM

## 2021-12-27 DIAGNOSIS — F42.9 OBSESSIVE-COMPULSIVE DISORDER, UNSPECIFIED TYPE: Chronic | ICD-10-CM

## 2021-12-27 PROCEDURE — 90834 PSYTX W PT 45 MINUTES: CPT | Performed by: SOCIAL WORKER

## 2021-12-30 ENCOUNTER — HOSPITAL ENCOUNTER (EMERGENCY)
Facility: HOSPITAL | Age: 36
Discharge: HOME | End: 2021-12-30
Attending: EMERGENCY MEDICINE | Admitting: EMERGENCY MEDICINE
Payer: COMMERCIAL

## 2021-12-30 VITALS
SYSTOLIC BLOOD PRESSURE: 127 MMHG | DIASTOLIC BLOOD PRESSURE: 60 MMHG | OXYGEN SATURATION: 95 % | BODY MASS INDEX: 39.71 KG/M2 | WEIGHT: 213.63 LBS | TEMPERATURE: 99.3 F | RESPIRATION RATE: 18 BRPM | HEART RATE: 113 BPM

## 2021-12-30 DIAGNOSIS — B34.9 VIRAL SYNDROME: ICD-10-CM

## 2021-12-30 DIAGNOSIS — R05.9 COUGH: Primary | ICD-10-CM

## 2021-12-30 PROCEDURE — 99283 EMERGENCY DEPT VISIT LOW MDM: CPT

## 2021-12-30 PROCEDURE — 99284 EMERGENCY DEPT VISIT MOD MDM: CPT | Performed by: PHYSICIAN ASSISTANT

## 2021-12-30 PROCEDURE — 87636 SARSCOV2 & INF A&B AMP PRB: CPT | Performed by: PHYSICIAN ASSISTANT

## 2021-12-30 RX ORDER — ACETAMINOPHEN 500 MG
500 TABLET ORAL EVERY 6 HOURS PRN
Qty: 30 TABLET | Refills: 0 | Status: SHIPPED | OUTPATIENT
Start: 2021-12-30 | End: 2022-01-12 | Stop reason: ALTCHOICE

## 2021-12-30 RX ORDER — BENZONATATE 100 MG/1
100 CAPSULE ORAL 3 TIMES DAILY PRN
Qty: 21 CAPSULE | Refills: 0 | Status: SHIPPED | OUTPATIENT
Start: 2021-12-30 | End: 2022-01-12 | Stop reason: ALTCHOICE

## 2021-12-30 RX ORDER — IBUPROFEN 600 MG/1
600 TABLET ORAL EVERY 6 HOURS PRN
Qty: 30 TABLET | Refills: 0 | Status: SHIPPED | OUTPATIENT
Start: 2021-12-30

## 2021-12-30 NOTE — DISCHARGE INSTRUCTIONS
Please refer to the attached information for strict return instructions  If symptoms worsen or new symptoms develop please return to the ER  If you do not have the [employer] hector for you phone, download this to receive the result of your COVID test  Instructions for downloading this hector are included in these discharge notes and a code is supplied to set up an account  Due to the high numbers being tested, there can be a significant delay in the time to test result  Do not call in as this will not speed up the process and is disruptive to patient care  You will be alerted when the test result is available  Tell you employer/school you are waiting on COVID testing and can not return until this has resulted  You have been provided with a note for work/school that refers to the test result on [employer], should this be positive you can return to work/school on the date provided in the note  Si no tiene la [employer] para beckham teléfono, descárguela para recibir el resultado de beckham prueba COVID  Las instrucciones para descargar esta aplicación se incluyen en estas notas de linda y se proporciona un código para configurar jesus Vivek Parcel  Debido a la gran cantidad de pruebas que se están realizando, puede brie jesus demora significativa en el tiempo para probar el resultado  No llame, ya que esto no acelerará el proceso y perjudicará la atención del [place]  Se le avisará cuando el resultado de la prueba esté disponible  Dígale a beckham empleador / [employer] que está esperando la prueba COVID y que no puede regresar hasta que esto haya resultado  Se le ha proporcionado jesus nota para el trabajo o la escuela que se refiere al resultado de la prueba en Mychart; si es positivo, puede regresar al [place] o la escuela en la fecha indicada en la nota

## 2022-01-02 NOTE — ED PROVIDER NOTES
HPI: Patient is a 29 y o  female who presents with 1-2 weeks of chills, cough, sore throat, fatigue and myalgias which the patient describes at moderate The patient has had contact with people with similar symptoms  The patient taken OTC medication with relief of symptoms  Allergies   Allergen Reactions    Codeine Other (See Comments)     dry heaves    Sulfa Antibiotics Hives and Rash     Per pt as achild    Erythromycin Hives     Per as a child       Past Medical History:   Diagnosis Date    Abnormal Pap smear of cervix     Eczema     Exposure to chlamydia     Resolved 17    Migraine without aura and without status migrainosus, not intractable 10/11/2019    Obesity     Post traumatic stress disorder 2021    Postpartum depression 2018    Manoj-Parkinson-White (WPW) syndrome       Past Surgical History:   Procedure Laterality Date    ABLATION OF DYSRHYTHMIC FOCUS      WPW ablation age 15    COLONOSCOPY      COLPOSCOPY      INDUCED       OTHER SURGICAL HISTORY      catheter ablation- WPW age 13     Social History     Tobacco Use    Smoking status: Never Smoker    Smokeless tobacco: Never Used   Vaping Use    Vaping Use: Never used   Substance Use Topics    Alcohol use: Yes    Drug use: No       Nursing notes reviewed  Physical Exam:  ED Triage Vitals [21 1152]   Temperature Pulse Respirations Blood Pressure SpO2   99 3 °F (37 4 °C) (!) 113 18 127/60 95 %      Temp Source Heart Rate Source Patient Position - Orthostatic VS BP Location FiO2 (%)   Oral Monitor Sitting Right arm --      Pain Score       --           ROS: Positive for cough, congestion, sore throat, fatigue, myalgias, the remainder of a 10 organ system ROS was otherwise unremarkable   Particularly, she denies chest pain or palpitations    General: awake, alert, no acute distress  Head: normocephalic, atraumatic  Eyes: no scleral icterus  Ears: external ears normal, hearing grossly intact  Nose: external exam grossly normal, negative nasal discharge  Neck: symmetric, No JVD noted, trachea midline  Pulmonary: no respiratory distress, no tachypnea noted  Lungs CTAB  Cardiovascular: appears well perfused  RRR, no M/R/G  Abdomen: no distention noted  Musculoskeletal: no deformities noted, tone normal  Neuro: grossly non-focal  Psych: mood and affect appropriate    The patient is stable and has a history and physical exam consistent with a viral illness  COVID19 testing has been performed  I considered the patient's other medical conditions as applicable/noted above in my medical decision making  The patient is stable upon discharge  The plan is for supportive care at home  The patient (and any family present) verbalized understanding of the discharge instructions and warnings that would necessitate return to the Emergency Department  All questions were answered prior to discharge  Medications - No data to display  Final diagnoses:   Cough   Viral syndrome     Time reflects when diagnosis was documented in both MDM as applicable and the Disposition within this note     Time User Action Codes Description Comment    12/30/2021  1:39 PM Angelique Sloan Add [R05 9] Cough     12/30/2021  1:39 PM Angelique Sloan Add [B34 9] Viral syndrome       ED Disposition     ED Disposition Condition Date/Time Comment    Discharge Stable Thu Dec 30, 2021  1:39 PM Madison Calvert discharge to home/self care              Follow-up Information     Follow up With Specialties Details Why Contact Info Additional Information    Kyra Lester MD [facility] Medicine Call   [address]  [address] [facility] [address] [address] [address] [phone]       [facility] Emergency Department Emergency Medicine  If symptoms worsen [facility] [address]  [address] [facility] Emergency Department, [address] [place] [address], [id number]        Discharge Medication List as of 12/30/2021  1:43 PM      START taking these medications    Details   acetaminophen (TYLENOL) 500 mg tablet Take 1 tablet (500 mg total) by mouth every 6 (six) hours as needed for fever, Starting Thu 12/30/2021, Normal      benzonatate (TESSALON PERLES) 100 mg capsule Take 1 capsule (100 mg total) by mouth 3 (three) times a day as needed for cough, Starting Thu 12/30/2021, Normal      guaiFENesin (ROBITUSSIN) 100 MG/5ML oral liquid Take 5 mL (100 mg total) by mouth every 4 (four) hours as needed for cough or congestion, Starting Thu 12/30/2021, Normal      ibuprofen (MOTRIN) 600 mg tablet Take 1 tablet (600 mg total) by mouth every 6 (six) hours as needed for mild pain, moderate pain or fever, Starting Thu 12/30/2021, Normal         CONTINUE these medications which have NOT CHANGED    Details   etonogestrel-ethinyl estradiol (NUVARING) 0 12-0 015 MG/24HR vaginal ring Insert vaginally and leave in place for 3 consecutive weeks, then remove for 1 week , Normal      hydrocortisone 2 5 % cream Apply topically 2 (two) times a day, Starting Wed 9/29/2021, Normal      lamoTRIgine (LaMICtal) 25 mg tablet TAKE 1 TABLET DAILY FOR 2 WEEKS, THEN 2 TABLETS EVERY DAY, Normal      naproxen (NAPROSYN) 500 mg tablet Take 1 tablet (500 mg total) by mouth 2 (two) times a day with meals, Starting Wed 9/29/2021, Normal           No discharge procedures on file      Electronically Signed by       Mya Conner PA-C  01/02/22 [id number]

## 2022-01-04 ENCOUNTER — TELEMEDICINE (OUTPATIENT)
Dept: FAMILY MEDICINE CLINIC | Facility: CLINIC | Age: 37
End: 2022-01-04
Payer: COMMERCIAL

## 2022-01-04 DIAGNOSIS — B34.9 VIRAL SYNDROME: Primary | ICD-10-CM

## 2022-01-04 PROCEDURE — G2012 BRIEF CHECK IN BY MD/QHP: HCPCS | Performed by: INTERNAL MEDICINE

## 2022-01-05 ENCOUNTER — TELEMEDICINE (OUTPATIENT)
Dept: FAMILY MEDICINE CLINIC | Facility: CLINIC | Age: 37
End: 2022-01-05
Payer: COMMERCIAL

## 2022-01-05 DIAGNOSIS — B34.9 VIRAL SYNDROME: ICD-10-CM

## 2022-01-05 DIAGNOSIS — R11.0 NAUSEA: Primary | ICD-10-CM

## 2022-01-05 LAB
FLUAV RNA RESP QL NAA+PROBE: NEGATIVE
FLUBV RNA RESP QL NAA+PROBE: NEGATIVE
SARS-COV-2 RNA RESP QL NAA+PROBE: POSITIVE

## 2022-01-05 PROCEDURE — G2012 BRIEF CHECK IN BY MD/QHP: HCPCS | Performed by: INTERNAL MEDICINE

## 2022-01-05 RX ORDER — ONDANSETRON 4 MG/1
4 TABLET, FILM COATED ORAL EVERY 8 HOURS PRN
Qty: 30 TABLET | Refills: 0 | Status: SHIPPED | OUTPATIENT
Start: 2022-01-05 | End: 2022-01-12 | Stop reason: ALTCHOICE

## 2022-01-05 NOTE — PROGRESS NOTES
COVID-19 Outpatient Progress Note    Assessment/Plan:    Problem List Items Addressed This Visit     None      Visit Diagnoses     Nausea    -  Primary    Relevant Medications    ondansetron (ZOFRAN) 4 mg tablet    Viral syndrome             Disposition:     Patient improving in terms of shortness of breath slightly  She still complains of a lot of nausea  Her COVID-19 test is still pending but most likely will be positive  She checked her saturation at the moment of encounter and it was 97%  She said that she woke up in the morning and she took it and was 91% min then shortly it went up to 95%  Continue with supportive measures, will use Zofran for nausea  She will let me know immediately if any worsening of her symptoms  I have spent 7 minutes directly with the patient  Encounter provider Radha Coffey MD    Provider located at [address] [address] [phone]    Recent Visits  Date Type Provider Dept   01/04/22 Aries Alvarez MD [place] [address] Primary Care   Showing recent visits within past 7 days and meeting all other requirements  Future Appointments  No visits were found meeting these conditions  Showing future appointments within next 150 days and meeting all other requirements     This virtual check-in was done via telephone and she agrees to proceed  Patient agrees to participate in a virtual check in via telephone or video visit instead of presenting to the office to address urgent/immediate medical needs  Patient is aware this is a billable service  After connecting through Telephone, the patient was identified by name and date of birth  Leticia Caraballo was informed that this was a telemedicine visit and that the exam was being conducted confidentially over secure lines  My office door was closed   Leticia Caraballo acknowledged consent and understanding of privacy and security of the telemedicine visit  I informed the patient that I have reviewed her record in Epic and presented the opportunity for her to ask any questions regarding the visit today  The patient agreed to participate  Verification of patient location:  Patient is located in the following state in which I hold an active license: [place]    Subjective:   Estefany Fernandez is a 29 y o  female who has been screened for COVID-19  Patient's symptoms include fever, nasal congestion, cough, shortness of breath and nausea  Patient denies chills, sore throat, vomiting, diarrhea and myalgias  COVID-19 vaccination status: Not vaccinated    Lab Results   Component Value Date    SARSCOV2 Negative 2021    SARSCOV2 Not Detected 2020    [address] [facility] Not Detected 2021     Past Medical History:   Diagnosis Date    Abnormal Pap smear of cervix     Eczema     Exposure to chlamydia     Resolved 17    Migraine without aura and without status migrainosus, not intractable 10/11/2019    Obesity     Post traumatic stress disorder 2021    Postpartum depression 2018    Manoj-Parkinson-White (WPW) syndrome      Past Surgical History:   Procedure Laterality Date    ABLATION OF DYSRHYTHMIC FOCUS      WPW ablation age 15    COLONOSCOPY      COLPOSCOPY      INDUCED       OTHER SURGICAL HISTORY      catheter ablation- WPW age 13     Current Outpatient Medications   Medication Sig Dispense Refill    acetaminophen (TYLENOL) 500 mg tablet Take 1 tablet (500 mg total) by mouth every 6 (six) hours as needed for fever 30 tablet 0    benzonatate (TESSALON PERLES) 100 mg capsule Take 1 capsule (100 mg total) by mouth 3 (three) times a day as needed for cough 21 capsule 0    etonogestrel-ethinyl estradiol (NUVARING) 0 12-0 015 MG/24HR vaginal ring Insert vaginally and leave in place for 3 consecutive weeks, then remove for 1 week   3 each 4    guaiFENesin (ROBITUSSIN) 100 MG/5ML oral liquid Take 5 mL (100 mg total) by mouth every 4 (four) hours as needed for cough or congestion 236 mL 0    hydrocortisone 2 5 % cream Apply topically 2 (two) times a day (Patient not taking: Reported on 12/15/2021 ) 30 g 0    ibuprofen (MOTRIN) 600 mg tablet Take 1 tablet (600 mg total) by mouth every 6 (six) hours as needed for mild pain, moderate pain or fever 30 tablet 0    lamoTRIgine (LaMICtal) 25 mg tablet TAKE 1 TABLET DAILY FOR 2 WEEKS, THEN 2 TABLETS EVERY DAY 70 tablet 1    naproxen (NAPROSYN) 500 mg tablet Take 1 tablet (500 mg total) by mouth 2 (two) times a day with meals 30 tablet 0    ondansetron (ZOFRAN) 4 mg tablet Take 1 tablet (4 mg total) by mouth every 8 (eight) hours as needed for nausea or vomiting 30 tablet 0     No current facility-administered medications for this visit  Allergies   Allergen Reactions    Codeine Other (See Comments)     dry heaves    Sulfa Antibiotics Hives and Rash     Per pt as achild    Erythromycin Hives     Per as a child       Review of Systems   Constitutional: Positive for fever  Negative for chills  HENT: Positive for congestion  Negative for sore throat  Respiratory: Positive for cough and shortness of breath  Negative for wheezing  Gastrointestinal: Positive for nausea  Negative for diarrhea and vomiting  Musculoskeletal: Negative for arthralgias and myalgias  Psychiatric/Behavioral: Negative for confusion  Objective: There were no vitals filed for this visit  Physical Exam  Constitutional:       Comments: Can speak full sentences  VIRTUAL VISIT DISCLAIMER    Ashwinjorden Courtney verbally agrees to participate in [employer]   Pt is aware that [employer] could be limited without vital signs or the ability to perform a full hands-on physical exam  Libertad Cota understands she or the provider may request at any time to terminate the video visit and request the patient to seek care or treatment in person

## 2022-01-05 NOTE — PROGRESS NOTES
COVID-19 Outpatient Progress Note    Assessment/Plan:    Problem List Items Addressed This Visit     None      Visit Diagnoses     Viral syndrome    -  Primary         Disposition:     COVID-19 test is pending  Her family is sick with that  Most likely it will be positive  She reports shortness of breath and cough  She is not vaccinated  She was instructed to check her saturation and let me know tomorrow  She was instructed to go to emergency room with saturation less than 94%  Will follow-up with her tomorrow  I have spent 10 minutes directly with the patient  Encounter provider Buddy Leahy MD    Provider located at [address] [address] [phone]    Recent Visits  Date Type Provider Dept   01/04/22 Hilton Bae MD [place] [address] Primary Care   Showing recent visits within past 7 days and meeting all other requirements  Future Appointments  No visits were found meeting these conditions  Showing future appointments within next 150 days and meeting all other requirements     This virtual check-in was done via telephone and she agrees to proceed  Patient agrees to participate in a virtual check in via telephone or video visit instead of presenting to the office to address urgent/immediate medical needs  Patient is aware this is a billable service  After connecting through Telephone, the patient was identified by name and date of birth  Zoie Tracy was informed that this was a telemedicine visit and that the exam was being conducted confidentially over secure lines  My office door was closed  Keimarley Rossana acknowledged consent and understanding of privacy and security of the telemedicine visit  I informed the patient that I have reviewed her record in Epic and presented the opportunity for her to ask any questions regarding the visit today   The patient agreed to participate  Verification of patient location:  Patient is located in the following state in which I hold an active license: [place]    Subjective:   Jakie Cogan is a 29 y o  female who is concerned about COVID-19  Patient's symptoms include fever, nasal congestion, cough and shortness of breath  Patient denies chills, sore throat, nausea, vomiting, diarrhea and myalgias  COVID-19 vaccination status: Not vaccinated    Lab Results   Component Value Date    SARSCOV2 Negative 2021    SARSCOV2 Not Detected 2020    [address] [facility] Not Detected 2021     Past Medical History:   Diagnosis Date    Abnormal Pap smear of cervix     Eczema     Exposure to chlamydia     Resolved 17    Migraine without aura and without status migrainosus, not intractable 10/11/2019    Obesity     Post traumatic stress disorder 2021    Postpartum depression 2018    Manoj-Parkinson-White (WPW) syndrome      Past Surgical History:   Procedure Laterality Date    ABLATION OF DYSRHYTHMIC FOCUS      WPW ablation age 15    COLONOSCOPY      COLPOSCOPY      INDUCED       OTHER SURGICAL HISTORY      catheter ablation- WPW age 13     Current Outpatient Medications   Medication Sig Dispense Refill    acetaminophen (TYLENOL) 500 mg tablet Take 1 tablet (500 mg total) by mouth every 6 (six) hours as needed for fever 30 tablet 0    benzonatate (TESSALON PERLES) 100 mg capsule Take 1 capsule (100 mg total) by mouth 3 (three) times a day as needed for cough 21 capsule 0    etonogestrel-ethinyl estradiol (NUVARING) 0 12-0 015 MG/24HR vaginal ring Insert vaginally and leave in place for 3 consecutive weeks, then remove for 1 week   3 each 4    guaiFENesin (ROBITUSSIN) 100 MG/5ML oral liquid Take 5 mL (100 mg total) by mouth every 4 (four) hours as needed for cough or congestion 236 mL 0    hydrocortisone 2 5 % cream Apply topically 2 (two) times a day (Patient not taking: Reported on 12/15/2021 ) 30 g 0    ibuprofen (MOTRIN) 600 mg tablet Take 1 tablet (600 mg total) by mouth every 6 (six) hours as needed for mild pain, moderate pain or fever 30 tablet 0    lamoTRIgine (LaMICtal) 25 mg tablet TAKE 1 TABLET DAILY FOR 2 WEEKS, THEN 2 TABLETS EVERY DAY 70 tablet 1    naproxen (NAPROSYN) 500 mg tablet Take 1 tablet (500 mg total) by mouth 2 (two) times a day with meals 30 tablet 0     No current facility-administered medications for this visit  Allergies   Allergen Reactions    Codeine Other (See Comments)     dry heaves    Sulfa Antibiotics Hives and Rash     Per pt as achild    Erythromycin Hives     Per as a child       Review of Systems   Constitutional: Positive for fever  Negative for chills  HENT: Positive for congestion  Negative for sore throat  Respiratory: Positive for cough and shortness of breath  Negative for wheezing  Gastrointestinal: Negative for diarrhea, nausea and vomiting  Musculoskeletal: Negative for arthralgias and myalgias  Psychiatric/Behavioral: Negative for confusion  Objective: There were no vitals filed for this visit  Physical Exam    VIRTUAL VISIT DISCLAIMER    Sarah Valdez verbally agrees to participate in [employer]  Pt is aware that [employer] could be limited without vital signs or the ability to perform a full hands-on physical exam  Libertad Cota understands she or the provider may request at any time to terminate the video visit and request the patient to seek care or treatment in person

## 2022-01-12 ENCOUNTER — TELEMEDICINE (OUTPATIENT)
Dept: FAMILY MEDICINE CLINIC | Facility: CLINIC | Age: 37
End: 2022-01-12
Payer: COMMERCIAL

## 2022-01-12 DIAGNOSIS — U07.1 COVID-19: Primary | ICD-10-CM

## 2022-01-12 DIAGNOSIS — M79.10 MYALGIA: ICD-10-CM

## 2022-01-12 PROCEDURE — 99214 OFFICE O/P EST MOD 30 MIN: CPT | Performed by: FAMILY MEDICINE

## 2022-01-12 RX ORDER — GABAPENTIN 100 MG/1
CAPSULE ORAL
Qty: 90 CAPSULE | Refills: 1 | Status: SHIPPED | OUTPATIENT
Start: 2022-01-12 | End: 2022-03-18

## 2022-01-12 NOTE — PROGRESS NOTES
COVID-19 Outpatient Progress Note    Assessment/Plan:    Problem List Items Addressed This Visit        Other    COVID-19 - Primary     The patient is having some persistent myalgias symptoms especially at night ever since COVID  Myalgias are certainly a very common part of this illness  She is on vaccinated and I am not surprised she is having more prolonged symptoms  We are going to try gabapentin as outlined  She can take 1 tablet at bedtime and increase to 3 tablets at bedtime as needed  Contact me if symptoms are getting worse or not improving  Relevant Medications    gabapentin (Neurontin) 100 mg capsule      Other Visit Diagnoses     Myalgia        Relevant Medications    gabapentin (Neurontin) 100 mg capsule         Disposition:     I have spent 15 minutes directly with the patient  Greater than 50% of this time was spent in counseling/coordination of care regarding: impressions  Encounter provider Kelli Sicard, MD    Provider located at [address] [id number] [phone]    Recent Visits  Date Type Provider Dept   01/05/22 Nehemias Ma MD [place] [address] Primary Care   Showing recent visits within past 7 days and meeting all other requirements  Today's Visits  Date Type Provider Dept   01/12/22 Telemedicine Kelli Sicard , MD [facility] Primary Care   Showing today's visits and meeting all other requirements  Future Appointments  No visits were found meeting these conditions  Showing future appointments within next 150 days and meeting all other requirements     This virtual check-in was done via [employer] and patient was informed that this is a secure, HIPAA-compliant platform  She agrees to proceed      Patient agrees to participate in a virtual check in via telephone or video visit instead of presenting to the office to address urgent/immediate medical needs  Patient is aware this is a billable service  After connecting through [facility], the patient was identified by name and date of birth  Clara Carrion was informed that this was a telemedicine visit and that the exam was being conducted confidentially over secure lines  My office door was closed  No one else was in the room  Clara Carrion acknowledged consent and understanding of privacy and security of the telemedicine visit  I informed the patient that I have reviewed her record in Epic and presented the opportunity for her to ask any questions regarding the visit today  The patient agreed to participate  Verification of patient location:  Patient is located in the following state in which I hold an active license: [place]    Subjective:   Clara Carrion is a 29 y o  female who has been screened for COVID-19  Patient's symptoms include fatigue (mild), loss of taste, cough (minimal), chest tightness (in cold weather) and myalgias  Patient denies fever, chills, congestion, rhinorrhea, sore throat, anosmia, shortness of breath, abdominal pain, nausea, vomiting, diarrhea and headaches  - Date of exposure: 12/23/2021  - Date of positive COVID-19 PCR: 12/30/2021  Type of test: PCR    COVID-19 vaccination status: Not vaccinated    The patient was concerned today regarding on ongoing muscle aches ever since having COVID  She has a lot of muscle aches especially in her legs  She denies any recent injury  She had a normal CBC back in August and has had no significant excessive menstrual bleeding or other risk factors for iron deficiency      Lab Results   Component Value Date    SARSCOV2 Positive (A) 12/30/2021    [facility]ers Not Detected 04/20/2020    [address] [facility] Not Detected 09/14/2021     Past Medical History:   Diagnosis Date    Abnormal Pap smear of cervix     Eczema     Exposure to chlamydia     Resolved 06/09/17    Migraine without aura and without status migrainosus, not intractable 10/11/2019    Obesity     Post traumatic stress disorder 2021    Postpartum depression 2018    Manoj-Parkinson-White (WPW) syndrome      Past Surgical History:   Procedure Laterality Date    ABLATION OF DYSRHYTHMIC FOCUS      WPW ablation age 15    COLONOSCOPY      COLPOSCOPY      INDUCED       OTHER SURGICAL HISTORY      catheter ablation- WPW age 13     Current Outpatient Medications   Medication Sig Dispense Refill    etonogestrel-ethinyl estradiol (NUVARING) 0 12-0 015 MG/24HR vaginal ring Insert vaginally and leave in place for 3 consecutive weeks, then remove for 1 week  3 each 4    gabapentin (Neurontin) 100 mg capsule Take up to 3 tablets as needed at bedtime for muscle aches 90 capsule 1    ibuprofen (MOTRIN) 600 mg tablet Take 1 tablet (600 mg total) by mouth every 6 (six) hours as needed for mild pain, moderate pain or fever 30 tablet 0    lamoTRIgine (LaMICtal) 25 mg tablet TAKE 1 TABLET DAILY FOR 2 WEEKS, THEN 2 TABLETS EVERY DAY 70 tablet 1    naproxen (NAPROSYN) 500 mg tablet Take 1 tablet (500 mg total) by mouth 2 (two) times a day with meals 30 tablet 0     No current facility-administered medications for this visit  Allergies   Allergen Reactions    Codeine Other (See Comments)     dry heaves    Sulfa Antibiotics Hives and Rash     Per pt as achild    Erythromycin Hives     Per as a child       Review of Systems   Constitutional: Positive for fatigue (mild)  Negative for chills and fever  HENT: Negative for congestion, rhinorrhea and sore throat  Respiratory: Positive for cough (minimal) and chest tightness (in cold weather)  Negative for shortness of breath  Gastrointestinal: Negative for abdominal pain, diarrhea, nausea and vomiting  Musculoskeletal: Positive for myalgias  Neurological: Negative for headaches  Objective: There were no vitals filed for this visit      Physical Exam  Constitutional:       General: She is not in acute distress  Appearance: She is well-developed  She is not ill-appearing  HENT:      Head: Normocephalic  Eyes:      General:         Right eye: No discharge  Conjunctiva/sclera: Conjunctivae normal    Pulmonary:      Effort: Pulmonary effort is normal  No respiratory distress  Skin:     Findings: No rash  Neurological:      Mental Status: She is alert  VIRTUAL VISIT DISCLAIMER    Charissa Farmer verbally agrees to participate in [employer]  Pt is aware that [employer] could be limited without vital signs or the ability to perform a full hands-on physical exam  Libertad Strauss understands she or the provider may request at any time to terminate the video visit and request the patient to seek care or treatment in person

## 2022-01-12 NOTE — ASSESSMENT & PLAN NOTE
The patient is having some persistent myalgias symptoms especially at night ever since COVID  Myalgias are certainly a very common part of this illness  She is on vaccinated and I am not surprised she is having more prolonged symptoms  We are going to try gabapentin as outlined  She can take 1 tablet at bedtime and increase to 3 tablets at bedtime as needed  Contact me if symptoms are getting worse or not improving

## 2022-01-13 ENCOUNTER — TELEMEDICINE (OUTPATIENT)
Dept: BEHAVIORAL/MENTAL HEALTH CLINIC | Facility: CLINIC | Age: 37
End: 2022-01-13
Payer: COMMERCIAL

## 2022-01-13 DIAGNOSIS — F41.1 GAD (GENERALIZED ANXIETY DISORDER): Chronic | ICD-10-CM

## 2022-01-13 DIAGNOSIS — F31.5 BIPOLAR I DISORDER, MOST RECENT EPISODE DEPRESSED, SEVERE WITH PSYCHOTIC FEATURES (HCC): Primary | Chronic | ICD-10-CM

## 2022-01-13 PROCEDURE — 90834 PSYTX W PT 45 MINUTES: CPT | Performed by: SOCIAL WORKER

## 2022-01-19 DIAGNOSIS — F31.9 BIPOLAR DEPRESSION (HCC): ICD-10-CM

## 2022-01-19 RX ORDER — LAMOTRIGINE 25 MG/1
TABLET ORAL
Qty: 70 TABLET | Refills: 1 | Status: SHIPPED | OUTPATIENT
Start: 2022-01-19 | End: 2022-02-07 | Stop reason: DRUGHIGH

## 2022-01-28 ENCOUNTER — TELEMEDICINE (OUTPATIENT)
Dept: BEHAVIORAL/MENTAL HEALTH CLINIC | Facility: CLINIC | Age: 37
End: 2022-01-28
Payer: COMMERCIAL

## 2022-01-28 DIAGNOSIS — F41.1 GAD (GENERALIZED ANXIETY DISORDER): Chronic | ICD-10-CM

## 2022-01-28 DIAGNOSIS — F31.5 BIPOLAR I DISORDER, MOST RECENT EPISODE DEPRESSED, SEVERE WITH PSYCHOTIC FEATURES (HCC): Primary | Chronic | ICD-10-CM

## 2022-01-28 DIAGNOSIS — F42.9 OBSESSIVE-COMPULSIVE DISORDER, UNSPECIFIED TYPE: Chronic | ICD-10-CM

## 2022-01-28 PROCEDURE — 90834 PSYTX W PT 45 MINUTES: CPT | Performed by: SOCIAL WORKER

## 2022-01-28 NOTE — PSYCH
Virtual Regular Visit    Verification of patient location:    Patient is located in the following state in which I hold an active license [place]    Assessment/Plan:    Problem List Items Addressed This Visit        Other    Bipolar I disorder, most recent episode depressed, severe with psychotic features ([facility] [address] ) - Primary (Chronic)    SHELLY (generalized anxiety disorder) (Chronic)    Obsessive-compulsive disorder, unspecified (Chronic)        Goals addressed in session: Goal 1      Reason for visit is No chief complaint on file  Encounter provider CHARLIE Ovalles    Provider located at [address] [place] [id number] [phone]    Recent Visits  No visits were found meeting these conditions  Showing recent visits within past 7 days and meeting all other requirements  Future Appointments  No visits were found meeting these conditions  Showing future appointments within next 150 days and meeting all other requirements     The patient was identified by name and date of birth  Charissa Farmer was informed that this is a telemedicine visit and that the visit is being conducted throughEpic Embedded and patient was informed this is a secure, HIPAA-complaint platform  She agrees to proceed     My office door was closed  No one else was in the room  She acknowledged consent and understanding of privacy and security of the video platform  The patient has agreed to participate and understands they can discontinue the visit at any time  Patient is aware this is a billable service  Subjective  Charissa Farmer is a 29 y o  female  DATA: Met with Dale Orr for scheduled individual session  Topics of discussion included family stressors, marital stress, relationships with family and parenting concerns   "I'm having a lot of anxiety right now " Dale Orr states that she is concerned about her son's health, and she has an appointment to take him to later today  She said that he is having episodes of being light-headed, and his heart is racing, etc  She states that he has been having these episodes periodically  She states that there was one episode in the past few months when [facility] was tachycardic, and she took him to the emergency room  She states that his lab work and his EKG were both abnormal  Lan Walker discussed her relationship with her SO  She states that she continues to struggle with his expectation that she will provide care to all of his children and that he gets upset when she tries to set limits and boundaries with them  She states that she feels that, if she is going to be a caregiver for them, she should be able to make decisions regarding discipline and setting limits regarding their leisure activities and their need to complete household chores/tasks  Lan Walker will speak with Naty Wei again regarding the possibility of coming into a session with her  Client shows evidence of utilizing emotion regulation skills and distress tolerance skills skills to manage mental health symptoms  During this session, this clinician used the following therapeutic modalities: supportive psychotherapy, client-centered therapy, mindfulness-based strategies, DBT-informed skills, Motivational Interviewing and solution-focused therapy  ASSESSMENT: Lan Walker presents with a dysthymic mood  Her affect is normal range and intensity, appropriate  Lan Walker exhibits good therapeutic rapport with this clinician  Lan Walker continues to exhibit willingness to work on treatment goals and objectives  Lan Walker presents with a minimal risk of suicide, minimal risk of self-harm, and minimal risk of harm to others  PLAN: Lan Walker will return in one week for the next scheduled session  Between sessions, Lan Walker will speak with her SO regarding the possibility of having a couple's session   She will use her mindfulness-based strategies to continue to manage her moods and deal with stress  She will report back during the next session re: successes and barriers  At the next session, this clinician will use supportive psychotherapy, client-centered therapy, mindfulness-based strategies, DBT-informed skills, Motivational Interviewing and solution-focused therapy to address her mood regulation and relationship concerns, in an effort to assist Claudell Loyal with meeting treatment goals  HPI     Past Medical History:   Diagnosis Date    Abnormal Pap smear of cervix     Eczema     Exposure to chlamydia     Resolved 17    Migraine without aura and without status migrainosus, not intractable 10/11/2019    Obesity     Post traumatic stress disorder 2021    Postpartum depression 2018    Manoj-Parkinson-White (WPW) syndrome        Past Surgical History:   Procedure Laterality Date    ABLATION OF DYSRHYTHMIC FOCUS      WPW ablation age 15    COLONOSCOPY      COLPOSCOPY      INDUCED       OTHER SURGICAL HISTORY      catheter ablation- WPW age 13       Current Outpatient Medications   Medication Sig Dispense Refill    etonogestrel-ethinyl estradiol (NUVARING) 0 12-0 015 MG/24HR vaginal ring Insert vaginally and leave in place for 3 consecutive weeks, then remove for 1 week  3 each 4    gabapentin (Neurontin) 100 mg capsule Take up to 3 tablets as needed at bedtime for muscle aches 90 capsule 1    ibuprofen (MOTRIN) 600 mg tablet Take 1 tablet (600 mg total) by mouth every 6 (six) hours as needed for mild pain, moderate pain or fever 30 tablet 0    lamoTRIgine (LaMICtal) 25 mg tablet TAKE 1 TABLET DAILY FOR 2 WEEKS, THEN 2 TABLETS EVERY DAY 70 tablet 1    naproxen (NAPROSYN) 500 mg tablet Take 1 tablet (500 mg total) by mouth 2 (two) times a day with meals 30 tablet 0     No current facility-administered medications for this visit          Allergies   Allergen Reactions    Codeine Other (See Comments)     dry heaves    Sulfa Antibiotics Hives and Rash     Per pt as achild    Erythromycin Hives     Per as a child       Review of Systems    Video Exam    There were no vitals filed for this visit  Physical Exam     I spent 45 minutes directly with the patient during this visit    VIRTUAL VISIT DISCLAIMER    Sofie Martinez verbally agrees to participate in [facility]  Pt is aware that [facility] could be limited without vital signs or the ability to perform a full hands-on physical exam  Libertad Jackson understands she or the provider may request at any time to terminate the video visit and request the patient to seek care or treatment in person

## 2022-02-02 ENCOUNTER — SOCIAL WORK (OUTPATIENT)
Dept: BEHAVIORAL/MENTAL HEALTH CLINIC | Facility: CLINIC | Age: 37
End: 2022-02-02
Payer: COMMERCIAL

## 2022-02-02 DIAGNOSIS — F31.5 BIPOLAR I DISORDER, MOST RECENT EPISODE DEPRESSED, SEVERE WITH PSYCHOTIC FEATURES (HCC): Primary | Chronic | ICD-10-CM

## 2022-02-02 DIAGNOSIS — F41.1 GAD (GENERALIZED ANXIETY DISORDER): Chronic | ICD-10-CM

## 2022-02-02 DIAGNOSIS — F42.9 OBSESSIVE-COMPULSIVE DISORDER, UNSPECIFIED TYPE: Chronic | ICD-10-CM

## 2022-02-02 PROCEDURE — 90834 PSYTX W PT 45 MINUTES: CPT | Performed by: SOCIAL WORKER

## 2022-02-05 NOTE — PSYCH
Psychotherapy Provided: Individual Psychotherapy 45 minutes     Length of time in session: 45 minutes, follow up in 2 weeks    Encounter Diagnosis     ICD-10-CM    1  Bipolar I disorder, most recent episode depressed, severe with psychotic features ([facility] )  F31 5    2  SHELLY (generalized anxiety disorder)  F41 1    3  Obsessive-compulsive disorder, unspecified type  F42 9        Goals addressed in session: Goal 1     Pain:      none    Current suicide risk : Low     DATA: Met with Calixto Ro for scheduled individual session  Topics of discussion included family stressors, marital stress and parenting concerns  Calixto Ro discussed her frustration with her SO and with his ex  She states that she and Sheila Child have had arguments regarding the care of his children and Libertad's interactions with them  Calixto Ro states that she is angry that she and Sheila Child have purchased clothing for the children, which then goes to their mother's house, and she later sends the children to Calixto Ro and Sheila Child in clothing that is ill-fitting and inappropriate for the weather or for school  Calixto Ro states that she is trying to work this out with her SO, so she does not need to be in the middle of these discussions  We discussed building her limit-setting skills  Calixto Ro discussed her difficulty with getting her three-year-old to listen to her  We discussed some basic behavior modification skills and she agreed to look into finding sticker charts, so he can begin to use these techniques  Client shows evidence of utilizing emotion regulation skills and distress tolerance skills skills to manage mental health symptoms  During this session, this clinician used the following therapeutic modalities: supportive psychotherapy, client-centered therapy, mindfulness-based strategies, DBT-informed skills, Motivational Interviewing and solution-focused therapy  ASSESSMENT: Calixto Ro presents with a depressed mood  Her affect is normal range and intensity, appropriate  Dale Orr exhibits good therapeutic rapport with this clinician  Dale Orr continues to exhibit willingness to work on treatment goals and objectives  Dale Orr presents with a minimal risk of suicide, minimal risk of self-harm, and minimal risk of harm to others  PLAN: Dale Orr will return in two weeks for the next scheduled session  Between sessions, Dale Orr will continue to work on building her communication with her SO (especially regarding parenting concerns) and will report back during the next session re: successes and barriers  At the next session, this clinician will use supportive psychotherapy, client-centered therapy, mindfulness-based strategies, DBT-informed skills, Motivational Interviewing and solution-focused therapy to address her mood regulation and family/relationshp concerns, in an effort to assist Dale Orr with meeting treatment goals  Behavioral Health Treatment Plan [facility]: Diagnosis and Treatment Plan explained to Delfina Alcantara relates understanding diagnosis and is agreeable to Treatment Plan   Yes

## 2022-02-07 DIAGNOSIS — F31.5 BIPOLAR I DISORDER, MOST RECENT EPISODE DEPRESSED, SEVERE WITH PSYCHOTIC FEATURES (HCC): Primary | ICD-10-CM

## 2022-02-07 RX ORDER — LAMOTRIGINE 50 MG/1
100 TABLET, EXTENDED RELEASE ORAL DAILY
Qty: 60 TABLET | Refills: 1 | Status: SHIPPED | OUTPATIENT
Start: 2022-02-07 | End: 2022-04-09

## 2022-02-16 ENCOUNTER — SOCIAL WORK (OUTPATIENT)
Dept: BEHAVIORAL/MENTAL HEALTH CLINIC | Facility: CLINIC | Age: 37
End: 2022-02-16
Payer: COMMERCIAL

## 2022-02-16 DIAGNOSIS — F31.5 BIPOLAR I DISORDER, MOST RECENT EPISODE DEPRESSED, SEVERE WITH PSYCHOTIC FEATURES (HCC): Primary | Chronic | ICD-10-CM

## 2022-02-16 DIAGNOSIS — F41.1 GAD (GENERALIZED ANXIETY DISORDER): Chronic | ICD-10-CM

## 2022-02-16 PROCEDURE — 90834 PSYTX W PT 45 MINUTES: CPT | Performed by: SOCIAL WORKER

## 2022-02-19 NOTE — PSYCH
Psychotherapy Provided: Individual Psychotherapy 45 minutes     Length of time in session: 45 minutes, follow up in 2 weeks    Encounter Diagnosis     ICD-10-CM    1  Bipolar I disorder, most recent episode depressed, severe with psychotic features ([facility] [address] )  F31 5    2  SHELLY (generalized anxiety disorder)  F41 1        Goals addressed in session: Goal 1     Pain:      none    Current suicide risk : Low     DATA: Met with Yulia Beltran for scheduled individual session  Topics of discussion included family stressors, marital stress and parenting concerns  Yulia Beltran states that her mood is improved  She states that she spoke with her PCP and had her medication dosages increased  She states that she wants to work on her relationship with her SO  She states that she has a difficult time with physical intimacy  As we explored this topic, it became evident that she has fears of her children overhearing her and her SO being intimate  We discussed some potential solutions to help her to feel more comfortable  She states that she will discuss these options with her SO  She states that her SO is very supportive of her  Yulia Beltran discussed her parenting concerns  She continues to have some difficulty with guilt/shame related to her daughter's dental issues  Yulia Beltran spoke about her son's current status  She states that he is no longer seeing the boy that he was previously interested in  She states that they had a conversation about him being too young to be in a relationship  She states that she feels that she and Eve Kamara are able to help balance one another with regard to parenting decisions  Client shows evidence of utilizing emotion regulation skills skills to manage mental health symptoms  During this session, this clinician used the following therapeutic modalities: supportive psychotherapy, client-centered therapy, mindfulness-based strategies, DBT-informed skills, Motivational Interviewing and solution-focused therapy       ASSESSMENT: Chet Lopez presents with a normal mood  Her affect is normal range and intensity, appropriate  Chet Lopez exhibits good therapeutic rapport with this clinician  Chet Lopez continues to exhibit willingness to work on treatment goals and objectives  Chet Lopez presents with a minimal risk of suicide, minimal risk of self-harm, and minimal risk of harm to others  PLAN: Chet Lopez will return in two weeks for the next scheduled session  Between sessions, Chet Lopez will continue to work on practicing mindfulness-based strategies to manage her anxiety  She will continue to work on improving her relationship and intimacy with her SO and will report back during the next session re: successes and barriers  At the next session, this clinician will use supportive psychotherapy, client-centered therapy, mindfulness-based strategies, DBT-informed skills, Motivational Interviewing and solution-focused therapy to address her mood regulation and relationship concerns, in an effort to assist Chet Lopez with meeting treatment goals  Behavioral Health Treatment Plan [facility]: Diagnosis and Treatment Plan explained to Marilyn Patel relates understanding diagnosis and is agreeable to Treatment Plan   Yes

## 2022-02-22 DIAGNOSIS — Z30.015 ENCOUNTER FOR INITIAL PRESCRIPTION OF VAGINAL RING HORMONAL CONTRACEPTIVE: ICD-10-CM

## 2022-02-23 DIAGNOSIS — Z30.44 ENCOUNTER FOR SURVEILLANCE OF VAGINAL RING HORMONAL CONTRACEPTIVE DEVICE: Primary | ICD-10-CM

## 2022-02-23 RX ORDER — ETONOGESTREL AND ETHINYL ESTRADIOL 11.7; 2.7 MG/1; MG/1
INSERT, EXTENDED RELEASE VAGINAL
Qty: 3 EACH | Refills: 0 | Status: SHIPPED | OUTPATIENT
Start: 2022-02-23 | End: 2022-04-25 | Stop reason: ALTCHOICE

## 2022-02-23 RX ORDER — ETONOGESTREL/ETHINYL ESTRADIOL .12-.015MG
RING, VAGINAL VAGINAL
Qty: 3 EACH | Refills: 3 | Status: SHIPPED | OUTPATIENT
Start: 2022-02-23

## 2022-02-23 NOTE — TELEPHONE ENCOUNTER
Please sent over new rx for pt  Insurance only covers [address] (brand-name)  Please do not sent generic   I checked off depense as written box

## 2022-03-01 ENCOUNTER — SOCIAL WORK (OUTPATIENT)
Dept: BEHAVIORAL/MENTAL HEALTH CLINIC | Facility: CLINIC | Age: 37
End: 2022-03-01
Payer: COMMERCIAL

## 2022-03-01 DIAGNOSIS — F31.5 BIPOLAR I DISORDER, MOST RECENT EPISODE DEPRESSED, SEVERE WITH PSYCHOTIC FEATURES (HCC): Primary | Chronic | ICD-10-CM

## 2022-03-01 DIAGNOSIS — F42.9 OBSESSIVE-COMPULSIVE DISORDER, UNSPECIFIED TYPE: Chronic | ICD-10-CM

## 2022-03-01 DIAGNOSIS — F41.1 GAD (GENERALIZED ANXIETY DISORDER): Chronic | ICD-10-CM

## 2022-03-01 PROCEDURE — 90834 PSYTX W PT 45 MINUTES: CPT | Performed by: SOCIAL WORKER
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
## 2022-03-18 ENCOUNTER — OFFICE VISIT (OUTPATIENT)
Dept: BARIATRICS | Facility: CLINIC | Age: 37
End: 2022-03-18
Payer: COMMERCIAL

## 2022-03-18 VITALS
BODY MASS INDEX: 38.41 KG/M2 | HEIGHT: 63 IN | TEMPERATURE: 98.2 F | HEART RATE: 100 BPM | DIASTOLIC BLOOD PRESSURE: 70 MMHG | RESPIRATION RATE: 14 BRPM | SYSTOLIC BLOOD PRESSURE: 102 MMHG | WEIGHT: 216.8 LBS

## 2022-03-18 DIAGNOSIS — E66.9 OBESITY, CLASS II, BMI 35-39.9: Primary | ICD-10-CM

## 2022-03-18 DIAGNOSIS — E78.5 DYSLIPIDEMIA: ICD-10-CM

## 2022-03-18 DIAGNOSIS — F31.5 BIPOLAR I DISORDER, MOST RECENT EPISODE DEPRESSED, SEVERE WITH PSYCHOTIC FEATURES (HCC): Chronic | ICD-10-CM

## 2022-03-18 PROBLEM — E66.812 OBESITY, CLASS II, BMI 35-39.9: Status: ACTIVE | Noted: 2017-06-08

## 2022-03-18 PROCEDURE — 99244 OFF/OP CNSLTJ NEW/EST MOD 40: CPT | Performed by: PHYSICIAN ASSISTANT

## 2022-03-18 RX ORDER — METOCLOPRAMIDE 10 MG/1
TABLET ORAL
COMMUNITY
Start: 2022-03-11 | End: 2022-04-25 | Stop reason: ALTCHOICE

## 2022-03-18 NOTE — PROGRESS NOTES
Assessment/Plan:    Obesity, Class II, BMI 35-39 9  -Discussed options of HealthyCORE-Intensive Lifestyle Intervention Program, Very Low Calorie Diet-VLCD and Conservative Program and the role of weight loss medications   -Initial weight loss goal of 5-10% weight loss for improved health  -Screening labs  Recommend checking lab coverage before having labs drawn   - Labs reviewed from 3/11/22 all within acceptable limits  - Is seeing sleep med for consult    -Patient is interested in pursuing conservative program   She did see dietician in the past    Recommend 1200 calorie diet  2252-7483 calories per day plan given   No sugary beverages  At least 64oz of water daily  Increase physical activity by 10 minutes daily  Gradually increase physical activity to a goal of 5 days per week for 30 minutes of MODERATE intensity PLUS 2 days per week of FULL BODY resistance training    -I will contact your psychiatry about topamax  Contract was signed today  She is on birth control and had negative urine HCG 3/12  -discussed SE of topamax including paresthesia, memory concerns and mood changes  Discussed birth defects in fetus if becoming pregnant            Bipolar I disorder, most recent episode depressed, severe with psychotic features ([facility] [address] )  Continue lamicatal   Will get clearance from psych regarding topamax    Dyslipidemia  -should improve with weight loss, dietary, and lifestyle changes          Follow up in approximately 2 months with Non-Surgical Physician/Advanced Practitioner  Goals:  Food log (ie ) www myfitnesspal com,sparkpeople  com,loseit com,calorieking  com,etc  baritastic  No sugary beverages  At least 64oz of water daily  Increase physical activity by 10 minutes daily   Gradually increase physical activity to a goal of 5 days per week for 30 minutes of MODERATE intensity PLUS 2 days per week of FULL BODY resistance training  3711-0912 calories per day      Diagnoses and all orders for this visit:    Obesity, Class II, BMI 35-39 9    Bipolar I disorder, most recent episode depressed, severe with psychotic features ([facility] [address] )    Dyslipidemia    Other orders  -     metoclopramide (REGLAN) 10 mg tablet          Subjective:   Chief Complaint   Patient presents with    Consult     MWM COnsult       Patient ID: Esteafny Fernandez  is a 29 y o  female with excess weight/obesity here to pursue weight management  Past Medical History:   Diagnosis Date    Abnormal Pap smear of cervix     Eczema     Exposure to chlamydia     Resolved 06/09/17    Migraine without aura and without status migrainosus, not intractable 10/11/2019    Obesity     Post traumatic stress disorder 12/13/2021    Postpartum depression 8/9/2018    Manoj-Parkinson-White (WPW) syndrome        HPI:  Obesity/Excess Weight:  Severity: class II  Onset:  6 years ago    Modifiers: Diet and Exercise and Prescription Weight Loss Medications  Contributing factors: Stress/Emotional Eating, Pregnancy and Depression  Associated symptoms: fatigue and increased joint pain    Goals:180  Hydration:water 4 bottles, 2 cans soda, coffee 1-2 cups w/cream and sugar  Alcohol: less than monthly  Exercise:doing BW exercises or elliptical 2-3 times a week  Occupation:      B: coffee  S:skips  L:leftovers or eggs, toast, avocado  S:Skips  D:rice and meat, few times veggies  S:usually eats more chips/dip or chex mix    Colonoscopy-Not applicable    The following portions of the patient's history were reviewed and updated as appropriate:   She  has a past medical history of Abnormal Pap smear of cervix, Eczema, Exposure to chlamydia, Migraine without aura and without status migrainosus, not intractable (10/11/2019), Obesity, Post traumatic stress disorder (12/13/2021), Postpartum depression (8/9/2018), and Manoj-Parkinson-White (WPW) syndrome    She   Patient Active Problem List    Diagnosis Date Noted    Dyslipidemia 03/18/2022    COVID-19 01/12/2022    Post traumatic stress disorder 2021    PVC's (premature ventricular contractions) 2021    History of radiofrequency ablation procedure for W-P-W 06/15/2021    Congenital fusion of carpal bone 2021    Patellofemoral disorder of left knee 2021    Chest wall pain, chronic 01/10/2020    Tinea pedis of left foot 01/10/2020    Family history of breast cancer 2019    Visit for genetic screening 2019    SHELLY (generalized anxiety disorder) 2019    Obsessive-compulsive disorder, unspecified 2019    Other insomnia 2019    Long-term use of high-risk medication 2019    Migraine without aura and without status migrainosus, not intractable 10/11/2019    Left wrist tendonitis 2019    Left wrist pain 2019    Abnormal Papanicolaou smear of cervix with positive human papilloma virus (HPV) test 2018    Tachycardia 2018    Obesity, Class II, BMI 35-39 9 2017    Pap smear abnormality of cervix with LGSIL 2016    Bipolar I disorder, most recent episode depressed, severe with psychotic features ([facility] [address] ) 2016    Dermatitis 2013     She  has a past surgical history that includes Other surgical history; Colposcopy; Colonoscopy; Induced ; and Ablation of dysrhythmic focus  Her family history includes Anxiety disorder in her brother; Bipolar disorder in her mother; Breast cancer in her maternal grandmother; Breast cancer (age of onset: 36) in her maternal aunt; Colon cancer in her paternal aunt;  Colon polyps in her maternal grandfather and mother; Crohn's disease in her paternal grandmother; Drug abuse in her brother; Factor V Leiden deficiency in her brother, cousin, and mother; Glucose-6-phos deficiency in her father; Heart disease in her maternal grandfather; Hyperlipidemia in her father and mother; Hypertension in her father and mother; Mental illness in her brother; No Known Problems in her sister; Pancreatic cancer in her paternal grandfather; Prostate cancer in her paternal grandfather; Suicide Attempts in her mother  She  reports that she has never smoked  She has never used smokeless tobacco  She reports current alcohol use  She reports that she does not use drugs  Current Outpatient Medications   Medication Sig Dispense Refill    etonogestrel-ethinyl estradiol (NUVARING) 0 12-0 015 MG/24HR vaginal ring Insert vaginally and leave in place for 3 consecutive weeks, then remove for 1 week  3 each 0    ibuprofen (MOTRIN) 600 mg tablet Take 1 tablet (600 mg total) by mouth every 6 (six) hours as needed for mild pain, moderate pain or fever 30 tablet 0    lamoTRIgine ER 50 MG TB24 Take 2 tablets (100 mg total) by mouth in the morning 60 tablet 1    metoclopramide (REGLAN) 10 mg tablet       naproxen (NAPROSYN) 500 mg tablet Take 1 tablet (500 mg total) by mouth 2 (two) times a day with meals 30 tablet 0    NuvaRing 0 12-0 015 MG/24HR vaginal ring Insert vaginally and leave in place for 3 consecutive weeks, then remove for 1 week  3 each 3     No current facility-administered medications for this visit  Current Outpatient Medications on File Prior to Visit   Medication Sig    etonogestrel-ethinyl estradiol (NUVARING) 0 12-0 015 MG/24HR vaginal ring Insert vaginally and leave in place for 3 consecutive weeks, then remove for 1 week   ibuprofen (MOTRIN) 600 mg tablet Take 1 tablet (600 mg total) by mouth every 6 (six) hours as needed for mild pain, moderate pain or fever    lamoTRIgine ER 50 MG TB24 Take 2 tablets (100 mg total) by mouth in the morning    metoclopramide (REGLAN) 10 mg tablet     naproxen (NAPROSYN) 500 mg tablet Take 1 tablet (500 mg total) by mouth 2 (two) times a day with meals    NuvaRing 0 12-0 015 MG/24HR vaginal ring Insert vaginally and leave in place for 3 consecutive weeks, then remove for 1 week      [DISCONTINUED] gabapentin (Neurontin) 100 mg capsule Take up to 3 tablets as needed at bedtime for muscle aches     No current facility-administered medications on file prior to visit  She is allergic to codeine, sulfa antibiotics, and erythromycin       Review of Systems   Constitutional: Negative for chills and fever  HENT: Negative for sore throat  Respiratory: Negative for shortness of breath  Cardiovascular: Negative for chest pain and palpitations  Gastrointestinal: Negative for abdominal pain, constipation, diarrhea and vomiting  Genitourinary: Negative for difficulty urinating  Musculoskeletal: Positive for arthralgias and back pain  Skin: Negative for rash  Neurological: Positive for headaches  Psychiatric/Behavioral: Positive for dysphoric mood (controlled on meds)  The patient is nervous/anxious (controlled on meds)  Objective:    /70   Pulse 100   Temp 98 2 °F (36 8 °C) (Tympanic)   Resp 14   Ht 5' 2 5" (1 588 m)   Wt 98 3 kg (216 lb 12 8 oz)   BMI 39 02 kg/m²     Physical Exam  Vitals and nursing note reviewed  Constitutional:       General: She is not in acute distress  Appearance: She is well-developed  She is obese  HENT:      Head: Normocephalic and atraumatic  Eyes:      Conjunctiva/sclera: Conjunctivae normal    Neck:      Thyroid: No thyromegaly  Pulmonary:      Effort: Pulmonary effort is normal  No respiratory distress  Skin:     Findings: No rash (visible)  Neurological:      Mental Status: She is alert and oriented to person, place, and time     Psychiatric:         Behavior: Behavior normal

## 2022-03-18 NOTE — PATIENT INSTRUCTIONS
Goals: Food log (ie ) www myfitnesspal com,sparkpeople  com,loseit com,calorieking  com,etc  baritastic  No sugary beverages  At least 64oz of water daily  Increase physical activity by 10 minutes daily   Gradually increase physical activity to a goal of 5 days per week for 30 minutes of MODERATE intensity PLUS 2 days per week of FULL BODY resistance training  1070-1969 calories per day  I will contact your psychiatry about topamax

## 2022-03-18 NOTE — ASSESSMENT & PLAN NOTE
-Discussed options of HealthyCORE-Intensive Lifestyle Intervention Program, Very Low Calorie Diet-VLCD and Conservative Program and the role of weight loss medications   -Initial weight loss goal of 5-10% weight loss for improved health  -Screening labs  Recommend checking lab coverage before having labs drawn   - Labs reviewed from 3/11/22 all within acceptable limits  - Is seeing sleep med for consult    -Patient is interested in pursuing conservative program   She did see dietician in the past    Recommend 1200 calorie diet  4587-2795 calories per day plan given   No sugary beverages  At least 64oz of water daily  Increase physical activity by 10 minutes daily  Gradually increase physical activity to a goal of 5 days per week for 30 minutes of MODERATE intensity PLUS 2 days per week of FULL BODY resistance training    -I will contact your psychiatry about topamax  Contract was signed today  She is on birth control and had negative urine HCG 3/12  -discussed SE of topamax including paresthesia, memory concerns and mood changes    Discussed birth defects in fetus if becoming pregnant

## 2022-03-21 ENCOUNTER — SOCIAL WORK (OUTPATIENT)
Dept: BEHAVIORAL/MENTAL HEALTH CLINIC | Facility: CLINIC | Age: 37
End: 2022-03-21
Payer: COMMERCIAL

## 2022-03-21 DIAGNOSIS — F31.5 BIPOLAR I DISORDER, MOST RECENT EPISODE DEPRESSED, SEVERE WITH PSYCHOTIC FEATURES (HCC): Primary | Chronic | ICD-10-CM

## 2022-03-21 DIAGNOSIS — F41.1 GAD (GENERALIZED ANXIETY DISORDER): Chronic | ICD-10-CM

## 2022-03-21 DIAGNOSIS — F42.9 OBSESSIVE-COMPULSIVE DISORDER, UNSPECIFIED TYPE: Chronic | ICD-10-CM

## 2022-03-21 PROCEDURE — 90834 PSYTX W PT 45 MINUTES: CPT | Performed by: SOCIAL WORKER
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
## 2022-04-08 ENCOUNTER — SOCIAL WORK (OUTPATIENT)
Dept: BEHAVIORAL/MENTAL HEALTH CLINIC | Facility: CLINIC | Age: 37
End: 2022-04-08
Payer: COMMERCIAL

## 2022-04-08 DIAGNOSIS — F41.1 GAD (GENERALIZED ANXIETY DISORDER): Chronic | ICD-10-CM

## 2022-04-08 DIAGNOSIS — F31.5 BIPOLAR I DISORDER, MOST RECENT EPISODE DEPRESSED, SEVERE WITH PSYCHOTIC FEATURES (HCC): Primary | Chronic | ICD-10-CM

## 2022-04-08 PROCEDURE — 90834 PSYTX W PT 45 MINUTES: CPT | Performed by: SOCIAL WORKER

## 2022-04-09 DIAGNOSIS — F31.5 BIPOLAR I DISORDER, MOST RECENT EPISODE DEPRESSED, SEVERE WITH PSYCHOTIC FEATURES (HCC): ICD-10-CM

## 2022-04-09 RX ORDER — LAMOTRIGINE 50 MG/1
100 TABLET, EXTENDED RELEASE ORAL DAILY
Qty: 60 TABLET | Refills: 1 | Status: SHIPPED | OUTPATIENT
Start: 2022-04-09 | End: 2022-06-17 | Stop reason: SDUPTHER

## 2022-04-15 NOTE — PSYCH
Psychotherapy Provided: Individual Psychotherapy 45 minutes     Length of time in session: 45 minutes, follow up in 3 weeks    Encounter Diagnosis     ICD-10-CM    1  Bipolar I disorder, most recent episode depressed, severe with psychotic features ([facility] )  F31 5    2  SHELLY (generalized anxiety disorder)  F41 1        Goals addressed in session: Goal 1     Pain:      none    Current suicide risk : Low   DATA: Met with Octavio Vasquez for scheduled individual session  Topics of discussion included family stressors, marital stress, relationships with family and parenting concerns  Octavio Adie reports that her mood has been more depressed and irritable within this past week  She states that she is not sure what the trigger/prompting event is that impacted her mood  Octaviolusi Vasquez states that she is due to change her nuvaring next week, and this could potentially be a hormonal mood response  She agrees to let this clinician know how her mood is  We discussed taking time for herself, to work on her own self-care, rather than concentrating all of her efforts on everyone else  We discussed the patterns of Libertad's moods, and she states that episodes of low mood usually last approximately a week or two  She denies any current suicidal ideation  She states that she does not feel like interacting with anyone and wants to stay isolated  This clinician encouraged her to practice "opposite action" and engage in activities that increase her feelings of confidence and competence  She states that she and her SO have been getting along better, and he is being supportive of her  During this session, this clinician used the following therapeutic modalities: supportive psychotherapy, client-centered therapy, mindfulness-based strategies, DBT-informed skills, Motivational Interviewing and solution-focused therapy       ASSESSMENT: Octavio Adie presents with a depressed mood  Her affect is blunted  Octavio Vasquez exhibits good therapeutic rapport with this clinician   Octavio Vasquez continues to exhibit willingness to work on treatment goals and objectives  Sami Hayes presents with a minimal risk of suicide, minimal risk of self-harm, and minimal risk of harm to others  PLAN: Sami Hayes will return in three weeks for the next scheduled session  Between sessions, Sami Hayes will monitor her moods and practice prioritizing her self-care  She will report back to this clinician prior to the next session re: successes and barriers  At the next session, this clinician will use supportive psychotherapy, client-centered therapy, mindfulness-based strategies, DBT-informed skills, Motivational Interviewing and solution-focused therapy to address her mood regulation and relationship concerns, in an effort to assist Sami Hayes with meeting treatment goals  Behavioral Health Treatment Plan [facility]: Diagnosis and Treatment Plan explained to Evelin Young relates understanding diagnosis and is agreeable to Treatment Plan   Yes

## 2022-04-25 ENCOUNTER — OFFICE VISIT (OUTPATIENT)
Dept: FAMILY MEDICINE CLINIC | Facility: CLINIC | Age: 37
End: 2022-04-25
Payer: COMMERCIAL

## 2022-04-25 VITALS
DIASTOLIC BLOOD PRESSURE: 82 MMHG | BODY MASS INDEX: 38.27 KG/M2 | HEIGHT: 63 IN | WEIGHT: 216 LBS | SYSTOLIC BLOOD PRESSURE: 112 MMHG | HEART RATE: 108 BPM | OXYGEN SATURATION: 98 % | RESPIRATION RATE: 18 BRPM

## 2022-04-25 DIAGNOSIS — R59.0 ENLARGED LYMPH NODE IN NECK: ICD-10-CM

## 2022-04-25 DIAGNOSIS — G43.109 MIGRAINE WITH AURA AND WITHOUT STATUS MIGRAINOSUS, NOT INTRACTABLE: Primary | ICD-10-CM

## 2022-04-25 DIAGNOSIS — F31.5 BIPOLAR I DISORDER, MOST RECENT EPISODE DEPRESSED, SEVERE WITH PSYCHOTIC FEATURES (HCC): Chronic | ICD-10-CM

## 2022-04-25 PROBLEM — B35.3 TINEA PEDIS OF LEFT FOOT: Status: RESOLVED | Noted: 2020-01-10 | Resolved: 2022-04-25

## 2022-04-25 PROBLEM — M77.8 LEFT WRIST TENDONITIS: Status: RESOLVED | Noted: 2019-08-14 | Resolved: 2022-04-25

## 2022-04-25 PROBLEM — R07.89 CHEST WALL PAIN, CHRONIC: Status: RESOLVED | Noted: 2020-01-10 | Resolved: 2022-04-25

## 2022-04-25 PROBLEM — M25.532 LEFT WRIST PAIN: Status: RESOLVED | Noted: 2019-07-12 | Resolved: 2022-04-25

## 2022-04-25 PROBLEM — F42.9 OBSESSIVE-COMPULSIVE DISORDER, UNSPECIFIED: Chronic | Status: RESOLVED | Noted: 2019-11-11 | Resolved: 2022-04-25

## 2022-04-25 PROBLEM — Z13.79 VISIT FOR GENETIC SCREENING: Status: RESOLVED | Noted: 2019-11-19 | Resolved: 2022-04-25

## 2022-04-25 PROBLEM — G89.29 CHEST WALL PAIN, CHRONIC: Status: RESOLVED | Noted: 2020-01-10 | Resolved: 2022-04-25

## 2022-04-25 PROCEDURE — 99214 OFFICE O/P EST MOD 30 MIN: CPT | Performed by: FAMILY MEDICINE

## 2022-04-25 RX ORDER — RIZATRIPTAN BENZOATE 10 MG/1
10 TABLET, ORALLY DISINTEGRATING ORAL ONCE AS NEEDED
Qty: 9 TABLET | Refills: 1 | Status: SHIPPED | OUTPATIENT
Start: 2022-04-25 | End: 2022-07-02

## 2022-04-25 RX ORDER — VITS A,C,E/LUTEIN/MINERALS 300MCG-200
TABLET ORAL
Refills: 0
Start: 2022-04-25 | End: 2022-07-26

## 2022-04-25 NOTE — ASSESSMENT & PLAN NOTE
Mood did get a little worse on topiramate  Keep a close eye on this  She contracts for safety today

## 2022-04-25 NOTE — ASSESSMENT & PLAN NOTE
She has a mildly enlarged lymph node along her posterior cervical chain proximally  This is likely a reactive lymph node    Consider surgical excision as it is painful

## 2022-04-25 NOTE — PROGRESS NOTES
Assessment/Plan:       Problem List Items Addressed This Visit        Cardiovascular and Mediastinum    Migraine with aura and without status migrainosus, not intractable - Primary     She does appear to be having classic headaches  Unfortunately, she is having some warning symptoms with headaches occurring in the middle the night are quite severe  Check MRI of her brain  Placed on Maxalt mL T tablets 10 mg at the very onset of her are or headache  She may still take either naproxen or high-dose ibuprofen with the Maxalt if it does not seem to be working  Continue with vitamin B2 400 mg daily as well as magnesium 4 mg daily  Consider neurology consultation if she is not improving  Stop topiramate in light of her worsening depression  Relevant Medications    rizatriptan (Maxalt-MLT) 10 MG disintegrating tablet    Riboflavin 400 MG CAPS    Magnesium Oxide (Mag-Oxide) 200 MG TABS    Other Relevant Orders    MRI brain wo contrast       Immune and Lymphatic    Enlarged lymph node in neck     She has a mildly enlarged lymph node along her posterior cervical chain proximally  This is likely a reactive lymph node  Consider surgical excision as it is painful            Other    Bipolar I disorder, most recent episode depressed, severe with psychotic features ([facility] [address] ) (Chronic)     Mood did get a little worse on topiramate  Keep a close eye on this  She contracts for safety today  Subjective:      Patient ID: Bk Kamara is a 29 y o  female  HPI     Patient presents today concerned regarding headaches  She has had headaches going back to her teenage years but over the last 6 months he seem to be getting worse  She is having pretty severe right-sided headaches that involve her right frontal skull, temple as well as occipital region  She has actually been woken up at night with this type of a headache about 5 times  These nighttime headaches are quite severe    She has tried ibuprofen and naproxen for the headaches and they have not been overly helpful  She did try topiramate but was having some worsening depression so stop this as of last night  This also did not help her headaches  The following portions of the patient's history were reviewed and updated as appropriate: allergies, current medications, past family history, past medical history, past social history, past surgical history and problem list       Current Outpatient Medications:     ibuprofen (MOTRIN) 600 mg tablet, Take 1 tablet (600 mg total) by mouth every 6 (six) hours as needed for mild pain, moderate pain or fever, Disp: 30 tablet, Rfl: 0    lamoTRIgine ER 50 MG TB24, TAKE 2 TABLETS (100 MG TOTAL) BY MOUTH IN THE MORNING, Disp: 60 tablet, Rfl: 1    naproxen (NAPROSYN) 500 mg tablet, Take 1 tablet (500 mg total) by mouth 2 (two) times a day with meals, Disp: 30 tablet, Rfl: 0    NuvaRing 0 12-0 015 MG/24HR vaginal ring, Insert vaginally and leave in place for 3 consecutive weeks, then remove for 1 week , Disp: 3 each, Rfl: 3    Magnesium Oxide (Mag-Oxide) 200 MG TABS, Take 400 mg daily, Disp: , Rfl: 0    Riboflavin 400 MG CAPS, Take 1 tablet daily, Disp: , Rfl: 0    rizatriptan (Maxalt-MLT) 10 MG disintegrating tablet, Take 1 tablet (10 mg total) by mouth once as needed for migraine for up to 1 dose May repeat in 2 hours if needed, Disp: 9 tablet, Rfl: 1     Review of Systems   Constitutional: Negative for fatigue  HENT: Positive for sinus pressure and sinus pain  Negative for trouble swallowing  Respiratory: Negative for chest tightness, shortness of breath and wheezing  Cardiovascular: Negative for chest pain, palpitations and leg swelling  Gastrointestinal: Negative for abdominal pain, constipation and diarrhea  Endocrine: Negative for polyuria  Genitourinary: Negative for difficulty urinating and flank pain  Musculoskeletal: Negative for arthralgias and myalgias     Skin: Negative for rash    Psychiatric/Behavioral: Negative for sleep disturbance  Objective:      /82 (BP Location: Left arm, Patient Position: Sitting, Cuff Size: Large)   Pulse (!) 108   Resp 18   Ht 5' 2 5" (1 588 m)   Wt 98 kg (216 lb)   SpO2 98%   BMI 38 88 kg/m²          Physical Exam  Vitals reviewed  Constitutional:       Appearance: She is well-developed  She is not ill-appearing  HENT:      Head: Normocephalic  Comments: She does have some palpable pain along her frontal and maxillary sinus on the right side as well as along the right temple and occipital skull  Cardiovascular:      Rate and Rhythm: Regular rhythm  Heart sounds: Normal heart sounds  No murmur heard  Pulmonary:      Effort: No respiratory distress  Breath sounds: No wheezing or rales  Abdominal:      General: There is no distension  Tenderness: There is no abdominal tenderness  Skin:     Findings: No erythema or rash  Neurological:      Mental Status: She is alert and oriented to person, place, and time             Mackenzie Houston MD

## 2022-04-25 NOTE — ASSESSMENT & PLAN NOTE
She does appear to be having classic headaches  Unfortunately, she is having some warning symptoms with headaches occurring in the middle the night are quite severe  Check MRI of her brain  Placed on Maxalt mL T tablets 10 mg at the very onset of her are or headache  She may still take either naproxen or high-dose ibuprofen with the Maxalt if it does not seem to be working  Continue with vitamin B2 400 mg daily as well as magnesium 4 mg daily  Consider neurology consultation if she is not improving  Stop topiramate in light of her worsening depression

## 2022-05-05 NOTE — PSYCH
Psychotherapy Provided: Individual Psychotherapy 45 minutes     Length of time in session: 45 minutes, follow up in 1 month    Encounter Diagnosis     ICD-10-CM    1  Bipolar I disorder, most recent episode depressed, severe with psychotic features ([facility] )  F31 5    2  SHELLY (generalized anxiety disorder)  F41 1    3  Obsessive-compulsive disorder, unspecified type  F42 9        Goals addressed in session: Goal 1     Pain:      none    Current suicide risk : Low     DATA: Met with Nisha Cuevas for scheduled individual session  Topics of discussion included family stressors, marital stress and work-related stress  Nisha Cuevas states that she saw her family doctor and has started taking medications  She reports that her mood is significantly improved since our last session  She recognizes that it might not yet be the medication; however, she does state that she is agreeable to continuing with medications  She states that she wants to work on building better communication with her partner  She states that the primary issue that they have in their relationship is her feeling pressured into caring for his ex's children  She states that she is agreeable to having additional couple's sessions in the future to work on these issues  We discussed her ability to set and maintain limits and boundaries  Client shows evidence of utilizing emotion regulation skills skills to manage mental health symptoms  During this session, this clinician used the following therapeutic modalities: supportive psychotherapy, client-centered therapy, mindfulness-based strategies, DBT-informed skills, Motivational Interviewing and solution-focused therapy  ASSESSMENT: Nisha Cuevas presents with a euthymic mood  Her affect is normal range and intensity, appropriate  Nisha Cuevas exhibits good therapeutic rapport with this clinician  Nisha Cuevas continues to exhibit willingness to work on treatment goals and objectives   Nisha Cuevas presents with a minimal risk of suicide, minimal risk of self-harm, and minimal risk of harm to others  PLAN: Ciera Rice will return in one month for the next scheduled session  Between sessions, Ciera Rice will continue to monitor her moods  She will maintain contact with her PCP for medication management until she is able to get in to see a psychiatrist for an evaluation  She will report back during the next session re: successes and barriers  At the next session, this clinician will use supportive psychotherapy, client-centered therapy, mindfulness-based strategies, DBT-informed skills, Motivational Interviewing and solution-focused therapy to address her mood regulation and relationship concerns, in an effort to assist Ciera Rice with meeting treatment goals  Behavioral Health Treatment Plan [facility]: Diagnosis and Treatment Plan explained to Landry Young relates understanding diagnosis and is agreeable to Treatment Plan   Yes MILES Alberts

## 2022-05-11 ENCOUNTER — HOSPITAL ENCOUNTER (OUTPATIENT)
Dept: MRI IMAGING | Facility: HOSPITAL | Age: 37
Discharge: HOME | End: 2022-05-11
Payer: COMMERCIAL

## 2022-05-11 DIAGNOSIS — G43.109 MIGRAINE WITH AURA AND WITHOUT STATUS MIGRAINOSUS, NOT INTRACTABLE: ICD-10-CM

## 2022-05-11 PROCEDURE — 70551 MRI BRAIN STEM W/O DYE: CPT

## 2022-05-11 PROCEDURE — G1004 CDSM NDSC: HCPCS

## 2022-06-10 ENCOUNTER — TELEMEDICINE (OUTPATIENT)
Dept: BEHAVIORAL/MENTAL HEALTH CLINIC | Facility: CLINIC | Age: 37
End: 2022-06-10
Payer: COMMERCIAL

## 2022-06-10 DIAGNOSIS — F41.1 GAD (GENERALIZED ANXIETY DISORDER): Chronic | ICD-10-CM

## 2022-06-10 DIAGNOSIS — F31.5 BIPOLAR I DISORDER, MOST RECENT EPISODE DEPRESSED, SEVERE WITH PSYCHOTIC FEATURES (HCC): Primary | Chronic | ICD-10-CM

## 2022-06-10 DIAGNOSIS — F43.10 POST TRAUMATIC STRESS DISORDER: ICD-10-CM

## 2022-06-10 PROCEDURE — 90834 PSYTX W PT 45 MINUTES: CPT | Performed by: SOCIAL WORKER

## 2022-06-10 NOTE — PSYCH
Virtual Regular Visit    Verification of patient location:    Patient is located in the following state in which I hold an active license [place]    Assessment/Plan:    Problem List Items Addressed This Visit        Other    Bipolar I disorder, most recent episode depressed, severe with psychotic features ([facility] ) - Primary (Chronic)    SHELLY (generalized anxiety disorder) (Chronic)    Post traumatic stress disorder        Goals addressed in session: Goal 1      Reason for visit is   Chief Complaint   Patient presents with    Virtual Regular Visit      Encounter provider CHARLIE Shaffer    Provider located at [address] [place] [id number] [phone]    Recent Visits  Date Type Provider Dept   06/10/22 [address], [address] [facility]   Showing recent visits within past 7 days and meeting all other requirements  Future Appointments  No visits were found meeting these conditions  Showing future appointments within next 150 days and meeting all other requirements     The patient was identified by name and date of birth  Dasia Morales was informed that this is a telemedicine visit and that the visit is being conducted throughEpic Embedded and patient was informed this is a secure, HIPAA-complaint platform  She agrees to proceed     My office door was closed  No one else was in the room  She acknowledged consent and understanding of privacy and security of the video platform  The patient has agreed to participate and understands they can discontinue the visit at any time  Patient is aware this is a billable service  Subjective  Dasia Morales is a 29 y o  female  DATA: Met with Chiquita Monteiro for scheduled individual session   Topics of discussion included relationship with significant other, family stressors, work-related stress, physical health concerns, parenting concerns, financial issues and mood regulation and symptoms  Tanner Jorgensen states that she continues to have some physical and emotional struggles  She states that she is overall "not feeling well " She states that her moods have been significantly better since discontinuing the topomax  Tanner Jorgensen states that she has been experiencing migraines  She has started a trial of Maxalt, and feels that this has been helpful in stopping the migraine symptoms  She is not currently on a preventative medication  Tanner Jorgensen states that she has taken her son to the haematologist, and he was diagnosed with low B-12, as well as anemia  She states that she will be continuing to follow up with his doctors  Tanner Jorgensen discussed her relationship with her SO and with his children  She states that she continues to feel a great deal of stress related to watching all of the children, and she feels that she does not get sufficient support from him  This clinician again asked if she would like to invite him to a session  She states that he has told her that he is not interested in participating in a couple's session  Tanner Jorgensen states that she is unsure what she wants from this relationship at this point in time  We spent some time reviewing and revising her treatment plan  The treatment plan was completed beyond the scheduled expiration date, due to cancelled appointments  Libertad's primary treatment goal is to manage her moods and make a decision regarding her relationship with her SO  She will continue to work on practicing distress tolerance skills, setting limits/boundaries with people in her life, and becoming more financially self-sufficient  She will be starting a new job in a few weeks  Client shows evidence of utilizing some basic mindfulness-based skills to manage mental health symptoms   During this session, this clinician used the following therapeutic modalities: supportive psychotherapy, client-centered therapy, mindfulness-based strategies, DBT-informed skills, Motivational Interviewing and solution-focused therapy  ASSESSMENT: Scott Lovett presents with a normal mood  Her affect is normal range and intensity, appropriate  Scott Lovett exhibits good therapeutic rapport with this clinician  Scott Lovett continues to exhibit willingness to work on treatment goals and objectives  Scott Lovett presents with a minimal risk of suicide, minimal risk of self-harm, and minimal risk of harm to others  PLAN: Scott Lovett will return in approximately 2-3 weeks for the next scheduled session  Between sessions, Scott Lovett will continue to practice mindfulness-based strategies to manage her moods  In addition, she will work on practicing effective communication skills with her SO  Scott Lovett will report back during the next session re: successes and barriers  At the next session, this clinician will use supportive psychotherapy, client-centered therapy, mindfulness-based strategies, DBT-informed skills, Motivational Interviewing and solution-focused therapy to address her mood regulation and relationship concerns, in an effort to assist Scott Lovett with meeting treatment goals       HPI     Past Medical History:   Diagnosis Date    Abnormal Pap smear of cervix     Eczema     Exposure to chlamydia     Resolved 17    Migraine without aura and without status migrainosus, not intractable 10/11/2019    Obesity     Post traumatic stress disorder 2021    Postpartum depression 2018    Manoj-Parkinson-White (WPW) syndrome        Past Surgical History:   Procedure Laterality Date    ABLATION OF DYSRHYTHMIC FOCUS      WPW ablation age 15    COLONOSCOPY      COLPOSCOPY      INDUCED       OTHER SURGICAL HISTORY      catheter ablation- WPW age 13       Current Outpatient Medications   Medication Sig Dispense Refill    ibuprofen (MOTRIN) 600 mg tablet Take 1 tablet (600 mg total) by mouth every 6 (six) hours as needed for mild pain, moderate pain or fever 30 tablet 0  lamoTRIgine ER 50 MG TB24 TAKE 2 TABLETS (100 MG TOTAL) BY MOUTH IN THE MORNING 60 tablet 1    Magnesium Oxide (Mag-Oxide) 200 MG TABS Take 400 mg daily  0    naproxen (NAPROSYN) 500 mg tablet Take 1 tablet (500 mg total) by mouth 2 (two) times a day with meals 30 tablet 0    NuvaRing 0 12-0 015 MG/24HR vaginal ring Insert vaginally and leave in place for 3 consecutive weeks, then remove for 1 week  3 each 3    Riboflavin 400 MG CAPS Take 1 tablet daily  0    rizatriptan (Maxalt-MLT) 10 MG disintegrating tablet Take 1 tablet (10 mg total) by mouth once as needed for migraine for up to 1 dose May repeat in 2 hours if needed 9 tablet 1     No current facility-administered medications for this visit  Allergies   Allergen Reactions    Codeine Other (See Comments)     dry heaves    Sulfa Antibiotics Hives and Rash     Per pt as achild    Erythromycin Hives     Per as a child       Review of Systems    Video Exam    There were no vitals filed for this visit  Physical Exam     I spent 47 minutes directly with the patient during this visit     Session start time: 2:09pm   Session end time: 2:56pm    937 Joe Tony verbally agrees to participate in [facility]  Pt is aware that [facility] could be limited without vital signs or the ability to perform a full hands-on physical exam  Libertad Beyer understands she or the provider may request at any time to terminate the video visit and request the patient to seek care or treatment in person

## 2022-06-10 NOTE — BH TREATMENT PLAN
Due to cancelled appointments on May 6, 2022 and May 27, 2022, this treatment plan was completed outside of the expected time-frame  This was the first session since the expiration date of the previous treatment plan update  Eamon Quinn  1985      Date of Initial Treatment Plan: October 3, 2019   Date of Current Treatment Plan: Kiki 10, 2022     Treatment Plan Number 7     Strengths/Personal Resources for Self Care: willingness to attend and work on therapeutic skills; some family support; motivation to be a good parent    Diagnosis:   1  Bipolar I disorder, most recent episode depressed, severe with psychotic features ([facility] )      2  SHELLY (generalized anxiety disorder)      3  Obsessive-compulsive disorder, unspecified type            Area of Needs: Anxiety and mood regulation      Long Term Goal 1: "I need to work on my triggers, and I need to decide what I want to do with my relationship "      Target Date:                                     October 8, 2022  Treatment Plan Expiration Date:   December 7, 2022  Completion Date:                          To be determined         Short Term Objectives for Goal 1:                  1  Libertad will identify triggers and prompting events that increase symptoms of anger, depression, and anxiety    Update Kiki 10, 2022: Ana Fisher continues to exhibit the ability to identify some of the triggers and prompting events that impact her mood dysregulation  Currently, she identifies her relationship with her SO as a primary trigger for anger, sadness, and anxiety  She states that she does not know what to expect when she speaks with him, and she expresses frustration with his lack of willingness to communicate with her  We will continue to work on identifying stressors and triggers as they arise  We will adjust treatment interventions, as needed, to manage symptoms        Burton Fay learn and exhibit understanding of a minimum of three distress tolerance skills to assist with symptom reduction  Update Kiki 10, 2022: Pollie Meigs states that she has been feeling overwhelmed by the amount of responsibility she has with regard to caring for her children and her SO's children  She states that she finds that taking some time to herself is helpful; however, she states that she does not often have this option  Pollie Meigs does endorse use of some basic mindfulness-based strategies to manage her moods  She states that she has made a decision that she does not want to get a medicinal MJ card  She continues to work on talking through her emotions, rather than acting on her anger                   3  Libertad will learn and exhibit understanding of effective communication skills (using a DBT-informed perspective), especially with her boyfriend  Update Kiki 10, 2022: Pollie Meigs states that she continues to attempt to use DBT-informed communication skills  She states, "I give up when he snaps back " We will continue to work on limit-setting and responding in an effective manner                   4  Carmela Pall identify and maintain personal limits and boundaries in relationships with her boyfriend and others, as needed  Any boundary crossings will be discussed in therapy sessions  Update Kiki 10, 2022: Pollie Meigs continues to work on setting limits with her SO and with his ex-  She states that she will be starting a new job soon, and this will force them to find other options for the care of the children  She states that she has been continuing to practice setting appropriate limits/boundaries with him                   5  Pollie Meigs will make a decision regarding her future plans for her relationship with her significant other  Update Kiki 10, 2022: Pollie Meigs continues to identify ups and downs in the relationship between her and her SO   She states that she wants to focus some time (with the use of MI techniques and DBT-informed skills) on determining the pros and cons of remaining in this relationship                   6  Vargas Lozano will maintain a "mild" level of anxiety and depression as evidenced by the PHQ-9 and SHELLY-7 screenings  Update Kiki 10, 2022: Vargas Lozano did not participate in the completion of PHQ-9 and SHELLY-7 screenings during this session  She will complete these screenings at our next in-person session               7  Vargas Lozano will consider addressing trauma, as it is appropriate to her treatment  At this point, she does not want to engage in pointed trauma-focused therapy  We will continue to address trauma in a trauma-informed approach, using DBT-informed care  Update Kiki 10, 2022: Vargas Lozano addresses trauma as it relates to her current situation  When she is managing her moods effectively, we spend additional time discussing her trauma timeline and working on ways to use some exposure techniques to help her manage her anxiety  6  Vargas Lozano will meet with a psychiatrist/AP and determine if medications will be a helpful tool to manage her moods  Update Kiki 10, 2022: Vargas Lozano states that she continues to work with her PCP to manage her medications  She states that her weight management doctor previously prescribed topamax; however, she had significant mood dysregulation that seemed to be exacerbated by the topamax   She has stopped this medication and notes an improvement in her mood       GOAL 1: Modality: Individual 1-2x per month   Completion Date to be determined and The person(s) responsible for carrying out the plan is  Libertad (client) Santino Dominguez (clinician)     Clinician will use client-centered therapy, mindfulness-based strategies, DBT-informed skills and solution-focused therapy to address Libertad's symptoms of anxiety and mood regulation  Libertad will practice skills between sessions and will report back, during subsequent sessions regarding successes and barriers       [address] [place] [address] Lu: Diagnosis and Treatment Plan explained to Libertad Maurer relates understanding diagnosis and is agreeable to Treatment Plan        Client Comments : Please share your thoughts, feelings, need and/or experiences regarding your treatment plan: Not at this time  Jigar Beach, 1985, actively participated in the review and update of this treatment plan during a virtual session, using the [employer]  Jigar Beach  provided verbal consent on 6/16/2022 at 3:06pm  The treatment plan was transcribed into the [employer] Record at a later time

## 2022-06-17 ENCOUNTER — PATIENT MESSAGE (OUTPATIENT)
Dept: FAMILY MEDICINE CLINIC | Facility: CLINIC | Age: 37
End: 2022-06-17

## 2022-06-17 DIAGNOSIS — F31.5 BIPOLAR I DISORDER, MOST RECENT EPISODE DEPRESSED, SEVERE WITH PSYCHOTIC FEATURES (HCC): ICD-10-CM

## 2022-06-17 RX ORDER — LAMOTRIGINE 50 MG/1
100 TABLET, EXTENDED RELEASE ORAL DAILY
Qty: 60 TABLET | Refills: 1 | Status: SHIPPED | OUTPATIENT
Start: 2022-06-17

## 2022-06-24 ENCOUNTER — TELEMEDICINE (OUTPATIENT)
Dept: BEHAVIORAL/MENTAL HEALTH CLINIC | Facility: CLINIC | Age: 37
End: 2022-06-24
Payer: COMMERCIAL

## 2022-06-24 DIAGNOSIS — F41.1 GAD (GENERALIZED ANXIETY DISORDER): Chronic | ICD-10-CM

## 2022-06-24 DIAGNOSIS — F31.5 BIPOLAR I DISORDER, MOST RECENT EPISODE DEPRESSED, SEVERE WITH PSYCHOTIC FEATURES (HCC): Primary | Chronic | ICD-10-CM

## 2022-06-24 DIAGNOSIS — F43.10 POST TRAUMATIC STRESS DISORDER: ICD-10-CM

## 2022-06-24 PROCEDURE — 90834 PSYTX W PT 45 MINUTES: CPT | Performed by: SOCIAL WORKER

## 2022-06-24 NOTE — PSYCH
Virtual Regular Visit    Verification of patient location:    Patient is located in the following state in which I hold an active license [place]    Assessment/Plan:    Problem List Items Addressed This Visit        Other    Bipolar I disorder, most recent episode depressed, severe with psychotic features ([facility] ) - Primary (Chronic)    SHELLY (generalized anxiety disorder) (Chronic)    Post traumatic stress disorder        Goals addressed in session: Goal 1      Reason for visit is No chief complaint on file  Encounter provider CHARLIE Joseph    Provider located at [address] [place] [id number] [phone]    Recent Visits  No visits were found meeting these conditions  Showing recent visits within past 7 days and meeting all other requirements  Future Appointments  No visits were found meeting these conditions  Showing future appointments within next 150 days and meeting all other requirements     The patient was identified by name and date of birth  Elle Butt was informed that this is a telemedicine visit and that the visit is being conducted throughEpic Embedded and patient was informed this is a secure, HIPAA-complaint platform  She agrees to proceed     My office door was closed  No one else was in the room  She acknowledged consent and understanding of privacy and security of the video platform  The patient has agreed to participate and understands they can discontinue the visit at any time  Patient is aware this is a billable service  Subjective  Elle Butt is a 29 y o  female    DATA: Met with Rob Sigala for scheduled individual session  Topics of discussion included relationship with significant other, family stressors, relationships with family, work-related stress, parenting concerns, financial issues and mood regulation and symptoms  "Work is going ok   It's a lot of information, so I'm a little nervous " 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   She states, "I don't want to make it worse for them " This clinician validated her experience and encouraged her to continue to learn and do the job  She states that she and her SO are still not getting along very well  She states that they have been able to have some discussions about limit-setting and her need to work and not be distracted by having to baby-sit his ex's children  She states that she wants to work on improving their relationship  She is agreeable to asking him to attend a session in the future  Client shows evidence of utilizing Mindfulness-based strategies and emotion regulation skills skills to manage mental health symptoms  During this session, this clinician used the following therapeutic modalities: supportive psychotherapy, client-centered therapy, mindfulness-based strategies, DBT-informed skills, Motivational Interviewing and solution-focused therapy  ASSESSMENT: Alana Maria presents with a somewhat dysthymic and anxious mood  Her affect is normal range and intensity, appropriate  Alana Maria exhibits good therapeutic rapport with this clinician  Alana Maria continues to exhibit willingness to work on treatment goals and objectives  Alana Maria presents with a minimal risk of suicide, minimal risk of self-harm, and minimal risk of harm to others  PLAN: Alana Maria will return in approximately 4-6 weeks for the next scheduled session  Between sessions, Alana Maria will continue to monitor her mood regulation and practice use of effective communication skills  She will report back during the next session re: successes and barriers   At the next session, this clinician will use supportive psychotherapy, client-centered therapy, mindfulness-based strategies, DBT-informed skills, Motivational Interviewing and solution-focused therapy to address her mood regulation and relationship concerns, in an effort to assist Calixto Ro with meeting treatment goals  HPI     Past Medical History:   Diagnosis Date    Abnormal Pap smear of cervix     Eczema     Exposure to chlamydia     Resolved 17    Migraine without aura and without status migrainosus, not intractable 10/11/2019    Obesity     Post traumatic stress disorder 2021    Postpartum depression 2018    Manoj-Parkinson-White (WPW) syndrome        Past Surgical History:   Procedure Laterality Date    ABLATION OF DYSRHYTHMIC FOCUS      WPW ablation age 15    COLONOSCOPY      COLPOSCOPY      INDUCED       OTHER SURGICAL HISTORY      catheter ablation- WPW age 13       Current Outpatient Medications   Medication Sig Dispense Refill    ibuprofen (MOTRIN) 600 mg tablet Take 1 tablet (600 mg total) by mouth every 6 (six) hours as needed for mild pain, moderate pain or fever 30 tablet 0    lamoTRIgine ER 50 MG TB24 Take 2 tablets (100 mg total) by mouth in the morning 60 tablet 1    Magnesium Oxide (Mag-Oxide) 200 MG TABS Take 400 mg daily  0    naproxen (NAPROSYN) 500 mg tablet Take 1 tablet (500 mg total) by mouth 2 (two) times a day with meals 30 tablet 0    NuvaRing 0 12-0 015 MG/24HR vaginal ring Insert vaginally and leave in place for 3 consecutive weeks, then remove for 1 week  3 each 3    Riboflavin 400 MG CAPS Take 1 tablet daily  0    rizatriptan (Maxalt-MLT) 10 MG disintegrating tablet Take 1 tablet (10 mg total) by mouth once as needed for migraine for up to 1 dose May repeat in 2 hours if needed 9 tablet 1     No current facility-administered medications for this visit  Allergies   Allergen Reactions    Codeine Other (See Comments)     dry heaves    Sulfa Antibiotics Hives and Rash     Per pt as achild    Erythromycin Hives     Per as a child       Review of Systems    Video Exam    There were no vitals filed for this visit      Physical Exam     I spent 48 minutes directly with the patient during this visit     Session start time: 2:04pm  Session end time: 2:52pm    VIRTUAL VISIT Ngoc Benz verbally agrees to participate in [employer]  Pt is aware that [employer] could be limited without vital signs or the ability to perform a full hands-on physical exam  Libertad Roca understands she or the provider may request at any time to terminate the video visit and request the patient to seek care or treatment in person

## 2022-07-02 DIAGNOSIS — G43.109 MIGRAINE WITH AURA AND WITHOUT STATUS MIGRAINOSUS, NOT INTRACTABLE: ICD-10-CM

## 2022-07-02 RX ORDER — RIZATRIPTAN BENZOATE 10 MG/1
TABLET, ORALLY DISINTEGRATING ORAL
Qty: 9 TABLET | Refills: 1 | Status: SHIPPED | OUTPATIENT
Start: 2022-07-02 | End: 2022-10-13 | Stop reason: SDUPTHER

## 2022-07-06 ENCOUNTER — TELEMEDICINE (OUTPATIENT)
Dept: BEHAVIORAL/MENTAL HEALTH CLINIC | Facility: CLINIC | Age: 37
End: 2022-07-06
Payer: COMMERCIAL

## 2022-07-06 DIAGNOSIS — F43.10 POST TRAUMATIC STRESS DISORDER: ICD-10-CM

## 2022-07-06 DIAGNOSIS — F41.1 GAD (GENERALIZED ANXIETY DISORDER): Chronic | ICD-10-CM

## 2022-07-06 DIAGNOSIS — F31.5 BIPOLAR I DISORDER, MOST RECENT EPISODE DEPRESSED, SEVERE WITH PSYCHOTIC FEATURES (HCC): Primary | Chronic | ICD-10-CM

## 2022-07-06 PROCEDURE — 90834 PSYTX W PT 45 MINUTES: CPT | Performed by: SOCIAL WORKER

## 2022-07-06 NOTE — PSYCH
Virtual Regular Visit    Verification of patient location:    Patient is located in the following state in which I hold an active license [place]    Assessment/Plan:    Problem List Items Addressed This Visit        Other    Bipolar I disorder, most recent episode depressed, severe with psychotic features ([facility] [address] ) - Primary (Chronic)    SHELLY (generalized anxiety disorder) (Chronic)    Post traumatic stress disorder        Goals addressed in session: Goal 1      Reason for visit is No chief complaint on file  Encounter provider Fain Krabbe, SW    Provider located at [address] [place] [id number] [phone]      Recent Visits  No visits were found meeting these conditions  Showing recent visits within past 7 days and meeting all other requirements  Future Appointments  No visits were found meeting these conditions  Showing future appointments within next 150 days and meeting all other requirements       The patient was identified by name and date of birth  Yolanda Easton was informed that this is a telemedicine visit and that the visit is being conducted throughEpic Embedded and patient was informed this is a secure, HIPAA-complaint platform  She agrees to proceed     My office door was closed  No one else was in the room  She acknowledged consent and understanding of privacy and security of the video platform  The patient has agreed to participate and understands they can discontinue the visit at any time  Patient is aware this is a billable service  Subjective  Yolanda Easton is a 29 y o  female  DATA: Met with Jeff Fraser for scheduled individual session  Topics of discussion included relationship with significant other, relationships with family, work-related stress, parenting concerns, financial issues and mood regulation and symptoms   "Today is Syble Butisha' birthday, and Yancy Ponce and I are arguing about it " Calixto Ro states that she and her partner have been arguing a lot  She states, "I have been treating him the way that I am being treated " She states that he has been depressed and is not willing to take care of his own mental health " She expressed her ambivalence about the relationship  As we discussed this further, she acknowledges that she does not want to leave the relationship  We worked on operationally defining the behaviors that are intolerable to her  She identifies that he is significantly impacted by his work  He will be leaving his work and will have a month off before starting his new job  This clinician encouraged her to invite him to participate in a session while he is off from work  Calixto Ro states that she is enjoying her job and "it's going pretty well " She feels that the work-environment is very supportive and very family-oriented  She states that she is feeling a bit more comfortable about her ability to manage the phone calls  Client shows evidence of utilizing Mindfulness-based strategies and distress tolerance skills skills to manage mental health symptoms  During this session, this clinician used the following therapeutic modalities: supportive psychotherapy, client-centered therapy, mindfulness-based strategies, DBT-informed skills, Motivational Interviewing and solution-focused therapy  ASSESSMENT: Calixto Ro presents with a somewhat dysthymic mood, primarily related to her current relationship  Her affect is normal range and intensity, appropriate  Calixto Ro exhibits good therapeutic rapport with this clinician  Calixto Ro continues to exhibit willingness to work on treatment goals and objectives  Calixto Ro presents with a minimal risk of suicide, minimal risk of self-harm, and minimal risk of harm to others  PLAN: Calixto Ro will return in approximately 2-3 weeks for the next scheduled session   Between sessions, Calixto Ro will work on improving communication with her SO (while maintaining her limits/boundaries) and will report back during the next session re: successes and barriers  At the next session, this clinician will use supportive psychotherapy, client-centered therapy, mindfulness-based strategies, DBT-informed skills, Motivational Interviewing and solution-focused therapy to address her mood regulation and relationship concerns, in an effort to assist [place] with meeting treatment goals  HPI     Past Medical History:   Diagnosis Date    Abnormal Pap smear of cervix     Eczema     Exposure to chlamydia     Resolved 17    Migraine without aura and without status migrainosus, not intractable 10/11/2019    Obesity     Post traumatic stress disorder 2021    Postpartum depression 2018    Manoj-Parkinson-White (WPW) syndrome        Past Surgical History:   Procedure Laterality Date    ABLATION OF DYSRHYTHMIC FOCUS      WPW ablation age 15    COLONOSCOPY      COLPOSCOPY      INDUCED       OTHER SURGICAL HISTORY      catheter ablation- WPW age 13       Current Outpatient Medications   Medication Sig Dispense Refill    ibuprofen (MOTRIN) 600 mg tablet Take 1 tablet (600 mg total) by mouth every 6 (six) hours as needed for mild pain, moderate pain or fever 30 tablet 0    lamoTRIgine ER 50 MG TB24 Take 2 tablets (100 mg total) by mouth in the morning 60 tablet 1    Magnesium Oxide (Mag-Oxide) 200 MG TABS Take 400 mg daily  0    naproxen (NAPROSYN) 500 mg tablet Take 1 tablet (500 mg total) by mouth 2 (two) times a day with meals 30 tablet 0    NuvaRing 0 12-0 015 MG/24HR vaginal ring Insert vaginally and leave in place for 3 consecutive weeks, then remove for 1 week   3 each 3    Riboflavin 400 MG CAPS Take 1 tablet daily  0    rizatriptan (MAXALT-MLT) 10 mg disintegrating tablet TAKE 1 TAB ORALLY ONCE AS NEEDED FOR MIGRAINE FOR UP TO 1 DOSE  MAY REPEAT IN 2 HOURS IF NEEDED 9 tablet 1     No current facility-administered medications for this visit  Allergies   Allergen Reactions    Codeine Other (See Comments)     dry heaves    Sulfa Antibiotics Hives and Rash     Per pt as achild    Erythromycin Hives     Per as a child       Review of Systems    Video Exam    There were no vitals filed for this visit  Physical Exam     I spent 44 minutes directly with the patient during this visit  Session start time: 8:13am  Session end time: 8:57am    VIRTUAL VISIT DISCLAIMER    Phuong Ayers verbally agrees to participate in [employer]  Pt is aware that [employer] could be limited without vital signs or the ability to perform a full hands-on physical exam  Libertad Wallace understands she or the provider may request at any time to terminate the video visit and request the patient to seek care or treatment in person

## 2022-07-14 ENCOUNTER — APPOINTMENT (OUTPATIENT)
Dept: RADIOLOGY | Facility: MEDICAL CENTER | Age: 37
End: 2022-07-14
Payer: COMMERCIAL

## 2022-07-14 ENCOUNTER — OFFICE VISIT (OUTPATIENT)
Dept: OBGYN CLINIC | Facility: MEDICAL CENTER | Age: 37
End: 2022-07-14
Payer: COMMERCIAL

## 2022-07-14 VITALS
BODY MASS INDEX: 37.21 KG/M2 | SYSTOLIC BLOOD PRESSURE: 105 MMHG | HEIGHT: 63 IN | DIASTOLIC BLOOD PRESSURE: 72 MMHG | WEIGHT: 210 LBS

## 2022-07-14 DIAGNOSIS — S83.005D PATELLAR DISLOCATION, LEFT, SUBSEQUENT ENCOUNTER: Primary | ICD-10-CM

## 2022-07-14 DIAGNOSIS — S83.001A PATELLAR SUBLUXATION, RIGHT, INITIAL ENCOUNTER: ICD-10-CM

## 2022-07-14 DIAGNOSIS — M25.361 PATELLAR INSTABILITY OF RIGHT KNEE: ICD-10-CM

## 2022-07-14 DIAGNOSIS — M25.561 RIGHT KNEE PAIN, UNSPECIFIED CHRONICITY: ICD-10-CM

## 2022-07-14 DIAGNOSIS — M25.362 PATELLAR INSTABILITY OF LEFT KNEE: ICD-10-CM

## 2022-07-14 PROCEDURE — 99213 OFFICE O/P EST LOW 20 MIN: CPT | Performed by: ORTHOPAEDIC SURGERY

## 2022-07-14 PROCEDURE — 73564 X-RAY EXAM KNEE 4 OR MORE: CPT

## 2022-07-14 NOTE — PROGRESS NOTES
Ortho Sports Medicine Knee Follow Up Visit   Assesment:     29 y o  female left knee patellar instability with possible right knee patellar instability versus patellofemoral syndrome    Plan:    Conservative treatment:    Ice to knee for 20 minutes at least 1-2 times daily  Braces for bilateral  Knees with J brace as prescribed and given    Imaging: All imaging from today was reviewed by myself and explained to the patient  Injection:    No Injection planned at this time  Surgery:     No surgery is recommended at this point, continue with conservative treatment plan as noted  Follow up:    No follow-ups on file  Chief Complaint   Patient presents with    Left Knee - Follow-up    Right Knee - Follow-up       History of Present Illness: The patient is returns for follow up of bilateral knees  Since the prior visit, She reports minimal improvement  She notes instability of her left knee and pain of both knees  Pain is worse with stairs and activities  Pain is better with rest   No other complaints at this time      Knee Surgical History:  None    Past Medical, Social and Family History:  Past Medical History:   Diagnosis Date    Abnormal Pap smear of cervix     Eczema     Exposure to chlamydia     Resolved 17    Migraine without aura and without status migrainosus, not intractable 10/11/2019    Obesity     Post traumatic stress disorder 2021    Postpartum depression 2018    Manoj-Parkinson-White (WPW) syndrome      Past Surgical History:   Procedure Laterality Date    ABLATION OF DYSRHYTHMIC FOCUS      WPW ablation age 15    COLONOSCOPY      COLPOSCOPY      INDUCED       OTHER SURGICAL HISTORY      catheter ablation- WPW age 13     Allergies   Allergen Reactions    Codeine Other (See Comments)     dry heaves    Sulfa Antibiotics Hives and Rash     Per pt as achild    Erythromycin Hives     Per as a child     Current Outpatient Medications on File Prior to Visit   Medication Sig Dispense Refill    ibuprofen (MOTRIN) 600 mg tablet Take 1 tablet (600 mg total) by mouth every 6 (six) hours as needed for mild pain, moderate pain or fever 30 tablet 0    lamoTRIgine ER 50 MG TB24 Take 2 tablets (100 mg total) by mouth in the morning 60 tablet 1    Magnesium Oxide (Mag-Oxide) 200 MG TABS Take 400 mg daily  0    naproxen (NAPROSYN) 500 mg tablet Take 1 tablet (500 mg total) by mouth 2 (two) times a day with meals 30 tablet 0    NuvaRing 0 12-0 015 MG/24HR vaginal ring Insert vaginally and leave in place for 3 consecutive weeks, then remove for 1 week  3 each 3    Riboflavin 400 MG CAPS Take 1 tablet daily  0    rizatriptan (MAXALT-MLT) 10 mg disintegrating tablet TAKE 1 TAB ORALLY ONCE AS NEEDED FOR MIGRAINE FOR UP TO 1 DOSE  MAY REPEAT IN 2 HOURS IF NEEDED 9 tablet 1     No current facility-administered medications on file prior to visit       Social History     Socioeconomic History    Marital status: Single     Spouse name: Not on file    Number of children: 2    Years of education: Not on file    Highest education level: Some college, no degree   Occupational History    Occupation: works in retail   Tobacco Use    Smoking status: Never Smoker    Smokeless tobacco: Never Used   Vaping Use    Vaping Use: Never used   Substance and Sexual Activity    Alcohol use: Yes     Comment: social    Drug use: No    Sexual activity: Yes     Partners: Male     Birth control/protection: Condom Male   Other Topics Concern    Not on file   Social History Narrative    Education: high school graduate and some college    Learning Disabilities: none    Marital History: single    Children: 1 daughter, 1 son    Living Arrangement: lives in home with boyfriend, 2 children and boyfriend's 2 children    Occupational History: works part time at [employer] and [employer] works    Functioning Relationships: boyfriend is supportive    Legal History: none     History: None Family planning    IUD contraception     Social Determinants of Health     Financial Resource Strain: Not on file   Food Insecurity: Not on file   Transportation Needs: Not on file   Physical Activity: Not on file   Stress: Not on file   Social Connections: Not on file   Intimate Partner Violence: Not on file   Housing Stability: Not on file         I have reviewed the past medical, surgical, social and family history, medications and allergies as documented in the EMR  Review of systems: ROS is negative other than that noted in the HPI  Constitutional: Negative for fatigue and fever  Physical Exam:    Blood pressure 105/72, height 5' 2 5" (1 588 m), weight 95 3 kg (210 lb), currently breastfeeding  General/Constitutional: NAD, well developed, well nourished  HENT: Normocephalic, atraumatic  CV: Intact distal pulses, regular rate  Resp: No respiratory distress or labored breathing  Lymphatic: No lymphadenopathy palpated  Neuro: Alert and Oriented x 3, no focal deficits  Psych: Normal mood, normal affect, normal judgement, normal behavior  Skin: Warm, dry, no rashes, no erythema      Knee Exam (focused): RIGHT LEFT   ROM:   0-130 0-130   Palpation: Effusion negative negative     MJL tenderness Negative Negative     LJL tenderness Negative Negative   Meniscus:  Johanne Negative Negative    Apley's Compression Negative Negative   Instability: Varus stable stable     Valgus stable stable   Special Tests: Lachman Negative Negative     Posterior drawer Negative Negative     Anterior drawer Negative Negative     Pivot shift not tested not tested     Dial not tested not tested   Patella: Palpation medial facet ttp medial facet ttp     Mobility 1/4 3/4     Apprehension Negative Positive   Other: Single leg 1/4 squat not tested not tested           LE NV Exam: +2 DP/PT pulses bilaterally  Sensation intact to light touch L2-S1 bilaterally    No calf tenderness to palpation bilaterally      Knee Imaging    No imaging was performed today

## 2022-07-25 NOTE — PROGRESS NOTES
Assessment/Plan:      Diagnoses and all orders for this visit:    Pelvic pain      - encouraged to complete pelvic US   -encouraged to continue using NuvaRing  Reviewed with patient can take naproxen twice daily with food with onset of back pain or day prior to removing ring for menses  Naproxen can help decrease pain and amount of bleeding   -encouraged to continue monitoring menses  - signs and symptoms to report reviewed   -will call/[employer] message with results    RTO 2022 for annual exam or sooner as needed     Subjective:     Patient ID: Julio Macdonald is a 29 y o  female  HPI  here with complaints of changes with last menstrual cycle  LMP 22  Menses are typically monthly lasting 3 days with light bleeding  Most recent menstrual cycle lasted 5-6 days  Had low back pain starting 1-2 days prior to bleeding and lasted throughout menstrual cycle  Back pain is resolved at this time  Had a lot of pelvic cramping during menses  Does not typically have any pain with menses  States bleeding was heavier with clots  Difficult to quantify bleeding, patient states possibly changing tampon 4 times during the day  Had to get up at night to change product  Denies associated symptoms  Today is having intermittent occasional soreness/pelvic pain  Pain occurs with coughing, sneezing or transitioning from a seated to a standing position  Denies alleviating factors  Took  OTC ibuprofen during menses  Is sexually active using NuvaRing for contraception  Has been on NuvaRing for 3-4 years  Denies fever, chills, new partner, denies concerns for STI,  vaginal discharge, bowel or bladder concerns  Denies history of abdominal surgeries    Last pap smear 12/15/22 ASCUS/ HR HPV(-)  Had gardasil vaccine    Review of Systems   Constitutional: Negative for chills, fatigue and fever  Respiratory: Negative  Cardiovascular: Negative  Genitourinary: Positive for menstrual problem and pelvic pain  Negative for decreased urine volume, difficulty urinating, dyspareunia, dysuria, enuresis, flank pain, frequency, genital sores, hematuria, urgency, vaginal bleeding, vaginal discharge and vaginal pain  Objective:  /84   Ht 5' 2 5" (1 588 m)   Wt 95 3 kg (210 lb)   LMP 06/20/2022   BMI 37 80 kg/m²      Physical Exam  Vitals reviewed  Exam conducted with a chaperone present  Constitutional:       Appearance: Normal appearance  Genitourinary:     General: Normal vulva  Vagina: Normal       Cervix: Normal       Uterus: Normal        Adnexa:         Right: Tenderness present  No mass or fullness  Left: No mass or tenderness  Comments: Difficult to completely assess uterus with bimanual exam due to body habitus  Tenderness with palpation of right adnexa  Neurological:      Mental Status: She is alert and oriented to person, place, and time     Psychiatric:         Mood and Affect: Mood normal          Behavior: Behavior normal

## 2022-07-26 ENCOUNTER — OFFICE VISIT (OUTPATIENT)
Dept: OBGYN CLINIC | Facility: MEDICAL CENTER | Age: 37
End: 2022-07-26
Payer: COMMERCIAL

## 2022-07-26 VITALS
DIASTOLIC BLOOD PRESSURE: 84 MMHG | WEIGHT: 210 LBS | BODY MASS INDEX: 37.21 KG/M2 | SYSTOLIC BLOOD PRESSURE: 110 MMHG | HEIGHT: 63 IN

## 2022-07-26 DIAGNOSIS — R10.2 PELVIC PAIN: Primary | ICD-10-CM

## 2022-07-26 LAB
BV WHIFF TEST VAG QL: NORMAL
CLUE CELLS SPEC QL WET PREP: NORMAL
PH SMN: 4.5 [PH]
SL AMB POCT WET MOUNT: NORMAL
T VAGINALIS VAG QL WET PREP: NORMAL
YEAST VAG QL WET PREP: NORMAL

## 2022-07-26 PROCEDURE — 87210 SMEAR WET MOUNT SALINE/INK: CPT | Performed by: NURSE PRACTITIONER

## 2022-07-26 PROCEDURE — 99213 OFFICE O/P EST LOW 20 MIN: CPT | Performed by: NURSE PRACTITIONER

## 2022-07-27 ENCOUNTER — TELEMEDICINE (OUTPATIENT)
Dept: BEHAVIORAL/MENTAL HEALTH CLINIC | Facility: CLINIC | Age: 37
End: 2022-07-27
Payer: COMMERCIAL

## 2022-07-27 DIAGNOSIS — F31.5 BIPOLAR I DISORDER, MOST RECENT EPISODE DEPRESSED, SEVERE WITH PSYCHOTIC FEATURES (HCC): Primary | Chronic | ICD-10-CM

## 2022-07-27 DIAGNOSIS — F41.1 GAD (GENERALIZED ANXIETY DISORDER): Chronic | ICD-10-CM

## 2022-07-27 PROCEDURE — 90834 PSYTX W PT 45 MINUTES: CPT | Performed by: SOCIAL WORKER

## 2022-07-27 NOTE — PSYCH
Virtual Regular Visit    Verification of patient location:    Patient is located in the following state in which I hold an active license [place]    Assessment/Plan:    Problem List Items Addressed This Visit        Other    Bipolar I disorder, most recent episode depressed, severe with psychotic features ([address] ) - Primary (Chronic)    SHELLY (generalized anxiety disorder) (Chronic)        Goals addressed in session: Goal 1      Reason for visit is No chief complaint on file  Encounter provider CHARLIE Dumont    Provider located at [address] [place] [id number] [phone]      Recent Visits  Date Type Provider Dept   07/27/22 [address], [address] [facility]   Showing recent visits within past 7 days and meeting all other requirements  Future Appointments  No visits were found meeting these conditions  Showing future appointments within next 150 days and meeting all other requirements       The patient was identified by name and date of birth  Clara Carrion was informed that this is a telemedicine visit and that the visit is being conducted throughEpic Embedded and patient was informed this is a secure, HIPAA-complaint platform  She agrees to proceed     My office door was closed  No one else was in the room  She acknowledged consent and understanding of privacy and security of the video platform  The patient has agreed to participate and understands they can discontinue the visit at any time  Patient is aware this is a billable service  Subjective  Clara Carrion is a 29 y o  female  DATA: Met with Mae Umaña for scheduled individual session  Initially, we had difficulty connecting via the [address]   The clinician provided troubleshooting assistance and we started our session through the telephone until the client and clinician were able to link through the [employer] platform  We were shawn to complete the remainder of the session through the [employer] platform  Topics of discussion included relationship with significant other, family stressors, work-related stress, physical health concerns, financial issues, housing issues and mood regulation and symptoms  "I feel trapped here  I don't know what to do " Rob Sigala states that she and her SO have been continuing to argue  Most of their arguments surround parenting issues  This clinician utilized solution-focused techniques to assist Rob Sigala with making some decisions about her relationship  She states that she continues to work daily (from home), which has been a struggle, since she has her SO and the children in the same room where she is attempting to do her work  We discussed any alternatives to working in the living area of the home  We also discussed activities that she can participate in that give her a break from her family responsibilities  Rob Sigala discussed her upcoming vacation with her family  She states that she is not looking forward to the time away, as she is feeling frustrated with her relationship  she states that she and her SO will not have any time to talk through their relationship concerns  This writer encouraged Rob Sigala to invite her SO to participate in one of her sessions  Client shows evidence of utilizing distress tolerance skills skills to manage mental health symptoms  During this session, this clinician used the following therapeutic modalities: supportive psychotherapy, client-centered therapy, mindfulness-based strategies, DBT-informed skills, Motivational Interviewing and solution-focused therapy  ASSESSMENT: Rob Sigala presents with a somewhat dysthymic mood  Her affect is normal range and intensity, appropriate  Rob Sigala exhibits good therapeutic rapport with this clinician  Rob Sigala continues to exhibit willingness to work on treatment goals and objectives   Rob Sigala presents with a minimal risk of suicide, minimal risk of self-harm, and minimal risk of harm to others  PLAN: Nisha Cuevas will return in approximately 2-3 weeks for the next scheduled session  Between sessions, Nisha Cuevas will monitor her moods, consider options for remaining in vs leaving her relationship, and invite her SO to attend a therapy session with her  She will report back during the next session re: successes and barriers  At the next session, this clinician will use supportive psychotherapy, client-centered therapy, mindfulness-based strategies, DBT-informed skills, Motivational Interviewing and solution-focused therapy to address her mood regulation and relationship concerns, in an effort to assist Nisha Cuevas with meeting treatment goals  HPI     Past Medical History:   Diagnosis Date    Abnormal Pap smear of cervix     Eczema     Exposure to chlamydia     Resolved 17    Migraine without aura and without status migrainosus, not intractable 10/11/2019    Obesity     Post traumatic stress disorder 2021    Postpartum depression 2018    Manoj-Parkinson-White (WPW) syndrome        Past Surgical History:   Procedure Laterality Date    ABLATION OF DYSRHYTHMIC FOCUS      WPW ablation age 15    COLONOSCOPY      COLPOSCOPY      INDUCED       OTHER SURGICAL HISTORY      catheter ablation- WPW age 13       Current Outpatient Medications   Medication Sig Dispense Refill    ibuprofen (MOTRIN) 600 mg tablet Take 1 tablet (600 mg total) by mouth every 6 (six) hours as needed for mild pain, moderate pain or fever 30 tablet 0    lamoTRIgine ER 50 MG TB24 Take 2 tablets (100 mg total) by mouth in the morning 60 tablet 1    naproxen (NAPROSYN) 500 mg tablet Take 1 tablet (500 mg total) by mouth 2 (two) times a day with meals 30 tablet 0    NuvaRing 0 12-0 015 MG/24HR vaginal ring Insert vaginally and leave in place for 3 consecutive weeks, then remove for 1 week   3 each 3    rizatriptan (MAXALT-MLT) 10 mg disintegrating tablet TAKE 1 TAB ORALLY ONCE AS NEEDED FOR MIGRAINE FOR UP TO 1 DOSE  MAY REPEAT IN 2 HOURS IF NEEDED 9 tablet 1     No current facility-administered medications for this visit  Allergies   Allergen Reactions    Codeine Other (See Comments)     dry heaves    Sulfa Antibiotics Hives and Rash     Per pt as achild    Erythromycin Hives     Per as a child       Review of Systems    Video Exam    There were no vitals filed for this visit  Physical Exam     I spent 45 minutes directly with the patient during this visit     Session start time: 8:15am  Session end time: 9:00am      VIRTUAL VISIT Ngoc Benz verbally agrees to participate in [employer]  Pt is aware that [employer] could be limited without vital signs or the ability to perform a full hands-on physical exam  Libertad Johnson understands she or the provider may request at any time to terminate the video visit and request the patient to seek care or treatment in person

## 2022-08-06 ENCOUNTER — OFFICE VISIT (OUTPATIENT)
Dept: URGENT CARE | Facility: MEDICAL CENTER | Age: 37
End: 2022-08-06
Payer: COMMERCIAL

## 2022-08-06 VITALS
SYSTOLIC BLOOD PRESSURE: 109 MMHG | BODY MASS INDEX: 38.64 KG/M2 | HEIGHT: 62 IN | HEART RATE: 87 BPM | DIASTOLIC BLOOD PRESSURE: 54 MMHG | TEMPERATURE: 98 F | OXYGEN SATURATION: 97 % | WEIGHT: 210 LBS

## 2022-08-06 DIAGNOSIS — J34.89 SINUS PRESSURE: ICD-10-CM

## 2022-08-06 DIAGNOSIS — Z20.822 CONTACT WITH AND (SUSPECTED) EXPOSURE TO COVID-19: Primary | ICD-10-CM

## 2022-08-06 LAB
SARS-COV-2 AG UPPER RESP QL IA: POSITIVE
VALID CONTROL: ABNORMAL

## 2022-08-06 PROCEDURE — 87811 SARS-COV-2 COVID19 W/OPTIC: CPT | Performed by: PHYSICIAN ASSISTANT

## 2022-08-06 PROCEDURE — 99213 OFFICE O/P EST LOW 20 MIN: CPT | Performed by: PHYSICIAN ASSISTANT

## 2022-08-06 NOTE — PATIENT INSTRUCTIONS
Patient was educated on [place]  Patient was told to quarantine for 5 days from testing date  On day 6 may return back to work double mask  Patient was educated on hydration  Patient was told any chest pain or shortness of breath go to ED  Patient was told to follow up with PCP    COVID-19 (Coronavirus Disease 2019)   WHAT YOU NEED TO KNOW:   COVID-19 is the disease caused by a coronavirus first discovered in December 2019  Coronaviruses generally cause upper respiratory (nose, throat, and lung) infections, such as a cold  The 2019 virus spreads quickly and easily  It can be spread starting 2 to 3 days before symptoms even begin  DISCHARGE INSTRUCTIONS:   Call your local emergency number (911 in the [facility]) if:   You have trouble breathing or shortness of breath at rest     You have chest pain or pressure that lasts longer than 5 minutes  You become confused or hard to wake  Your lips or face are blue  Return to the emergency department if:   You have a fever of 104°F (40°C) or higher  Call your doctor if:   You have symptoms of COVID-19  You have questions or concerns about your condition or care  Medicines: You may need any of the following:  Decongestants  help reduce nasal congestion and help you breathe more easily  If you take decongestant pills, they may make you feel restless or cause problems with your sleep  Do not use decongestant sprays for more than a few days  Cough suppressants  help reduce coughing  Ask your healthcare provider which type of cough medicine is best for you  Acetaminophen  decreases pain and fever  It is available without a doctor's order  Ask how much to take and how often to take it  Follow directions  Read the labels of all other medicines you are using to see if they also contain acetaminophen, or ask your doctor or pharmacist  Acetaminophen can cause liver damage if not taken correctly   Do not use more than 4 grams (4,000 milligrams) total of acetaminophen in one day  NSAIDs , such as ibuprofen, help decrease swelling, pain, and fever  This medicine is available with or without a doctor's order  NSAIDs can cause stomach bleeding or kidney problems in certain people  If you take blood thinner medicine, always ask your healthcare provider if NSAIDs are safe for you  Always read the medicine label and follow directions  Blood thinners  help prevent blood clots  Clots can cause strokes, heart attacks, and death  The following are general safety guidelines to follow while you are taking a blood thinner:    Watch for bleeding and bruising while you take blood thinners  Watch for bleeding from your gums or nose  Watch for blood in your urine and bowel movements  Use a soft washcloth on your skin, and a soft toothbrush to brush your teeth  This can keep your skin and gums from bleeding  If you shave, use an electric shaver  Do not play contact sports  Tell your dentist and other healthcare providers that you take a blood thinner  Wear a bracelet or necklace that says you take this medicine  Do not start or stop any other medicines unless your healthcare provider tells you to  Many medicines cannot be used with blood thinners  Take your blood thinner exactly as prescribed by your healthcare provider  Do not skip does or take less than prescribed  Tell your provider right away if you forget to take your blood thinner, or if you take too much  Warfarin  is a blood thinner that you may need to take  The following are things you should be aware of if you take warfarin:     Foods and medicines can affect the amount of warfarin in your blood  Do not make major changes to your diet while you take warfarin  Warfarin works best when you eat about the same amount of vitamin K every day  Vitamin K is found in green leafy vegetables and certain other foods  Ask for more information about what to eat when you are taking warfarin      You will need to see your healthcare provider for follow-up visits when you are on warfarin  You will need regular blood tests  These tests are used to decide how much medicine you need  Take your medicine as directed  Contact your healthcare provider if you think your medicine is not helping or if you have side effects  Tell him or her if you are allergic to any medicine  Keep a list of the medicines, vitamins, and herbs you take  Include the amounts, and when and why you take them  Bring the list or the pill bottles to follow-up visits  Carry your medicine list with you in case of an emergency  What you need to know about variants: The virus has changed into several new forms (called variants) since it was discovered  The variants may be more contagious (easily spread) than the original form  Some may also cause more severe illness than others  What you need to know about COVID-19 vaccines:  Healthcare providers recommend a COVID-19 vaccine, even if you have already had COVID-19  You are considered fully vaccinated against COVID-19 two weeks after the final dose of any COVID-19 vaccine  Let your healthcare provider know when you have received the final dose of the vaccine  Make a copy of your vaccination card  Keep the original with you in case you need to show it  Keep the copy in a safe place  COVID-19 vaccines are given as a shot in 1 or 2 doses  Vaccination is recommended for everyone 5 years or older  One 2-dose vaccine is fully approved  for those 16 years or older  This vaccine also has an emergency use authorization (EUA) for children 11to 13years old  No vaccine is currently available for children younger than 5 years  A booster (additional) dose  is given to help the immune system continue to protect against severe COVID-19  A booster is recommended for all adults 18 or older  The booster can be a different brand of the COVID-19 vaccine than you originally received   The timing for the booster depends on the type of vaccine you received:    1-dose vaccine: The booster is given at least 2 months after you received the vaccine  2-dose vaccine: The booster is given at least 5 or 6 months after the second dose  A booster can be given to adolescents 15to 16years old  Only 1 COVID-19 vaccine has this EUA  The booster is given at least 5 months after the second dose of the original vaccine series  A booster is recommended for immunocompromised children 11to 6years old  Only 1 COVID-19 vaccine has this EUA  The booster is given 28 days after the second dose  Continue social distancing and other measures, even after you get the vaccine  Although it is not common, you can become infected after you get the vaccine  You may also be able to pass the virus to others without knowing you are infected  After you get the vaccine, check local, national, and international travel rules  You may need to be tested before you travel  Some countries require proof of a negative test before you travel  You may also need to quarantine after you return  Medicine may be given to prevent infection  The medicine can be given if you are at high risk for infection and cannot get the vaccine  It can also be given if your immune system does not respond well to the vaccine  How the 2019 coronavirus spreads:   Droplets are the main way all coronaviruses spread  The virus travels in droplets that form when a person talks, sings, coughs, or sneezes  The droplets can also float in the air for minutes or hours  Infection happens when you breathe in the droplets or get them in your eyes or nose  Close personal contact with an infected person increases your risk for infection  This means being within 6 feet (2 meters) of the person for at least 15 minutes over 24 hours  Person-to-person contact can spread the virus    For example, a person with the virus on his or her hands can spread it by shaking hands with someone  The virus can stay on objects and surfaces for up to 3 days  You may become infected by touching the object or surface and then touching your eyes or mouth  Help lower the risk for COVID-19:   Wash your hands often throughout the day  Use soap and water  Rub your soapy hands together, lacing your fingers, for at least 20 seconds  Rinse with warm, running water  Dry your hands with a clean towel or paper towel  Use hand  that contains alcohol if soap and water are not available  Teach children how to wash their hands and use hand   Cover sneezes and coughs  Turn your face away and cover your mouth and nose with a tissue  Throw the tissue away  Use the bend of your arm if a tissue is not available  Then wash your hands well with soap and water or use hand   Teach children how to cover a cough or sneeze  Wear a face covering (mask) when needed  Use a cloth covering with at least 2 layers  You can also create layers by putting a cloth covering over a disposable non-medical mask  Cover your mouth and your nose  Follow worldwide, national, and local social distancing guidelines  Keep at least 6 feet (2 meters) between you and others  Try not to touch your face  If you get the virus on your hands, you can transfer it to your eyes, nose, or mouth and become infected  You can also transfer it to objects, surfaces, or people  Clean and disinfect high-touch surfaces and objects often  Use disinfecting wipes, or make a solution of 4 teaspoons of bleach in 1 quart (4 cups) of water  Ask about other vaccines you may need  Get the influenza (flu) vaccine as soon as recommended each year, usually starting in September or October  Get the pneumonia vaccine if recommended  Your healthcare provider can tell you if you should also get other vaccines, and when to get them         Follow social distancing guidelines:  National and local social distancing rules vary  Rules and restrictions may change over time as restrictions are lifted  The following are general guidelines:  Stay home if you are sick or think you may have COVID-19  It is important to stay home if you are waiting for a testing appointment or for test results  Avoid close physical contact with anyone who does not live in your home  Do not shake hands with, hug, or kiss a person as a greeting  If you must use public transportation (such as a bus or subway), try to sit or stand away from others  Wear your face covering  Avoid in-person gatherings and crowds  Attend virtually if possible  Follow up with your doctor as directed:  Write down your questions so you remember to ask them during your visits  For more information:   [facility]  [address] , [address]  Phone: 0- [phone] - [phone]  Web Address: [employer] com br    © Copyright [employer] 2022 Information is for End User's use only and may not be sold, redistributed or otherwise used for commercial purposes  All illustrations and images included in CareNotes® are the copyrighted property of [employer]  or [facility]   The above information is an  only  It is not intended as medical advice for individual conditions or treatments  Talk to your doctor, nurse or pharmacist before following any medical regimen to see if it is safe and effective for you

## 2022-08-06 NOTE — PROGRESS NOTES
[facility]        NAME: Leticia Caraballo is a 29 y o  female  : 1985    MRN: [id number]  DATE: 2022  TIME: 12:51 PM    Assessment and Plan   Contact with and (suspected) exposure to covid-19 [Z20 822]  1  Contact with and (suspected) exposure to covid-19  Poct Covid 19 Rapid Antigen Test   2  Sinus pressure     Rapid COVID 19- Positive  Patient Instructions       Patient was educated on [place]  Patient was told to quarantine for 5 days from testing date  On day 6 may return back to work double mask  Patient was educated on hydration  Patient was told any chest pain or shortness of breath go to ED  Patient was told to follow up with PCP  Chief Complaint     Chief Complaint   Patient presents with    Sinusitis     Patient states she started Wednesday with sinus pressure on the L side of face, she had Diarrhea multiple times, she felt lightheaded  Only has sinus congestion now         History of Present Illness       Patient is here today complaining of sinus pressure that started on August 3, 2022  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 [facility]  Patient is not vaccinated for COVID 19  Review of Systems   Review of Systems   Constitutional: Positive for fever  HENT: Positive for congestion, rhinorrhea, sinus pressure and sinus pain  Respiratory: Positive for cough  Cardiovascular: Negative  Neurological: Negative  Psychiatric/Behavioral: Negative            Current Medications       Current Outpatient Medications:     ibuprofen (MOTRIN) 600 mg tablet, Take 1 tablet (600 mg total) by mouth every 6 (six) hours as needed for mild pain, moderate pain or fever, Disp: 30 tablet, Rfl: 0    lamoTRIgine ER 50 MG TB24, Take 2 tablets (100 mg total) by mouth in the morning, Disp: 60 tablet, Rfl: 1    naproxen (NAPROSYN) 500 mg tablet, Take 1 tablet (500 mg total) by mouth 2 (two) times a day with meals, Disp: 30 tablet, Rfl: 0    NuvaRing 0 12-0 015 MG/24HR vaginal ring, Insert vaginally and leave in place for 3 consecutive weeks, then remove for 1 week , Disp: 3 each, Rfl: 3    rizatriptan (MAXALT-MLT) 10 mg disintegrating tablet, TAKE 1 TAB ORALLY ONCE AS NEEDED FOR MIGRAINE FOR UP TO 1 DOSE  MAY REPEAT IN 2 HOURS IF NEEDED, Disp: 9 tablet, Rfl: 1    Current Allergies     Allergies as of 2022 - Reviewed 2022   Allergen Reaction Noted    Codeine Other (See Comments)     Sulfa antibiotics Hives and Rash     Erythromycin Hives 2012            The following portions of the patient's history were reviewed and updated as appropriate: allergies, current medications, past family history, past medical history, past social history, past surgical history and problem list      Past Medical History:   Diagnosis Date    Abnormal Pap smear of cervix     Eczema     Exposure to chlamydia     Resolved 17    Migraine without aura and without status migrainosus, not intractable 10/11/2019    Obesity     Post traumatic stress disorder 2021    Postpartum depression 2018    Manoj-Parkinson-White (WPW) syndrome        Past Surgical History:   Procedure Laterality Date    ABLATION OF DYSRHYTHMIC FOCUS      WPW ablation age 14    COLONOSCOPY      COLPOSCOPY      INDUCED       OTHER SURGICAL HISTORY      catheter ablation- WPW age 13       Family History   Problem Relation Age of Onset    Bipolar disorder Mother     Hypertension Mother     Hyperlipidemia Mother     Suicide Attempts Mother     Colon polyps Mother     Factor V Leiden deficiency Mother     Glucose-6-phos deficiency Father     Hyperlipidemia Father     Hypertension Father     No Known Problems Sister     Anxiety disorder Brother     Factor V Leiden deficiency Brother     Breast cancer Maternal Grandmother         dx approx age early 42's    Colon polyps Maternal Grandfather     Heart disease Maternal Grandfather  Crohn's disease Paternal Grandmother     Prostate cancer Paternal Grandfather     Pancreatic cancer Paternal Grandfather     Colon cancer Paternal Aunt     Mental illness Brother     Drug abuse Brother     Breast cancer Maternal Aunt 36    Factor V Leiden deficiency Cousin     Thyroid disease Neg Hx     Stroke Neg Hx          Medications have been verified  Objective   /54   Pulse 87   Temp 98 °F (36 7 °C)   Ht 5' 2" (1 575 m)   Wt 95 3 kg (210 lb)   LMP 07/06/2022   SpO2 97%   BMI 38 41 kg/m²   Patient's last menstrual period was 07/06/2022  Physical Exam     Physical Exam  Vitals and nursing note reviewed  Constitutional:       Appearance: Normal appearance  HENT:      Head: Normocephalic  Comments: No pain over frontal and maxillary sinus     Right Ear: Tympanic membrane, ear canal and external ear normal       Left Ear: Tympanic membrane, ear canal and external ear normal       Nose: Nose normal       Mouth/Throat:      Mouth: Mucous membranes are moist       Pharynx: Posterior oropharyngeal erythema present  No oropharyngeal exudate  Comments: PND  Eyes:      Extraocular Movements: Extraocular movements intact  Pupils: Pupils are equal, round, and reactive to light  Neck:      Comments: No palpable lymph nodes  Cardiovascular:      Rate and Rhythm: Normal rate and regular rhythm  Heart sounds: Normal heart sounds  Pulmonary:      Breath sounds: Normal breath sounds  No wheezing  Neurological:      General: No focal deficit present  Mental Status: She is alert and oriented to person, place, and time     Psychiatric:         Mood and Affect: Mood normal          Behavior: Behavior normal

## 2022-08-16 ENCOUNTER — EVALUATION (OUTPATIENT)
Dept: PHYSICAL THERAPY | Facility: REHABILITATION | Age: 37
End: 2022-08-16
Payer: COMMERCIAL

## 2022-08-16 DIAGNOSIS — M25.362 PATELLAR INSTABILITY OF BOTH KNEES: ICD-10-CM

## 2022-08-16 DIAGNOSIS — M25.361 PATELLAR INSTABILITY OF BOTH KNEES: ICD-10-CM

## 2022-08-16 DIAGNOSIS — S83.001D PATELLAR SUBLUXATION, RIGHT, SUBSEQUENT ENCOUNTER: ICD-10-CM

## 2022-08-16 DIAGNOSIS — S83.005D PATELLAR DISLOCATION, LEFT, SUBSEQUENT ENCOUNTER: Primary | ICD-10-CM

## 2022-08-16 PROCEDURE — 97140 MANUAL THERAPY 1/> REGIONS: CPT | Performed by: PHYSICAL THERAPIST

## 2022-08-16 PROCEDURE — 97162 PT EVAL MOD COMPLEX 30 MIN: CPT | Performed by: PHYSICAL THERAPIST

## 2022-08-16 NOTE — PROGRESS NOTES
PT Evaluation     Today's date: 2022  Patient name: Jakie Cogan  : 1985  MRN: [id number]  Referring provider: Greg Benz DO  Dx:   Encounter Diagnosis     ICD-10-CM    1  Patellar dislocation, left, subsequent encounter  S83 005D    2  Patellar instability of both knees  M25 361     M25 362    3  Patellar subluxation, right, subsequent encounter  S83 001D        Start Time:   Stop Time: 183  Total time in clinic (min): 40 minutes    Assessment  Assessment details: Patient is a 29 y o  female that presents with bilateral knee pain  Patient presents with decreased strength, tenderness to palpation, and pain  Patient has difficulty with negotiation of stairs, sitting on the floor, ambulation, and sit to stand secondary to impairments  Patient would benefit from skilled physical therapy services to address impairments to maximize function  Impairments: activity intolerance, impaired physical strength, lacks appropriate home exercise program and pain with function  Understanding of Dx/Px/POC: good   Prognosis: good    Goals  Impairment:  1  Patient will reports 50% reduction in pain in 4 weeks to maximize function  2   Patient will improve strength to 4+/5 in all planes to maximize function  Functional:  1  Patient will improve FOTO by 13 points to 61/100 in 4 weeks to maximize function  2  Patient will be independent with HEP in 4 weeks to maximize function  3  Patient will report no difficulty with ambulation in 4 weeks to maximize function  4  Patient will report no difficulty with negotiation of stairs in 4 weeks to maximize function  5  Patient will report no difficulty with sitting on the floor in 4 weeks to maximize function  Plan  Plan details: Patient will be a RE in 4 weeks      Patient would benefit from: skilled physical therapy  Planned modality interventions: cryotherapy  Planned therapy interventions: abdominal trunk stabilization, manual therapy, neuromuscular re-education, patient education, strengthening, therapeutic activities, therapeutic exercise, home exercise program and balance  Frequency: 1x week  Duration in visits: 5  Duration in weeks: 4  Treatment plan discussed with: patient        Subjective Evaluation    History of Present Illness  Mechanism of injury: Patient presents with bilateral knee pain, left worse than right  Patient reports a chronic history of left knee pain going back to childhood  She reports pain for several years and sought treatment about a year ago  She went to several PT appointments without much relief  Patient had an injection at that time with some relief  She feels her symptoms have been worsening over the last few months as she has been exercising  Patient will complete her exercise routine 3-5x/week which is about a 15 minute video including jumping, squatting, running in place, pushups, etc   Patient will walk about 30-45 minutes once a week with her family  Patient will feel like her patella will be stuck when she is sitting for an extended period of time and this will happen when she goes to get up  The frequency of this is increasing to several times a week from about once a week  Patient reports difficulty with negotiation of stairs, sitting on the floor, ambulation, and sit to stand  Pain  At best pain ratin  At worst pain rating: 10 (achin/10)  Location: L knee  Quality: dull ache and sharp  Relieving factors: rest  Aggravating factors: walking, standing and stair climbing  Progression: worsening      Diagnostic Tests  X-ray: normal  Treatments  Previous treatment: injection treatment  Current treatment: physical therapy  Patient Goals  Patient goals for therapy: decreased pain and increased strength  Patient goal: get the patella to stop getting stuck        Objective     Tenderness   Left Knee   Tenderness in the patellar tendon   No tenderness in the fibular head, lateral joint line, medial joint line and pes anserinus  Right Knee   Tenderness in the medial joint line  No tenderness in the fibular head, lateral joint line, patellar tendon and pes anserinus       Passive Range of Motion   Left Knee   Flexion: 135 degrees   Extension: WFL    Right Knee   Flexion: 135 degrees   Extension: [facility]    Mobility   Patellar Mobility:   Left Knee   Hypermobile: left medial, left lateral, left superior and left inferior      Right Knee   Hypermobile: medial, lateral, superior and inferior     Strength/Myotome Testing     Left Hip   Planes of Motion   Flexion: 4-  Extension: 4-  Abduction: 3+  External rotation: 4  Internal rotation: 4    Right Hip   Planes of Motion   Flexion: 4  Extension: 4  Abduction: 4-  External rotation: 4+  Internal rotation: 4+    Left Knee   Flexion: 4  Extension: 4  Quadriceps contraction: good    Right Knee   Flexion: 4+  Extension: 4+  Quadriceps contraction: good    Left Ankle/Foot   Dorsiflexion: 5    Right Ankle/Foot   Dorsiflexion: 5    Additional Strength Details  No lag with SLR gayathri    Ambulation     Observational Gait   Gait: within functional limits     Functional Assessment        Single Leg Stance   Left: 30 seconds  Right: 30 seconds             Precautions: hx anxiety, depression, HA      Manuals 8/16            k-tape patellar tracking L 8 min                                                   Neuro Re-Ed             SLS issued            Mini squat             TKE band             sidestepping band             Monster walks band                                       Ther Ex             Recumbent bike             SLR issued            sidelying hip abduction issued            Prone hip extension issued                                                                Ther Activity                                       Gait Training                                       Modalities             PRN                            Access Code [email or web address]

## 2022-08-19 DIAGNOSIS — F31.5 BIPOLAR I DISORDER, MOST RECENT EPISODE DEPRESSED, SEVERE WITH PSYCHOTIC FEATURES (HCC): ICD-10-CM

## 2022-08-19 RX ORDER — LAMOTRIGINE 50 MG/1
100 TABLET, EXTENDED RELEASE ORAL DAILY
Qty: 60 TABLET | Refills: 1 | Status: SHIPPED | OUTPATIENT
Start: 2022-08-19 | End: 2022-10-21

## 2022-08-26 ENCOUNTER — OFFICE VISIT (OUTPATIENT)
Dept: PHYSICAL THERAPY | Facility: REHABILITATION | Age: 37
End: 2022-08-26
Payer: COMMERCIAL

## 2022-08-26 DIAGNOSIS — S83.001D PATELLAR SUBLUXATION, RIGHT, SUBSEQUENT ENCOUNTER: ICD-10-CM

## 2022-08-26 DIAGNOSIS — M25.361 PATELLAR INSTABILITY OF BOTH KNEES: ICD-10-CM

## 2022-08-26 DIAGNOSIS — M25.362 PATELLAR INSTABILITY OF BOTH KNEES: ICD-10-CM

## 2022-08-26 DIAGNOSIS — S83.005D PATELLAR DISLOCATION, LEFT, SUBSEQUENT ENCOUNTER: Primary | ICD-10-CM

## 2022-08-26 PROCEDURE — 97110 THERAPEUTIC EXERCISES: CPT

## 2022-08-26 PROCEDURE — 97140 MANUAL THERAPY 1/> REGIONS: CPT

## 2022-08-26 PROCEDURE — 97112 NEUROMUSCULAR REEDUCATION: CPT

## 2022-08-26 NOTE — PROGRESS NOTES
Daily Note     Today's date: 2022  Patient name: Phuong Ayers  : 1985  MRN: [id number]  Referring provider: Aleksandr Blank DO  Dx:   Encounter Diagnosis     ICD-10-CM    1  Patellar dislocation, left, subsequent encounter  S83 005D    2  Patellar instability of both knees  M25 361     M25 362    3  Patellar subluxation, right, subsequent encounter  S83 001D        Start Time: 0730  Stop Time: 0815  Total time in clinic (min): 45 minutes    Subjective: Pt reports noticing improvement in symptoms with application of k-tape last visit, notes having more soreness after removal   Pt presents to PT this visit denying bilat knee pain  Objective: See treatment diary below    Precautions: hx anxiety, depression, HA      Manuals            k-tape patellar tracking L 8 min 8 min                                                  Neuro Re-Ed             SLS issued 20"x3 ea           Mini squat  x10           TKE band  Red 5"x10           sidestepping band  Red x2 ea           Monster walks band  Red x3 ea                                     Ther Ex             Recumbent bike  5 min           SLR issued 5"x10 ea           sidelying hip abduction issued 5"x10 ea           Prone hip extension issued 5"x10 ea                                                               Ther Activity                                       Gait Training                                       Modalities             PRN                              Assessment: Pt presents to PT for first visit since initial evaluation  Initiated TE As noted per PT POC  Tolerated treatment well  Provided cueing to facilitate proper technique throughout treatment  Good quad control without lag  Gluteal fatigue with addition of side stpeping/monster walks  K-tape applied at end of session with positive response  Pt was issued updated HEP/band  Assess response to treatment NV and progress as able    Patient demonstrated fatigue post treatment and would benefit from continued PT      Plan: Progress treatment as tolerated

## 2022-08-30 ENCOUNTER — TELEPHONE (OUTPATIENT)
Dept: PSYCHIATRY | Facility: CLINIC | Age: 37
End: 2022-08-30

## 2022-09-01 ENCOUNTER — OFFICE VISIT (OUTPATIENT)
Dept: OBGYN CLINIC | Facility: MEDICAL CENTER | Age: 37
End: 2022-09-01
Payer: COMMERCIAL

## 2022-09-01 VITALS
DIASTOLIC BLOOD PRESSURE: 74 MMHG | SYSTOLIC BLOOD PRESSURE: 109 MMHG | HEART RATE: 96 BPM | HEIGHT: 62 IN | BODY MASS INDEX: 38.83 KG/M2 | WEIGHT: 211 LBS

## 2022-09-01 DIAGNOSIS — M25.362 PATELLAR INSTABILITY OF LEFT KNEE: Primary | ICD-10-CM

## 2022-09-01 PROCEDURE — 99213 OFFICE O/P EST LOW 20 MIN: CPT | Performed by: ORTHOPAEDIC SURGERY

## 2022-09-01 NOTE — PROGRESS NOTES
Ortho Sports Medicine Knee Follow Up Visit     Assesment:     29 y o  female left knee patellar instability and patellar maltracking    Plan:    Conservative treatment:    Ice to knee for 20 minutes at least 1-2 times daily  PT for ROM/strengthening to knee, hip and core  Continue use of bracing as needed    Imaging: All imaging from today was reviewed by myself and explained to the patient  Injection:    No Injection planned at this time  Surgery:     No surgery is recommended at this point, continue with conservative treatment plan as noted  I did have a lengthy discussion that if her pain and tracking fails to improve with physical therapy we can proceed with surgery for her knee  Follow up:    Return in about 2 months (around 11/1/2022) for Recheck  Chief Complaint   Patient presents with    Right Knee - Follow-up       History of Present Illness: The patient is returns for follow up of left patellar instability  Since the prior visit, She reports minimal improvement  She states that she has only attempted two physical therapy visits since she was last seen on 07/14/2022  She does report some improvement in her patellar tracking with physical therapy and states that when they tape her knee it helps significantly  She denies any recent subluxations  She only experiences mild tracking issues when getting up out of a chair  Pain is located anterior, medial      Pain is improved by rest, ice, NSAIDS, physical therapy and bracing  Pain is aggravated by standing up out of a chair  Symptoms include clicking  The patient has tried rest, ice, NSAIDS, physical therapy and bracing            Knee Surgical History:  None    Past Medical, Social and Family History:  Past Medical History:   Diagnosis Date    Abnormal Pap smear of cervix     Eczema     Exposure to chlamydia     Resolved 06/09/17    Migraine without aura and without status migrainosus, not intractable 10/11/2019    Obesity     Post traumatic stress disorder 2021    Postpartum depression 2018    Manoj-Parkinson-White (WPW) syndrome      Past Surgical History:   Procedure Laterality Date    ABLATION OF DYSRHYTHMIC FOCUS      WPW ablation age 15    COLONOSCOPY      COLPOSCOPY      INDUCED       OTHER SURGICAL HISTORY      catheter ablation- WPW age 13     Allergies   Allergen Reactions    Codeine Other (See Comments)     dry heaves    Sulfa Antibiotics Hives and Rash     Per pt as achild    Erythromycin Hives     Per as a child     Current Outpatient Medications on File Prior to Visit   Medication Sig Dispense Refill    ibuprofen (MOTRIN) 600 mg tablet Take 1 tablet (600 mg total) by mouth every 6 (six) hours as needed for mild pain, moderate pain or fever 30 tablet 0    lamoTRIgine ER 50 MG TB24 TAKE 2 TABLETS (100 MG TOTAL) BY MOUTH IN THE MORNING 60 tablet 1    naproxen (NAPROSYN) 500 mg tablet Take 1 tablet (500 mg total) by mouth 2 (two) times a day with meals 30 tablet 0    NuvaRing 0 12-0 015 MG/24HR vaginal ring Insert vaginally and leave in place for 3 consecutive weeks, then remove for 1 week  3 each 3    rizatriptan (MAXALT-MLT) 10 mg disintegrating tablet TAKE 1 TAB ORALLY ONCE AS NEEDED FOR MIGRAINE FOR UP TO 1 DOSE  MAY REPEAT IN 2 HOURS IF NEEDED 9 tablet 1     No current facility-administered medications on file prior to visit       Social History     Socioeconomic History    Marital status: Single     Spouse name: Not on file    Number of children: 2    Years of education: Not on file    Highest education level: Some college, no degree   Occupational History    Occupation: works in retail   Tobacco Use    Smoking status: Never Smoker    Smokeless tobacco: Never Used   Vaping Use    Vaping Use: Never used   Substance and Sexual Activity    Alcohol use: Yes     Comment: social    Drug use: No    Sexual activity: Yes     Partners: Male     Birth control/protection: Condom Male   Other Topics Concern    Not on file   Social History Narrative    Education: high school graduate and some college    Learning Disabilities: none    Marital History: single    Children: 1 daughter, 1 son    Living Arrangement: lives in home with boyfriend, 2 children and boyfriend's 2 children    Occupational History: works part time at [employer] works    Functioning Relationships: boyfriend is supportive    Legal History: none     History: None         Family planning    IUD contraception     Social Determinants of Health     Financial Resource Strain: Not on file   Food Insecurity: Not on file   Transportation Needs: Not on file   Physical Activity: Not on file   Stress: Not on file   Social Connections: Not on file   Intimate Partner Violence: Not on file   Housing Stability: Not on file         I have reviewed the past medical, surgical, social and family history, medications and allergies as documented in the EMR  Review of systems: ROS is negative other than that noted in the HPI  Constitutional: Negative for fatigue and fever  Physical Exam:    Blood pressure 109/74, pulse 96, height 5' 2" (1 575 m), weight 95 7 kg (211 lb), currently breastfeeding  General/Constitutional: NAD, well developed, well nourished  HENT: Normocephalic, atraumatic  CV: Intact distal pulses, regular rate  Resp: No respiratory distress or labored breathing  Lymphatic: No lymphadenopathy palpated  Neuro: Alert and Oriented x 3, no focal deficits  Psych: Normal mood, normal affect, normal judgement, normal behavior  Skin: Warm, dry, no rashes, no erythema      Knee Exam (focused): RIGHT LEFT   ROM:   0-130 0-130   Palpation: Effusion negative negative     MJL tenderness Negative Negative     LJL tenderness Negative Negative   Meniscus:  Johanne Negative Negative    Apley's Compression Negative Negative   Instability: Varus stable stable     Valgus stable stable   Special Tests: Lachman Negative Negative     Posterior drawer Negative Negative     Anterior drawer Negative Negative     Pivot shift not tested not tested     Dial not tested not tested   Patella: Palpation no tenderness medial facet ttp  tenderness     Mobility 1/4 3/4     Apprehension Negative Positive   Other: Single leg 1/4 squat not tested not tested           LE NV Exam: +2 DP/PT pulses bilaterally  Sensation intact to light touch L2-S1 bilaterally    No calf tenderness to palpation bilaterally      Knee Imaging    MRI of the left knee performed on 03/19/2021 demonstrates slight patella Jennifer with increase in lateral tilt  Patella translation 9 mm   TTTG measures 14mm,  I reviewed the radiology report and agree with impression      Scribe Attestation    I,:  Usman Pierce am acting as a scribe while in the presence of the attending physician :       I,:  Prashant Diaz DO personally performed the services described in this documentation    as scribed in my presence :

## 2022-10-03 ENCOUNTER — TELEPHONE (OUTPATIENT)
Dept: PSYCHIATRY | Facility: CLINIC | Age: 37
End: 2022-10-03

## 2022-10-13 DIAGNOSIS — G43.109 MIGRAINE WITH AURA AND WITHOUT STATUS MIGRAINOSUS, NOT INTRACTABLE: ICD-10-CM

## 2022-10-13 NOTE — PROGRESS NOTES
10/13/22    Patient did not return for any follow-up visits after 8/26/22, self-discharging care  Patient self-discharged prior to re-evaluation therefore no follow-up measurements were obtained  Patient will be formally discharged from skilled physical therapy services at this time

## 2022-10-14 RX ORDER — RIZATRIPTAN BENZOATE 10 MG/1
TABLET, ORALLY DISINTEGRATING ORAL
Qty: 9 TABLET | Refills: 0 | Status: SHIPPED | OUTPATIENT
Start: 2022-10-14

## 2022-10-16 ENCOUNTER — PATIENT MESSAGE (OUTPATIENT)
Dept: FAMILY MEDICINE CLINIC | Facility: CLINIC | Age: 37
End: 2022-10-16

## 2022-10-16 DIAGNOSIS — G43.009 MIGRAINE WITHOUT AURA AND WITHOUT STATUS MIGRAINOSUS, NOT INTRACTABLE: ICD-10-CM

## 2022-10-17 RX ORDER — NAPROXEN 500 MG/1
500 TABLET ORAL 2 TIMES DAILY WITH MEALS
Qty: 30 TABLET | Refills: 0 | Status: SHIPPED | OUTPATIENT
Start: 2022-10-17

## 2022-10-21 DIAGNOSIS — F31.5 BIPOLAR I DISORDER, MOST RECENT EPISODE DEPRESSED, SEVERE WITH PSYCHOTIC FEATURES (HCC): ICD-10-CM

## 2022-10-21 RX ORDER — LAMOTRIGINE 50 MG/1
100 TABLET, EXTENDED RELEASE ORAL DAILY
Qty: 60 TABLET | Refills: 1 | Status: SHIPPED | OUTPATIENT
Start: 2022-10-21

## 2022-11-03 ENCOUNTER — TELEPHONE (OUTPATIENT)
Dept: PSYCHIATRY | Facility: CLINIC | Age: 37
End: 2022-11-03

## 2022-11-03 NOTE — TELEPHONE ENCOUNTER
Pt called in to schedule an appointment- she is available at anytime  Pt also requested that FLMA paperwork be filled out on her behalf at the providers earliest convenience

## 2022-11-07 ENCOUNTER — TELEPHONE (OUTPATIENT)
Dept: PSYCHIATRY | Facility: CLINIC | Age: 37
End: 2022-11-07

## 2022-11-07 NOTE — TELEPHONE ENCOUNTER
Pt called asking if FLMA paperwork are fill out by provider  Pt stated that she hasn't has any information about that matter  Writer informed pt that a message will be send to provider

## 2022-11-14 ENCOUNTER — TELEMEDICINE (OUTPATIENT)
Dept: BEHAVIORAL/MENTAL HEALTH CLINIC | Facility: CLINIC | Age: 37
End: 2022-11-14

## 2022-11-14 DIAGNOSIS — F41.1 GAD (GENERALIZED ANXIETY DISORDER): Chronic | ICD-10-CM

## 2022-11-14 DIAGNOSIS — F31.5 BIPOLAR I DISORDER, MOST RECENT EPISODE DEPRESSED, SEVERE WITH PSYCHOTIC FEATURES (HCC): Primary | Chronic | ICD-10-CM

## 2022-11-14 DIAGNOSIS — F43.10 POST TRAUMATIC STRESS DISORDER: ICD-10-CM

## 2022-11-14 NOTE — PSYCH
Psychotherapy Provided: Individual psychotherapy     Follow up in 2 weeks    Encounter Diagnosis     ICD-10-CM    1  Bipolar I disorder, most recent episode depressed, severe with psychotic features ([facility] [address] )  F31 5       2  SHELLY (generalized anxiety disorder)  F41 1       3  Post traumatic stress disorder  F43 10           Goals addressed in session: Goal 1     Current suicide risk : Low      DATA: Met with Scott Lovett for scheduled individual session  Topics of discussion included relationship with significant other, family stressors, relationships with family, work-related stress, parenting concerns and mood regulation and symptoms  Scott Lovett re-engaged with therapy services after missing several months due to work scheduling issues  She would like to maintain regular sessions, as she reports significant difficulty with mood management  Scott Lovett requested that this clinician complete a form for accommodations for her work  She signed appropriate ROIs and also completed her portion of the form prior to this clinician completing the clinical documentation  Client is requesting an accommodation to allow for her to take breaks, if needed, as well as to attend therapy and medication management sessions during work hours  This clinician completed the documentation during this session-- as we discussed how the accommodations will assist her with mood management and what other techniques she can use when feeling emotionally dysregulated  Scott Lovett states that she has been feeling irritable and edgy with her family  She states that her communication with her SO continues to be a difficult stressor for her  We will resume biweekly to monthly sessions, depending upon scheduling availability  Scott Lovett has also been scheduled for a psychiatric intake with Dr Dulce Segundo  She will let me know the outcome of that appointment  At our next session, we will update her treatment plan to move forward with our work together   Client shows evidence of utilizing distress tolerance skills skills to manage mental health symptoms  During this session, this clinician used the following therapeutic modalities: engagement strategies, supportive psychotherapy, client-centered therapy, mindfulness-based strategies, CBT techniques, DBT-informed skills, gender affirmation therapy, Motivational Interviewing, solution-focused therapy, prolonged exposure, bilateral stimulation, family therapy and CPT techniques  ASSESSMENT: Giovani Johnson presents with a dysthymic and anxious mood  Her affect is normal range and intensity, appropriate  Giovani Johnson exhibits good therapeutic rapport with this clinician  Giovani Johnson continues to exhibit willingness to work on treatment goals and objectives  Giovani Johnson presents with a minimal risk of suicide, minimal risk of self-harm, and minimal risk of harm to others  PLAN: Giovani Johnson will return in approximately two weeks for the next scheduled session  Between sessions, Giovani Johnson will meet with Dr Yari Zhou for a psychiatric intake and will report back during the next session re: successes and barriers  At the next session, this clinician will use supportive psychotherapy, client-centered therapy, mindfulness-based strategies, DBT-informed skills, Motivational Interviewing and solution-focused therapy to address her mood regulation and relationship concerns, in an effort to assist Giovani Johnson with meeting treatment goals  Behavioral Health Treatment Plan [facility]: Diagnosis and Treatment Plan explained to Ruiz Orr relates understanding diagnosis and is agreeable to Treatment Plan   Yes     Visit start and stop times:    11/14/22  Start Time: 1704  Stop Time: 1756  Total Visit Time: 52 minutes
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
## 2022-11-21 ENCOUNTER — OFFICE VISIT (OUTPATIENT)
Dept: PSYCHIATRY | Facility: CLINIC | Age: 37
End: 2022-11-21

## 2022-11-21 VITALS — WEIGHT: 214 LBS | HEIGHT: 62 IN | BODY MASS INDEX: 39.38 KG/M2

## 2022-11-21 DIAGNOSIS — F41.1 GAD (GENERALIZED ANXIETY DISORDER): ICD-10-CM

## 2022-11-21 DIAGNOSIS — F43.10 PTSD (POST-TRAUMATIC STRESS DISORDER): ICD-10-CM

## 2022-11-21 DIAGNOSIS — F31.81 BIPOLAR II DISORDER (HCC): Primary | ICD-10-CM

## 2022-11-21 RX ORDER — LAMOTRIGINE 200 MG/1
200 TABLET ORAL DAILY
Qty: 30 TABLET | Refills: 2 | Status: SHIPPED | OUTPATIENT
Start: 2022-11-21 | End: 2022-11-21

## 2022-11-21 RX ORDER — TRAZODONE HYDROCHLORIDE 50 MG/1
25-50 TABLET ORAL
Qty: 30 TABLET | Refills: 0 | Status: SHIPPED | OUTPATIENT
Start: 2022-11-21 | End: 2022-12-21

## 2022-11-27 DIAGNOSIS — G43.009 MIGRAINE WITHOUT AURA AND WITHOUT STATUS MIGRAINOSUS, NOT INTRACTABLE: ICD-10-CM

## 2022-11-28 ENCOUNTER — TELEMEDICINE (OUTPATIENT)
Dept: BEHAVIORAL/MENTAL HEALTH CLINIC | Facility: CLINIC | Age: 37
End: 2022-11-28

## 2022-11-28 DIAGNOSIS — F31.5 BIPOLAR I DISORDER, MOST RECENT EPISODE DEPRESSED, SEVERE WITH PSYCHOTIC FEATURES (HCC): Primary | Chronic | ICD-10-CM

## 2022-11-28 DIAGNOSIS — F41.1 GAD (GENERALIZED ANXIETY DISORDER): Chronic | ICD-10-CM

## 2022-11-28 DIAGNOSIS — F43.10 POST TRAUMATIC STRESS DISORDER: ICD-10-CM

## 2022-11-28 NOTE — PSYCH
Virtual Regular Visit    Verification of patient location:    Patient is located in the following state in which I hold an active license [place]    Assessment/Plan:    Problem List Items Addressed This Visit        Other    Bipolar I disorder, most recent episode depressed, severe with psychotic features ([facility] [address] ) - Primary (Chronic)    SHELLY (generalized anxiety disorder) (Chronic)    Post traumatic stress disorder     Goals addressed in session: Goal 1      Reason for visit is No chief complaint on file  Encounter provider CHARLIE Cabrales    Provider located at [address] [place] [id number] [phone]    Recent Visits  No visits were found meeting these conditions  Showing recent visits within past 7 days and meeting all other requirements  Future Appointments  No visits were found meeting these conditions  Showing future appointments within next 150 days and meeting all other requirements     The patient was identified by name and date of birth  Negrito Booth was informed that this is a telemedicine visit and that the visit is being conducted throughthe [facility]  She agrees to proceed     My office door was closed  No one else was in the room  She acknowledged consent and understanding of privacy and security of the video platform  The patient has agreed to participate and understands they can discontinue the visit at any time  Patient is aware this is a billable service  Subjective  Negrito Booth is a 40 y o  female  DATA: Met with Geraldine Hernandez for scheduled individual session  Topics of discussion included relationship with significant other, family stressors, parenting concerns and mood regulation and symptoms  We discussed Libertad's recent visit with Dr Choco Patel  She states that he increased her medications; however, she does not yet feel any difference in her moods   We discussed some behavioral strategies to help her manage her mood and decrease her level of irritability  Michael Roach discussed her frustration with caring for the children  We discussed ways that she can set boundaries and limits with her SO and with his ex-  She states that she has some difficulty with setting limits with regard to caring for the children, because she feels strongly about providing them with good care and appropriate discipline  She states that, at this point, her SO has no interest in attending therapy sessions  We discussed her treatment plan  Michael Roach states that she would like to work on mood regulation and anger management  During this session, this clinician used the following therapeutic modalities: supportive psychotherapy, client-centered therapy, mindfulness-based strategies, DBT-informed skills, Motivational Interviewing and solution-focused therapy  ASSESSMENT: Michael Roach presents with a depressed mood  Her affect is tearful, at times, as she discussed her frustration with her current situation  This clinician conducted a safety assessment  Michael Roach states that she has no suicidal ideation  Michael Roach exhibits good therapeutic rapport with this clinician  Michael Roach continues to exhibit willingness to work on treatment goals and objectives  Michael Roach presents with a minimal risk of suicide, minimal risk of self-harm, and minimal risk of harm to others  PLAN: Michael Roach will return in approximately two weeks for the next scheduled session  Between sessions, Michael Roach will continue to monitor her moods  She will practice finding ways to engage in mindfulness-based strategies and will report back during the next session re: successes and barriers   At the next session, this clinician will use supportive psychotherapy, client-centered therapy, mindfulness-based strategies, DBT-informed skills, Motivational Interviewing and solution-focused therapy to address her mood regulation and relationship concerns, in an effort to assist Pedro gottiage with meeting treatment goals  HPI     Past Medical History:   Diagnosis Date   • Abnormal Pap smear of cervix    • Eczema    • Exposure to chlamydia     Resolved 18   • Migraine without aura and without status migrainosus, not intractable 10/11/2019   • Obesity    • Post traumatic stress disorder 2021   • Postpartum depression 2018   • Manoj-Parkinson-White (WPW) syndrome        Past Surgical History:   Procedure Laterality Date   • ABLATION OF DYSRHYTHMIC FOCUS      WPW ablation age 14   • COLONOSCOPY     • COLPOSCOPY     • INDUCED      • OTHER SURGICAL HISTORY      catheter ablation- WPW age 13       Current Outpatient Medications   Medication Sig Dispense Refill   • ibuprofen (MOTRIN) 600 mg tablet Take 1 tablet (600 mg total) by mouth every 6 (six) hours as needed for mild pain, moderate pain or fever 30 tablet 0   • lamoTRIgine (LaMICtal) 200 MG tablet Take 1 tablet (200 mg total) by mouth daily 30 tablet 2   • naproxen (NAPROSYN) 500 mg tablet Take 1 tablet (500 mg total) by mouth 2 (two) times a day with meals 30 tablet 0   • NuvaRing 0 12-0 015 MG/24HR vaginal ring Insert vaginally and leave in place for 3 consecutive weeks, then remove for 1 week  3 each 3   • rizatriptan (MAXALT-MLT) 10 mg disintegrating tablet Take at the onset of migraine; if symptoms continue or return, may take another dose at least 2 hours after first dose  Take no more than 2 doses in a day  9 tablet 0   • sertraline (Zoloft) 50 mg tablet Take 1 tablet (50 mg total) by mouth daily 30 tablet 0   • traZODone (DESYREL) 50 mg tablet Take 0 5-1 tablets (25-50 mg total) by mouth daily at bedtime as needed for sleep 30 tablet 0     No current facility-administered medications for this visit          Allergies   Allergen Reactions   • Codeine Other (See Comments)     dry heaves   • Sulfa Antibiotics Hives and Rash     Per pt as achild   • Erythromycin Hives     Per as a child       Review of Systems    Video Exam    There were no vitals filed for this visit      Physical Exam     Visit Time    11/28/22  Start Time: 0454  Stop Time: 8452  Total Visit Time: 52 minutes

## 2022-11-28 NOTE — BH TREATMENT PLAN
Angelica Nicole  1985      Date of Initial Treatment Plan: October 3, 2019   Date of Current Treatment Plan: 11/28/2022     Treatment Plan Number 8     Strengths/Personal Resources for Self Care: willingness to attend and work on therapeutic skills; some family support; motivation to be a good parent    Diagnosis:   1  Bipolar I disorder, most recent episode depressed, severe with psychotic features ([address] )      2  SHELLY (generalized anxiety disorder)      3  Obsessive-compulsive disorder, unspecified type            Area of Needs: Anxiety and mood regulation      Long Term Goal 1: "I want to be able to maintain my moods  I want to be able to trust that I'm not going to freak out "      Target Date:                                     March 28, 2023  Treatment Plan Expiration Date:   May 27, 2023  Completion Date:                          To be determined         Short Term Objectives for Goal 1:                  1  Libertad will identify triggers and prompting events that increase symptoms of anger, depression, and anxiety    Update 11/28/2022: Leopold Bell continues to identify some of the triggers that impact her moods  She continues to identify her multiple responsibilities (caring for her children and her partner's children; working; taking care of the household responsibilities, etc)  We will continue to identify triggers and prompting events, as they occur  We will revise treatment plan interventions as needed                  2  Libertad will learn and exhibit understanding of a minimum of three distress tolerance skills to assist with symptom reduction  Update 11/28/2022: Leopold Bell states that she is having a very difficult time using distress tolerance skills  She states that she feels that she needs to have some time alone, which she is not currently able to get   We will focus on building Libertad's distress tolerance skills, so she does not act out in anger                   3  Libertad will learn and exhibit understanding of effective communication skills (using a DBT-informed perspective), especially with her boyfriend  Update 11/28/2022: Tawny Charlton states that she and her SO have been struggling with their communication  She states that she is struggling with the use of effective communication skills  She identifies that she and her SO argue and are not always supportive of one another  Tawny Charlton will work on learning how to put her thoughts together to engage in an effective conversation, rather than arguing and becoming physically aggressive toward him  She states that her SO is not interested in attending [place] [address] treatment or couple's sessions  We will work on developing her ability to use the [facility] skills to better communicate her needs                     4  Saul Kos identify and maintain personal limits and boundaries in relationships with her boyfriend and others, as needed  Any boundary crossings will be discussed in therapy sessions  Update 11/28/2022: Tawny Charlton continues to struggle with setting and maintaining her boundaries with her SO  She states, "I need for it to change " She has a difficult time identifying what she needs to have change  We will focus on determining what her limits/boundaries are and what changes will make it feasible for her to stay in the relationship                   5  Tawny Charlton will make a decision regarding her future plans for her relationship with her significant other  Update 11/28/2022: Tawny Charlton will continue to work on determining whether or not she wants to remain in this relationship  She states, "I can't take this for 6 more years " She states that she wants changes in the relationship  We will work on developing her ability to identify her needs within the relationship                   6  Tawny Charlton will maintain a "mild" level of anxiety and depression as evidenced by the PHQ-9 and SHELLY-7 screenings      Update 11/28/2022: Tawny Charlton completed a PHQ-9 and SHELLY-7 during her psychiatric evaluation (which occurred last week)  Her PHQ-9 score was a 19  Her SHELLY-7 score was a 12  We will continue to measure these screenings, as we move forward with her treatment               7  Tatianna Ayala will consider addressing trauma, as it is appropriate to her treatment  At this point, she does not want to engage in pointed trauma-focused therapy  We will continue to address trauma in a trauma-informed approach, using DBT-informed care  Update 11/28/2022: Tatianna Ayala exhibits the ability to identify how her past trauma impacts her current relationships  We will continue to address her trauma, as it impacts her on a day-to-day basis  If she decides to address trauma-processing in a more formal way, we will re-evaluate this objective  6  Tatianna Ayala will meet with a psychiatrist/AP and determine if medications will be a helpful tool to manage her moods  Update 11/28/2022: Tatianna Ayala had her psychiatric evaluation with Dr Fide Connor  She states that she has spoken with him about her history of mental health symptoms and trauma   She states that Dr Fide Connor increased her dosage of lamictal  There are times that she forgets to take her medications (she reports about 2 missed doses in the past couple weeks)        GOAL 1: Modality: Individual 1-2x per month   Completion Date to be determined and The person(s) responsible for carrying out the plan is  Libertad (client) and Ivelisse Pereyra (clinician)     Clinician will use client-centered therapy, mindfulness-based strategies, DBT-informed skills and solution-focused therapy to address Libertad's symptoms of anxiety and mood regulation  Libertad will practice skills between sessions and will report back, during subsequent sessions regarding successes and barriers       [address] [place] [address]: Diagnosis and Treatment Plan explained to Libertad Maurer relates understanding diagnosis and is agreeable to Treatment Plan        Client Comments : Please share your thoughts, feelings, need and/or experiences regarding your treatment plan: Not at this time  Génesis Jiang, 1985, actively participated in the review and update of this treatment plan during a virtual session, using the [facility]  Génesis Jiang  provided verbal consent on 11/28/2022 at 5:58pm  The treatment plan was transcribed into the [facility] at a later time

## 2022-11-29 RX ORDER — NAPROXEN 500 MG/1
500 TABLET ORAL 2 TIMES DAILY WITH MEALS
Qty: 30 TABLET | Refills: 0 | Status: SHIPPED | OUTPATIENT
Start: 2022-11-29

## 2022-12-12 ENCOUNTER — TELEMEDICINE (OUTPATIENT)
Dept: BEHAVIORAL/MENTAL HEALTH CLINIC | Facility: CLINIC | Age: 37
End: 2022-12-12

## 2022-12-12 DIAGNOSIS — F41.1 GAD (GENERALIZED ANXIETY DISORDER): Chronic | ICD-10-CM

## 2022-12-12 DIAGNOSIS — F43.10 POST TRAUMATIC STRESS DISORDER: ICD-10-CM

## 2022-12-12 DIAGNOSIS — F31.5 BIPOLAR I DISORDER, MOST RECENT EPISODE DEPRESSED, SEVERE WITH PSYCHOTIC FEATURES (HCC): Primary | Chronic | ICD-10-CM

## 2022-12-12 NOTE — PSYCH
Virtual Regular Visit    Verification of patient location:    Patient is located in the following state in which I hold an active license [place]    Assessment/Plan:    Problem List Items Addressed This Visit        Other    Bipolar I disorder, most recent episode depressed, severe with psychotic features ([facility] [address] ) - Primary (Chronic)    SHELLY (generalized anxiety disorder) (Chronic)    Post traumatic stress disorder     Goals addressed in session: Goal 1      Reason for visit is   Chief Complaint   Patient presents with   • Virtual Regular Visit      Encounter provider CHARLIE Lua    Provider located at [address] [place] [id number] [phone]    Recent Visits  Date Type Provider Dept   12/12/22 [address], [address] [facility]   Showing recent visits within past 7 days and meeting all other requirements  Future Appointments  No visits were found meeting these conditions  Showing future appointments within next 150 days and meeting all other requirements     The patient was identified by name and date of birth  Yao Finley was informed that this is a telemedicine visit and that the visit is being conducted thr[facility]  She agrees to proceed     My office door was closed  No one else was in the room  She acknowledged consent and understanding of privacy and security of the video platform  The patient has agreed to participate and understands they can discontinue the visit at any time  Patient is aware this is a billable service  Subjective    Yao Nurse is a 40 y o  female  DATA: Met with Sridhar Sawant for scheduled individual session  Topics of discussion included relationship with significant other, family stressors, relationships with family, work-related stress, parenting concerns and mood regulation and symptoms   "I still have days when I cannot control if I fly off the handle " We discussed Libertad's thought process about being on medications  She states, "I just don't want to be on a lot of medication " Cesar Faye states that she did start her antidepressant medication last night  She states that she feels that her other medication is giving her some side effects  She states that she has occasionally missed doses  We discussed how the missed doses could be leading to some side effects or withdrawal symptoms  We discussed some strategies to help her remember to take her medications  This clinician validated her experience related to psychiatric medications and continued to encourage her to take the medications as prescribed  Cesar Faye agreed to set an alarm on her phone to remind her to take her medications  Cesar Faye discussed her relationship with her SO  She states, "It's not good, but it's not horrible " She reports that she and her SO recently had a conversation about the care of the children and her feelings of "not wanting to be here " She states that she does not have suicidal ideation-- but has thoughts of just wanting to "disappear into a black hole " She said that the discussion reminded her of fights that her parents had  She states that she does not want to fight with him in front of her children  She acknowledges that she does not want her interactions with her SO to negatively impact her children  She states that she does not want to have her children and/or her SO be expected to make her feel better  She states that she was able to verbalize this to her SO  Cesar Faye states that it is difficult for her and her SO to have a conversation, because their daughter is "always, always, always there " She states that there is no change in her relationships with her parents  She reports that there is no way for her and Eula Kevsagar to take time outside of the home to talk, due to childcare issues   She states that Dorene can help, but she does not want to give him the responsibility of caring for all of the children on his own  Pina Juan states that she is upset that her mother does not want to try to talk to her and does not want to make things better with her  We spent some time talking about her relationship with her mother, her father, and her brothers  She states that she does not know how to manage the upcoming holidays, with regard to her parents  Client shows evidence of utilizing emotion regulation skills and distress tolerance skills skills to manage mental health symptoms  During this session, this clinician used the following therapeutic modalities: supportive psychotherapy, client-centered therapy, mindfulness-based strategies, DBT-informed skills, Motivational Interviewing and solution-focused therapy  ASSESSMENT: Pina Juan presents with a somewhat dysthymic mood  Her affect is normal range and intensity, appropriate  Pina Juan exhibits good therapeutic rapport with this clinician  Pina Juan continues to exhibit willingness to work on treatment goals and objectives  Pina Juan presents with a minimal risk of suicide, minimal risk of self-harm, and minimal risk of harm to others  PLAN: Pina Juan will return in approximately two weeks for the next scheduled session  Between sessions, Pina Juan will continue to monitor her moods, her medication adherence, and her interactions with others  She will practice use of effective communication skills (especially with her SO) and will report back during the next session re: successes and barriers  At the next session, this clinician will use supportive psychotherapy, client-centered therapy, mindfulness-based strategies, DBT-informed skills, Motivational Interviewing and solution-focused therapy to address her mood regulation and relationship concerns, in an effort to assist Pina Juan with meeting treatment goals       HPI     Past Medical History:   Diagnosis Date   • Abnormal Pap smear of cervix    • Eczema    • Exposure to chlamydia     Resolved 18   • Migraine without aura and without status migrainosus, not intractable 10/11/2019   • Obesity    • Post traumatic stress disorder 2021   • Postpartum depression 2018   • Manoj-Parkinson-White (WPW) syndrome        Past Surgical History:   Procedure Laterality Date   • ABLATION OF DYSRHYTHMIC FOCUS      WPW ablation age 14   • COLONOSCOPY     • COLPOSCOPY     • INDUCED      • OTHER SURGICAL HISTORY      catheter ablation- WPW age 13       Current Outpatient Medications   Medication Sig Dispense Refill   • ibuprofen (MOTRIN) 600 mg tablet Take 1 tablet (600 mg total) by mouth every 6 (six) hours as needed for mild pain, moderate pain or fever 30 tablet 0   • lamoTRIgine (LaMICtal) 200 MG tablet Take 1 tablet (200 mg total) by mouth daily 30 tablet 2   • naproxen (NAPROSYN) 500 mg tablet Take 1 tablet (500 mg total) by mouth 2 (two) times a day with meals 30 tablet 0   • NuvaRing 0 12-0 015 MG/24HR vaginal ring Insert vaginally and leave in place for 3 consecutive weeks, then remove for 1 week  3 each 3   • rizatriptan (MAXALT-MLT) 10 mg disintegrating tablet Take at the onset of migraine; if symptoms continue or return, may take another dose at least 2 hours after first dose  Take no more than 2 doses in a day  9 tablet 0   • sertraline (Zoloft) 50 mg tablet Take 1 tablet (50 mg total) by mouth daily 30 tablet 0   • traZODone (DESYREL) 50 mg tablet Take 0 5-1 tablets (25-50 mg total) by mouth daily at bedtime as needed for sleep 30 tablet 0     No current facility-administered medications for this visit  Allergies   Allergen Reactions   • Codeine Other (See Comments)     dry heaves   • Sulfa Antibiotics Hives and Rash     Per pt as achild   • Erythromycin Hives     Per as a child       Review of Systems    Video Exam    There were no vitals filed for this visit      Physical Exam     Visit Time    22  Start Time: 5451  Stop Time: 869  Total Visit Time: 51 minutes

## 2022-12-14 NOTE — PROGRESS NOTES
Subjective      Timmy Sahni is a 40 y o  female who presents for annual well woman exam   Last Pap smear 12/15/21 ASCUS/ HR HPV (-)  History of multiple abnormal Pap smears  Due for Pap smear today  Periods are regular every 28-30 days, lasting 4-5 days  Is interested in switching birth control  States she has been having significant amount of headaches the week before menses that have been lasting 48 to 72 hours  No intermenstrual bleeding, spotting, or discharge  Current contraception: NuvaRing vaginal inserts  History of abnormal Pap smear: yes   Family history of uterine or ovarian cancer: no  Regular self breast exam: yes  History of abnormal mammogram: Mammogram ordered (family history)  Family history of breast cancer: yes - M  Grandmother (45s) and M Aunt (45s)  History of abnormal lipids: no  Gardasil vaccine: had series       Menstrual History:  OB History        3    Para   2    Term   2       0    AB   1    Living   2       SAB   0    IAB   1    Ectopic   0    Multiple   0    Live Births   2                Menarche age: 7  Patient's last menstrual period was 2022  Period Cycle (Days):  (monthly)  Period Duration (Days): 4-5  Period Pattern: Regular  Menstrual Flow: Moderate  Dysmenorrhea: (!) Mild  Dysmenorrhea Symptoms: Headache, Diarrhea    The following portions of the patient's history were reviewed and updated as appropriate: allergies, current medications, past family history, past medical history, past social history, past surgical history and problem list     Review of Systems  Pertinent items are noted in HPI        Objective      /80   Ht 5' 2" (1 575 m)   Wt 97 1 kg (214 lb)   LMP 2022   BMI 39 14 kg/m²     General:   alert and oriented, in no acute distress, alert, moderately obese, appears stated age and cooperative   Heart: regular rate and rhythm, S1, S2 normal, no murmur, click, rub or gallop   Lungs: clear to auscultation bilaterally   Abdomen: soft, non-tender, without masses or organomegaly   Vulva: normal, Bartholin's, Urethra, Oval's normal   Vagina: normal mucosa, normal discharge, no palpable nodules   Cervix: no cervical motion tenderness and no lesions   Uterus: normal size, non-tender, normal shape and consistency   Adnexa: normal adnexa and no mass, fullness, tenderness   Breast: breasts appear normal, no suspicious masses, no skin or nipple changes or axillary nodes  Assessment      @well woman  no contraindication to continue hormonal therapy@   Plan      All questions answered  Await pap smear results  Breast self exam technique reviewed and patient encouraged to perform self-exam monthly  Contraception: Reviewed all forms of contraception  Patient is most interested in trying POP  Rx Zoya will start Sunday after next menses  Reviewed with patient take the pill at the same time every day  Common side effects reviewed  Written information provided  Will return to office in 3 months for follow-up  Diagnosis explained in detail, including differential   Dietary diary  Discussed healthy lifestyle modifications  Educational material distributed  Follow up in 3 months POP check and  1 year annual exam    Follow up as needed  Mammogram   Thin prep Pap smear  Breast awareness reviewed   Encouraged to schedule mammogram  Encouraged healthy diet, exercise and lifestyle  Encouraged follow-up with PCP as needed  Encouraged seasonal influenza vaccination  Gardasil vaccine series reviewed  Written information provided  Encouraged social distancing, good hand hygiene, avoidance of crowds and masking  Written information provided about COVID-19    Will call/ ENOVIXt message  with results  VBI-    BMI Counseling: Body mass index is 39 14 kg/m²   The BMI is above normal  Nutrition recommendations include reducing portion sizes, decreasing overall calorie intake and 3-5 servings of fruits/vegetables daily  Exercise recommendations include exercising 3-5 times per week, joining a gym and strength training exercises  Depression Screening Follow-up Plan: Patient's depression screening was positive with a PHQ-2 score of 2  Their PHQ-9 score was   Continue regular follow-up with their psychologist/therapist/psychiatrist who is managing their mental health condition(s)

## 2022-12-19 ENCOUNTER — ANNUAL EXAM (OUTPATIENT)
Dept: OBGYN CLINIC | Facility: MEDICAL CENTER | Age: 37
End: 2022-12-19

## 2022-12-19 VITALS
SYSTOLIC BLOOD PRESSURE: 122 MMHG | HEIGHT: 62 IN | BODY MASS INDEX: 39.38 KG/M2 | WEIGHT: 214 LBS | DIASTOLIC BLOOD PRESSURE: 80 MMHG

## 2022-12-19 DIAGNOSIS — Z12.31 BREAST CANCER SCREENING BY MAMMOGRAM: ICD-10-CM

## 2022-12-19 DIAGNOSIS — Z80.3 FAMILY HISTORY OF BREAST CANCER: ICD-10-CM

## 2022-12-19 DIAGNOSIS — Z30.011 ENCOUNTER FOR INITIAL PRESCRIPTION OF CONTRACEPTIVE PILLS: ICD-10-CM

## 2022-12-19 DIAGNOSIS — Z01.419 WELL WOMAN EXAM WITH ROUTINE GYNECOLOGICAL EXAM: Primary | ICD-10-CM

## 2022-12-19 RX ORDER — ACETAMINOPHEN AND CODEINE PHOSPHATE 120; 12 MG/5ML; MG/5ML
1 SOLUTION ORAL DAILY
Qty: 30 TABLET | Refills: 4 | Status: SHIPPED | OUTPATIENT
Start: 2022-12-19

## 2022-12-20 DIAGNOSIS — F41.1 GAD (GENERALIZED ANXIETY DISORDER): ICD-10-CM

## 2022-12-20 DIAGNOSIS — F43.10 PTSD (POST-TRAUMATIC STRESS DISORDER): ICD-10-CM

## 2022-12-20 DIAGNOSIS — F31.81 BIPOLAR II DISORDER (HCC): ICD-10-CM
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
## 2022-12-22 ENCOUNTER — OFFICE VISIT (OUTPATIENT)
Dept: PSYCHIATRY | Facility: CLINIC | Age: 37
End: 2022-12-22

## 2022-12-22 VITALS — HEIGHT: 62 IN | WEIGHT: 213 LBS | BODY MASS INDEX: 39.2 KG/M2

## 2022-12-22 DIAGNOSIS — F41.1 GAD (GENERALIZED ANXIETY DISORDER): ICD-10-CM

## 2022-12-22 DIAGNOSIS — F43.10 PTSD (POST-TRAUMATIC STRESS DISORDER): ICD-10-CM

## 2022-12-22 DIAGNOSIS — F31.81 BIPOLAR II DISORDER (HCC): Primary | ICD-10-CM

## 2022-12-23 LAB
LAB AP GYN PRIMARY INTERPRETATION: NORMAL
LAB AP LMP: NORMAL
Lab: NORMAL

## 2022-12-27 ENCOUNTER — TELEMEDICINE (OUTPATIENT)
Dept: BEHAVIORAL/MENTAL HEALTH CLINIC | Facility: CLINIC | Age: 37
End: 2022-12-27

## 2022-12-27 DIAGNOSIS — F31.5 BIPOLAR I DISORDER, MOST RECENT EPISODE DEPRESSED, SEVERE WITH PSYCHOTIC FEATURES (HCC): Primary | Chronic | ICD-10-CM

## 2022-12-27 DIAGNOSIS — F41.1 GAD (GENERALIZED ANXIETY DISORDER): Chronic | ICD-10-CM

## 2022-12-27 NOTE — PSYCH
Virtual Regular Visit    Verification of patient location:    Patient is located in the following state in which I hold an active license [place]    Assessment/Plan:    Problem List Items Addressed This Visit        Other    Bipolar I disorder, most recent episode depressed, severe with psychotic features ([facility] [address] ) - Primary (Chronic)    SHELLY (generalized anxiety disorder) (Chronic)     Goals addressed in session: Goal 1      Reason for visit is   Chief Complaint   Patient presents with   • Virtual Regular Visit      Encounter provider CHARLIE Pelaez    Provider located at [address] [place] [id number] [phone]    Recent Visits  No visits were found meeting these conditions  Showing recent visits within past 7 days and meeting all other requirements  Today's Visits  Date Type Provider Dept   12/27/22 [address], [address] today's visits and meeting all other requirements  Future Appointments  No visits were found meeting these conditions  Showing future appointments within next 150 days and meeting all other requirements     The patient was identified by name and date of birth  Claude Morales was informed that this is a telemedicine visit and [facility]  She agrees to proceed     My office door was closed  No one else was in the room  She acknowledged consent and understanding of privacy and security of the video platform  The patient has agreed to participate and understands they can discontinue the visit at any time  Patient is aware this is a billable service  Subjective  Claude Morales is a 40 y o  female  DATA: Met with Golden Asher for scheduled individual session   Topics of discussion included relationship with significant other, family stressors, work-related stress, parenting concerns, mood regulation and symptoms and intimacy and sexuality  Carmella Orr discussed going to her parents' house to  their gifts for the children  She said that her mother wrote her a note, a few days prior to [place]  She said that she is willing to attend a therapy session with her, to begin to work on their relationship  Carmella Orr acknowledged that she is having some difficulty with determining her limits/boundaries with regard to her mother's behaviors  We discussed some of the issues that Carmella Orr feels she needs to discuss with her mother  She reports that she needs some additional consistency in this relationship, and she would like to have a reciprocal relationship-- where she is not the person who is expected to care for her mother  Carmella Orr states, "She can  Never be ok with all three of us kids at the same time  There always has to be one of us that she is unhappy with " Carmella Orr discussed getting a new bed for her daughter  She is working on trying to encourage Alexander to sleep in her own bed  We discussed her own feelings of Paralee Bearded being her "security" and having ambivalence about her sleeping in her own bed  She discussed wanting to reconnect with Rommel Rae and exhibited an understanding that she needs for Paralee Bearded to have her own space, so she can reconnect with her SO  We discussed how she made the decision to actively reconnect with her SO  She has also identiifed that her reactions to her SO are somewhat similar to her mother's behaviors  She is actively working on changing her own behaviors within relationships  She has recently switched her birth control (which may impact her moods the week before her period)  She also reports taking her psychiatric medications daily  Client shows evidence of utilizing CBT thought record, Mindfulness-based strategies, emotion regulation skills, effective communication skills and distress tolerance skills skills to manage mental health symptoms   During this session, this clinician used the following therapeutic modalities: supportive psychotherapy, client-centered therapy, mindfulness-based strategies, DBT-informed skills, Motivational Interviewing and solution-focused therapy       ASSESSMENT: Julio Luz presents with a improved mood  Her affect is normal range and intensity, appropriate  Julio Luz exhibits good therapeutic rapport with this clinician  Julio Luz continues to exhibit willingness to work on treatment goals and objectives  Julio Luz presents with a minimal risk of suicide, minimal risk of self-harm, and minimal risk of harm to others  PLAN: Julio Luz will return in approximately 2 weeks for the next scheduled session  Between sessions, Julio Luz will continue to work on developing her limits/boundaries with her mother  She will consider bringing her to a session  Julio Luz will continue to work on helping her daughter acclimate to her new bed, so she and her SO can reconnect  Julio Luz will report back during the next session re: successes and barriers  At the next session, this clinician will use supportive psychotherapy, client-centered therapy, mindfulness-based strategies, DBT-informed skills, Motivational Interviewing and solution-focused therapy to address her mood regulation and relationship concerns, in an effort to assist Julio Luz with meeting treatment goals       HPI     Past Medical History:   Diagnosis Date   • Abnormal Pap smear of cervix    • Depression    • Eczema    • Exposure to chlamydia     Resolved 18   • Migraine without aura and without status migrainosus, not intractable 10/11/2019   • Obesity    • Post traumatic stress disorder 2021   • Postpartum depression 2018   • Manoj-Parkinson-White (WPW) syndrome        Past Surgical History:   Procedure Laterality Date   • ABLATION OF DYSRHYTHMIC FOCUS      WPW ablation age 14   • COLONOSCOPY     • COLPOSCOPY     • INDUCED      • OTHER SURGICAL HISTORY      catheter ablation- WPW age 13 Current Outpatient Medications   Medication Sig Dispense Refill   • ibuprofen (MOTRIN) 600 mg tablet Take 1 tablet (600 mg total) by mouth every 6 (six) hours as needed for mild pain, moderate pain or fever 30 tablet 0   • lamoTRIgine (LaMICtal) 200 MG tablet Take 1 tablet (200 mg total) by mouth daily 30 tablet 2   • naproxen (NAPROSYN) 500 mg tablet Take 1 tablet (500 mg total) by mouth 2 (two) times a day with meals (Patient taking differently: Take 500 mg by mouth 2 (two) times a day with meals) 30 tablet 0   • norethindrone (Zoya) 0 35 MG tablet Take 1 tablet (0 35 mg total) by mouth daily 30 tablet 4   • rizatriptan (MAXALT-MLT) 10 mg disintegrating tablet Take at the onset of migraine; if symptoms continue or return, may take another dose at least 2 hours after first dose  Take no more than 2 doses in a day  9 tablet 0   • sertraline (ZOLOFT) 50 mg tablet TAKE 1 TABLET BY MOUTH EVERY DAY 30 tablet 0     No current facility-administered medications for this visit  Allergies   Allergen Reactions   • Codeine Other (See Comments)     dry heaves   • Sulfa Antibiotics Hives and Rash     Per pt as achild   • Erythromycin Hives     Per as a child       Review of Systems    Video Exam    There were no vitals filed for this visit      Physical Exam     Visit Time    12/27/22  Start Time: 2670  Stop Time: 2705  Total Visit Time: 51 minutes

## 2023-01-10 ENCOUNTER — TELEMEDICINE (OUTPATIENT)
Dept: BEHAVIORAL/MENTAL HEALTH CLINIC | Facility: CLINIC | Age: 38
End: 2023-01-10

## 2023-01-10 DIAGNOSIS — F41.1 GAD (GENERALIZED ANXIETY DISORDER): Chronic | ICD-10-CM

## 2023-01-10 DIAGNOSIS — F31.5 BIPOLAR I DISORDER, MOST RECENT EPISODE DEPRESSED, SEVERE WITH PSYCHOTIC FEATURES (HCC): Primary | Chronic | ICD-10-CM

## 2023-01-10 DIAGNOSIS — G47.09 OTHER INSOMNIA: Chronic | ICD-10-CM

## 2023-01-10 NOTE — PSYCH
Virtual Regular Visit    Verification of patient location:    Patient is located in the following state in which I hold an active license [place]    Assessment/Plan:    Problem List Items Addressed This Visit        Other    Bipolar I disorder, most recent episode depressed, severe with psychotic features ([facility] [address] ) - Primary (Chronic)    SHELLY (generalized anxiety disorder) (Chronic)    Other insomnia (Chronic)     Goals addressed in session: Goal 1        Reason for visit is   Chief Complaint   Patient presents with   • Virtual Regular Visit        Encounter provider CHARLIE Fallon    Provider located at [address] [place] [id number] [phone]      Recent Visits  Date Type Provider Dept   01/10/23 [address], [address]   Showing recent visits within past 7 days and meeting all other requirements  Future Appointments  No visits were found meeting these conditions  Showing future appointments within next 150 days and meeting all other requirements       The patient was identified by name and date of birth  Sarah Valdez was informed that this is a telemedicine visit and that [facility]  She agrees to proceed     My office door was closed  No one else was in the room  She acknowledged consent and understanding of privacy and security of the video platform  The patient has agreed to participate and understands they can discontinue the visit at any time  Patient is aware this is a billable service  Subjective  Sarah Valdez is a 40 y o  female  DATA: Met with Jose Gavin for scheduled individual session  Topics of discussion included relationship with significant other, relationships with family, parenting concerns and mood regulation and symptoms   "I was going to have a session with my mom, but I chickened out " We spent some time talking about Libertad's decision to wait and not have a family session at this point in time  She states that she has thought about the possibility that she might be putting more pressure on herself and that her mother might not be putting the pressure on her  This writer discussed interpersonal effectiveness skills and sent her (by email) a copy of the [facility] skill worksheets  Nisha Cuevas discussed her relationship with her SO and their children  She states that, for the most part, they have been getting along better  Nisha Cuevas states that her anxiety has been high  She has been taking her "accomodation days" at work, because of her level of anxiety  She states that she needs to get some of her living spaces organized  She reports that she feels that she cannot do some of the work, because she has to wait for Dre Aburto to do his part  She plans to talk with him regarding planning a day over the weekend to organize their space  Client shows evidence of utilizing emotion regulation skills, effective communication skills and distress tolerance skills skills to manage mental health symptoms  During this session, this clinician used the following therapeutic modalities: supportive psychotherapy, client-centered therapy, mindfulness-based strategies, DBT-informed skills, Motivational Interviewing and solution-focused therapy  ASSESSMENT: Nisha Cuevas presents with a euthymic mood  Her affect is normal range and intensity, appropriate  Nisha Cuevas exhibits good therapeutic rapport with this clinician  Nisha Cuevas continues to exhibit willingness to work on treatment goals and objectives  Nisha Cuevas presents with a minimal risk of suicide, minimal risk of self-harm, and minimal risk of harm to others  PLAN: Nisha Cuevas will return in two weeks for the next scheduled session   Between sessions, Nisha Cuevas will continue to work on organizing her space, maintaining effective communication with her SO, and maintaining limits/boundaries with other family members  She will report back during the next session re: successes and barriers  At the next session, this clinician will use supportive psychotherapy, client-centered therapy, mindfulness-based strategies, DBT-informed skills, Motivational Interviewing and solution-focused therapy to address her mood regulation and relationship concerns, in an effort to assist Sridhar Sawant with meeting treatment goals  HPI     Past Medical History:   Diagnosis Date   • Abnormal Pap smear of cervix    • Depression    • Eczema    • Exposure to chlamydia     Resolved 18   • Migraine without aura and without status migrainosus, not intractable 10/11/2019   • Obesity    • Post traumatic stress disorder 2021   • Postpartum depression 2018   • Manoj-Parkinson-White (WPW) syndrome        Past Surgical History:   Procedure Laterality Date   • ABLATION OF DYSRHYTHMIC FOCUS      WPW ablation age 14   • COLONOSCOPY     • COLPOSCOPY     • INDUCED      • OTHER SURGICAL HISTORY      catheter ablation- WPW age 13       Current Outpatient Medications   Medication Sig Dispense Refill   • ibuprofen (MOTRIN) 600 mg tablet Take 1 tablet (600 mg total) by mouth every 6 (six) hours as needed for mild pain, moderate pain or fever 30 tablet 0   • lamoTRIgine (LaMICtal) 200 MG tablet Take 1 tablet (200 mg total) by mouth daily 30 tablet 2   • naproxen (NAPROSYN) 500 mg tablet Take 1 tablet (500 mg total) by mouth 2 (two) times a day with meals (Patient taking differently: Take 500 mg by mouth 2 (two) times a day with meals) 30 tablet 0   • norethindrone (Zoya) 0 35 MG tablet Take 1 tablet (0 35 mg total) by mouth daily 30 tablet 4   • rizatriptan (MAXALT-MLT) 10 mg disintegrating tablet Take at the onset of migraine; if symptoms continue or return, may take another dose at least 2 hours after first dose  Take no more than 2 doses in a day   9 tablet 0   • sertraline (ZOLOFT) 50 mg tablet TAKE 1 TABLET BY MOUTH EVERY DAY 30 tablet 0     No current facility-administered medications for this visit  Allergies   Allergen Reactions   • Codeine Other (See Comments)     dry heaves   • Sulfa Antibiotics Hives and Rash     Per pt as achild   • Erythromycin Hives     Per as a child       Review of Systems    Video Exam    There were no vitals filed for this visit      Physical Exam     Visit Time    01/10/23  Start Time: 0902  Stop Time: 0936  Total Visit Time: 48 minutes

## 2023-01-18 DIAGNOSIS — F41.1 GAD (GENERALIZED ANXIETY DISORDER): ICD-10-CM

## 2023-01-18 DIAGNOSIS — F43.10 PTSD (POST-TRAUMATIC STRESS DISORDER): ICD-10-CM

## 2023-01-18 DIAGNOSIS — F31.81 BIPOLAR II DISORDER (HCC): ICD-10-CM

## 2023-01-20 ENCOUNTER — OFFICE VISIT (OUTPATIENT)
Dept: FAMILY MEDICINE CLINIC | Facility: CLINIC | Age: 38
End: 2023-01-20

## 2023-01-20 VITALS
TEMPERATURE: 100.3 F | OXYGEN SATURATION: 97 % | SYSTOLIC BLOOD PRESSURE: 130 MMHG | DIASTOLIC BLOOD PRESSURE: 90 MMHG | WEIGHT: 213.8 LBS | HEART RATE: 95 BPM | HEIGHT: 62 IN | BODY MASS INDEX: 39.34 KG/M2

## 2023-01-20 DIAGNOSIS — R50.9 FEVER, UNSPECIFIED FEVER CAUSE: Primary | ICD-10-CM

## 2023-01-20 LAB
SARS-COV-2 AG UPPER RESP QL IA: NEGATIVE
VALID CONTROL: NORMAL

## 2023-01-20 RX ORDER — BROMPHENIRAMINE MALEATE, PSEUDOEPHEDRINE HYDROCHLORIDE, AND DEXTROMETHORPHAN HYDROBROMIDE 2; 30; 10 MG/5ML; MG/5ML; MG/5ML
5 SYRUP ORAL 4 TIMES DAILY PRN
Qty: 120 ML | Refills: 0 | Status: SHIPPED | OUTPATIENT
Start: 2023-01-20

## 2023-01-20 NOTE — PATIENT INSTRUCTIONS
1  Fever, unspecified fever cause  -     POCT Rapid Covid Ag  -     Covid/Flu- Office Collect  -     brompheniramine-pseudoephedrine-DM 30-2-10 MG/5ML syrup;  Take 5 mL by mouth 4 (four) times a day as needed for congestion, cough or allergies     Call us if worsening

## 2023-01-20 NOTE — PROGRESS NOTES
Assessment/Plan:       Problem List Items Addressed This Visit    None  Visit Diagnoses     Fever, unspecified fever cause    -  Primary    Relevant Orders    POCT Rapid Covid Ag    Covid/Flu- Office Collect        1  Fever, unspecified fever cause  Likely viral syndrome, possible COVID/FLU, send out testing today, if worsening cough obtain chest xray  - POCT Rapid Covid Ag      Subjective:      Patient ID: Laura Sánchez is a 40 y o  female  HPI     40year old presenting for headache, congestiong and cough  Cough was mild but has worsened today  Children had flu two weeks ago  Fatigue and body aches as well, for the last two days along with sore throat  Had chills but normal temp at home      The following portions of the patient's history were reviewed and updated as appropriate: allergies, current medications, past family history, past medical history, past social history, past surgical history and problem list       Current Outpatient Medications:   •  ibuprofen (MOTRIN) 600 mg tablet, Take 1 tablet (600 mg total) by mouth every 6 (six) hours as needed for mild pain, moderate pain or fever, Disp: 30 tablet, Rfl: 0  •  naproxen (NAPROSYN) 500 mg tablet, Take 1 tablet (500 mg total) by mouth 2 (two) times a day with meals (Patient taking differently: Take 500 mg by mouth 2 (two) times a day with meals), Disp: 30 tablet, Rfl: 0  •  norethindrone (Zoya) 0 35 MG tablet, Take 1 tablet (0 35 mg total) by mouth daily, Disp: 30 tablet, Rfl: 4  •  rizatriptan (MAXALT-MLT) 10 mg disintegrating tablet, Take at the onset of migraine; if symptoms continue or return, may take another dose at least 2 hours after first dose   Take no more than 2 doses in a day , Disp: 9 tablet, Rfl: 0  •  sertraline (ZOLOFT) 50 mg tablet, TAKE 1 TABLET BY MOUTH EVERY DAY, Disp: 30 tablet, Rfl: 0  •  lamoTRIgine (LaMICtal) 200 MG tablet, Take 1 tablet (200 mg total) by mouth daily, Disp: 30 tablet, Rfl: 2     Review of Systems   Constitutional: Negative for activity change and appetite change  Respiratory: Negative for apnea and chest tightness  Cardiovascular: Negative for chest pain and palpitations  Gastrointestinal: Negative for abdominal distention and abdominal pain  Musculoskeletal: Negative for arthralgias and back pain  Psychiatric/Behavioral: Negative for agitation and behavioral problems  Objective:      /90 (BP Location: Left arm, Patient Position: Sitting, Cuff Size: Large)   Pulse 95   Temp 100 3 °F (37 9 °C) (Tympanic)   Ht 5' 2" (1 575 m)   Wt 97 kg (213 lb 12 8 oz)   SpO2 97%   BMI 39 10 kg/m²          Physical Exam  Constitutional:       Appearance: She is well-developed  Cardiovascular:      Rate and Rhythm: Normal rate and regular rhythm  Heart sounds: Normal heart sounds  Pulmonary:      Effort: Pulmonary effort is normal       Breath sounds: Normal breath sounds  Abdominal:      Palpations: Abdomen is soft  Neurological:      Mental Status: She is alert             Brennen Oakley MD

## 2023-01-21 LAB
FLUAV RNA RESP QL NAA+PROBE: NEGATIVE
FLUBV RNA RESP QL NAA+PROBE: NEGATIVE
SARS-COV-2 RNA RESP QL NAA+PROBE: NEGATIVE

## 2023-01-24 ENCOUNTER — TELEMEDICINE (OUTPATIENT)
Dept: BEHAVIORAL/MENTAL HEALTH CLINIC | Facility: CLINIC | Age: 38
End: 2023-01-24

## 2023-01-24 DIAGNOSIS — F41.1 GAD (GENERALIZED ANXIETY DISORDER): Chronic | ICD-10-CM

## 2023-01-24 DIAGNOSIS — F31.5 BIPOLAR I DISORDER, MOST RECENT EPISODE DEPRESSED, SEVERE WITH PSYCHOTIC FEATURES (HCC): Primary | Chronic | ICD-10-CM

## 2023-01-24 DIAGNOSIS — F43.10 POST TRAUMATIC STRESS DISORDER: ICD-10-CM

## 2023-01-24 NOTE — PSYCH
Virtual Regular Visit    Verification of patient location:    Patient is located in the following state in which I hold an active license [place]    Assessment/Plan:    Problem List Items Addressed This Visit        Other    Bipolar I disorder, most recent episode depressed, severe with psychotic features ([facility] [address] ) - Primary (Chronic)    SHELLY (generalized anxiety disorder) (Chronic)    Post traumatic stress disorder     Goals addressed in session: Goal 1      Reason for visit is No chief complaint on file  Encounter provider CHARLIE Reina    Provider located at [address] [place] [id number] [phone]    Recent Visits  Date Type Provider Dept   01/20/23 Office Visit Manjula Sebastian 1979 Primary [facility]   Showing recent visits within past 7 days and meeting all other requirements  Today's Visits  Date Type Provider Dept   01/24/23 [address] [place] , [address] [facility]   Showing today's visits and meeting all other requirements  Future Appointments  No visits were found meeting these conditions  Showing future appointments within next 150 days and meeting all other requirements     The patient was identified by name and date of birth  Dolores Clancy was informed that this is a telemedicine visit and [facility]  She agrees to proceed     My office door was closed  No one else was in the room  She acknowledged consent and understanding of privacy and security of the video platform  The patient has agreed to participate and understands they can discontinue the visit at any time  Patient is aware this is a billable service  Subjective  [facility]        HPI     Past Medical History:   Diagnosis Date   • Abnormal Pap smear of cervix    • Depression    • Eczema • Exposure to chlamydia     Resolved 18   • Migraine without aura and without status migrainosus, not intractable 10/11/2019   • Obesity    • Post traumatic stress disorder 2021   • Postpartum depression 2018   • Manoj-Parkinson-White (WPW) syndrome        Past Surgical History:   Procedure Laterality Date   • ABLATION OF DYSRHYTHMIC FOCUS      WPW ablation age 14   • COLONOSCOPY     • COLPOSCOPY     • INDUCED      • OTHER SURGICAL HISTORY      catheter ablation- WPW age 13       Current Outpatient Medications   Medication Sig Dispense Refill   • brompheniramine-pseudoephedrine-DM 30-2-10 MG/5ML syrup Take 5 mL by mouth 4 (four) times a day as needed for congestion, cough or allergies 120 mL 0   • ibuprofen (MOTRIN) 600 mg tablet Take 1 tablet (600 mg total) by mouth every 6 (six) hours as needed for mild pain, moderate pain or fever 30 tablet 0   • lamoTRIgine (LaMICtal) 200 MG tablet Take 1 tablet (200 mg total) by mouth daily 30 tablet 2   • naproxen (NAPROSYN) 500 mg tablet Take 1 tablet (500 mg total) by mouth 2 (two) times a day with meals (Patient taking differently: Take 500 mg by mouth 2 (two) times a day with meals) 30 tablet 0   • norethindrone (Zoya) 0 35 MG tablet Take 1 tablet (0 35 mg total) by mouth daily 30 tablet 4   • rizatriptan (MAXALT-MLT) 10 mg disintegrating tablet Take at the onset of migraine; if symptoms continue or return, may take another dose at least 2 hours after first dose  Take no more than 2 doses in a day  9 tablet 0   • sertraline (ZOLOFT) 50 mg tablet TAKE 1 TABLET BY MOUTH EVERY DAY 30 tablet 0     No current facility-administered medications for this visit  Allergies   Allergen Reactions   • Codeine Other (See Comments)     dry heaves   • Sulfa Antibiotics Hives and Rash     Per pt as achild   • Erythromycin Hives     Per as a child       Review of Systems    Video Exam    There were no vitals filed for this visit      Physical Exam Behavioral Health Psychotherapy Progress Note    Psychotherapy Provided: Individual Psychotherapy     1  Bipolar I disorder, most recent episode depressed, severe with psychotic features ([address] )        2  SHELLY (generalized anxiety disorder)        3  Post traumatic stress disorder          Goals addressed in session: Goal 1     DATA: "I have gotten Alexander to sleep in her bed, so I have been able to have some alone time with Meng Bruno discussed her feelings of reconnecting with her SO and her fears that she could become "disconnected from Cheyanne " This clinician assisted Clemencia Palumbo with reframing these thoughts  This clinician discussed the positive aspect of Clemencia Marreroon encouraging her daughter to develop a comfort level with being away from Clemencia Palumbo  She does acknowledge that she wants Cheyanne to be more independent  In addition, we briefly discussed her fears regarding Cheyanne' health and wellbeing, when she is not with her  This clinician encouraged her to use the new sleeping arrangements as a beginning place for her to gain more comfort with her daughter's growing independence  Clemencia Deepali states that she has invited her mother to one of our sessions in February  She states that she is feeling somewhat nervous about getting into an argument with her mother  She states that she is easily frustrated with her mother  Clemenciadima Marreroon expressed that she feels that her mother acts like she does not know things or understand things, even though she believes that her mother is well-aware  Clemencia Domjohnon states that she does not want her mother to feel like she hates her  We discussed Libertad's expectation for the relationship with her parents  She states that she feels that her mother and father want a relationship with her children and not with her   She states that she wants to repair her relationship with her parents, and she wants them to work on their relationship first  We discussed Libertad's relationship with her SO and things that they can work on now (while their relationship is going well) to plan for how to best support one another when they are having some emotional struggles  Mily Brandon was able to acknowledge that her mood is often impacted by her partner's mood and vice versa  During this session, this clinician used the following therapeutic modalities: Client-centered Therapy, Dialectical Behavior Therapy, Mindfulness-based Strategies, Motivational Interviewing, Solution-Focused Therapy and Supportive Psychotherapy    Substance Abuse was not addressed during this session  If the client is diagnosed with a co-occurring substance use disorder, please indicate any changes in the frequency or amount of use: N/A  Stage of change for addressing substance use diagnoses: No substance use/Not applicable    ASSESSMENT:  Stef Alvarez presents with a somewhat anxious mood  her affect is Normal range and intensity, which is congruent, with her mood and the content of the session  The client has made progress on their goals  Stef Alvarez presents with a minimal risk of suicide, minimal risk of self-harm, and minimal risk of harm to others  For any risk assessment that surpasses a "low" rating, a safety plan must be developed  A safety plan was indicated: no  If yes, describe in detail N/A  PLAN: Between sessions, Stef Alvarez will review the [facility] handouts and begin to develop a priority list of what she wants to accomplish in her family session with her mother  At the next session, the therapist will use Client-centered Therapy, Dialectical Behavior Therapy, Mindfulness-based Strategies, Motivational Interviewing, Solution-Focused Therapy and Supportive Psychotherapy to address Libertad's mood regulation and relationships  This clinician will review her [facility] worksheets with her, to develop a plan for how we will manage the session with her mother      Behavioral Health Treatment Plan and Discharge Planning: Mily Brandon Pita Martinez is aware of and agrees to continue to work on their treatment plan  They have identified and are working toward their discharge goals   yes    Visit start and stop times:    01/24/23  Start Time: 0902  Stop Time: 1933  Total Visit Time: 50 minutes
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
## 2023-02-02 ENCOUNTER — TELEMEDICINE (OUTPATIENT)
Dept: PSYCHIATRY | Facility: CLINIC | Age: 38
End: 2023-02-02

## 2023-02-02 DIAGNOSIS — F43.10 PTSD (POST-TRAUMATIC STRESS DISORDER): ICD-10-CM

## 2023-02-02 DIAGNOSIS — F31.81 BIPOLAR II DISORDER (HCC): Primary | ICD-10-CM

## 2023-02-02 DIAGNOSIS — F41.1 GAD (GENERALIZED ANXIETY DISORDER): ICD-10-CM

## 2023-02-02 RX ORDER — LAMOTRIGINE 200 MG/1
200 TABLET ORAL DAILY
Qty: 30 TABLET | Refills: 2 | Status: SHIPPED | OUTPATIENT
Start: 2023-02-02

## 2023-02-13 ENCOUNTER — TELEPHONE (OUTPATIENT)
Dept: PSYCHIATRY | Facility: CLINIC | Age: 38
End: 2023-02-13

## 2023-02-13 NOTE — TELEPHONE ENCOUNTER
Contacted Patient in regards to previous encounter, Patient stated it was not a good time to talk and will call back to schedule with a new provider

## 2023-02-13 NOTE — TELEPHONE ENCOUNTER
Pt called in regards to a vm she received  Writer informed her that the person who will schedule the appt with the new provider is not available at this moment but she will call her back

## 2023-02-13 NOTE — TELEPHONE ENCOUNTER
Contacted patient in regards to carrie due to provider leaving and scheduling with new provider  LVM for patient to contact intake departmnent

## 2023-02-15 NOTE — TELEPHONE ENCOUNTER
Contacted Patient in regards to carrie, unable to lvm due to "call could not be completed as dialed

## 2023-02-17 DIAGNOSIS — G43.109 MIGRAINE WITH AURA AND WITHOUT STATUS MIGRAINOSUS, NOT INTRACTABLE: ICD-10-CM

## 2023-02-17 RX ORDER — RIZATRIPTAN BENZOATE 10 MG/1
TABLET, ORALLY DISINTEGRATING ORAL
Qty: 9 TABLET | Refills: 0 | Status: SHIPPED | OUTPATIENT
Start: 2023-02-17

## 2023-02-24 ENCOUNTER — SOCIAL WORK (OUTPATIENT)
Dept: BEHAVIORAL/MENTAL HEALTH CLINIC | Facility: CLINIC | Age: 38
End: 2023-02-24

## 2023-02-24 DIAGNOSIS — F31.5 BIPOLAR I DISORDER, MOST RECENT EPISODE DEPRESSED, SEVERE WITH PSYCHOTIC FEATURES (HCC): Primary | Chronic | ICD-10-CM

## 2023-02-24 DIAGNOSIS — F43.10 POST TRAUMATIC STRESS DISORDER: ICD-10-CM

## 2023-02-24 DIAGNOSIS — F41.1 GAD (GENERALIZED ANXIETY DISORDER): Chronic | ICD-10-CM

## 2023-02-24 DIAGNOSIS — Z63.8 FAMILY CONFLICT: ICD-10-CM

## 2023-02-24 SDOH — SOCIAL STABILITY - SOCIAL INSECURITY: OTHER SPECIFIED PROBLEMS RELATED TO PRIMARY SUPPORT GROUP: Z63.8

## 2023-03-03 NOTE — PSYCH
Behavioral Health Psychotherapy Progress Note    Psychotherapy Provided: Family Therapy    1  Bipolar I disorder, most recent episode depressed, severe with psychotic features ([facility] [address] )        2  SHELLY (generalized anxiety disorder)        3  Post traumatic stress disorder        4  Family conflict            Goals addressed in session: Goal 1     DATA: Met with Megan Cooper and her mother for a scheduled family session  We spent time discussing the reasons that both parties want to participate in family sessions  Megan Cooper states, "I want to improve our communication " Libertad's mother states, "I want to see my grandchildren " We discussed the importance of validation, trust, and openness in their relationship  Libertad's mother openly discussed her past mental health history and acknowledged understanding of how this might have impacted Libertad's development  At one point, Libertad's mother stated that Libertad's boyfriend needs to "get over it" when discussing his past trauma  This clinician pointed out that trauma impacts everyone differently and that each person needs to work on resolving trauma and that it is not something that one can "get over " We discussed the possibility of Megan Cooper and her mother taking time to build their relationship  Her mother continued to focus on seeing her grandchildren  Megan Cooper expressed to her mother that she wants to build a relationship with her and that she does not have trust in her at this point in time  Megan Cooper and I will meet together for our next session without her mother and will make a plan for potential future sessions  During this session, this clinician used the following therapeutic modalities: Client-centered Therapy, Dialectical Behavior Therapy, Family Therapy, Mindfulness-based Strategies, Motivational Interviewing, Solution-Focused Therapy and Supportive Psychotherapy    Substance Abuse was not addressed during this session   If the client is diagnosed with a co-occurring substance use disorder, please indicate any changes in the frequency or amount of use: n/a  Stage of change for addressing substance use diagnoses: No substance use/Not applicable    ASSESSMENT:  Brittanie Nieves presents with a Euthymic/ normal mood  her affect is Normal range and intensity, which is congruent, with her mood and the content of the session  The client has made progress on their goals  Brittanie Nieves presents with a minimal risk of suicide, minimal risk of self-harm, and minimal risk of harm to others  For any risk assessment that surpasses a "low" rating, a safety plan must be developed  A safety plan was indicated: no  If yes, describe in detail n/a    PLAN: Between sessions, Brittanie Nieves will continue to monitor her mood  She will continue to set/maintain her boundaries and limits with her mother (and other family members)  At the next session, the therapist will use Client-centered Therapy, Dialectical Behavior Therapy, Mindfulness-based Strategies, Motivational Interviewing, Solution-Focused Therapy and Supportive Psychotherapy to address her mood regulation and relationship concerns  Behavioral Health Treatment Plan and Discharge Planning: Brittanie Nieves is aware of and agrees to continue to work on their treatment plan  They have identified and are working toward their discharge goals   yes    Visit start and stop times:    02/24/23  Start Time: 1512  Stop Time: 1603  Total Visit Time: 51 minutes

## 2023-03-10 ENCOUNTER — TELEMEDICINE (OUTPATIENT)
Dept: BEHAVIORAL/MENTAL HEALTH CLINIC | Facility: CLINIC | Age: 38
End: 2023-03-10

## 2023-03-10 DIAGNOSIS — G47.09 OTHER INSOMNIA: Chronic | ICD-10-CM

## 2023-03-10 DIAGNOSIS — F41.1 GAD (GENERALIZED ANXIETY DISORDER): Chronic | ICD-10-CM

## 2023-03-10 DIAGNOSIS — F31.5 BIPOLAR I DISORDER, MOST RECENT EPISODE DEPRESSED, SEVERE WITH PSYCHOTIC FEATURES (HCC): Primary | Chronic | ICD-10-CM

## 2023-03-10 NOTE — PSYCH
Virtual Regular Visit    Verification of patient location:    Patient is located in the following state in which I hold an active license [place]    Assessment/Plan:    Problem List Items Addressed This Visit        Other    Bipolar I disorder, most recent episode depressed, severe with psychotic features ([facility] [address] ) - Primary (Chronic)    SHELLY (generalized anxiety disorder) (Chronic)    Other insomnia (Chronic)     Goals addressed in session: Goal 1      Reason for visit is   Chief Complaint   Patient presents with   • Virtual Regular Visit      Encounter provider Char Severin, SW    Provider located at [address] [place] [address]  [facility] [id number] [phone]    Recent Visits  No visits were found meeting these conditions  Showing recent visits within past 7 days and meeting all other requirements  Today's Visits  Date Type Provider Dept   03/10/23 [address] today's visits and meeting all other requirements  Future Appointments  No visits were found meeting these conditions  Showing future appointments within next 150 days and meeting all other requirements     The patient was identified by name and date of birth  Mckenna Crzu was informed that this is a telemedicine visit and that [facility]  She agrees to proceed     My office door was closed  No one else was in the room  She acknowledged consent and understanding of privacy and security of the video platform  The patient has agreed to participate and understands they can discontinue the visit at any time  Patient is aware this is a billable service  Subjective  Mckenna Cruz is a 40 y o  female      HPI     Past Medical History:   Diagnosis Date   • Abnormal Pap smear of cervix    • Depression    • Eczema    • Exposure to chlamydia Resolved 18   • Migraine without aura and without status migrainosus, not intractable 10/11/2019   • Obesity    • Post traumatic stress disorder 2021   • Postpartum depression 2018   • Manoj-Parkinson-White (WPW) syndrome        Past Surgical History:   Procedure Laterality Date   • ABLATION OF DYSRHYTHMIC FOCUS      WPW ablation age 14   • COLONOSCOPY     • COLPOSCOPY     • INDUCED      • OTHER SURGICAL HISTORY      catheter ablation- WPW age 13       Current Outpatient Medications   Medication Sig Dispense Refill   • brompheniramine-pseudoephedrine-DM 30-2-10 MG/5ML syrup Take 5 mL by mouth 4 (four) times a day as needed for congestion, cough or allergies 120 mL 0   • ibuprofen (MOTRIN) 600 mg tablet Take 1 tablet (600 mg total) by mouth every 6 (six) hours as needed for mild pain, moderate pain or fever 30 tablet 0   • lamoTRIgine (LaMICtal) 200 MG tablet TAKE 1 TABLET BY MOUTH EVERY DAY 90 tablet 0   • naproxen (NAPROSYN) 500 mg tablet Take 1 tablet (500 mg total) by mouth 2 (two) times a day with meals (Patient taking differently: Take 500 mg by mouth 2 (two) times a day with meals) 30 tablet 0   • norethindrone (Zoya) 0 35 MG tablet Take 1 tablet (0 35 mg total) by mouth daily 30 tablet 4   • rizatriptan (MAXALT-MLT) 10 mg disintegrating tablet TAKE AT THE ONSET OF MIGRAINE IF SYMPTOMS CONTINUE OR RETURN, MAY TAKE ANOTHER DOSE AT LEAST 2 HOURS AFTER FIRST DOSE  TAKE NO MORE THAN 2 DOSES IN A DAY  9 tablet 0   • sertraline (ZOLOFT) 50 mg tablet Take 1 tablet (50 mg total) by mouth daily 30 tablet 2     No current facility-administered medications for this visit  Allergies   Allergen Reactions   • Codeine Other (See Comments)     dry heaves   • Sulfa Antibiotics Hives and Rash     Per pt as achild   • Erythromycin Hives     Per as a child       Review of Systems    Video Exam    There were no vitals filed for this visit      Physical Exam     Behavioral Health Psychotherapy Progress Note    Psychotherapy Provided: Individual Psychotherapy     1  Bipolar I disorder, most recent episode depressed, severe with psychotic features ([address] )        2  SHELLY (generalized anxiety disorder)        3  Other insomnia          Goals addressed in session: Goal 1     DATA: Met with Ke Rice for scheduled individual session  We spent time discussing the previous session and her feelings about how the family session with her mother went  She states that she feels that the conversations with her mother were frustrating  She states that she gives herself a "B" as a grade for how she managed the interaction  She states that she is willing to participate in another family session in the future, and she states that her mother is also willing to do so  We spent some time discussing ways that Ke Rice can make some changes within the relationship-- e g , changing her responses to be more accepting of the possibility of her mother forgetting things  She states that she has tried this, and she has a very difficult time believing that her mother cannot remember certain topics-- as she has reminded her mother on multiple occasions  This clinician encouraged her to be clear about her reasons for not wanting her children to be in the care of her mother  She states that she does not trust that her mother will not say inappropriate things about members of the family  We discussed ways that she can remain in "wise mind" when interacting with her mother  Overall, Ke Rice states that other parts of her life are going well  She does identify that she has some difficulty with getting up in the morning and following through with her work day  We discussed the possibility of her creating a ritual for transition to and from the workday     During this session, this clinician used the following therapeutic modalities: Client-centered Therapy, Dialectical Behavior Therapy, Mindfulness-based Strategies, Motivational Interviewing, Solution-Focused Therapy and Supportive Psychotherapy    Substance Abuse was not addressed during this session  If the client is diagnosed with a co-occurring substance use disorder, please indicate any changes in the frequency or amount of use: n/a  Stage of change for addressing substance use diagnoses: No substance use/Not applicable    ASSESSMENT:  Claude Morales presents with a Euthymic/ normal mood  her affect is Normal range and intensity, which is congruent, with her mood and the content of the session  The client has made progress on their goals  Claude Morales presents with a minimal risk of suicide, minimal risk of self-harm, and minimal risk of harm to others  For any risk assessment that surpasses a "low" rating, a safety plan must be developed  A safety plan was indicated: no  If yes, describe in detail n/a    PLAN: Between sessions, Claude Morales will continue to practice use of effective communication skills (especially with her mother)  She will monitor her moods and use her coping skills to manage her moods  At the next session, the therapist will use Client-centered Therapy, Dialectical Behavior Therapy, Mindfulness-based Strategies, Motivational Interviewing, Solution-Focused Therapy and Supportive Psychotherapy to address her mood regulation and relationship concerns  Behavioral Health Treatment Plan and Discharge Planning: Claude Morales is aware of and agrees to continue to work on their treatment plan  They have identified and are working toward their discharge goals   yes    Visit start and stop times:    03/10/23  Start Time: 0802  Stop Time: 0848  Total Visit Time: 46 minutes

## 2023-03-21 ENCOUNTER — TELEMEDICINE (OUTPATIENT)
Dept: BEHAVIORAL/MENTAL HEALTH CLINIC | Facility: CLINIC | Age: 38
End: 2023-03-21

## 2023-03-21 DIAGNOSIS — F31.5 BIPOLAR I DISORDER, MOST RECENT EPISODE DEPRESSED, SEVERE WITH PSYCHOTIC FEATURES (HCC): Primary | Chronic | ICD-10-CM

## 2023-03-21 DIAGNOSIS — F41.1 GAD (GENERALIZED ANXIETY DISORDER): Chronic | ICD-10-CM

## 2023-03-21 NOTE — PSYCH
Virtual Regular Visit    Verification of patient location:    Patient is located in the following state in which I hold an active license [place]    Assessment/Plan:    Problem List Items Addressed This Visit        Other    Bipolar I disorder, most recent episode depressed, severe with psychotic features ([facility] [address] ) - Primary (Chronic)    SHELLY (generalized anxiety disorder) (Chronic)     Goals addressed in session: Goal 1      Reason for visit is   Chief Complaint   Patient presents with   • Virtual Regular Visit      Encounter provider CHARLIE Mcdaniels    Provider located at [address] [place] [id number] [phone]    Recent Visits  No visits were found meeting these conditions  Showing recent visits within past 7 days and meeting all other requirements  Today's Visits  Date Type Provider Dept   03/21/23 [address] today's visits and meeting all other requirements  Future Appointments  No visits were found meeting these conditions  Showing future appointments within next 150 days and meeting all other requirements     The patient was identified by name and date of birth  Real Tyson was informed that this is a telemedicine visit and that the visit is being conducted throughthe [employer] platform  She agrees to proceed     My office door was closed  No one else was in the room  She acknowledged consent and understanding of privacy and security of the video platform  The patient has agreed to participate and understands they can discontinue the visit at any time  Patient is aware this is a billable service  Subjective  Real Tyson is a 40 y o  female      HPI     Past Medical History:   Diagnosis Date   • Abnormal Pap smear of cervix    • Depression    • Eczema    • Exposure to chlamydia     Resolved 06/09/17   • Migraine without aura and without status migrainosus, not intractable 10/11/2019   • Obesity    • Post traumatic stress disorder 2021   • Postpartum depression 2018   • Manoj-Parkinson-White (WPW) syndrome        Past Surgical History:   Procedure Laterality Date   • ABLATION OF DYSRHYTHMIC FOCUS      WPW ablation age 14   • COLONOSCOPY     • COLPOSCOPY     • INDUCED      • OTHER SURGICAL HISTORY      catheter ablation- WPW age 13       Current Outpatient Medications   Medication Sig Dispense Refill   • brompheniramine-pseudoephedrine-DM 30-2-10 MG/5ML syrup Take 5 mL by mouth 4 (four) times a day as needed for congestion, cough or allergies 120 mL 0   • ibuprofen (MOTRIN) 600 mg tablet Take 1 tablet (600 mg total) by mouth every 6 (six) hours as needed for mild pain, moderate pain or fever 30 tablet 0   • lamoTRIgine (LaMICtal) 200 MG tablet TAKE 1 TABLET BY MOUTH EVERY DAY 90 tablet 0   • naproxen (NAPROSYN) 500 mg tablet Take 1 tablet (500 mg total) by mouth 2 (two) times a day with meals (Patient taking differently: Take 500 mg by mouth 2 (two) times a day with meals) 30 tablet 0   • norethindrone (Zoya) 0 35 MG tablet Take 1 tablet (0 35 mg total) by mouth daily 30 tablet 4   • rizatriptan (MAXALT-MLT) 10 mg disintegrating tablet TAKE AT THE ONSET OF MIGRAINE IF SYMPTOMS CONTINUE OR RETURN, MAY TAKE ANOTHER DOSE AT LEAST 2 HOURS AFTER FIRST DOSE  TAKE NO MORE THAN 2 DOSES IN A DAY  9 tablet 0   • sertraline (ZOLOFT) 50 mg tablet Take 1 tablet (50 mg total) by mouth daily 30 tablet 2     No current facility-administered medications for this visit  Allergies   Allergen Reactions   • Codeine Other (See Comments)     dry heaves   • Sulfa Antibiotics Hives and Rash     Per pt as achild   • Erythromycin Hives     Per as a child       Review of Systems    Video Exam    There were no vitals filed for this visit      Physical Exam     Behavioral Health Psychotherapy Progress Note    Psychotherapy Provided: Individual Psychotherapy     1  Bipolar I disorder, most recent episode depressed, severe with psychotic features ([address] )        2  SHELLY (generalized anxiety disorder)          Goals addressed in session: Goal 1     DATA: Met with Jeff Fraser for her scheduled individual session  Jeff Fraser discussed her concern regarding her daughter's teeth  She states that her daughter has two loose teeth on the bottom  She states that she is uncertain if this is normal or not-- due to her daughter's prior dental issues  She has a follow-up appointment scheduled for her daughter  Jeff Fraser discussed the difficulty she has with managing her anxiety  She states that she has been calling out of work approximately once per week, due to her anxiety  We discussed using exposure to the anxiety to help her push through and attend work, even when she does not want to  She states that when she is participating in work, she often feels better  We discussed her medication regimen and the possibility of her speaking to her provider about increasing her dose or changing to a different medication  She states that she has been having night sweats-- which started after she began taking the zoloft  She will also reach out to her medical provider to see if she can get labwork done-- to rule out any physical reasons for the night sweats  Overall, Jeff Fraser does recognize an improvement in her mood regulation since starting on the zoloft and lamictal  She states that, if the night sweats are caused by the medication, she will continue to take the medications  During this session, this clinician used the following therapeutic modalities: Client-centered Therapy, Dialectical Behavior Therapy, Mindfulness-based Strategies, Motivational Interviewing, Solution-Focused Therapy and Supportive Psychotherapy    Substance Abuse was not addressed during this session   If the client is diagnosed with a co-occurring substance use disorder, please indicate any changes in the frequency or amount of use: n/a  Stage of change for addressing substance use diagnoses: No substance use/Not applicable    ASSESSMENT:  Tawny Weinstein presents with a somewhat dysthymic mood  her affect is Normal range and intensity, which is congruent, with her mood and the content of the session  The client has made progress on their goals  Tawny Weinstein presents with a minimal risk of suicide, minimal risk of self-harm, and minimal risk of harm to others  For any risk assessment that surpasses a "low" rating, a safety plan must be developed  A safety plan was indicated: no  If yes, describe in detail n/a    PLAN: Between sessions, Tawny Weinstein will reach out to her medical providers  She will also call [facility] to reschedule with a psychiatric provider  Yoko Swain will continue to work through her anxiety and attend her work  She will use the skills that we discussed at our previous session (e g , getting up and going through a daily work-preparation ritual)  At the next session, the therapist will use Client-centered Therapy, Dialectical Behavior Therapy, Mindfulness-based Strategies, Motivational Interviewing, Solution-Focused Therapy and Supportive Psychotherapy to address her mood regulation and relationship concerns  Behavioral Health Treatment Plan and Discharge Planning: Tawny Weinstein is aware of and agrees to continue to work on their treatment plan  They have identified and are working toward their discharge goals   yes    Visit start and stop times:    03/21/23  Start Time: 0908  Stop Time: 7777  Total Visit Time: 48 minutes

## 2023-03-27 DIAGNOSIS — G43.109 MIGRAINE WITH AURA AND WITHOUT STATUS MIGRAINOSUS, NOT INTRACTABLE: ICD-10-CM

## 2023-03-27 RX ORDER — RIZATRIPTAN BENZOATE 10 MG/1
TABLET, ORALLY DISINTEGRATING ORAL
Qty: 9 TABLET | Refills: 0 | Status: SHIPPED | OUTPATIENT
Start: 2023-03-27

## 2023-03-28 ENCOUNTER — OFFICE VISIT (OUTPATIENT)
Dept: FAMILY MEDICINE CLINIC | Facility: CLINIC | Age: 38
End: 2023-03-28

## 2023-03-28 ENCOUNTER — APPOINTMENT (OUTPATIENT)
Dept: LAB | Facility: MEDICAL CENTER | Age: 38
End: 2023-03-28

## 2023-03-28 VITALS
TEMPERATURE: 99.1 F | RESPIRATION RATE: 16 BRPM | WEIGHT: 218 LBS | SYSTOLIC BLOOD PRESSURE: 110 MMHG | HEART RATE: 97 BPM | HEIGHT: 62 IN | OXYGEN SATURATION: 98 % | DIASTOLIC BLOOD PRESSURE: 78 MMHG | BODY MASS INDEX: 40.12 KG/M2

## 2023-03-28 DIAGNOSIS — R59.0 ENLARGED LYMPH NODE IN NECK: Primary | ICD-10-CM

## 2023-03-28 DIAGNOSIS — R59.0 ENLARGED LYMPH NODE IN NECK: ICD-10-CM

## 2023-03-28 LAB
ALBUMIN SERPL BCP-MCNC: 3.7 G/DL (ref 3.5–5)
ALP SERPL-CCNC: 65 U/L (ref 46–116)
ALT SERPL W P-5'-P-CCNC: 35 U/L (ref 12–78)
ANION GAP SERPL CALCULATED.3IONS-SCNC: 3 MMOL/L (ref 4–13)
AST SERPL W P-5'-P-CCNC: 21 U/L (ref 5–45)
BASOPHILS # BLD AUTO: 0.12 THOUSANDS/ÂΜL (ref 0–0.1)
BASOPHILS NFR BLD AUTO: 1 % (ref 0–1)
BILIRUB SERPL-MCNC: 0.33 MG/DL (ref 0.2–1)
BUN SERPL-MCNC: 10 MG/DL (ref 5–25)
CALCIUM SERPL-MCNC: 8.8 MG/DL (ref 8.3–10.1)
CHLORIDE SERPL-SCNC: 108 MMOL/L (ref 96–108)
CO2 SERPL-SCNC: 25 MMOL/L (ref 21–32)
CREAT SERPL-MCNC: 0.7 MG/DL (ref 0.6–1.3)
EOSINOPHIL # BLD AUTO: 0.34 THOUSAND/ÂΜL (ref 0–0.61)
EOSINOPHIL NFR BLD AUTO: 3 % (ref 0–6)
ERYTHROCYTE [DISTWIDTH] IN BLOOD BY AUTOMATED COUNT: 12.6 % (ref 11.6–15.1)
GFR SERPL CREATININE-BSD FRML MDRD: 111 ML/MIN/1.73SQ M
GLUCOSE SERPL-MCNC: 79 MG/DL (ref 65–140)
HCT VFR BLD AUTO: 44.6 % (ref 34.8–46.1)
HGB BLD-MCNC: 14.8 G/DL (ref 11.5–15.4)
IMM GRANULOCYTES # BLD AUTO: 0.03 THOUSAND/UL (ref 0–0.2)
IMM GRANULOCYTES NFR BLD AUTO: 0 % (ref 0–2)
LYMPHOCYTES # BLD AUTO: 3.27 THOUSANDS/ÂΜL (ref 0.6–4.47)
LYMPHOCYTES NFR BLD AUTO: 32 % (ref 14–44)
MCH RBC QN AUTO: 29.2 PG (ref 26.8–34.3)
MCHC RBC AUTO-ENTMCNC: 33.2 G/DL (ref 31.4–37.4)
MCV RBC AUTO: 88 FL (ref 82–98)
MONOCYTES # BLD AUTO: 0.76 THOUSAND/ÂΜL (ref 0.17–1.22)
MONOCYTES NFR BLD AUTO: 7 % (ref 4–12)
NEUTROPHILS # BLD AUTO: 5.71 THOUSANDS/ÂΜL (ref 1.85–7.62)
NEUTS SEG NFR BLD AUTO: 57 % (ref 43–75)
NRBC BLD AUTO-RTO: 0 /100 WBCS
PLATELET # BLD AUTO: 387 THOUSANDS/UL (ref 149–390)
PMV BLD AUTO: 10.6 FL (ref 8.9–12.7)
POTASSIUM SERPL-SCNC: 3.6 MMOL/L (ref 3.5–5.3)
PROT SERPL-MCNC: 7.2 G/DL (ref 6.4–8.4)
RBC # BLD AUTO: 5.07 MILLION/UL (ref 3.81–5.12)
SODIUM SERPL-SCNC: 136 MMOL/L (ref 135–147)
WBC # BLD AUTO: 10.23 THOUSAND/UL (ref 4.31–10.16)

## 2023-03-28 NOTE — PROGRESS NOTES
Assessment/Plan:       Problem List Items Addressed This Visit        Immune and Lymphatic    Enlarged lymph node in neck - Primary       Swollen nodule possible lymph node will obtain ultrasound and blood work for further evaluation consider referral to surgeon for removal or further evaluation if not improving  Subjective:      Patient ID: Eugenio Lyons is a 40 y o  female  HPI    55-year-old presenting for swollen and palpable 1 cm nodule on the back of her neck been present for over a year  She was about a year ago was thought to be a reactive lymph node it did not change until a few days ago and has become more painful she does note pain in this area with turning her neck in the area is        The following portions of the patient's history were reviewed and updated as appropriate: allergies, current medications, past family history, past medical history, past social history, past surgical history and problem list       Current Outpatient Medications:   •  ibuprofen (MOTRIN) 600 mg tablet, Take 1 tablet (600 mg total) by mouth every 6 (six) hours as needed for mild pain, moderate pain or fever, Disp: 30 tablet, Rfl: 0  •  lamoTRIgine (LaMICtal) 200 MG tablet, TAKE 1 TABLET BY MOUTH EVERY DAY, Disp: 90 tablet, Rfl: 0  •  naproxen (NAPROSYN) 500 mg tablet, Take 1 tablet (500 mg total) by mouth 2 (two) times a day with meals (Patient taking differently: Take 500 mg by mouth 2 (two) times a day with meals), Disp: 30 tablet, Rfl: 0  •  norethindrone (Zoya) 0 35 MG tablet, Take 1 tablet (0 35 mg total) by mouth daily, Disp: 30 tablet, Rfl: 4  •  rizatriptan (MAXALT-MLT) 10 mg disintegrating tablet, TAKE AT THE ONSET OF MIGRAINE IF SYMPTOMS CONTINUE OR RETURN, MAY TAKE ANOTHER DOSE AT LEAST 2 HOURS AFTER FIRST DOSE  TAKE NO MORE THAN 2 DOSES IN A DAY , Disp: 9 tablet, Rfl: 0  •  sertraline (ZOLOFT) 50 mg tablet, Take 1 tablet (50 mg total) by mouth daily, Disp: 30 tablet, Rfl: 2  • "brompheniramine-pseudoephedrine-DM 30-2-10 MG/5ML syrup, Take 5 mL by mouth 4 (four) times a day as needed for congestion, cough or allergies, Disp: 120 mL, Rfl: 0     Review of Systems   Constitutional: Negative for activity change, appetite change, diaphoresis and fatigue  HENT: Negative for congestion and dental problem  Respiratory: Negative for apnea and chest tightness  Cardiovascular: Negative for chest pain and palpitations  Gastrointestinal: Negative for abdominal distention and abdominal pain  Musculoskeletal: Negative for arthralgias and back pain  Objective:      /78 (BP Location: Left arm, Cuff Size: Large)   Pulse 97   Temp 99 1 °F (37 3 °C) (Tympanic)   Resp 16   Ht 5' 2\" (1 575 m)   Wt 98 9 kg (218 lb)   SpO2 98%   BMI 39 87 kg/m²          Physical Exam  Constitutional:       Appearance: Normal appearance  Neck:        Comments: 1cm tender palpable nodule on the back of neck left side  Cardiovascular:      Rate and Rhythm: Normal rate and regular rhythm  Pulmonary:      Effort: Pulmonary effort is normal       Breath sounds: Normal breath sounds  Abdominal:      General: Abdomen is flat  Musculoskeletal:         General: Normal range of motion  Cervical back: Normal range of motion  Neurological:      Mental Status: She is alert             Micheline Jones MD  "

## 2023-03-28 NOTE — PATIENT INSTRUCTIONS
1  Enlarged lymph node in neck  -     US head neck soft tissue; Future; Expected date: 03/28/2023  -     CBC and differential; Future  -     Comprehensive metabolic panel;  Future

## 2023-03-31 ENCOUNTER — HOSPITAL ENCOUNTER (OUTPATIENT)
Dept: ULTRASOUND IMAGING | Facility: HOSPITAL | Age: 38
Discharge: HOME | End: 2023-03-31
Attending: FAMILY MEDICINE

## 2023-03-31 DIAGNOSIS — R59.0 ENLARGED LYMPH NODE IN NECK: ICD-10-CM

## 2023-04-03 ENCOUNTER — APPOINTMENT (EMERGENCY)
Dept: RADIOLOGY | Facility: HOSPITAL | Age: 38
End: 2023-04-03
Attending: EMERGENCY MEDICINE

## 2023-04-03 ENCOUNTER — HOSPITAL ENCOUNTER (EMERGENCY)
Facility: HOSPITAL | Age: 38
Discharge: HOME | End: 2023-04-03
Attending: EMERGENCY MEDICINE

## 2023-04-03 VITALS
HEART RATE: 71 BPM | TEMPERATURE: 98 F | DIASTOLIC BLOOD PRESSURE: 58 MMHG | SYSTOLIC BLOOD PRESSURE: 110 MMHG | RESPIRATION RATE: 16 BRPM | OXYGEN SATURATION: 100 %

## 2023-04-03 DIAGNOSIS — R00.2 PALPITATIONS: Primary | ICD-10-CM

## 2023-04-03 LAB
ALBUMIN SERPL BCP-MCNC: 4.1 G/DL (ref 3.5–5)
ALP SERPL-CCNC: 53 U/L (ref 34–104)
ALT SERPL W P-5'-P-CCNC: 21 U/L (ref 7–52)
ANION GAP SERPL CALCULATED.3IONS-SCNC: 6 MMOL/L (ref 4–13)
AST SERPL W P-5'-P-CCNC: 17 U/L (ref 13–39)
BASOPHILS # BLD AUTO: 0.12 THOUSANDS/ÂΜL (ref 0–0.1)
BASOPHILS NFR BLD AUTO: 1 % (ref 0–1)
BILIRUB SERPL-MCNC: 0.35 MG/DL (ref 0.2–1)
BUN SERPL-MCNC: 11 MG/DL (ref 5–25)
CALCIUM SERPL-MCNC: 9 MG/DL (ref 8.4–10.2)
CHLORIDE SERPL-SCNC: 105 MMOL/L (ref 96–108)
CO2 SERPL-SCNC: 29 MMOL/L (ref 21–32)
CREAT SERPL-MCNC: 0.78 MG/DL (ref 0.6–1.3)
EOSINOPHIL # BLD AUTO: 0.27 THOUSAND/ÂΜL (ref 0–0.61)
EOSINOPHIL NFR BLD AUTO: 2 % (ref 0–6)
ERYTHROCYTE [DISTWIDTH] IN BLOOD BY AUTOMATED COUNT: 12.8 % (ref 11.6–15.1)
GFR SERPL CREATININE-BSD FRML MDRD: 98 ML/MIN/1.73SQ M
GLUCOSE SERPL-MCNC: 68 MG/DL (ref 65–140)
HCT VFR BLD AUTO: 46.3 % (ref 34.8–46.1)
HGB BLD-MCNC: 14.7 G/DL (ref 11.5–15.4)
IMM GRANULOCYTES # BLD AUTO: 0.07 THOUSAND/UL (ref 0–0.2)
IMM GRANULOCYTES NFR BLD AUTO: 1 % (ref 0–2)
LYMPHOCYTES # BLD AUTO: 4.34 THOUSANDS/ÂΜL (ref 0.6–4.47)
LYMPHOCYTES NFR BLD AUTO: 37 % (ref 14–44)
MAGNESIUM SERPL-MCNC: 2.2 MG/DL (ref 1.9–2.7)
MCH RBC QN AUTO: 28.7 PG (ref 26.8–34.3)
MCHC RBC AUTO-ENTMCNC: 31.7 G/DL (ref 31.4–37.4)
MCV RBC AUTO: 90 FL (ref 82–98)
MONOCYTES # BLD AUTO: 0.88 THOUSAND/ÂΜL (ref 0.17–1.22)
MONOCYTES NFR BLD AUTO: 8 % (ref 4–12)
NEUTROPHILS # BLD AUTO: 5.97 THOUSANDS/ÂΜL (ref 1.85–7.62)
NEUTS SEG NFR BLD AUTO: 51 % (ref 43–75)
NRBC BLD AUTO-RTO: 0 /100 WBCS
PLATELET # BLD AUTO: 401 THOUSANDS/UL (ref 149–390)
PMV BLD AUTO: 10.3 FL (ref 8.9–12.7)
POTASSIUM SERPL-SCNC: 3.7 MMOL/L (ref 3.5–5.3)
PROT SERPL-MCNC: 7.3 G/DL (ref 6.4–8.4)
RBC # BLD AUTO: 5.13 MILLION/UL (ref 3.81–5.12)
SODIUM SERPL-SCNC: 140 MMOL/L (ref 135–147)
WBC # BLD AUTO: 11.65 THOUSAND/UL (ref 4.31–10.16)

## 2023-04-03 NOTE — Clinical Note
Enid Vargas was seen and treated in our emergency department on 4/3/2023  Diagnosis:     Davidson Gabriel  may return to work on return date  She may return on this date: 04/05/2023         If you have any questions or concerns, please don't hesitate to call        Wendie Clayton DO    ______________________________           _______________          _______________  Hospital Representative                              Date                                Time

## 2023-04-04 ENCOUNTER — TELEMEDICINE (OUTPATIENT)
Dept: BEHAVIORAL/MENTAL HEALTH CLINIC | Facility: CLINIC | Age: 38
End: 2023-04-04

## 2023-04-04 DIAGNOSIS — F31.5 BIPOLAR I DISORDER, MOST RECENT EPISODE DEPRESSED, SEVERE WITH PSYCHOTIC FEATURES (HCC): Primary | Chronic | ICD-10-CM

## 2023-04-04 DIAGNOSIS — F41.1 GAD (GENERALIZED ANXIETY DISORDER): Chronic | ICD-10-CM

## 2023-04-04 LAB
ATRIAL RATE: 79 BPM
P AXIS: 6 DEGREES
PR INTERVAL: 122 MS
QRS AXIS: 69 DEGREES
QRSD INTERVAL: 84 MS
QT INTERVAL: 376 MS
QTC INTERVAL: 431 MS
T WAVE AXIS: 51 DEGREES
VENTRICULAR RATE: 79 BPM

## 2023-04-04 NOTE — DISCHARGE INSTRUCTIONS
Please follow-up on your Lamictal level with Eileent  If you are unable to do so then please call your family doctor tomorrow to follow-up on this level

## 2023-04-04 NOTE — ED PROVIDER NOTES
"History  Chief Complaint   Patient presents with   • Palpitations     Pt with palpitations  Feels \"heart skipping beats  \"  Pt with history of WPW     67-year-old female with a history of WPW with an ablation when she was younger presents for palpitations that started this morning when she woke up  States that normally they only last a few minutes and then go away  The symptoms lasted all day and have been waxing and waning  She does report that she has a history of PVCs and her most recent Holter was about a year ago  Does not currently follow-up with cardiology because she has been stable and is not on any cardiac meds  Does take a progesterone only birth control pill  States that she has some shortness of breath that is intermittent when the palpitations, but denies any chest pain, syncope, etc   No other new medications other than Lamictal that was started a few months ago  No recent illnesses, vaccines, etc       History provided by:  Patient   used: No    Palpitations  Palpitations quality:  Irregular  Timing:  Constant  Progression:  Waxing and waning  Associated symptoms: shortness of breath    Associated symptoms: no chest pain, no cough, no dizziness, no nausea and no vomiting    Risk factors: no hx of DVT and no hx of PE        Prior to Admission Medications   Prescriptions Last Dose Informant Patient Reported?  Taking?   brompheniramine-pseudoephedrine-DM 30-2-10 MG/5ML syrup   No No   Sig: Take 5 mL by mouth 4 (four) times a day as needed for congestion, cough or allergies   ibuprofen (MOTRIN) 600 mg tablet   No Yes   Sig: Take 1 tablet (600 mg total) by mouth every 6 (six) hours as needed for mild pain, moderate pain or fever   lamoTRIgine (LaMICtal) 200 MG tablet   No Yes   Sig: TAKE 1 TABLET BY MOUTH EVERY DAY   naproxen (NAPROSYN) 500 mg tablet   No No   Sig: Take 1 tablet (500 mg total) by mouth 2 (two) times a day with meals   Patient taking differently: Take 500 mg by " mouth 2 (two) times a day with meals   norethindrone (Zoya) 0 35 MG tablet   No Yes   Sig: Take 1 tablet (0 35 mg total) by mouth daily   rizatriptan (MAXALT-MLT) 10 mg disintegrating tablet   No Yes   Sig: TAKE AT THE ONSET OF MIGRAINE IF SYMPTOMS CONTINUE OR RETURN, MAY TAKE ANOTHER DOSE AT LEAST 2 HOURS AFTER FIRST DOSE  TAKE NO MORE THAN 2 DOSES IN A DAY     sertraline (ZOLOFT) 50 mg tablet   No Yes   Sig: Take 1 tablet (50 mg total) by mouth daily      Facility-Administered Medications: None       Past Medical History:   Diagnosis Date   • Abnormal Pap smear of cervix    • Depression    • Eczema    • Exposure to chlamydia     Resolved 18   • Migraine without aura and without status migrainosus, not intractable 10/11/2019   • Obesity    • Post traumatic stress disorder 2021   • Postpartum depression 2018   • Manoj-Parkinson-White (WPW) syndrome        Past Surgical History:   Procedure Laterality Date   • ABLATION OF DYSRHYTHMIC FOCUS      WPW ablation age 14   • COLONOSCOPY     • COLPOSCOPY     • INDUCED      • OTHER SURGICAL HISTORY      catheter ablation- WPW age 13       Family History   Problem Relation Age of Onset   • Bipolar disorder Mother    • Hypertension Mother    • Hyperlipidemia Mother    • Suicide Attempts Mother    • Colon polyps Mother    • Factor V Leiden deficiency Mother    • Glucose-6-phos deficiency Father    • Hyperlipidemia Father    • Hypertension Father    • Diabetes Father    • Anxiety disorder Brother    • Factor V Leiden deficiency Brother    • Mental illness Brother    • Drug abuse Brother    • No Known Problems Daughter    • Anemia Son    • Other Son         Gilbert's   • Breast cancer Maternal Grandmother         dx approx age early 42's   • Colon polyps Maternal Grandfather    • Heart disease Maternal Grandfather    • Crohn's disease Paternal Grandmother    • Prostate cancer Paternal Grandfather    • Pancreatic cancer Paternal Grandfather    • Breast cancer Maternal Aunt 40   • Colon cancer Paternal Aunt    • Factor V Leiden deficiency Cousin    • Thyroid disease Neg Hx    • Stroke Neg Hx    • Ovarian cancer Neg Hx      I have reviewed and agree with the history as documented  E-Cigarette/Vaping   • E-Cigarette Use Never User      E-Cigarette/Vaping Substances   • Nicotine No    • THC No    • CBD No    • Flavoring No    • Other No    • Unknown No      Social History     Tobacco Use   • Smoking status: Never   • Smokeless tobacco: Never   Vaping Use   • Vaping Use: Never used   Substance Use Topics   • Alcohol use: Yes     Comment: Occasional   • Drug use: No       Review of Systems   Constitutional: Negative for chills and fever  HENT: Negative for ear pain and sore throat  Eyes: Negative for pain and visual disturbance  Respiratory: Positive for shortness of breath  Negative for cough and chest tightness  Cardiovascular: Positive for palpitations  Negative for chest pain  Gastrointestinal: Negative for diarrhea, nausea and vomiting  Genitourinary: Negative for dysuria and hematuria  Skin: Negative for color change and rash  Neurological: Negative for dizziness, syncope and light-headedness  All other systems reviewed and are negative  Physical Exam  Physical Exam  Vitals and nursing note reviewed  Constitutional:       General: She is not in acute distress  Appearance: She is well-developed  She is not ill-appearing, toxic-appearing or diaphoretic  HENT:      Head: Normocephalic and atraumatic  Right Ear: External ear normal       Left Ear: External ear normal       Nose: Nose normal  No congestion or rhinorrhea  Eyes:      General: No scleral icterus  Right eye: No discharge  Left eye: No discharge  Conjunctiva/sclera: Conjunctivae normal       Pupils: Pupils are equal, round, and reactive to light  Neck:      Trachea: No tracheal deviation     Cardiovascular:      Rate and Rhythm: Normal rate and regular rhythm  Heart sounds: Normal heart sounds  No murmur heard  No friction rub  Pulmonary:      Effort: Pulmonary effort is normal  No tachypnea, accessory muscle usage or respiratory distress  Breath sounds: Normal breath sounds  No stridor  No wheezing or rales  Abdominal:      General: There is no distension  Palpations: Abdomen is soft  Tenderness: There is no abdominal tenderness  There is no guarding or rebound  Musculoskeletal:         General: No tenderness or deformity  Normal range of motion  Cervical back: Normal range of motion and neck supple  Skin:     General: Skin is warm and dry  Neurological:      General: No focal deficit present  Mental Status: She is alert and oriented to person, place, and time  She is not disoriented        Gait: Gait normal    Psychiatric:         Speech: Speech normal          Behavior: Behavior normal          Vital Signs  ED Triage Vitals   Temperature Pulse Respirations Blood Pressure SpO2   04/03/23 2029 04/03/23 2029 04/03/23 2029 04/03/23 2029 04/03/23 2029   98 °F (36 7 °C) 85 16 161/85 99 %      Temp Source Heart Rate Source Patient Position - Orthostatic VS BP Location FiO2 (%)   04/03/23 2029 -- 04/03/23 2029 04/03/23 2029 --   Oral  Lying Right arm       Pain Score       04/03/23 2200       No Pain           Vitals:    04/03/23 2029 04/03/23 2200   BP: 161/85 110/58   Pulse: 85 71   Patient Position - Orthostatic VS: Lying Sitting         Visual Acuity      ED Medications  Medications - No data to display    Diagnostic Studies  Results Reviewed     Procedure Component Value Units Date/Time    Comprehensive metabolic panel [[id number]] Collected: 04/03/23 2153    Lab Status: Final result Specimen: Blood from Arm, Left Updated: 04/03/23 2229     Sodium 140 mmol/L      Potassium 3 7 mmol/L      Chloride 105 mmol/L      CO2 29 mmol/L      ANION GAP 6 mmol/L      BUN 11 mg/dL      Creatinine 0 78 mg/dL      Glucose 68 mg/dL Calcium 9 0 mg/dL      AST 17 U/L      ALT 21 U/L      Alkaline Phosphatase 53 U/L      Total Protein 7 3 g/dL      Albumin 4 1 g/dL      Total Bilirubin 0 35 mg/dL      eGFR 98 ml/min/1 73sq m     Narrative:      [place] guidelines for Chronic Kidney Disease (CKD):   •  Stage 1 with normal or high GFR (GFR > 90 mL/min/1 73 square meters)  •  Stage 2 Mild CKD (GFR = 60-89 mL/min/1 73 square meters)  •  Stage 3A Moderate CKD (GFR = 45-59 mL/min/1 73 square meters)  •  Stage 3B Moderate CKD (GFR = 30-44 mL/min/1 73 square meters)  •  Stage 4 Severe CKD (GFR = 15-29 mL/min/1 73 square meters)  •  Stage 5 End Stage CKD (GFR <15 mL/min/1 73 square meters)  Note: GFR calculation is accurate only with a steady state creatinine    Magnesium [[id number]]  (Normal) Collected: 04/03/23 2153    Lab Status: Final result Specimen: Blood from Arm, Left Updated: 04/03/23 2229     Magnesium 2 2 mg/dL     CBC and differential [[id number]]  (Abnormal) Collected: 04/03/23 2153    Lab Status: Final result Specimen: Blood from Arm, Left Updated: 04/03/23 2208     WBC 11 65 Thousand/uL      RBC 5 13 Million/uL      Hemoglobin 14 7 g/dL      Hematocrit 46 3 %      MCV 90 fL      MCH 28 7 pg      MCHC 31 7 g/dL      RDW 12 8 %      MPV 10 3 fL      Platelets 717 Thousands/uL      nRBC 0 /100 WBCs      Neutrophils Relative 51 %      Immat GRANS % 1 %      Lymphocytes Relative 37 %      Monocytes Relative 8 %      Eosinophils Relative 2 %      Basophils Relative 1 %      Neutrophils Absolute 5 97 Thousands/µL      Immature Grans Absolute 0 07 Thousand/uL      Lymphocytes Absolute 4 34 Thousands/µL      Monocytes Absolute 0 88 Thousand/µL      Eosinophils Absolute 0 27 Thousand/µL      Basophils Absolute 0 12 Thousands/µL     Lamotrigine level [[id number]] Collected: 04/03/23 2153    Lab Status:  In process Specimen: Blood from Arm, Left Updated: 04/03/23 2205                 XR chest 2 views   ED Interpretation by Esme Patterson DO (04/03 2222)   The CXR was ordered by myself and interpreted by me independently  On my read, it appears without acute abnormalities:  - The  cardiomediastinal  silhouette  is  unremarkable     - The  lungs  are  clear  - No  pleural  effusions   - No  pneumothorax  - The  pulmonary  vasculature  is  within  normal  limits     - The  trachea  is  midline     - Bony  thorax  is  unremarkable                            Procedures  ECG 12 Lead Documentation Only    Date/Time: 4/3/2023 9:31 PM  Performed by: Esme Patterson DO  Authorized by: Esme Patterson DO     Indications / Diagnosis:  Palpitations  ECG reviewed by me, the ED Provider: yes    Patient location:  ED  Previous ECG:     Previous ECG:  Unavailable  Interpretation:     Interpretation: normal    Rate:     ECG rate assessment: normal    Rhythm:     Rhythm: sinus rhythm    Ectopy:     Ectopy: none    QRS:     QRS axis:  Normal    QRS intervals:  Normal  Conduction:     Conduction: normal    ST segments:     ST segments:  Normal  T waves:     T waves: normal               ED Course                       PERC Rule for PE    Flowsheet Row Most Recent Value   PERC Rule for PE    Age >=50 0 Filed at: 04/03/2023 2122   HR >=100 0 Filed at: 04/03/2023 2122   O2 Sat on room air < 95% 0 Filed at: 04/03/2023 2122   History of PE or DVT 0 Filed at: 04/03/2023 2122   Recent trauma or surgery 0 Filed at: 04/03/2023 2122   Hemoptysis 0 Filed at: 04/03/2023 2122   Exogenous estrogen 0 Filed at: 04/03/2023 2122   Unilateral leg swelling 0 Filed at: 04/03/2023 2122   Keshav  14  Rule for PE Results 0 Filed at: 04/03/2023 2122              SBIRT 22yo+    Flowsheet Row Most Recent Value   SBIRT (25 yo +)    In order to provide better care to our patients, we are screening all of our patients for alcohol and drug use  Would it be okay to ask you these screening questions?  Unable to answer at this time Filed at: 04/03/2023 2050 Medical Decision Making  Patient presents with palpitations with a history of WPW  EKG is normal sinus with a normal NV interval   Patient appears well with normal vital signs  She is on a progesterone only birth control so her PERC rule is negative  She has no chest pain at this time  Her oxygen saturation is 99% but she does report mild shortness of breath when the symptoms do happen  I will obtain basic labs to evaluate for possible other causes such as anemia, electrolyte abnormality, infiltrate and/or infection, CHF, and keep her on the monitor to evaluate for possible arrhythmias  Patient did have a PVC that she did feel when I was in the room but no runs of nonsustained V  tach  We will also obtain a chest x-ray to evaluate her shortness of breath  I do not feel that she needs a troponin at this time  She is on Lamictal so I will order a level and have her follow-up on that if it is not back by the time she is ready to be discharged  No treatment right now since patient is stable  10:41 PM  Work-up here is negative  No events on the monitor  We will have the patient follow-up with cardiology tomorrow  Her Lamictal level is still pending so we will have her follow-up with Luke or her PCP to discuss these results  Return ER precautions discussed with her  Patient understands and agrees with plan of care  Questions and concerns answered  Amount and/or Complexity of Data Reviewed  Labs: ordered  Radiology: ordered and independent interpretation performed            Disposition  Final diagnoses:   Palpitations     Time reflects when diagnosis was documented in both MDM as applicable and the Disposition within this note     Time User Action Codes Description Comment    4/3/2023 10:38 PM Matthew Benton Add [R00 2] Palpitations       ED Disposition     ED Disposition   Discharge    Condition   Stable    Date/Time   Mon Apr 3, 2023 10:38 PM    Comment   Norma Wallace Teddy discharge to home/self care  Follow-up Information     Follow up With Specialties Details Why Raciel Mar MD Cardiology Call today To schedule an appointment as soon as you can [address] [facility]  [phone]      Norma Mo MD Family Medicine Call  As needed  [address] [address] [phone]            Patient's Medications   Discharge Prescriptions    No medications on file       No discharge procedures on file      PDMP Review       Value Time User    PDMP Reviewed  Yes 11/11/2019  3:09 PM Priyank Christy MD          ED Provider  Electronically Signed by           Najma Ramon DO  04/03/23 8363

## 2023-04-04 NOTE — PSYCH
Virtual Regular Visit    Verification of patient location:    Patient is located in the following state in which I hold an active license [place]    Assessment/Plan:    Problem List Items Addressed This Visit        Other    Bipolar I disorder, most recent episode depressed, severe with psychotic features ([address] ) - Primary (Chronic)    SHELLY (generalized anxiety disorder) (Chronic)       Goals addressed in session: Goal 1      Reason for visit is   Chief Complaint   Patient presents with   • Virtual Regular Visit      Encounter provider CHARLIE Magallon    Provider located at [address] [place] [id number] [phone]      Recent Visits  Date Type Provider Dept   03/28/23 Office Visit Manjula Adkins [facility]   Showing recent visits within past 7 days and meeting all other requirements  Today's Visits  Date Type Provider Dept   04/04/23 [address], [address] [facility]   Showing today's visits and meeting all other requirements  Future Appointments  No visits were found meeting these conditions  Showing future appointments within next 150 days and meeting all other requirements     The patient was identified by name and date of birth  Forrest Johnston was informed that this is a telemedicine visit and that [facility]  She agrees to proceed     My office door was closed  No one else was in the room  She acknowledged consent and understanding of privacy and security of the video platform  The patient has agreed to participate and understands they can discontinue the visit at any time  Patient is aware this is a billable service  Subjective  Forrest Johnston is a 40 y o  female      HPI     Past Medical History:   Diagnosis Date   • Abnormal Pap smear of cervix    • Depression • Eczema    • Exposure to chlamydia     Resolved 18   • Migraine without aura and without status migrainosus, not intractable 10/11/2019   • Obesity    • Post traumatic stress disorder 2021   • Postpartum depression 2018   • Manoj-Parkinson-White (WPW) syndrome        Past Surgical History:   Procedure Laterality Date   • ABLATION OF DYSRHYTHMIC FOCUS      WPW ablation age 14   • COLONOSCOPY     • COLPOSCOPY     • INDUCED      • OTHER SURGICAL HISTORY      catheter ablation- WPW age 13       Current Outpatient Medications   Medication Sig Dispense Refill   • brompheniramine-pseudoephedrine-DM 30-2-10 MG/5ML syrup Take 5 mL by mouth 4 (four) times a day as needed for congestion, cough or allergies 120 mL 0   • ibuprofen (MOTRIN) 600 mg tablet Take 1 tablet (600 mg total) by mouth every 6 (six) hours as needed for mild pain, moderate pain or fever 30 tablet 0   • lamoTRIgine (LaMICtal) 200 MG tablet TAKE 1 TABLET BY MOUTH EVERY DAY 90 tablet 0   • naproxen (NAPROSYN) 500 mg tablet Take 1 tablet (500 mg total) by mouth 2 (two) times a day with meals (Patient taking differently: Take 500 mg by mouth 2 (two) times a day with meals) 30 tablet 0   • norethindrone (Zoya) 0 35 MG tablet Take 1 tablet (0 35 mg total) by mouth daily 30 tablet 4   • rizatriptan (MAXALT-MLT) 10 mg disintegrating tablet TAKE AT THE ONSET OF MIGRAINE IF SYMPTOMS CONTINUE OR RETURN, MAY TAKE ANOTHER DOSE AT LEAST 2 HOURS AFTER FIRST DOSE  TAKE NO MORE THAN 2 DOSES IN A DAY  9 tablet 0   • sertraline (ZOLOFT) 50 mg tablet Take 1 tablet (50 mg total) by mouth daily 30 tablet 2     No current facility-administered medications for this visit  Allergies   Allergen Reactions   • Codeine Other (See Comments)     dry heaves   • Sulfa Antibiotics Hives and Rash     Per pt as achild   • Erythromycin Hives     Per as a child       Review of Systems    Video Exam    There were no vitals filed for this visit      Physical "Exam     Behavioral Health Psychotherapy Progress Note    Psychotherapy Provided: Individual Psychotherapy     1  Bipolar I disorder, most recent episode depressed, severe with psychotic features ([address] )        2  SHELLY (generalized anxiety disorder)          Goals addressed in session: Goal 1     DATA: Met with Cornell Samayoa for scheduled individual session  Cornell Samayoa discussed her relationship with her mother  She discussed her frustration with her mother's responses to her  Cornell Samayoa wants to be able to \"trust her with my emotions and her feelings  \" Cornell Samayoa discussed what she would like from her mother-- validation, understanding, interest in her life  She states that her mother is very dismissive of her boyfriend  We spent some time  different issues-- e g , her mother's relationship with her boyfriend, her mother's inappropriate comments in front of or to the children, and her mother's relationship with her  Cornell Samayoa discussed her level of anxiety and the physical symptoms that she is experiencing  She went to the ED last night re: her heart palpitations  She states that she has an appointment with her cardiologist  She discussed how her anxiety and her physical health symptoms are impacting her ability to participate in her work  She states that she needs additional information for her employer re: her accommodations  She will send the form to me for completion  During this session, this clinician used the following therapeutic modalities: Client-centered Therapy, Dialectical Behavior Therapy, Mindfulness-based Strategies, Motivational Interviewing, Solution-Focused Therapy and Supportive Psychotherapy    Substance Abuse was not addressed during this session  If the client is diagnosed with a co-occurring substance use disorder, please indicate any changes in the frequency or amount of use: n/a   Stage of change for addressing substance use diagnoses: No substance use/Not applicable    ASSESSMENT:  Timmy Moreno " "Juliana Herrera presents with a primarily dysthymic mood  her affect is Normal range and intensity, which is congruent, with her mood and the content of the session  The client has not made progress on their goals since our most recent session  Kiel Whittington presents with a minimal risk of suicide, minimal risk of self-harm, and minimal risk of harm to others  For any risk assessment that surpasses a \"low\" rating, a safety plan must be developed  A safety plan was indicated: no  If yes, describe in detail n/a    PLAN: Between sessions, Kiel Whittington will continue to speak with her mother about having another joint session  She will practice mindfulness-based strategies to manage her anxiety and her mood  At the next session, the therapist will use Client-centered Therapy, Dialectical Behavior Therapy, Mindfulness-based Strategies, Motivational Interviewing, Solution-Focused Therapy and Supportive Psychotherapy to address her mood regulation and relationship concerns  Behavioral Health Treatment Plan and Discharge Planning: Kiel Whittington is aware of and agrees to continue to work on their treatment plan  They have identified and are working toward their discharge goals   yes    Visit start and stop times:    04/04/23  Start Time: 0904  Stop Time: 0950  Total Visit Time: 46 minutes  "

## 2023-04-06 LAB — LAMOTRIGINE SERPL-MCNC: 3.3 UG/ML (ref 2–20)

## 2023-04-23 PROBLEM — I47.11 INAPPROPRIATE SINUS TACHYCARDIA: Status: ACTIVE | Noted: 2023-04-23

## 2023-04-23 PROBLEM — R00.0 INAPPROPRIATE SINUS TACHYCARDIA: Status: ACTIVE | Noted: 2023-04-23

## 2023-04-25 ENCOUNTER — TELEMEDICINE (OUTPATIENT)
Dept: BEHAVIORAL/MENTAL HEALTH CLINIC | Facility: CLINIC | Age: 38
End: 2023-04-25

## 2023-04-25 DIAGNOSIS — F41.1 GAD (GENERALIZED ANXIETY DISORDER): Chronic | ICD-10-CM

## 2023-04-25 DIAGNOSIS — G47.09 OTHER INSOMNIA: Chronic | ICD-10-CM

## 2023-04-25 DIAGNOSIS — F31.5 BIPOLAR I DISORDER, MOST RECENT EPISODE DEPRESSED, SEVERE WITH PSYCHOTIC FEATURES (HCC): Primary | Chronic | ICD-10-CM

## 2023-04-25 NOTE — PSYCH
Virtual Regular Visit    Verification of patient location:    Patient is located at Home in the following state in which I hold an active license [place]      Assessment/Plan:    Problem List Items Addressed This Visit        Other    Bipolar I disorder, most recent episode depressed, severe with psychotic features ([facility] [address] ) - Primary (Chronic)    SHELLY (generalized anxiety disorder) (Chronic)    Other insomnia (Chronic)       Goals addressed in session: Goal 1          Reason for visit is No chief complaint on file  Encounter provider Auburn Mcardle, SW    Provider located at [address] [place] [id number] [phone]      Recent Visits  No visits were found meeting these conditions  Showing recent visits within past 7 days and meeting all other requirements  Today's Visits  Date Type Provider Dept   04/25/23 [facility], [address] today's visits and meeting all other requirements  Future Appointments  No visits were found meeting these conditions  Showing future appointments within next 150 days and meeting all other requirements       The patient was identified by name and date of birth  Bishop Leos was informed that this is a telemedicine visit and [facility]  She agrees to proceed     My office door was closed  This session was attended by Kim Trent, employee orientation  Ashlee [place] provided verbal consent for the employee to sit in on this session  She acknowledges understanding that she can opt out of student observations/participation at any time     She acknowledged consent and understanding of privacy and security of the video platform  The patient has agreed to participate and understands they can discontinue the visit at any time  Patient is aware this is a billable service  Wilfred Luther is a 40 y o  female  HPI     Past Medical History:   Diagnosis Date   • Abnormal Pap smear of cervix    • Depression    • Eczema    • Exposure to chlamydia     Resolved 17   • Migraine without aura and without status migrainosus, not intractable 10/11/2019   • Obesity    • Post traumatic stress disorder 2021   • Postpartum depression 2018   • Manoj-Parkinson-White (WPW) syndrome        Past Surgical History:   Procedure Laterality Date   • ABLATION OF DYSRHYTHMIC FOCUS      WPW ablation age 14   • COLONOSCOPY     • COLPOSCOPY     • INDUCED      • OTHER SURGICAL HISTORY      catheter ablation- WPW age 13       Current Outpatient Medications   Medication Sig Dispense Refill   • brompheniramine-pseudoephedrine-DM 30-2-10 MG/5ML syrup Take 5 mL by mouth 4 (four) times a day as needed for congestion, cough or allergies 120 mL 0   • ibuprofen (MOTRIN) 600 mg tablet Take 1 tablet (600 mg total) by mouth every 6 (six) hours as needed for mild pain, moderate pain or fever 30 tablet 0   • lamoTRIgine (LaMICtal) 200 MG tablet TAKE 1 TABLET BY MOUTH EVERY DAY 90 tablet 0   • naproxen (NAPROSYN) 500 mg tablet Take 1 tablet (500 mg total) by mouth 2 (two) times a day with meals (Patient taking differently: Take 500 mg by mouth 2 (two) times a day with meals) 30 tablet 0   • norethindrone (Zoya) 0 35 MG tablet Take 1 tablet (0 35 mg total) by mouth daily 30 tablet 4   • rizatriptan (MAXALT-MLT) 10 mg disintegrating tablet TAKE AT THE ONSET OF MIGRAINE IF SYMPTOMS CONTINUE OR RETURN, MAY TAKE ANOTHER DOSE AT LEAST 2 HOURS AFTER FIRST DOSE  TAKE NO MORE THAN 2 DOSES IN A DAY  9 tablet 0   • sertraline (ZOLOFT) 50 mg tablet Take 1 tablet (50 mg total) by mouth daily 30 tablet 2     No current facility-administered medications for this visit          Allergies   Allergen Reactions   • Codeine Other (See Comments)     dry heaves   • Sulfa Antibiotics Hives and Rash "Per pt as achild   • Erythromycin Hives     Per as a child       Review of Systems    Video Exam    There were no vitals filed for this visit  Physical Exam     Behavioral Health Psychotherapy Progress Note    Psychotherapy Provided: Individual Psychotherapy     1  Bipolar I disorder, most recent episode depressed, severe with psychotic features ([address] )        2  SHELLY (generalized anxiety disorder)        3  Other insomnia            Goals addressed in session: Goal 1     DATA: Met with Alondra Santiago for scheduled individual session  Alondra Henderson had previously spoken with her mother about joining this session via a virtual link  We sent her mother a virtual link  Alondra Henderson discussed her relationship with her mother and her deisre to repair this relationship  She states that her parents are moving to [facility]  Alondra Henderson states that she wants to make an effort to reconcile with both of her parents; however, she feels frustrated by her mother's responses to her  Libertad's mother did not join the session  Alondra Henderson states that this is [place] example of how I am not a priority  \" Alondra Henderson discussed her relationship with her parents and her ambivalence about the situation  She states that she wants to be able to have her children speak with her parents, but she feels that she needs to continue to maintain safety for her children  We spent some time discussing Libertad's relationship with her SO  She states that she will talk with him about this call, but she is fearful of asking him for support, since he is also going through a rough time  We discussed effective communication skills and ways that she can ask for support and still support him  At the end of the session, Alondra Henderson became tearful  Her mother texted her 10 minutes prior to the end of the session, stating that she was unable to join  This clinician validated her experience and provided encouragement to her       During this session, this clinician used the following therapeutic modalities: " "Client-centered Therapy, Dialectical Behavior Therapy, Mindfulness-based Strategies, Motivational Interviewing, Solution-Focused Therapy and Supportive Psychotherapy    Substance Abuse was not addressed during this session  If the client is diagnosed with a co-occurring substance use disorder, please indicate any changes in the frequency or amount of use: n/a  Stage of change for addressing substance use diagnoses: No substance use/Not applicable    ASSESSMENT:  Anna Mendoza presents with an appropriately sad mood  her affect is Normal range and intensity, which is congruent, with her mood and the content of the session  The client has made progress on their goals  Anna Mendoza presents with a minimal risk of suicide, minimal risk of self-harm, and minimal risk of harm to others  For any risk assessment that surpasses a \"low\" rating, a safety plan must be developed  A safety plan was indicated: no  If yes, describe in detail n/a    PLAN: Between sessions, Anna Mendoza will continue to work on identifying ways that she can improve her mood (manage anxiety) and increase effective communication  At the next session, the therapist will use Client-centered Therapy, Dialectical Behavior Therapy, Mindfulness-based Strategies, Motivational Interviewing, Solution-Focused Therapy and Supportive Psychotherapy to address her mood regulation and relationship concerns  Behavioral Health Treatment Plan and Discharge Planning: Anna Mendoza is aware of and agrees to continue to work on their treatment plan  They have identified and are working toward their discharge goals   yes    Visit start and stop times:    04/25/23  Start Time: 0909  Stop Time: 0956  Total Visit Time: 47 minutes      "

## 2023-05-09 ENCOUNTER — OFFICE VISIT (OUTPATIENT)
Dept: PSYCHIATRY | Facility: CLINIC | Age: 38
End: 2023-05-09

## 2023-05-09 DIAGNOSIS — F31.81 BIPOLAR II DISORDER (HCC): Primary | ICD-10-CM

## 2023-05-09 DIAGNOSIS — F41.1 GAD (GENERALIZED ANXIETY DISORDER): ICD-10-CM

## 2023-05-09 DIAGNOSIS — F43.10 PTSD (POST-TRAUMATIC STRESS DISORDER): ICD-10-CM

## 2023-05-09 RX ORDER — LAMOTRIGINE 200 MG/1
200 TABLET ORAL DAILY
Qty: 90 TABLET | Refills: 0 | Status: SHIPPED | OUTPATIENT
Start: 2023-05-09

## 2023-05-09 RX ORDER — LORAZEPAM 1 MG/1
1 TABLET ORAL EVERY 6 HOURS PRN
Qty: 30 TABLET | Refills: 0 | Status: SHIPPED | OUTPATIENT
Start: 2023-05-09

## 2023-05-09 NOTE — PSYCH
Massiel Ramirez 1985 [id number]     The patient was seen for continuing care and pharmacotherapy  The patient reports long-time history of severe anxiety and intermittent OCD symptoms as well as episodes of depression and also a few hypomanic episodes  She carries a diagnosis of bipolar disorder type II and generalized anxiety disorder  She has been stable on a combination of sertraline 50 mg daily and lamotrigine 200 mg daily  She has been doing fairly well except that in the past week she has been experiencing more anxiety  This coincides with her younger brother attempting suicide and being admitted to a psychiatric hospital   This incident triggered her lifetime struggle with anxiety as a result of her mother with diagnosis of bipolar disorder having made multiple suicide attempts and being hospitalized since the patient was a very young child  However she has never attempted suicide herself and she has never been hospitalized  Her most disturbing symptom at this time is her fear of something bad happening to her children or boyfriend  She has a good relationship with her supportive boyfriend and they live together with 2 of his children and [place] 2 biological children  She works full-time for an [employer]  ROS: Intense anxiety    Current Mental Status Evaluation:  Appearance:  Adequate hygiene and grooming and Good eye contact   Behavior:  calm and cooperative   Mood:  Anxious   Affect: appropriate   Speech: Normal volume and Normal rate   Thought Process:  Goal directed and coherent   Thought Content:  Does not verbalize delusional material   Perceptual Disturbances: Denies hallucinations and does not appear to be responding to internal stimuli   Risk Potential: No suicidal or homicidal ideation   Orientation:        1  Bipolar II disorder (HCC)  lamoTRIgine (LaMICtal) 200 MG tablet    sertraline (ZOLOFT) 50 mg tablet    LORazepam (ATIVAN) 1 mg tablet      2   PTSD (post-traumatic stress disorder)  sertraline (ZOLOFT) 50 mg tablet      3  SHELLY (generalized anxiety disorder)  sertraline (ZOLOFT) 50 mg tablet    LORazepam (ATIVAN) 1 mg tablet             Current Outpatient Medications   Medication Instructions   • brompheniramine-pseudoephedrine-DM 30-2-10 MG/5ML syrup 5 mL, Oral, 4 times daily PRN   • ibuprofen (MOTRIN) 600 mg, Oral, Every 6 hours PRN   • lamoTRIgine (LAMICTAL) 200 mg, Oral, Daily   • LORazepam (ATIVAN) 1 mg, Oral, Every 6 hours PRN   • naproxen (NAPROSYN) 500 mg, Oral, 2 times daily with meals   • norethindrone (TRISH) 0 35 mg, Oral, Daily   • rizatriptan (MAXALT-MLT) 10 mg disintegrating tablet TAKE AT THE ONSET OF MIGRAINE IF SYMPTOMS CONTINUE OR RETURN, MAY TAKE ANOTHER DOSE AT LEAST 2 HOURS AFTER FIRST DOSE  TAKE NO MORE THAN 2 DOSES IN A DAY  • sertraline (ZOLOFT) 50 mg, Oral, Daily          Treatment Recommendations- Risks Benefits    The patient will continue with lamotrigine and sertraline  Lorazepam 1 mg every 6 hours as needed for intense and overwhelming anxiety  Risks of sedation with benzodiazepines discussed with the patient  She had taken it in the past and tolerated it  I will see her in 3 months  She will continue psychotherapy on a regular basis with her therapist  Risks, Benefits And Possible Side Effects Of Medications:  Risks, benefits, and possible side effects of medications explained to patient and patient verbalizes understanding    VIRTUAL VISIT DISCLAIMER    Dilia Iraheta verbally agrees to participate in [employer]  Pt is aware that [employer] could be limited without vital signs or the ability to perform a full hands-on physical exam  Dilia Iraheta understands she or the provider may request at any time to terminate the video visit and request the patient to seek care or treatment in person       Evelyne Smith MD 05/09/23

## 2023-05-16 ENCOUNTER — TELEMEDICINE (OUTPATIENT)
Dept: BEHAVIORAL/MENTAL HEALTH CLINIC | Facility: CLINIC | Age: 38
End: 2023-05-16

## 2023-05-16 ENCOUNTER — TELEPHONE (OUTPATIENT)
Dept: PSYCHIATRY | Facility: CLINIC | Age: 38
End: 2023-05-16

## 2023-05-16 DIAGNOSIS — F43.10 POST TRAUMATIC STRESS DISORDER: ICD-10-CM

## 2023-05-16 DIAGNOSIS — F41.1 GAD (GENERALIZED ANXIETY DISORDER): Chronic | ICD-10-CM

## 2023-05-16 DIAGNOSIS — F31.5 BIPOLAR I DISORDER, MOST RECENT EPISODE DEPRESSED, SEVERE WITH PSYCHOTIC FEATURES (HCC): Primary | Chronic | ICD-10-CM

## 2023-05-16 DIAGNOSIS — G47.09 OTHER INSOMNIA: Chronic | ICD-10-CM

## 2023-05-16 NOTE — TELEPHONE ENCOUNTER
Contacted patient to r/s the appointment for 6/6/23 at 8:00a due to the provider covering the resident clinic  Patient has been r/s for 6/5/23 at 8:00a virtually

## 2023-05-16 NOTE — BH CRISIS PLAN
"Client Name: Ana Savage       Client YOB: 1985  : 1985    Treatment Team (include name and contact information):     Psychotherapist: Lionel Garcia    Psychiatrist: Dr Tosin Torres of information completed: [facility] psychiatrist    Healthcare Provider  Bridgette Kincaid/ Dr Ofelia Muhammad  [address]  [facility] [id number] [phone]    Type of Plan   * Child plans (children 15 yo and younger) must be completed and signed by the child's legal guardian   * Plans for all individuals 15 yo and above must be signed by the client  Plan Type: adolescent/adult (14 and over) Initial      My Personal Strengths are (in the client's own words):  * self-awareness \"I can identify my emotions and where they are coming from  \"     The stressors and triggers that may put me at risk are:  Financial stress; Family relationships (especially with my mother); When other people in my life are going through things    Coping skills I can use to keep myself calm and safe:  * Keeping myself busy  * Talking about it in therapy    Coping skills/supports I can use to maintain abstinence from substance use:   Not applicable  The people that provide me with help and support: (Include name, contact, and how they can help)   Support person #1: Anitha Leo (my partner)    * Phone number: in my cell phone    * How can they help me? He's very good with knowing me and knows the right things to say to help me clear my mind and bring me back  He makes me feel super supported, which calms me  Support person #2: Jonoaurora James (my cousin)    * Phone number: in my cell phone    * How can they help me? She's my sister that I don't have  We feel the same and talk the same  We have the same mindset  Support person #3: Gurvinder Key (my brother)    * Phone number: in my cell phone    * How can they help me?  He's just there to help me vent and listen    In the past, the following has helped me in times of crisis: " Being in a quiet space, Talking to a professional on the telephone, Calling a friend, Calling a family member and Using de-escalation tools that I have learned    Dilia Iraheta, 1985, actively participated in the creation of this crisis plan during a virtual session, using the [facility]  Dilia Esequiel  provided verbal consent on 5/16/2023 at 9:52 AM  The crisis plan was sent to the client, via the [facility]  For signature  This clinician will check for signature at the next session  If it is an emergency and you need immediate help, call 9-1-1    If there is a possibility of danger to yourself or others, call the following crisis hotline resources:     Adult Crisis Numbers  Suicide Prevention Hotline - Dial 9-8-8  [facility]: [address]: [address]: [address]: [phone]  [facility] ([facility]): [phone]  [facility]: [address] [place] [address] Avenue: [address] [place] [address] St: [phone] (daytime)  [phone] (after hours, weekends, holidays)     Child/Adolescent Crisis Numbers   [facility]: [address]: [phone]   [place]: [phone]   [facility] ([facility]): [phone]    Please note: Some [facility] do not have a separate number for Child/Adolescent specific crisis  If your county is not listed under Child/Adolescent, please call the adult number for your county     National Talk to Text Line   All Ages - [phone]    In the event your feelings become unmanageable, and you cannot reach your support system, you will call 911 immediately or go to the nearest hospital emergency room

## 2023-05-16 NOTE — BH TREATMENT PLAN
"[address] [facility]  1985     Date of Initial Psychotherapy Assessment: 10/03/2019   Date of Current Treatment Plan: 05/16/23  Treatment Plan Target Date: 09/13/20123   Treatment Plan Expiration Date: 11/12/2023    Diagnosis:   1  Bipolar I disorder, most recent episode depressed, severe with psychotic features ([facility] [address] )        2  SHELLY (generalized anxiety disorder)        3  Other insomnia        4  Post traumatic stress disorder            Area(s) of Need: mood regulation and relationships    Long Term Goal 1 (in the client's own words): \"I do not want to be so dependent on pleasing everybody  \"     Stage of Change: Action    Target Date for completion: 05/16/2024     Anticipated therapeutic modalities: client-centered; mindfulness-based; dbt-informed; motivational interviewing; solution-focused     People identified to complete this goal: Velvet Gonzalez (client); Jasmin Mcfarland (clinician); Dr Jelani Kim (psychiatrist)      Objective 1: (identify the means of measuring success in meeting the objective): Velvet Gonzalez will meet with the psychiatrist for scheduled medication management sessions and take her medications, as prescribed  She reports that she is doing well with her medications  She reports no side effects  She will maintain a minimum of 95% medication adherence  Objective 2: (identify the means of measuring success in meeting the objective): Velvet Gonzalez will participate in therapy sessions, a minimum of once monthly  We will use the above-mentioned modalities to address mood-regulation and relationship concerns  Objective 3: (identify the means of measuring success in meeting the objective): Velvet Gonzalez will learn a minimum of 3 techniques to help her with setting and maintaining limits with others-- e g , DBT Interpersonal Effectiveness Skills  She will be able to verbalize, during sessions, what her personal limits are and what her personal goals are   " Objective 4: (identify the means of measuring success in meeting the objective): When appropriate, Alison Houser will include family members in therapy sessions, to practice her assertiveness, discuss her emotions, and set limits with her family members  I am currently under the care of a [facility] psychiatric provider: yes    My [facility] psychiatric provider is: Dr Kady Love    I am currently taking psychiatric medications: Yes, as prescribed    I feel that I will be ready for discharge from mental health care when I reach the following (measurable goal/objective): I would have better management of my emotions  For children and adults who have a legal guardian:   Has there been any change to custody orders and/or guardianship status? NA  If yes, attach updated documentation  I have created my Crisis Plan and have been offered a copy of this plan    [address]: Diagnosis and Treatment Plan explained to [address] acknowledges an understanding of their diagnosis  Beatrizjaz Downey agrees to this treatment plan      I have been offered a copy of this Treatment Plan  yes

## 2023-05-16 NOTE — PSYCH
Virtual Regular Visit    Verification of patient location:    Patient is located at Home in the following state in which I hold an active license [place]    Assessment/Plan:    Problem List Items Addressed This Visit        Other    Bipolar I disorder, most recent episode depressed, severe with psychotic features ([facility] [address] ) - Primary (Chronic)    SHELLY (generalized anxiety disorder) (Chronic)    Other insomnia (Chronic)    Post traumatic stress disorder       Goals addressed in session: Goal 1          Reason for visit is   Chief Complaint   Patient presents with   • Virtual Regular Visit        Encounter provider CHARLIE Hallman    Provider located at [address] [place] [id number]  [phone]      Recent Visits  No visits were found meeting these conditions  Showing recent visits within past 7 days and meeting all other requirements  Today's Visits  Date Type Provider Dept   05/16/23 [address] today's visits and meeting all other requirements  Future Appointments  No visits were found meeting these conditions  Showing future appointments within next 150 days and meeting all other requirements       The patient was identified by name and date of birth  Travon Bobby was informed that this is a telemedicine visit and [facility]  She agrees to proceed     My office door was closed  No one else was in the room  She acknowledged consent and understanding of privacy and security of the video platform  The patient has agreed to participate and understands they can discontinue the visit at any time  Patient is aware this is a billable service  Subjective  Travon Bobby is a 40 y o  female        HPI     Past Medical History:   Diagnosis Date   • Abnormal Pap smear of cervix • Depression    • Eczema    • Exposure to chlamydia     Resolved 18   • Migraine without aura and without status migrainosus, not intractable 10/11/2019   • Obesity    • Post traumatic stress disorder 2021   • Postpartum depression 2018   • Manoj-Parkinson-White (WPW) syndrome        Past Surgical History:   Procedure Laterality Date   • ABLATION OF DYSRHYTHMIC FOCUS      WPW ablation age 14   • COLONOSCOPY     • COLPOSCOPY     • INDUCED      • OTHER SURGICAL HISTORY      catheter ablation- WPW age 13       Current Outpatient Medications   Medication Sig Dispense Refill   • brompheniramine-pseudoephedrine-DM 30-2-10 MG/5ML syrup Take 5 mL by mouth 4 (four) times a day as needed for congestion, cough or allergies 120 mL 0   • ibuprofen (MOTRIN) 600 mg tablet Take 1 tablet (600 mg total) by mouth every 6 (six) hours as needed for mild pain, moderate pain or fever 30 tablet 0   • lamoTRIgine (LaMICtal) 200 MG tablet Take 1 tablet (200 mg total) by mouth daily 90 tablet 0   • LORazepam (ATIVAN) 1 mg tablet Take 1 tablet (1 mg total) by mouth every 6 (six) hours as needed for anxiety 30 tablet 0   • naproxen (NAPROSYN) 500 mg tablet Take 1 tablet (500 mg total) by mouth 2 (two) times a day with meals (Patient taking differently: Take 500 mg by mouth 2 (two) times a day with meals) 30 tablet 0   • norethindrone (Zoya) 0 35 MG tablet Take 1 tablet (0 35 mg total) by mouth daily 90 tablet 2   • rizatriptan (MAXALT-MLT) 10 mg disintegrating tablet TAKE AT THE ONSET OF MIGRAINE IF SYMPTOMS CONTINUE OR RETURN, MAY TAKE ANOTHER DOSE AT LEAST 2 HOURS AFTER FIRST DOSE  TAKE NO MORE THAN 2 DOSES IN A DAY  9 tablet 0   • sertraline (ZOLOFT) 50 mg tablet Take 1 tablet (50 mg total) by mouth daily 30 tablet 2     No current facility-administered medications for this visit          Allergies   Allergen Reactions   • Codeine Other (See Comments)     dry heaves   • Sulfa Antibiotics Hives and Rash     Per pt "as geovanna   • Erythromycin Hives     Per as a child       Review of Systems    Video Exam    There were no vitals filed for this visit  Physical Exam     Behavioral Health Psychotherapy Progress Note    Psychotherapy Provided: Individual Psychotherapy     1  Bipolar I disorder, most recent episode depressed, severe with psychotic features ([address] )        2  SHELLY (generalized anxiety disorder)        3  Other insomnia        4  Post traumatic stress disorder            Goals addressed in session: Goal 1     DATA: Met with Liban Giraldo for scheduled individual session  Liban Giraldo discussed her relationship with her mother  She states that, after the last session, her mother made excuses for why she was not able to join the session  Liban Giraldo states that her parents have made a decision to move to [place]  She states that they have already sold their home and will be moving in June  She expressed her ambivalence about seeing them before they move  She states that her SO and her brother are not supportive of contact with her parents  She states that, if she didn't have the input of them, she \"would have caved a long time ago  \" She reports that her brother was recently in a psychiatric facility, due to significant suicidal threats  We discussed her work status  She states that last week she attended work every day  She used an accommodation day today, as a planned day for taking care of herself after her therapy  We discussed the activities she will do after today's session to manage her emotions  She states that there are times that being alone is helpful and other times when getting back to her regular daily routine is helpful  She states that she often feels that she needs to try to [facility] everyone happy  \" We discussed the impossibility of making everyone happy  She was tearful during our conversation  We spent some time reviewing and updating her treatment plan and creating her crisis plan   She actively participated in this " "process and was able to sign her documents in the [employer]  During this session, this clinician used the following therapeutic modalities: Client-centered Therapy, Dialectical Behavior Therapy, Mindfulness-based Strategies, Motivational Interviewing, Solution-Focused Therapy and Supportive Psychotherapy    Substance Abuse was not addressed during this session  If the client is diagnosed with a co-occurring substance use disorder, please indicate any changes in the frequency or amount of use: n/a  Stage of change for addressing substance use diagnoses: No substance use/Not applicable    ASSESSMENT:  Rosa Isela Mckeon presents with a Depressed mood  her affect is Normal range and intensity and Tearful, which is congruent, with her mood and the content of the session  The client has made progress on their goals  Rosa Isela Mckeon presents with a minimal risk of suicide, minimal risk of self-harm, and minimal risk of harm to others  For any risk assessment that surpasses a \"low\" rating, a safety plan must be developed  A safety plan was indicated: no  If yes, describe in detail n/a    PLAN: Between sessions, Rosa Isela Mckeon will continue to monitor her moods and use her coping skills to manage her strong emotions  She will work on setting limits with her family members  At the next session, the therapist will use Client-centered Therapy, Dialectical Behavior Therapy, Mindfulness-based Strategies, Motivational Interviewing, Solution-Focused Therapy and Supportive Psychotherapy to address her mood regulation and relationship concerns  Behavioral Health Treatment Plan and Discharge Planning: Rosa Isela Mckeon is aware of and agrees to continue to work on their treatment plan  They have identified and are working toward their discharge goals   yes    Visit start and stop times:    05/16/23  Start Time: 0904  Stop Time: 0956  Total Visit Time: 52 minutes    "

## 2023-06-05 ENCOUNTER — TELEMEDICINE (OUTPATIENT)
Dept: PSYCHIATRY | Facility: CLINIC | Age: 38
End: 2023-06-05

## 2023-06-05 DIAGNOSIS — F31.81 BIPOLAR II DISORDER (HCC): ICD-10-CM

## 2023-06-05 DIAGNOSIS — F43.10 PTSD (POST-TRAUMATIC STRESS DISORDER): ICD-10-CM

## 2023-06-05 DIAGNOSIS — F41.1 GAD (GENERALIZED ANXIETY DISORDER): ICD-10-CM

## 2023-06-05 RX ORDER — LORAZEPAM 1 MG/1
1 TABLET ORAL EVERY 6 HOURS PRN
Qty: 30 TABLET | Refills: 0 | Status: SHIPPED | OUTPATIENT
Start: 2023-06-05

## 2023-06-05 RX ORDER — LAMOTRIGINE 200 MG/1
200 TABLET ORAL DAILY
Qty: 90 TABLET | Refills: 0
Start: 2023-06-05

## 2023-06-05 NOTE — PSYCH
MEDICATION MANAGEMENT NOTE    PSYCHIATRIC VIRTUAL VISIT        [address]      Name and Date of Birth:  Kristel Hugo 40 y o  1985 MRN: [id number]    Psychiatric Virtual Visit:    Verification of patient location: Patient is located in the state that I hold an active license: [place]    REQUIRED DOCUMENTATION:     Provider located at [address] in [place] [address]  TeleMed provider: Nolberto Hernández MD  Patient was identified by name and date of birth  Kristel Hugo was informed that this is a telemedicine visit and that the visit is being conducted through the [facility]  She agrees to proceed     My office door was closed  No one else was in the room  She acknowledged consent and understanding of privacy and security of the video platform  The patient has agreed to participate and understands they can discontinue the visit at any time  Patient is aware this is a billable service  This note was shared with patient  I spent 15 minutes directly with the patient during this visit    Solis Jesus was seen for continuing care and pharmacotherapy  She reports that she feels better despite persistence of stress  Another factor is pain related to TMJ which prevents her from sleeping through the night and also because of her anatomy, she snores a lot and is uncomfortable  Her appetite has not changed  She has not had any crying spells  She still gets overwhelmed due to multiple responsibilities with her children and her boyfriend's children  She has been taking lorazepam which is helpful with sleeping  Slight sedation is reported but does not interfere with her functioning    Review Of Systems:     Mood Anxious   Thought Content Normal   General Sleep Disturbances   Physical symptoms As Noted in HPI       Laboratory Results: No results found for this or any previous visit  Subjective:          Objective:       Mental status:  Appearance calm and cooperative , adequate hygiene and grooming and good eye contact    Mood Anxious   Affect affect appropriate    Speech Normal rate   Thought Processes coherent/organized   Hallucinations Denies hallucinations and does not appear to be responding to internal stimuli   Thought Content Does not verbalize delusional material   Abnormal Thoughts no suicidal thoughts    Orientation A+O x 3       Assessment/Plan:             Treatment Recommendations- Risks Benefits    We will continue with same medications  Follow-up visit in 6 weeks  Risks, Benefits And Possible Side Effects Of Medications:  Risks, benefits, and possible side effects of medications explained to patient and patient verbalizes understanding    VIRTUAL VISIT DISCLAIMER    Shannon Morrison verbally agrees to participate in [employer]  Pt is aware that [employer] could be limited without vital signs or the ability to perform a full hands-on physical exam  Shannon Morrison understands she or the provider may request at any time to terminate the video visit and request the patient to seek care or treatment in person       Albert Dorsey MD 06/05/23

## 2023-06-06 ENCOUNTER — TELEPHONE (OUTPATIENT)
Dept: PSYCHIATRY | Facility: CLINIC | Age: 38
End: 2023-06-06

## 2023-06-06 ENCOUNTER — TELEMEDICINE (OUTPATIENT)
Dept: BEHAVIORAL/MENTAL HEALTH CLINIC | Facility: CLINIC | Age: 38
End: 2023-06-06
Payer: COMMERCIAL

## 2023-06-06 DIAGNOSIS — F41.1 GAD (GENERALIZED ANXIETY DISORDER): Chronic | ICD-10-CM

## 2023-06-06 DIAGNOSIS — F31.5 BIPOLAR I DISORDER, MOST RECENT EPISODE DEPRESSED, SEVERE WITH PSYCHOTIC FEATURES (HCC): Primary | Chronic | ICD-10-CM

## 2023-06-06 PROCEDURE — 90834 PSYTX W PT 45 MINUTES: CPT | Performed by: SOCIAL WORKER

## 2023-06-06 NOTE — PSYCH
Virtual Regular Visit    Verification of patient location:    Patient is located at Home in the following state in which I hold an active license [place]    Assessment/Plan:    Problem List Items Addressed This Visit        Other    Bipolar I disorder, most recent episode depressed, severe with psychotic features ([facility] [address] ) - Primary (Chronic)    SHELLY (generalized anxiety disorder) (Chronic)     Goals addressed in session: Goal 1      Reason for visit is   Chief Complaint   Patient presents with   • Virtual Regular Visit      Encounter provider CHARLIE Rosales    Provider located at [address] [place], [place] [id number] [phone]    Recent Visits  No visits were found meeting these conditions  Showing recent visits within past 7 days and meeting all other requirements  Today's Visits  Date Type Provider Dept   06/06/23 [address] today's visits and meeting all other requirements  Future Appointments  No visits were found meeting these conditions  Showing future appointments within next 150 days and meeting all other requirements     The patient was identified by name and date of birth  Howard Garcia was informed that this is a telemedicine visit and [facility]  She agrees to proceed     My office door was closed  No one else was in the room  She acknowledged consent and understanding of privacy and security of the video platform  The patient has agreed to participate and understands they can discontinue the visit at any time  Patient is aware this is a billable service  Subjective  Howard Garcia is a 40 y o  female      HPI     Past Medical History:   Diagnosis Date   • Abnormal Pap smear of cervix    • Depression    • Eczema    • Exposure to chlamydia     Resolved 06/09/17 • Migraine without aura and without status migrainosus, not intractable 10/11/2019   • Obesity    • Post traumatic stress disorder 2021   • Postpartum depression 2018   • Manoj-Parkinson-White (WPW) syndrome        Past Surgical History:   Procedure Laterality Date   • ABLATION OF DYSRHYTHMIC FOCUS      WPW ablation age 14   • COLONOSCOPY     • COLPOSCOPY     • INDUCED      • OTHER SURGICAL HISTORY      catheter ablation- WPW age 13       Current Outpatient Medications   Medication Sig Dispense Refill   • brompheniramine-pseudoephedrine-DM 30-2-10 MG/5ML syrup Take 5 mL by mouth 4 (four) times a day as needed for congestion, cough or allergies 120 mL 0   • ibuprofen (MOTRIN) 600 mg tablet Take 1 tablet (600 mg total) by mouth every 6 (six) hours as needed for mild pain, moderate pain or fever 30 tablet 0   • lamoTRIgine (LaMICtal) 200 MG tablet Take 1 tablet (200 mg total) by mouth daily 90 tablet 0   • LORazepam (ATIVAN) 1 mg tablet Take 1 tablet (1 mg total) by mouth every 6 (six) hours as needed for anxiety 30 tablet 0   • naproxen (NAPROSYN) 500 mg tablet Take 1 tablet (500 mg total) by mouth 2 (two) times a day with meals (Patient taking differently: Take 500 mg by mouth 2 (two) times a day with meals) 30 tablet 0   • norethindrone (Zoya) 0 35 MG tablet Take 1 tablet (0 35 mg total) by mouth daily 90 tablet 2   • rizatriptan (MAXALT-MLT) 10 mg disintegrating tablet TAKE AT THE ONSET OF MIGRAINE IF SYMPTOMS CONTINUE OR RETURN, MAY TAKE ANOTHER DOSE AT LEAST 2 HOURS AFTER FIRST DOSE  TAKE NO MORE THAN 2 DOSES IN A DAY  9 tablet 0   • sertraline (ZOLOFT) 50 mg tablet Take 1 tablet (50 mg total) by mouth daily 30 tablet 2     No current facility-administered medications for this visit          Allergies   Allergen Reactions   • Codeine Other (See Comments)     dry heaves   • Sulfa Antibiotics Hives and Rash     Per pt as achild   • Erythromycin Hives     Per as a child       Review of "Systems    Video Exam    There were no vitals filed for this visit  Physical Exam     Behavioral Health Psychotherapy Progress Note    Psychotherapy Provided: Individual Psychotherapy     1  Bipolar I disorder, most recent episode depressed, severe with psychotic features ([address] )        2  SHELLY (generalized anxiety disorder)            Goals addressed in session: Goal 1     DATA: Met with Roni Bloch for scheduled individual session  Roni Bloch states that she invited her parents over to her home, to say \"goodbye\" before they move  She discussed her anxiety during the visit  She states that Frank Shaw made a decision to stay upstairs during the visit  We discussed her self-assessment of how she managed her anxiety/mood during the visit  She states that she was able to maintain her mood throughout the visit  She states that despite her mother \"harping on things,\" she was able to ignore her mother's comments and maintain her mood  She is considering the possibility of having one more visit with her parents and her children  She is considering options for where she can go and still have the opportunity to give herself a break, if needed, from the conversation  Roni Bloch states that she continues to have some symptoms of depression--e g , lack of energy, lack of motivation, etc  She does acknowledge that she feels slightly better on the days that she attends work, versus the days that she does not attend work  She states that it is VERY difficult for her to motivate herself to get up, when she is feeling depressed, even with the knowledge that she might feel better later  Roni Bloch agreed to try to add small mood-enhancing behaviors to her day (e g , drinking water, sitting in the sun, getting a little exercise, etc )  This clinician will review with her at the next session       During this session, this clinician used the following therapeutic modalities: Client-centered Therapy, Dialectical Behavior Therapy, Mindfulness-based " "Strategies, Motivational Interviewing, Solution-Focused Therapy and Supportive Psychotherapy    Substance Abuse was not addressed during this session  If the client is diagnosed with a co-occurring substance use disorder, please indicate any changes in the frequency or amount of use: n/a  Stage of change for addressing substance use diagnoses: No substance use/Not applicable    ASSESSMENT:  Parveen Menendez presents with a Dysthymic mood  her affect is Normal range and intensity, which is congruent, with her mood and the content of the session  The client has made progress on their goals, as evidenced by her ability to have her parents visit her home and maintaining her mood throughout the visit  Parveen Menendez presents with a minimal risk of suicide, minimal risk of self-harm, and minimal risk of harm to others  For any risk assessment that surpasses a \"low\" rating, a safety plan must be developed  A safety plan was indicated: no  If yes, describe in detail n/a    PLAN: Between sessions, Parveen Menendez will continue to monitor her moods and use her mindfulness-based skills to maintain emotion regulation  At the next session, the therapist will use Client-centered Therapy, Dialectical Behavior Therapy, Mindfulness-based Strategies, Motivational Interviewing, Solution-Focused Therapy and Supportive Psychotherapy to address her mood regulation and relationship concerns  Behavioral Health Treatment Plan and Discharge Planning: Parveen Menendez is aware of and agrees to continue to work on their treatment plan  They have identified and are working toward their discharge goals   yes    Visit start and stop times:    06/06/23  Start Time: 0902  Stop Time: 2254  Total Visit Time: 50 minutes      "

## 2023-06-06 NOTE — TELEPHONE ENCOUNTER
Patient called in to let provider know she did email the forms over already that needed to be completed

## 2023-06-07 NOTE — TELEPHONE ENCOUNTER
Patient contacted the office seeking an update on the forms that was sent over yesterday  Patient stated she also emailed provider additional information that needs to be on the form

## 2023-06-08 NOTE — TELEPHONE ENCOUNTER
Patient contacted the office attempting to speak with her therapist about the documentation that was sent via email  Writer informed the patient that the documentation was received and that her therapist will be able to review them  Patient understood and stated that if her therapist needs any clarification or has any questions to please reach out to further discuss  Thank you

## 2023-06-09 ENCOUNTER — DOCUMENTATION (OUTPATIENT)
Dept: BEHAVIORAL/MENTAL HEALTH CLINIC | Facility: CLINIC | Age: 38
End: 2023-06-09

## 2023-06-09 NOTE — TELEPHONE ENCOUNTER
Patient contacted the office seeking an update on documentation that was sent via email  Writer informed patient that the documentation were received and are being reviewed  Once the documents are complete we will be in contact to inform her  Patient stated that she also email therapist additional information that need to be on the documents

## 2023-06-23 ENCOUNTER — TELEPHONE (OUTPATIENT)
Dept: PSYCHIATRY | Facility: CLINIC | Age: 38
End: 2023-06-23

## 2023-06-23 NOTE — TELEPHONE ENCOUNTER
Patient called in because she has an appt scheduled for Tuesday 6/27/2023 at [address] [place] [address] virtually, patient would like to make this an in person visit

## 2023-06-27 ENCOUNTER — SOCIAL WORK (OUTPATIENT)
Dept: BEHAVIORAL/MENTAL HEALTH CLINIC | Facility: CLINIC | Age: 38
End: 2023-06-27
Payer: COMMERCIAL

## 2023-06-27 DIAGNOSIS — G47.09 OTHER INSOMNIA: Chronic | ICD-10-CM

## 2023-06-27 DIAGNOSIS — F41.1 GAD (GENERALIZED ANXIETY DISORDER): Chronic | ICD-10-CM

## 2023-06-27 DIAGNOSIS — F43.10 POST TRAUMATIC STRESS DISORDER: ICD-10-CM

## 2023-06-27 DIAGNOSIS — F31.5 BIPOLAR I DISORDER, MOST RECENT EPISODE DEPRESSED, SEVERE WITH PSYCHOTIC FEATURES (HCC): Primary | Chronic | ICD-10-CM

## 2023-06-27 PROCEDURE — 90837 PSYTX W PT 60 MINUTES: CPT | Performed by: SOCIAL WORKER

## 2023-06-27 NOTE — PSYCH
Behavioral Health Psychotherapy Progress Note    Psychotherapy Provided: Individual Psychotherapy     1  Bipolar I disorder, most recent episode depressed, severe with psychotic features ([facility] [address] )        2  SHELLY (generalized anxiety disorder)        3  Post traumatic stress disorder        4  Other insomnia            Goals addressed in session: Goal 1     DATA: Met with ct for scheduled individual session  Client reports that she needs to have paperwork completed for FMLA (attached to this service)  Client has reached her one-year anniversary at her job  We spent time talking about her work-- the pros and cons-- and how her mental health impacts and is impacted by her work  She states that she feels that she is working for a very good company, which values diversity and employee wellness  She states that she feels an increase in motivation to attend work regularly; however, there are still days that she is unable to work  She 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-- She explained that she is able to ask for time to regroup after telephone calls like this  Loc Gibbs discussed her goals to decrease the number of days she misses at work  We briefly discussed Libertad's parents' move and the two visits that she had with them prior to them leaving  During this session, this clinician used the following therapeutic modalities: Client-centered Therapy, Dialectical Behavior Therapy, Mindfulness-based Strategies, Motivational Interviewing, Solution-Focused Therapy and Supportive Psychotherapy    Substance Abuse was not addressed during this session  If the client is diagnosed with a co-occurring substance use disorder, please indicate any changes in the frequency or amount of use: n/a  Stage of change for addressing substance use diagnoses: No substance use/Not applicable    ASSESSMENT:  Junious Angry presents with a Euthymic/ normal mood       her affect is Normal range and "intensity, which is congruent, with her mood and the content of the session  The client has made progress on their goals  Deb Schmitz presents with a minimal risk of suicide, minimal risk of self-harm, and minimal risk of harm to others  For any risk assessment that surpasses a \"low\" rating, a safety plan must be developed  A safety plan was indicated: no  If yes, describe in detail n/a    PLAN: Between sessions, Deb Schmitz will continue to track her moods and the impact of her moods on her ability to attend work  She will continue to follow through with medication management  At the next session, the therapist will use Client-centered Therapy, Dialectical Behavior Therapy, Mindfulness-based Strategies, Motivational Interviewing, Solution-Focused Therapy and Supportive Psychotherapy to address her mood regulation and relationship concerns  Behavioral Health Treatment Plan and Discharge Planning: Deb Schmitz is aware of and agrees to continue to work on their treatment plan  They have identified and are working toward their discharge goals   yes    Visit start and stop times:    06/27/23  Start Time: 0906  Stop Time: 1000  Total Visit Time: 54 minutes  "

## 2023-07-03 NOTE — TELEPHONE ENCOUNTER
Per Roxie Benton- need to try and get her in with [facility] sooner than Oct appt  Will need medication review asap  I called [phone] and [phone] and LM for them to call back asap  66 male hx of ischemic cardiomyopathy s/p ICD, CAD (AWMI s/p BMS to LAD in 2013), VT, s/p DCCV x 1. s/p LHC showing mild-moderate distal disease. s/p VT ablation 2/19/2020 sp ICD battery change on 2/2020, HTN, HLD 66 male hx of GERD, ischemic cardiomyopathy s/p ICD, CAD (AWMI s/p BMS to LAD in 2013), VT, s/p DCCV x 1. s/p LHC showing mild-moderate distal disease. s/p VT ablation 2/19/2020 sp ICD battery change on 2/2020, HTN, HLD.    this am he presents out of an abundance of concern.  Last ac when he did his routine evening blood pressure check it was 124 systolic.  This worried him so he awoke again at 2 am with his usual gerd sx in stomach, feeling his pulse was fast he checked the vitals and recorded in his log 140/90 hr=80.  he fell back asleep but was still worried about his symptoms. so at 6 am he took his meds and repeated his vs found it to be 156/88 hr=86. his gerd sx were gone but he was very worried.  he denies any mi sx, no cp no sob/diaphoresis/n/v/d/ no fever no chills no covid sx no travel no ill contacts no other complaints.  he is a remote former smoker not a drinker no drugs no allergies.    pmd dr Kellie Bear Tranexamic Acid Pregnancy And Lactation Text: It is unknown if this medication is safe during pregnancy or breast feeding.

## 2023-07-14 ENCOUNTER — TELEPHONE (OUTPATIENT)
Dept: PSYCHIATRY | Facility: CLINIC | Age: 38
End: 2023-07-14

## 2023-07-14 NOTE — TELEPHONE ENCOUNTER
Patient is calling regarding cancelling an appointment.     Date/Time: 7/17/2023 at 9 am   Reason: pt could not make it    Patient was rescheduled: YES [x] NO []  If yes, when was Patient reschedule for: 8/30/2023  At 11:30 am    Patient requesting call back to reschedule: YES [] NO [x]

## 2023-07-28 ENCOUNTER — OFFICE VISIT (OUTPATIENT)
Dept: OBGYN CLINIC | Facility: MEDICAL CENTER | Age: 38
End: 2023-07-28
Payer: COMMERCIAL

## 2023-07-28 VITALS
BODY MASS INDEX: 39.05 KG/M2 | HEART RATE: 80 BPM | SYSTOLIC BLOOD PRESSURE: 106 MMHG | WEIGHT: 212.2 LBS | HEIGHT: 62 IN | DIASTOLIC BLOOD PRESSURE: 71 MMHG

## 2023-07-28 DIAGNOSIS — S83.005D PATELLAR DISLOCATION, LEFT, SUBSEQUENT ENCOUNTER: ICD-10-CM

## 2023-07-28 DIAGNOSIS — S83.30XA: Primary | ICD-10-CM

## 2023-07-28 DIAGNOSIS — M25.362 PATELLAR INSTABILITY OF LEFT KNEE: ICD-10-CM

## 2023-07-28 PROCEDURE — 99213 OFFICE O/P EST LOW 20 MIN: CPT | Performed by: ORTHOPAEDIC SURGERY

## 2023-07-28 NOTE — PROGRESS NOTES
Ortho Sports Medicine Knee Follow Up Visit     Assesment:       40 y.o. female left knee continued patellar instability and anterior knee pain      Plan:    Conservative treatment:    Ice to knee for 20 minutes at least 1-2 times daily. OTC NSAIDS prn for pain. Imaging: All imaging from today was reviewed by myself and explained to the patient. We will obtain an MRI of the knee to rule out cartilage defect of the patella given long standing patellar instability, surgical planning    Follow up in 1-2 weeks for MRI review. Will make a definitive treatment plan based on the results of the MRI. Injection:    No Injection planned at this time. Surgery:     Discussed proceeding forward with Left knee surgical arthroscopy with mpfl with allograft and lateral release arthroscopic    Follow up:    Return for 1 week after MRI to review . Chief Complaint   Patient presents with   • Left Knee - Pain       History of Present Illness: The patient is returns for follow up of bilateral knee pain and patellar instability, left worse than right. Since the prior visit, She reports minimal improvement. Pain is located anterior, posterior. Pain is improved by rest.  Pain is aggravated by weight bearing. Symptoms include clicking. The patient has tried rest.    She notes continued instability.       Knee Surgical History:  None      Past Medical, Social and Family History:  Past Medical History:   Diagnosis Date   • Abnormal Pap smear of cervix    • Depression    • Eczema    • Exposure to chlamydia     Resolved 06/09/17   • Migraine without aura and without status migrainosus, not intractable 10/11/2019   • Obesity    • Post traumatic stress disorder 12/13/2021   • Postpartum depression 08/09/2018   • Manoj-Parkinson-White (WPW) syndrome      Past Surgical History:   Procedure Laterality Date   • ABLATION OF DYSRHYTHMIC FOCUS      WPW ablation age 14   • COLONOSCOPY     • COLPOSCOPY     • INDUCED      • OTHER SURGICAL HISTORY      catheter ablation- WPW age 13     Allergies   Allergen Reactions   • Codeine Other (See Comments)     dry heaves   • Sulfa Antibiotics Hives and Rash     Per pt as achild   • Erythromycin Hives     Per as a child     Current Outpatient Medications on File Prior to Visit   Medication Sig Dispense Refill   • ibuprofen (MOTRIN) 600 mg tablet Take 1 tablet (600 mg total) by mouth every 6 (six) hours as needed for mild pain, moderate pain or fever 30 tablet 0   • lamoTRIgine (LaMICtal) 200 MG tablet Take 1 tablet (200 mg total) by mouth daily 90 tablet 0   • LORazepam (ATIVAN) 1 mg tablet Take 1 tablet (1 mg total) by mouth every 6 (six) hours as needed for anxiety 30 tablet 0   • naproxen (NAPROSYN) 500 mg tablet Take 1 tablet (500 mg total) by mouth 2 (two) times a day with meals (Patient taking differently: Take 500 mg by mouth 2 (two) times a day with meals) 30 tablet 0   • norethindrone (Zoya) 0.35 MG tablet Take 1 tablet (0.35 mg total) by mouth daily 90 tablet 2   • sertraline (ZOLOFT) 50 mg tablet Take 1 tablet (50 mg total) by mouth daily 30 tablet 2   • brompheniramine-pseudoephedrine-DM 30-2-10 MG/5ML syrup Take 5 mL by mouth 4 (four) times a day as needed for congestion, cough or allergies 120 mL 0   • rizatriptan (MAXALT-MLT) 10 mg disintegrating tablet TAKE AT THE ONSET OF MIGRAINE IF SYMPTOMS CONTINUE OR RETURN, MAY TAKE ANOTHER DOSE AT LEAST 2 HOURS AFTER FIRST DOSE. TAKE NO MORE THAN 2 DOSES IN A DAY. (Patient not taking: Reported on 2023) 9 tablet 0     No current facility-administered medications on file prior to visit.      Social History     Socioeconomic History   • Marital status: Single     Spouse name: Not on file   • Number of children: 2   • Years of education: Not on file   • Highest education level: Some college, no degree   Occupational History   • Occupation: works in retail   Tobacco Use   • Smoking status: Never   • Smokeless tobacco: Never   Vaping Use   • Vaping Use: Never used   Substance and Sexual Activity   • Alcohol use: Yes     Comment: Occasional   • Drug use: No   • Sexual activity: Yes     Partners: Male     Birth control/protection: Condom Male, Ring   Other Topics Concern   • Not on file   Social History Narrative    Education: high school graduate and some college    Learning Disabilities: none    Marital History: single    Children: 1 daughter, 1 son    Living Arrangement: lives in home with boyfriend, 2 children and boyfriend's 2 children    Occupational History: works part time at [employer] and [employer] works    Functioning Relationships: boyfriend is supportive    Legal History: none     History: None         Family planning    IUD contraception     Social Determinants of Health     Financial Resource Strain: Medium Risk (2/4/2021)    Overall Financial Resource Strain (CARDIA)    • Difficulty of Paying Living Expenses: Somewhat hard   Food Insecurity: Food Insecurity Present (2/4/2021)    Hunger Vital Sign    • Worried About [place] in the Last Year: Sometimes true    • [address] in the Last Year: Sometimes true   Transportation Needs: Unmet Transportation Needs (2/4/2021)    PRAPARE - Transportation    • Lack of Transportation (Medical):  Yes    • Lack of Transportation (Non-Medical): Yes   Physical Activity: Inactive (11/11/2019)    Exercise Vital Sign    • Days of Exercise per Week: 0 days    • Minutes of Exercise per Session: 0 min   Stress: Stress Concern Present (11/11/2019)    [facility]    • Feeling of Stress : Rather much   Social Connections: Socially Isolated (11/11/2019)    Social Connection and Isolation Panel [NHANES]    • Frequency of Communication with Friends and Family: Once a week    • Frequency of Social Gatherings with Friends and Family: Once a week    • Attends Yazidi Services: Never    • Active Member of Clubs or Organizations: No    • Attends Club or Organization Meetings: Never    • Marital Status: Never    Intimate Partner Violence: Not At Risk (11/11/2019)    Humiliation, Afraid, Rape, and Kick questionnaire    • Fear of Current or Ex-Partner: No    • Emotionally Abused: No    • Physically Abused: No    • Sexually Abused: No   Housing Stability: Not on file         I have reviewed the past medical, surgical, social and family history, medications and allergies as documented in the EMR. Review of systems: ROS is negative other than that noted in the HPI. Constitutional: Negative for fatigue and fever. Physical Exam:    Blood pressure 106/71, pulse 80, height 5' 2" (1.575 m), weight 96.3 kg (212 lb 3.2 oz), not currently breastfeeding. General/Constitutional: NAD, well developed, well nourished  HENT: Normocephalic, atraumatic  CV: Intact distal pulses, regular rate  Resp: No respiratory distress or labored breathing  GI: Soft and non-tender   Lymphatic: No lymphadenopathy palpated  Neuro: Alert and Oriented x 3, no focal deficits  Psych: Normal mood, normal affect, normal judgement, normal behavior  Skin: Warm, dry, no rashes, no erythema      Knee Exam (focused): RIGHT LEFT   ROM:   0-130 0-130   Palpation: Effusion negative negative     MJL tenderness Negative Negative     LJL tenderness Negative Negative   Meniscus:  Johanne Negative Negative    Apley's Compression Negative Negative   Instability: Varus stable stable     Valgus stable stable   Special Tests: Lachman Negative Negative     Posterior drawer Negative Negative     Anterior drawer Negative Negative     Pivot shift not tested not tested     Dial not tested not tested   Patella: Palpation medial facet ttp medial facet ttp     Mobility 3/4 3/4     Apprehension Positive Positive   Other: Single leg 1/4 squat not tested not tested           LE NV Exam: +2 DP/PT pulses bilaterally  Sensation intact to light touch L2-S1 bilaterally    No calf tenderness to palpation bilaterally      Knee Imaging    No imaging was performed today

## 2023-08-03 ENCOUNTER — TELEPHONE (OUTPATIENT)
Age: 38
End: 2023-08-03

## 2023-08-03 NOTE — TELEPHONE ENCOUNTER
Caller: [employer]     Doctor: Fernanda    Reason for call: Call was lost -while trying to transfer- regarding patient's auth for MRI     Call back#: NA

## 2023-08-07 ENCOUNTER — OFFICE VISIT (OUTPATIENT)
Dept: OBGYN CLINIC | Facility: MEDICAL CENTER | Age: 38
End: 2023-08-07
Payer: COMMERCIAL

## 2023-08-07 VITALS
HEIGHT: 62 IN | DIASTOLIC BLOOD PRESSURE: 82 MMHG | WEIGHT: 212.5 LBS | BODY MASS INDEX: 39.11 KG/M2 | SYSTOLIC BLOOD PRESSURE: 106 MMHG

## 2023-08-07 DIAGNOSIS — L70.0 ACNE VULGARIS: ICD-10-CM

## 2023-08-07 DIAGNOSIS — F41.1 GAD (GENERALIZED ANXIETY DISORDER): ICD-10-CM

## 2023-08-07 DIAGNOSIS — N64.4 BREAST TENDERNESS: ICD-10-CM

## 2023-08-07 DIAGNOSIS — R23.2 HOT FLASHES: Primary | ICD-10-CM

## 2023-08-07 DIAGNOSIS — F31.81 BIPOLAR II DISORDER (HCC): ICD-10-CM

## 2023-08-07 DIAGNOSIS — F43.10 PTSD (POST-TRAUMATIC STRESS DISORDER): ICD-10-CM

## 2023-08-07 PROCEDURE — 99213 OFFICE O/P EST LOW 20 MIN: CPT | Performed by: OBSTETRICS & GYNECOLOGY

## 2023-08-07 NOTE — PROGRESS NOTES
Assessment:  40 y.o. [id number] who presents with multiple symptoms concerning for hormonal etiology. Plan:  Diagnoses and all orders for this visit:    Hot flashes  Acne vulgaris  Breast tenderness  -     Follicle stimulating hormone; Future  -     Luteinizing hormone; Future  -     Estradiol; Future  -     TSH, 3rd generation with Free T4 reflex; Future  -     CBC and differential; Future  -     DHEA-sulfate; Future  -     Testosterone, free, total; Future  -     Cortisol Level, AM Specimen; Future  -     Prolactin; Future      __________________________________________________________________    Subjective   Linnea Dior is a 40 y.o. [id number] who presents with hormonal concerns. Reports 6mo of night sweats, dryness, breast tenderness. Most bothered by the sweating. Not baseline typically sweaty, but happening all day long now. Worse at night, waking up drenched in sweat. Acne as well increased from baseline. Vaginal dryness largely external. Itchy in nature. Breast tenderness, unsure if related to cycles specifically. Comes and goes. No change to SBE. Mood swings, but this is more typical for her but worse lately. Using Zoya POP. Getting a regular menses (2d in duration), increasing dysmenorrhea. Started POPs in 12/2022. Menses were heavy at onset of taking and back pain noted. Contraception helping with migraines. The following portions of the patient's history were reviewed and updated as appropriate: allergies, current medications, past medical history, past social history, past surgical history and problem list.    Review of Systems  Review of Systems   Constitutional: Negative for chills and fever. Respiratory: Negative for shortness of breath. Cardiovascular: Negative for chest pain and palpitations. Gastrointestinal: Negative for abdominal distention, abdominal pain, constipation, diarrhea, nausea and vomiting. Endocrine: Positive for heat intolerance.    Genitourinary: Positive for vaginal pain (dryness). Negative for dysuria, flank pain, menstrual problem, vaginal bleeding and vaginal discharge. Skin: Negative for rash and wound. Neurological: Negative for dizziness, light-headedness and headaches. Psychiatric/Behavioral: Positive for dysphoric mood. The patient is nervous/anxious. Objective  /82   Ht 5' 2" (1.575 m)   Wt 96.4 kg (212 lb 8 oz)   LMP 07/24/2023 (Approximate)   BMI 38.87 kg/m²      Physical Exam:  Physical Exam  Constitutional:       General: She is not in acute distress. Appearance: Normal appearance. She is not ill-appearing, toxic-appearing or diaphoretic. Eyes:      General: No scleral icterus. Right eye: No discharge. Left eye: No discharge. Conjunctiva/sclera: Conjunctivae normal.   Cardiovascular:      Rate and Rhythm: Normal rate. Pulmonary:      Effort: Pulmonary effort is normal. No respiratory distress. Abdominal:      General: There is no distension. Palpations: There is no mass. Tenderness: There is no abdominal tenderness. There is no guarding or rebound. Hernia: No hernia is present. Musculoskeletal:         General: No swelling. Skin:     General: Skin is warm and dry. Coloration: Skin is not jaundiced or pale. Findings: No bruising or erythema. Neurological:      Mental Status: She is alert. Psychiatric:         Mood and Affect: Mood normal.         Behavior: Behavior normal.         Thought Content:  Thought content normal.         Judgment: Judgment normal.

## 2023-08-10 NOTE — TELEPHONE ENCOUNTER
Patient was calling to follow up on her medication refills, pharmacy told her refill requests were sent but nothing has been received back as of yetMedication Refill Request     Name of Medication lamictal  Dose/Frequency 200mg take 1 tablet by mouth daily  Quantity 90  Verified pharmacy   [x]  Verified ordering Provider   [x]  Does patient have enough for the next 3 days? Yes [] No [x]  Does patient have a follow-up appointment scheduled? Yes [x] No []   If so when is appointment: 8/30/2023 11:30am    Medication Refill Request     Name of Medication Zoloft  Dose/Frequency 50mg take 1 tablet by mouth daily  Quantity 30  Verified pharmacy   [x]  Verified ordering Provider   [x]  Does patient have enough for the next 3 days? Yes [] No [x]  Does patient have a follow-up appointment scheduled?  Yes [x] No []   If so when is appointment: 8/30/2023 11:30am

## 2023-08-11 ENCOUNTER — APPOINTMENT (OUTPATIENT)
Dept: RADIOLOGY | Facility: MEDICAL CENTER | Age: 38
End: 2023-08-11
Payer: COMMERCIAL

## 2023-08-11 DIAGNOSIS — S83.30XA: Primary | ICD-10-CM

## 2023-08-11 DIAGNOSIS — M25.362 PATELLAR INSTABILITY OF LEFT KNEE: ICD-10-CM

## 2023-08-11 DIAGNOSIS — S83.30XA: ICD-10-CM

## 2023-08-11 PROCEDURE — 73564 X-RAY EXAM KNEE 4 OR MORE: CPT

## 2023-08-15 ENCOUNTER — HOSPITAL ENCOUNTER (OUTPATIENT)
Facility: MEDICAL CENTER | Age: 38
Discharge: HOME | End: 2023-08-15
Payer: COMMERCIAL

## 2023-08-15 ENCOUNTER — SOCIAL WORK (OUTPATIENT)
Dept: BEHAVIORAL/MENTAL HEALTH CLINIC | Facility: CLINIC | Age: 38
End: 2023-08-15
Payer: COMMERCIAL

## 2023-08-15 ENCOUNTER — APPOINTMENT (OUTPATIENT)
Dept: LAB | Facility: CLINIC | Age: 38
End: 2023-08-15
Payer: COMMERCIAL

## 2023-08-15 DIAGNOSIS — G47.09 OTHER INSOMNIA: Chronic | ICD-10-CM

## 2023-08-15 DIAGNOSIS — F43.10 POST TRAUMATIC STRESS DISORDER: ICD-10-CM

## 2023-08-15 DIAGNOSIS — S83.30XA: ICD-10-CM

## 2023-08-15 DIAGNOSIS — R23.2 HOT FLASHES: ICD-10-CM

## 2023-08-15 DIAGNOSIS — L70.0 ACNE VULGARIS: ICD-10-CM

## 2023-08-15 DIAGNOSIS — F41.1 GAD (GENERALIZED ANXIETY DISORDER): Chronic | ICD-10-CM

## 2023-08-15 DIAGNOSIS — N64.4 BREAST TENDERNESS: ICD-10-CM

## 2023-08-15 DIAGNOSIS — S83.005D PATELLAR DISLOCATION, LEFT, SUBSEQUENT ENCOUNTER: ICD-10-CM

## 2023-08-15 DIAGNOSIS — M25.362 PATELLAR INSTABILITY OF LEFT KNEE: ICD-10-CM

## 2023-08-15 DIAGNOSIS — F31.5 BIPOLAR I DISORDER, MOST RECENT EPISODE DEPRESSED, SEVERE WITH PSYCHOTIC FEATURES (HCC): Primary | Chronic | ICD-10-CM

## 2023-08-15 LAB
BASOPHILS # BLD AUTO: 0.09 THOUSANDS/ÂΜL (ref 0–0.1)
BASOPHILS NFR BLD AUTO: 1 % (ref 0–1)
CORTIS AM PEAK SERPL-MCNC: 19.4 UG/DL (ref 6.7–22.6)
EOSINOPHIL # BLD AUTO: 0.21 THOUSAND/ÂΜL (ref 0–0.61)
EOSINOPHIL NFR BLD AUTO: 3 % (ref 0–6)
ERYTHROCYTE [DISTWIDTH] IN BLOOD BY AUTOMATED COUNT: 12.2 % (ref 11.6–15.1)
ESTRADIOL SERPL-MCNC: 90 PG/ML
FSH SERPL-ACNC: 7.2 MIU/ML
HCT VFR BLD AUTO: 44.9 % (ref 34.8–46.1)
HGB BLD-MCNC: 14.8 G/DL (ref 11.5–15.4)
IMM GRANULOCYTES # BLD AUTO: 0.02 THOUSAND/UL (ref 0–0.2)
IMM GRANULOCYTES NFR BLD AUTO: 0 % (ref 0–2)
LH SERPL-ACNC: 8.6 MIU/ML
LYMPHOCYTES # BLD AUTO: 3.33 THOUSANDS/ÂΜL (ref 0.6–4.47)
LYMPHOCYTES NFR BLD AUTO: 45 % (ref 14–44)
MCH RBC QN AUTO: 29.1 PG (ref 26.8–34.3)
MCHC RBC AUTO-ENTMCNC: 33 G/DL (ref 31.4–37.4)
MCV RBC AUTO: 88 FL (ref 82–98)
MONOCYTES # BLD AUTO: 0.6 THOUSAND/ÂΜL (ref 0.17–1.22)
MONOCYTES NFR BLD AUTO: 8 % (ref 4–12)
NEUTROPHILS # BLD AUTO: 3.27 THOUSANDS/ÂΜL (ref 1.85–7.62)
NEUTS SEG NFR BLD AUTO: 43 % (ref 43–75)
NRBC BLD AUTO-RTO: 0 /100 WBCS
PLATELET # BLD AUTO: 371 THOUSANDS/UL (ref 149–390)
PMV BLD AUTO: 10.5 FL (ref 8.9–12.7)
PROLACTIN SERPL-MCNC: 16.77 NG/ML (ref 3.34–26.72)
RBC # BLD AUTO: 5.08 MILLION/UL (ref 3.81–5.12)
TSH SERPL DL<=0.05 MIU/L-ACNC: 4.06 UIU/ML (ref 0.45–4.5)
WBC # BLD AUTO: 7.52 THOUSAND/UL (ref 4.31–10.16)

## 2023-08-15 PROCEDURE — 84403 ASSAY OF TOTAL TESTOSTERONE: CPT

## 2023-08-15 PROCEDURE — 36415 COLL VENOUS BLD VENIPUNCTURE: CPT

## 2023-08-15 PROCEDURE — 83001 ASSAY OF GONADOTROPIN (FSH): CPT

## 2023-08-15 PROCEDURE — 82670 ASSAY OF TOTAL ESTRADIOL: CPT

## 2023-08-15 PROCEDURE — 82533 TOTAL CORTISOL: CPT

## 2023-08-15 PROCEDURE — 83002 ASSAY OF GONADOTROPIN (LH): CPT

## 2023-08-15 PROCEDURE — 84402 ASSAY OF FREE TESTOSTERONE: CPT

## 2023-08-15 PROCEDURE — 73721 MRI JNT OF LWR EXTRE W/O DYE: CPT

## 2023-08-15 PROCEDURE — 90837 PSYTX W PT 60 MINUTES: CPT | Performed by: SOCIAL WORKER

## 2023-08-15 PROCEDURE — G1004 CDSM NDSC: HCPCS

## 2023-08-15 PROCEDURE — 85025 COMPLETE CBC W/AUTO DIFF WBC: CPT

## 2023-08-15 PROCEDURE — 82627 DEHYDROEPIANDROSTERONE: CPT

## 2023-08-15 PROCEDURE — 84146 ASSAY OF PROLACTIN: CPT

## 2023-08-15 PROCEDURE — 84443 ASSAY THYROID STIM HORMONE: CPT

## 2023-08-15 NOTE — PSYCH
Behavioral Health Psychotherapy Progress Note    Psychotherapy Provided: Individual Psychotherapy     1. Bipolar I disorder, most recent episode depressed, severe with psychotic features ([address])        2. SHELLY (generalized anxiety disorder)        3. Other insomnia        4. Post traumatic stress disorder            Goals addressed in session: Goal 1     DATA: Met with Percy Rosenbaum for scheduled individual session. Percy Rosenbaum states that she has been feeling somewhat anxious/depressed recently. She discussed her relationship with her SO. She states that they recently had a discussion about being "more like roommates." She states that she loves him and wants to have more intimacy with him; however, their daughter remains in their bedroom-- which creates a physical barrier between them. She states that she is working on getting Nulu into her own bedroom. We discussed the possibility of finding other times and other ways for them to engage in physical intimacy. We also discussed their emotional intimacy and ability to communicate with one another. Percy Rosenbaum states that she continues to experience significant anxiety. She reports missing work, due to her anxiety. She also reports that she does not want to engage in any discussions that might be tense; so, she often avoids talking about some of the hard topics that she feels need to be addressed. Percy Rosenbaum discussed her relationships with all of the children and her tendency to take on a primary parental role-- even with her step children. She discussed her frustration with her SO relying on her to manage the difficult topics with all of the children. During this session, this clinician used the following therapeutic modalities: Client-centered Therapy, Dialectical Behavior Therapy, Mindfulness-based Strategies, Motivational Interviewing, Solution-Focused Therapy and Supportive Psychotherapy    Substance Abuse was not addressed during this session.  If the client is diagnosed with a co-occurring substance use disorder, please indicate any changes in the frequency or amount of use: n/a. Stage of change for addressing substance use diagnoses: No substance use/Not applicable    ASSESSMENT:  Uli Guerra presents with a somewhat anxious and dysthymic mood. her affect is Normal range and intensity, which is congruent, with her mood and the content of the session. The client has not made progress on their goals since her last visit. Uli Guerra presents with a minimal risk of suicide, minimal risk of self-harm, and minimal risk of harm to others. For any risk assessment that surpasses a "low" rating, a safety plan must be developed. A safety plan was indicated: no  If yes, describe in detail n/a    PLAN: Between sessions, Uli Guerra will continue to monitor her moods. She will work on finding ways to increase her emotional and physical intimacy with her SO (e.g., encouraging their daughter to sleep in her own bed). Jose Raul Benton will continue to make efforts to attend work, even when she is having some anxiety. At the next session, the therapist will use Client-centered Therapy, Dialectical Behavior Therapy, Mindfulness-based Strategies, Motivational Interviewing, Solution-Focused Therapy and Supportive Psychotherapy to address her mood regulation and relationship concerns. Behavioral Health Treatment Plan and Discharge Planning: Uli Guerra is aware of and agrees to continue to work on their treatment plan. They have identified and are working toward their discharge goals.  yes    Visit start and stop times:    08/15/23  Start Time: 0903  Stop Time: 0957  Total Visit Time: 54 minutes

## 2023-08-16 LAB
DHEA-S SERPL-MCNC: 135 UG/DL (ref 57.3–279.2)
TESTOST FREE SERPL-MCNC: 2.8 PG/ML (ref 0–4.2)
TESTOST SERPL-MCNC: 29 NG/DL (ref 8–60)

## 2023-08-18 RX ORDER — LAMOTRIGINE 200 MG/1
200 TABLET ORAL DAILY
Qty: 30 TABLET | Refills: 2 | Status: SHIPPED | OUTPATIENT
Start: 2023-08-18

## 2023-08-18 NOTE — TELEPHONE ENCOUNTER
Patient called her meds have still not been refilled she took her last pills yesterday and does not have anymore, script needs to be sent to [facility] on [facility] in [place]

## 2023-08-21 NOTE — RESULT ENCOUNTER NOTE
Please inform patient of normal results of her labs . I was checking Dr Maribel Covington labs while she was on vacation.  Recommend patient has scheduled follow up with Inessa to review further

## 2023-08-22 ENCOUNTER — TELEMEDICINE (OUTPATIENT)
Dept: BEHAVIORAL/MENTAL HEALTH CLINIC | Facility: CLINIC | Age: 38
End: 2023-08-22
Payer: COMMERCIAL

## 2023-08-22 DIAGNOSIS — G47.09 OTHER INSOMNIA: Chronic | ICD-10-CM

## 2023-08-22 DIAGNOSIS — F41.1 GAD (GENERALIZED ANXIETY DISORDER): Chronic | ICD-10-CM

## 2023-08-22 DIAGNOSIS — F31.5 BIPOLAR I DISORDER, MOST RECENT EPISODE DEPRESSED, SEVERE WITH PSYCHOTIC FEATURES (HCC): Primary | Chronic | ICD-10-CM

## 2023-08-22 PROCEDURE — 90834 PSYTX W PT 45 MINUTES: CPT | Performed by: SOCIAL WORKER

## 2023-08-25 ENCOUNTER — OFFICE VISIT (OUTPATIENT)
Dept: OBGYN CLINIC | Facility: MEDICAL CENTER | Age: 38
End: 2023-08-25
Payer: COMMERCIAL

## 2023-08-25 VITALS
HEIGHT: 62 IN | HEART RATE: 97 BPM | WEIGHT: 212 LBS | SYSTOLIC BLOOD PRESSURE: 108 MMHG | BODY MASS INDEX: 39.01 KG/M2 | DIASTOLIC BLOOD PRESSURE: 73 MMHG

## 2023-08-25 DIAGNOSIS — Z01.818 PREOP TESTING: ICD-10-CM

## 2023-08-25 DIAGNOSIS — M22.02 RECURRENT DISLOCATION OF LEFT PATELLA: Primary | ICD-10-CM

## 2023-08-25 DIAGNOSIS — M25.362 PATELLAR INSTABILITY OF LEFT KNEE: ICD-10-CM

## 2023-08-25 PROCEDURE — 99214 OFFICE O/P EST MOD 30 MIN: CPT | Performed by: ORTHOPAEDIC SURGERY

## 2023-08-25 RX ORDER — ACETAMINOPHEN 325 MG/1
650 TABLET ORAL EVERY 6 HOURS PRN
OUTPATIENT
Start: 2023-08-25

## 2023-08-25 RX ORDER — TRAMADOL HYDROCHLORIDE 50 MG/1
50 TABLET ORAL EVERY 6 HOURS PRN
OUTPATIENT
Start: 2023-08-25

## 2023-08-25 RX ORDER — CEFAZOLIN SODIUM 2 G/50ML
2000 SOLUTION INTRAVENOUS ONCE
OUTPATIENT
Start: 2023-08-25 | End: 2023-08-25

## 2023-08-25 NOTE — PROGRESS NOTES
Ortho Sports Medicine Knee Follow Up Visit     Assesment:       40 y.o. female left knee continued patellar instability and anterior knee pain with grade 3 chondral fissuring lateral facet TT-TG1.6 CM  History recurrent patella subluxations/dislocations      Plan:    Conservative treatment:    Ice to knee for 20 minutes at least 1-2 times daily. OTC NSAIDS prn for pain. Patient has tried and failed conservative treatment of J-bracing >6 months of continuous use. She also has attended PT in the past regarding treatment for patellar dislocation, August of 2022 and has continued to preform these exercises 3-5 times a week for 1hr in duration. We will upload her exercise program into her chart. Imaging: All imaging from today was reviewed by myself and explained to the patient. Injection:    No Injection planned at this time. Surgery: All of the risks and benefits of operative treatment were explained to the patient, as well as the risks and benefits of any alternative treatment options, including nonoperative care. The risks of surgical treatement include, but are not limited to, infection, bleeding, blood clot, neurovascular damage, need for further surgery, continued pain, cardiovascular risk, and anesthesia risk. The patient understood this and elects to proceed forward with surgical intervention. We will proceed forward with surgical arthroscopy of the knee with lateral release and open MPFL reconstruction with allograft     Patient Denies history of blood clot. Patient denies personal history of bleeding or clotting disorder. Patient's Mother and Brother have Factor V Leiden but the patient has been tested and is negative. Patient does not take any blood thinners. Patient has prior cardiac history of [facility], in which she didundergo ablation and has been cleared from cardiology and no longer follows up with them. Patient denies  high blood pressure.  Patient does not  see a cardiologist. Patient Denies  current history of diabetes. Patient has allergies to antibiotics, Sulfa Abx and Erythromycin. Patient denies history of MRSA infection. Patient Denies current tobacco use. Discussed with patient use of narcotics for post-op pain. Patient denies history of opioid abuse. Medical clearance will be obtained. Bloodwork will not be obtained. EKG will not be obtained. Follow up:    Return for Post op. Chief Complaint   Patient presents with   • Left Knee - Follow-up       History of Present Illness: The patient is returns for follow up of bilateral knee pain and patellar instability, left worse than right. Since the prior visit, She reports minimal improvement. She is here to review MRI of left knee. History patella dislocation when younger with recurrent subluxation,dislocation episodes. Pain is located anterior, posterior, lateral aspect of knee. She also has attended PT in the past regarding treatment for patellar dislocation, 2022 and has continued to preform these exercises 3-5 times a week for 1hr in duration. She has tried J bracing>6 months as well. Pain is improved by rest.  Pain is aggravated by weight bearing. Symptoms include clicking. The patient has tried rest.    She notes continued instability.       Knee Surgical History:  None      Past Medical, Social and Family History:  Past Medical History:   Diagnosis Date   • Abnormal Pap smear of cervix    • Depression    • Eczema    • Exposure to chlamydia     Resolved 18   • Migraine without aura and without status migrainosus, not intractable 10/11/2019   • Obesity    • Post traumatic stress disorder 2021   • Postpartum depression 2018   • Manoj-Parkinson-White (WPW) syndrome      Past Surgical History:   Procedure Laterality Date   • ABLATION OF DYSRHYTHMIC FOCUS      WPW ablation age 14   • COLONOSCOPY     • COLPOSCOPY     • INDUCED      • OTHER SURGICAL HISTORY      catheter ablation- WPW age 13     Allergies   Allergen Reactions   • Codeine Other (See Comments)     dry heaves   • Sulfa Antibiotics Hives and Rash     Per pt as achild   • Erythromycin Hives     Per as a child     Current Outpatient Medications on File Prior to Visit   Medication Sig Dispense Refill   • ibuprofen (MOTRIN) 600 mg tablet Take 1 tablet (600 mg total) by mouth every 6 (six) hours as needed for mild pain, moderate pain or fever 30 tablet 0   • lamoTRIgine (LaMICtal) 200 MG tablet TAKE 1 TABLET BY MOUTH EVERY DAY 30 tablet 2   • LORazepam (ATIVAN) 1 mg tablet Take 1 tablet (1 mg total) by mouth every 6 (six) hours as needed for anxiety 30 tablet 0   • naproxen (NAPROSYN) 500 mg tablet Take 1 tablet (500 mg total) by mouth 2 (two) times a day with meals (Patient taking differently: Take 500 mg by mouth 2 (two) times a day with meals) 30 tablet 0   • norethindrone (Zoya) 0.35 MG tablet Take 1 tablet (0.35 mg total) by mouth daily 90 tablet 2   • sertraline (ZOLOFT) 50 mg tablet TAKE 1 TABLET BY MOUTH EVERY DAY 30 tablet 2   • rizatriptan (MAXALT-MLT) 10 mg disintegrating tablet TAKE AT THE ONSET OF MIGRAINE IF SYMPTOMS CONTINUE OR RETURN, MAY TAKE ANOTHER DOSE AT LEAST 2 HOURS AFTER FIRST DOSE. TAKE NO MORE THAN 2 DOSES IN A DAY. (Patient not taking: Reported on 7/28/2023) 9 tablet 0     No current facility-administered medications on file prior to visit.      Social History     Socioeconomic History   • Marital status: Single     Spouse name: Not on file   • Number of children: 2   • Years of education: Not on file   • Highest education level: Some college, no degree   Occupational History   • Occupation: works in retail   Tobacco Use   • Smoking status: Never   • Smokeless tobacco: Never   Vaping Use   • Vaping Use: Never used   Substance and Sexual Activity   • Alcohol use: Yes     Comment: Occasional   • Drug use: No   • Sexual activity: Yes     Partners: Male     Birth control/protection: Condom Male, OCP   Other Topics Concern   • Not on file   Social History Narrative    Education: high school graduate and some college    Learning Disabilities: none    Marital History: single    Children: 1 daughter, 1 son    Living Arrangement: lives in home with boyfriend, 2 children and boyfriend's 2 children    Occupational History: works part time at [employer] and [employer] works    Functioning Relationships: boyfriend is supportive    Legal History: none     History: None         Family planning    IUD contraception     Social Determinants of Health     Financial Resource Strain: Medium Risk (2/4/2021)    Overall Financial Resource Strain (CARDIA)    • Difficulty of Paying Living Expenses: Somewhat hard   Food Insecurity: Food Insecurity Present (2/4/2021)    Hunger Vital Sign    • Worried About [place] in the Last Year: Sometimes true    • Ran Out of Food in the Last Year: Sometimes true   Transportation Needs: Unmet Transportation Needs (2/4/2021)    PRAPARE - Transportation    • Lack of Transportation (Medical):  Yes    • Lack of Transportation (Non-Medical): Yes   Physical Activity: Inactive (11/11/2019)    Exercise Vital Sign    • Days of Exercise per Week: 0 days    • Minutes of Exercise per Session: 0 min   Stress: Stress Concern Present (11/11/2019)    [facility]    • Feeling of Stress : Rather much   Social Connections: Socially Isolated (11/11/2019)    Social Connection and Isolation Panel [NHANES]    • Frequency of Communication with Friends and Family: Once a week    • Frequency of Social Gatherings with Friends and Family: Once a week    • Attends Restoration Services: Never    • Active Member of Clubs or Organizations: No    • Attends Club or Organization Meetings: Never    • Marital Status: Never    Intimate Partner Violence: Not At Risk (11/11/2019)    Humiliation, Afraid, Rape, and Kick questionnaire    • Fear of Current or Ex-Partner: No    • Emotionally Abused: No    • Physically Abused: No    • Sexually Abused: No   Housing Stability: Not on file         I have reviewed the past medical, surgical, social and family history, medications and allergies as documented in the EMR. Review of systems: ROS is negative other than that noted in the HPI. Constitutional: Negative for fatigue and fever. Physical Exam:    Blood pressure 108/73, pulse 97, height 5' 2" (1.575 m), weight 96.2 kg (212 lb), last menstrual period 07/24/2023, not currently breastfeeding. General/Constitutional: NAD, well developed, well nourished  HENT: Normocephalic, atraumatic  CV: Intact distal pulses, regular rate  Resp: No respiratory distress or labored breathing  GI: Soft and non-tender   Lymphatic: No lymphadenopathy palpated  Neuro: Alert and Oriented x 3, no focal deficits  Psych: Normal mood, normal affect, normal judgement, normal behavior  Skin: Warm, dry, no rashes, no erythema      Knee Exam (focused): RIGHT LEFT   ROM:   0-130 0-130   Palpation: Effusion negative negative     MJL tenderness Negative Negative     LJL tenderness Negative Negative   Meniscus:  Johanne Negative Negative    Apley's Compression Negative Negative   Instability: Varus stable stable     Valgus stable stable   Special Tests: Lachman Negative Negative     Posterior drawer Negative Negative     Anterior drawer Negative Negative     Pivot shift not tested not tested     Dial not tested not tested   Patella: Palpation medial facet ttp medial facet ttp     Mobility 3/4 3/4     Apprehension Positive Positive   Other: Single leg 1/4 squat not tested not tested           LE NV Exam: +2 DP/PT pulses bilaterally  Sensation intact to light touch L2-S1 bilaterally    No calf tenderness to palpation bilaterally      Knee Imaging    MRI imaging of left knee reviewed today demonstrates grade 3 chondral fissuring lateral patellar facet, no meniscal or ligamentous tears. TT-TG 1.6 CM.   I have reviewed the report and agree with the impression      Scribe Attestation    I,:  Amelie Shabazz am acting as a scribe while in the presence of the attending physician.:       I,:  Kiera Barney DO personally performed the services described in this documentation    as scribed in my presence.:

## 2023-08-28 NOTE — PSYCH
Virtual Regular Visit    Verification of patient location:    Patient is located at Home in the following state in which I hold an active license [place]    Assessment/Plan:    Problem List Items Addressed This Visit        Other    Bipolar I disorder, most recent episode depressed, severe with psychotic features ([address]) - Primary (Chronic)    SHELLY (generalized anxiety disorder) (Chronic)    Other insomnia (Chronic)       Goals addressed in session: Goal 1          Reason for visit is   Chief Complaint   Patient presents with   • Virtual Regular Visit        Encounter provider CHARLIE Anne    Provider located at [address] [place] [address]  [address] [facility] [address] [phone]      Recent Visits  Date Type Provider Dept   08/22/23 [facility], [address] [place] [address]   Showing recent visits within past 7 days and meeting all other requirements  Future Appointments  No visits were found meeting these conditions. Showing future appointments within next 150 days and meeting all other requirements       The patient was identified by name and date of birth. Royal Dumont was informed that this is a telemedicine visit and that the visit is being conducted through[facility]. She agrees to proceed. .  My office door was closed. No one else was in the room. She acknowledged consent and understanding of privy and security of the video platform. The patient has agreed to participate and understands they can discontinue the visit at any time. Patient is aware this is a billable service. Subjective  Royal Dumont is a 40 y.o. female.     HPI     Past Medical History:   Diagnosis Date   • Abnormal Pap smear of cervix    • Depression    • Eczema    • Exposure to chlamydia     Resolved 06/09/17   • Migraine without aura and without status migrainosus, not intractable 10/11/2019 • Obesity    • Post traumatic stress disorder 2021   • Postpartum depression 2018   • Manoj-Parkinson-White (WPW) syndrome        Past Surgical History:   Procedure Laterality Date   • ABLATION OF DYSRHYTHMIC FOCUS      WPW ablation age 14   • COLONOSCOPY     • COLPOSCOPY     • INDUCED      • OTHER SURGICAL HISTORY      catheter ablation- WPW age 13       Current Outpatient Medications   Medication Sig Dispense Refill   • ibuprofen (MOTRIN) 600 mg tablet Take 1 tablet (600 mg total) by mouth every 6 (six) hours as needed for mild pain, moderate pain or fever 30 tablet 0   • lamoTRIgine (LaMICtal) 200 MG tablet TAKE 1 TABLET BY MOUTH EVERY DAY 30 tablet 2   • LORazepam (ATIVAN) 1 mg tablet Take 1 tablet (1 mg total) by mouth every 6 (six) hours as needed for anxiety 30 tablet 0   • naproxen (NAPROSYN) 500 mg tablet Take 1 tablet (500 mg total) by mouth 2 (two) times a day with meals (Patient taking differently: Take 500 mg by mouth 2 (two) times a day with meals) 30 tablet 0   • norethindrone (Zoya) 0.35 MG tablet Take 1 tablet (0.35 mg total) by mouth daily 90 tablet 2   • rizatriptan (MAXALT-MLT) 10 mg disintegrating tablet TAKE AT THE ONSET OF MIGRAINE IF SYMPTOMS CONTINUE OR RETURN, MAY TAKE ANOTHER DOSE AT LEAST 2 HOURS AFTER FIRST DOSE. TAKE NO MORE THAN 2 DOSES IN A DAY. (Patient not taking: Reported on 2023) 9 tablet 0   • sertraline (ZOLOFT) 50 mg tablet TAKE 1 TABLET BY MOUTH EVERY DAY 30 tablet 2     No current facility-administered medications for this visit. Allergies   Allergen Reactions   • Codeine Other (See Comments)     dry heaves   • Sulfa Antibiotics Hives and Rash     Per pt as achild   • Erythromycin Hives     Per as a child       Review of Systems    Video Exam    There were no vitals filed for this visit. Physical Exam     Behavioral Health Psychotherapy Progress Note    Psychotherapy Provided: Individual Psychotherapy     1.  Bipolar I disorder, most recent episode depressed, severe with psychotic features ([address])        2. SHELLY (generalized anxiety disorder)        3. Other insomnia            Goals addressed in session: Goal 1     DATA: Met with Amari Solis for an "emergency" appointment. She reached out earlier in the day to see if I had any availability, as she states that she is "not doing well." She was in her car (in her driveway) as we spoke, because she was unable to have any privacy in her home. She was tearful as she talked about her relationship with her partner and her relationships with her children. She states that she has been working on getting Janeen Face to sleep in her own bed; however, she and her partner have not had any increase in physical intimacy. Amari Solis states that she has been feeling increasingly sad and depressed. She adamantly denies any suicidal thoughts, plans, or intent. She states that she sometimes wishes that she "could disappear," but she states that she would not end her life. She reports feeling overwhelmed by the responsibility of caring for the children, and she does not feel that her SO is supportive of her efforts. She states that this has been a long-term issue-- and she enjoys caring for the kids, "but I need a break." We discussed ways that she can plan some time for herself-- with or without some of her friends. She states that she doesn't want to get together and complain about her SO-- she wants to have some fun times and truly have some time to relax. She agreed to talk with her SO about this, and she agreed to plan something with one (or more) of her friends. Amari Solis and I discussed her work schedule. She states that she needs to get back to her previous goal of attending work on a daily basis. We updated her [facility] paperwork. This clinician informed her that we can not make any additional adjustments to this documentation. She acknowledged understanding.     During this session, this clinician used the following therapeutic modalities: Client-centered Therapy, Dialectical Behavior Therapy, Mindfulness-based Strategies, Motivational Interviewing, Solution-Focused Therapy and Supportive Psychotherapy    Substance Abuse was not addressed during this session. If the client is diagnosed with a co-occurring substance use disorder, please indicate any changes in the frequency or amount of use: n/a. Stage of change for addressing substance use diagnoses: No substance use/Not applicable    ASSESSMENT:  Claudio Ramachandran presents with a Dysthymic mood. her affect is Tearful, which is congruent, with her mood and the content of the session. The client has not made progress on their goals since our last session    Claudio Ramachandran presents with a minimal risk of suicide, minimal risk of self-harm, and minimal risk of harm to others. For any risk assessment that surpasses a "low" rating, a safety plan must be developed. A safety plan was indicated: no  If yes, describe in detail n/a    PLAN: Between sessions, Claudio Ramachandran will continue to monitor her moods. She will talk with her partner about her need for a break. She will schedule an outing with some of her friends. . At the next session, the therapist will use Client-centered Therapy, Dialectical Behavior Therapy, Mindfulness-based Strategies, Motivational Interviewing, Solution-Focused Therapy and Supportive Psychotherapy to address her mood regulation and relationship concerns. .    Behavioral Health Treatment Plan and Discharge Planning: Claudio Ramachandran is aware of and agrees to continue to work on their treatment plan. They have identified and are working toward their discharge goals.  yes    Visit start and stop times:    08/22/23  Start Time: 1504  Stop Time: 8851  Total Visit Time: 51 minutes

## 2023-08-28 NOTE — PSYCH
Virtual Regular Visit    Verification of patient location:    Patient is located in the following state in which I hold an active license [place]    Assessment/Plan:    Problem List Items Addressed This Visit        Other    Bipolar I disorder, most recent episode depressed, severe with psychotic features ([address] ) - Primary (Chronic)    SHELLY (generalized anxiety disorder) (Chronic)        Goals addressed in session: Goal 1      Reason for visit is No chief complaint on file  Encounter provider CHARLIE Navarro    Provider located at [address] [place] [id number] [phone]    Recent Visits  No visits were found meeting these conditions  Showing recent visits within past 7 days and meeting all other requirements  Future Appointments  No visits were found meeting these conditions  Showing future appointments within next 150 days and meeting all other requirements     The patient was identified by name and date of birth  Shyann Alvarez was informed that this is a telemedicine visit and that the visit is being conducted through[employer]pic Emb[employer] and patient was informed this is a secure, HIPAA-complaint platform  She agrees to proceed     My office door was closed  No one else was in the room  She acknowledged consent and understanding of privacy and security of the video platform  The patient has agreed to participate and understands they can discontinue the visit at any time  Patient is aware this is a billable service  Wilfred Alvarez is a 29 y o  female  DATA: Met with Damaris Vergara for scheduled individual session  Topics of discussion included family stressors, marital stress and parenting concerns  "I'm not doing so good  I don't want to go out and do anything  I just want to stay at home " Damaris Vergara states that their family had covid last week   She states, "Agatha Betancourt was really horrible to me " [facility] states that the conflict was surrounding their parenting of the children  She states, "He went off on me, for questioning him; and he did it in front of the kids " She states that she set a limit with him regarding the way that he was behaving  We discussed the use of effective communication skills and ways that she can open up dialogue with her SO  We also discussed the possibility of having a family session, to address the communication concerns between [facility] and Eula Aquino  Client shows evidence of utilizing distress tolerance skills skills to manage mental health symptoms  During this session, this clinician used the following therapeutic modalities: supportive psychotherapy, client-centered therapy, mindfulness-based strategies, DBT-informed skills, Motivational Interviewing and solution-focused therapy  ASSESSMENT: [facility] presents with a depressed mood  Her affect is normal range and intensity, appropriate  [facility] exhibits good therapeutic rapport with this clinician  [facility] continues to exhibit willingness to work on treatment goals and objectives  [facility] presents with a minimal risk of suicide, minimal risk of self-harm, and minimal risk of harm to others  PLAN: [facility] will speak with her SO and will call to schedule-- depending on whether or not her SO will participate in this session  Between sessions, [facility] will speak with her SO and ask him if he is able/willing to participate in a family session  She will also continue to use mindfulness-based strategies and emotion regulation skills to manage her moods  She will report back during the next session re: successes and barriers   At the next session, this clinician will use supportive psychotherapy, client-centered therapy, mindfulness-based strategies, DBT-informed skills, Motivational Interviewing and solution-focused therapy to address her mood regulation and relationship concerns, in an effort to assist [facility] with meeting treatment goals          HPI     Past Medical History:   Diagnosis Date    Abnormal Pap smear of cervix     Eczema     Exposure to chlamydia     Resolved 18    Migraine without aura and without status migrainosus, not intractable 10/11/2019    Obesity     Post traumatic stress disorder 2021    Postpartum depression 2018    Manoj-Parkinson-White (WPW) syndrome        Past Surgical History:   Procedure Laterality Date    ABLATION OF DYSRHYTHMIC FOCUS      WPW ablation age 15    COLONOSCOPY      COLPOSCOPY      INDUCED       OTHER SURGICAL HISTORY      catheter ablation- WPW age 13       Current Outpatient Medications   Medication Sig Dispense Refill    etonogestrel-ethinyl estradiol (NUVARING) 0 12-0 015 MG/24HR vaginal ring Insert vaginally and leave in place for 3 consecutive weeks, then remove for 1 week  3 each 4    gabapentin (Neurontin) 100 mg capsule Take up to 3 tablets as needed at bedtime for muscle aches 90 capsule 1    ibuprofen (MOTRIN) 600 mg tablet Take 1 tablet (600 mg total) by mouth every 6 (six) hours as needed for mild pain, moderate pain or fever 30 tablet 0    lamoTRIgine (LaMICtal) 25 mg tablet TAKE 1 TABLET DAILY FOR 2 WEEKS, THEN 2 TABLETS EVERY DAY 70 tablet 1    naproxen (NAPROSYN) 500 mg tablet Take 1 tablet (500 mg total) by mouth 2 (two) times a day with meals 30 tablet 0     No current facility-administered medications for this visit  Allergies   Allergen Reactions    Codeine Other (See Comments)     dry heaves    Sulfa Antibiotics Hives and Rash     Per pt as achild    Erythromycin Hives     Per as a child       Review of Systems    Video Exam    There were no vitals filed for this visit  Physical Exam     I spent 45 minutes directly with the patient during this visit    VIRTUAL VISIT DISCLAIMER    Estefany Fernandez verbally agrees to participate in [employer]   Pt is aware that Virtual Care Services could be limited without vital signs or the ability to perform a full hands-on physical Rubi Darrian understands she or the provider may request at any time to terminate the video visit and request the patient to seek care or treatment in person  verbal cues/nonverbal cues (demo/gestures)/1 person assist

## 2023-08-29 ENCOUNTER — TELEPHONE (OUTPATIENT)
Dept: PSYCHIATRY | Facility: CLINIC | Age: 38
End: 2023-08-29

## 2023-08-29 NOTE — TELEPHONE ENCOUNTER
Writer spoke with patient to reschedule 8/30 appointment due to provider being sick. Patient is now scheduled on 10/4 @12:30 virtually via [address] [place] [address]. Writer stated if any questions, concerns, refills, or issues with the appointment came up they can call the office.

## 2023-09-05 ENCOUNTER — TELEPHONE (OUTPATIENT)
Dept: PSYCHIATRY | Facility: CLINIC | Age: 38
End: 2023-09-05

## 2023-09-05 ENCOUNTER — TELEMEDICINE (OUTPATIENT)
Dept: BEHAVIORAL/MENTAL HEALTH CLINIC | Facility: CLINIC | Age: 38
End: 2023-09-05
Payer: COMMERCIAL

## 2023-09-05 DIAGNOSIS — F41.1 GAD (GENERALIZED ANXIETY DISORDER): Chronic | ICD-10-CM

## 2023-09-05 DIAGNOSIS — G47.09 OTHER INSOMNIA: Chronic | ICD-10-CM

## 2023-09-05 DIAGNOSIS — F31.5 BIPOLAR I DISORDER, MOST RECENT EPISODE DEPRESSED, SEVERE WITH PSYCHOTIC FEATURES (HCC): Primary | Chronic | ICD-10-CM

## 2023-09-05 PROCEDURE — 90834 PSYTX W PT 45 MINUTES: CPT | Performed by: SOCIAL WORKER

## 2023-09-05 NOTE — PSYCH
Virtual Regular Visit    Verification of patient location:    Patient is located at Home in the following state in which I hold an active license [place]    Assessment/Plan:    Problem List Items Addressed This Visit        Other    Bipolar I disorder, most recent episode depressed, severe with psychotic features ([address]) - Primary (Chronic)    SHELLY (generalized anxiety disorder) (Chronic)    Other insomnia (Chronic)     Goals addressed in session: Goal 1      Reason for visit is   Chief Complaint   Patient presents with   • Virtual Regular Visit      Encounter provider CHARLIE Sheriff    Provider located at [address] [place] [id number] [phone]    Recent Visits  No visits were found meeting these conditions. Showing recent visits within past 7 days and meeting all other requirements  Today's Visits  Date Type Provider Dept   09/05/23 Telephone Precious Gerardo, [address]   09/05/23 [address] [place] [address],[address] today's visits and meeting all other requirements  Future Appointments  No visits were found meeting these conditions. Showing future appointments within next 150 days and meeting all other requirements     The patient was identified by name and date of birth. Queenie Maya was informed that this is a telemedicine visit and [facility] She agrees to proceed. .  My office door was closed. No one else was in the room. She acknowledged consent and understanding of privacy and security of the video platform. The patient has agreed to participate and understands they can discontinue the visit at any time. Patient is aware this is a billable service. Subjective  Queenie Maya is a 40 y.o. female.     HPI     Past Medical History:   Diagnosis Date   • Abnormal Pap smear of cervix    • Depression    • Eczema    • Exposure to chlamydia     Resolved 18   • Migraine without aura and without status migrainosus, not intractable 10/11/2019   • Obesity    • Post traumatic stress disorder 2021   • Postpartum depression 2018   • Manoj-Parkinson-White (WPW) syndrome        Past Surgical History:   Procedure Laterality Date   • ABLATION OF DYSRHYTHMIC FOCUS      WPW ablation age 14   • COLONOSCOPY     • COLPOSCOPY     • INDUCED      • OTHER SURGICAL HISTORY      catheter ablation- WPW age 13       Current Outpatient Medications   Medication Sig Dispense Refill   • ibuprofen (MOTRIN) 600 mg tablet Take 1 tablet (600 mg total) by mouth every 6 (six) hours as needed for mild pain, moderate pain or fever 30 tablet 0   • lamoTRIgine (LaMICtal) 200 MG tablet TAKE 1 TABLET BY MOUTH EVERY DAY 30 tablet 2   • LORazepam (ATIVAN) 1 mg tablet Take 1 tablet (1 mg total) by mouth every 6 (six) hours as needed for anxiety 30 tablet 0   • naproxen (NAPROSYN) 500 mg tablet Take 1 tablet (500 mg total) by mouth 2 (two) times a day with meals (Patient taking differently: Take 500 mg by mouth 2 (two) times a day with meals) 30 tablet 0   • norethindrone (Zoya) 0.35 MG tablet Take 1 tablet (0.35 mg total) by mouth daily 90 tablet 2   • rizatriptan (MAXALT-MLT) 10 mg disintegrating tablet TAKE AT THE ONSET OF MIGRAINE IF SYMPTOMS CONTINUE OR RETURN, MAY TAKE ANOTHER DOSE AT LEAST 2 HOURS AFTER FIRST DOSE. TAKE NO MORE THAN 2 DOSES IN A DAY. (Patient not taking: Reported on 2023) 9 tablet 0   • sertraline (ZOLOFT) 50 mg tablet TAKE 1 TABLET BY MOUTH EVERY DAY 30 tablet 2     No current facility-administered medications for this visit.         Allergies   Allergen Reactions   • Codeine Other (See Comments)     dry heaves   • Sulfa Antibiotics Hives and Rash     Per pt as achild   • Erythromycin Hives     Per as a child       Review of Systems    Video Exam    There were no vitals filed for this visit. Physical Exam     Behavioral Health Psychotherapy Progress Note    Psychotherapy Provided: Individual Psychotherapy     1. Bipolar I disorder, most recent episode depressed, severe with psychotic features ([address])        2. SHELLY (generalized anxiety disorder)        3. Other insomnia            Goals addressed in session: Goal 1     DATA: Met with Joana Klein for scheduled individual session. She apologized for having to switch today's session to a virtual session. She states, "This morning has been a mess already." Joana Klein discussed the stress of her morning and her initial plans to come into the office for her session. She states that Ke Cantrell is at home with a sore throat and a headache. Joana Klein states that she feels that she has been irritable with her SO. Joana Klein discussed her levels of anxiety. She states that she has been feeling "internal anxiety" (e.g., shakiness, etc), rather than it being an outward anxious presentation. Joana Klein discussed her relationships with her parents. She states that her parents were having dinner with family friends, and their daughter was communicating with Libertad's brother-- informing him of how his parents were talking about them throughout their visit. Joana Klein states that this has created a lot of stress between the various family members. She states that she is trying to make up with her brother-- to explain that she was trying to be helpful to him. She discussed her parents' lack of common sense, when they discuss personal issues with other people. Joana Klein discussed her feelings of anxiety. She states that she feels that she has a lot of PVCs, and it is difficult for her to determine the difference between anxiety and actual PVCs. We discussed seeking physical health follow up. She is following up with her OB/GYN to get on a new BCP. If needed, she will also follow up with a cardiologist re: the PVCs.  Joana Klein states that she continues to feel increased anxiety and depression. She adamantly denies any suicidal ideation; however, she does continue to feel that she wants to "run away." She has no plans to follow through with the situation. Radhika Zazueta states that she and her SO have not engaged in any physical intimacy. She states that they have been irritable with one another recently. She states that she would like to work on increasing their level of intimacy. Radhika Zazueta states that this coming weekend, the entire family (including Saturnino's ex-) are going to [facility]. Radhika Zazueta states that she does not want to spend the weekend fighting with him, but she has not been able to have a conversation with him about this. She states that she feels that she is not as emotional as she was previously; however, she feels that she is trying to stay "in a closed box." She states that Ruslan Bruce starts his new job next week. She states that she was able to attend work every day last week. She states that today is an FMLA day for her, and she will return to her work schedule tomorrow. She states that her employer did accept the documentation for her intermittent FMLA. She is going to have surgery on her kneecap in October, and she will have another FMLA leave for her surgery and recovery. During this session, this clinician used the following therapeutic modalities: Client-centered Therapy, Dialectical Behavior Therapy, Mindfulness-based Strategies, Motivational Interviewing, Solution-Focused Therapy and Supportive Psychotherapy    Substance Abuse was not addressed during this session. If the client is diagnosed with a co-occurring substance use disorder, please indicate any changes in the frequency or amount of use: n/a. Stage of change for addressing substance use diagnoses: No substance use/Not applicable    ASSESSMENT:  Carlitos Jiang presents with a primarily dysthymic mood.  Her affect is Normal range and intensity, which is congruent, with her mood and the content of the session. The client has not made progress on their goals since our last session. Sharan De Leon presents with a minimal risk of suicide, minimal risk of self-harm, and minimal risk of harm to others. For any risk assessment that surpasses a "low" rating, a safety plan must be developed. A safety plan was indicated: no  If yes, describe in detail n/a    PLAN: Between sessions, Sharan De Leon will follow up with medical care to address her heart palpitations. At the next session, the therapist will use Client-centered Therapy, Dialectical Behavior Therapy, Mindfulness-based Strategies, Motivational Interviewing, Solution-Focused Therapy and Supportive Psychotherapy to address her mood regulation and relationship concerns. Behavioral Health Treatment Plan and Discharge Planning: Sharan De Leon is aware of and agrees to continue to work on their treatment plan. They have identified and are working toward their discharge goals.  yes    Visit start and stop times:    09/05/23  Start Time: 1009  Stop Time: 1101  Total Visit Time: 52 minutes

## 2023-09-05 NOTE — TELEPHONE ENCOUNTER
Pt called to get her appt for 9/5 at 10am switched to a virtual visit due to forgot about it. Writer switched appt. as per provider.

## 2023-09-07 DIAGNOSIS — Z30.41 ENCOUNTER FOR SURVEILLANCE OF CONTRACEPTIVE PILLS: Primary | ICD-10-CM

## 2023-09-13 DIAGNOSIS — F43.10 PTSD (POST-TRAUMATIC STRESS DISORDER): ICD-10-CM

## 2023-09-13 DIAGNOSIS — F41.1 GAD (GENERALIZED ANXIETY DISORDER): ICD-10-CM

## 2023-09-13 DIAGNOSIS — F31.81 BIPOLAR II DISORDER (HCC): ICD-10-CM

## 2023-09-13 RX ORDER — LAMOTRIGINE 200 MG/1
200 TABLET ORAL DAILY
Qty: 90 TABLET | Refills: 1 | Status: SHIPPED | OUTPATIENT
Start: 2023-09-13

## 2023-09-14 ENCOUNTER — TELEPHONE (OUTPATIENT)
Age: 38
End: 2023-09-14

## 2023-09-14 NOTE — TELEPHONE ENCOUNTER
Delia Aguayo, patient is scheduled for surgery with Dr Anjel Nuñez to ultimately address her issue. For now she should continue OTC NSAID, tylenol up to 3000 mg per day, ice, and elevation. She can wear her J brace as needed for comfort. Thanks!

## 2023-09-14 NOTE — TELEPHONE ENCOUNTER
Caller: Patient     Doctor: Kyara Acevedo     Reason for call: Patient is having a lot pain and swelling and difficult to walk, pain in the back of the knee     Call back#: [phone]

## 2023-09-15 NOTE — TELEPHONE ENCOUNTER
Caller: Nguyen Diaz    Doctor: Kyara Acevedo    Reason for call:    Patient returning nurses call.  Soft tranfer to nurse    Call back#: n/a

## 2023-09-15 NOTE — TELEPHONE ENCOUNTER
Received callback from pt. Relayed ROD Kearns's msg to pt in detail. Reminded pt has surgery 10/31/23. Will CB w/any further questions/concerns. States she understands info given.

## 2023-09-19 ENCOUNTER — TELEPHONE (OUTPATIENT)
Dept: OBGYN CLINIC | Facility: MEDICAL CENTER | Age: 38
End: 2023-09-19

## 2023-09-19 NOTE — TELEPHONE ENCOUNTER
Patient left anita. Stated that pharmacy let her know that she needs pre authorization for her [facility]. Please review when you have a chance, ty! Tried to call back, left anita to call back.

## 2023-09-20 NOTE — TELEPHONE ENCOUNTER
Pt contacted. The insurance information in the pt's chart is for [employer]. I attempted to initiate a PA using this information,response is PA not required. I called the pharmacy and the insurance info they have is for [employer]. I attempted to update info with pharmacy but pharmacist was not able to find the information I provided. Advised pt to bring correct insurance card to pharmacy. Pt agreed.

## 2023-09-25 ENCOUNTER — TELEPHONE (OUTPATIENT)
Dept: PSYCHIATRY | Facility: CLINIC | Age: 38
End: 2023-09-25

## 2023-09-25 NOTE — TELEPHONE ENCOUNTER
Patient LVM stating she would be late for her appointment tomorrow and emailed the provider but hadn't heard anything back. Writer called and spoke with patient. Writer stated they could inform the provider and request a provider call back. Jorge A stated they would be late to their appointment at 5, arriving tomorrow around 9:15 due to dropping her daughters off at school. Patient understands that her appointment is scheduled till 10 and she would not expect it to go past that.    *Pateint is requesting provider response either to email or call**

## 2023-09-27 ENCOUNTER — TELEMEDICINE (OUTPATIENT)
Dept: BEHAVIORAL/MENTAL HEALTH CLINIC | Facility: CLINIC | Age: 38
End: 2023-09-27
Payer: COMMERCIAL

## 2023-09-27 DIAGNOSIS — G47.09 OTHER INSOMNIA: Chronic | ICD-10-CM

## 2023-09-27 DIAGNOSIS — F41.1 GAD (GENERALIZED ANXIETY DISORDER): Chronic | ICD-10-CM

## 2023-09-27 DIAGNOSIS — F31.5 BIPOLAR I DISORDER, MOST RECENT EPISODE DEPRESSED, SEVERE WITH PSYCHOTIC FEATURES (HCC): Primary | Chronic | ICD-10-CM

## 2023-09-27 PROCEDURE — 90834 PSYTX W PT 45 MINUTES: CPT | Performed by: SOCIAL WORKER

## 2023-09-27 NOTE — PSYCH
Virtual Regular Visit    Verification of patient location:    Patient is located at Home in the following state in which I hold an active license [place]      Assessment/Plan:    Problem List Items Addressed This Visit        Other    Bipolar I disorder, most recent episode depressed, severe with psychotic features ([address]) - Primary (Chronic)    SHELLY (generalized anxiety disorder) (Chronic)    Other insomnia (Chronic)       Goals addressed in session: Goal 1          Reason for visit is   Chief Complaint   Patient presents with   • Virtual Regular Visit        Encounter provider CHARLIE Burks    Provider located at [address] [place] [address]  [facility] [address] [phone]      Recent Visits  Date Type Provider Dept   09/25/23 Telephone Deb Angel, [facility] recent visits within past 7 days and meeting all other requirements  Today's Visits  Date Type Provider Dept   09/27/23 [address] [place] [address]   Showing today's visits and meeting all other requirements  Future Appointments  No visits were found meeting these conditions. Showing future appointments within next 150 days and meeting all other requirements       The patient was identified by name and date of birth. Maximilian Ricardo was informed that this is a telemedicine visit and [facility] She agrees to proceed. .  My office door was closed. No one else was in the room. She acknowledged consent and understanding of privacy and security of the video platform. The patient has agreed to participate and understands they can discontinue the visit at any time. Patient is aware this is a billable service. Subjective  Maximilian Ricardo is a 41 y.o. female.       HPI     Past Medical History:   Diagnosis Date   • Abnormal Pap smear of cervix    • Depression    • Eczema    • Exposure to chlamydia     Resolved 18   • Migraine without aura and without status migrainosus, not intractable 10/11/2019   • Obesity    • Post traumatic stress disorder 2021   • Postpartum depression 2018   • Manoj-Parkinson-White (WPW) syndrome        Past Surgical History:   Procedure Laterality Date   • ABLATION OF DYSRHYTHMIC FOCUS      WPW ablation age 14   • COLONOSCOPY     • COLPOSCOPY     • INDUCED      • OTHER SURGICAL HISTORY      catheter ablation- WPW age 13       Current Outpatient Medications   Medication Sig Dispense Refill   • Drospirenone 4 MG TABS Take 1 tablet by mouth in the morning 84 tablet 1   • ibuprofen (MOTRIN) 600 mg tablet Take 1 tablet (600 mg total) by mouth every 6 (six) hours as needed for mild pain, moderate pain or fever 30 tablet 0   • lamoTRIgine (LaMICtal) 200 MG tablet TAKE 1 TABLET BY MOUTH EVERY DAY 90 tablet 1   • LORazepam (ATIVAN) 1 mg tablet Take 1 tablet (1 mg total) by mouth every 6 (six) hours as needed for anxiety 30 tablet 0   • naproxen (NAPROSYN) 500 mg tablet Take 1 tablet (500 mg total) by mouth 2 (two) times a day with meals (Patient taking differently: Take 500 mg by mouth 2 (two) times a day with meals) 30 tablet 0   • rizatriptan (MAXALT-MLT) 10 mg disintegrating tablet TAKE AT THE ONSET OF MIGRAINE IF SYMPTOMS CONTINUE OR RETURN, MAY TAKE ANOTHER DOSE AT LEAST 2 HOURS AFTER FIRST DOSE. TAKE NO MORE THAN 2 DOSES IN A DAY. (Patient not taking: Reported on 2023) 9 tablet 0   • sertraline (ZOLOFT) 50 mg tablet TAKE 1 TABLET BY MOUTH EVERY DAY 90 tablet 1     No current facility-administered medications for this visit.         Allergies   Allergen Reactions   • Codeine Other (See Comments)     dry heaves   • Sulfa Antibiotics Hives and Rash     Per pt as achild   • Erythromycin Hives     Per as a child       Review of Systems    Video Exam    There were no vitals filed for this visit.    Physical Exam     Behavioral Health Psychotherapy Progress Note    Psychotherapy Provided: Individual Psychotherapy     1. Bipolar I disorder, most recent episode depressed, severe with psychotic features ([address])        2. SHELLY (generalized anxiety disorder)        3. Other insomnia            Goals addressed in session: Goal 1     DATA: Met with Patricia Acosta for scheduled individual session. She states that her daughter Luke Nergete) has been having some difficulties with maintaining emotion regulation. She states that she believes that this is related to going to school and having to abide by the rules and maintain emotion regulation and positive behaviors during the school day. Patricia Acotsa states that, for the most part, Luke Negrete has been sleeping in her own bed. She states that the physical intimacy in the evening has not improved (due to both of them being tired). She states that, at this point, their work breaks do not align. She states that they are getting along a bit better. She states, "I can tell that he is trying." We spent some time talking about what has made the relationship feel better. This clinician encouraged her to consider what behaviors she needs more of from him, and to encourage these behaviors. She states that she fears that her SO will think that she is acting like her mother acts toward her father. This clinician encouraged her to have a discussion with her SO about her thoughts and to get feedback from him re: how they can work together to continue to improve their relationship. Patricia Acosta discussed her recent conversation with her brother. She states that she feels that she and he are going to be able to reconcile their relationship. She states that she has more work to do to regain his trust. They will be getting together in a couple weeks, for a family event, and she hopes that they will be able to talk more at that time. Patricia Acosta discussed her job.  She states that she has only asked for 2 days off for [employer] this month. She states that she has some fears that she will lose her job. We used reality-testing to address her fears. She states that she has no evidence that her supervisors are unhappy with her or her work. In fact, she states that the company is very forward-thinking about mental health care. During this session, this clinician used the following therapeutic modalities: Client-centered Therapy, Dialectical Behavior Therapy, Mindfulness-based Strategies, Motivational Interviewing, Solution-Focused Therapy and Supportive Psychotherapy    Substance Abuse was not addressed during this session. If the client is diagnosed with a co-occurring substance use disorder, please indicate any changes in the frequency or amount of use: n/a. Stage of change for addressing substance use diagnoses: No substance use/Not applicable    ASSESSMENT:  Herminio Maxwell presents with a Euthymic/ normal mood. her affect is Normal range and intensity, which is congruent, with her mood and the content of the session. The client has made progress on their goals. Herminio Maxwell presents with a minimal risk of suicide, minimal risk of self-harm, and minimal risk of harm to others. For any risk assessment that surpasses a "low" rating, a safety plan must be developed. A safety plan was indicated: no  If yes, describe in detail n/a    PLAN: Between sessions, Herminio Maxwell will continue to monitor her moods. At the next session, the therapist will use Client-centered Therapy, Dialectical Behavior Therapy, Mindfulness-based Strategies, Motivational Interviewing, Solution-Focused Therapy and Supportive Psychotherapy to address her mood regulation and relationship concerns. Behavioral Health Treatment Plan and Discharge Planning: Herminio Maxwell is aware of and agrees to continue to work on their treatment plan. They have identified and are working toward their discharge goals.  yes    Visit start and stop times:    09/27/23  Start Time: 0902  Stop Time: 8959  Total Visit Time: 51 minutes

## 2023-10-01 NOTE — PROGRESS NOTES
FAMILY PRACTICE OFFICE VISIT  [facility] - [facility]       NAME: Maximilian Ricardo  AGE: 41 y.o. SEX: female       : 1985        MRN: [id number]    DATE: 10/3/2023  TIME: 11:46 PM    Assessment and Plan     Problem List Items Addressed This Visit        Cardiovascular and Mediastinum    PVC's (premature ventricular contractions)     Patient reports experiencing these all day about twice a week. None noted today on EKG but discussed that this should have further follow-up with Cardiology prior to surgery. Referral entered to be seen ASAP. Relevant Orders    POCT ECG (Completed)    Ambulatory Referral to Cardiology       Musculoskeletal and Integument    Recurrent dislocation of left patella       Other    History of radiofrequency ablation procedure for W-P-W    Relevant Orders    Ambulatory Referral to Cardiology    Patellar instability of left knee   Other Visit Diagnoses     Pre-op examination    -  Primary    Relevant Orders    POCT ECG (Completed)    Ambulatory Referral to Cardiology         Surgical clearance: The patient is found to be at unacceptable risk from a cardiovascular standpoint. Additional cardiac testing/evaluation is necessary. She was referred to Cardiology. Preoperative testing reviewed includes EKG. Re-evaluation is needed if the patient should present with symptoms prior to surgery/ procedure. Surgical clearance will be faxed to Dr. Thomas Santizo. *Addendum (10/6/23): Patient has been cleared from a cardiovascular standpoint by cardiology (Dr. Neisha Clancy). She was felt to otherwise be at acceptable risk for the proposed procedure so she is at this time cleared to proceed with the left knee arthroscopy. Chief Complaint     Chief Complaint   Patient presents with   • Pre-op Exam       History of Present Illness   Maximilian Ricardo is a 41y.o.-year-old female who presents for preop clearance.      Pre-Op Visit:  The patient is being seen today for preoperative visit. The procedure that is scheduled is left knee arthroscopy for patella realignment. It is scheduled on 10/31/23  with Dr. Thomas Santizo. The indication for the surgery is patellar instability and recurrent dislocation. Surgical Risk Assessment:  Prior Anesthesia:  The patient confirms prior anesthesia and denies problems with anesthesia. Pertinent Past Medical History:  The patient confirms history of  arrhythmia and denies  diabetes,  CAD,  DVT,  pulmonary embolism,  chronic liver disease,  chronic kidney disease,  COPD,  asthma,  thyroid disease and  seizure disorder. Exercise Capacity:  The patient confirms ability to walk 4 blocks without symptoms and confirms ability to walk two flights of stairs without symptoms. Lifestyle Factors: The patient denies alcohol use, denies tobacco use, and denies drug use. Symptoms:  The patient confirm  Palpitations (gets PVCs more often than before over the last year - about 2 times weekly - lasts longer than before too - now lasting all day when it occurs), but denies easy bruising,  chest pain,  cough,  dyspnea,  edema and  wheezing. Pertinent Family History:  The patient denies family history of  ischemic heart diease,  adverse reaction to anesthesia,  aneurysm,  bleeding problems,  sudden early death and  stroke. Patient has family history of Factor V Leiden but was tested herself and was negative. Living Situation:  The patient's reports home is secure and denies any post-op concerns with current living situation. Review of Systems   Review of Systems   Constitutional: Negative for chills and fever. HENT: Negative for rhinorrhea and sore throat. Respiratory: Negative for cough, shortness of breath and wheezing. Cardiovascular: Positive for palpitations. Negative for chest pain and leg swelling.    Gastrointestinal: Negative for abdominal pain, constipation, diarrhea, nausea and vomiting. Genitourinary: Negative for dysuria and frequency. Musculoskeletal: Negative for arthralgias and myalgias. Skin: Negative for rash. Neurological: Negative for dizziness, syncope and headaches. Hematological: Does not bruise/bleed easily.        Active Problem List     Patient Active Problem List   Diagnosis   • Bipolar I disorder, most recent episode depressed, severe with psychotic features (HCC)   • Obesity, Class II, BMI 35-39.9   • Abnormal Papanicolaou smear of cervix with positive human papilloma virus (HPV) test   • Migraine with aura and without status migrainosus, not intractable   • SHELLY (generalized anxiety disorder)   • Other insomnia   • Long-term use of high-risk medication   • Family history of breast cancer   • Patellofemoral disorder of left knee   • Congenital fusion of carpal bone   • History of radiofrequency ablation procedure for W-P-W   • PVC's (premature ventricular contractions)   • Post traumatic stress disorder   • Dyslipidemia   • Enlarged lymph node in neck   • Inappropriate sinus tachycardia   • Recurrent dislocation of left patella   • Patellar instability of left knee         Past Medical History:  Past Medical History:   Diagnosis Date   • Abnormal Pap smear of cervix    • COVID-19 2022   • Depression    • Eczema    • Exposure to chlamydia     Resolved 18   • Migraine without aura and without status migrainosus, not intractable 10/11/2019   • Obesity    • Post traumatic stress disorder 2021   • Postpartum depression 2018   • Manoj-Parkinson-White (WPW) syndrome        Past Surgical History:  Past Surgical History:   Procedure Laterality Date   • ABLATION OF DYSRHYTHMIC FOCUS      WPW ablation age 14   • COLONOSCOPY     • COLPOSCOPY     • INDUCED      • OTHER SURGICAL HISTORY      catheter ablation- WPW age 13       Family History:  Family History   Problem Relation Age of Onset   • Bipolar disorder Mother    • Hypertension Mother    • Hyperlipidemia Mother    • Suicide Attempts Mother    • Colon polyps Mother    • Factor V Leiden deficiency Mother    • Glucose-6-phos deficiency Father    • Hyperlipidemia Father    • Hypertension Father    • Diabetes Father    • Anxiety disorder Brother    • Factor V Leiden deficiency Brother    • Mental illness Brother    • Drug abuse Brother    • No Known Problems Daughter    • Anemia Son    • Other Son         Gilbert's   • Breast cancer Maternal Grandmother         dx approx age early 42's   • Colon polyps Maternal Grandfather    • Heart disease Maternal Grandfather    • Crohn's disease Paternal Grandmother    • Prostate cancer Paternal Grandfather    • Pancreatic cancer Paternal Grandfather    • Breast cancer Maternal Aunt 44   • Colon cancer Paternal Aunt    • Factor V Leiden deficiency Cousin    • Thyroid disease Neg Hx    • Stroke Neg Hx    • Ovarian cancer Neg Hx        Social History:  Social History     Socioeconomic History   • Marital status: Single     Spouse name: Not on file   • Number of children: 2   • Years of education: Not on file   • Highest education level: Some college, no degree   Occupational History   • Occupation: works in retail   Tobacco Use   • Smoking status: Never   • Smokeless tobacco: Never   Vaping Use   • Vaping Use: Never used   Substance and Sexual Activity   • Alcohol use: Yes     Comment: Occasional/special occasions   • Drug use: No   • Sexual activity: Yes     Partners: Male     Birth control/protection: Condom Male, OCP   Other Topics Concern   • Not on file   Social History Narrative    Education: high school graduate and some college    Learning Disabilities: none    Marital History: single    Children: 1 daughter, 1 son    Living Arrangement: lives in home with boyfriend, 2 children and boyfriend's 2 children    Occupational History: works part time at [employer] and [employer] works    Functioning Relationships: boyfriend is supportive    Legal History: none     History: None         Family planning    IUD contraception     Social Determinants of Health     Financial Resource Strain: Medium Risk (2/4/2021)    Overall Financial Resource Strain (CARDIA)    • Difficulty of Paying Living Expenses: Somewhat hard   Food Insecurity: Food Insecurity Present (2/4/2021)    Hunger Vital Sign    • Worried About Running Out of Food in the Last Year: Sometimes true    • Ran Out of Food in the Last Year: Sometimes true   Transportation Needs: Unmet Transportation Needs (2/4/2021)    PRAPARE - Transportation    • Lack of Transportation (Medical):  Yes    • Lack of Transportation (Non-Medical): Yes   Physical Activity: Inactive (11/11/2019)    Exercise Vital Sign    • Days of Exercise per Week: 0 days    • Minutes of Exercise per Session: 0 min   Stress: Stress Concern Present (11/11/2019)    [facility]    • Feeling of Stress : Rather much   Social Connections: Socially Isolated (11/11/2019)    Social Connection and Isolation Panel [NHANES]    • Frequency of Communication with Friends and Family: Once a week    • Frequency of Social Gatherings with Friends and Family: Once a week    • Attends Temple Services: Never    • Active Member of Clubs or Organizations: No    • Attends Club or Organization Meetings: Never    • Marital Status: Never    Intimate Partner Violence: Not At Risk (11/11/2019)    Humiliation, Afraid, Rape, and Kick questionnaire    • Fear of Current or Ex-Partner: No    • Emotionally Abused: No    • Physically Abused: No    • Sexually Abused: No   Housing Stability: Not on file       Objective     Vitals:    10/02/23 1311   BP: 114/80   BP Location: Left arm   Patient Position: Sitting   Cuff Size: Large   Pulse: 93   Resp: 20   Temp: 99.1 °F (37.3 °C)   TempSrc: Tympanic   SpO2: 98%   Weight: 96.2 kg (212 lb)   Height: 5' 2" (1.575 m)     Wt Readings from Last 3 Encounters:   10/02/23 96.2 kg (212 lb) 08/25/23 96.2 kg (212 lb)   08/07/23 96.4 kg (212 lb 8 oz)       Physical Exam  Vitals reviewed. Constitutional:       General: She is not in acute distress. Appearance: Normal appearance. She is well-developed. She is obese. She is not ill-appearing. HENT:      Head: Normocephalic and atraumatic. Right Ear: Hearing, tympanic membrane, ear canal and external ear normal.      Left Ear: Hearing, tympanic membrane, ear canal and external ear normal.      Mouth/Throat:      Mouth: Mucous membranes are moist.      Pharynx: No oropharyngeal exudate or posterior oropharyngeal erythema. Eyes:      General: Lids are normal.      Conjunctiva/sclera: Conjunctivae normal.      Pupils: Pupils are equal, round, and reactive to light. Neck:      Thyroid: No thyroid mass or thyromegaly. Vascular: No carotid bruit. Trachea: Trachea normal.   Cardiovascular:      Rate and Rhythm: Normal rate and regular rhythm. Pulses: Normal pulses. Radial pulses are 2+ on the right side and 2+ on the left side. Posterior tibial pulses are 2+ on the right side and 2+ on the left side. Heart sounds: Normal heart sounds, S1 normal and S2 normal. No murmur heard. Pulmonary:      Effort: Pulmonary effort is normal.      Breath sounds: Normal breath sounds. No decreased breath sounds, wheezing, rhonchi or rales. Abdominal:      General: Bowel sounds are normal. There is no distension. Palpations: Abdomen is soft. There is no mass. Tenderness: There is no abdominal tenderness. Hernia: No hernia is present. Musculoskeletal:         General: Normal range of motion. Cervical back: Normal range of motion and neck supple. Right lower leg: No edema. Left lower leg: No edema. Lymphadenopathy:      Cervical: No cervical adenopathy. Skin:     General: Skin is warm and dry. Findings: No rash. Neurological:      Mental Status: She is alert.       Sensory: No sensory deficit (light touch sensation intact and equal in UE and LE bilaterally). Motor: No weakness. Psychiatric:         Mood and Affect: Mood normal.         Behavior: Behavior normal.         Thought Content: Thought content normal.         Judgment: Judgment normal.         EKG: normal EKG, normal sinus rhythm.    Ventricular rate: 75 bpm      Pertinent Laboratory/Diagnostic Studies:  Lab Results   Component Value Date    GLUCOSE 81 08/11/2014    BUN 11 04/03/2023    CREATININE 0.78 04/03/2023    CALCIUM 9.0 04/03/2023     08/11/2014    K 3.7 04/03/2023    CO2 29 04/03/2023     04/03/2023     Lab Results   Component Value Date    ALT 21 04/03/2023    AST 17 04/03/2023    ALKPHOS 53 04/03/2023    BILITOT 0.4 08/11/2014       Lab Results   Component Value Date    WBC 7.52 08/15/2023    HGB 14.8 08/15/2023    HCT 44.9 08/15/2023    MCV 88 08/15/2023     08/15/2023     Lab Results   Component Value Date    CHOL 193 08/07/2015     Lab Results   Component Value Date    TRIG 170 (H) 09/27/2021     Lab Results   Component Value Date    HDL 44 09/27/2021     Lab Results   Component Value Date    LDLCALC 127 (H) 09/27/2021     Lab Results   Component Value Date    HGBA1C 5.2 02/19/2020       Results for orders placed or performed in visit on 13/43/51   Follicle stimulating hormone   Result Value Ref Range    FSH 7.2 See Comment mIU/mL   Luteinizing hormone   Result Value Ref Range    LH 8.6 See Comment mIU/mL   Estradiol   Result Value Ref Range    Estradiol 90.0 See Comment pg/mL   TSH, 3rd generation with Free T4 reflex   Result Value Ref Range    TSH 3RD GENERATON 4.063 0.450 - 4.500 uIU/mL   CBC and differential   Result Value Ref Range    WBC 7.52 4.31 - 10.16 Thousand/uL    RBC 5.08 3.81 - 5.12 Million/uL    Hemoglobin 14.8 11.5 - 15.4 g/dL    Hematocrit 44.9 34.8 - 46.1 %    MCV 88 82 - 98 fL    MCH 29.1 26.8 - 34.3 pg    MCHC 33.0 31.4 - 37.4 g/dL    RDW 12.2 11.6 - 15.1 %    MPV 10.5 8.9 - 12.7 fL    Platelets 341 733 - 982 Thousands/uL    nRBC 0 /100 WBCs    Neutrophils Relative 43 43 - 75 %    Immat GRANS % 0 0 - 2 %    Lymphocytes Relative 45 (H) 14 - 44 %    Monocytes Relative 8 4 - 12 %    Eosinophils Relative 3 0 - 6 %    Basophils Relative 1 0 - 1 %    Neutrophils Absolute 3.27 1.85 - 7.62 Thousands/µL    Immature Grans Absolute 0.02 0.00 - 0.20 Thousand/uL    Lymphocytes Absolute 3.33 0.60 - 4.47 Thousands/µL    Monocytes Absolute 0.60 0.17 - 1.22 Thousand/µL    Eosinophils Absolute 0.21 0.00 - 0.61 Thousand/µL    Basophils Absolute 0.09 0.00 - 0.10 Thousands/µL   DHEA-sulfate   Result Value Ref Range    DHEA-SO4 135.0 57.3 - 279.2 ug/dL   Testosterone, free, total   Result Value Ref Range    Testosterone, Free 2.8 0.0 - 4.2 pg/mL    TESTOSTERONE TOTAL 29 8 - 60 ng/dL   Cortisol Level, AM Specimen   Result Value Ref Range    Cortisol - AM 19.4 6.7 - 22.6 ug/dL   Prolactin   Result Value Ref Range    Prolactin 16.77 3.34 - 26.72 ng/mL       Orders Placed This Encounter   Procedures   • Ambulatory Referral to Cardiology   • POCT ECG       ALLERGIES:  Allergies   Allergen Reactions   • Codeine Other (See Comments)     dry heaves   • Sulfa Antibiotics Hives and Rash     Per pt as achild   • Erythromycin Hives     Per as a child       Current Medications     Current Outpatient Medications   Medication Sig Dispense Refill   • ibuprofen (MOTRIN) 600 mg tablet Take 1 tablet (600 mg total) by mouth every 6 (six) hours as needed for mild pain, moderate pain or fever 30 tablet 0   • lamoTRIgine (LaMICtal) 200 MG tablet TAKE 1 TABLET BY MOUTH EVERY DAY 90 tablet 1   • LORazepam (ATIVAN) 1 mg tablet Take 1 tablet (1 mg total) by mouth every 6 (six) hours as needed for anxiety 30 tablet 0   • naproxen (NAPROSYN) 500 mg tablet Take 1 tablet (500 mg total) by mouth 2 (two) times a day with meals (Patient taking differently: Take 500 mg by mouth 2 (two) times a day with meals) 30 tablet 0   • sertraline (ZOLOFT) 50 mg tablet TAKE 1 TABLET BY MOUTH EVERY DAY 90 tablet 1   • Drospirenone 4 MG TABS Take 1 tablet by mouth in the morning 84 tablet 1   • rizatriptan (MAXALT-MLT) 10 mg disintegrating tablet TAKE AT THE ONSET OF MIGRAINE IF SYMPTOMS CONTINUE OR RETURN, MAY TAKE ANOTHER DOSE AT LEAST 2 HOURS AFTER FIRST DOSE. TAKE NO MORE THAN 2 DOSES IN A DAY. (Patient not taking: Reported on 7/28/2023) 9 tablet 0     No current facility-administered medications for this visit.          Health Maintenance     Health Maintenance   Topic Date Due   • COVID-19 Vaccine (1) Never done   • Influenza Vaccine (1) 09/01/2023   • BMI: Followup Plan  12/19/2023   • Annual Physical  12/19/2023   • Cervical Cancer Screening  12/19/2023   • BMI: Adult  10/02/2024   • DTaP,Tdap,and Td Vaccines (2 - Td or Tdap) 04/13/2028   • HIV Screening  Completed   • Hepatitis C Screening  Completed   • HPV Vaccine  Completed   • Pneumococcal Vaccine: Pediatrics (0 to 5 Years) and At-Risk Patients (6 to 59 Years)  Aged Out   • HIB Vaccine  Aged Out   • IPV Vaccine  Aged Out   • Hepatitis A Vaccine  Aged Out   • Meningococcal ACWY Vaccine  Aged [employer] History   Administered Date(s) Administered   • H1N1, All Formulations 11/04/2009   • HPV Quadrivalent 01/01/2007, 03/01/2007, 07/18/2010   • Hep B, adult 04/15/1997, 10/01/1997, 11/01/1997   • INFLUENZA 09/04/2020   • Influenza Quadrivalent Preservative Free 3 years and older IM 10/02/2014, 10/08/2015, 10/05/2016, 10/11/2017   • Influenza, injectable, quadrivalent, preservative free 0.5 mL 10/30/2018, 09/09/2019, 09/29/2021   • Influenza, seasonal, injectable 12/01/2007, 10/17/2008, 10/19/2009, 10/04/2010, 10/12/2011   • Influenza, seasonal, injectable, preservative free 10/16/2013   • MMR 07/08/2018   • Tdap 04/13/2018   • Tuberculin Skin Test-PPD Intradermal 10/12/2011       Derrick Mullins PA-C  10/3/2023 11:46 PM  [address] \[facility]\""

## 2023-10-02 ENCOUNTER — CONSULT (OUTPATIENT)
Dept: FAMILY MEDICINE CLINIC | Facility: CLINIC | Age: 38
End: 2023-10-02
Payer: COMMERCIAL

## 2023-10-02 ENCOUNTER — TELEPHONE (OUTPATIENT)
Age: 38
End: 2023-10-02

## 2023-10-02 ENCOUNTER — TELEPHONE (OUTPATIENT)
Dept: FAMILY MEDICINE CLINIC | Facility: CLINIC | Age: 38
End: 2023-10-02

## 2023-10-02 ENCOUNTER — TELEPHONE (OUTPATIENT)
Dept: OBGYN CLINIC | Facility: HOSPITAL | Age: 38
End: 2023-10-02

## 2023-10-02 VITALS
TEMPERATURE: 99.1 F | DIASTOLIC BLOOD PRESSURE: 80 MMHG | OXYGEN SATURATION: 98 % | RESPIRATION RATE: 20 BRPM | BODY MASS INDEX: 39.01 KG/M2 | HEIGHT: 62 IN | WEIGHT: 212 LBS | HEART RATE: 93 BPM | SYSTOLIC BLOOD PRESSURE: 114 MMHG

## 2023-10-02 DIAGNOSIS — I49.3 PVC'S (PREMATURE VENTRICULAR CONTRACTIONS): ICD-10-CM

## 2023-10-02 DIAGNOSIS — M22.02 RECURRENT DISLOCATION OF LEFT PATELLA: ICD-10-CM

## 2023-10-02 DIAGNOSIS — M25.362 PATELLAR INSTABILITY OF LEFT KNEE: ICD-10-CM

## 2023-10-02 DIAGNOSIS — Z98.890 HISTORY OF RADIOFREQUENCY ABLATION PROCEDURE FOR CARDIAC ARRHYTHMIA: ICD-10-CM

## 2023-10-02 DIAGNOSIS — Z01.818 PRE-OP EXAMINATION: Primary | ICD-10-CM

## 2023-10-02 PROBLEM — U07.1 COVID-19: Status: RESOLVED | Noted: 2022-01-12 | Resolved: 2023-10-02

## 2023-10-02 PROCEDURE — 93000 ELECTROCARDIOGRAM COMPLETE: CPT | Performed by: PHYSICIAN ASSISTANT

## 2023-10-02 PROCEDURE — 99214 OFFICE O/P EST MOD 30 MIN: CPT | Performed by: PHYSICIAN ASSISTANT

## 2023-10-02 NOTE — TELEPHONE ENCOUNTER
Im unsure who of yous signed this patient up for surgery but wanted to make you aware that she will have to see cardio. Please also call and instruct that we would recommend wanting until after her cardio appointment before submitting her FMLA paperwork incase her case has to be pushed back if cardio wants further testing prior to surgery.     Thank you

## 2023-10-02 NOTE — ASSESSMENT & PLAN NOTE
Patient reports experiencing these all day about twice a week. None noted today on EKG but discussed that this should have further follow-up with Cardiology prior to surgery. Referral entered to be seen ASAP.

## 2023-10-02 NOTE — TELEPHONE ENCOUNTER
Caller: Patient    Doctor: Hang Maya    Reason for call: Patient is in the PCP office at present. There is no order for EKG, Labs. Is there a form for pre-op clearance also?     Call back#: [phone] [facility]

## 2023-10-02 NOTE — TELEPHONE ENCOUNTER
Called Ortho to find out if pt needs blood work and EGK for her pre-op visit today. Waiting for a call back.

## 2023-10-02 NOTE — TELEPHONE ENCOUNTER
Spoke with Dang, let her know that no labs/EKG were ordered per Dr. Jigar Mcmillan and that no form is required, we can use the office note.

## 2023-10-02 NOTE — TELEPHONE ENCOUNTER
Caller: Patient     Doctor: Reggie Coyne     Reason for call: Had pre op today and she has to see cardiologist on Wednesday for possible halter monitor . Should she wait until after this to have FMLA done or can they be done before if halter is needed .      Call back#: [phone]

## 2023-10-02 NOTE — TELEPHONE ENCOUNTER
Ortho called me back pt does not need anything, but whatever Lilia Bullock feels comfortable with to clear her is ok.

## 2023-10-03 NOTE — PROGRESS NOTES
Cardiology Follow Up    Clare Bingham  1985  [id number]  [address] [facility]  [phone]    1. PVC's (premature ventricular contractions)        2. History of radiofrequency ablation procedure for W-P-W        3. Dyslipidemia            Interval History: Patient is here for follow-up visit and preoperative clearance in reference to ligament surgery in the left knee. Patient had ablation for WPW at the age of 14 in [place] at the [facility].  She was seen by our group in August 2021 in reference to arrhythmia. Patient has HLD. There is a FH of CAD. Patient had a Zio patch placed July 2021. Predominant rhythm was NSR with an average heart rate of 100. Per cardiology note there was felt to be no serious repercussions of her arrhythmia. Preference was not to treat this medically. Patient's blood pressure is stable today. She did have an EKG with her PCP recently which demonstrated NSR at rate of 75. Patient has had no chest pain or significant dyspnea. She gets occasional palpitation which she feels is related to PVCs. She admits to being an anxious person.     Patient Active Problem List   Diagnosis   • Bipolar I disorder, most recent episode depressed, severe with psychotic features ([address])   • Obesity, Class II, BMI 35-39.9   • Abnormal Papanicolaou smear of cervix with positive human papilloma virus (HPV) test   • Migraine with aura and without status migrainosus, not intractable   • SHELLY (generalized anxiety disorder)   • Other insomnia   • Long-term use of high-risk medication   • Family history of breast cancer   • Patellofemoral disorder of left knee   • Congenital fusion of carpal bone   • History of radiofrequency ablation procedure for W-P-W   • PVC's (premature ventricular contractions)   • Post traumatic stress disorder   • Dyslipidemia   • Enlarged lymph node in neck   • Inappropriate sinus tachycardia   • Recurrent dislocation of left patella   • Patellar instability of left knee     Past Medical History:   Diagnosis Date   • Abnormal Pap smear of cervix    • COVID-19 1/12/2022   • Depression    • Eczema    • Exposure to chlamydia     Resolved 06/09/17   • Migraine without aura and without status migrainosus, not intractable 10/11/2019   • Obesity    • Post traumatic stress disorder 12/13/2021   • Postpartum depression 08/09/2018   • Manoj-Parkinson-White (WPW) syndrome      Social History     Socioeconomic History   • Marital status: Single     Spouse name: Not on file   • Number of children: 2   • Years of education: Not on file   • Highest education level: Some college, no degree   Occupational History   • Occupation: works in retail   Tobacco Use   • Smoking status: Never   • Smokeless tobacco: Never   Vaping Use   • Vaping Use: Never used   Substance and Sexual Activity   • Alcohol use: Yes     Comment: Occasional/special occasions   • Drug use: No   • Sexual activity: Yes     Partners: Male     Birth control/protection: Condom Male, OCP   Other Topics Concern   • Not on file   Social History Narrative    Education: high school graduate and some college    Learning Disabilities: none    Marital History: single    Children: 1 daughter, 1 son    Living Arrangement: lives in home with boyfriend, 2 children and boyfriend's 2 children    Occupational History: works part time at [email or web address] and [employer] works    Functioning Relationships: boyfriend is supportive    Legal History: none     History: None         Family planning    IUD contraception     Social Determinants of Health     Financial Resource Strain: Medium Risk (2/4/2021)    Overall Financial Resource Strain (CARDIA)    • Difficulty of Paying Living Expenses: Somewhat hard   Food Insecurity: Food Insecurity Present (2/4/2021)    Hunger Vital Sign    • Worried About [place] in the Last Year: Sometimes true    • Ran Out of Food in the Last Year: Sometimes true   Transportation Needs: Unmet Transportation Needs (2/4/2021)    PRAPARE - Transportation    • Lack of Transportation (Medical):  Yes    • Lack of Transportation (Non-Medical): Yes   Physical Activity: Inactive (11/11/2019)    Exercise Vital Sign    • Days of Exercise per Week: 0 days    • Minutes of Exercise per Session: 0 min   Stress: Stress Concern Present (11/11/2019)    [address]    • Feeling of Stress : Rather much   Social Connections: Socially Isolated (11/11/2019)    Social Connection and Isolation Panel [NHANES]    • Frequency of Communication with Friends and Family: Once a week    • Frequency of Social Gatherings with Friends and Family: Once a week    • Attends [employer] Services: Never    • Active Member of Clubs or Organizations: No    • Attends Club or Organization Meetings: Never    • Marital Status: Never    Intimate Partner Violence: Not At Risk (11/11/2019)    Humiliation, Afraid, Rape, and Kick questionnaire    • Fear of Current or Ex-Partner: No    • Emotionally Abused: No    • Physically Abused: No    • Sexually Abused: No   Housing Stability: Not on file      Family History   Problem Relation Age of Onset   • Bipolar disorder Mother    • Hypertension Mother    • Hyperlipidemia Mother    • Suicide Attempts Mother    • Colon polyps Mother    • Factor V Leiden deficiency Mother    • Glucose-6-phos deficiency Father    • Hyperlipidemia Father    • Hypertension Father    • Diabetes Father    • Anxiety disorder Brother    • Factor V Leiden deficiency Brother    • Mental illness Brother    • Drug abuse Brother    • No Known Problems Daughter    • Anemia Son    • Other Son         Gilbert's   • Breast cancer Maternal Grandmother         dx approx age early 42's   • Colon polyps Maternal Grandfather    • Heart disease Maternal Grandfather    • Crohn's disease Paternal Grandmother    • Prostate cancer Paternal Grandfather    • Pancreatic cancer Paternal Grandfather    • Breast cancer Maternal Aunt 44   • Colon cancer Paternal Aunt    • Factor V Leiden deficiency Cousin    • Thyroid disease Neg Hx    • Stroke Neg Hx    • Ovarian cancer Neg Hx      Past Surgical History:   Procedure Laterality Date   • ABLATION OF DYSRHYTHMIC FOCUS      WPW ablation age 14   • COLONOSCOPY     • COLPOSCOPY     • INDUCED      • OTHER SURGICAL HISTORY      catheter ablation- WPW age 13       Current Outpatient Medications:   •  ibuprofen (MOTRIN) 600 mg tablet, Take 1 tablet (600 mg total) by mouth every 6 (six) hours as needed for mild pain, moderate pain or fever, Disp: 30 tablet, Rfl: 0  •  lamoTRIgine (LaMICtal) 200 MG tablet, Take 1 tablet (200 mg total) by mouth daily, Disp: 90 tablet, Rfl: 1  •  LORazepam (ATIVAN) 1 mg tablet, Take 1 tablet (1 mg total) by mouth every 6 (six) hours as needed for anxiety, Disp: , Rfl: 0  •  naproxen (NAPROSYN) 500 mg tablet, Take 1 tablet (500 mg total) by mouth 2 (two) times a day with meals (Patient taking differently: Take 500 mg by mouth 2 (two) times a day with meals), Disp: 30 tablet, Rfl: 0  •  sertraline (ZOLOFT) 50 mg tablet, Take 1 tablet (50 mg total) by mouth daily, Disp: 90 tablet, Rfl: 1  •  Drospirenone 4 MG TABS, Take 1 tablet by mouth in the morning (Patient not taking: Reported on 10/4/2023), Disp: 84 tablet, Rfl: 1  Allergies   Allergen Reactions   • Codeine Other (See Comments)     dry heaves   • Sulfa Antibiotics Hives and Rash     Per pt as achild   • Erythromycin Hives     Per as a child       Labs:not applicable  Imaging: No results found. Review of Systems:  Review of Systems   All other systems reviewed and are negative.       Physical Exam:  /80 (BP Location: Left arm, Patient Position: Sitting, Cuff Size: Standard)   Pulse 94   Ht 5' 2" (1.575 m)   Wt 96.4 kg (212 lb 7.2 oz)   LMP 2023 (Approximate) BMI 38.86 kg/m²   Physical Exam  Vitals reviewed. Constitutional:       Appearance: She is well-developed. HENT:      Head: Normocephalic and atraumatic. Eyes:      Conjunctiva/sclera: Conjunctivae normal.      Pupils: Pupils are equal, round, and reactive to light. Cardiovascular:      Rate and Rhythm: Normal rate. Heart sounds: Normal heart sounds. Pulmonary:      Effort: Pulmonary effort is normal.      Breath sounds: Normal breath sounds. Musculoskeletal:      Cervical back: Normal range of motion and neck supple. Skin:     General: Skin is warm and dry. Neurological:      Mental Status: She is alert and oriented to person, place, and time. Discussion/Summary: Patient is stable from a cardiac point of view. She has no cardiac symptoms except for occasional palpitation. She feels this is related to anxiety. She does not require further cardiac testing. She is okay to proceed with orthopedic surgery as scheduled. I have asked her to call if there is a problem in the interim otherwise I will see her in 1 year.

## 2023-10-03 NOTE — TELEPHONE ENCOUNTER
Spoke with the patient, let her know that she should wait until after her cardiology appointment to submit [place] paperwork. Also discussed with her that she will need to wait until she is cleared by cardiology to have her surgery, and that this could potentially delay her date depending on what they require for clearance.

## 2023-10-04 ENCOUNTER — TELEMEDICINE (OUTPATIENT)
Dept: PSYCHIATRY | Facility: CLINIC | Age: 38
End: 2023-10-04
Payer: COMMERCIAL

## 2023-10-04 ENCOUNTER — OFFICE VISIT (OUTPATIENT)
Dept: CARDIOLOGY CLINIC | Facility: CLINIC | Age: 38
End: 2023-10-04
Payer: COMMERCIAL

## 2023-10-04 ENCOUNTER — TELEPHONE (OUTPATIENT)
Dept: OBGYN CLINIC | Facility: MEDICAL CENTER | Age: 38
End: 2023-10-04

## 2023-10-04 ENCOUNTER — TELEPHONE (OUTPATIENT)
Dept: FAMILY MEDICINE CLINIC | Facility: CLINIC | Age: 38
End: 2023-10-04

## 2023-10-04 VITALS
BODY MASS INDEX: 39.1 KG/M2 | SYSTOLIC BLOOD PRESSURE: 120 MMHG | WEIGHT: 212.45 LBS | DIASTOLIC BLOOD PRESSURE: 80 MMHG | HEIGHT: 62 IN | HEART RATE: 94 BPM

## 2023-10-04 DIAGNOSIS — I49.3 PVC'S (PREMATURE VENTRICULAR CONTRACTIONS): Primary | ICD-10-CM

## 2023-10-04 DIAGNOSIS — F43.10 PTSD (POST-TRAUMATIC STRESS DISORDER): ICD-10-CM

## 2023-10-04 DIAGNOSIS — Z98.890 HISTORY OF RADIOFREQUENCY ABLATION PROCEDURE FOR CARDIAC ARRHYTHMIA: ICD-10-CM

## 2023-10-04 DIAGNOSIS — F41.1 GAD (GENERALIZED ANXIETY DISORDER): ICD-10-CM

## 2023-10-04 DIAGNOSIS — Z01.818 PRE-OP EXAMINATION: ICD-10-CM

## 2023-10-04 DIAGNOSIS — F31.81 BIPOLAR II DISORDER (HCC): ICD-10-CM

## 2023-10-04 DIAGNOSIS — E78.5 DYSLIPIDEMIA: ICD-10-CM

## 2023-10-04 PROCEDURE — 99212 OFFICE O/P EST SF 10 MIN: CPT | Performed by: PSYCHIATRY & NEUROLOGY

## 2023-10-04 PROCEDURE — 99214 OFFICE O/P EST MOD 30 MIN: CPT | Performed by: INTERNAL MEDICINE

## 2023-10-04 RX ORDER — LAMOTRIGINE 200 MG/1
200 TABLET ORAL DAILY
Qty: 90 TABLET | Refills: 1 | Status: SHIPPED | OUTPATIENT
Start: 2023-10-04

## 2023-10-04 RX ORDER — LORAZEPAM 1 MG/1
1 TABLET ORAL EVERY 6 HOURS PRN
Refills: 0
Start: 2023-10-04

## 2023-10-04 NOTE — PSYCH
MEDICATION MANAGEMENT NOTE    PSYCHIATRIC VIRTUAL VISIT        [facility]      Name and Date of Birth:  Patience Ivy 41 y.o. 1985 MRN: [id number]    Psychiatric Virtual Visit:    Verification of patient location: Patient is located in the state that I hold an active license: [place]    REQUIRED DOCUMENTATION:     Provider located at [address] [place] [address] at [address] in [address]. TeleMed provider: Elda Capps MD  Patient was identified by name and date of birth. Patience Ivy was informed that this is a telemedicine visit and that the visit is being conducted through the [employer]. She agrees to proceed. .  My office door was closed. No one else was in the room. She acknowledged consent and understanding of privacy and security of the video platform. The patient has agreed to participate and understands they can discontinue the visit at any time. Patient is aware this is a billable service. This note was shared with patient. I spent 15 minutes directly with the patient during this visit        Review Of Systems:  No complaints  Mood Euthymic   Thought Content Normal   General As HPI   Physical symptoms No physical complaints       Laboratory Results: No results found for this or any previous visit. Subjective:    Shellie Chanel was seen for continuing care and pharmacotherapy. Since her last visit, she has not had any particular issues and remains in remission. No side effects with lamotrigine or sertraline. Seldom takes lorazepam.  Continues to see her therapist on a regular basis.   Sleep and appetite are normal      Objective:   Stable and in remission    Mental status:  Appearance calm and cooperative , adequate hygiene and grooming and poor eye contact    Mood Euthymic   Affect affect was broad and affect appropriate    Speech Normal rate and Normal volume   Thought Processes coherent/organized   Hallucinations None   Thought Content None   Abnormal Thoughts no suicidal thoughts    Orientation A+O x 3       Assessment/Plan:       There are no diagnoses linked to this encounter. No diagnosis found. Treatment Recommendations- Risks Benefits      Risks, Benefits And Possible Side Effects Of Medications:  Risks, benefits, and possible side effects of medications explained to patient and patient verbalizes understanding    VIRTUAL VISIT DISCLAIMER    Sarah Garcia verbally agrees to participate in [employer]. Pt is aware that [employer] could be limited without vital signs or the ability to perform a full hands-on physical exam. Sarah Garcia understands she or the provider may request at any time to terminate the video visit and request the patient to seek care or treatment in person.      Charissa Dang MD 10/04/23

## 2023-10-04 NOTE — PATIENT INSTRUCTIONS
I will continue the patient's present medical regimen. Patient appears well compensated. I have asked the patient to call if there is a problem in the interim otherwise I will see the patient in one years time.

## 2023-10-06 ENCOUNTER — TELEPHONE (OUTPATIENT)
Age: 38
End: 2023-10-06

## 2023-10-06 ENCOUNTER — TELEPHONE (OUTPATIENT)
Dept: FAMILY MEDICINE CLINIC | Facility: CLINIC | Age: 38
End: 2023-10-06

## 2023-10-06 NOTE — TELEPHONE ENCOUNTER
Verified that we received her [place] paperwork from [address] [place] [address] Drive 10-14 days to complete paperwork.

## 2023-10-06 NOTE — TELEPHONE ENCOUNTER
10-6-23 / 10:39 AM    Elaine Cleveland from [facility] called requesting you to sign an addendum stating wether the pt is clear or not for surgery on 10/31/23.

## 2023-10-17 ENCOUNTER — SOCIAL WORK (OUTPATIENT)
Dept: BEHAVIORAL/MENTAL HEALTH CLINIC | Facility: CLINIC | Age: 38
End: 2023-10-17
Payer: COMMERCIAL

## 2023-10-17 DIAGNOSIS — F31.5 BIPOLAR I DISORDER, MOST RECENT EPISODE DEPRESSED, SEVERE WITH PSYCHOTIC FEATURES (HCC): Primary | Chronic | ICD-10-CM

## 2023-10-17 DIAGNOSIS — F41.1 GAD (GENERALIZED ANXIETY DISORDER): Chronic | ICD-10-CM

## 2023-10-17 PROCEDURE — 90837 PSYTX W PT 60 MINUTES: CPT | Performed by: SOCIAL WORKER

## 2023-10-17 NOTE — PSYCH
Behavioral Health Psychotherapy Progress Note    Psychotherapy Provided: Individual Psychotherapy     1. Bipolar I disorder, most recent episode depressed, severe with psychotic features ([address])        2. SHELLY (generalized anxiety disorder)            Goals addressed in session: Goal 1     DATA: Met with Shellie Chanel for scheduled individual session. Shellie Chanel discussed an increase in anxiety. She states that she has a lot going on in her life, which is increasing her anxiety. She discussed the triggers that have led to her increase in anxiety. Among these are her concerns for her children Guillermina Becker is having some temper tantrums (increased significantly after starting school) and Alfredo Ennis is failing math class); her worry about her upcoming surgery on her knee; her stress related to her financial situation (attempted to help her brother get a car & had negative impact on her credit rating). We spent some time discussing the things that she can control vs the things she cannot control. She states that she is unable to control any of these items. We spent some time discussing ways that she can address some of these issues. We used solution-focused techniques to come up with a plan for addressing her stressors. She is going to make some telephone calls later today, to address some of her concerns. She will have her surgery prior to her next therapy appointment. That appointment may need to be virtual, and she will let me know, in advance of that session. She has already completed and been approved for her [facility] leave from work. This clinician provided positive validation and encouragement for the steps she has taken to decrease her stress.      During this session, this clinician used the following therapeutic modalities: Client-centered Therapy, Dialectical Behavior Therapy, Mindfulness-based Strategies, Motivational Interviewing, Solution-Focused Therapy, and Supportive Psychotherapy    Substance Abuse was not addressed during this session. If the client is diagnosed with a co-occurring substance use disorder, please indicate any changes in the frequency or amount of use: n/a. Stage of change for addressing substance use diagnoses: No substance use/Not applicable    ASSESSMENT:  Preeti Ramsay presents with a slightly anxious mood. her affect is Normal range and intensity, which is congruent, with her mood and the content of the session. The client has made progress on their goals. Preeti Ramsay presents with a minimal risk of suicide, minimal risk of self-harm, and minimal risk of harm to others. For any risk assessment that surpasses a "low" rating, a safety plan must be developed. A safety plan was indicated: no  If yes, describe in detail n/a    PLAN: Between sessions, Preeti Ramsay will continue to monitor her moods. She will look at each of her stressors independent of the others, and she will develop step-by-step plans to address them. She will use distraction when she needs to take a break from her emotions. At the next session, the therapist will use Client-centered Therapy, Dialectical Behavior Therapy, Mindfulness-based Strategies, Motivational Interviewing, Solution-Focused Therapy, and Supportive Psychotherapy to address her mood regulation and relationship concerns. Behavioral Health Treatment Plan and Discharge Planning: Preeti Ramsay is aware of and agrees to continue to work on their treatment plan. They have identified and are working toward their discharge goals.  yes    Visit start and stop times:    10/17/23  Start Time: 0904  Stop Time: 7631  Total Visit Time: 54 minutes

## 2023-10-17 NOTE — PSYCH
Behavioral Health Psychotherapy Progress Note    Psychotherapy Provided: Individual Psychotherapy     1. Bipolar I disorder, most recent episode depressed, severe with psychotic features ([address])        2. SHELLY (generalized anxiety disorder)            Goals addressed in session: Goal 1     DATA: Met with Patricia Acosta for scheduled individual session. Patricia Acosta discussed an increase in anxiety. She states that she has a lot going on in her life, which is increasing her anxiety. She discussed the triggers    During this session, this clinician used the following therapeutic modalities: {Therapeutic Modalities:81577}    Substance Abuse {Was/was not:64771} addressed during this session. If the client is diagnosed with a co-occurring substance use disorder, please indicate any changes in the frequency or amount of use: ***. Stage of change for addressing substance use diagnoses: {Psych Substance Abuse Stage of Change:02733}    ASSESSMENT:  Samuel Guerrier presents with a {Psych/BH Mood:23654::"Euthymic/ normal"} mood. her affect is {Psych/BH Affect:26026::"Normal range and intensity"}, which is {Congruent/Non Congruent:16413}, with her mood and the content of the session. The client {HAS/HAS NOT:08157} made progress on their goals. *** Samuel Guerrier presents with a {PSYCH Suicide/Homicide Risk:60728} risk of suicide, {PSYCH Suicide/Homicide Risk:80487} risk of self-harm, and {PSYCH Suicide/Homicide Risk:36577} risk of harm to others. For any risk assessment that surpasses a "low" rating, a safety plan must be developed. A safety plan was indicated: {YES/NO:20200}  If yes, describe in detail ***    PLAN: Between sessions, Samuel Guerrier will ***. At the next session, the therapist will use {Therapeutic Modalities:86377} to address ***. Behavioral Health Treatment Plan and Discharge Planning: Samuel Guerrier is aware of and agrees to continue to work on their treatment plan.  They have identified and are working toward their discharge goals.  {YES/NO:20200}    Visit start and stop times:    10/17/23

## 2023-10-27 ENCOUNTER — TELEPHONE (OUTPATIENT)
Age: 38
End: 2023-10-27

## 2023-10-27 NOTE — PRE-PROCEDURE INSTRUCTIONS
Pre-Surgery Instructions:   Medication Instructions    Drospirenone 4 MG TABS Take night before surgery- did instruct pt to verify use prior to surgery with prescribing MD    ibuprofen (MOTRIN) 600 mg tablet Stop taking 3 days prior to surgery. lamoTRIgine (LaMICtal) 200 MG tablet Take night before surgery    LORazepam (ATIVAN) 1 mg tablet Uses PRN- OK to take day of surgery, if needed with sip of water     naproxen (NAPROSYN) 500 mg tablet Stop taking 3 days prior to surgery. sertraline (ZOLOFT) 50 mg tablet Take night before surgery    Pt has cleanser - instructed to use cleanser and showering instructions reviewed with pt for both night before and DOS. Pt instructed to verify with prescribing MD  regarding BC use over any instructions of use prior to surgery. Medication instructions for day surgery reviewed. Please use only a sip of water to take your instructed medications. Avoid all over the counter vitamins, supplements and NSAIDS for one week prior to surgery per anesthesia guidelines. Tylenol is ok to take as needed. You will receive a call one business day prior to surgery with an arrival time and hospital directions. If your surgery is scheduled on a Monday, the hospital will be calling you on the Friday prior to your surgery. If you have not heard from anyone by 8pm, please call the hospital supervisor through the hospital  at [phone]. Zaria Rosales [phone]). Do not eat or drink anything after midnight the night before your surgery, including candy, mints, lifesavers, or chewing gum. Do not drink alcohol 24hrs before your surgery. Try not to smoke at least 24hrs before your surgery. Follow the pre surgery showering instructions as listed in the [facility] Surgical Experience Booklet” or otherwise provided by your surgeon's office. Do not use a blade to shave the surgical area 1 week before surgery. It is okay to use a clean electric clippers up to 24 hours before surgery. Do not apply any lotions, creams, including makeup, cologne, deodorant, or perfumes after showering on the day of your surgery. Do not use dry shampoo, hair spray, hair gel, or any type of hair products. No contact lenses, eye make-up, or artificial eyelashes. Remove nail polish, including gel polish, and any artificial, gel, or acrylic nails if possible. Remove all jewelry including rings and body piercing jewelry. Wear causal clothing that is easy to take on and off. Consider your type of surgery. Keep any valuables, jewelry, piercings at home. Please bring any specially ordered equipment (sling, braces) if indicated. Arrange for a responsible person to drive you to and from the hospital on the day of your surgery. Visitor Guidelines discussed. Call the surgeon's office with any new illnesses, exposures, or additional questions prior to surgery. Please reference your [facility] Surgical Experience Booklet” for additional information to prepare for your upcoming surgery.

## 2023-10-27 NOTE — TELEPHONE ENCOUNTER
Caller: Patient    Doctor: Ashly Dumont    Reason for call: Patient received a call from pre-admission testing regarding her procedure on 10/31. They told her to call the and ask the office if she should take her birth control pills the day before and day of the procedure.    Drosipirenone 4mg    Call back#: [phone]

## 2023-10-30 ENCOUNTER — ANESTHESIA EVENT (OUTPATIENT)
Dept: PERIOP | Facility: HOSPITAL | Age: 38
End: 2023-10-30
Payer: COMMERCIAL

## 2023-10-30 NOTE — TELEPHONE ENCOUNTER
Please call the patient and instruct that she can take her birth control pill the day before and of surgery.  AdverseEvents

## 2023-10-30 NOTE — ANESTHESIA PREPROCEDURE EVALUATION
Procedure:  OPEN MPFL RECONSTRUCTION WITH ALLOGRAFT (Left: Knee)  KNEE SURGICAL ARTHROSCOPY WITH LATERAL RELEASE (Left: Knee)    Relevant Problems   CARDIO   (+) Inappropriate sinus tachycardia   (+) Migraine with aura and without status migrainosus, not intractable   (+) PVC's (premature ventricular contractions)      NEURO/PSYCH   (+) SHELLY (generalized anxiety disorder)   (+) Migraine with aura and without status migrainosus, not intractable   (+) Post traumatic stress disorder      Musculoskeletal and Integument   (+) Patellofemoral disorder of left knee      Other   (+) Dyslipidemia   (+) Enlarged lymph node in neck   (+) History of radiofrequency ablation procedure for W-P-W   (+) Long-term use of high-risk medication   (+) Obesity, Class II, BMI 35-39.9   (+) Other insomnia        Physical Exam    Airway    Mallampati score: II  TM Distance: >3 FB  Neck ROM: full     Dental       Cardiovascular  Cardiovascular exam normal    Pulmonary  Pulmonary exam normal     Other Findings        Anesthesia Plan  ASA Score- 2     Anesthesia Type- general with ASA Monitors. Additional Monitors:     Airway Plan: LMA. Comment: Adductor block . Plan Factors-Exercise tolerance (METS): >4 METS. Chart reviewed. Existing labs reviewed. Patient summary reviewed. Patient is not a current smoker. Patient not instructed to abstain from smoking on day of procedure. Patient did not smoke on day of surgery. Induction- intravenous. Postoperative Plan- Plan for postoperative opioid use. Informed Consent- Anesthetic plan and risks discussed with patient and spouse. I personally reviewed this patient with the CRNA. Discussed and agreed on the Anesthesia Plan with the CRNA. .              Lab Results   Component Value Date    HGBA1C 5.2 02/19/2020       Lab Results   Component Value Date     08/11/2014    K 3.7 04/03/2023     04/03/2023    CO2 29 04/03/2023    ANIONGAP 12 08/11/2014    BUN 11 04/03/2023    CREATININE 0.78 04/03/2023    GLUCOSE 81 08/11/2014    GLUF 83 02/19/2020    CALCIUM 9.0 04/03/2023    AST 17 04/03/2023    ALT 21 04/03/2023    ALKPHOS 53 04/03/2023    PROT 7.9 08/11/2014    BILITOT 0.4 08/11/2014    EGFR 98 04/03/2023       Lab Results   Component Value Date    WBC 7.52 08/15/2023    HGB 14.8 08/15/2023    HCT 44.9 08/15/2023    MCV 88 08/15/2023     08/15/2023           Impression    EKG: normal EKG, normal sinus rhythm.   Ventricular rate: 75 bpm      Specimen Collected: 10/02/23 13:44

## 2023-10-31 ENCOUNTER — HOSPITAL ENCOUNTER (OUTPATIENT)
Facility: HOSPITAL | Age: 38
Setting detail: OUTPATIENT SURGERY
Discharge: HOME | End: 2023-10-31
Attending: ORTHOPAEDIC SURGERY | Admitting: ORTHOPAEDIC SURGERY
Payer: COMMERCIAL

## 2023-10-31 ENCOUNTER — ANESTHESIA (OUTPATIENT)
Dept: PERIOP | Facility: HOSPITAL | Age: 38
End: 2023-10-31
Payer: COMMERCIAL

## 2023-10-31 VITALS
OXYGEN SATURATION: 98 % | HEART RATE: 80 BPM | SYSTOLIC BLOOD PRESSURE: 122 MMHG | DIASTOLIC BLOOD PRESSURE: 73 MMHG | TEMPERATURE: 96.8 F | RESPIRATION RATE: 16 BRPM

## 2023-10-31 DIAGNOSIS — M25.362 PATELLAR INSTABILITY OF LEFT KNEE: ICD-10-CM

## 2023-10-31 DIAGNOSIS — M22.02 RECURRENT DISLOCATION OF LEFT PATELLA: Primary | ICD-10-CM

## 2023-10-31 DIAGNOSIS — Z98.890 S/P LEFT KNEE SURGERY: ICD-10-CM

## 2023-10-31 DIAGNOSIS — Z30.41 ENCOUNTER FOR SURVEILLANCE OF CONTRACEPTIVE PILLS: ICD-10-CM

## 2023-10-31 LAB
EXT PREGNANCY TEST URINE: NEGATIVE
EXT. CONTROL: NORMAL

## 2023-10-31 PROCEDURE — C1713 ANCHOR/SCREW BN/BN,TIS/BN: HCPCS | Performed by: ORTHOPAEDIC SURGERY

## 2023-10-31 PROCEDURE — 27427 RECONSTRUCTION KNEE: CPT | Performed by: ORTHOPAEDIC SURGERY

## 2023-10-31 PROCEDURE — C9290 INJ, BUPIVACAINE LIPOSOME: HCPCS | Performed by: ANESTHESIOLOGY

## 2023-10-31 PROCEDURE — NC001 PR NO CHARGE: Performed by: ORTHOPAEDIC SURGERY

## 2023-10-31 PROCEDURE — 29873 ARTHRS KNEE SURG W/LAT RLS: CPT | Performed by: ORTHOPAEDIC SURGERY

## 2023-10-31 PROCEDURE — 81025 URINE PREGNANCY TEST: CPT | Performed by: ORTHOPAEDIC SURGERY

## 2023-10-31 DEVICE — TENDON GRAFT GRACILIS: Type: IMPLANTABLE DEVICE | Site: KNEE | Status: FUNCTIONAL

## 2023-10-31 DEVICE — BIO-COMP INTERFSCRW W/DISP SHTH
Type: IMPLANTABLE DEVICE | Site: KNEE | Status: FUNCTIONAL
Brand: ARTHREX®

## 2023-10-31 DEVICE — SUTR ANCH,BIO-COMP S-TAK
Type: IMPLANTABLE DEVICE | Site: KNEE | Status: FUNCTIONAL
Brand: ARTHREX®

## 2023-10-31 RX ORDER — SODIUM CHLORIDE, SODIUM LACTATE, POTASSIUM CHLORIDE, CALCIUM CHLORIDE 600; 310; 30; 20 MG/100ML; MG/100ML; MG/100ML; MG/100ML
INJECTION, SOLUTION INTRAVENOUS CONTINUOUS PRN
Status: DISCONTINUED | OUTPATIENT
Start: 2023-10-31 | End: 2023-10-31

## 2023-10-31 RX ORDER — SODIUM CHLORIDE, SODIUM LACTATE, POTASSIUM CHLORIDE, CALCIUM CHLORIDE 600; 310; 30; 20 MG/100ML; MG/100ML; MG/100ML; MG/100ML
50 INJECTION, SOLUTION INTRAVENOUS CONTINUOUS
Status: DISCONTINUED | OUTPATIENT
Start: 2023-10-31 | End: 2023-10-31 | Stop reason: HOSPADM

## 2023-10-31 RX ORDER — TRAMADOL HYDROCHLORIDE 50 MG/1
50 TABLET ORAL EVERY 6 HOURS PRN
Status: DISCONTINUED | OUTPATIENT
Start: 2023-10-31 | End: 2023-10-31 | Stop reason: HOSPADM

## 2023-10-31 RX ORDER — FENTANYL CITRATE/PF 50 MCG/ML
50 SYRINGE (ML) INJECTION
Status: DISCONTINUED | OUTPATIENT
Start: 2023-10-31 | End: 2023-10-31 | Stop reason: HOSPADM

## 2023-10-31 RX ORDER — SODIUM CHLORIDE, SODIUM LACTATE, POTASSIUM CHLORIDE, CALCIUM CHLORIDE 600; 310; 30; 20 MG/100ML; MG/100ML; MG/100ML; MG/100ML
100 INJECTION, SOLUTION INTRAVENOUS CONTINUOUS
Status: DISCONTINUED | OUTPATIENT
Start: 2023-10-31 | End: 2023-10-31 | Stop reason: HOSPADM

## 2023-10-31 RX ORDER — FENTANYL CITRATE 50 UG/ML
INJECTION, SOLUTION INTRAMUSCULAR; INTRAVENOUS AS NEEDED
Status: DISCONTINUED | OUTPATIENT
Start: 2023-10-31 | End: 2023-10-31

## 2023-10-31 RX ORDER — DEXAMETHASONE SODIUM PHOSPHATE 10 MG/ML
INJECTION, SOLUTION INTRAMUSCULAR; INTRAVENOUS AS NEEDED
Status: DISCONTINUED | OUTPATIENT
Start: 2023-10-31 | End: 2023-10-31

## 2023-10-31 RX ORDER — ONDANSETRON 2 MG/ML
4 INJECTION INTRAMUSCULAR; INTRAVENOUS ONCE AS NEEDED
Status: COMPLETED | OUTPATIENT
Start: 2023-10-31 | End: 2023-10-31

## 2023-10-31 RX ORDER — FENTANYL CITRATE 50 UG/ML
INJECTION, SOLUTION INTRAMUSCULAR; INTRAVENOUS
Status: COMPLETED | OUTPATIENT
Start: 2023-10-31 | End: 2023-10-31

## 2023-10-31 RX ORDER — DIPHENHYDRAMINE HYDROCHLORIDE 50 MG/ML
12.5 INJECTION INTRAMUSCULAR; INTRAVENOUS ONCE AS NEEDED
Status: DISCONTINUED | OUTPATIENT
Start: 2023-10-31 | End: 2023-10-31 | Stop reason: HOSPADM

## 2023-10-31 RX ORDER — CEFAZOLIN SODIUM 2 G/50ML
2000 SOLUTION INTRAVENOUS ONCE
Status: COMPLETED | OUTPATIENT
Start: 2023-10-31 | End: 2023-10-31

## 2023-10-31 RX ORDER — ONDANSETRON 2 MG/ML
INJECTION INTRAMUSCULAR; INTRAVENOUS AS NEEDED
Status: DISCONTINUED | OUTPATIENT
Start: 2023-10-31 | End: 2023-10-31

## 2023-10-31 RX ORDER — ACETAMINOPHEN 325 MG/1
650 TABLET ORAL EVERY 6 HOURS PRN
Status: DISCONTINUED | OUTPATIENT
Start: 2023-10-31 | End: 2023-10-31 | Stop reason: HOSPADM

## 2023-10-31 RX ORDER — LIDOCAINE HYDROCHLORIDE 10 MG/ML
INJECTION, SOLUTION EPIDURAL; INFILTRATION; INTRACAUDAL; PERINEURAL AS NEEDED
Status: DISCONTINUED | OUTPATIENT
Start: 2023-10-31 | End: 2023-10-31

## 2023-10-31 RX ORDER — BUPIVACAINE HYDROCHLORIDE 5 MG/ML
INJECTION, SOLUTION EPIDURAL; INTRACAUDAL
Status: COMPLETED | OUTPATIENT
Start: 2023-10-31 | End: 2023-10-31

## 2023-10-31 RX ORDER — PROPOFOL 10 MG/ML
INJECTION, EMULSION INTRAVENOUS AS NEEDED
Status: DISCONTINUED | OUTPATIENT
Start: 2023-10-31 | End: 2023-10-31

## 2023-10-31 RX ORDER — MAGNESIUM HYDROXIDE 1200 MG/15ML
LIQUID ORAL AS NEEDED
Status: DISCONTINUED | OUTPATIENT
Start: 2023-10-31 | End: 2023-10-31 | Stop reason: HOSPADM

## 2023-10-31 RX ORDER — MIDAZOLAM HYDROCHLORIDE 2 MG/2ML
INJECTION, SOLUTION INTRAMUSCULAR; INTRAVENOUS
Status: COMPLETED | OUTPATIENT
Start: 2023-10-31 | End: 2023-10-31

## 2023-10-31 RX ORDER — OXYCODONE HYDROCHLORIDE 5 MG/1
5 TABLET ORAL EVERY 4 HOURS PRN
Qty: 15 TABLET | Refills: 0 | Status: SHIPPED | OUTPATIENT
Start: 2023-10-31

## 2023-10-31 RX ADMIN — MIDAZOLAM 2 MG: 1 INJECTION INTRAMUSCULAR; INTRAVENOUS at 10:15

## 2023-10-31 RX ADMIN — FENTANYL CITRATE 50 MCG: 50 INJECTION, SOLUTION INTRAMUSCULAR; INTRAVENOUS at 10:15

## 2023-10-31 RX ADMIN — FENTANYL CITRATE 50 MCG: 50 INJECTION, SOLUTION INTRAMUSCULAR; INTRAVENOUS at 13:18

## 2023-10-31 RX ADMIN — SODIUM CHLORIDE, SODIUM LACTATE, POTASSIUM CHLORIDE, AND CALCIUM CHLORIDE: .6; .31; .03; .02 INJECTION, SOLUTION INTRAVENOUS at 10:00

## 2023-10-31 RX ADMIN — FENTANYL CITRATE 25 MCG: 50 INJECTION, SOLUTION INTRAMUSCULAR; INTRAVENOUS at 12:47

## 2023-10-31 RX ADMIN — SODIUM CHLORIDE, SODIUM LACTATE, POTASSIUM CHLORIDE, AND CALCIUM CHLORIDE: .6; .31; .03; .02 INJECTION, SOLUTION INTRAVENOUS at 12:21

## 2023-10-31 RX ADMIN — CEFAZOLIN SODIUM 2000 MG: 2 SOLUTION INTRAVENOUS at 11:15

## 2023-10-31 RX ADMIN — EPINEPHRINE: 1 INJECTION, SOLUTION, CONCENTRATE INTRAVENOUS at 11:46

## 2023-10-31 RX ADMIN — PROPOFOL 200 MG: 10 INJECTION, EMULSION INTRAVENOUS at 11:22

## 2023-10-31 RX ADMIN — FENTANYL CITRATE 50 MCG: 50 INJECTION, SOLUTION INTRAMUSCULAR; INTRAVENOUS at 11:46

## 2023-10-31 RX ADMIN — BUPIVACAINE 20 ML: 13.3 INJECTION, SUSPENSION, LIPOSOMAL INFILTRATION at 10:20

## 2023-10-31 RX ADMIN — TRAMADOL HYDROCHLORIDE 50 MG: 50 TABLET, COATED ORAL at 14:03

## 2023-10-31 RX ADMIN — FENTANYL CITRATE 25 MCG: 50 INJECTION, SOLUTION INTRAMUSCULAR; INTRAVENOUS at 12:16

## 2023-10-31 RX ADMIN — BUPIVACAINE HYDROCHLORIDE 7 ML: 5 INJECTION, SOLUTION EPIDURAL; INTRACAUDAL at 10:20

## 2023-10-31 RX ADMIN — LIDOCAINE HYDROCHLORIDE 50 MG: 10 INJECTION, SOLUTION EPIDURAL; INFILTRATION; INTRACAUDAL; PERINEURAL at 11:22

## 2023-10-31 RX ADMIN — FENTANYL CITRATE 50 MCG: 50 INJECTION, SOLUTION INTRAMUSCULAR; INTRAVENOUS at 11:26

## 2023-10-31 RX ADMIN — ONDANSETRON 4 MG: 2 INJECTION INTRAMUSCULAR; INTRAVENOUS at 13:14

## 2023-10-31 RX ADMIN — DEXAMETHASONE SODIUM PHOSPHATE 10 MG: 10 INJECTION, SOLUTION INTRAMUSCULAR; INTRAVENOUS at 11:23

## 2023-10-31 RX ADMIN — ONDANSETRON 4 MG: 2 INJECTION INTRAMUSCULAR; INTRAVENOUS at 11:23

## 2023-10-31 RX ADMIN — FENTANYL CITRATE 50 MCG: 50 INJECTION, SOLUTION INTRAMUSCULAR; INTRAVENOUS at 13:22

## 2023-10-31 NOTE — ANESTHESIA PROCEDURE NOTES
Peripheral Block    Patient location during procedure: holding area  Start time: 10/31/2023 10:10 AM  Reason for block: at surgeon's request and post-op pain management  Staffing  Performed by: Blanca Gallo MD  Authorized by: Blanca Gallo MD    Preanesthetic Checklist  Completed: patient identified, IV checked, site marked, risks and benefits discussed, surgical consent, monitors and equipment checked, pre-op evaluation and timeout performed  Peripheral Block  Patient position: supine  Prep: ChloraPrep  Patient monitoring: frequent blood pressure checks, continuous pulse oximetry and heart rate  Block type: Adductor Canal  Laterality: left  Injection technique: single-shot  Procedures: ultrasound guided, Ultrasound guidance required for the procedure to increase accuracy and safety of medication placement and decrease risk of complications.   Ultrasound permanent image savedbupivacaine (PF) (MARCAINE) 0.5 % injection 20 mL - Perineural   7 mL - 10/31/2023 10:20:00 AM  bupivacaine liposomal (EXPAREL) 1.3 % injection 20 mL - Perineural   20 mL - 10/31/2023 10:20:00 AM  fentanyl citrate (PF) 100 MCG/2ML 50 mcg - Intravenous   50 mcg - 10/31/2023 10:15:00 AM  midazolam (VERSED) injection 0.5 mg - Intravenous   2 mg - 10/31/2023 10:15:00 AM  Needle  Needle type: Stimuplex   Needle gauge: 20 G  Needle length: 4 in  Needle localization: anatomical landmarks and ultrasound guidance  Assessment  Injection assessment: incremental injection, frequent aspiration, injected with ease, negative aspiration, negative for heart rate change, no paresthesia on injection, no symptoms of intraneural/intravenous injection and needle tip visualized at all times  Paresthesia pain: none  Post-procedure:  site cleaned  patient tolerated the procedure well with no immediate complications

## 2023-10-31 NOTE — ANESTHESIA POSTPROCEDURE EVALUATION
Post-Op Assessment Note    CV Status:  Stable  Pain Score: 1    Pain management: adequate     Mental Status:  Alert and awake   Hydration Status:  Stable   PONV Controlled:  None   Airway Patency:  Patent      Post Op Vitals Reviewed: Yes      Staff: CRNA         No notable events documented.     /72 (10/31/23 1254)    Temp (!) 97.4 °F (36.3 °C) (10/31/23 1254)    Pulse 93 (10/31/23 1254)   Resp 16 (10/31/23 1254)    SpO2 97 % (10/31/23 1254)

## 2023-10-31 NOTE — NURSING NOTE
Pt able to eat/drink. Pain acceptable; pt in no distress. IV removed, AVS instructions given. Pt verbalized understanding.

## 2023-10-31 NOTE — OP NOTE
OPERATIVE REPORT  PATIENT NAME: Viry January    :  1985  MRN: [id number]  Pt Location:  OR ROOM 12    SURGERY DATE: 10/31/2023    Surgeon(s) and Role:     * Wilbert Michael DO - Primary     * Dilshad Hernández PA-C - Assisting     * Tanya Mendoza MD - Assisting    Preop Diagnosis:  Patellar instability of left knee [M25.362]  Recurrent dislocation of left patella [M22.02]    Post-Op Diagnosis Codes:     * Patellar instability of left knee [M25.362]     * Recurrent dislocation of left patella [M22.02]    Procedure(s):  Left - OPEN MPFL RECONSTRUCTION WITH ALLOGRAFT  Left - RELEASE RETINACULAR    Specimen(s):  * No specimens in log *    Estimated Blood Loss:   Minimal    Drains:  * No LDAs found *    Anesthesia Type:   General    Operative Indications:  Patellar instability of left knee [M25.362]  Recurrent dislocation of left patella [Q53.07]    Complications:   None    Procedure and Technique:      PREOPERATIVE DIAGNOSIS:  Left knee Patellar maltracking   Left knee Patellar Instability     POSTOPERATIVE DIAGNOSIS:  Left knee Patellar maltracking   Left knee Patellar Instability       PROCEDURE:  1. Left knee MPFL reconstruction with gracilis allograft   2. Surgical arthroscopy of the left knee with lateral release     ANESTHESIA:  General      ESTIMATED BLOOD LOSS:  minimal.     COMPLICATIONS:  None. DRAINS:  None. TOURNIQUET:  30 minutes     OPERATIVE INDICATIONS:     Based on MRI and sxs referable to recurrent patella instability and maltracking, and having failed nonop measures, we recommended surgical intervention. We discussed the risks and benefits of surgery along with the probabilities of each with the patient. The risks include but are not limited to: infection (superficial or deep), bleeding, nerve damage (direct or indirect), post operative stiffness, blood clots, pulmonary embolus, death from anesthesia.      They elect to proceed with MPFL reconstruction using hamstring allograft, as well as arthroscopy with lateral release     FINDINGS:  Examination under anesthesia revealed a range of motion in the left knee from about -5 to 135 degrees of flexion. The right knee had -5 degrees of hyperextension to 135 degrees of flexion. In the left knee, there was a Negative lachman and negative pivot shift. The knee was stable to valgus and varus stress. Negative posterior drawer. Negative posterolateral drawer. There was significant left patellar instability with translation of 3/4 distance of the patellar width. DESCRIPTION OF PROCEDURE:           Informed consent and initialing of the left knee in preoperative holding. General with LMA  anesthesia. Patient supine. Sterile prep and drape of the left lower extremity. Antibiotics given within 1 hour of skin incision. Time-out prior to the skin incision. Findings:      Arthroscopic evaluation of the knee revealed the following:   Medial meniscus: No tears. Medial femoral condyle:Grade 0 chondral defects. Medial tibial plateau: Grade 0 chondral defects. Anterior cruciate ligament: No tears. .. Posterior cruciate ligament: Normal appearance. Lateral meniscus: No tears. Lateral femoral condyle: Grade 0 chondral defects. Lateral tibial plateau: Grade 0 chondral defects. Medial and lateral gutters: No loose bodies. Patella: Grade 0 chondral defects. Trochlea: Grade 0 chondral defects. Medial plica: No significant plica was present. .      Procedure: In the pre-operative holding area, the patient identified the correct operative extremity and I marked that extremity with my initials, using a permanent marker. The patient was then brought to the operating room and positioned supine. Following satisfactory induction of anesthesia, the  knee was prepped and draped in the usual sterile fashion for surgical arthroscopy of the left knee.  Before any surgical instrumentation was passed to me by the surgical technician, a formalized time-out occurred, which involves the surgeon, circulating nurse, and anesthesia staff all verifying the correct operative extremity. My initials were visible on the prepped and draped operative field. The anatomic landmarks of the anteromedial and anterolateral portals were marked. The anterolateral portal was established with a scalpel. The arthroscope was introduced through this portal. Under direct visualization, the anteromedial portal was established with a localizing needle followed by a scalpel. A probe was then introduced into the anteromedial portal. A systematic diagnostic arthroscopy evaluated the following: medial compartment, notch, lateral compartment, patellofemoral compartment, medial gutter, and lateral gutter. Patella lateral maltracking and lateral tilting were confirmed arthroscopically. Consequently, a release of the lateral retinaculum was performed using the electrocautery tool Subsequently, the patella tracked more centrally. Tilting resolved. At this time, the point at the junction between the proximal one third and the middle one third of the patella was identified and was verified fluoroscopically. This was to be the patellar origin of the MPFL graft. 3cm incision made -- interval between layer 2 and 3 located. A small rongeur and a handheld bur was used to make a small trough approximately 3 mm in width and 2 cm long at the planned location of the patellar fixation of our MPFL graft. 2×3.0mm Arthrex suturetak anchors were placed into this spot approximately 1-1/2 cm apart. A gracilis allograft 260 mm in length was doubled and the looped end was secured to this point on the patella using 1 limb of each anchor through the looped end of the graft, then tied together.  The graft was drawn down into the trough made into the patella by pulling on the remaining limbs, passing those remaining limbs through the graft, and tying the remaining 2 limbs together on top resulting in 2 knots securing the gracilis graft. This provided a very secure fixation of the looped end of the gracilis allograft on the patella. The gracilis allograft was wrapped in saline and protected with a towel while attention was turned to the femoral side of the MPFL reconstruction. At this time, fluoroscopy was used to guide an incision based on the medial epicondyle of the femur. A 3 cm incision was made over this area and blunt dissection was taken down to the fascia. 15 blade was used to cut the fascia longitudinally approximately 3 cm. Blunt dissection was taken down to the abductor tubercle and the medial epicondyle. The small valley intervening these 2 structures was identified. A Beath pin was placed provisionally in the spot and was verified fluoroscopically as being an adequate placement for the femoral attachment of the MPFL graft that we were to place. Once fluoroscopically confirmed, the Beath pin was inserted into the medial femoral condyle and out the lateral cortex and lateral skin at the anterior one third of the iliotibial band. Blunt retraction was used throughout this procedure to protect all surrounding structures. A blunt tonsil was used to deliver a suture shuttle from the patella down to this location. A loop was placed on the patella side and the 2 free ends of the gracilis allograft were delivered in the interval between layer 2 and 3 down into our medial thigh incision. Care was taken to tension the graft down to the pin while fluoroscopy was used to provide a perfect AP of the knee in 60° of flexion. This was done to ensure that we had the appropriate length going into the planned tunnel, that the patella was centrally tracking, and that we were not going to over constrain the patella.  In addition, the free ends of the graft were looped around the Beath pin and the knee was taken through a full range of motion to ensure optimal graft isometry prior to reaming. Once the appropriate length was decided, the gracilis allograft free ends were whipstitched together from that point meeting the aperture of the tunnel to approximately 25mm past this point. Approximately 2 cm of extra tendon was removed. A 7 mm reamer was placed over this pin, and this was taken approximately 5 cm into the medial femoral condyle. The ends of the allograft were placed into the Beath pin eyelet and brought out laterally. Again, tension was carefully applied to ensure central tracking of the patella without overconstraining the patella. At this juncture, a 7 x 23 mm Arthrex Bio-Tenodesis screw was brought to the field and was then delivered into the tunnel along with the stitched end of the gracilis allograft. The screw was delivered smoothly into the tunnel that was created until flush with bone. The knee was taken through a full range of motion, and there was a good endpoint with lateral patellar displacement. The patella was tracking centrally throughout the range. Wounds were copiously irrigated using sterile saline. Meticulous hemostasis was obtained. Deep subcutaneous layer closed with 0-vicyl in an inverted interrupted fashion. This was followed by 2-0 vicryl interrupted for superficial subcutaneous tissue and 3-0 monocryl in a buried fashion     Arthroscopic portals were closed with 3-0 monocryl sutures. A sterile dressing was applied. A brace was applied. The patient tolerated the procedure well was taken to the recovery room stable condition. IMPLANTS USED:  2 x 3.0mm PEEK suturetak anchors  1 x 7x23 PEEK biotenodesis screw  1 x Gracilis allograft     POSTOPERATIVE PLAN:  The patient will be WBAT with brace locked in extension (about 6-8 weeks). I was present for the entire procedure., A qualified resident physician was not available. , and A physician assistant was required during the procedure for retraction, tissue handling, dissection and suturing.     Patient Disposition:  PACU         SIGNATURE: Julius Beltran DO  DATE: October 31, 2023  TIME: 10:38 AM

## 2023-10-31 NOTE — DISCHARGE INSTR - AVS FIRST PAGE
POSTOPERATIVE INSTRUCTIONS following KNEE SURGERY    MEDICATIONS:  Resume all home medications unless otherwise instructed by your surgeon. Pain Medication:  Oxycodone 5 mg, 1 tablet every 4 hours as needed  If you were given a regional anesthetic (nerve block), please begin taking the pain medication as soon as you get home, even if you have minimal or no pain. DO NOT WAIT FOR THE NERVE BLOCK TO WEAR OFF. Possible side effects include nausea, constipation, and urinary retention. If you experience these side effects, please call our office for assistance. Pain med refills are authorized only during office hours (8am-4pm, Mon-Fri). Anti-Inflammatory:  Ibuprofen 600 mg, 1 tablet every 8 hours for 4 weeks and Tylenol 325 mg, 1-2 tablets every 6 hours for 4 weeks  Take with food. Stop if you experience nausea, reflux, or stomach pain. Nausea Medication:  None  Fill prescription ONLY if you expericnce severe nausea. Blood Clot Prevention:  Aspirin 81 mg, 1 tablet twice daily for 6 weeks  Pump your foot up and down 20 times per hour while you are less mobile. WOUND CARE:  Keep the dressing clean and dry. Light drainage may occur the first 2 days postop. You may remove the dressings and get the incision wet in the shower 72 hours after surgery. DO NOT remove steri-strips or sutures. DO NOT immerse the incision under water. Carefully pat the incision dry. If there is wound drainage, re-apply a fresh dry gauze dressing. Please call our office ([phone]) if you experience either of the following:  Sudden increase in swelling, redness, or warmth at the surgical site  Excessive incisional drainage that persists beyond the 3rd day after surgery  Oral temperature greater than 101 degrees, not relieved with Tylenol  Shortness of breath, chest pain, nausea, or any other concerning symptoms    SWELLING CONTROL:  Cold Therapy:   The cold therapy device may be used either continuously or only as needed, according to your preference. Do not let the pad directly touch your skin. Alternatively, apply ice (20 min on, 20 min off) as often as you feel is necessary. Elevation:  Elevate the entire leg above heart level. Place pillows under your ankle to keep your knee straight. Compression:  Apply ACE wraps or a thigh-length compression stocking as needed. RANGE OF MOTION:  You are allowed LIMITED RANGE OF MOTION from 0 degrees (full extension) to 90 degrees of flexion    IMMOBILIZATION:  Your knee brace should be WORN AT ALL TIMES, including sleep. Keep the brace LOCKED FOR WALKING. You may unlock the brace while sitting or sleeping. You may remove the brace only for showering. ACTIVITY:   BEAR FULL WEIGHT AS TOLERATED on the operative leg. Use crutches to assist only as needed. Using Crutches on Stairs:  Going up, lead with your "good" (nonoperative) leg. Going down, lead with your "bad" (operative) leg. Use a hand rail when available. Knee Extension:  Place a rolled towel or pillow under your ankle for 20-30 minutes 3-5 times per day. This will help to maintain full knee extension. Quad Sets:  Sit or lie with your knee straight. Tighten your quadriceps (front thigh) muscle. Hold for 3 seconds, then relax. Repeat 20 times per hour while awake. PHYSICAL THERAPY:  Begin therapy 3 TO 5 DAYS AFTER SURGERY. You were given a prescription for therapy at your preoperative office visit. If you do not have physical therapy scheduled yet, please call our office for assistance. FOLLOW-UP APPOINTMENT:  7-10 days after surgery with:    Dr. Suly Milligan, D.OAntonio   [address] AMBAR Brown, [address] [place] [address]  [phone] ([address] [place] [address])  [phone] (After-Hours)

## 2023-10-31 NOTE — H&P
Ortho Sports Medicine Knee Follow Up Visit     Assesment:       41 y.o. female left knee continued patellar instability and anterior knee pain with grade 3 chondral fissuring lateral facet TT-TG1.6 CM  History recurrent patella subluxations/dislocations      Plan:    Conservative treatment:    Ice to knee for 20 minutes at least 1-2 times daily. OTC NSAIDS prn for pain. Patient has tried and failed conservative treatment of J-bracing >6 months of continuous use. She also has attended PT in the past regarding treatment for patellar dislocation, August of 2022 and has continued to preform these exercises 3-5 times a week for 1hr in duration. We will upload her exercise program into her chart. Imaging: All imaging from today was reviewed by myself and explained to the patient. Injection:    No Injection planned at this time. Surgery: All of the risks and benefits of operative treatment were explained to the patient, as well as the risks and benefits of any alternative treatment options, including nonoperative care. The risks of surgical treatement include, but are not limited to, infection, bleeding, blood clot, neurovascular damage, need for further surgery, continued pain, cardiovascular risk, and anesthesia risk. The patient understood this and elects to proceed forward with surgical intervention. We will proceed forward with surgical arthroscopy of the knee with lateral release and open MPFL reconstruction with allograft     Patient Denies history of blood clot. Patient denies personal history of bleeding or clotting disorder. Patient's Mother and Brother have Factor V Leiden but the patient has been tested and is negative. Patient does not take any blood thinners. Patient has prior cardiac history of [facility], in which she didundergo ablation and has been cleared from cardiology and no longer follows up with them. Patient denies  high blood pressure.  Patient does not  see a cardiologist. Patient Denies  current history of diabetes. Patient has allergies to antibiotics, Sulfa Abx and Erythromycin. Patient denies history of MRSA infection. Patient Denies current tobacco use. Discussed with patient use of narcotics for post-op pain. Patient denies history of opioid abuse. Medical clearance will be obtained. Bloodwork will not be obtained. EKG will not be obtained. Follow up:    No follow-ups on file. No chief complaint on file. History of Present Illness: The patient is returns for follow up of bilateral knee pain and patellar instability, left worse than right. Since the prior visit, She reports minimal improvement. She is here to review MRI of left knee. History patella dislocation when younger with recurrent subluxation,dislocation episodes. Pain is located anterior, posterior, lateral aspect of knee. She also has attended PT in the past regarding treatment for patellar dislocation, August of 2022 and has continued to preform these exercises 3-5 times a week for 1hr in duration. She has tried J bracing>6 months as well. Pain is improved by rest.  Pain is aggravated by weight bearing. Symptoms include clicking. The patient has tried rest.    She notes continued instability.       Knee Surgical History:  None      Past Medical, Social and Family History:  Past Medical History:   Diagnosis Date    Abnormal Pap smear of cervix     Anxiety     Bipolar disorder ([address])     COVID-19 01/12/2022    10/27/23 Per pt had 3 separate times - last year being the last episode    Depression     Eczema     Exposure to chlamydia     Resolved 06/09/17    Knee pain, left     Migraine without aura and without status migrainosus, not intractable 10/11/2019    Obesity     Post traumatic stress disorder 12/13/2021    Postpartum depression 08/09/2018    TMJ (dislocation of temporomandibular joint)     causes snoring    Manoj-Parkinson-White (WPW) syndrome Past Surgical History:   Procedure Laterality Date    ABLATION OF DYSRHYTHMIC FOCUS      WPW ablation age 14    COLONOSCOPY      COLPOSCOPY      INDUCED       OTHER SURGICAL HISTORY      catheter ablation- WPW age 13    TONSILLECTOMY      WISDOM TOOTH EXTRACTION       Allergies   Allergen Reactions    Codeine Other (See Comments)     dry heaves    Sulfa Antibiotics Hives and Rash     Per pt as achild    Erythromycin Hives     Per as a child     No current facility-administered medications on file prior to encounter.      Current Outpatient Medications on File Prior to Encounter   Medication Sig Dispense Refill    naproxen (NAPROSYN) 500 mg tablet Take 1 tablet (500 mg total) by mouth 2 (two) times a day with meals (Patient taking differently: Take 500 mg by mouth 2 (two) times a day with meals) 30 tablet 0    ibuprofen (MOTRIN) 600 mg tablet Take 1 tablet (600 mg total) by mouth every 6 (six) hours as needed for mild pain, moderate pain or fever 30 tablet 0     Social History     Socioeconomic History    Marital status: Single     Spouse name: Not on file    Number of children: 2    Years of education: Not on file    Highest education level: Some college, no degree   Occupational History    Occupation: works in retail   Tobacco Use    Smoking status: Never    Smokeless tobacco: Never    Tobacco comments:     Never a smoker or use of any tobacco products per pt    Vaping Use    Vaping Use: Never used   Substance and Sexual Activity    Alcohol use: Yes     Comment: Occasional/special occasions    Drug use: No     Comment: Denies any drug use per pt    Sexual activity: Yes     Partners: Male     Birth control/protection: Condom Male, OCP     Comment: Denies any chest pain or shortness of breath with activity   Other Topics Concern    Not on file   Social History Narrative    Education: high school graduate and some college    Learning Disabilities: none    Marital History: single    Children: 1 daughter, 1 son    Living Arrangement: lives in home with boyfriend, 2 children and boyfriend's 2 children    Occupational History: works part time at [employer] and [employer] works    Functioning Relationships: boyfriend is supportive    Legal History: none     History: None         Family planning    IUD contraception     Social Determinants of Health     Financial Resource Strain: Medium Risk (2/4/2021)    Overall Financial Resource Strain (CARDIA)     Difficulty of Paying Living Expenses: Somewhat hard   Food Insecurity: Food Insecurity Present (2/4/2021)    Hunger Vital Sign     Worried About [place] in the Last Year: Sometimes true     [address] in the Last Year: Sometimes true   Transportation Needs: Unmet Transportation Needs (2/4/2021)    PRAPARE - Transportation     Lack of Transportation (Medical): Yes     Lack of Transportation (Non-Medical): Yes   Physical Activity: Inactive (11/11/2019)    Exercise Vital Sign     Days of Exercise per Week: 0 days     Minutes of Exercise per Session: 0 min   Stress: Stress Concern Present (11/11/2019)    [address] [facility]     Feeling of Stress : Rather much   Social Connections: Socially Isolated (11/11/2019)    Social Connection and Isolation Panel [NHANES]     Frequency of Communication with Friends and Family: Once a week     Frequency of Social Gatherings with Friends and Family: Once a week     Attends Latter-day Services: Never     Active Member of Clubs or Organizations: No     Attends Club or Organization Meetings: Never     Marital Status: Never    Intimate Partner Violence: Not At Risk (11/11/2019)    Humiliation, Afraid, Rape, and Kick questionnaire     Fear of Current or Ex-Partner: No     Emotionally Abused: No     Physically Abused: No     Sexually Abused: No   Housing Stability: Not on file         I have reviewed the past medical, surgical, social and family history, medications and allergies as documented in the EMR. Review of systems: ROS is negative other than that noted in the HPI. Constitutional: Negative for fatigue and fever. Physical Exam:    Blood pressure 110/58, pulse 84, temperature 97.9 °F (36.6 °C), temperature source Temporal, resp. rate 17, last menstrual period 09/26/2023, SpO2 96 %, not currently breastfeeding. General/Constitutional: NAD, well developed, well nourished  HENT: Normocephalic, atraumatic  CV: Intact distal pulses, regular rate  Resp: No respiratory distress or labored breathing  GI: Soft and non-tender   Lymphatic: No lymphadenopathy palpated  Neuro: Alert and Oriented x 3, no focal deficits  Psych: Normal mood, normal affect, normal judgement, normal behavior  Skin: Warm, dry, no rashes, no erythema      Knee Exam (focused): RIGHT LEFT   ROM:   0-130 0-130   Palpation: Effusion negative negative     MJL tenderness Negative Negative     LJL tenderness Negative Negative   Meniscus: Johanne Negative Negative    Apley's Compression Negative Negative   Instability: Varus stable stable     Valgus stable stable   Special Tests: Lachman Negative Negative     Posterior drawer Negative Negative     Anterior drawer Negative Negative     Pivot shift not tested not tested     Dial not tested not tested   Patella: Palpation medial facet ttp medial facet ttp     Mobility 3/4 3/4     Apprehension Positive Positive   Other: Single leg 1/4 squat not tested not tested           LE NV Exam: +2 DP/PT pulses bilaterally  Sensation intact to light touch L2-S1 bilaterally    No calf tenderness to palpation bilaterally      Knee Imaging    MRI imaging of left knee reviewed today demonstrates grade 3 chondral fissuring lateral patellar facet, no meniscal or ligamentous tears. TT-TG 1.6 CM.   I have reviewed the report and agree with the impression      Scribe Attestation      I,:   am acting as a scribe while in the presence of the attending physician.: I,:   personally performed the services described in this documentation    as scribed in my presence.:

## 2023-11-06 ENCOUNTER — EVALUATION (OUTPATIENT)
Dept: PHYSICAL THERAPY | Facility: REHABILITATION | Age: 38
End: 2023-11-06
Payer: COMMERCIAL

## 2023-11-06 DIAGNOSIS — Z98.890 S/P LEFT KNEE SURGERY: ICD-10-CM

## 2023-11-06 DIAGNOSIS — M25.362 PATELLAR INSTABILITY OF LEFT KNEE: ICD-10-CM

## 2023-11-06 DIAGNOSIS — M22.02 RECURRENT DISLOCATION OF LEFT PATELLA: ICD-10-CM

## 2023-11-06 PROCEDURE — 97110 THERAPEUTIC EXERCISES: CPT | Performed by: PHYSICAL THERAPIST

## 2023-11-06 PROCEDURE — 97161 PT EVAL LOW COMPLEX 20 MIN: CPT | Performed by: PHYSICAL THERAPIST

## 2023-11-06 NOTE — PROGRESS NOTES
PT Evaluation     Today's date: 2023  Patient name: Cecilio Garcia  : 1985  MRN: [id number]  Referring provider: Lauren Nance,*  Dx:   Encounter Diagnosis     ICD-10-CM    1. S/P left knee surgery  Z98.890 Ambulatory Referral to Physical Therapy      2. Patellar instability of left knee  M25.362 Ambulatory Referral to Physical Therapy      3. Recurrent dislocation of left patella  M22.02 Ambulatory Referral to Physical Therapy                     Assessment  Assessment details: Patient is a 41 y.o. female that presents status post surgical arthroscopy of the left knee with lateral release and open MPFL reconstruction with allograft on 10/31/2023. Patient presents with decreased strength, decreased ROM, and gait abnormalities. Patient has difficulty with ambulation, negotiation of stairs, transfers, and ADLS secondary to impairments. Patient would benefit from skilled physical therapy services to address impairments to maximize function. Impairments: abnormal gait, abnormal muscle firing, abnormal or restricted ROM, activity intolerance, impaired physical strength, lacks appropriate home exercise program, pain with function and weight-bearing intolerance  Understanding of Dx/Px/POC: good  Goals  Impairment:  1. Patient will reports 50% reduction in pain in 8 weeks to maximize function. 2.  Patient will improve strength to 4/5 in all planes to maximize function. 3.  Patient will improve ROM to [facility] in 8 weeks to maximize function. Functional:  1. Patient will improve FOTO by 37 points to 74/100 in 12 weeks to maximize function. 2. Patient will be independent with HEP in 8 weeks to maximize function. 3. Patient will report no difficulty with ambulation in 12 weeks to maximize function. 4. Patient will report no difficulty with negotiation of stairs in 12 weeks to maximize function.   5. Patient will report no difficulty with ADLS in 12 weeks to maximize function. Plan  Plan details: Patient will be a RE in 4 weeks. Patient would benefit from: skilled physical therapy  Planned modality interventions: cryotherapy  Planned therapy interventions: manual therapy, neuromuscular re-education, patient education, strengthening, stretching, therapeutic activities, therapeutic exercise, home exercise program, functional ROM exercises, flexibility, balance/weight bearing training, abdominal trunk stabilization and activity modification  Frequency: 1-2x/week. Duration in visits: 8  Duration in weeks: 4  Treatment plan discussed with: patient        Subjective Evaluation    History of Present Illness  Date of surgery: 10/31/2023  Mechanism of injury: surgery  Mechanism of injury: Patient presents status post surgical arthroscopy of the left knee with lateral release and open MPFL reconstruction with allograft. Patient reports a chronic history of bilateral knee pain back to childhood. Patient had bene having a locking sensation in her left knee over the last several years which had been getting more frequent. Patient is currently limited with ALS such as bathing and dressing, ambulation, negotiation of stairs, standing, transfers, childcare, and housework. Patient notes her knee feels more unstable over the last few days. Patient Goals  Patient goals for therapy: increased strength, decreased pain, increased motion and independence with ADLs/IADLs    Pain  At best pain ratin  At worst pain ratin  Location: medial/anterior L knee  Quality: sharp    Treatments  Current treatment: physical therapy        Objective     Observations   Left Knee   Positive for effusion and trophic changes. Negative for drainage.      Additional Observation Details  Steri strips in place     Passive Range of Motion   Left Knee   Flexion: 81 degrees with pain  Extension: -5 degrees     Strength/Myotome Testing     Left Hip   Planes of Motion   Flexion: 3+    Right Hip   Planes of Motion   Flexion: 4    Left Knee   Flexion: 4-  Extension: 3-  Quadriceps contraction: poor    Right Knee   Flexion: 4+  Extension: 4  Quadriceps contraction: good    Ambulation   Weight-Bearing Status   Weight-Bearing Status (Left): weight-bearing as tolerated   Assistive device used: crutches    Additional Weight-Bearing Status Details  WBAT with gayathri axillary crutches with brace locked in extension              Precautions: follow protocol       Manuals 11/6            Light patellar mobility                                                    Neuro Re-Ed             Quad set issued            Weight shifting issued                                                                             Ther Ex             Recumbent bike (0-90)             Heel slide flexion issued            SAQ             Ankle pump issued            LLLD knee extension stretch issued                                                   Ther Activity                                       Gait Training                                       Modalities             Ice PRN

## 2023-11-07 ENCOUNTER — TELEMEDICINE (OUTPATIENT)
Dept: BEHAVIORAL/MENTAL HEALTH CLINIC | Facility: CLINIC | Age: 38
End: 2023-11-07
Payer: COMMERCIAL

## 2023-11-07 DIAGNOSIS — F43.10 POST TRAUMATIC STRESS DISORDER: ICD-10-CM

## 2023-11-07 DIAGNOSIS — F31.5 BIPOLAR I DISORDER, MOST RECENT EPISODE DEPRESSED, SEVERE WITH PSYCHOTIC FEATURES (HCC): Primary | Chronic | ICD-10-CM

## 2023-11-07 DIAGNOSIS — F41.1 GAD (GENERALIZED ANXIETY DISORDER): Chronic | ICD-10-CM

## 2023-11-07 PROCEDURE — 90837 PSYTX W PT 60 MINUTES: CPT | Performed by: SOCIAL WORKER

## 2023-11-07 NOTE — PSYCH
Virtual Regular Visit    Verification of patient location:    Patient is located at Home in the following state in which I hold an active license [place]    Assessment/Plan:    Problem List Items Addressed This Visit          Other    Bipolar I disorder, most recent episode depressed, severe with psychotic features ([address] ) - Primary (Chronic)    SHELLY (generalized anxiety disorder) (Chronic)    Post traumatic stress disorder     Goals addressed in session: Goal 1      Reason for visit is   Chief Complaint   Patient presents with    Virtual Regular Visit     Encounter provider Lafe Mortimer, SW    Provider located at [address] [place] [id number] [phone]    Recent Visits  No visits were found meeting these conditions. Showing recent visits within past 7 days and meeting all other requirements  Today's Visits  Date Type Provider Dept   11/07/23 [address] [place] [address],[address] today's visits and meeting all other requirements  Future Appointments  No visits were found meeting these conditions. Showing future appointments within next 150 days and meeting all other requirements     The patient was identified by name and date of birth. Cecilio Garcia was informed that this is a telemedicine visit and [facility] She agrees to proceed. .  My office door was closed. No one else was in the room. She acknowledged consent and understanding of privacy and security of the video platform. The patient has agreed to participate and understands they can discontinue the visit at any time. Patient is aware this is a billable service. Subjective  Cecilio Garcia is a 41 y.o. female.     HPI     Past Medical History:   Diagnosis Date    Abnormal Pap smear of cervix     Anxiety     Bipolar disorder ([address] [facility]) COVID-19 2022    10/27/23 Per pt had 3 separate times - last year being the last episode    Depression     Eczema     Exposure to chlamydia     Resolved 18    Knee pain, left     Migraine without aura and without status migrainosus, not intractable 10/11/2019    Obesity     Post traumatic stress disorder 2021    Postpartum depression 2018    TMJ (dislocation of temporomandibular joint)     causes snoring    Manoj-Parkinson-White (WPW) syndrome        Past Surgical History:   Procedure Laterality Date    ABLATION OF DYSRHYTHMIC FOCUS      WPW ablation age 14    COLONOSCOPY      COLPOSCOPY      DISTAL PATELLAR REALIGNMENT Left 10/31/2023    Procedure: OPEN MPFL RECONSTRUCTION WITH ALLOGRAFT;  Surgeon: Ludin Jade DO;  Location: [facility] MAIN OR;  Service: Orthopedics    INDUCED       OTHER SURGICAL HISTORY      catheter ablation- WPW age 13    [place] ARTHROSCOPY KNEE LATERAL RELEASE Left 10/31/2023    Procedure: RELEASE RETINACULAR;  Surgeon: Ludin Jade DO;  Location: [facility] MAIN OR;  Service: Orthopedics    TONSILLECTOMY      WISDOM TOOTH EXTRACTION         Current Outpatient Medications   Medication Sig Dispense Refill    aspirin (ECOTRIN LOW STRENGTH) 81 mg EC tablet Take 1 tablet (81 mg total) by mouth 2 (two) times a day 84 tablet 0    Drospirenone 4 MG TABS Take 1 tablet by mouth in the morning (Patient taking differently: Take 1 tablet by mouth daily at bedtime Takes at night) 84 tablet 1    ibuprofen (MOTRIN) 600 mg tablet Take 1 tablet (600 mg total) by mouth every 6 (six) hours as needed for mild pain, moderate pain or fever 30 tablet 0    lamoTRIgine (LaMICtal) 200 MG tablet Take 1 tablet (200 mg total) by mouth daily (Patient taking differently: Take 200 mg by mouth daily at bedtime) 90 tablet 1    LORazepam (ATIVAN) 1 mg tablet Take 1 tablet (1 mg total) by mouth every 6 (six) hours as needed for anxiety  0    naproxen (NAPROSYN) 500 mg tablet Take 1 tablet (500 mg total) by mouth 2 (two) times a day with meals (Patient taking differently: Take 500 mg by mouth 2 (two) times a day with meals) 30 tablet 0    oxyCODONE (ROXICODONE) 5 immediate release tablet Take 1 tablet (5 mg total) by mouth every 4 (four) hours as needed for moderate pain for up to 15 doses Max Daily Amount: 30 mg 15 tablet 0    sertraline (ZOLOFT) 50 mg tablet Take 1 tablet (50 mg total) by mouth daily (Patient taking differently: Take 50 mg by mouth daily at bedtime) 90 tablet 1     No current facility-administered medications for this visit. Allergies   Allergen Reactions    Codeine Other (See Comments)     dry heaves    Sulfa Antibiotics Hives and Rash     Per pt as achild    Erythromycin Hives     Per as a child       Review of Systems    Video Exam    There were no vitals filed for this visit. Physical Exam     Behavioral Health Psychotherapy Progress Note    Psychotherapy Provided: Individual Psychotherapy     1. Bipolar I disorder, most recent episode depressed, severe with psychotic features ([address])        2. SHELLY (generalized anxiety disorder)        3. Post traumatic stress disorder            Goals addressed in session: Goal 1     DATA: Met with Viri Casanova for her scheduled individual session. She reports that she had surgery on her knee. She states that her SO has been very supportive of her, and he has encouraged her to take care of herself. She states that she is struggling with feeling like she is lazy. This clinician encouraged her to continue to engage in positive self-care. In addition to her own self-care, she also has been caring for her daughter-- who has been ill with an upper respiratory infection. She states that the doctors gave her cough medicine, but it is not really helping her to get better. At this point, Viri Casanova is still not able to drive; so she will need support to take her daughter to a doctor appointment. Viri Casanova will be following up with her orthopedist tomorrow.  She is going to try to get an appointment for her daughter at the same time as her appointment. Sarina Ratliff describes her current mood as euthymic. She states that she had spikes of anxiety prior to her surgery and prior to her first PT appointment. She discussed how her own anxiety brought up her thoughts about her daughter's past dental surgery-- which was traumatizing for Sarina Ratliff. She states that the medical team for her own surgery was very good, explained what they were doing, and made her feel comfortable, as she prepped for the procedure. She states that Elna Hodgkin is scheduled for a wisdom tooth extraction next week. She states that she anticipates that she will have a bit more anxiety, as she prepares for his surgery. She states that she will be out of work for 6 weeks, and she will be able to collect on her short-term disability. Sarina Ratliff discussed her family relationships. She states that Beaulah Flatness' behaviors have been defiant, and this has caused some tension between Sarina Ratliff and GIOVANY. She states that he initially becomes angry with Sarina Ratliff for his responses to Beaulah Flatness, but then he changes his behaviors. She states that she has not yet provided positive feedback to him, because she "has my defense up." She describes her relationship with her SO as being in a more neutral place, at this point. She states that there has been some increase in physical intimacy (prior to her surgery). She states that this has been ok for her emotionally. We spent some time, during this session, reviewing and updating her treatment plan. She states that she wants to continue to work on ways that she can increase her ability to manage her moods. She identifies that some of her barriers are related to her lack of follow through with practicing emotion regulation and distress tolerance skills.  This clinician will use some MI techniques to address her ambivalence and will continue to teach DBT-informed skills, to help Sarina Ratliff gain more control over her moods and her responses. During this session, this clinician used the following therapeutic modalities: Client-centered Therapy, Dialectical Behavior Therapy, Mindfulness-based Strategies, Motivational Interviewing, Solution-Focused Therapy, and Supportive Psychotherapy    Substance Abuse was not addressed during this session. If the client is diagnosed with a co-occurring substance use disorder, please indicate any changes in the frequency or amount of use: n/a. Stage of change for addressing substance use diagnoses: No substance use/Not applicable    ASSESSMENT:  Patience Ivy presents with a Euthymic/ normal mood. her affect is Normal range and intensity, which is congruent, with her mood and the content of the session. The client has made progress on their goals. Patience Ivy presents with a minimal risk of suicide, minimal risk of self-harm, and minimal risk of harm to others. For any risk assessment that surpasses a "low" rating, a safety plan must be developed. A safety plan was indicated: no  If yes, describe in detail n/a    PLAN: Between sessions, Patience Ivy will continue to monitor her moods. She will work on identifying the barriers to her practicing mindfulness-based strategies to manage her moods. She will continue to work on developing her effective communication skills (especially with her SO). At the next session, the therapist will use Client-centered Therapy, Dialectical Behavior Therapy, Mindfulness-based Strategies, Motivational Interviewing, Solution-Focused Therapy, and Supportive Psychotherapy to address her mood regulation and relationship concerns. Behavioral Health Treatment Plan and Discharge Planning: Patience Ivy is aware of and agrees to continue to work on their treatment plan. They have identified and are working toward their discharge goals.  yes    Visit start and stop times:    11/07/23  Start Time: 1101  Stop Time: 1158  Total Visit Time: 57 minutes

## 2023-11-07 NOTE — BH TREATMENT PLAN
Outpatient [address] [place] [address]  1985     Date of Initial Psychotherapy Assessment: 10/03/2019   Date of Current Treatment Plan: 11/07/23  Treatment Plan Target Date: March 6, 2024   Treatment Plan Expiration Date: May 5, 2024    Diagnosis:   1. Bipolar I disorder, most recent episode depressed, severe with psychotic features ([address])        2. SHELLY (generalized anxiety disorder)        3. Post traumatic stress disorder            Area(s) of Need: mood regulation and relationships    Long Term Goal 1 (in the client's own words): "I want to manage my emotions and work on my understanding/changing my reactions."    Stage of Change: Action    Target Date for completion: 11/07/2024     Anticipated therapeutic modalities: client-centered; mindfulness-based; dbt-informed; motivational interviewing; solution-focused     People identified to complete this goal: Radhika Zazueta (client); Michelle Navarrete (clinician); Dr. Denise Banks (psychiatrist)      Objective 1: (identify the means of measuring success in meeting the objective): Radhika Zazueta will meet with the psychiatrist for scheduled medication management sessions and take her medications, as prescribed. She reports that she is doing well with her medications. She reports no side effects. She will maintain a minimum of 95% medication adherence. Update 11/07/2023: Radhika Zazueta states that she continues to see Dr. Denise Banks for medication management sessions. She reports this is going well. Radhika Zazueta endorses medication adherence on most days. She states that she has missed approximately three doses of medication in the past 30 days. We will continue to discuss the barriers that interfere with her reaching 100% adherence, and will continue to work on developing strategies to increase adherence. She states that her SO has set up alarms for her, to help her remember to take her medications.        Objective 2: (identify the means of measuring success in meeting the objective): Alec Haider will participate in therapy sessions, a minimum of once monthly. We will use the above-mentioned modalities to address mood-regulation and relationship concerns. Update 11/07/2023: Alec Haider continues to actively participate in regular therapy sessions. She participates in discussions regarding relationship issues, learning coping skills, and discussing successes and barriers. She will identify and exhibit the use of effective anxiety-management coping skills. Alec Haider states that she is willing to practice breathing skills and/or meditation skills. She identifies that she often does not follow through with this type of practice. She states that she feels that her biggest barrier is "laziness" and talks herself out of the practice. We will continue to work on developing ways to increase her motivation to participate in exercises. Objective 3: (identify the means of measuring success in meeting the objective): Alec Haider will learn a minimum of 3 techniques to help her with setting and maintaining limits with others-- e.g., DBT Interpersonal Effectiveness Skills. She will be able to verbalize, during sessions, what her personal limits are and what her personal goals are. Update 11/07/2023: Alec Haider states that she is doing good with setting/maintaining limits with others. She states that she feels that she is starting to "push back" when her SO says things in a way that she does not like. She states that, rather than just getting angry, she tells him that she is not ok with the way he is treating her. She states that she needs to continue to practice this skill with him. She will discuss barriers and successes in future sessions. Alec Haider states that she is having more communication with her mother (which has improved since her parents have moved away). Alec Haider will continue to work on developing parenting skills, to help her manage her younger daughter's new behaviors. Objective 4: (identify the means of measuring success in meeting the objective): When appropriate, Yong will include family members in therapy sessions, to practice her assertiveness, discuss her emotions, and set limits with her family members. Update 11/07/2023: Yong has not included her SO in any of her therapy sessions. At this point, she is open to the idea; however, he is not willing to participate. We will revisit this objective during the next treatment plan update. I am currently under the care of a [facility] psychiatric provider: yes    My [facility] psychiatric provider is: Dr. Beatriz Avilez    I am currently taking psychiatric medications: Yes, as prescribed    I feel that I will be ready for discharge from mental health care when I reach the following (measurable goal/objective): I would have better management of my emotions. For children and adults who have a legal guardian:   Has there been any change to custody orders and/or guardianship status? NA. If yes, attach updated documentation. Crisis plan not due at this time. Will update at next tx plan update (approximately May 2024). Behavioral Health Treatment Plan [facility]: Diagnosis and Treatment Plan explained to [address] acknowledges an understanding of their diagnosis. Liliya Zaragoza agrees to this treatment plan. I have been offered a copy of this Treatment Plan. Yes    Liliyapilar Zaragoza, 1985, actively participated in the review and update of this treatment plan during a virtual session, using the [employer]. Liliya Zaragoza  provided verbal consent on 11/7/2023 at 11:41am. The treatment plan was transcribed into the [employer] Real Record at a later time. This treatment plan will be sent to Yong, via her [employer] account, for signature. The signature will be reviewed at her next session.

## 2023-11-08 ENCOUNTER — OFFICE VISIT (OUTPATIENT)
Dept: OBGYN CLINIC | Facility: MEDICAL CENTER | Age: 38
End: 2023-11-08

## 2023-11-08 VITALS
BODY MASS INDEX: 38.86 KG/M2 | HEART RATE: 97 BPM | DIASTOLIC BLOOD PRESSURE: 76 MMHG | SYSTOLIC BLOOD PRESSURE: 112 MMHG | HEIGHT: 62 IN

## 2023-11-08 DIAGNOSIS — Z98.890 S/P ARTHROSCOPIC SURGERY OF LEFT KNEE: Primary | ICD-10-CM

## 2023-11-08 PROCEDURE — 99024 POSTOP FOLLOW-UP VISIT: CPT | Performed by: PHYSICIAN ASSISTANT

## 2023-11-08 NOTE — PROGRESS NOTES
Knee Post Operative Visit     Assesment:     41 y.o. female 1 week s/p surgical arthroscopy of the left knee with lateral release and open MPFL reconstruction with allograft, DOS: 10/31/23    Plan:    Post-Operative treatment:    Ice to knee for 20 minutes at least 1-2 times daily. Continue PT per protocol  OTC NSAIDS prn for pain. Steri-strips were changed in the office today. No incisional concerns. New steri-strips applied. Instructed that they may shower, allowing the warm soapy water to run over the incision and pat dry. If steri-strips fall off on their own, that is fine, if they are still in place in 1 week they are to remove the steri-strips. No soaking, baths, or tubs at this time. No creams ointments or lotions for an additional 3 weeks. Imaging: All imaging from today was reviewed by myself and explained to the patient. Weight bearing:  as tolerated     ROM:  0-90 week 0-3, then progresses to full motion >week 3    Brace:  Wear brace at all times other than for showers, Keep brace locked in extension for ambulation, and May unlock brace for ROM exercises    DVT Prophylaxis:  Aspirin 81 mg oral twice daily x 6 weeks post-op and Ambulation    Follow up:   4 weeks, motion check     Patient was advised that if they have any fevers, chills, chest pain, shortness of breath, redness or drainage from the incision, please let our office know immediately. Chief Complaint   Patient presents with    Left Knee - Post-op       History of Present Illness: The patient is a 41 y.o. female who is being evaluated post operatively 1 week s/p surgical arthroscopy of the left knee with lateral release and open MPFL reconstruction with allograft, DOS: 10/31/23. Since the prior visit, She reports improvement. She reports to the office today in her T ROM and utilizing crutches. Patient has attended physical therapy and states that this is going well.   Patient states that she is working on her exercises at home. Pain is well controlled. The patient is using ice to control swelling. They have started physical therapy. The patient Aspirin 81 mg oral twice daily x 6 weeks post-op and Ambulation for DVT ppx. The patient has been ambulating with crutches. The patient has been ambulating with a brace. The patient denies any fevers, chills, calf pain, chest pain/shortness of breath, redness or drainage from the incision. I have reviewed the past medical, surgical, social and family history, medications and allergies as documented in the EMR. Review of systems: ROS is negative other than that noted in the HPI. Constitutional: Negative for fatigue and fever. Physical Exam:    Last menstrual period 09/26/2023, not currently breastfeeding. General/Constitutional: NAD, well developed, well nourished  HENT: Normocephalic, atraumatic  CV: Intact distal pulses, regular rate  Resp: No respiratory distress or labored breathing  Lymphatic: No lymphadenopathy palpated  Neuro: Alert and Oriented x 3, no focal deficits  Psych: Normal mood, normal affect, normal judgement, normal behavior  Skin: Warm, dry, no rashes, no erythema      Knee Exam (focused):                   RIGHT LEFT   ROM:   -5-130 0-70    Palpation: Effusion negative mild     MJL tenderness Negative Negative     LJL tenderness Negative Negative   Instability: Varus stable stable     Valgus stable stable   Special Tests: Lachman Negative Negative     Posterior drawer Negative Negative     Anterior drawer Negative Negative     Pivot shift not tested not tested     Dial not tested not tested   Patella: Palpation Ttp medial facet medial facet ttp     Mobility 3/4 1/4     Apprehension Positive Negative   Other: Single leg 1/4 squat not tested not tested      Incisions show no erythema, no drainage    LE NV Exam: +2 DP/PT pulses bilaterally  Sensation intact to light touch L2-S1 bilaterally     Bilateral hip ROM demonstrates no pain actively or passively    No calf tenderness to palpation bilaterally

## 2023-11-13 ENCOUNTER — APPOINTMENT (OUTPATIENT)
Dept: PHYSICAL THERAPY | Facility: REHABILITATION | Age: 38
End: 2023-11-13
Payer: COMMERCIAL

## 2023-11-14 ENCOUNTER — OFFICE VISIT (OUTPATIENT)
Dept: PHYSICAL THERAPY | Facility: REHABILITATION | Age: 38
End: 2023-11-14
Payer: COMMERCIAL

## 2023-11-14 DIAGNOSIS — M25.362 PATELLAR INSTABILITY OF LEFT KNEE: ICD-10-CM

## 2023-11-14 DIAGNOSIS — M22.02 RECURRENT DISLOCATION OF LEFT PATELLA: ICD-10-CM

## 2023-11-14 DIAGNOSIS — Z98.890 S/P LEFT KNEE SURGERY: Primary | ICD-10-CM

## 2023-11-14 PROCEDURE — 97110 THERAPEUTIC EXERCISES: CPT

## 2023-11-14 NOTE — PROGRESS NOTES
Daily Note     Today's date: 2023  Patient name: Marilyn Gonzalez  : 1985  MRN: [id number]  Referring provider: Edy Casas,*  Dx:   Encounter Diagnosis     ICD-10-CM    1. S/P left knee surgery  Z98.890       2. Patellar instability of left knee  M25.362       3. Recurrent dislocation of left patella  M22.02           Start Time: 0945  Stop Time: 1030  Total time in clinic (min): 45 minutes    Subjective: Pt reports having a f/u with surgeon since last visit, notes surgeon is happy with progress thus far. Patient notes compliance with HEP and brace usage. Pt notes feeling more discomfort along medial aspect of L knee and has been having more pain in R LE and foot which she contributes to heavier reliance on. Objective: See treatment diary below    Precautions: follow protocol       Manuals            Light patellar mobility  5 min                                                  Neuro Re-Ed             Quad set issued 5"x10           Weight shifting issued                                                                             Ther Ex             Recumbent bike (0-90)  5 min AROM           Heel slide flexion issued x10           SAQ  5"x5 min A           Ankle pump issued            LLLD knee extension stretch issued 4 min                                                  Ther Activity                                       Gait Training                                       Modalities             Ice PRN  np                          Assessment: Pt presents to PT for first visit since initial evaluation. Initiated TE as noted per PT POC. Tolerated treatment well. Pt ambulates into clinic with brace locked at full ext and without axillary crutches. Discussed usage of axillary crutch/crutches to assist with ambulation for weeks 0-3 per protocol to reduce strain through bilat LE's and provide more comfortable transition to normalize gait.  Fair quad control with quad set, is challenged with SAQ and requires min A to perform through available ROM. Tightness with end range knee flex. Pt was educated in dosage of HEP and usage of ice for symptom control, declined ice in clinic to perform at home. Patient would benefit from continued PT      Plan: Progress treament per protocol.

## 2023-11-17 DIAGNOSIS — Z30.41 ENCOUNTER FOR SURVEILLANCE OF CONTRACEPTIVE PILLS: ICD-10-CM

## 2023-11-18 DIAGNOSIS — F31.81 BIPOLAR II DISORDER (HCC): ICD-10-CM

## 2023-11-18 DIAGNOSIS — F41.1 GAD (GENERALIZED ANXIETY DISORDER): ICD-10-CM

## 2023-11-20 RX ORDER — LORAZEPAM 1 MG/1
1 TABLET ORAL EVERY 6 HOURS PRN
Qty: 30 TABLET | Refills: 0 | Status: SHIPPED | OUTPATIENT
Start: 2023-11-20

## 2023-11-21 ENCOUNTER — OFFICE VISIT (OUTPATIENT)
Dept: PHYSICAL THERAPY | Facility: REHABILITATION | Age: 38
End: 2023-11-21
Payer: COMMERCIAL

## 2023-11-21 DIAGNOSIS — M22.02 RECURRENT DISLOCATION OF LEFT PATELLA: ICD-10-CM

## 2023-11-21 DIAGNOSIS — M25.362 PATELLAR INSTABILITY OF LEFT KNEE: ICD-10-CM

## 2023-11-21 DIAGNOSIS — Z98.890 S/P LEFT KNEE SURGERY: Primary | ICD-10-CM

## 2023-11-21 PROCEDURE — 97110 THERAPEUTIC EXERCISES: CPT | Performed by: PHYSICAL THERAPIST

## 2023-11-21 PROCEDURE — 97140 MANUAL THERAPY 1/> REGIONS: CPT | Performed by: PHYSICAL THERAPIST

## 2023-11-21 NOTE — PROGRESS NOTES
Daily Note     Today's date: 2023  Patient name: Preeti Ramsay  : 1985  MRN: [id number]  Referring provider: Esthela Dacosta,*  Dx:   Encounter Diagnosis     ICD-10-CM    1. S/P left knee surgery  Z98.890       2. Patellar instability of left knee  M25.362       3. Recurrent dislocation of left patella  M22.02           Start Time: 1003  Stop Time: 1055  Total time in clinic (min): 52 minutes    Subjective: Patient reports that she is doing well overall. She has been ambulating without crutch with brace locked in full extension. Patient can get sharper discomfort in the anterior aspect of her knee when sitting at times. She has most of her discomfort is on the medial aspect of her knee. Objective: See treatment diary below    Precautions: follow protocol       Manuals           Light patellar mobility  5 min 5 min                                                 Neuro Re-Ed             Quad set issued 5"x10 5" 10x          Weight shifting issued                                                                             Ther Ex             Recumbent bike  5 min AROM 7 min AROM          Heel slide flexion issued x10 20x with PT          SAQ  5"x5 min A 5" 10x with PT          Ankle pump issued            LLLD knee extension stretch issued 4 min 5 min                                                 Ther Activity                                       Gait Training                                       Modalities             Ice PRN  np 10 min                         Assessment: Tolerated treatment well. Unlocked brace per protocol. Patient presents with significant quad lag. Educated to be cautious ambulating at this time and to lock for longer walks or in crowds until quad control improves. Knee flexion to 85 degrees this visit and extension to 0.     Patient demonstrated fatigue post treatment, exhibited good technique with therapeutic exercises, and would benefit from continued PT. Plan: Progress treament per protocol.

## 2023-11-22 DIAGNOSIS — Z98.890 S/P LEFT KNEE SURGERY: ICD-10-CM

## 2023-11-22 DIAGNOSIS — M25.362 PATELLAR INSTABILITY OF LEFT KNEE: ICD-10-CM

## 2023-11-22 DIAGNOSIS — M22.02 RECURRENT DISLOCATION OF LEFT PATELLA: ICD-10-CM

## 2023-11-22 RX ORDER — SALICYLIC ACID 40 %
81 ADHESIVE PATCH, MEDICATED TOPICAL 2 TIMES DAILY
Qty: 180 TABLET | Refills: 1 | Status: SHIPPED | OUTPATIENT
Start: 2023-11-22

## 2023-11-24 ENCOUNTER — APPOINTMENT (OUTPATIENT)
Dept: PHYSICAL THERAPY | Facility: REHABILITATION | Age: 38
End: 2023-11-24
Payer: COMMERCIAL

## 2023-11-27 ENCOUNTER — OFFICE VISIT (OUTPATIENT)
Dept: PHYSICAL THERAPY | Facility: REHABILITATION | Age: 38
End: 2023-11-27
Payer: COMMERCIAL

## 2023-11-27 DIAGNOSIS — M22.02 RECURRENT DISLOCATION OF LEFT PATELLA: ICD-10-CM

## 2023-11-27 DIAGNOSIS — Z98.890 S/P LEFT KNEE SURGERY: Primary | ICD-10-CM

## 2023-11-27 DIAGNOSIS — M25.362 PATELLAR INSTABILITY OF LEFT KNEE: ICD-10-CM

## 2023-11-27 PROCEDURE — 97110 THERAPEUTIC EXERCISES: CPT

## 2023-11-27 PROCEDURE — 97112 NEUROMUSCULAR REEDUCATION: CPT

## 2023-11-27 NOTE — PROGRESS NOTES
Daily Note     Today's date: 2023  Patient name: Cheryl Reardon  : 1985  MRN: [id number]  Referring provider: Roxana Castillo,*  Dx:   Encounter Diagnosis     ICD-10-CM    1. S/P left knee surgery  Z98.890       2. Patellar instability of left knee  M25.362       3. Recurrent dislocation of left patella  M22.02           Start Time: 1735  Stop Time: 1820  Total time in clinic (min): 45 minutes    Subjective: Patient reports feeling frustrated in lack of progress and feels that she should be further along. Pt reports working on quad strength throughout the day and feels that is improving, but is having a harder time with flexion ROM and feels this isn't making much progress. Pt reports feeling of tightness along most proximal incision. Objective: See treatment diary below    Precautions: follow protocol       Manuals          Light patellar mobility  5 min 5 min 5 min                                                Neuro Re-Ed             Quad set issued 5"x10 5" 10x 5"x12         Weight shifting issued                                                                             Ther Ex             Recumbent bike  5 min AROM 7 min AROM 8 min AROM         Heel slide flexion issued x10 20x with PT x20 w/ PT         SAQ  5"x5 min A 5" 10x with PT 5"x10 w/ PT         Ankle pump issued            LLLD knee extension stretch issued 4 min 5 min 5 min         S/l hip abd    5"x10                                   Ther Activity                                       Gait Training                                       Modalities             Ice PRN  np 10 min np                        Assessment: Tolerated treatment well. Pt presents with significant knee flex ROM limitations initially, however ROM improves with AROM on bike and achieves 98 deg of knee flex following heel slides.   Pt cont to demonstrate significant quad control deficits, requires assistance to perform concentric phase of SAQ with visible muscle fasciculations. Added s/l hip abd with good tolerance and cueing to prevent posterior lean. Assess response to treatment NV and progress as able per protocol. Patient demonstrated fatigue post treatment and would benefit from continued PT. Plan: Progress treament per protocol.

## 2023-11-28 ENCOUNTER — TELEMEDICINE (OUTPATIENT)
Dept: BEHAVIORAL/MENTAL HEALTH CLINIC | Facility: CLINIC | Age: 38
End: 2023-11-28
Payer: COMMERCIAL

## 2023-11-28 DIAGNOSIS — F41.1 GAD (GENERALIZED ANXIETY DISORDER): Chronic | ICD-10-CM

## 2023-11-28 DIAGNOSIS — G47.09 OTHER INSOMNIA: Chronic | ICD-10-CM

## 2023-11-28 DIAGNOSIS — F31.5 BIPOLAR I DISORDER, MOST RECENT EPISODE DEPRESSED, SEVERE WITH PSYCHOTIC FEATURES (HCC): Primary | Chronic | ICD-10-CM

## 2023-11-28 PROCEDURE — 90837 PSYTX W PT 60 MINUTES: CPT | Performed by: SOCIAL WORKER

## 2023-11-28 NOTE — PSYCH
Virtual Regular Visit    Verification of patient location:    Patient is located at Home in the following state in which I hold an active license [place]    Assessment/Plan:    Problem List Items Addressed This Visit          Other    Bipolar I disorder, most recent episode depressed, severe with psychotic features ([address] ) - Primary (Chronic)    SHELLY (generalized anxiety disorder) (Chronic)    Other insomnia (Chronic)     Goals addressed in session: Goal 1      Reason for visit is   Chief Complaint   Patient presents with    Virtual Regular Visit     Encounter provider CHARLIE Burks    Provider located at [address] [place] [id number] [phone]    Recent Visits  No visits were found meeting these conditions. Showing recent visits within past 7 days and meeting all other requirements  Today's Visits  Date Type Provider Dept   11/28/23 [address] [place] [address],[address] today's visits and meeting all other requirements  Future Appointments  No visits were found meeting these conditions. Showing future appointments within next 150 days and meeting all other requirements     The patient was identified by name and date of birth. Maximilian Ricardo was informed that this is a telemedicine visit and [facility] She agrees to proceed. .  My office door was closed. No one else was in the room. She acknowledged consent and understanding of privacy and security of the video platform. The patient has agreed to participate and understands they can discontinue the visit at any time. Patient is aware this is a billable service. Subjective  Maximilian Ricardo is a 41 y.o. female.       HPI     Past Medical History:   Diagnosis Date    Abnormal Pap smear of cervix     Anxiety     Bipolar disorder ([facility]) COVID-19 2022    10/27/23 Per pt had 3 separate times - last year being the last episode    Depression     Eczema     Exposure to chlamydia     Resolved 17    Knee pain, left     Migraine without aura and without status migrainosus, not intractable 10/11/2019    Obesity     Post traumatic stress disorder 2021    Postpartum depression 2018    TMJ (dislocation of temporomandibular joint)     causes snoring    Manoj-Parkinson-White (WPW) syndrome        Past Surgical History:   Procedure Laterality Date    ABLATION OF DYSRHYTHMIC FOCUS      WPW ablation age 14    COLONOSCOPY      COLPOSCOPY      DISTAL PATELLAR REALIGNMENT Left 10/31/2023    Procedure: OPEN MPFL RECONSTRUCTION WITH ALLOGRAFT;  Surgeon: Kristel Petersen DO;  Location:  MAIN OR;  Service: Orthopedics    INDUCED       OTHER SURGICAL HISTORY      catheter ablation- WPW age 13    GA ARTHROSCOPY KNEE LATERAL RELEASE Left 10/31/2023    Procedure: RELEASE RETINACULAR;  Surgeon: Kristel Petersen DO;  Location: [address] [place] [address] MAIN OR;  Service: Orthopedics    TONSILLECTOMY      WISDOM TOOTH EXTRACTION         Current Outpatient Medications   Medication Sig Dispense Refill    Acetaminophen (TYLENOL PO) Take by mouth      [facility] Aspirin Low Dose 81 MG EC tablet TAKE 1 TABLET BY MOUTH 2 TIMES A DAY. 180 tablet 1    Drospirenone 4 MG TABS Take 1 tablet by mouth in the morning 84 tablet 3    ibuprofen (MOTRIN) 600 mg tablet Take 1 tablet (600 mg total) by mouth every 6 (six) hours as needed for mild pain, moderate pain or fever 30 tablet 0    lamoTRIgine (LaMICtal) 200 MG tablet Take 1 tablet (200 mg total) by mouth daily (Patient taking differently: Take 200 mg by mouth daily at bedtime) 90 tablet 1    LORazepam (ATIVAN) 1 mg tablet TAKE 1 TABLET BY MOUTH EVERY 6 HOURS AS NEEDED FOR ANXIETY.  30 tablet 0    naproxen (NAPROSYN) 500 mg tablet Take 1 tablet (500 mg total) by mouth 2 (two) times a day with meals (Patient taking differently: Take 500 mg by mouth 2 (two) times a day with meals) 30 tablet 0    oxyCODONE (ROXICODONE) 5 immediate release tablet Take 1 tablet (5 mg total) by mouth every 4 (four) hours as needed for moderate pain for up to 15 doses Max Daily Amount: 30 mg (Patient taking differently: Take 5 mg by mouth every 4 (four) hours as needed for moderate pain Taking at night) 15 tablet 0    sertraline (ZOLOFT) 50 mg tablet Take 1 tablet (50 mg total) by mouth daily (Patient taking differently: Take 50 mg by mouth daily at bedtime) 90 tablet 1     No current facility-administered medications for this visit. Allergies   Allergen Reactions    Codeine Other (See Comments)     dry heaves    Sulfa Antibiotics Hives and Rash     Per pt as achild    Erythromycin Hives     Per as a child       Review of Systems    Video Exam    There were no vitals filed for this visit. Physical Exam     Behavioral Health Psychotherapy Progress Note    Psychotherapy Provided: Individual Psychotherapy     1. Bipolar I disorder, most recent episode depressed, severe with psychotic features ([address])        2. SHELLY (generalized anxiety disorder)        3. Other insomnia            Goals addressed in session: Goal 1     DATA: Met with Mandi Trent for her scheduled individual session. Mandi Trent states that she is feeling discouraged with the progress she is making after her surgery. She has spoken with her PT regarding her progress, and this helped her to feel better about the slow progress. Mandi Trent discussed her son's upcoming 16th birthday. She states that they are working on planning his birthday party, and she has some financial concerns. She states that she wants to make sure that she is able to celebrate this birthday with him-- because they have recently spent money on Saturnino's children's birthday. We spent time discussing Libertad's relationship with Sam Noe.  She states that she often feels that they are roommates or friends and not really in "a relationship." She identifies that she is working on being more physically affectionate with him (to offer him comfort and reassurance). She states that he is often receptive to it, but she is not sure if he notices that she is making an effort to be more demonstrative. She states that there is very little physical intimacy between them-- primarily due to her recent surgery. She recognizes the importance of physical intimacy; however, she struggles to get the desire back after not being intimate for a while. We discussed ways to increase intimacy-- including the possibility of scheduling sex-- to maintain the physical intimacy. Zak Carlos states that she struggles with the feeling that sex is "wrong." She is not able to identify the origin of this feeling, but she states that it has always been present for her. We discussed her family's attitudes about sexuality. She states that her mother is too open about her own sexuality-- which is uncomfortable for Zak Carlos. She states that her brother identifies as being hypersexual, and he is discussing this in treatment with his own therapist. Zak Carlos is agreeable to setting a schedule for herself, and we will discuss her successes and barriers at the next session. During this session, this clinician used the following therapeutic modalities: Client-centered Therapy, Dialectical Behavior Therapy, Mindfulness-based Strategies, Motivational Interviewing, Solution-Focused Therapy, and Supportive Psychotherapy    Substance Abuse was not addressed during this session. If the client is diagnosed with a co-occurring substance use disorder, please indicate any changes in the frequency or amount of use: n/a. Stage of change for addressing substance use diagnoses: No substance use/Not applicable    ASSESSMENT:  Edmar Franklin presents with a Euthymic/ normal mood. her affect is Normal range and intensity, which is congruent, with her mood and the content of the session.  The client has made progress on their goals. Claudio Ramachandran presents with a minimal risk of suicide, minimal risk of self-harm, and minimal risk of harm to others. For any risk assessment that surpasses a "low" rating, a safety plan must be developed. A safety plan was indicated: no  If yes, describe in detail n/a    PLAN: Between sessions, Claudio Ramachandran will continue to monitor her mood. She will continue to set appropriate boundaries in her relationships. Amari Solis will work on creating a schedule for herself regarding intimacy with her partner. At the next session, the therapist will use Client-centered Therapy, Dialectical Behavior Therapy, Mindfulness-based Strategies, Motivational Interviewing, Solution-Focused Therapy, and Supportive Psychotherapy to address her mood regulation and relationship concerns. Behavioral Health Treatment Plan and Discharge Planning: Claudio Ramachandran is aware of and agrees to continue to work on their treatment plan. They have identified and are working toward their discharge goals.  yes    Visit start and stop times:    11/28/23  Start Time: 0902  Stop Time: 0957  Total Visit Time: 55 minutes

## 2023-11-29 ENCOUNTER — OFFICE VISIT (OUTPATIENT)
Dept: PHYSICAL THERAPY | Facility: REHABILITATION | Age: 38
End: 2023-11-29
Payer: COMMERCIAL

## 2023-11-29 DIAGNOSIS — M25.362 PATELLAR INSTABILITY OF LEFT KNEE: ICD-10-CM

## 2023-11-29 DIAGNOSIS — M22.02 RECURRENT DISLOCATION OF LEFT PATELLA: ICD-10-CM

## 2023-11-29 DIAGNOSIS — Z98.890 S/P LEFT KNEE SURGERY: Primary | ICD-10-CM

## 2023-11-29 PROCEDURE — 97110 THERAPEUTIC EXERCISES: CPT | Performed by: PHYSICAL THERAPIST

## 2023-11-29 NOTE — PROGRESS NOTES
Daily Note     Today's date: 2023  Patient name: Claudio Ramachandran  : 1985  MRN: [id number]  Referring provider: Joanie Kanner,*  Dx:   Encounter Diagnosis     ICD-10-CM    1. S/P left knee surgery  Z98.890       2. Patellar instability of left knee  M25.362       3. Recurrent dislocation of left patella  M22.02           Start Time: 0945  Stop Time: 1040  Total time in clinic (min): 55 minutes    Subjective: Patient reports her daughter accidentally leaning on her knee yesterday morning with increased pain and swelling. She held on HEP yesterday secondary to the pain. She is feeling more confident weight bearing through her LE for ambulation. Objective: See treatment diary below    Precautions: follow protocol       Manuals         Light patellar mobility  5 min 5 min 5 min 5 min                                               Neuro Re-Ed             Quad set issued 5"x10 5" 10x 5"x12 5" 10x        Weight shifting issued                                                                             Ther Ex             Recumbent bike  5 min AROM 7 min AROM 8 min AROM 8 min AROM        Heel slide flexion issued x10 20x with PT x20 w/ PT 20x with PT        SAQ  5"x5 min A 5" 10x with PT 5"x10 w/ PT 5" 10x with PT        Ankle pump issued            LLLD knee extension stretch issued 4 min 5 min 5 min HEP        S/l hip abd    5"x10 5" 10x L                                  Ther Activity                                       Gait Training                                       Modalities             Ice PRN  np 10 min np 10 min                       Assessment: Tolerated treatment well. Patient presents with 109 degrees of knee flexion end of session. She is able to complete revolution on recumbent bike for first time this visit. Patient continues to present with quad lag, but is improving overall.   Patient demonstrated fatigue post treatment, exhibited good technique with therapeutic exercises, and would benefit from continued PT. Plan: Progress treament per protocol.

## 2023-12-01 ENCOUNTER — TELEPHONE (OUTPATIENT)
Dept: PSYCHIATRY | Facility: CLINIC | Age: 38
End: 2023-12-01

## 2023-12-01 NOTE — TELEPHONE ENCOUNTER
Called and left message for patient to inform 2/6/24 8AM was cancelled due to provider schedule change. Requested return call to reschedule. Please schedule upon return call.

## 2023-12-05 ENCOUNTER — APPOINTMENT (OUTPATIENT)
Dept: PHYSICAL THERAPY | Facility: REHABILITATION | Age: 38
End: 2023-12-05
Payer: COMMERCIAL

## 2023-12-06 ENCOUNTER — OFFICE VISIT (OUTPATIENT)
Dept: OBGYN CLINIC | Facility: MEDICAL CENTER | Age: 38
End: 2023-12-06

## 2023-12-06 VITALS
HEART RATE: 93 BPM | WEIGHT: 214 LBS | DIASTOLIC BLOOD PRESSURE: 72 MMHG | BODY MASS INDEX: 39.38 KG/M2 | SYSTOLIC BLOOD PRESSURE: 107 MMHG | HEIGHT: 62 IN

## 2023-12-06 DIAGNOSIS — Z98.890 S/P LEFT KNEE SURGERY: Primary | ICD-10-CM

## 2023-12-06 PROCEDURE — 99024 POSTOP FOLLOW-UP VISIT: CPT | Performed by: ORTHOPAEDIC SURGERY

## 2023-12-06 NOTE — PROGRESS NOTES
Knee Post Operative Visit     Assesment:     41 y.o. female 5 weeks s/p surgical arthroscopy of the left knee with lateral release and open MPFL reconstruction with allograft, DOS: 10/31/23 stiffness with flexion    Plan:    Post-Operative treatment:    Ice to knee for 20 minutes at least 1-2 times daily. Continue PT per protocol  OTC NSAIDS prn for pain. Imaging: All imaging from today was reviewed by myself and explained to the patient. Weight bearing:  as tolerated     ROM:  full - must push as current rom is limited in flexion and may require KYA if not improved    Brace:  none    DVT Prophylaxis:  Aspirin 81 mg oral twice daily x 6 weeks post-op and Ambulation    Follow up:   4 weeks, motion check     Patient was advised that if they have any fevers, chills, chest pain, shortness of breath, redness or drainage from the incision, please let our office know immediately. Chief Complaint   Patient presents with    Left Knee - Post-op       History of Present Illness: The patient is a 41 y.o. female who is being evaluated post operatively 5 weeks s/p surgical arthroscopy of the left knee with lateral release and open MPFL reconstruction with allograft, DOS: 10/31/23. Since the prior visit, She reports improvement. She reports to the office today in her T ROM and utilizing crutches. Patient has attended physical therapy and states that this is going well. Patient states that she is working on her exercises at home. Pain is well controlled. The patient is using ice to control swelling. They have started physical therapy. The patient Aspirin 81 mg oral twice daily x 6 weeks post-op and Ambulation for DVT ppx. The patient has been ambulating with crutches. The patient has been ambulating with a brace. The patient denies any fevers, chills, calf pain, chest pain/shortness of breath, redness or drainage from the incision.          I have reviewed the past medical, surgical, social and family history, medications and allergies as documented in the EMR. Review of systems: ROS is negative other than that noted in the HPI. Constitutional: Negative for fatigue and fever. Physical Exam:    Height 5' 2" (1.575 m), weight 97.1 kg (214 lb), not currently breastfeeding. General/Constitutional: NAD, well developed, well nourished  HENT: Normocephalic, atraumatic  CV: Intact distal pulses, regular rate  Resp: No respiratory distress or labored breathing  Lymphatic: No lymphadenopathy palpated  Neuro: Alert and Oriented x 3, no focal deficits  Psych: Normal mood, normal affect, normal judgement, normal behavior  Skin: Warm, dry, no rashes, no erythema      Knee Exam (focused):                   RIGHT LEFT   ROM:   -5-130 0-95   Palpation: Effusion negative mild     MJL tenderness Negative Negative     LJL tenderness Negative Negative   Instability: Varus stable stable     Valgus stable stable   Special Tests: Lachman Negative Negative     Posterior drawer Negative Negative     Anterior drawer Negative Negative     Pivot shift not tested not tested     Dial not tested not tested   Patella: Palpation Ttp medial facet medial facet ttp     Mobility 3/4 1/4     Apprehension Positive Negative   Other: Single leg 1/4 squat not tested not tested      Incisions show no erythema, no drainage    LE NV Exam: +2 DP/PT pulses bilaterally  Sensation intact to light touch L2-S1 bilaterally     Bilateral hip ROM demonstrates no pain actively or passively    No calf tenderness to palpation bilaterally

## 2023-12-08 ENCOUNTER — OFFICE VISIT (OUTPATIENT)
Dept: PHYSICAL THERAPY | Facility: REHABILITATION | Age: 38
End: 2023-12-08
Payer: COMMERCIAL

## 2023-12-08 DIAGNOSIS — M25.362 PATELLAR INSTABILITY OF LEFT KNEE: ICD-10-CM

## 2023-12-08 DIAGNOSIS — Z98.890 S/P LEFT KNEE SURGERY: ICD-10-CM

## 2023-12-08 DIAGNOSIS — Z98.890 S/P LEFT KNEE SURGERY: Primary | ICD-10-CM

## 2023-12-08 DIAGNOSIS — M22.02 RECURRENT DISLOCATION OF LEFT PATELLA: ICD-10-CM

## 2023-12-08 PROCEDURE — 97112 NEUROMUSCULAR REEDUCATION: CPT

## 2023-12-08 PROCEDURE — 97110 THERAPEUTIC EXERCISES: CPT

## 2023-12-08 RX ORDER — OXYCODONE HYDROCHLORIDE 5 MG/1
5 TABLET ORAL EVERY 4 HOURS PRN
Qty: 15 TABLET | Refills: 0 | Status: SHIPPED | OUTPATIENT
Start: 2023-12-08

## 2023-12-08 RX ORDER — NALOXONE HYDROCHLORIDE 4 MG/.1ML
SPRAY NASAL
Qty: 1 EACH | Refills: 1 | Status: SHIPPED | OUTPATIENT
Start: 2023-12-08 | End: 2024-12-07

## 2023-12-12 ENCOUNTER — EVALUATION (OUTPATIENT)
Dept: PHYSICAL THERAPY | Facility: REHABILITATION | Age: 38
End: 2023-12-12
Payer: COMMERCIAL

## 2023-12-12 DIAGNOSIS — Z98.890 S/P LEFT KNEE SURGERY: Primary | ICD-10-CM

## 2023-12-12 DIAGNOSIS — M25.362 PATELLAR INSTABILITY OF LEFT KNEE: ICD-10-CM

## 2023-12-12 DIAGNOSIS — M22.02 RECURRENT DISLOCATION OF LEFT PATELLA: ICD-10-CM

## 2023-12-12 PROCEDURE — 97140 MANUAL THERAPY 1/> REGIONS: CPT | Performed by: PHYSICAL THERAPIST

## 2023-12-12 PROCEDURE — 97110 THERAPEUTIC EXERCISES: CPT

## 2023-12-12 NOTE — PROGRESS NOTES
PT Re-Evaluation     Today's date: 2023  Patient name: Elier Badillo  : 1985  MRN: [id number]  Referring provider: Phoebe Chung,*  Dx:   Encounter Diagnosis     ICD-10-CM    1. S/P left knee surgery  Z98.890       2. Patellar instability of left knee  M25.362       3. Recurrent dislocation of left patella  M22.02           Start Time: 1745  Stop Time: 1830  Total time in clinic (min): 45 minutes    Assessment  Assessment details: Patient is a 41 y.o. female that presents status post surgical arthroscopy of the left knee with lateral release and open MPFL reconstruction with allograft on 10/31/2023. Patient reports improvement with skilled physical therapy services. Patient reports improvement with ADLS such as bathing and dressing, ambulation, negotiation of stairs, standing, transfers, childcare, and housework. Patient reports continued difficulty with ADLS such as bathing and dressing, ambulation, negotiation of stairs, standing, transfers, childcare, and housework. Patient has made good progress towards goals established for physical therapy. Patient would benefit from continued skilled physical therapy services for continued strengthening and ROM. Will continue to follow protocol. Impairments: abnormal gait, abnormal muscle firing, abnormal or restricted ROM, activity intolerance, impaired physical strength and pain with function  Understanding of Dx/Px/POC: good  Goals  Impairment:  1. Patient will reports 50% reduction in pain in 8 weeks to maximize function. -MET  2. Patient will improve strength to 4/5 in all planes to maximize function. -PARTIALLY MET  3. Patient will improve ROM to [facility] in 8 weeks to maximize function. -PARTIALLY MET    Functional:  1. Patient will improve FOTO by 37 points to 74/100 in 12 weeks to maximize function. -NOT ASSESSED  2. Patient will be independent with HEP in 8 weeks to maximize function. -MET  3.  Patient will report no difficulty with ambulation in 12 weeks to maximize function. -PARTIALLY MET  4. Patient will report no difficulty with negotiation of stairs in 12 weeks to maximize function. -PARTIALLY MET  5. Patient will report no difficulty with ADLS in 12 weeks to maximize function. -PARTIALLY MET          Plan  Plan details: Patient will be a RE in 4 weeks. Patient would benefit from: skilled physical therapy  Planned modality interventions: cryotherapy  Planned therapy interventions: manual therapy, neuromuscular re-education, patient education, strengthening, stretching, therapeutic activities, therapeutic exercise, home exercise program, functional ROM exercises, flexibility, balance/weight bearing training, abdominal trunk stabilization and activity modification  Duration in visits: 8  Duration in weeks: 4  Treatment plan discussed with: patient        Subjective Evaluation    History of Present Illness  Date of surgery: 10/31/2023  Mechanism of injury: surgery  Mechanism of injury: Patient presents status post surgical arthroscopy of the left knee with lateral release and open MPFL reconstruction with allograft. Patient reports a chronic history of bilateral knee pain back to childhood. Patient had bene having a locking sensation in her left knee over the last several years which had been getting more frequent. Patient is currently limited with ADLS such as bathing and dressing, ambulation, negotiation of stairs, standing, transfers, childcare, and housework. Patient notes her knee feels more unstable over the last few days. Patient Goals  Patient goals for therapy: increased strength, decreased pain, increased motion and independence with ADLs/IADLs    Pain  At best pain ratin  At worst pain ratin  Location: medial/anterior L knee  Quality: sharp and dull ache  Progression: improved    Treatments  Current treatment: physical therapy        Objective     Observations   Left Knee   Positive for effusion.  Negative for drainage and trophic changes.      Passive Range of Motion   Left Knee   Flexion: 107 degrees with pain  Extension: [facility]    Strength/Myotome Testing     Left Hip   Planes of Motion   Flexion: 3-    Right Hip   Planes of Motion   Flexion: 4    Left Knee   Flexion: 4  Extension: 3-  Quadriceps contraction: fair    Right Knee   Flexion: 4+  Extension: 4  Quadriceps contraction: good    Left Ankle/Foot   Dorsiflexion: 5    Additional Strength Details  Moderate quad lag with SLR    Ambulation   Weight-Bearing Status   Weight-Bearing Status (Left): weight-bearing as tolerated     Additional Weight-Bearing Status Details  Ambulation with brace unlocked     Precautions: follow protocol         Manuals 11/6 11/14 11/21 11/27 11/29 12/8 12/12   Light patellar mobility   5 min 5 min 5 min 5 min 5 min  5 min   measurements/ RE             20 min                                       Neuro Re-Ed                 Quad set issued 5"x10 5" 10x 5"x12 5" 10x 5"x10 blue roll, 5"x10 no roll  5" 10x no roll   Weight shifting issued               Retro rocking           5"x10                                                                             Ther Ex                 Recumbent bike   5 min AROM 7 min AROM 8 min AROM 8 min AROM 8 min AROM  8 min AROM   Heel slide flexion issued x10 20x with PT x20 w/ PT 20x with PT x20 w/ PT  20x    SAQ   5"x5 min A 5" 10x with PT 5"x10 w/ PT 5" 10x with PT 5"x10 w/ PT     Ankle pump issued               LLLD knee extension stretch issued 4 min 5 min 5 min HEP       S/l hip abd       5"x10 5" 10x L 5"x10 L  5" 10x L                                       Ther Activity                                                     Gait Training                                                     Modalities                 Ice PRN   np 10 min np 10 min 10 min  np

## 2023-12-14 ENCOUNTER — APPOINTMENT (OUTPATIENT)
Dept: PHYSICAL THERAPY | Facility: REHABILITATION | Age: 38
End: 2023-12-14
Payer: COMMERCIAL

## 2023-12-15 ENCOUNTER — APPOINTMENT (OUTPATIENT)
Dept: PHYSICAL THERAPY | Facility: REHABILITATION | Age: 38
End: 2023-12-15
Payer: COMMERCIAL

## 2023-12-19 ENCOUNTER — OFFICE VISIT (OUTPATIENT)
Dept: PHYSICAL THERAPY | Facility: REHABILITATION | Age: 38
End: 2023-12-19
Payer: COMMERCIAL

## 2023-12-19 ENCOUNTER — TELEMEDICINE (OUTPATIENT)
Dept: BEHAVIORAL/MENTAL HEALTH CLINIC | Facility: CLINIC | Age: 38
End: 2023-12-19
Payer: COMMERCIAL

## 2023-12-19 DIAGNOSIS — M25.362 PATELLAR INSTABILITY OF LEFT KNEE: ICD-10-CM

## 2023-12-19 DIAGNOSIS — F31.5 BIPOLAR I DISORDER, MOST RECENT EPISODE DEPRESSED, SEVERE WITH PSYCHOTIC FEATURES (HCC): Primary | Chronic | ICD-10-CM

## 2023-12-19 DIAGNOSIS — M22.02 RECURRENT DISLOCATION OF LEFT PATELLA: ICD-10-CM

## 2023-12-19 DIAGNOSIS — F41.1 GAD (GENERALIZED ANXIETY DISORDER): Chronic | ICD-10-CM

## 2023-12-19 DIAGNOSIS — Z98.890 S/P LEFT KNEE SURGERY: Primary | ICD-10-CM

## 2023-12-19 PROCEDURE — 90834 PSYTX W PT 45 MINUTES: CPT | Performed by: SOCIAL WORKER

## 2023-12-19 PROCEDURE — 97112 NEUROMUSCULAR REEDUCATION: CPT

## 2023-12-19 PROCEDURE — 97110 THERAPEUTIC EXERCISES: CPT

## 2023-12-19 NOTE — PSYCH
Virtual Regular Visit    Verification of patient location:    Patient is located at Home in the following state in which I hold an active license [place]    Assessment/Plan:    Problem List Items Addressed This Visit          Other    Bipolar I disorder, most recent episode depressed, severe with psychotic features (HCC) - Primary (Chronic)    SHELLY (generalized anxiety disorder) (Chronic)     Goals addressed in session: Goal 1      Reason for visit is   Chief Complaint   Patient presents with    Virtual Regular Visit      Encounter provider CHARLIE Hutchinson    Provider located at [facility]  [facility] [place] [address] [phone]    Recent Visits  No visits were found meeting these conditions.  Showing recent visits within past 7 days and meeting all other requirements  Today's Visits  Date Type Provider Dept   12/19/23 Telemedicine CHARLIE Hutchinson  [facility]   Showing today's visits and meeting all other requirements  Future Appointments  No visits were found meeting these conditions.  Showing future appointments within next 150 days and meeting all other requirements     The patient was identified by name and date of birth. Libertad Espinal was informed that this is a telemedicine visit and that the visit is being conducted throughthe Epic Embedded platform. She agrees to proceed..  My office door was closed. No one else was in the room.  She acknowledged consent and understanding of privacy and security of the video platform. The patient has agreed to participate and understands they can discontinue the visit at any time.    Patient is aware this is a billable service.     Subjective  Libertad Espinal is a 38 y.o. female.    HPI     Past Medical History:   Diagnosis Date    Abnormal Pap smear of cervix     Anxiety     Bipolar disorder (HCC)     COVID-19 01/12/2022    10/27/23  Per pt had 3 separate times - last year being the last episode    Depression     Eczema     Exposure to chlamydia     Resolved 18    Knee pain, left     Migraine without aura and without status migrainosus, not intractable 10/11/2019    Obesity     Post traumatic stress disorder 2021    Postpartum depression 2018    TMJ (dislocation of temporomandibular joint)     causes snoring    Manoj-Parkinson-White (WPW) syndrome        Past Surgical History:   Procedure Laterality Date    ABLATION OF DYSRHYTHMIC FOCUS      WPW ablation age 16    COLONOSCOPY      COLPOSCOPY      DISTAL PATELLAR REALIGNMENT Left 10/31/2023    Procedure: OPEN MPFL RECONSTRUCTION WITH ALLOGRAFT;  Surgeon: Wilbert Michael DO;  Location:  MAIN OR;  Service: Orthopedics    INDUCED       OTHER SURGICAL HISTORY      catheter ablation- WPW age 17    VA ARTHROSCOPY KNEE LATERAL RELEASE Left 10/31/2023    Procedure: RELEASE RETINACULAR;  Surgeon: Wilbert Michael DO;  Location:  MAIN OR;  Service: Orthopedics    TONSILLECTOMY      WISDOM TOOTH EXTRACTION         Current Outpatient Medications   Medication Sig Dispense Refill    Acetaminophen (TYLENOL PO) Take by mouth (Patient not taking: Reported on 2023)      [facility] Aspirin Low Dose 81 MG EC tablet TAKE 1 TABLET BY MOUTH 2 TIMES A DAY. 180 tablet 1    Drospirenone 4 MG TABS Take 1 tablet by mouth in the morning 84 tablet 3    ibuprofen (MOTRIN) 600 mg tablet Take 1 tablet (600 mg total) by mouth every 6 (six) hours as needed for mild pain, moderate pain or fever (Patient not taking: Reported on 2023) 30 tablet 0    lamoTRIgine (LaMICtal) 200 MG tablet Take 1 tablet (200 mg total) by mouth daily (Patient taking differently: Take 200 mg by mouth daily at bedtime) 90 tablet 1    LORazepam (ATIVAN) 1 mg tablet TAKE 1 TABLET BY MOUTH EVERY 6 HOURS AS NEEDED FOR ANXIETY. 30 tablet 0    naloxone (NARCAN) 4 mg/0.1 mL nasal spray Administer 1 spray into a nostril. If no  response after 2-3 minutes, give another dose in the other nostril using a new spray. 1 each 1    naproxen (NAPROSYN) 500 mg tablet Take 1 tablet (500 mg total) by mouth 2 (two) times a day with meals (Patient taking differently: Take 500 mg by mouth 2 (two) times a day with meals) 30 tablet 0    oxyCODONE (ROXICODONE) 5 immediate release tablet Take 1 tablet (5 mg total) by mouth every 4 (four) hours as needed for moderate pain for up to 15 doses Max Daily Amount: 30 mg 15 tablet 0    sertraline (ZOLOFT) 50 mg tablet Take 1 tablet (50 mg total) by mouth daily (Patient taking differently: Take 50 mg by mouth daily at bedtime) 90 tablet 1     No current facility-administered medications for this visit.        Allergies   Allergen Reactions    Codeine Other (See Comments)     dry heaves    Sulfa Antibiotics Hives and Rash     Per pt as achild    Erythromycin Hives     Per as a child       Review of Systems    Video Exam    There were no vitals filed for this visit.    Physical Exam     Behavioral Health Psychotherapy Progress Note    Psychotherapy Provided: Individual Psychotherapy     1. Bipolar I disorder, most recent episode depressed, severe with psychotic features (HCC)        2. SHELLY (generalized anxiety disorder)            Goals addressed in session: Goal 1     DATA: Met with Libertad for her scheduled individual session. Libertad states that she is feeling depressed. She attributes some of her mood dysregulation to her slow recovery after her surgery. Libertad states that the PT practitioner tells her that she is making some slow progress; however, she states that she is not feeling good about the progress. This clinician used Motivational Interviewing to assist her with developing a plan to practice her exercises between sessions. We discussed her using her partner as a support; however, she states that she does not want to get angry with him. She plans to use her alarm on her phone as a reminder to do her  "exercises. Libertad states that she has been struggling with overall motivation. \"I don't want to do anything. It's the feeling of wanting to sleep but not being able to. I want to do things, but I don't want to do things.\" She states that she is struggling to get good sleep, due to her pain waking her up. She is taking occasionally taking pain medications, but they are not helping her to get solid sleep. She is aware of the concerns related to dependence, as she is taking opiate medications. She states, \"I can't jeopardize me wanting that.\" This clinician validated her experience and provided positive reinforcement for her recognition of the situation. Libertad states that she will be returning to work on December 29th. Libertad informed me that she needs updated [facility] documentation for her anxiety accommodations. We spent some time talking about her relationship. She states that she and her partner have engaged in some physical intimacy-- which was ok for her. She states that they do not get much time alone. Libertad states that they had a recent discussion where he acknowledged that he has some difficulty with anger-management. We discussed the potential of having her partner join her for a therapy session. She is unsure if he would be willing. Libertad denies active suicidal ideation. She does report feelings of wanting to \"disappear.\" She reports protective factors of having to care for the children. Discussed encounter form and sent to client, for signature, at end of session.     During this session, this clinician used the following therapeutic modalities: Client-centered Therapy, Dialectical Behavior Therapy, Mindfulness-based Strategies, Motivational Interviewing, Solution-Focused Therapy, and Supportive Psychotherapy    Substance Abuse was addressed during this session. If the client is diagnosed with a co-occurring substance use disorder, please indicate any changes in the frequency or amount of use: no substance " "use. Discussed use of pain medications for post-surgical pain. Provided positive reinforcement for client's recognition of dangers of using opioid medications for long-term pain management. Stage of change for addressing substance use diagnoses: No substance use/Not applicable    ASSESSMENT:  Libertad Espinal presents with a Dysthymic mood.     her affect is Normal range and intensity, which is congruent, with her mood and the content of the session. The client has made progress on their goals.    Libertad Espinal presents with a minimal risk of suicide, minimal risk of self-harm, and minimal risk of harm to others.    For any risk assessment that surpasses a \"low\" rating, a safety plan must be developed.    A safety plan was indicated: no  If yes, describe in detail n/a    PLAN: Between sessions, Libertad Espinal will continue to monitor her mood. She will engage in some behavior mod techniques to increase her practice of PT exercises between sessions. She will reach out to her psychiatrist to schedule an appointment for a potential medication adjustment. This clinician will complete FMLA documentation, at client's request. At the next session, the therapist will use Client-centered Therapy, Dialectical Behavior Therapy, Mindfulness-based Strategies, Motivational Interviewing, Solution-Focused Therapy, and Supportive Psychotherapy to address Libertad's mood regulation, relationship concerns, and ongoing physical health issues.    Behavioral Health Treatment Plan and Discharge Planning: Libertad Espinal is aware of and agrees to continue to work on their treatment plan. They have identified and are working toward their discharge goals. yes    Visit start and stop times:    12/19/23  Start Time: 0907  Stop Time: 0956  Total Visit Time: 49 minutes        "

## 2023-12-19 NOTE — PROGRESS NOTES
"Daily Note     Today's date: 2023  Patient name: Libertad Espinal  : 1985  MRN: [id number]  Referring provider: Ester Calderon,*  Dx:   Encounter Diagnosis     ICD-10-CM    1. S/P left knee surgery  Z98.890       2. Patellar instability of left knee  M25.362       3. Recurrent dislocation of left patella  M22.02           Start Time: 1745  Stop Time: 1830  Total time in clinic (min): 45 minutes    Subjective: Pt reports feeling she is getting better at lifting L LE and doesn't have to provide assistance as much to get in/out of bed, the car, etc.      Objective: See treatment diary below  Precautions: follow protocol         Manuals    Light patellar mobility  5 min 5 min 5 min 5 min 5 min 5 min  5 min   measurements/ RE             20 min                                       Neuro Re-Ed                 Quad set 5\"x15 no roll 5\"x10 5\" 10x 5\"x12 5\" 10x 5\"x10 blue roll, 5\"x10 no roll  5\" 10x no roll   Weight shifting                Retro rocking  5\"x10         5\"x10                                                                             Ther Ex                 Recumbent bike 8 min AROM 5 min AROM 7 min AROM 8 min AROM 8 min AROM 8 min AROM  8 min AROM   Heel slide flexion x20 w. PT x10 20x with PT x20 w/ PT 20x with PT x20 w/ PT  20x w/ PT   SAQ 5\"x10 w/ PT 5\"x5 min A 5\" 10x with PT 5\"x10 w/ PT 5\" 10x with PT 5\"x10 w/ PT    Ankle pump                LLLD knee extension stretch  4 min 5 min 5 min HEP       S/l hip abd  5\"x12 L     5\"x10 5\" 10x L 5\"x10 L                                        Ther Activity                                                     Gait Training                                                     Modalities                 Ice PRN   np 10 min np 10 min 10 min  np                      Assessment: Tolerated treatment well. Pt is able to perform full revolution on bike immediately upon beginning w/u this visit for the first " time.  Pt cont to be challenged by quad strength, is able to perform greater ROM independently, but requires assistance to perform through available ROM and fatigues quickly with exercise.  Pt demonstrates hip hike compensatory pattern with onset of fatigue, but can correct with cues.  Continue to progress knee flex ROM and L LE strength as tolerated.  Patient demonstrated fatigue post treatment and would benefit from continued PT      Plan: Progress treatment as tolerated.

## 2023-12-27 ENCOUNTER — OFFICE VISIT (OUTPATIENT)
Dept: PHYSICAL THERAPY | Facility: REHABILITATION | Age: 38
End: 2023-12-27
Payer: COMMERCIAL

## 2023-12-27 DIAGNOSIS — Z98.890 S/P LEFT KNEE SURGERY: Primary | ICD-10-CM

## 2023-12-27 DIAGNOSIS — M22.02 RECURRENT DISLOCATION OF LEFT PATELLA: ICD-10-CM

## 2023-12-27 DIAGNOSIS — M25.362 PATELLAR INSTABILITY OF LEFT KNEE: ICD-10-CM

## 2023-12-27 PROCEDURE — 97110 THERAPEUTIC EXERCISES: CPT | Performed by: PHYSICAL THERAPIST

## 2023-12-27 NOTE — PROGRESS NOTES
"Daily Note     Today's date: 2023  Patient name: Libertad Espinal  : 1985  MRN: [id number]  Referring provider: Ester Calderon,*  Dx:   Encounter Diagnosis     ICD-10-CM    1. S/P left knee surgery  Z98.890       2. Patellar instability of left knee  M25.362       3. Recurrent dislocation of left patella  M22.02           Start Time: 09  Stop Time: 09  Total time in clinic (min): 45 minutes    Subjective: Patient reports that she is doing well overall.  She reports minimal pain and feels her walking has been improving.        Objective: See treatment diary below  Precautions: follow protocol         Manuals    Light patellar mobility  5 min 5 min   5 min 5 min  5 min   measurements/ RE           20 min                                   Neuro Re-Ed               Quad set 5\"x15 no roll 5\" 15x no roll   5\" 10x 5\"x10 blue roll, 5\"x10 no roll  5\" 10x no roll   Weight shifting              Retro rocking  5\"x10       5\"x10                                                                     Ther Ex               Recumbent bike 8 min AROM 10 min bike on   8 min AROM 8 min AROM  8 min AROM   Heel slide flexion x20 w. PT 20x with PT   20x with PT x20 w/ PT  20x w/ PT   SAQ 5\"x10 w/ PT 5\" 10x   5\" 10x with PT 5\"x10 w/ PT    Ankle pump              LLLD knee extension stretch  2 min   HEP       S/l hip abd  5\"x12 L  5\" 10x L   5\" 10x L 5\"x10 L                                    Ther Activity                                               Gait Training                                               Modalities               Ice PRN   np   10 min 10 min  np                    Assessment: Tolerated treatment well. Knee extension WFL.  Flexion measured to 122 degrees this visit.  She continues to presents with quad lag.  Discussed NMES and has a home TENS unit.  She is going to bring it to next visit to see if it can perform NMES.  Continue to progress as able. Recommended " continued use of brace at this time with quad lag.  Patient demonstrated fatigue post treatment and would benefit from continued PT      Plan: Progress treatment as tolerated.

## 2023-12-29 ENCOUNTER — OFFICE VISIT (OUTPATIENT)
Dept: PHYSICAL THERAPY | Facility: REHABILITATION | Age: 38
End: 2023-12-29
Payer: COMMERCIAL

## 2023-12-29 DIAGNOSIS — M22.02 RECURRENT DISLOCATION OF LEFT PATELLA: ICD-10-CM

## 2023-12-29 DIAGNOSIS — Z98.890 S/P LEFT KNEE SURGERY: Primary | ICD-10-CM

## 2023-12-29 DIAGNOSIS — M25.362 PATELLAR INSTABILITY OF LEFT KNEE: ICD-10-CM

## 2023-12-29 PROCEDURE — 97112 NEUROMUSCULAR REEDUCATION: CPT

## 2023-12-29 PROCEDURE — 97110 THERAPEUTIC EXERCISES: CPT

## 2023-12-29 NOTE — PROGRESS NOTES
"Daily Note     Today's date: 2023  Patient name: Libertad Espinal  : 1985  MRN: [id number]  Referring provider: Ester Calderon,*  Dx:   Encounter Diagnosis     ICD-10-CM    1. S/P left knee surgery  Z98.890       2. Patellar instability of left knee  M25.362       3. Recurrent dislocation of left patella  M22.02           Start Time: 0730  Stop Time: 0815  Total time in clinic (min): 45 minutes    Subjective: Patient denies soreness following last visit and reports she starts back to work today for the first time.            Objective: See treatment diary below  Precautions: follow protocol         Manuals    Light patellar mobility  5 min 5 min 5 min  5 min 5 min  5 min   measurements/ RE           20 min                                   Neuro Re-Ed               Quad set 5\"x15 no roll 5\" 15x no roll 5\"x15 no roll  5\" 10x 5\"x10 blue roll, 5\"x10 no roll  5\" 10x no roll   Weight shifting              Retro rocking  5\"x10   5\"x10    5\"x10     TENS/NMES Education     10 min                                                          Ther Ex               Recumbent bike 8 min AROM 10 min bike on 10 min bike on  8 min AROM 8 min AROM  8 min AROM   Heel slide flexion x20 w. PT 20x with PT x20 w/ PT  20x with PT x20 w/ PT  20x w/ PT   SAQ 5\"x10 w/ PT 5\" 10x 5\"x10 w/ PT  5\" 10x with PT 5\"x10 w/ PT    Ankle pump              LLLD knee extension stretch  2 min   HEP       S/l hip abd  5\"x12 L  5\" 10x L 5\"x15 L  5\" 10x L 5\"x10 L                                    Ther Activity                                               Gait Training                                               Modalities               Ice PRN   np   10 min 10 min  np                    Assessment: Tolerated treatment well. Pt continues to present with quad lag at this time with inability to perform SAQ through available ROM.  Provided assistance through available range and patient independently " lowers through eccentric phase of exercise. Quad fatigue with retro walking.  Pt brought in at home TENs unit.  Configured parameters of unit to resemble NMES unit, provided education in safety of use, pad placement, how to utilize unit, and dosage.  Pt verbalizes understanding.  Assess NV. Patient demonstrated fatigue post treatment and would benefit from continued PT      Plan: Progress treatment as tolerated.

## 2024-01-03 ENCOUNTER — APPOINTMENT (OUTPATIENT)
Dept: PHYSICAL THERAPY | Facility: REHABILITATION | Age: 39
End: 2024-01-03
Payer: COMMERCIAL

## 2024-01-05 ENCOUNTER — OFFICE VISIT (OUTPATIENT)
Dept: PHYSICAL THERAPY | Facility: REHABILITATION | Age: 39
End: 2024-01-05
Payer: COMMERCIAL

## 2024-01-05 ENCOUNTER — APPOINTMENT (OUTPATIENT)
Dept: PHYSICAL THERAPY | Facility: REHABILITATION | Age: 39
End: 2024-01-05
Payer: COMMERCIAL

## 2024-01-05 DIAGNOSIS — M25.362 PATELLAR INSTABILITY OF LEFT KNEE: ICD-10-CM

## 2024-01-05 DIAGNOSIS — M22.02 RECURRENT DISLOCATION OF LEFT PATELLA: ICD-10-CM

## 2024-01-05 DIAGNOSIS — Z98.890 S/P LEFT KNEE SURGERY: Primary | ICD-10-CM

## 2024-01-05 PROCEDURE — 97110 THERAPEUTIC EXERCISES: CPT | Performed by: PHYSICAL THERAPIST

## 2024-01-05 PROCEDURE — 97112 NEUROMUSCULAR REEDUCATION: CPT | Performed by: PHYSICAL THERAPIST

## 2024-01-05 NOTE — PROGRESS NOTES
"Daily Note     Today's date: 2024  Patient name: Libertad Espinal  : 1985  MRN: [id number]  Referring provider: Ester Calderon,*  Dx:   Encounter Diagnosis     ICD-10-CM    1. S/P left knee surgery  Z98.890       2. Patellar instability of left knee  M25.362       3. Recurrent dislocation of left patella  M22.02                      Subjective: Patient reports she started back at work and she has had some increased pain. She mentions she sits at her desk all day and is not able to straighten or elevate the leg.          Objective: See treatment diary below  Precautions: follow protocol         Manuals    Light patellar mobility  5 min 5 min 5 min 5 min    5 min   measurements/ RE         20 min                               Neuro Re-Ed             Quad set 5\"x15 no roll 5\" 15x no roll 5\"x15 no roll 5\"x20    5\" 10x no roll   Weight shifting            Retro rocking  5\"x10   5\"x10 5\"x10       TENS/NMES Education     10 min                                                  Ther Ex             Recumbent bike 8 min AROM 10 min bike on 10 min bike on 10 min L1    8 min AROM   Heel slide flexion x20 w. PT 20x with PT x20 w/ PT x20     20x w/ PT   SAQ 5\"x10 w/ PT 5\" 10x 5\"x10 w/ PT 5\"x10      Ankle pump            LLLD knee extension stretch  2 min         S/l hip abd  5\"x12 L  5\" 10x L 5\"x15 L 5\"x15 L                                  Ther Activity                                         Gait Training                                         Modalities             Ice PRN   np      np                    Assessment: Tolerated treatment well. Continued with program as outlined below. Pt was able to complete SAQ independently during today's session. She does require cuing throughout session, able to maintain corrections after cuing. Patient demonstrated fatigue post treatment and would benefit from continued PT      Plan: Progress treatment as tolerated.          "

## 2024-01-08 ENCOUNTER — APPOINTMENT (OUTPATIENT)
Dept: PHYSICAL THERAPY | Facility: REHABILITATION | Age: 39
End: 2024-01-08
Payer: COMMERCIAL

## 2024-01-09 ENCOUNTER — OFFICE VISIT (OUTPATIENT)
Dept: PHYSICAL THERAPY | Facility: REHABILITATION | Age: 39
End: 2024-01-09
Payer: COMMERCIAL

## 2024-01-09 ENCOUNTER — SOCIAL WORK (OUTPATIENT)
Dept: BEHAVIORAL/MENTAL HEALTH CLINIC | Facility: CLINIC | Age: 39
End: 2024-01-09
Payer: COMMERCIAL

## 2024-01-09 DIAGNOSIS — F41.1 GAD (GENERALIZED ANXIETY DISORDER): Chronic | ICD-10-CM

## 2024-01-09 DIAGNOSIS — F31.5 BIPOLAR I DISORDER, MOST RECENT EPISODE DEPRESSED, SEVERE WITH PSYCHOTIC FEATURES (HCC): Primary | Chronic | ICD-10-CM

## 2024-01-09 DIAGNOSIS — Z98.890 S/P LEFT KNEE SURGERY: Primary | ICD-10-CM

## 2024-01-09 DIAGNOSIS — M22.02 RECURRENT DISLOCATION OF LEFT PATELLA: ICD-10-CM

## 2024-01-09 DIAGNOSIS — M25.362 PATELLAR INSTABILITY OF LEFT KNEE: ICD-10-CM

## 2024-01-09 PROCEDURE — 90837 PSYTX W PT 60 MINUTES: CPT | Performed by: SOCIAL WORKER

## 2024-01-09 PROCEDURE — 97112 NEUROMUSCULAR REEDUCATION: CPT

## 2024-01-09 PROCEDURE — 97110 THERAPEUTIC EXERCISES: CPT

## 2024-01-09 NOTE — PROGRESS NOTES
"Daily Note     Today's date: 2024  Patient name: Libertad Espinal  : 1985  MRN: [id number]  Referring provider: Ester Calderon,*  Dx:   Encounter Diagnosis     ICD-10-CM    1. S/P left knee surgery  Z98.890       2. Patellar instability of left knee  M25.362       3. Recurrent dislocation of left patella  M22.02           Start Time: 0730  Stop Time: 0815  Total time in clinic (min): 45 minutes    Subjective: Patient reports she is having a difficult time adjusting to being back at work.  Pt notes sitting in a chair for prolonged periods throughout the day and experiences increased stiffness and edema in L LE.   Discussed possibly setting a timer to remind her to get up and walk around throughout the day.      Objective: See treatment diary below  Precautions: follow protocol         Manuals    Light patellar mobility  5 min 5 min 5 min 5 min 5 min   5 min   measurements/ RE         20 min                               Neuro Re-Ed             Quad set 5\"x15 no roll 5\" 15x no roll 5\"x15 no roll 5\"x20 5\"x20   5\" 10x no roll   Weight shifting            Retro rocking  5\"x10   5\"x10 5\"x10 5\"x10      TENS/NMES Education     10 min                                                  Ther Ex             Recumbent bike 8 min AROM 10 min bike on 10 min bike on 10 min L1 10 min bike on   8 min AROM   Heel slide flexion x20 w. PT 20x with PT x20 w/ PT x20  x20 w/ PT   20x w/ PT   SAQ 5\"x10 w/ PT 5\" 10x 5\"x10 w/ PT 5\"x10 5\"x15 w/ PT     Ankle pump            LLLD knee extension stretch  2 min         S/l hip abd  5\"x12 L  5\" 10x L 5\"x15 L 5\"x15 L 5\"x15 L                                 Ther Activity                                         Gait Training                                         Modalities             Ice PRN   np      np                    Assessment: Tolerated treatment well. Patient demonstrates good quad contraction with isometric quad set and can more " independently perform SAQ, but cont to require assistance through last few deg of SAQ to achieve TKE.  Good eccentric control with visible fatigue.  Continue to progress as tolerated per protocol.  Patient demonstrated fatigue post treatment and would benefit from continued PT      Plan: Progress treatment as tolerated.

## 2024-01-09 NOTE — PSYCH
Behavioral Health Psychotherapy Progress Note    Psychotherapy Provided: Individual Psychotherapy     1. Bipolar I disorder, most recent episode depressed, severe with psychotic features (HCC)        2. SHELLY (generalized anxiety disorder)            Goals addressed in session: Goal 1     DATA: Met with Libertad for her scheduled individual session. We briefly discussed her [place] documentation-- for which she sent a signed AIDAN yesterday via email. She states that she has missed a few days at work due to having an increase in anxiety. This clinician encouraged her to keep track of her moods and the way she feels on days that she pushes herself to go to work versus the days that she decides to call off. Libertad states that her SO has also been struggling with his own mood regulation, which she acknowledges is a trigger for her. We discussed their relationship, their communication, and their emotional and physical intimacy. She states that recently they have had less communication. This clinician reasserted that Libertad can include her SO in a session. She reports that he continues to decline this option. Libertad states that she does not have current suicidal ideation. She states that she feels an increase in anxiety, which is exacerbated by having to talk to people during her workday. 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 We discussed self-care and finding time to care for her own needs-- as she continues to support her SO and their children.     During this session, this clinician used the following therapeutic modalities: Client-centered Therapy, Dialectical Behavior Therapy, Mindfulness-based Strategies, Motivational Interviewing, Solution-Focused Therapy, and Supportive Psychotherapy    Substance Abuse was not addressed during this session. If the client is diagnosed with a co-occurring  "substance use disorder, please indicate any changes in the frequency or amount of use: n/a. Stage of change for addressing substance use diagnoses: No substance use/Not applicable    ASSESSMENT:  Libertad Espinal presents with a Dysthymic mood.     her affect is Normal range and intensity, which is congruent, with her mood and the content of the session. The client has not made progress on their goals since our last session.     Libertad Espinal presents with a minimal risk of suicide, minimal risk of self-harm, and minimal risk of harm to others.    For any risk assessment that surpasses a \"low\" rating, a safety plan must be developed.    A safety plan was indicated: no  If yes, describe in detail n/a    PLAN: Between sessions, Libertad Espinal will continue to monitor her moods. She will monitor the number of times she does not attend work and the emotional response she has when she attends work versus when she takes a day off. Libertad will return for her next session in approximately three weeks. At the next session, the therapist will use Client-centered Therapy, Dialectical Behavior Therapy, Mindfulness-based Strategies, Motivational Interviewing, Solution-Focused Therapy, and Supportive Psychotherapy to address her mood regulation and relationship concerns.    Behavioral Health Treatment Plan and Discharge Planning: Libertad Espinal is aware of and agrees to continue to work on their treatment plan. They have identified and are working toward their discharge goals. yes    Visit start and stop times:    01/09/24  Start Time: 1005  Stop Time: 1058  Total Visit Time: 53 minutes  "

## 2024-01-11 ENCOUNTER — TELEPHONE (OUTPATIENT)
Dept: PSYCHIATRY | Facility: CLINIC | Age: 39
End: 2024-01-11

## 2024-01-11 ENCOUNTER — APPOINTMENT (OUTPATIENT)
Dept: PHYSICAL THERAPY | Facility: REHABILITATION | Age: 39
End: 2024-01-11
Payer: COMMERCIAL

## 2024-01-11 DIAGNOSIS — F31.81 BIPOLAR II DISORDER (HCC): ICD-10-CM

## 2024-01-11 DIAGNOSIS — F41.1 GAD (GENERALIZED ANXIETY DISORDER): ICD-10-CM

## 2024-01-11 DIAGNOSIS — F43.10 PTSD (POST-TRAUMATIC STRESS DISORDER): ICD-10-CM

## 2024-01-11 NOTE — TELEPHONE ENCOUNTER
Patient tried to get medication refill from the pharmacy and when they went to pick it up they stated their was a request from insurance for a 90 day supply of coverage not the 30 day.    Medication Refill Request     Name of Medication Zoloft  Dose/Frequency 50mg Daily  Quantity 90 tablets  Verified pharmacy   [x]  Verified ordering Provider   [x]  Does patient have enough for the next 3 days? Yes [] No [x] **Patient has been out for 3 days**  Does patient have a follow-up appointment scheduled? Yes [x] No []   If so when is appointment: 2/12 @11:30am

## 2024-01-11 NOTE — TELEPHONE ENCOUNTER
Patient called in to give her talk therapist a heads up that she took the [place] paperwork in to work and her work told her she needed something that was called accomodation forms filled out instead. Patient stated that they were being sent to the office for completion,.

## 2024-01-15 ENCOUNTER — APPOINTMENT (OUTPATIENT)
Dept: PHYSICAL THERAPY | Facility: REHABILITATION | Age: 39
End: 2024-01-15
Payer: COMMERCIAL

## 2024-01-15 DIAGNOSIS — F43.10 PTSD (POST-TRAUMATIC STRESS DISORDER): ICD-10-CM

## 2024-01-15 DIAGNOSIS — F31.81 BIPOLAR II DISORDER (HCC): ICD-10-CM

## 2024-01-15 DIAGNOSIS — F41.1 GAD (GENERALIZED ANXIETY DISORDER): ICD-10-CM

## 2024-01-17 ENCOUNTER — EVALUATION (OUTPATIENT)
Dept: PHYSICAL THERAPY | Facility: REHABILITATION | Age: 39
End: 2024-01-17
Payer: COMMERCIAL

## 2024-01-17 DIAGNOSIS — M25.362 PATELLAR INSTABILITY OF LEFT KNEE: ICD-10-CM

## 2024-01-17 DIAGNOSIS — Z98.890 S/P LEFT KNEE SURGERY: Primary | ICD-10-CM

## 2024-01-17 DIAGNOSIS — M22.02 RECURRENT DISLOCATION OF LEFT PATELLA: ICD-10-CM

## 2024-01-17 PROCEDURE — 97110 THERAPEUTIC EXERCISES: CPT | Performed by: PHYSICAL THERAPIST

## 2024-01-17 PROCEDURE — 97140 MANUAL THERAPY 1/> REGIONS: CPT | Performed by: PHYSICAL THERAPIST

## 2024-01-17 NOTE — PROGRESS NOTES
PT Re-Evaluation     Today's date: 2024  Patient name: Libertad Espinal  : 1985  MRN: [id number]  Referring provider: Ester Calderon,*  Dx:   Encounter Diagnosis     ICD-10-CM    1. S/P left knee surgery  Z98.890       2. Patellar instability of left knee  M25.362       3. Recurrent dislocation of left patella  M22.02           Start Time: 0820  Stop Time: 0900  Total time in clinic (min): 40 minutes    Assessment  Assessment details: Patient is a 38 y.o. female that presents status post surgical arthroscopy of the left knee with lateral release and open MPFL reconstruction with allograft on 10/31/2023.  Patient reports improvement with skilled physical therapy services.  Patient reports improvement with ADLS such as bathing and dressing, ambulation, negotiation of stairs, standing, transfers, childcare, and housework.  Patient reports continued difficulty with ADLS such as donning shoes and socks, negotiation of stairs, standing, transfers, childcare, and housework.  Patient has made good progress towards goals established for physical therapy.  Patient would benefit from continued skilled physical therapy services for continued strengthening and ROM.  Will continue to follow protocol.          Impairments: abnormal gait, abnormal muscle firing, abnormal or restricted ROM, activity intolerance, impaired physical strength and pain with function  Understanding of Dx/Px/POC: good  Goals  Impairment:  1.  Patient will reports 50% reduction in pain in 8 weeks to maximize function.-MET  2.  Patient will improve strength to 4/5 in all planes to maximize function.-PARTIALLY MET  3.  Patient will improve ROM to WFL in 8 weeks to maximize function.-PARTIALLY MET    Functional:  1. Patient will improve FOTO by 37 points to 74/100 in 12 weeks to maximize function.-NOT ASSESSED  2. Patient will be independent with HEP in 8 weeks to maximize function.-MET  3. Patient will report no difficulty with  ambulation in 12 weeks to maximize function.-PARTIALLY MET  4. Patient will report no difficulty with negotiation of stairs in 12 weeks to maximize function.-PARTIALLY MET  5. Patient will report no difficulty with ADLS in 12 weeks to maximize function.-PARTIALLY MET          Plan  Plan details: Patient will be a RE in 4 weeks.    Patient would benefit from: skilled physical therapy  Planned modality interventions: cryotherapy  Planned therapy interventions: manual therapy, neuromuscular re-education, patient education, strengthening, stretching, therapeutic activities, therapeutic exercise, home exercise program, functional ROM exercises, flexibility, balance/weight bearing training, abdominal trunk stabilization and activity modification  Duration in visits: 8  Duration in weeks: 4  Treatment plan discussed with: patient        Subjective Evaluation    History of Present Illness  Date of surgery: 10/31/2023  Mechanism of injury: surgery  Mechanism of injury: Patient presents status post surgical arthroscopy of the left knee with lateral release and open MPFL reconstruction with allograft.  Patient reports a chronic history of bilateral knee pain back to childhood.  Patient had bene having a locking sensation in her left knee over the last several years which had been getting more frequent.  Patient is currently limited with ADLS such as bathing and dressing, ambulation, negotiation of stairs, standing, transfers, childcare, and housework.  Patient notes her knee feels more unstable over the last few days.   Patient Goals  Patient goals for therapy: increased strength, decreased pain, increased motion and independence with ADLs/IADLs    Pain  At best pain ratin  At worst pain ratin  Location: medial/anterior L knee  Quality: sharp  Progression: improved    Treatments  Current treatment: physical therapy        Objective     Observations   Left Knee   Negative for drainage, effusion and trophic changes.  "    Palpation   Left   No palpable tenderness to the distal biceps femoris, distal semimembranosus, distal semitendinosus, lateral gastrocnemius, medial gastrocnemius, rectus femoris and vastus lateralis.   Tenderness of the vastus medialis.     Tenderness   Left Knee   Tenderness in the lateral joint line, medial joint line and patellar tendon. No tenderness in the fibular head and pes anserinus.     Passive Range of Motion   Left Knee   Flexion: 120 degrees with pain  Extension: WFL    Right Knee   Flexion: 139 degrees   Extension: WFL    Strength/Myotome Testing     Left Hip   Planes of Motion   Flexion: 4  Abduction: 4-    Right Hip   Planes of Motion   Flexion: 4+  Abduction: 4    Left Knee   Flexion: 4+  Extension: 4-  Quadriceps contraction: fair    Right Knee   Flexion: 4+  Extension: 4  Quadriceps contraction: good    Left Ankle/Foot   Dorsiflexion: 5    Additional Strength Details  Mild quad lag with SLR    Ambulation   Weight-Bearing Status   Weight-Bearing Status (Left): weight-bearing as tolerated     Observational Gait   Decreased walking speed.     Functional Assessment        Single Leg Stance   Left: 25 seconds  Right: 30 seconds    Precautions: follow protocol         Manuals 12/19 12/27 12/29 1/4 1/9 1/17 12/12   Light patellar mobility  5 min 5 min 5 min 5 min 5 min  5 min  5 min   measurements/ RE            20 min 20 min                                       Neuro Re-Ed                 Quad set 5\"x15 no roll 5\" 15x no roll 5\"x15 no roll 5\"x20 5\"x20    5\" 10x no roll   Weight shifting                 Retro rocking  5\"x10   5\"x10 5\"x10 5\"x10       TENS/NMES Education     10 min                                                                 Ther Ex                 Recumbent bike 8 min AROM 10 min bike on 10 min bike on 10 min L1 10 min bike on  10 min bike on  8 min AROM   Heel slide flexion x20 w. PT 20x with PT x20 w/ PT x20  x20 w/ PT  20x with PT  20x w/ PT   SAQ 5\"x10 w/ PT 5\" 10x 5\"x10 w/ " "PT 5\"x10 5\"x15 w/ PT       Ankle pump                 LLLD knee extension stretch   2 min             S/l hip abd  5\"x12 L  5\" 10x L 5\"x15 L 5\"x15 L 5\"x15 L                                           Ther Activity                                                     Gait Training                                                     Modalities                 Ice PRN   np          np                         "

## 2024-01-18 ENCOUNTER — APPOINTMENT (OUTPATIENT)
Dept: PHYSICAL THERAPY | Facility: REHABILITATION | Age: 39
End: 2024-01-18
Payer: COMMERCIAL

## 2024-01-23 ENCOUNTER — OFFICE VISIT (OUTPATIENT)
Dept: PHYSICAL THERAPY | Facility: REHABILITATION | Age: 39
End: 2024-01-23
Payer: COMMERCIAL

## 2024-01-23 DIAGNOSIS — Z98.890 S/P LEFT KNEE SURGERY: Primary | ICD-10-CM

## 2024-01-23 DIAGNOSIS — M22.02 RECURRENT DISLOCATION OF LEFT PATELLA: ICD-10-CM

## 2024-01-23 DIAGNOSIS — M25.362 PATELLAR INSTABILITY OF LEFT KNEE: ICD-10-CM

## 2024-01-23 PROCEDURE — 97110 THERAPEUTIC EXERCISES: CPT

## 2024-01-23 PROCEDURE — 97112 NEUROMUSCULAR REEDUCATION: CPT

## 2024-01-23 NOTE — PROGRESS NOTES
"Daily Note     Today's date: 2024  Patient name: Libertad Espinal  : 1985  MRN: [id number]  Referring provider: Ester Calderon,*  Dx:   Encounter Diagnosis     ICD-10-CM    1. S/P left knee surgery  Z98.890       2. Patellar instability of left knee  M25.362       3. Recurrent dislocation of left patella  M22.02           Start Time: 1710  Stop Time: 1800  Total time in clinic (min): 50 minutes    Subjective: Pt reports L knee gets stiff during work after sitting for prolonged periods.  Pt notes attempting to get up and walk around throughout the day, but sometimes isn't able to due to daily tasks.  Pt reports stiffness as primary complaint, notes minimal edema and pain and only has pain typically when attempting to negotiate stairs.        Objective: See treatment diary below    Precautions: follow protocol         Manuals    Light patellar mobility  5 min 5 min 5 min 5 min 5 min  5 min 5 min   measurements/ RE            20 min                                        Neuro Re-Ed                 Quad set 5\"x15 no roll 5\" 15x no roll 5\"x15 no roll 5\"x20 5\"x20    5\"x20 no roll   Prone quad set       5\"x10   Retro rocking  5\"x10   5\"x10 5\"x10 5\"x10    5\"x10   TENS/NMES Education     10 min                                                                 Ther Ex                 Recumbent bike 8 min AROM 10 min bike on 10 min bike on 10 min L1 10 min bike on  10 min bike on 10 min bike on   Heel slide flexion x20 w. PT 20x with PT x20 w/ PT x20  x20 w/ PT  20x with PT x20 w/ PT   SAQ 5\"x10 w/ PT 5\" 10x 5\"x10 w/ PT 5\"x10 5\"x15 w/ PT    5\"x15 w/ PT   Ankle pump                 LLLD knee extension stretch   2 min             S/l hip abd  5\"x12 L  5\" 10x L 5\"x15 L 5\"x15 L 5\"x15 L    5\"x15 L                                       Ther Activity                                                     Gait Training                                                   "   Modalities                 Ice PRN   np          np                      Assessment: Tolerated treatment well.  Tightness along quad with end range flex heel slide ROM.  Trial of prone quad set this visit to further enhance quad strength and performing through available ROM.  Assess response to treatment NV and progress as able.   Patient demonstrated fatigue post treatment and would benefit from continued PT      Plan: Progress treatment as tolerated.

## 2024-01-24 ENCOUNTER — TELEPHONE (OUTPATIENT)
Dept: OBGYN CLINIC | Facility: MEDICAL CENTER | Age: 39
End: 2024-01-24

## 2024-01-24 ENCOUNTER — OFFICE VISIT (OUTPATIENT)
Dept: OBGYN CLINIC | Facility: MEDICAL CENTER | Age: 39
End: 2024-01-24

## 2024-01-24 VITALS
SYSTOLIC BLOOD PRESSURE: 114 MMHG | HEART RATE: 101 BPM | DIASTOLIC BLOOD PRESSURE: 77 MMHG | WEIGHT: 218 LBS | BODY MASS INDEX: 40.12 KG/M2 | HEIGHT: 62 IN

## 2024-01-24 DIAGNOSIS — M25.362 PATELLAR INSTABILITY OF LEFT KNEE: ICD-10-CM

## 2024-01-24 DIAGNOSIS — M22.02 RECURRENT DISLOCATION OF LEFT PATELLA: Primary | ICD-10-CM

## 2024-01-24 PROCEDURE — 99024 POSTOP FOLLOW-UP VISIT: CPT | Performed by: ORTHOPAEDIC SURGERY

## 2024-01-24 NOTE — TELEPHONE ENCOUNTER
Concerns discussed with patient. Patient has been experiencing irregular bleeding. Patient aware it is not uncommon to experience breakthrough/irregular bleeding with the new birth control and that this can last up to 3 months. Patient advised to call the office if irregular bleeding persists for longer than 3 months. No further questions at this time.

## 2024-01-24 NOTE — PROGRESS NOTES
Knee Post Operative Visit     Assesment:     38 y.o. female 3 months s/p left knee with open MPFL reconstruction using allograft and lateral release, DOS: 10/31/2023    Plan:    Post-Operative treatment:    PT for ROM/strengthening to knee, hip and core.  OTC NSAIDS prn for pain.  Tylenol for pain.  Let pain guide gradual return activities.  We discussed her right knee which she notes has pain over the lateral aspect of her knee. I recommend she address her right knee with physical therapy at this time. If her symptoms fail to improve or worsen we can get an MRI for further evaluation.    Imaging:    No imaging was available for review today.    Weight bearing:  as tolerated     ROM:  Full    Brace:  No brace needed    DVT Prophylaxis:  Ambulation    Follow up:   8 weeks    Patient was advised that if they have any fevers, chills, chest pain, shortness of breath, redness or drainage from the incision, please let our office know immediately.          Chief Complaint   Patient presents with    Left Knee - Post-op       History of Present Illness:    The patient is a 38 y.o. female who is being evaluated post operatively 3 months status post left knee open MPFL reconstruction using allograft and lateral release, DOS: 10/31/2023. Since the prior visit, She reports significant improvement.    She states she is doing well overall at this time. She has no pain at this time, but does have some mild discomfort, especially after physical therapy. She has been compliant with going to physical therapy. She notes they are working on her range of motion and strengthening, which she feels is improving. She is happy with her progress at this time. She denies any new trauma or injury. She denies any numbness and tingling. She reports having some pain over the lateral aspect of her right knee. She has not had any treatment for this knee in the past.      The patient denies any fevers, chills, calf pain, chest pain/shortness of  "breath, redness or drainage from the incision.         I have reviewed the past medical, surgical, social and family history, medications and allergies as documented in the EMR.    Review of systems: ROS is negative other than that noted in the HPI.  Constitutional: Negative for fatigue and fever.        Physical Exam:    Blood pressure 114/77, pulse 101, height 5' 2\" (1.575 m), weight 98.9 kg (218 lb), not currently breastfeeding.    General/Constitutional: NAD, well developed, well nourished  HENT: Normocephalic, atraumatic  CV: Intact distal pulses, regular rate  Resp: No respiratory distress or labored breathing  Lymphatic: No lymphadenopathy palpated  Neuro: Alert and Oriented x 3, no focal deficits  Psych: Normal mood, normal affect, normal judgement, normal behavior  Skin: Warm, dry, no rashes, no erythema      Knee Exam (focused):                  RIGHT LEFT   ROM:   0-130 0-130   Palpation: Effusion negative negative     MJL tenderness Negative Negative     LJL tenderness Negative Negative   Instability: Varus stable stable     Valgus stable stable   Special Tests: Lachman Negative Negative     Posterior drawer Negative Negative     Anterior drawer Negative Negative     Pivot shift not tested not tested     Dial not tested not tested   Patella: Palpation no tenderness no tenderness     Mobility 1/4 1/4     Apprehension Negative Negative   Other: Single leg 1/4 squat not tested not tested      Incisions show no erythema, no drainage    LE NV Exam: +2 DP/PT pulses bilaterally  Sensation intact to light touch L2-S1 bilaterally     Bilateral hip ROM demonstrates no pain actively or passively    No calf tenderness to palpation bilaterally    Scribe Attestation      I,:  John Andujar am acting as a scribe while in the presence of the attending physician.:       I,:  Wilbert Michael, DO personally performed the services described in this documentation    as scribed in my presence.:            "

## 2024-01-24 NOTE — TELEPHONE ENCOUNTER
Patient needs new prior Auth for bc as per her pharmacy. She needs it by tomorrow 1/25. Also she is concerned about her bleeding while on bc. Please advise.

## 2024-01-25 ENCOUNTER — OFFICE VISIT (OUTPATIENT)
Dept: PHYSICAL THERAPY | Facility: REHABILITATION | Age: 39
End: 2024-01-25
Payer: COMMERCIAL

## 2024-01-25 DIAGNOSIS — M22.02 RECURRENT DISLOCATION OF LEFT PATELLA: ICD-10-CM

## 2024-01-25 DIAGNOSIS — M25.362 PATELLAR INSTABILITY OF LEFT KNEE: ICD-10-CM

## 2024-01-25 DIAGNOSIS — Z98.890 S/P LEFT KNEE SURGERY: Primary | ICD-10-CM

## 2024-01-25 PROCEDURE — 97110 THERAPEUTIC EXERCISES: CPT

## 2024-01-25 PROCEDURE — 97112 NEUROMUSCULAR REEDUCATION: CPT

## 2024-01-25 NOTE — PROGRESS NOTES
"Daily Note     Today's date: 2024  Patient name: Libertad Espinal  : 1985  MRN: [id number]  Referring provider: Ester Calderon,*  Dx:   Encounter Diagnosis     ICD-10-CM    1. S/P left knee surgery  Z98.890       2. Patellar instability of left knee  M25.362       3. Recurrent dislocation of left patella  M22.02           Start Time: 0730  Stop Time: 0810  Total time in clinic (min): 40 minutes    Subjective: Pt reports having a f/u with surgeon yesterday whom she reports is happy with progress.  Pt notes ascending stairs with a reciprocal gait pattern, but finds it difficult and cont to descend with step to gait pattern.        Objective: See treatment diary below    Precautions: follow protocol         Manuals    Light patellar mobility  5 min 5 min 5 min 5 min 5 min  5 min 5 min   measurements/ RE            20 min                                        Neuro Re-Ed                 Quad set 10\"x10 no roll 5\" 15x no roll 5\"x15 no roll 5\"x20 5\"x20    5\"x20 no roll   Prone quad set       5\"x10   Retro rocking  5\"x10   5\"x10 5\"x10 5\"x10    5\"x10   TENS/NMES Education     10 min                                                                 Ther Ex                 Recumbent bike 10 min bike on 10 min bike on 10 min bike on 10 min L1 10 min bike on  10 min bike on 10 min bike on   Heel slide flexion x20 w/ PT 20x with PT x20 w/ PT x20  x20 w/ PT  20x with PT x20 w/ PT   SAQ 5\"x15 w/ PT 5\" 10x 5\"x10 w/ PT 5\"x10 5\"x15 w/ PT    5\"x15 w/ PT   Ankle pump                 LLLD knee extension stretch   2 min             S/l hip abd 5\"x15 L  5\" 10x L 5\"x15 L 5\"x15 L 5\"x15 L    5\"x15 L                                       Ther Activity                                                     Gait Training                                                     Modalities                 Ice PRN   np          np                      Assessment: Tolerated treatment well. " Progressed quad set in knee ext to longer hold time with increased fatigue.  Patient cont to demonstrate quad lag with SLR and trunk rotation to initiate movement.  Tightness along quad with end range heel slide flex.  Continue to progress strengthening and ROM as tolerated.   Patient demonstrated fatigue post treatment and would benefit from continued PT      Plan: Progress treatment as tolerated.

## 2024-01-26 ENCOUNTER — TELEPHONE (OUTPATIENT)
Dept: PSYCHIATRY | Facility: CLINIC | Age: 39
End: 2024-01-26

## 2024-01-26 NOTE — TELEPHONE ENCOUNTER
Patient called to send a message to provider that she emailed the accomodation papers over.    Any questions, patient can be reached at [phone]

## 2024-01-29 ENCOUNTER — APPOINTMENT (OUTPATIENT)
Dept: PHYSICAL THERAPY | Facility: REHABILITATION | Age: 39
End: 2024-01-29
Payer: COMMERCIAL

## 2024-01-30 ENCOUNTER — TELEMEDICINE (OUTPATIENT)
Dept: BEHAVIORAL/MENTAL HEALTH CLINIC | Facility: CLINIC | Age: 39
End: 2024-01-30
Payer: COMMERCIAL

## 2024-01-30 ENCOUNTER — OFFICE VISIT (OUTPATIENT)
Dept: PHYSICAL THERAPY | Facility: REHABILITATION | Age: 39
End: 2024-01-30
Payer: COMMERCIAL

## 2024-01-30 DIAGNOSIS — F31.5 BIPOLAR I DISORDER, MOST RECENT EPISODE DEPRESSED, SEVERE WITH PSYCHOTIC FEATURES (HCC): Primary | Chronic | ICD-10-CM

## 2024-01-30 DIAGNOSIS — F41.1 GAD (GENERALIZED ANXIETY DISORDER): Chronic | ICD-10-CM

## 2024-01-30 DIAGNOSIS — Z98.890 S/P LEFT KNEE SURGERY: Primary | ICD-10-CM

## 2024-01-30 DIAGNOSIS — M25.362 PATELLAR INSTABILITY OF LEFT KNEE: ICD-10-CM

## 2024-01-30 DIAGNOSIS — M22.02 RECURRENT DISLOCATION OF LEFT PATELLA: ICD-10-CM

## 2024-01-30 DIAGNOSIS — G47.09 OTHER INSOMNIA: Chronic | ICD-10-CM

## 2024-01-30 PROCEDURE — 97110 THERAPEUTIC EXERCISES: CPT

## 2024-01-30 PROCEDURE — 97112 NEUROMUSCULAR REEDUCATION: CPT

## 2024-01-30 PROCEDURE — 90837 PSYTX W PT 60 MINUTES: CPT | Performed by: SOCIAL WORKER

## 2024-01-30 NOTE — PROGRESS NOTES
"Daily Note     Today's date: 2024  Patient name: Libertad Espinal  : 1985  MRN: [id number]  Referring provider: Ester Calderon,*  Dx:   Encounter Diagnosis     ICD-10-CM    1. S/P left knee surgery  Z98.890       2. Patellar instability of left knee  M25.362       3. Recurrent dislocation of left patella  M22.02           Start Time: 0730  Stop Time: 08  Total time in clinic (min): 42 minutes    Subjective: Pt denies soreness following last visit.  Pt reports noticing herself circumducting during ambulation and stair negotiation and is trying to be more aware of positioning while walking.  Patient also notes feeling L LE has been giving out more the last few days.          Objective: See treatment diary below    Precautions: follow protocol         Manuals    Light patellar mobility  5 min 5 min 5 min 5 min 5 min  5 min 5 min   measurements/ RE            20 min                                        Neuro Re-Ed                 Quad set 10\"x10 no roll 10\"x10 no roll 5\"x15 no roll 5\"x20 5\"x20    5\"x20 no roll   Prone quad set       5\"x10   Retro rocking  5\"x10  5\"x15 5\"x10 5\"x10 5\"x10    5\"x10   TENS/NMES Education     10 min                                                                 Ther Ex                 Recumbent bike 10 min bike on 10 min bike on 10 min bike on 10 min L1 10 min bike on  10 min bike on 10 min bike on   Heel slide flexion x20 w/ PT x20 w/ PT x20 w/ PT x20  x20 w/ PT  20x with PT x20 w/ PT   SAQ 5\"x15 w/ PT 5\"x15 w/ PT 5\"x10 w/ PT 5\"x10 5\"x15 w/ PT    5\"x15 w/ PT   Ankle pump                 LLLD knee extension stretch                S/l hip abd 5\"x15 L 5\"x15 L 5\"x15 L 5\"x15 L 5\"x15 L    5\"x15 L                                      Ther Activity                                                     Gait Training                                                     Modalities                 Ice PRN             np                "       Assessment: Tolerated treatment well. Tenderness to palpation along medial aspect of L knee with patellar mobility this visit.  Pt cont to be challenged with quad control during last few deg of SAQ and with SLR.  Pt demo's trunk rotation and mild knee flex to initiate SLR movement, however does demonstrate mild improvement in control with lifting phase of exercise after initial movement.  Continue to progress strengthening and ROM as tolerated.   Patient demonstrated fatigue post treatment and would benefit from continued PT      Plan: Progress treatment as tolerated.

## 2024-01-30 NOTE — PSYCH
Virtual Regular Visit    Verification of patient location:    Patient is located at Home in the following state in which I hold an active license [place]    Assessment/Plan:    Problem List Items Addressed This Visit          Other    Bipolar I disorder, most recent episode depressed, severe with psychotic features (HCC) - Primary (Chronic)    SHELLY (generalized anxiety disorder) (Chronic)    Other insomnia (Chronic)     Goals addressed in session: Goal 1      Reason for visit is   Chief Complaint   Patient presents with    Virtual Regular Visit      Encounter provider CHARLIE Hutchinson    Provider located at [facility]  [facility] [place] [id number] [phone]    Recent Visits  Date Type Provider Dept   01/30/24 Telemedicine CHARLIE Hutchinson [facility] [place]   01/26/24 Telephone CHARLIE Hutchinson [facility] [place]   Showing recent visits within past 7 days and meeting all other requirements  Future Appointments  No visits were found meeting these conditions.  Showing future appointments within next 150 days and meeting all other requirements     The patient was identified by name and date of birth. Libertad Espinal was informed that this is a telemedicine visit and that the visit is being conducted throughthe Epic Embedded platform. She agrees to proceed..  My office door was closed. No one else was in the room.  She acknowledged consent and understanding of privacy and security of the video platform. The patient has agreed to participate and understands they can discontinue the visit at any time.    Patient is aware this is a billable service.     Subjective  Libertad Espinal is a 38 y.o. female.    HPI     Past Medical History:   Diagnosis Date    Abnormal Pap smear of cervix     Anxiety     Bipolar disorder (HCC)     COVID-19 01/12/2022    10/27/23 Per pt had 3 separate  times - last year being the last episode    Depression     Eczema     Exposure to chlamydia     Resolved 18    Knee pain, left     Migraine without aura and without status migrainosus, not intractable 10/11/2019    Obesity     Post traumatic stress disorder 2021    Postpartum depression 2018    TMJ (dislocation of temporomandibular joint)     causes snoring    Manoj-Parkinson-White (WPW) syndrome        Past Surgical History:   Procedure Laterality Date    ABLATION OF DYSRHYTHMIC FOCUS      WPW ablation age 16    COLONOSCOPY      COLPOSCOPY      DISTAL PATELLAR REALIGNMENT Left 10/31/2023    Procedure: OPEN MPFL RECONSTRUCTION WITH ALLOGRAFT;  Surgeon: Wilbert Michael DO;  Location:  MAIN OR;  Service: Orthopedics    INDUCED       OTHER SURGICAL HISTORY      catheter ablation- WPW age 17    OH ARTHROSCOPY KNEE LATERAL RELEASE Left 10/31/2023    Procedure: RELEASE RETINACULAR;  Surgeon: Wilbert Michael DO;  Location:  MAIN OR;  Service: Orthopedics    TONSILLECTOMY      WISDOM TOOTH EXTRACTION         Current Outpatient Medications   Medication Sig Dispense Refill    Acetaminophen (TYLENOL PO) Take by mouth (Patient not taking: Reported on 2023)      [facility] Aspirin Low Dose 81 MG EC tablet TAKE 1 TABLET BY MOUTH 2 TIMES A DAY. 180 tablet 1    Drospirenone 4 MG TABS Take 1 tablet by mouth in the morning 84 tablet 3    ibuprofen (MOTRIN) 600 mg tablet Take 1 tablet (600 mg total) by mouth every 6 (six) hours as needed for mild pain, moderate pain or fever (Patient not taking: Reported on 2023) 30 tablet 0    lamoTRIgine (LaMICtal) 200 MG tablet Take 1 tablet (200 mg total) by mouth daily (Patient taking differently: Take 200 mg by mouth daily at bedtime) 90 tablet 1    LORazepam (ATIVAN) 1 mg tablet TAKE 1 TABLET BY MOUTH EVERY 6 HOURS AS NEEDED FOR ANXIETY. 30 tablet 0    naloxone (NARCAN) 4 mg/0.1 mL nasal spray Administer 1 spray into a nostril. If no response after 2-3 minutes,  "give another dose in the other nostril using a new spray. 1 each 1    naproxen (NAPROSYN) 500 mg tablet Take 1 tablet (500 mg total) by mouth 2 (two) times a day with meals (Patient taking differently: Take 500 mg by mouth 2 (two) times a day with meals) 30 tablet 0    oxyCODONE (ROXICODONE) 5 immediate release tablet Take 1 tablet (5 mg total) by mouth every 4 (four) hours as needed for moderate pain for up to 15 doses Max Daily Amount: 30 mg 15 tablet 0    sertraline (ZOLOFT) 50 mg tablet Take 1 tablet (50 mg total) by mouth daily at bedtime 90 tablet 0     No current facility-administered medications for this visit.        Allergies   Allergen Reactions    Codeine Other (See Comments)     dry heaves    Sulfa Antibiotics Hives and Rash     Per pt as achild    Erythromycin Hives     Per as a child     Review of Systems    Video Exam    There were no vitals filed for this visit.    Physical Exam     Behavioral Health Psychotherapy Progress Note    Psychotherapy Provided: Individual Psychotherapy     1. Bipolar I disorder, most recent episode depressed, severe with psychotic features (HCC)        2. SHELLY (generalized anxiety disorder)        3. Other insomnia            Goals addressed in session: Goal 1     DATA: Met with Libertad for her scheduled individual session. We briefly reviewed the ADA forms that she needed to have completed. She will come into the office to sign the forms. Libertad states that she is \"not doing well.\" She states that she feels that she cannot be honest with her SO about how overwhelmed she is. She states that he has indicated that he is not happy in their relationship and is not sure that he wants to stay in the relationship. She states that he is her support system, and she is used to being able to depend on him. We discussed Libertad's support system. She identifies her brother and one of her cousins as being supportive to her. She states that she is feeling responsible for Saturnino's " "happiness. She states that she has fears of losing people. Libertad was tearful and initially stated that she felt that she was \"shutting down\" and did not want to talk about the issues. She was able to persevere and we spoke about her past trauma and how it impacts her current relationships. By the end of the session, Libertad was able to develop a plan to address her feelings of being overwhelmed. She plans to speak with her SO and talk to him about her feelings. We will follow up in three weeks. If she needs additional support between sessions, I encouraged her to reach out to me.     During this session, this clinician used the following therapeutic modalities: Client-centered Therapy, Dialectical Behavior Therapy, Mindfulness-based Strategies, Motivational Interviewing, Solution-Focused Therapy, and Supportive Psychotherapy    Substance Abuse was not addressed during this session. If the client is diagnosed with a co-occurring substance use disorder, please indicate any changes in the frequency or amount of use: n/a. Stage of change for addressing substance use diagnoses: No substance use/Not applicable    ASSESSMENT:  Libertad Espinal presents with a Anxious and Dysthymic mood.     her affect is Tearful, which is congruent, with her mood and the content of the session. The client has not made progress on their goals since our last session.    Libertad Espinal presents with a minimal risk of suicide, minimal risk of self-harm, and minimal risk of harm to others.    For any risk assessment that surpasses a \"low\" rating, a safety plan must be developed.    A safety plan was indicated: no  If yes, describe in detail n/a    PLAN: Between sessions, Libertad Espinal will continue to monitor her moods. She will continue to work on setting limits/boundaries with her SO. She will also continue to work on increasing effective communication with her SO. At the next session, the therapist will use " Client-centered Therapy, Dialectical Behavior Therapy, Mindfulness-based Strategies, Motivational Interviewing, Solution-Focused Therapy, and Supportive Psychotherapy to address her mood regulation and relationship concerns.    Behavioral Health Treatment Plan and Discharge Planning: Libertad Letty Teddy is aware of and agrees to continue to work on their treatment plan. They have identified and are working toward their discharge goals. yes    Visit start and stop times:    01/30/24  Start Time: 1002  Stop Time: 1101  Total Visit Time: 59 minutes

## 2024-01-31 ENCOUNTER — TELEPHONE (OUTPATIENT)
Dept: PSYCHIATRY | Facility: CLINIC | Age: 39
End: 2024-01-31

## 2024-01-31 NOTE — TELEPHONE ENCOUNTER
Patient came to  to sign AIDAN that was here for her as well as some forms from [facility]. They were [employer] Medical Inquiry Forms. Provider gave clear directions for signing all the papers and gave a fax number for which they were to be sent. Fax was received and all was loaded into Media. A copy of the paperwork was given to Patient.    Thank you.

## 2024-02-05 ENCOUNTER — APPOINTMENT (OUTPATIENT)
Dept: PHYSICAL THERAPY | Facility: REHABILITATION | Age: 39
End: 2024-02-05
Payer: COMMERCIAL

## 2024-02-06 ENCOUNTER — OFFICE VISIT (OUTPATIENT)
Dept: PHYSICAL THERAPY | Facility: REHABILITATION | Age: 39
End: 2024-02-06
Payer: COMMERCIAL

## 2024-02-06 DIAGNOSIS — M25.362 PATELLAR INSTABILITY OF LEFT KNEE: ICD-10-CM

## 2024-02-06 DIAGNOSIS — M22.02 RECURRENT DISLOCATION OF LEFT PATELLA: ICD-10-CM

## 2024-02-06 DIAGNOSIS — Z98.890 S/P LEFT KNEE SURGERY: Primary | ICD-10-CM

## 2024-02-06 PROCEDURE — 97110 THERAPEUTIC EXERCISES: CPT

## 2024-02-06 PROCEDURE — 97112 NEUROMUSCULAR REEDUCATION: CPT

## 2024-02-06 NOTE — PROGRESS NOTES
"Daily Note     Today's date: 2024  Patient name: Libertad Espinal  : 1985  MRN: [id number]  Referring provider: Ester Calderon,*  Dx:   Encounter Diagnosis     ICD-10-CM    1. S/P left knee surgery  Z98.890       2. Patellar instability of left knee  M25.362       3. Recurrent dislocation of left patella  M22.02           Start Time: 0730  Stop Time: 0815  Total time in clinic (min): 45 minutes    Subjective: Pt reports soreness lasting about one day along medial quadriceps region following last visit.  Pt notes occasional instances of buckling sensation when standing.  Compliance with HEP noted.        Objective: See treatment diary below    Precautions: follow protocol         Manuals    Light patellar mobility  5 min 5 min 5 min 5 min 5 min  5 min 5 min   measurements/ RE            20 min                                        Neuro Re-Ed                 Quad set 10\"x10 no roll 10\"x10 no roll 10\"x10 no roll 5\"x20 5\"x20    5\"x20 no roll   Prone quad set   5\"x10    5\"x10   Retro rocking  5\"x10  5\"x15 5\"x15 5\"x10 5\"x10    5\"x10   TENS/NMES Education                                                                      Ther Ex                 Recumbent bike 10 min bike on 10 min bike on 10 min bike on 10 min L1 10 min bike on  10 min bike on 10 min bike on   Heel slide flexion x20 w/ PT x20 w/ PT x20 w/ PT x20  x20 w/ PT  20x with PT x20 w/ PT   SAQ 5\"x15 w/ PT 5\"x15 w/ PT 5\"x15 w/ PT 5\"x10 5\"x15 w/ PT    5\"x15 w/ PT   Ankle pump                 LLLD knee extension stretch                S/l hip abd 5\"x15 L 5\"x15 L 5\"x15 L 5\"x15 L 5\"x15 L    5\"x15 L                                      Ther Activity                                                     Gait Training                                                     Modalities                 Ice PRN             np                      Assessment: Tolerated treatment well. Mild knee flex with initiating SLR and " through last few deg of eccentric phase, however demonstrates improved quad control in ext through mid portion of SLR.  Tightness noted with end range heel slide. Continue to progress strengthening and ROM as tolerated.   Patient demonstrated fatigue post treatment and would benefit from continued PT      Plan: Progress treatment as tolerated.

## 2024-02-12 ENCOUNTER — APPOINTMENT (OUTPATIENT)
Dept: PHYSICAL THERAPY | Facility: REHABILITATION | Age: 39
End: 2024-02-12
Payer: COMMERCIAL

## 2024-02-12 ENCOUNTER — TELEMEDICINE (OUTPATIENT)
Dept: PSYCHIATRY | Facility: CLINIC | Age: 39
End: 2024-02-12
Payer: COMMERCIAL

## 2024-02-12 DIAGNOSIS — F43.10 PTSD (POST-TRAUMATIC STRESS DISORDER): ICD-10-CM

## 2024-02-12 DIAGNOSIS — F31.81 BIPOLAR II DISORDER (HCC): ICD-10-CM

## 2024-02-12 DIAGNOSIS — F41.1 GAD (GENERALIZED ANXIETY DISORDER): ICD-10-CM

## 2024-02-12 PROCEDURE — 99213 OFFICE O/P EST LOW 20 MIN: CPT | Performed by: PSYCHIATRY & NEUROLOGY

## 2024-02-12 RX ORDER — LAMOTRIGINE 200 MG/1
200 TABLET ORAL
Qty: 30 TABLET | Refills: 1 | Status: SHIPPED | OUTPATIENT
Start: 2024-02-12

## 2024-02-12 RX ORDER — LORAZEPAM 1 MG/1
1 TABLET ORAL EVERY 6 HOURS PRN
Qty: 30 TABLET | Refills: 0 | Status: SHIPPED | OUTPATIENT
Start: 2024-02-12

## 2024-02-12 NOTE — PSYCH
MEDICATION MANAGEMENT NOTE    PSYCHIATRIC VIRTUAL VISIT        [facility]      Name and Date of Birth:  Libertad Espinal 38 y.o. 1985 MRN: [id number]    Psychiatric Virtual Visit:    Verification of patient location: Patient is located in the state that I hold an active license: [place]    REQUIRED DOCUMENTATION:     Provider located at [facility] at [address] [place] [address].  TeleMed provider: Chip Sutton MD  Patient was identified by name and date of birth. Libertad Espinal was informed that this is a telemedicine visit and that the visit is being conducted through the Epic Embedded platform. She agrees to proceed..  My office door was closed. No one else was in the room.  She acknowledged consent and understanding of privacy and security of the video platform. The patient has agreed to participate and understands they can discontinue the visit at any time. Patient is aware this is a billable service.         This note was shared with patient.    I spent 15 minutes directly with the patient during this visit        Review Of Systems:     Mood Depressed   Thought Content Normal   General As HPI   Physical symptoms No physical complaints       Laboratory Results: No results found for this or any previous visit.    Subjective:    Complains of feeling depressed and unmotivated.  She does not feel like doing anything although she forces herself to attend to the needs of her children.  She sits around or lays down a lot.  Sometimes she cannot keep up with the house chores.  Her family is understanding.  She is no longer troubled by obsessive thoughts.  Sleep pattern is disturbed.  Her appetite is fair.  At times she gets teary-eyed but she does not have any crying spells.  No hopelessness or suicidal tendencies.  Continues to see her therapist on a regular basis      Objective:   Patient is showing signs of  moderate depression.    Mental status:  Appearance calm and cooperative , adequate hygiene and grooming, and good eye contact    Mood Depressed   Affect affect was blunted   Speech Normal rate and Normal volume   Thought Processes coherent/organized   Hallucinations Denies hallucinations and does not appear to be responding to internal stimuli   Thought Content Does not verbalize delusional material   Abnormal Thoughts no suicidal thoughts  and no homicidal thoughts    Orientation A+O x 3       Assessment/Plan:       There are no diagnoses linked to this encounter.    No diagnosis found.    Treatment Recommendations- Risks Benefits    Increase sertraline to 100 mg daily.  Take lorazepam 1 mg as needed for anxiety.  Continue with lamotrigine 200 mg at bedtime.  Follow-up in 4 weeks  Risks, Benefits And Possible Side Effects Of Medications:  Risks, benefits, and possible side effects of medications explained to patient and patient verbalizes understanding    VIRTUAL VISIT DISCLAIMER    Libertad Espinal verbally agrees to participate in Virtual Care Services. Pt is aware that Virtual Care Services could be limited without vital signs or the ability to perform a full hands-on physical exam. Libertad Espinal understands she or the provider may request at any time to terminate the video visit and request the patient to seek care or treatment in person.     Chip Sutton MD 02/12/24

## 2024-02-13 ENCOUNTER — APPOINTMENT (OUTPATIENT)
Dept: PHYSICAL THERAPY | Facility: REHABILITATION | Age: 39
End: 2024-02-13
Payer: COMMERCIAL

## 2024-02-14 ENCOUNTER — OFFICE VISIT (OUTPATIENT)
Dept: PHYSICAL THERAPY | Facility: REHABILITATION | Age: 39
End: 2024-02-14
Payer: COMMERCIAL

## 2024-02-14 DIAGNOSIS — Z98.890 S/P LEFT KNEE SURGERY: Primary | ICD-10-CM

## 2024-02-14 DIAGNOSIS — M25.362 PATELLAR INSTABILITY OF LEFT KNEE: ICD-10-CM

## 2024-02-14 DIAGNOSIS — M22.02 RECURRENT DISLOCATION OF LEFT PATELLA: ICD-10-CM

## 2024-02-14 PROCEDURE — 97112 NEUROMUSCULAR REEDUCATION: CPT

## 2024-02-14 PROCEDURE — 97110 THERAPEUTIC EXERCISES: CPT

## 2024-02-14 NOTE — PROGRESS NOTES
"Daily Note     Today's date: 2024  Patient name: Libertad Espinal  : 1985  MRN: [id number]  Referring provider: Ester Calderon,*  Dx:   Encounter Diagnosis     ICD-10-CM    1. S/P left knee surgery  Z98.890       2. Patellar instability of left knee  M25.362       3. Recurrent dislocation of left patella  M22.02           Start Time: 0730  Stop Time: 0810  Total time in clinic (min): 40 minutes    Subjective: Pt reports pain free \"pop\" with stair negotiation and sometimes when moving around while sitting at work.  Pt notes compliance with HEP and working on quad strength.  Pt is able to ascend stairs with reciprocal gait pattern, but continues to descend with step to gait pattern.        Objective: See treatment diary below    Precautions: follow protocol         Manuals    Light patellar mobility  5 min 5 min 5 min 5 min 5 min  5 min 5 min   measurements/ RE            20 min                                        Neuro Re-Ed                 Quad set 10\"x10 no roll 10\"x10 no roll 10\"x10 no roll 10\"x10 no roll 5\"x20    5\"x20 no roll   Prone quad set   5\"x10 5\"x10   5\"x10   Retro rocking  5\"x10  5\"x15 5\"x15 5\"x15 5\"x10    5\"x10   TENS/NMES Education                                                                      Ther Ex                 Recumbent bike 10 min bike on 10 min bike on 10 min bike on 10 min bike on 10 min bike on  10 min bike on 10 min bike on   Heel slide flexion x20 w/ PT x20 w/ PT x20 w/ PT x20 w/ PT x20 w/ PT  20x with PT x20 w/ PT   SAQ 5\"x15 w/ PT 5\"x15 w/ PT 5\"x15 w/ PT 5\"x15 w/ PT 5\"x15 w/ PT    5\"x15 w/ PT   Ankle pump                 LLLD knee extension stretch                S/l hip abd 5\"x15 L 5\"x15 L 5\"x15 L 5\"x15 L 5\"x15 L    5\"x15 L   SLR       x5 w/ PT                           Ther Activity                                                     Gait Training                                                     Modalities           "       Ice PRN             np                      Assessment: Tolerated treatment well. Pt cont to be challenged with quad control initiating SLR, but is improving with control during exercise and performed with PT today.  Fatigue demonstrated with SAQ.  Tightness without pain into end range heel flex.  Cont to progress ROM and quad strength as able.  Patient demonstrated fatigue post treatment and would benefit from continued PT      Plan: Progress treatment as tolerated.

## 2024-02-19 ENCOUNTER — APPOINTMENT (OUTPATIENT)
Dept: PHYSICAL THERAPY | Facility: REHABILITATION | Age: 39
End: 2024-02-19
Payer: COMMERCIAL

## 2024-02-20 ENCOUNTER — APPOINTMENT (OUTPATIENT)
Dept: PHYSICAL THERAPY | Facility: REHABILITATION | Age: 39
End: 2024-02-20
Payer: COMMERCIAL

## 2024-02-20 ENCOUNTER — TELEMEDICINE (OUTPATIENT)
Dept: BEHAVIORAL/MENTAL HEALTH CLINIC | Facility: CLINIC | Age: 39
End: 2024-02-20
Payer: COMMERCIAL

## 2024-02-20 DIAGNOSIS — G47.09 OTHER INSOMNIA: Chronic | ICD-10-CM

## 2024-02-20 DIAGNOSIS — F31.5 BIPOLAR I DISORDER, MOST RECENT EPISODE DEPRESSED, SEVERE WITH PSYCHOTIC FEATURES (HCC): Primary | Chronic | ICD-10-CM

## 2024-02-20 DIAGNOSIS — F41.1 GAD (GENERALIZED ANXIETY DISORDER): Chronic | ICD-10-CM

## 2024-02-20 PROCEDURE — 90837 PSYTX W PT 60 MINUTES: CPT | Performed by: SOCIAL WORKER

## 2024-02-20 NOTE — PSYCH
Virtual Regular Visit    Verification of patient location:    Patient is located at Home in the following state in which I hold an active license [place]      Assessment/Plan:    Problem List Items Addressed This Visit          Other    Bipolar I disorder, most recent episode depressed, severe with psychotic features (HCC) - Primary (Chronic)    SHELLY (generalized anxiety disorder) (Chronic)    Other insomnia (Chronic)       Goals addressed in session: Goal 1          Reason for visit is   Chief Complaint   Patient presents with    Virtual Regular Visit          Encounter provider CHARLIE Hutchinson    Provider located at [facility] [place]  [facility] [place]  [address]  [place] [address] [phone]      Recent Visits  No visits were found meeting these conditions.  Showing recent visits within past 7 days and meeting all other requirements  Today's Visits  Date Type Provider Dept   02/20/24 Telemedicine CHARLIE Hutchinson  [facility] [place]   Showing today's visits and meeting all other requirements  Future Appointments  No visits were found meeting these conditions.  Showing future appointments within next 150 days and meeting all other requirements       The patient was identified by name and date of birth. Libertad Espinal was informed that this is a telemedicine visit and that the visit is being conducted throughthe Epic Embedded platform. She agrees to proceed..  My office door was closed. No one else was in the room.  She acknowledged consent and understanding of privacy and security of the video platform. The patient has agreed to participate and understands they can discontinue the visit at any time.    Patient is aware this is a billable service.     Subjective  Libertad Espinal is a 38 y.o. female.      HPI     Past Medical History:   Diagnosis Date    Abnormal Pap smear of cervix     Anxiety     Bipolar  disorder (HCC)     COVID-19 2022    10/27/23 Per pt had 3 separate times - last year being the last episode    Depression     Eczema     Exposure to chlamydia     Resolved 18    Knee pain, left     Migraine without aura and without status migrainosus, not intractable 10/11/2019    Obesity     Post traumatic stress disorder 2021    Postpartum depression 2018    TMJ (dislocation of temporomandibular joint)     causes snoring    Manoj-Parkinson-White (WPW) syndrome        Past Surgical History:   Procedure Laterality Date    ABLATION OF DYSRHYTHMIC FOCUS      WPW ablation age 16    COLONOSCOPY      COLPOSCOPY      DISTAL PATELLAR REALIGNMENT Left 10/31/2023    Procedure: OPEN MPFL RECONSTRUCTION WITH ALLOGRAFT;  Surgeon: Wilbert Michael DO;  Location:  MAIN OR;  Service: Orthopedics    INDUCED       OTHER SURGICAL HISTORY      catheter ablation- WPW age 17    MO ARTHROSCOPY KNEE LATERAL RELEASE Left 10/31/2023    Procedure: RELEASE RETINACULAR;  Surgeon: Wilbert Michael DO;  Location:  MAIN OR;  Service: Orthopedics    TONSILLECTOMY      WISDOM TOOTH EXTRACTION         Current Outpatient Medications   Medication Sig Dispense Refill    Acetaminophen (TYLENOL PO) Take by mouth (Patient not taking: Reported on 2023)      [facility] Aspirin Low Dose 81 MG EC tablet TAKE 1 TABLET BY MOUTH 2 TIMES A DAY. 180 tablet 1    Drospirenone 4 MG TABS Take 1 tablet by mouth in the morning 84 tablet 3    ibuprofen (MOTRIN) 600 mg tablet Take 1 tablet (600 mg total) by mouth every 6 (six) hours as needed for mild pain, moderate pain or fever (Patient not taking: Reported on 2023) 30 tablet 0    lamoTRIgine (LaMICtal) 200 MG tablet Take 1 tablet (200 mg total) by mouth daily at bedtime 30 tablet 1    LORazepam (ATIVAN) 1 mg tablet Take 1 tablet (1 mg total) by mouth every 6 (six) hours as needed for anxiety 30 tablet 0    naproxen (NAPROSYN) 500 mg tablet Take 1 tablet (500 mg total) by mouth 2  "(two) times a day with meals (Patient taking differently: Take 500 mg by mouth 2 (two) times a day with meals) 30 tablet 0    sertraline (ZOLOFT) 50 mg tablet Take 2 tablets (100 mg total) by mouth daily at bedtime 30 tablet 1     No current facility-administered medications for this visit.        Allergies   Allergen Reactions    Codeine Other (See Comments)     dry heaves    Sulfa Antibiotics Hives and Rash     Per pt as achild    Erythromycin Hives     Per as a child       Review of Systems    Video Exam    There were no vitals filed for this visit.    Physical Exam     Behavioral Health Psychotherapy Progress Note    Psychotherapy Provided: Individual Psychotherapy     1. Bipolar I disorder, most recent episode depressed, severe with psychotic features (HCC)        2. SHELLY (generalized anxiety disorder)        3. Other insomnia            Goals addressed in session: Goal 1     DATA: Met with Libertad for her scheduled individual session. She states that things are going ok for her. She did meet with her mother recently, and she took the kids to see her for a dinner gathering. She states that she and Saturnino had an open discussion about her taking the kids to see her mother, and the conversation went very well. She states that, after the conversation with Saturnino, she felt that they were both ok with her taking them to see her mother. She states that her mother \"didn't really cross the line at all with the kids.\" Libertad states that she met with Dr. Sutton, and he recommended that she increase her dose of sertraline. She states that she has been having some difficulty with motivating herself to take the medications. She states that she knows that she needs to take it, and she knows that it will help her when she takes it. She states that she just wants to \"go to sleep and turn the world off.\" Libertad plans to write a note for herself to remind herself that she will feel better if she takes the medications. Libertad " "states that she is struggling with parenting issues with Alexander. She states that she has been having an increase in temper tantrums. She states that during one particularly bad tantrum, Alexander stated, \"I want to die.\" Libertad states that she had a conversation with Alexander about using different words to express her emotions. Libertad states that she has a \"melt-down\" approximately once per week-- and it's usually related to her wanting something and having to either not get it or having to wait for it. Libertad states that her daughter is in  and seems to be doing well, overall, at school. Libertad tends to blame herself for her daughter's behaviors. She does believe that her parenting is different with Alexander versus how things were when she was parenting her son. She states that she feels that she yelled a lot more with Jamar-- due to her life situations at the time. She feels that she has been more lenient (and enabling) to Alexander-- which she fears is leading to Alexander' behaviors. We spent some time talking about Libertad's emotion regulation during Alexander' temper tantrums. We discussed some various ways for her to manage her emotions--e.g., taking a time-out, asking her partner to step in to help, etc. We also discussed some of Alexander' vulnerability factors, as well as Libertad's vulnerability factors. She had an easier time identifying Alexander' vulnerability factors versus her own. She states that she is doing ok with her job. She states she is not enjoying it. \"I am not enjoying anything.\" She states that during the past two weeks, she has called out 2.5 days. She states that, at the time, she felt that it was helpful to call out. Libertad states that she feels that she has been having a difficult time maintaining a solid train of thought. She states that she has been feeling \"out in left field.\" She states that she has been feeling very tired. She states that she got her period this week, which also impacts her mood. She " "states that she has been sleeping but wakes up frequently throughout the night. We discussed sleep hygiene and ways that she can improve her sleep hygiene.     During this session, this clinician used the following therapeutic modalities: Client-centered Therapy, Dialectical Behavior Therapy, Mindfulness-based Strategies, Motivational Interviewing, Solution-Focused Therapy, and Supportive Psychotherapy    Substance Abuse was not addressed during this session. If the client is diagnosed with a co-occurring substance use disorder, please indicate any changes in the frequency or amount of use: n/a. Stage of change for addressing substance use diagnoses: No substance use/Not applicable    ASSESSMENT:  Libertad Espinal presents with a Euthymic/ normal mood.     her affect is Normal range and intensity, which is congruent, with her mood and the content of the session. The client has made progress on their goals.    Libertad Espinal presents with a minimal risk of suicide, minimal risk of self-harm, and minimal risk of harm to others.    For any risk assessment that surpasses a \"low\" rating, a safety plan must be developed.    A safety plan was indicated: no  If yes, describe in detail n/a    PLAN: Between sessions, Libertad Espinal will continue to monitor her mood. She will increase adherence with her medication regimen. We will meet again in 3-4 weeks. At the next session, the therapist will use Client-centered Therapy, Dialectical Behavior Therapy, Mindfulness-based Strategies, Motivational Interviewing, Solution-Focused Therapy, and Supportive Psychotherapy to address her mood regulation and relationship concerns.    Behavioral Health Treatment Plan and Discharge Planning: Libertad Espinal is aware of and agrees to continue to work on their treatment plan. They have identified and are working toward their discharge goals. yes    Visit start and stop times:    02/20/24  Start Time: 1005  Stop Time: " 1100  Total Visit Time: 55 minutes

## 2024-02-26 ENCOUNTER — APPOINTMENT (OUTPATIENT)
Dept: PHYSICAL THERAPY | Facility: REHABILITATION | Age: 39
End: 2024-02-26
Payer: COMMERCIAL

## 2024-02-27 ENCOUNTER — EVALUATION (OUTPATIENT)
Dept: PHYSICAL THERAPY | Facility: REHABILITATION | Age: 39
End: 2024-02-27
Payer: COMMERCIAL

## 2024-02-27 DIAGNOSIS — M25.362 PATELLAR INSTABILITY OF LEFT KNEE: ICD-10-CM

## 2024-02-27 DIAGNOSIS — M22.02 RECURRENT DISLOCATION OF LEFT PATELLA: ICD-10-CM

## 2024-02-27 DIAGNOSIS — Z98.890 S/P LEFT KNEE SURGERY: Primary | ICD-10-CM

## 2024-02-27 PROCEDURE — 97110 THERAPEUTIC EXERCISES: CPT | Performed by: PHYSICAL THERAPIST

## 2024-02-27 PROCEDURE — 97140 MANUAL THERAPY 1/> REGIONS: CPT | Performed by: PHYSICAL THERAPIST

## 2024-02-27 NOTE — PROGRESS NOTES
PT Re-Evaluation     Today's date: 2024  Patient name: Libertad Espinal  : 1985  MRN: [id number]  Referring provider: Ester Calderon,*  Dx:   Encounter Diagnosis     ICD-10-CM    1. S/P left knee surgery  Z98.890       2. Patellar instability of left knee  M25.362       3. Recurrent dislocation of left patella  M22.02           Start Time: 0732  Stop Time: 0815  Total time in clinic (min): 43 minutes    Assessment  Assessment details: Patient is a 38 y.o. female that presents status post surgical arthroscopy of the left knee with lateral release and open MPFL reconstruction with allograft on 10/31/2023.  Patient reports 80% improvement with skilled physical therapy services.  FOTO improved by 28 points to 65/100.  Patient reports improvement with ADLS such as bathing and dressing, ambulation, negotiation of stairs, standing, transfers, childcare, and housework.  Patient reports continued difficulty with negotiation of stairs and kneeling/crawling on her knee.  Patient has made good progress towards goals established for physical therapy.  Patient would benefit from continued skilled physical therapy services for continued strengthening.  Will continue to follow protocol.  Will reduce to once every other week at this time to focus on HEP for quad strengthening.           Impairments: abnormal muscle firing, impaired physical strength and pain with function  Understanding of Dx/Px/POC: good  Goals  Impairment:  1.  Patient will reports 50% reduction in pain in 8 weeks to maximize function.-MET  2.  Patient will improve strength to 4/5 in all planes to maximize function.-PARTIALLY MET  3.  Patient will improve ROM to WFL in 8 weeks to maximize function.-PARTIALLY MET    Functional:  1. Patient will improve FOTO by 37 points to 74/100 in 12 weeks to maximize function.-PARTIALLY MET  2. Patient will be independent with HEP in 8 weeks to maximize function.-MET  3. Patient will report no difficulty  with ambulation in 12 weeks to maximize function.- MET  4. Patient will report no difficulty with negotiation of stairs in 12 weeks to maximize function.-PARTIALLY MET  5. Patient will report no difficulty with ADLS in 12 weeks to maximize function.- MET          Plan  Plan details: Patient will be a RE in 4 weeks.    Patient would benefit from: skilled physical therapy  Planned modality interventions: cryotherapy  Planned therapy interventions: manual therapy, neuromuscular re-education, patient education, strengthening, stretching, therapeutic activities, therapeutic exercise, home exercise program, functional ROM exercises, flexibility, balance/weight bearing training, abdominal trunk stabilization and activity modification  Frequency: 2x month  Duration in visits: 3  Duration in weeks: 4  Treatment plan discussed with: patient        Subjective Evaluation    History of Present Illness  Date of surgery: 10/31/2023  Mechanism of injury: surgery  Mechanism of injury: Patient presents status post surgical arthroscopy of the left knee with lateral release and open MPFL reconstruction with allograft.  Patient reports a chronic history of bilateral knee pain back to childhood.  Patient had bene having a locking sensation in her left knee over the last several years which had been getting more frequent.  Patient is currently limited with ADLS such as bathing and dressing, ambulation, negotiation of stairs, standing, transfers, childcare, and housework.  Patient notes her knee feels more unstable over the last few days.   Patient Goals  Patient goals for therapy: increased strength, decreased pain, increased motion and independence with ADLs/IADLs    Pain  At best pain ratin  At worst pain ratin  Location: medial L knee  Quality: sharp  Progression: improved    Treatments  Current treatment: physical therapy        Objective     Observations   Left Knee   Negative for drainage, effusion and trophic changes.  "    Palpation   Left   No palpable tenderness to the distal biceps femoris, distal semimembranosus, distal semitendinosus, lateral gastrocnemius, medial gastrocnemius, rectus femoris, vastus lateralis and vastus medialis.     Tenderness   Left Knee   Tenderness in the lateral joint line, medial joint line and pes anserinus. No tenderness in the fibular head and patellar tendon.     Passive Range of Motion   Left Knee   Flexion: 137 degrees   Extension: WFL    Right Knee   Flexion: 139 degrees   Extension: WFL    Strength/Myotome Testing     Left Hip   Planes of Motion   Flexion: 4+  Abduction: 4    Right Hip   Planes of Motion   Flexion: 4+  Abduction: 4    Left Knee   Flexion: 4+  Extension: 4  Quadriceps contraction: fair    Right Knee   Flexion: 4+  Extension: 4+  Quadriceps contraction: good    Left Ankle/Foot   Dorsiflexion: 5    Additional Strength Details  Mild quad lag with SLR    Ambulation   Weight-Bearing Status   Weight-Bearing Status (Left): weight-bearing as tolerated     Ambulation: Stairs   Ascend stairs: independent  Pattern: reciprocal  Railings: without rails  Descend stairs: independent  Pattern: reciprocal  Railings: one rail    Additional Stairs Ambulation Details  Poor eccentric control with left LE to descend stairs     Observational Gait   Gait: within functional limits     Functional Assessment        Single Leg Stance   Left: 30 seconds  Right: 30 seconds      Precautions: follow protocol         Manuals 1/25 1/30 2/6 2/14 2/27 1/17 1/23   Light patellar mobility  5 min 5 min 5 min 5 min   5 min 5 min   measurements/ RE          20 min  20 min                                         Neuro Re-Ed                 Quad set 10\"x10 no roll 10\"x10 no roll 10\"x10 no roll 10\"x10 no roll     5\"x20 no roll   Prone quad set     5\"x10 5\"x10    5\"x10   Retro rocking  5\"x10  5\"x15 5\"x15 5\"x15     5\"x10   TENS/NMES Education                  lateral step up/down with eccentric control          5x L     " "                                      Ther Ex                 Recumbent bike 10 min bike on 10 min bike on 10 min bike on 10 min bike on 10 min bike on  10 min bike on 10 min bike on   Heel slide flexion x20 w/ PT x20 w/ PT x20 w/ PT x20 w/ PT x20 w/ PT  20x with PT x20 w/ PT   SAQ 5\"x15 w/ PT 5\"x15 w/ PT 5\"x15 w/ PT 5\"x15 w/ PT     5\"x15 w/ PT   Ankle pump                 LLLD knee extension stretch                 S/l hip abd 5\"x15 L 5\"x15 L 5\"x15 L 5\"x15 L     5\"x15 L   SLR        x5 w/ PT  5\" 12x                         Ther Activity                                                     Gait Training                                                     Modalities                 Ice PRN              np                  "

## 2024-03-08 ENCOUNTER — TELEPHONE (OUTPATIENT)
Dept: PSYCHIATRY | Facility: CLINIC | Age: 39
End: 2024-03-08

## 2024-03-08 NOTE — TELEPHONE ENCOUNTER
Patient called regarding a billing questions regarding recent appts. Writer provider patient with deshawn number to assist

## 2024-03-11 ENCOUNTER — TELEPHONE (OUTPATIENT)
Dept: PSYCHIATRY | Facility: CLINIC | Age: 39
End: 2024-03-11

## 2024-03-11 ENCOUNTER — TELEMEDICINE (OUTPATIENT)
Dept: PSYCHIATRY | Facility: CLINIC | Age: 39
End: 2024-03-11
Payer: COMMERCIAL

## 2024-03-11 DIAGNOSIS — F31.81 BIPOLAR II DISORDER (HCC): Primary | ICD-10-CM

## 2024-03-11 DIAGNOSIS — G47.33 OBSTRUCTIVE SLEEP APNEA SYNDROME: ICD-10-CM

## 2024-03-11 PROCEDURE — 99213 OFFICE O/P EST LOW 20 MIN: CPT | Performed by: PSYCHIATRY & NEUROLOGY

## 2024-03-11 RX ORDER — LAMOTRIGINE 200 MG/1
200 TABLET ORAL
Qty: 30 TABLET | Refills: 1 | Status: SHIPPED | OUTPATIENT
Start: 2024-03-11

## 2024-03-11 RX ORDER — SERTRALINE HYDROCHLORIDE 100 MG/1
150 TABLET, FILM COATED ORAL DAILY
Qty: 45 TABLET | Refills: 1 | Status: SHIPPED | OUTPATIENT
Start: 2024-03-11

## 2024-03-11 NOTE — PSYCH
MEDICATION MANAGEMENT NOTE    PSYCHIATRIC VIRTUAL VISIT        [facility]      Name and Date of Birth:  Libertad Espinal 38 y.o. 1985 MRN: [id number]    Psychiatric Virtual Visit:    Verification of patient location: Patient is located in the state that I hold an active license: [place]    REQUIRED DOCUMENTATION:     Provider located at [facility] at [address] [place] [place] [address].  TeleMed provider: Chip Sutton MD  Patient was identified by name and date of birth. Libertad Espinal was informed that this is a telemedicine visit and that the visit is being conducted through the Epic Embedded platform. She agrees to proceed..  My office door was closed. No one else was in the room.  She acknowledged consent and understanding of privacy and security of the video platform. The patient has agreed to participate and understands they can discontinue the visit at any time. Patient is aware this is a billable service.         This note was shared with patient.    I spent 15 minutes directly with the patient during this visit        Review Of Systems:     Mood Depressed and Irritable/Angry   Thought Content Normal   General Sleep Disturbances, Changes in interest, and As HPI   Physical symptoms As Noted in HPI       Laboratory Results: No results found for this or any previous visit.    Subjective:  Libertad reports some improvement in her irritability and she is more motivated to do the housework but continues to withdraw herself and prefers to be by herself.  She wakes up frequently at night because of snoring.  She has never been assessed for sleep apnea.  Relationship with her family is available.  When she gets anxious she has a tendency to eat more.  Since last visit, she has called in sick 4 times        Objective:     Partial response to increased dose of Zoloft  Mental status:  Appearance adequate hygiene and grooming  and demonstrated behavior psychomotor retardation   Mood Depressed and Irritable   Affect affect was blunted and affect was constricted   Speech Normal rate and Normal volume   Thought Processes coherent/organized   Hallucinations Denies hallucinations and does not appear to be responding to internal stimuli   Thought Content Does not verbalize delusional material   Abnormal Thoughts no suicidal thoughts    Orientation A+O x 3       Assessment/Plan:      Diagnoses and all orders for this visit:    Bipolar II disorder (HCC)  -     sertraline (ZOLOFT) 100 mg tablet; Take 1.5 tablets (150 mg total) by mouth daily  -     lamoTRIgine (LaMICtal) 200 MG tablet; Take 1 tablet (200 mg total) by mouth daily at bedtime    Obstructive sleep apnea syndrome  -     Ambulatory Referral to Sleep Medicine; Future        1. Bipolar II disorder (HCC)    2. Obstructive sleep apnea syndrome        Treatment Recommendations- Risks Benefits    Increase sertraline to 150 mg a day.  Ambulatory referral to sleep medicine.  Follow-up in 4 weeks  Risks, Benefits And Possible Side Effects Of Medications:  Risks, benefits, and possible side effects of medications explained to patient and patient verbalizes understanding    VIRTUAL VISIT DISCLAIMER    Libertad Espinal verbally agrees to participate in Virtual Care Services. Pt is aware that Virtual Care Services could be limited without vital signs or the ability to perform a full hands-on physical exam. Libertad Espinal understands she or the provider may request at any time to terminate the video visit and request the patient to seek care or treatment in person.     Chip Sutton MD 03/11/24

## 2024-03-11 NOTE — TELEPHONE ENCOUNTER
Spoke with patient and scheduled 4 week follow up for 5/29/24 8AM virtual. Added to cancellation list as provider note for follow up around 4/8/24.

## 2024-03-11 NOTE — TELEPHONE ENCOUNTER
I called patient to let her know she has no active ins, and she stated it should be active as of March 1, I let her know this visit will be self-pay till insurance reinstates.

## 2024-03-12 ENCOUNTER — TELEMEDICINE (OUTPATIENT)
Dept: BEHAVIORAL/MENTAL HEALTH CLINIC | Facility: CLINIC | Age: 39
End: 2024-03-12
Payer: COMMERCIAL

## 2024-03-12 ENCOUNTER — APPOINTMENT (OUTPATIENT)
Dept: PHYSICAL THERAPY | Facility: REHABILITATION | Age: 39
End: 2024-03-12
Payer: COMMERCIAL

## 2024-03-12 DIAGNOSIS — F41.1 GAD (GENERALIZED ANXIETY DISORDER): Chronic | ICD-10-CM

## 2024-03-12 DIAGNOSIS — F43.10 POST TRAUMATIC STRESS DISORDER: ICD-10-CM

## 2024-03-12 DIAGNOSIS — F31.5 BIPOLAR I DISORDER, MOST RECENT EPISODE DEPRESSED, SEVERE WITH PSYCHOTIC FEATURES (HCC): Primary | Chronic | ICD-10-CM

## 2024-03-12 PROCEDURE — 90834 PSYTX W PT 45 MINUTES: CPT | Performed by: SOCIAL WORKER

## 2024-03-12 NOTE — PSYCH
Virtual Regular Visit    Verification of patient location:    Patient is located at Home in the following state in which I hold an active license [place]    Assessment/Plan:    Problem List Items Addressed This Visit          Behavioral Health    Bipolar I disorder, most recent episode depressed, severe with psychotic features (HCC) - Primary (Chronic)    SHELLY (generalized anxiety disorder) (Chronic)    Post traumatic stress disorder       Goals addressed in session: Goal 1      Reason for visit is No chief complaint on file.      Encounter provider CHARLIE Hutchinson    Provider [facility]  [facility] [place]  [address] [place] [address] [phone]      Recent Visits  No visits were found meeting these conditions.  Showing recent visits within past 7 days and meeting all other requirements  Today's Visits  Date Type Provider Dept   03/12/24 Telemedicine CHARLIE Hutchinson  [facility] [place]   Showing today's visits and meeting all other requirements  Future Appointments  No visits were found meeting these conditions.  Showing future appointments within next 150 days and meeting all other requirements       The patient was identified by name and date of birth. Libertad Espinal was informed that this is a telemedicine visit and that the visit is being conducted throughthe Epic Embedded platform. She agrees to proceed..  My office door was closed. No one else was in the room.  She acknowledged consent and understanding of privacy and security of the video platform. The patient has agreed to participate and understands they can discontinue the visit at any time.    Patient is aware this is a billable service.     Subjective  Libertad Espinal is a 38 y.o. female.      HPI     Past Medical History:   Diagnosis Date    Abnormal Pap smear of cervix     Anxiety     Bipolar disorder (HCC)     COVID-19  2022    10/27/23 Per pt had 3 separate times - last year being the last episode    Depression     Eczema     Exposure to chlamydia     Resolved 18    Knee pain, left     Migraine without aura and without status migrainosus, not intractable 10/11/2019    Obesity     Post traumatic stress disorder 2021    Postpartum depression 2018    TMJ (dislocation of temporomandibular joint)     causes snoring    Manoj-Parkinson-White (WPW) syndrome        Past Surgical History:   Procedure Laterality Date    ABLATION OF DYSRHYTHMIC FOCUS      WPW ablation age 16    COLONOSCOPY      COLPOSCOPY      DISTAL PATELLAR REALIGNMENT Left 10/31/2023    Procedure: OPEN MPFL RECONSTRUCTION WITH ALLOGRAFT;  Surgeon: Wilbert Michael DO;  Location:  MAIN OR;  Service: Orthopedics    INDUCED       OTHER SURGICAL HISTORY      catheter ablation- WPW age 17    CO ARTHROSCOPY KNEE LATERAL RELEASE Left 10/31/2023    Procedure: RELEASE RETINACULAR;  Surgeon: Wilbert Michael DO;  Location:  MAIN OR;  Service: Orthopedics    TONSILLECTOMY      WISDOM TOOTH EXTRACTION         Current Outpatient Medications   Medication Sig Dispense Refill    Acetaminophen (TYLENOL PO) Take by mouth (Patient not taking: Reported on 2023)      [employer] Aspirin Low Dose 81 MG EC tablet TAKE 1 TABLET BY MOUTH 2 TIMES A DAY. 180 tablet 1    Drospirenone 4 MG TABS Take 1 tablet by mouth in the morning 84 tablet 3    ibuprofen (MOTRIN) 600 mg tablet Take 1 tablet (600 mg total) by mouth every 6 (six) hours as needed for mild pain, moderate pain or fever (Patient not taking: Reported on 2023) 30 tablet 0    lamoTRIgine (LaMICtal) 200 MG tablet Take 1 tablet (200 mg total) by mouth daily at bedtime 30 tablet 1    LORazepam (ATIVAN) 1 mg tablet Take 1 tablet (1 mg total) by mouth every 6 (six) hours as needed for anxiety 30 tablet 0    naproxen (NAPROSYN) 500 mg tablet Take 1 tablet (500 mg total) by mouth 2 (two) times a day with meals  (Patient taking differently: Take 500 mg by mouth 2 (two) times a day with meals) 30 tablet 0    sertraline (ZOLOFT) 100 mg tablet Take 1.5 tablets (150 mg total) by mouth daily 45 tablet 1     No current facility-administered medications for this visit.        Allergies   Allergen Reactions    Codeine Other (See Comments)     dry heaves    Sulfa Antibiotics Hives and Rash     Per pt as achild    Erythromycin Hives     Per as a child       Review of Systems    Video Exam    There were no vitals filed for this visit.    Physical Exam     Behavioral Health Psychotherapy Progress Note    Psychotherapy Provided: Individual Psychotherapy     1. Bipolar I disorder, most recent episode depressed, severe with psychotic features (HCC)        2. SHELLY (generalized anxiety disorder)        3. Post traumatic stress disorder            Goals addressed in session: Goal 1     DATA: Met with Libertad for her scheduled individual session. Initially, Libertad forgot about her session. This clinician called her, and she was able to join the session virtually. Liberatd states that she has already decided to take the day off, as an accommodation day. She states that she has been feeling out of sorts. She states that she had a meeting with Dr. Sutton and increased her antidepressant medications. During the session, her SO was present; however, he was not a part of the session. We discussed her feelings of disconnection from others. She states that when she is actively engaged with others in her family, she feels more connected to others and to herself. She states that she often does not feel motivated to spend time with others, and she will go to her room and isolate. She states that she wants to be around her SO more-- to feel more connected to him. We discussed the importance of being mindful about spending time with others versus spending time on her own. We discussed different ways that she can remind herself that she feels better when she  "is actively engaged with others; e.g., writing a note to herself or putting up some sort of visual cue. We also discussed mindfully setting a timer to limit her alone time. She continues to express her concerns for her children, when she feels \"mentally unstable.\" We spent some time defining her terminology of \"mentally unstable.\" She states that she rapidly moves from one mood to the other. She states that she does not want to behave like her mother. She is able to identify ways that she is different from her mother-- in that she takes responsibility for her behaviors and talks to her children if she feels that she has done something wrong.     During this session, this clinician used the following therapeutic modalities: Client-centered Therapy, Dialectical Behavior Therapy, Mindfulness-based Strategies, Motivational Interviewing, Solution-Focused Therapy, and Supportive Psychotherapy    Substance Abuse was not addressed during this session. If the client is diagnosed with a co-occurring substance use disorder, please indicate any changes in the frequency or amount of use: n/a. Stage of change for addressing substance use diagnoses: No substance use/Not applicable    ASSESSMENT:  Libertad Espinal presents with a Dysthymic mood.     her affect is Normal range and intensity, which is congruent, with her mood and the content of the session. The client has not made progress on their goals since our last session.    Libertad Espinal presents with a minimal risk of suicide, minimal risk of self-harm, and minimal risk of harm to others.    For any risk assessment that surpasses a \"low\" rating, a safety plan must be developed.    A safety plan was indicated: no  If yes, describe in detail n/a    PLAN: Between sessions, Libertad Espinal will continue to monitor her moods. She will work on mindfully deciding when to take time for herself versus when to engage with the family. She will work on setting time " limits for herself when engaging in isolating activities. At the next session, the therapist will use Client-centered Therapy, Dialectical Behavior Therapy, Mindfulness-based Strategies, Motivational Interviewing, Solution-Focused Therapy, and Supportive Psychotherapy to address her mood regulation and relationship concerns.    Behavioral Health Treatment Plan and Discharge Planning: Libertad Saenz Teddy is aware of and agrees to continue to work on their treatment plan. They have identified and are working toward their discharge goals. yes    Visit start and stop times:    03/12/24  Start Time: 0908  Stop Time: 0956  Total Visit Time: 48 minutes

## 2024-03-20 ENCOUNTER — OFFICE VISIT (OUTPATIENT)
Dept: OBGYN CLINIC | Facility: MEDICAL CENTER | Age: 39
End: 2024-03-20
Payer: COMMERCIAL

## 2024-03-20 VITALS
DIASTOLIC BLOOD PRESSURE: 76 MMHG | HEART RATE: 91 BPM | WEIGHT: 216 LBS | SYSTOLIC BLOOD PRESSURE: 110 MMHG | HEIGHT: 62 IN | BODY MASS INDEX: 39.75 KG/M2

## 2024-03-20 DIAGNOSIS — Z98.890 S/P ARTHROSCOPIC SURGERY OF LEFT KNEE: Primary | ICD-10-CM

## 2024-03-20 DIAGNOSIS — M22.02 RECURRENT DISLOCATION OF LEFT PATELLA: ICD-10-CM

## 2024-03-20 PROCEDURE — 99213 OFFICE O/P EST LOW 20 MIN: CPT | Performed by: ORTHOPAEDIC SURGERY

## 2024-03-20 NOTE — PROGRESS NOTES
Ortho Sports Medicine Knee Follow Up Visit     Assesment:     38 y.o. female is 4.5 months  s/p left knee with open MPFL reconstruction using allograft and lateral release, DOS: 10/31/2023     Plan:    Post - Operative  treatment:    Ice to knee for 20 minutes at least 1-2 times daily.  OTC NSAIDS prn for pain.  Continue physical therapy working on strengthening exercises   Prescribed patient Voltaren gel  for pain relief  May consider ordering MRI in the future if needed of the popping/clicking does not improve     Imaging:    No imaging was available for review today.      Injection:    No Injection planned at this time.      Surgery:     No surgery is recommended at this point, continue with conservative treatment plan as noted.    Follow up:    Return in about 8 weeks (around 5/15/2024) for Recheck Left knee .        Chief Complaint   Patient presents with    Left Knee - Follow-up       History of Present Illness:    The patient is returns 4.5 months s/p  post left knee open MPFL reconstruction using allograft and lateral release, DOS: 10/31/2023.  She states she is doing well.  She has been going to physical therapy and is tolerating sessions well.  She says she does have some pain over the anteromedial aspect of the left knee.  She does state clicking/ popping  at times with no pain.  He is currently not taking any pain medications.             Past Medical, Social and Family History:  Past Medical History:   Diagnosis Date    Abnormal Pap smear of cervix     Anxiety     Bipolar disorder (HCC)     COVID-19 01/12/2022    10/27/23 Per pt had 3 separate times - last year being the last episode    Depression     Eczema     Exposure to chlamydia     Resolved 06/09/17    Knee pain, left     Migraine without aura and without status migrainosus, not intractable 10/11/2019    Obesity     Post traumatic stress disorder 12/13/2021    Postpartum depression 08/09/2018    TMJ (dislocation of temporomandibular joint)      causes snoring    Manoj-Parkinson-White (WPW) syndrome      Past Surgical History:   Procedure Laterality Date    ABLATION OF DYSRHYTHMIC FOCUS      WPW ablation age 16    COLONOSCOPY      COLPOSCOPY      DISTAL PATELLAR REALIGNMENT Left 10/31/2023    Procedure: OPEN MPFL RECONSTRUCTION WITH ALLOGRAFT;  Surgeon: Wilbert Michael DO;  Location:  MAIN OR;  Service: Orthopedics    INDUCED       OTHER SURGICAL HISTORY      catheter ablation- WPW age 17    MO ARTHROSCOPY KNEE LATERAL RELEASE Left 10/31/2023    Procedure: RELEASE RETINACULAR;  Surgeon: Wilbert Michael DO;  Location:  MAIN OR;  Service: Orthopedics    TONSILLECTOMY      WISDOM TOOTH EXTRACTION       Allergies   Allergen Reactions    Codeine Other (See Comments)     dry heaves    Sulfa Antibiotics Hives and Rash     Per pt as achild    Erythromycin Hives     Per as a child     Current Outpatient Medications on File Prior to Visit   Medication Sig Dispense Refill    Drospirenone 4 MG TABS Take 1 tablet by mouth in the morning 84 tablet 3    lamoTRIgine (LaMICtal) 200 MG tablet Take 1 tablet (200 mg total) by mouth daily at bedtime 30 tablet 1    LORazepam (ATIVAN) 1 mg tablet Take 1 tablet (1 mg total) by mouth every 6 (six) hours as needed for anxiety 30 tablet 0    naproxen (NAPROSYN) 500 mg tablet Take 1 tablet (500 mg total) by mouth 2 (two) times a day with meals (Patient taking differently: Take 500 mg by mouth 2 (two) times a day with meals) 30 tablet 0    sertraline (ZOLOFT) 100 mg tablet Take 1.5 tablets (150 mg total) by mouth daily 45 tablet 1    Acetaminophen (TYLENOL PO) Take by mouth (Patient not taking: Reported on 2023)      [employer]S Aspirin Low Dose 81 MG EC tablet TAKE 1 TABLET BY MOUTH 2 TIMES A DAY. 180 tablet 1    ibuprofen (MOTRIN) 600 mg tablet Take 1 tablet (600 mg total) by mouth every 6 (six) hours as needed for mild pain, moderate pain or fever (Patient not taking: Reported on 2023) 30 tablet 0     No current  facility-administered medications on file prior to visit.     Social History     Socioeconomic History    Marital status: Single     Spouse name: Not on file    Number of children: 2    Years of education: Not on file    Highest education level: Some college, no degree   Occupational History    Occupation: works in retail   Tobacco Use    Smoking status: Never    Smokeless tobacco: Never    Tobacco comments:     Never a smoker or use of any tobacco products per pt    Vaping Use    Vaping status: Never Used   Substance and Sexual Activity    Alcohol use: Yes     Comment: Occasional/special occasions    Drug use: No     Comment: Denies any drug use per pt    Sexual activity: Yes     Partners: Male     Birth control/protection: Condom Male, OCP     Comment: Denies any chest pain or shortness of breath with activity   Other Topics Concern    Not on file   Social History Narrative    Education: high school graduate and some college    Learning Disabilities: none    Marital History: single    Children: 1 daughter, 1 son    Living Arrangement: lives in home with boyfriend, 2 children and boyfriend's 2 children    Occupational History: works part time at [employer]    Functioning Relationships: boyfriend is supportive    Legal History: none     History: None         Family planning    IUD contraception     Social Determinants of Health     Financial Resource Strain: Medium Risk (2/4/2021)    Overall Financial Resource Strain (CARDIA)     Difficulty of Paying Living Expenses: Somewhat hard   Food Insecurity: Food Insecurity Present (2/4/2021)    Hunger Vital Sign     Worried About Running Out of Food in the Last Year: Sometimes true     Ran Out of Food in the Last Year: Sometimes true   Transportation Needs: Unmet Transportation Needs (2/4/2021)    PRAPARE - Transportation     Lack of Transportation (Medical): Yes     Lack of Transportation (Non-Medical): Yes   Physical Activity: Inactive (11/11/2019)     "Exercise Vital Sign     Days of Exercise per Week: 0 days     Minutes of Exercise per Session: 0 min   Stress: Stress Concern Present (11/11/2019)    [employer] - Occupational Stress Questionnaire     Feeling of Stress : Rather much   Social Connections: Socially Isolated (11/11/2019)    Social Connection and Isolation Panel [NHANES]     Frequency of Communication with Friends and Family: Once a week     Frequency of Social Gatherings with Friends and Family: Once a week     Attends Yarsanism Services: Never     Active Member of Clubs or Organizations: No     Attends Club or Organization Meetings: Never     Marital Status: Never    Intimate Partner Violence: Not At Risk (11/11/2019)    Humiliation, Afraid, Rape, and Kick questionnaire     Fear of Current or Ex-Partner: No     Emotionally Abused: No     Physically Abused: No     Sexually Abused: No   Housing Stability: Not on file         I have reviewed the past medical, surgical, social and family history, medications and allergies as documented in the EMR.    Review of systems: ROS is negative other than that noted in the HPI.  Constitutional: Negative for fatigue and fever.      Physical Exam:    Blood pressure 110/76, pulse 91, height 5' 2\" (1.575 m), weight 98 kg (216 lb), not currently breastfeeding.    General/Constitutional: NAD, well developed, well nourished  HENT: Normocephalic, atraumatic  CV: Intact distal pulses, regular rate  Resp: No respiratory distress or labored breathing  GI: Soft and non-tender   Lymphatic: No lymphadenopathy palpated  Neuro: Alert and Oriented x 3, no focal deficits  Psych: Normal mood, normal affect, normal judgement, normal behavior  Skin: Warm, dry, no rashes, no erythema      Knee Exam (focused):               RIGHT LEFT   ROM:   0-130 0-130   Palpation: Effusion negative negative     MJL tenderness Negative Positive     LJL tenderness Negative Negative   Meniscus: Johanne Negative " Negative    Apley's Compression Negative Negative   Instability: Varus stable stable     Valgus stable stable   Special Tests: Lachman Negative Negative     Posterior drawer Negative Negative     Anterior drawer Negative Negative     Pivot shift not tested not tested     Dial not tested not tested   Patella: Palpation no tenderness no tenderness     Mobility 1/4 1/4     Apprehension Negative Negative   Other: Single leg 1/4 squat not tested not tested      Left knee surgical incision well healed, no erythema      LE NV Exam: +2 DP/PT pulses bilaterally  Sensation intact to light touch L2-S1 bilaterally    No calf tenderness to palpation bilaterally      Knee Imaging    No imaging was performed today      Scribe Attestation      I,:  Pasha Linder am acting as a scribe while in the presence of the attending physician.:       I,:  Wilbert Michael DO personally performed the services described in this documentation    as scribed in my presence.:

## 2024-03-26 ENCOUNTER — EVALUATION (OUTPATIENT)
Dept: PHYSICAL THERAPY | Facility: REHABILITATION | Age: 39
End: 2024-03-26
Payer: COMMERCIAL

## 2024-03-26 DIAGNOSIS — Z98.890 S/P LEFT KNEE SURGERY: Primary | ICD-10-CM

## 2024-03-26 DIAGNOSIS — M22.02 RECURRENT DISLOCATION OF LEFT PATELLA: ICD-10-CM

## 2024-03-26 DIAGNOSIS — M25.362 PATELLAR INSTABILITY OF LEFT KNEE: ICD-10-CM

## 2024-03-26 PROCEDURE — 97110 THERAPEUTIC EXERCISES: CPT | Performed by: PHYSICAL THERAPIST

## 2024-03-26 PROCEDURE — 97140 MANUAL THERAPY 1/> REGIONS: CPT | Performed by: PHYSICAL THERAPIST

## 2024-03-26 NOTE — PROGRESS NOTES
PT Re-Evaluation     Today's date: 3/26/2024  Patient name: Libertad Espinal  : 1985  MRN: [id number]  Referring provider: Ester Calderon,*  Dx:   Encounter Diagnosis     ICD-10-CM    1. S/P left knee surgery  Z98.890       2. Patellar instability of left knee  M25.362       3. Recurrent dislocation of left patella  M22.02           Start Time: 0740  Stop Time: 0815  Total time in clinic (min): 35 minutes    Assessment  Assessment details: Patient is a 38 y.o. female that presents status post surgical arthroscopy of the left knee with lateral release and open MPFL reconstruction with allograft on 10/31/2023.  Patient reports 80% improvement with skilled physical therapy services.  FOTO improved by 32 points to 69/100.  Patient reports improvement with ADLS such as bathing and dressing, ambulation, negotiation of stairs, standing, transfers, childcare, and housework.  Patient reports continued difficulty with kneeling/crawling on her knee.  Patient has made good progress towards goals established for physical therapy.  Patient would benefit from continued skilled physical therapy services for continued strengthening.  Will continue to follow protocol.            Impairments: abnormal muscle firing, impaired physical strength and pain with function  Understanding of Dx/Px/POC: good  Goals  Impairment:  1.  Patient will reports 50% reduction in pain in 8 weeks to maximize function.-MET  2.  Patient will improve strength to 4/5 in all planes to maximize function.-PARTIALLY MET  3.  Patient will improve ROM to WFL in 8 weeks to maximize function.-PARTIALLY MET    Functional:  1. Patient will improve FOTO by 37 points to 74/100 in 12 weeks to maximize function.-PARTIALLY MET  2. Patient will be independent with HEP in 8 weeks to maximize function.-MET  3. Patient will report no difficulty with ambulation in 12 weeks to maximize function.- MET  4. Patient will report no difficulty with negotiation of  stairs in 12 weeks to maximize function.-PARTIALLY MET  5. Patient will report no difficulty with ADLS in 12 weeks to maximize function.- MET          Plan  Plan details: Patient will be a RE in 4 weeks.    Patient would benefit from: skilled physical therapy  Planned modality interventions: cryotherapy  Planned therapy interventions: manual therapy, neuromuscular re-education, patient education, strengthening, stretching, therapeutic activities, therapeutic exercise, home exercise program, functional ROM exercises, flexibility, balance/weight bearing training, abdominal trunk stabilization and activity modification  Frequency: 2x month  Duration in visits: 2  Duration in weeks: 4  Treatment plan discussed with: patient        Subjective Evaluation    History of Present Illness  Date of surgery: 10/31/2023  Mechanism of injury: surgery  Mechanism of injury: Patient presents status post surgical arthroscopy of the left knee with lateral release and open MPFL reconstruction with allograft.  Patient reports a chronic history of bilateral knee pain back to childhood.  Patient had bene having a locking sensation in her left knee over the last several years which had been getting more frequent.  Patient is currently limited with ADLS such as bathing and dressing, ambulation, negotiation of stairs, standing, transfers, childcare, and housework.  Patient notes her knee feels more unstable over the last few days.   Patient Goals  Patient goals for therapy: increased strength, decreased pain, increased motion and independence with ADLs/IADLs    Pain  At best pain ratin  At worst pain rating: 3  Location: medial L knee  Quality: sharp  Progression: improved    Treatments  Current treatment: physical therapy        Objective     Observations   Left Knee   Negative for drainage, effusion and trophic changes.     Palpation   Left   No palpable tenderness to the distal biceps femoris, distal semimembranosus, distal  "semitendinosus, lateral gastrocnemius, medial gastrocnemius, rectus femoris, vastus lateralis and vastus medialis.     Tenderness   Left Knee   Tenderness in the lateral joint line, medial joint line, patellar tendon and pes anserinus. No tenderness in the fibular head.     Passive Range of Motion   Left Knee   Flexion: 140 degrees   Extension: WFL    Right Knee   Flexion: 139 degrees   Extension: WFL    Strength/Myotome Testing     Left Hip   Planes of Motion   Flexion: 5  Abduction: 4+    Right Hip   Planes of Motion   Flexion: 4+  Abduction: 4    Left Knee   Flexion: 4+  Extension: 4+  Quadriceps contraction: fair    Right Knee   Flexion: 5  Extension: 4+  Quadriceps contraction: good    Left Ankle/Foot   Dorsiflexion: 5    Right Ankle/Foot   Plantar flexion: 4+    Additional Strength Details  Mild quad lag with SLR  SL heel raise 20x gayathri    Ambulation   Weight-Bearing Status   Weight-Bearing Status (Left): weight-bearing as tolerated     Ambulation: Stairs   Ascend stairs: independent  Pattern: reciprocal  Railings: without rails  Descend stairs: independent  Pattern: reciprocal  Railings: without rails    Observational Gait   Gait: within functional limits     Functional Assessment        Single Leg Stance   Left: 30 seconds  Right: 30 seconds      Precautions: follow protocol         Manuals 1/25 1/30 2/6 2/14 2/27 3/26    Light patellar mobility  5 min 5 min 5 min 5 min      measurements/ RE          20 min 15 min                                      Neuro Re-Ed                Quad set 10\"x10 no roll 10\"x10 no roll 10\"x10 no roll 10\"x10 no roll       Prone quad set     5\"x10 5\"x10       Retro rocking  5\"x10  5\"x15 5\"x15 5\"x15  5\" 10x    TENS/NMES Education                 lateral step up/down with eccentric control          5x L                                           Ther Ex                 Recumbent bike 10 min bike on 10 min bike on 10 min bike on 10 min bike on 10 min bike on  8 min bike on    Heel " "slide flexion x20 w/ PT x20 w/ PT x20 w/ PT x20 w/ PT x20 w/ PT      SAQ 5\"x15 w/ PT 5\"x15 w/ PT 5\"x15 w/ PT 5\"x15 w/ PT   5\" 10x blue roll    Ankle pump                LLLD knee extension stretch                S/l hip abd 5\"x15 L 5\"x15 L 5\"x15 L 5\"x15 L       SLR        x5 w/ PT  5\" 12x  5\" 10x L                       Ther Activity                                                     Gait Training                                                     Modalities                 Ice PRN              np                  "

## 2024-04-04 ENCOUNTER — TELEMEDICINE (OUTPATIENT)
Dept: BEHAVIORAL/MENTAL HEALTH CLINIC | Facility: CLINIC | Age: 39
End: 2024-04-04

## 2024-04-04 DIAGNOSIS — F43.10 POST TRAUMATIC STRESS DISORDER: ICD-10-CM

## 2024-04-04 DIAGNOSIS — F31.5 BIPOLAR I DISORDER, MOST RECENT EPISODE DEPRESSED, SEVERE WITH PSYCHOTIC FEATURES (HCC): Primary | Chronic | ICD-10-CM

## 2024-04-04 DIAGNOSIS — F41.1 GAD (GENERALIZED ANXIETY DISORDER): Chronic | ICD-10-CM

## 2024-04-08 DIAGNOSIS — F31.81 BIPOLAR II DISORDER (HCC): ICD-10-CM

## 2024-04-08 RX ORDER — SERTRALINE HYDROCHLORIDE 100 MG/1
TABLET, FILM COATED ORAL
Qty: 135 TABLET | Refills: 1 | Status: SHIPPED | OUTPATIENT
Start: 2024-04-08

## 2024-04-08 RX ORDER — LAMOTRIGINE 200 MG/1
200 TABLET ORAL
Qty: 90 TABLET | Refills: 1 | Status: SHIPPED | OUTPATIENT
Start: 2024-04-08

## 2024-04-10 NOTE — PSYCH
Virtual Regular Visit    Verification of patient location:    Patient is located at Home in the following state in which I hold an active license [place]      Assessment/Plan:    Problem List Items Addressed This Visit          Behavioral Health    Bipolar I disorder, most recent episode depressed, severe with psychotic features (HCC) - Primary (Chronic)    SHELLY (generalized anxiety disorder) (Chronic)    Post traumatic stress disorder       Goals addressed in session: Goal 1          Reason for visit is   Chief Complaint   Patient presents with    Virtual Regular Visit          Encounter provider CHARLIE Hutchinson    Provider located at [facility] [place]  [facility] [place]  [address]  [place] [id number] [phone]      Recent Visits  Date Type Provider Dept   04/04/24 Telemedicine CHARLIE Hutchinson  [facility] [place]   Showing recent visits within past 7 days and meeting all other requirements  Future Appointments  No visits were found meeting these conditions.  Showing future appointments within next 150 days and meeting all other requirements       The patient was identified by name and date of birth. Libertad Espinal was informed that this is a telemedicine visit and that the visit is being conducted throughthe Epic Embedded platform. She agrees to proceed..  My office door was closed. No one else was in the room.  She acknowledged consent and understanding of privacy and security of the video platform. The patient has agreed to participate and understands they can discontinue the visit at any time.    Patient is aware this is a billable service.     Subjective  Libertad Espinal is a 38 y.o. female.    HPI     Past Medical History:   Diagnosis Date    Abnormal Pap smear of cervix     Anxiety     Bipolar disorder (HCC)     COVID-19 01/12/2022    10/27/23 Per pt had 3 separate times - last year being the last episode     Depression     Eczema     Exposure to chlamydia     Resolved 18    Knee pain, left     Migraine without aura and without status migrainosus, not intractable 10/11/2019    Obesity     Post traumatic stress disorder 2021    Postpartum depression 2018    TMJ (dislocation of temporomandibular joint)     causes snoring    Manoj-Parkinson-White (WPW) syndrome        Past Surgical History:   Procedure Laterality Date    ABLATION OF DYSRHYTHMIC FOCUS      WPW ablation age 16    COLONOSCOPY      COLPOSCOPY      DISTAL PATELLAR REALIGNMENT Left 10/31/2023    Procedure: OPEN MPFL RECONSTRUCTION WITH ALLOGRAFT;  Surgeon: Wilbert Michael DO;  Location:  MAIN OR;  Service: Orthopedics    INDUCED       OTHER SURGICAL HISTORY      catheter ablation- WPW age 17    [place] ARTHROSCOPY KNEE LATERAL RELEASE Left 10/31/2023    Procedure: RELEASE RETINACULAR;  Surgeon: Wilbert Michael DO;  Location:  MAIN OR;  Service: Orthopedics    TONSILLECTOMY      WISDOM TOOTH EXTRACTION         Current Outpatient Medications   Medication Sig Dispense Refill    Acetaminophen (TYLENOL PO) Take by mouth (Patient not taking: Reported on 2023)      [employer] Aspirin Low Dose 81 MG EC tablet TAKE 1 TABLET BY MOUTH 2 TIMES A DAY. 180 tablet 1    Drospirenone 4 MG TABS Take 1 tablet by mouth in the morning 84 tablet 3    ibuprofen (MOTRIN) 600 mg tablet Take 1 tablet (600 mg total) by mouth every 6 (six) hours as needed for mild pain, moderate pain or fever (Patient not taking: Reported on 2023) 30 tablet 0    lamoTRIgine (LaMICtal) 200 MG tablet TAKE 1 TABLET (200 MG TOTAL) BY MOUTH DAILY AT BEDTIME 90 tablet 1    LORazepam (ATIVAN) 1 mg tablet Take 1 tablet (1 mg total) by mouth every 6 (six) hours as needed for anxiety 30 tablet 0    naproxen (NAPROSYN) 500 mg tablet Take 1 tablet (500 mg total) by mouth 2 (two) times a day with meals (Patient taking differently: Take 500 mg by mouth 2 (two) times a day with meals) 30  tablet 0    sertraline (ZOLOFT) 100 mg tablet TAKE 1.5 TABLETS (150MG TOTAL) BY MOUTH DAILY 135 tablet 1     No current facility-administered medications for this visit.        Allergies   Allergen Reactions    Codeine Other (See Comments)     dry heaves    Sulfa Antibiotics Hives and Rash     Per pt as achild    Erythromycin Hives     Per as a child       Review of Systems    Video Exam    There were no vitals filed for this visit.    Physical Exam     Behavioral Health Psychotherapy Progress Note    Psychotherapy Provided: Individual Psychotherapy     1. Bipolar I disorder, most recent episode depressed, severe with psychotic features (HCC)        2. SHELLY (generalized anxiety disorder)        3. Post traumatic stress disorder            Goals addressed in session: Goal 1     DATA: Met with Libertad for her scheduled individual session. She states that she and Saturnino have been getting along well. She discussed her difficulty with physical intimacy. She states that she struggles to talk with him, at times, about her own needs. Libertad discussed her family relationships. She also discussed her work. She states that she continues to use some of her FMLA days, to take time off from her job, due to anxiety. We discussed ways that she can increase her work attendance. She states that she often does not want to attend work, due to her hesitation to deal with the community. 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 We discussed the potential for her to talk with her boss about alternative jobs that may provide her with more of the work that she enjoys.     During this session, this clinician used the following therapeutic modalities: Client-centered Therapy, Dialectical Behavior Therapy, Mindfulness-based Strategies, Motivational Interviewing, and Solution-Focused Therapy    Substance Abuse was not addressed during this session. If the client is diagnosed with a co-occurring  "substance use disorder, please indicate any changes in the frequency or amount of use: n/. Stage of change for addressing substance use diagnoses: No substance use/Not applicable    ASSESSMENT:  Libertad Espinal presents with a Euthymic/ normal mood.     her affect is Normal range and intensity, which is congruent, with her mood and the content of the session. The client has made progress on their goals.    Libertad Espinal presents with a minimal risk of suicide, minimal risk of self-harm, and minimal risk of harm to others.    For any risk assessment that surpasses a \"low\" rating, a safety plan must be developed.    A safety plan was indicated: no  If yes, describe in detail n/a    PLAN: Between sessions, Libertad Espinal will continue to monitor her moods. She will discuss her needs with her boyfriend, related to intimacy. Libertad will discuss her work-related issues with her supervisor. At the next session, the therapist will use Client-centered Therapy, Dialectical Behavior Therapy, Mindfulness-based Strategies, Motivational Interviewing, and Solution-Focused Therapy to address her mood regulation and relationship concerns.    Behavioral Health Treatment Plan and Discharge Planning: Libertad Espinal is aware of and agrees to continue to work on their treatment plan. They have identified and are working toward their discharge goals. yes    Visit start and stop times:    04/04/24  Start Time: 0903  Stop Time: 0952  Total Visit Time: 49 minutes        "

## 2024-04-16 ENCOUNTER — EVALUATION (OUTPATIENT)
Dept: PHYSICAL THERAPY | Facility: REHABILITATION | Age: 39
End: 2024-04-16
Payer: COMMERCIAL

## 2024-04-16 DIAGNOSIS — M25.362 PATELLAR INSTABILITY OF LEFT KNEE: ICD-10-CM

## 2024-04-16 DIAGNOSIS — M22.02 RECURRENT DISLOCATION OF LEFT PATELLA: ICD-10-CM

## 2024-04-16 DIAGNOSIS — Z98.890 S/P LEFT KNEE SURGERY: Primary | ICD-10-CM

## 2024-04-16 PROCEDURE — 97110 THERAPEUTIC EXERCISES: CPT | Performed by: PHYSICAL THERAPIST

## 2024-04-16 PROCEDURE — 97140 MANUAL THERAPY 1/> REGIONS: CPT | Performed by: PHYSICAL THERAPIST

## 2024-04-17 DIAGNOSIS — Z00.6 ENCOUNTER FOR EXAMINATION FOR NORMAL COMPARISON OR CONTROL IN CLINICAL RESEARCH PROGRAM: ICD-10-CM

## 2024-04-23 ENCOUNTER — TELEMEDICINE (OUTPATIENT)
Dept: BEHAVIORAL/MENTAL HEALTH CLINIC | Facility: CLINIC | Age: 39
End: 2024-04-23
Payer: COMMERCIAL

## 2024-04-23 ENCOUNTER — TELEPHONE (OUTPATIENT)
Dept: PSYCHIATRY | Facility: CLINIC | Age: 39
End: 2024-04-23

## 2024-04-23 DIAGNOSIS — F43.10 POST TRAUMATIC STRESS DISORDER: ICD-10-CM

## 2024-04-23 DIAGNOSIS — F41.1 GAD (GENERALIZED ANXIETY DISORDER): Chronic | ICD-10-CM

## 2024-04-23 DIAGNOSIS — F31.5 BIPOLAR I DISORDER, MOST RECENT EPISODE DEPRESSED, SEVERE WITH PSYCHOTIC FEATURES (HCC): Primary | Chronic | ICD-10-CM

## 2024-04-23 PROCEDURE — 90837 PSYTX W PT 60 MINUTES: CPT | Performed by: SOCIAL WORKER

## 2024-04-23 NOTE — PSYCH
Virtual Regular Visit    Verification of patient location:    Patient is located at Home in the following state in which I hold an active license [place]    Assessment/Plan:    Problem List Items Addressed This Visit          Behavioral Health    Bipolar I disorder, most recent episode depressed, severe with psychotic features (HCC) - Primary (Chronic)    SHELLY (generalized anxiety disorder) (Chronic)    Post traumatic stress disorder     Goals addressed in session: Goal 1        Reason for visit is   Chief Complaint   Patient presents with    Virtual Regular Visit     Encounter provider CHARLIE Hutchinson    Provider located at [facility] [place]  [facility] [place] [id number] [phone]    Recent Visits  Date Type Provider Dept   04/23/24 Telephone CHARLIE Hutchinson [facility]   04/23/24 Telemedicine CHARLIE Hutchinson [facility] [place]   Showing recent visits within past 7 days and meeting all other requirements  Future Appointments  No visits were found meeting these conditions.  Showing future appointments within next 150 days and meeting all other requirements     The patient was identified by name and date of birth. Libertad Espinal was informed that this is a telemedicine visit and that the visit is being conducted throughthe Epic Embedded platform. She agrees to proceed..  My office door was closed. No one else was in the room.  She acknowledged consent and understanding of privacy and security of the video platform. The patient has agreed to participate and understands they can discontinue the visit at any time.    Patient is aware this is a billable service.     Subjective  Libertad Espinal is a 38 y.o. female.    HPI     Past Medical History:   Diagnosis Date    Abnormal Pap smear of cervix     Anxiety     Bipolar disorder (HCC)     COVID-19 01/12/2022    10/27/23 Per  pt had 3 separate times - last year being the last episode    Depression     Eczema     Exposure to chlamydia     Resolved 18    Knee pain, left     Migraine without aura and without status migrainosus, not intractable 10/11/2019    Obesity     Post traumatic stress disorder 2021    Postpartum depression 2018    TMJ (dislocation of temporomandibular joint)     causes snoring    Manoj-Parkinson-White (WPW) syndrome        Past Surgical History:   Procedure Laterality Date    ABLATION OF DYSRHYTHMIC FOCUS      WPW ablation age 16    COLONOSCOPY      COLPOSCOPY      DISTAL PATELLAR REALIGNMENT Left 10/31/2023    Procedure: OPEN MPFL RECONSTRUCTION WITH ALLOGRAFT;  Surgeon: Wilbert Michael DO;  Location:  MAIN OR;  Service: Orthopedics    INDUCED       OTHER SURGICAL HISTORY      catheter ablation- WPW age 17    [place] ARTHROSCOPY KNEE LATERAL RELEASE Left 10/31/2023    Procedure: RELEASE RETINACULAR;  Surgeon: Wilbert Michael DO;  Location:  MAIN OR;  Service: Orthopedics    TONSILLECTOMY      WISDOM TOOTH EXTRACTION         Current Outpatient Medications   Medication Sig Dispense Refill    Acetaminophen (TYLENOL PO) Take by mouth (Patient not taking: Reported on 2023)      [employer]S Aspirin Low Dose 81 MG EC tablet TAKE 1 TABLET BY MOUTH 2 TIMES A DAY. 180 tablet 1    Drospirenone 4 MG TABS Take 1 tablet by mouth in the morning 84 tablet 3    ibuprofen (MOTRIN) 600 mg tablet Take 1 tablet (600 mg total) by mouth every 6 (six) hours as needed for mild pain, moderate pain or fever (Patient not taking: Reported on 2023) 30 tablet 0    lamoTRIgine (LaMICtal) 200 MG tablet TAKE 1 TABLET (200 MG TOTAL) BY MOUTH DAILY AT BEDTIME 90 tablet 1    LORazepam (ATIVAN) 1 mg tablet Take 1 tablet (1 mg total) by mouth every 6 (six) hours as needed for anxiety 30 tablet 0    naproxen (NAPROSYN) 500 mg tablet Take 1 tablet (500 mg total) by mouth 2 (two) times a day with meals (Patient taking differently:  "Take 500 mg by mouth 2 (two) times a day with meals) 30 tablet 0    sertraline (ZOLOFT) 100 mg tablet TAKE 1.5 TABLETS (150MG TOTAL) BY MOUTH DAILY 135 tablet 1     No current facility-administered medications for this visit.        Allergies   Allergen Reactions    Codeine Other (See Comments)     dry heaves    Sulfa Antibiotics Hives and Rash     Per pt as achild    Erythromycin Hives     Per as a child       Review of Systems    Video Exam    There were no vitals filed for this visit.    Physical Exam     Behavioral Health Psychotherapy Progress Note    Psychotherapy Provided: Individual Psychotherapy     1. Bipolar I disorder, most recent episode depressed, severe with psychotic features (HCC)        2. Post traumatic stress disorder        3. SHELLY (generalized anxiety disorder)            Goals addressed in session: Goal 1     DATA: Met with Libertad for her scheduled individual session. She states that things have been going fairly well; however, \"there was a wrench thrown in\" regarding her physical intimacy with her SO. She spent some time talking about her relationship with her SO. She states that he has confided in her about his use of pornography. She states that she is not upset by his use of pornography; however, he states that he \"doesn't feel right\" about it, and he is pulling away from physical intimacy. She states that she is frustrated by his unwillingness to talk about this issue and his unwillingness to seek professional help for his concerns. She states that she feels badly about how this is impacting their relationship, because she was feeling that their connection was getting stronger. Libertad discussed her increase in anxiety symptoms. She continues to use her FMLA days to engage in self-care; however, she states that she has gone over her days and has accrued an \"incidence\" of absence. We discussed her job satisfaction and her thoughts about looking for other options. She states that she feels " "that working from home is the best option for her. Libertad states that she is feeling badly about her relationship with her parents (in particular, her mother). She states that she has no motivation to call her and to work on building this relationship. She states that she feels that she is not doing a good job as a parent, and she is expressing that she feels that she is treating her children like her mother treated her. This clinician helped her to identify some of the differences between her parenting style and that of her mother. This clinician offered support and encouragement and validated her experiences. We spent some time reviewing and updating Libertad's treatment plan. She states that she wants to continue to work on mood regulation and management of her emotions.     During this session, this clinician used the following therapeutic modalities: Client-centered Therapy, Dialectical Behavior Therapy, Mindfulness-based Strategies, Motivational Interviewing, Solution-Focused Therapy, and Supportive Psychotherapy    Substance Abuse was not addressed during this session. If the client is diagnosed with a co-occurring substance use disorder, please indicate any changes in the frequency or amount of use: n/a. Stage of change for addressing substance use diagnoses: No substance use/Not applicable    ASSESSMENT:  Libertad Espinal presents with a Dysthymic mood.     her affect is Normal range and intensity, which is congruent, with her mood and the content of the session. The client has made progress on their goals.    Libertad Espinal presents with a minimal risk of suicide, minimal risk of self-harm, and minimal risk of harm to others.    For any risk assessment that surpasses a \"low\" rating, a safety plan must be developed.    A safety plan was indicated: no  If yes, describe in detail n/a    PLAN: Between sessions, Libertad Espinal will continue to monitor her moods. She will work on increasing " communication with her SO, as she is able. She will continue to encourage him to find a way to address his personal concerns that are impacting this relationship. At the next session, the therapist will use Client-centered Therapy, Dialectical Behavior Therapy, Mindfulness-based Strategies, Motivational Interviewing, Solution-Focused Therapy, and Supportive Psychotherapy to address her mood regulation and relationship concerns.    Behavioral Health Treatment Plan and Discharge Planning: Libertad Letty Teddy is aware of and agrees to continue to work on their treatment plan. They have identified and are working toward their discharge goals. yes    Visit start and stop times:    04/23/24  Start Time: 0914  Stop Time: 1007  Total Visit Time: 53 minutes

## 2024-04-23 NOTE — TELEPHONE ENCOUNTER
Patient called the office seeking to find out if her appt was virtual. When writer first checked it was not scheduled at virtual. Writer contacted provider and her MR to let them know patient wanted virtual. Writer received no answer at first. So explained that the MR will call her back to reschedule. Writer then received a message that it was changed to virtual. Writer called patient to let her know.

## 2024-04-23 NOTE — BH TREATMENT PLAN
"Outpatient Behavioral Health Psychotherapy Treatment Plan    Libertad Espinal  1985     Date of Initial Psychotherapy Assessment: 10/03/2019   Date of Current Treatment Plan: 04/23/24  Treatment Plan Target Date: 08/21/24   Treatment Plan Expiration Date: 10/20/2024     Diagnosis:   1. Bipolar I disorder, most recent episode depressed, severe with psychotic features (HCC)        2. Post traumatic stress disorder        3. SHELLY (generalized anxiety disorder)            Area(s) of Need: mood regulation and relationships    Long Term Goal 1 (in the client's own words): \"I want to continue to work on my mental health. I want my relationships to be healthy.\"    Stage of Change: Action    Target Date for completion: 04/23/2025     Anticipated therapeutic modalities: client-centered; mindfulness-based; dbt-informed; motivational interviewing; solution-focused     People identified to complete this goal: Libertad (client); Olga Schrader (clinician); Dr. Sutton (psychiatrist)      Objective 1: (identify the means of measuring success in meeting the objective): Libertad will meet with the psychiatrist for scheduled medication management sessions and take her medications, as prescribed. She will maintain a minimum of 95% medication adherence.   Update 04/23/24: Libertad states that she continues to meet with Dr. Sutton for her medication sessions. She states that she understands that her medications help her to manage her symptoms; however, she reports that she has missed medications approximately three times in the past month. We will continue to discuss medication adherence and develop ways to increase her adherence with her medication regimen. She states that she has recently increased the dosage of sertraline, which has increased the incidence of night-sweats. She is hoping that this is a transient reaction. She will continue to inform Dr. Sutton about her medication concerns.       Objective 2: (identify the " means of measuring success in meeting the objective): Libertad will participate in therapy sessions, a minimum of once monthly. We will use the above-mentioned modalities to address mood-regulation and relationship concerns.    Update 04/23/24: Libertad continues to actively participate in therapy sessions-- approximately every three weeks. She continues to report back, during sessions, on successes and barriers with using mindfulness-based strategies and DBT-informed interpersonal skills. She continues to have some periods of mood dysregulation, that impact her daily activities. We will continue to work toward her decreasing her use of FMLA days-- as a means of measuring her success with anxiety-management strategies.       Objective 3: (identify the means of measuring success in meeting the objective): Libertad will learn a minimum of 3 techniques to help her with setting and maintaining limits with others-- e.g., DBT Interpersonal Effectiveness Skills. She will be able to verbalize, during sessions, what her personal limits are and what her personal goals are.    Update 04/23/24: Libertad identifies using grounding techniques to manage her moods, so she can then talk openly with her SO about their relationship. She identifies that, when she is able to better manage her own moods, she is able to engage in more successful communication with him. Libertad continues to set boundaries with her parents, which she identifies has helped to decrease relationship stressors.        Objective 4: (identify the means of measuring success in meeting the objective): When appropriate, Libertad will include family members in therapy sessions, to practice her assertiveness, discuss her emotions, and set limits with her family members.    Update 04/23/24: Libertad has not had her partner officially participate in a session. He was present (for a virtual session) by virtue of being in the same room. This clinician did provide him with some  positive feedback about some of the ways that he was communicating with Libertad. He was receptive to this feedback. She will continue to try to invite him into sessions.      Objective 5: (identify the means of measuring success in meeting the objective): Libertad will engage with her friends and other supports a minimum of one time per month.    Update 04/23/24: Libertad has very limited social engagement outside of her family. She will continue to work on scheduling time away from her SO and her children, in an effort to improve her self-care.       I am currently under the care of a [facility] psychiatric provider: yes    My [facility] psychiatric provider is: Dr. Sutton    I am currently taking psychiatric medications: Yes, as prescribed    I feel that I will be ready for discharge from mental health care when I reach the following (measurable goal/objective): I would have better management of my emotions.     For children and adults who have a legal guardian:   Has there been any change to custody orders and/or guardianship status? NA. If yes, attach updated documentation.    Crisis plan was updated at this session.     Behavioral Health Treatment Plan [facility]: Diagnosis and Treatment Plan explained to Libertad Saenz Teddy Libertad Saenz Teddy acknowledges an understanding of their diagnosis. Libertad Letty Espinal agrees to this treatment plan.    I have been offered a copy of this Treatment Plan. Yes    Libertad Espinal, 1985, actively participated in the review and update of this treatment plan during a virtual session, using the Epic Embedded platform.   Libertad Letty Espinal  provided verbal consent on 4/23/2024 at 09:55 am. The treatment plan was transcribed into the Electronic Health Record at a later time. This treatment plan will be sent to Libertad, via her Videum account, for signature. The signature will be reviewed at her next session.

## 2024-04-30 ENCOUNTER — OFFICE VISIT (OUTPATIENT)
Dept: PHYSICAL THERAPY | Facility: REHABILITATION | Age: 39
End: 2024-04-30
Payer: COMMERCIAL

## 2024-04-30 DIAGNOSIS — M25.362 PATELLAR INSTABILITY OF LEFT KNEE: ICD-10-CM

## 2024-04-30 DIAGNOSIS — Z98.890 S/P LEFT KNEE SURGERY: Primary | ICD-10-CM

## 2024-04-30 DIAGNOSIS — M22.02 RECURRENT DISLOCATION OF LEFT PATELLA: ICD-10-CM

## 2024-04-30 PROCEDURE — 97110 THERAPEUTIC EXERCISES: CPT

## 2024-04-30 PROCEDURE — 97112 NEUROMUSCULAR REEDUCATION: CPT

## 2024-04-30 PROCEDURE — 97140 MANUAL THERAPY 1/> REGIONS: CPT

## 2024-04-30 NOTE — PROGRESS NOTES
"Daily Note     Today's date: 2024  Patient name: Libertad Espinal  : 1985  MRN: [id number]  Referring provider: Ester Calderon,*  Dx:   Encounter Diagnosis     ICD-10-CM    1. S/P left knee surgery  Z98.890       2. Patellar instability of left knee  M25.362       3. Recurrent dislocation of left patella  M22.02           Start Time: 07  Stop Time: 0815  Total time in clinic (min): 45 minutes    Subjective: Pt reports feeling as though she was making progress, but now feels she is moving backwards with L LE feeling as though it can give out again.  Pt notes one instance of giving out ascending stairs the other day, otherwise has had discomfort primarily when descending.  Pt notes mild improvement in symptoms for about 1 day with application of k-tape.      Objective: See treatment diary below    Precautions: follow protocol         Manuals 4/30 1/30 2/6 2/14 2/27 3/26 4/16   Light patellar mobility 5 min 5 min 5 min 5 min      measurements/ RE          20 min 15 min 15 min    patellar taping k-tape 5 min           5 min                    Neuro Re-Ed                Quad set 10\"x10 no roll 10\"x10 no roll 10\"x10 no roll 10\"x10 no roll    10\" 10x no roll   Prone quad set 5\"x10   5\"x10 5\"x10       Retro rocking   5\"x15 5\"x15 5\"x15  5\" 10x    TENS/NMES Education                 lateral step up/down with eccentric control x10 L         5x L                                           Ther Ex                 Recumbent bike 10 min bike on 10 min bike on 10 min bike on 10 min bike on 10 min bike on  8 min bike on 10 min bike on   Heel slide flexion  x20 w/ PT x20 w/ PT x20 w/ PT x20 w/ PT      SAQ 5\"x10 blue roll 5\"x15 w/ PT 5\"x15 w/ PT 5\"x15 w/ PT   5\" 10x blue roll    Ankle pump                LLLD knee extension stretch                          S/l hip abd  5\"x15 L 5\"x15 L 5\"x15 L       SLR 5\"x10 L       x5 w/ PT  5\" 12x  5\" 10x L  5\" 10x L                     Ther Activity                         "                             Gait Training                                                     Modalities                 Ice PRN              np                  Assessment: Tolerated treatment well. Tenderness to palpation to medial aspect of L knee.  Pt is challenged maintaining quad control initiating SLR, can maintain control through remainder of exercise, but fatigues quickly.  K-tape applied at end of session.  Assess response to treatment NV and progress as able.  Patient demonstrated fatigue post treatment and would benefit from continued PT      Plan: Progress treatment as tolerated.

## 2024-04-30 NOTE — BH CRISIS PLAN
Client Name: Libertad Espinal       Client YOB: 1985    [place] Safety Plan      Creation Date: 4/23/24 Update Date: 4/23/25   Created By: CHARLIE Hutchinson Last Updated By: CHARLIE Hutchinson      Step 1: Warning Signs:   Warning Signs   when I'm actually thinking about doing something   being in an argument with someone can be a trigger/prompting event   isolation   tearfulness            Step 2: Internal Coping Strategies:   Internal Coping Strategies   going for a drive   going in my room to lay down and watch tv            Step 3: People and social settings that provide distraction:   Name Contact Information   Sit with my children and hug them     Places   go for a drive to a drive thru and sit in the parking lot           Step 4: People whom I can ask for help during a crisis:      Name Contact Information    Renata in my cell phone    maybe Saturnino       Step 5: Professionals or agencies I can contact during a crisis:      Clinican/Agency Name Phone Emergency Contact    Olga Schrader [phone]     Dr. Sutton [phone]       Local Emergency Department Emergency Department Phone Emergency Department Address    [facility]          Crisis Phone Numbers:   Suicide Prevention Lifeline: Call or Text  096 Crisis Text Line: Text HOME to 394-080   Please note: Some [facility] do not have a separate number for Child/Adolescent specific crisis. If your county is not listed under Child/Adolescent, please call the adult number for your county      Adult Crisis Numbers: Child/Adolescent Crisis Numbers   [facility]: [phone] [facility]: [phone]   [facility]: [phone] [facility]: [phone]   [facility]: [phone] [place]: [phone]   [facility]: [phone] [place] [facility]: [phone]   [place] [facility]: [phone]   [facility]: [phone]   [facility]: [phone]    [facility]: [phone] (daytime) [phone] (after hours, weekends, holidays)      Step 6: Making the environment safer (plan for lethal means safety):   Plan: No guns or weapons.   Has regular medications...only one month at a time.   If she felt she was in danger, she will ask Saturnino to hold them for her.      Optional: What is most important to me and worth living for?   My children.     [place] Safety Plan. Lulu Hayward and Jean Bellamy. Used with permission of the authors.

## 2024-05-06 ENCOUNTER — OFFICE VISIT (OUTPATIENT)
Dept: FAMILY MEDICINE CLINIC | Facility: CLINIC | Age: 39
End: 2024-05-06
Payer: COMMERCIAL

## 2024-05-06 ENCOUNTER — APPOINTMENT (OUTPATIENT)
Dept: LAB | Facility: CLINIC | Age: 39
End: 2024-05-06
Payer: COMMERCIAL

## 2024-05-06 VITALS
TEMPERATURE: 99.2 F | HEART RATE: 87 BPM | RESPIRATION RATE: 16 BRPM | WEIGHT: 216.8 LBS | SYSTOLIC BLOOD PRESSURE: 110 MMHG | OXYGEN SATURATION: 96 % | DIASTOLIC BLOOD PRESSURE: 80 MMHG | BODY MASS INDEX: 39.9 KG/M2 | HEIGHT: 62 IN

## 2024-05-06 DIAGNOSIS — Z13.1 DIABETES MELLITUS SCREENING: ICD-10-CM

## 2024-05-06 DIAGNOSIS — Z13.220 LIPID SCREENING: ICD-10-CM

## 2024-05-06 DIAGNOSIS — E66.9 OBESITY, CLASS II, BMI 35-39.9: ICD-10-CM

## 2024-05-06 DIAGNOSIS — G47.19 EXCESSIVE DAYTIME SLEEPINESS: Primary | ICD-10-CM

## 2024-05-06 DIAGNOSIS — Z00.6 ENCOUNTER FOR EXAMINATION FOR NORMAL COMPARISON OR CONTROL IN CLINICAL RESEARCH PROGRAM: ICD-10-CM

## 2024-05-06 DIAGNOSIS — G47.19 EXCESSIVE DAYTIME SLEEPINESS: ICD-10-CM

## 2024-05-06 LAB
25(OH)D3 SERPL-MCNC: 9.9 NG/ML (ref 30–100)
ALBUMIN SERPL BCP-MCNC: 4.2 G/DL (ref 3.5–5)
ALP SERPL-CCNC: 52 U/L (ref 34–104)
ALT SERPL W P-5'-P-CCNC: 23 U/L (ref 7–52)
ANION GAP SERPL CALCULATED.3IONS-SCNC: 8 MMOL/L (ref 4–13)
AST SERPL W P-5'-P-CCNC: 19 U/L (ref 13–39)
BASOPHILS # BLD AUTO: 0.09 THOUSANDS/ÂΜL (ref 0–0.1)
BASOPHILS NFR BLD AUTO: 1 % (ref 0–1)
BILIRUB SERPL-MCNC: 0.48 MG/DL (ref 0.2–1)
BUN SERPL-MCNC: 9 MG/DL (ref 5–25)
CALCIUM SERPL-MCNC: 8.9 MG/DL (ref 8.4–10.2)
CHLORIDE SERPL-SCNC: 103 MMOL/L (ref 96–108)
CHOLEST SERPL-MCNC: 213 MG/DL
CO2 SERPL-SCNC: 28 MMOL/L (ref 21–32)
CREAT SERPL-MCNC: 0.71 MG/DL (ref 0.6–1.3)
EOSINOPHIL # BLD AUTO: 0.13 THOUSAND/ÂΜL (ref 0–0.61)
EOSINOPHIL NFR BLD AUTO: 2 % (ref 0–6)
ERYTHROCYTE [DISTWIDTH] IN BLOOD BY AUTOMATED COUNT: 12.4 % (ref 11.6–15.1)
GFR SERPL CREATININE-BSD FRML MDRD: 109 ML/MIN/1.73SQ M
GLUCOSE P FAST SERPL-MCNC: 74 MG/DL (ref 65–99)
HCT VFR BLD AUTO: 45.9 % (ref 34.8–46.1)
HDLC SERPL-MCNC: 46 MG/DL
HGB BLD-MCNC: 15.2 G/DL (ref 11.5–15.4)
IMM GRANULOCYTES # BLD AUTO: 0.02 THOUSAND/UL (ref 0–0.2)
IMM GRANULOCYTES NFR BLD AUTO: 0 % (ref 0–2)
LDLC SERPL CALC-MCNC: 134 MG/DL (ref 0–100)
LYMPHOCYTES # BLD AUTO: 3.06 THOUSANDS/ÂΜL (ref 0.6–4.47)
LYMPHOCYTES NFR BLD AUTO: 36 % (ref 14–44)
MCH RBC QN AUTO: 29.7 PG (ref 26.8–34.3)
MCHC RBC AUTO-ENTMCNC: 33.1 G/DL (ref 31.4–37.4)
MCV RBC AUTO: 90 FL (ref 82–98)
MONOCYTES # BLD AUTO: 0.56 THOUSAND/ÂΜL (ref 0.17–1.22)
MONOCYTES NFR BLD AUTO: 7 % (ref 4–12)
NEUTROPHILS # BLD AUTO: 4.7 THOUSANDS/ÂΜL (ref 1.85–7.62)
NEUTS SEG NFR BLD AUTO: 54 % (ref 43–75)
NRBC BLD AUTO-RTO: 0 /100 WBCS
PLATELET # BLD AUTO: 360 THOUSANDS/UL (ref 149–390)
PMV BLD AUTO: 10.3 FL (ref 8.9–12.7)
POTASSIUM SERPL-SCNC: 3.9 MMOL/L (ref 3.5–5.3)
PROT SERPL-MCNC: 7.2 G/DL (ref 6.4–8.4)
RBC # BLD AUTO: 5.11 MILLION/UL (ref 3.81–5.12)
SODIUM SERPL-SCNC: 139 MMOL/L (ref 135–147)
TRIGL SERPL-MCNC: 166 MG/DL
TSH SERPL DL<=0.05 MIU/L-ACNC: 2.62 UIU/ML (ref 0.45–4.5)
WBC # BLD AUTO: 8.56 THOUSAND/UL (ref 4.31–10.16)

## 2024-05-06 PROCEDURE — 36415 COLL VENOUS BLD VENIPUNCTURE: CPT

## 2024-05-06 PROCEDURE — 84443 ASSAY THYROID STIM HORMONE: CPT

## 2024-05-06 PROCEDURE — 80053 COMPREHEN METABOLIC PANEL: CPT

## 2024-05-06 PROCEDURE — 85025 COMPLETE CBC W/AUTO DIFF WBC: CPT

## 2024-05-06 PROCEDURE — 99214 OFFICE O/P EST MOD 30 MIN: CPT | Performed by: PHYSICIAN ASSISTANT

## 2024-05-06 PROCEDURE — 80061 LIPID PANEL: CPT

## 2024-05-06 PROCEDURE — 82306 VITAMIN D 25 HYDROXY: CPT

## 2024-05-06 NOTE — PROGRESS NOTES
FAMILY PRACTICE ACUTE OFFICE VISIT  [facility] - [facility] PRIMARY CARE       NAME: Libertad Espinal  AGE: 38 y.o. SEX: female       : 1985        MRN: [id number]    DATE: 2024  TIME: 8:28 AM    Assessment and Plan     Problem List Items Addressed This Visit          Neurology/Sleep    Excessive daytime sleepiness - Primary     Check labs as ordered as well as sleep study.  If neither are contributory, recommend discussing possible correlation with her change in Zoloft dosing last month.         Relevant Orders    Ambulatory Referral to Sleep Medicine    CBC and differential (Completed)    TSH, 3rd generation with Free T4 reflex (Completed)    Vitamin D 25 hydroxy (Completed)       Other    Obesity, Class II, BMI 35-39.9     Check labs as ordered.  Reports recently getting a treadmill.  Regular exercise and balanced diet.    BMI Counseling: Body mass index is 39.65 kg/m². The BMI is above normal. Nutrition recommendations include 3-5 servings of fruits/vegetables daily. Exercise recommendations include exercising 3-5 times per week.           Relevant Orders    TSH, 3rd generation with Free T4 reflex (Completed)    Vitamin D 25 hydroxy (Completed)     Other Visit Diagnoses       Lipid screening        Relevant Orders    Lipid Panel with Direct LDL reflex (Completed)    Diabetes mellitus screening        Relevant Orders    Comprehensive metabolic panel (Completed)                  Chief Complaint     Chief Complaint   Patient presents with    Fatigue       History of Present Illness   Libertad Espinal is a 38 y.o.-year-old female who presents for fatigue and exhaustion for several weeks.     She notes that she has been very tired and feels like she can fall asleep anytime/anywhere. It began about a month ago. She has a lot of trouble sleeping. She has been snoring for a long time. She is unaware of any choking or gasping. She is then tired all day. She works from  home but then falls asleep at her desk. She guesses that she sleeps about 5 hours a night but wakes about 3 times nightly. She reports sometimes TMJ wakes her up. She has trouble with pain in that area at times and was told by TMJ specialist but was told that her tongue rides high while sleeping. She is on birth control and rarely bleeds with her menses. Her LMP is unknown. Notes that her depression was bad with knee surgery and issues post surgery but psych increased meds about a month ago - on same Lamictal dose, but Zoloft increased           Review of Systems   Review of Systems   Cardiovascular:  Positive for palpitations (not different than usual).   Psychiatric/Behavioral:  Positive for decreased concentration (reports attention and memory has been poor lately).        Active Problem List     Patient Active Problem List   Diagnosis    Bipolar I disorder, most recent episode depressed, severe with psychotic features ([facility])    Obesity, Class II, BMI 35-39.9    Abnormal Papanicolaou smear of cervix with positive human papilloma virus (HPV) test    Migraine with aura and without status migrainosus, not intractable    SHELLY (generalized anxiety disorder)    Other insomnia    Long-term use of high-risk medication    Family history of breast cancer    Patellofemoral disorder of left knee    Congenital fusion of carpal bone    History of radiofrequency ablation procedure for W-P-W    PVC's (premature ventricular contractions)    Post traumatic stress disorder    Dyslipidemia    Enlarged lymph node in neck    Inappropriate sinus tachycardia    Recurrent dislocation of left patella    Patellar instability of left knee    Excessive daytime sleepiness         Social History:  Social History     Socioeconomic History    Marital status: Single     Spouse name: Not on file    Number of children: 2    Years of education: Not on file    Highest education level: Some college, no degree   Occupational History    Occupation: works  in retail   Tobacco Use    Smoking status: Never    Smokeless tobacco: Never    Tobacco comments:     Never a smoker or use of any tobacco products per pt    Vaping Use    Vaping status: Never Used   Substance and Sexual Activity    Alcohol use: Yes     Comment: Occasional/special occasions    Drug use: No     Comment: Denies any drug use per pt    Sexual activity: Yes     Partners: Male     Birth control/protection: Condom Male, OCP     Comment: Denies any chest pain or shortness of breath with activity   Other Topics Concern    Not on file   Social History Narrative    Education: high school graduate and some college    Learning Disabilities: none    Marital History: single    Children: 1 daughter, 1 son    Living Arrangement: lives in home with boyfriend, 2 children and boyfriend's 2 children    Occupational History: works part time at [employer]    Functioning Relationships: boyfriend is supportive    Legal History: none     History: None         Family planning    IUD contraception     Social Determinants of Health     Financial Resource Strain: Medium Risk (2/4/2021)    Overall Financial Resource Strain (CARDIA)     Difficulty of Paying Living Expenses: Somewhat hard   Food Insecurity: Food Insecurity Present (2/4/2021)    Hunger Vital Sign     Worried About Running Out of Food in the Last Year: Sometimes true     Ran Out of Food in the Last Year: Sometimes true   Transportation Needs: Unmet Transportation Needs (2/4/2021)    PRAPARE - Transportation     Lack of Transportation (Medical): Yes     Lack of Transportation (Non-Medical): Yes   Physical Activity: Inactive (11/11/2019)    Exercise Vital Sign     Days of Exercise per Week: 0 days     Minutes of Exercise per Session: 0 min   Stress: Stress Concern Present (11/11/2019)    [employer] [place] [employer] - Occupational Stress Questionnaire     Feeling of Stress : Rather much   Social Connections: Socially Isolated (11/11/2019)  "   Social Connection and Isolation Panel [NHANES]     Frequency of Communication with Friends and Family: Once a week     Frequency of Social Gatherings with Friends and Family: Once a week     Attends Spiritism Services: Never     Active Member of Clubs or Organizations: No     Attends Club or Organization Meetings: Never     Marital Status: Never    Intimate Partner Violence: Not At Risk (11/11/2019)    Humiliation, Afraid, Rape, and Kick questionnaire     Fear of Current or Ex-Partner: No     Emotionally Abused: No     Physically Abused: No     Sexually Abused: No   Housing Stability: Not on file       Objective     Vitals:    05/06/24 0800   BP: 110/80   BP Location: Left arm   Cuff Size: Large   Pulse: 87   Resp: 16   Temp: 99.2 °F (37.3 °C)   TempSrc: Tympanic   SpO2: 96%   Weight: 98.3 kg (216 lb 12.8 oz)   Height: 5' 2\" (1.575 m)     Wt Readings from Last 3 Encounters:   05/06/24 98.3 kg (216 lb 12.8 oz)   03/20/24 98 kg (216 lb)   01/24/24 98.9 kg (218 lb)       Physical Exam  Vitals reviewed.   Constitutional:       General: She is not in acute distress.     Appearance: Normal appearance. She is well-developed. She is obese. She is not ill-appearing.   HENT:      Head: Normocephalic and atraumatic.   Neck:      Thyroid: No thyromegaly.      Vascular: No carotid bruit.   Cardiovascular:      Rate and Rhythm: Normal rate and regular rhythm.      Pulses: Normal pulses.      Heart sounds: Normal heart sounds. No murmur heard.  Pulmonary:      Effort: Pulmonary effort is normal.      Breath sounds: Normal breath sounds. No wheezing, rhonchi or rales.   Musculoskeletal:      Cervical back: Neck supple.      Right lower leg: No edema.      Left lower leg: No edema.   Lymphadenopathy:      Cervical: No cervical adenopathy.   Skin:     General: Skin is warm and dry.   Neurological:      Mental Status: She is alert.   Psychiatric:         Mood and Affect: Mood normal.         Behavior: Behavior normal.        "  Thought Content: Thought content normal.         Judgment: Judgment normal.                 Orders Placed This Encounter   Procedures    CBC and differential    Comprehensive metabolic panel    Lipid Panel with Direct LDL reflex    TSH, 3rd generation with Free T4 reflex    Vitamin D 25 hydroxy    Ambulatory Referral to Sleep Medicine       ALLERGIES:  Allergies   Allergen Reactions    Codeine Other (See Comments)     dry heaves    Sulfa Antibiotics Hives and Rash     Per pt as achild    Erythromycin Hives     Per as a child       Current Medications     Current Outpatient Medications   Medication Sig Dispense Refill    Drospirenone 4 MG TABS Take 1 tablet by mouth in the morning 84 tablet 3    lamoTRIgine (LaMICtal) 200 MG tablet TAKE 1 TABLET (200 MG TOTAL) BY MOUTH DAILY AT BEDTIME 90 tablet 1    LORazepam (ATIVAN) 1 mg tablet Take 1 tablet (1 mg total) by mouth every 6 (six) hours as needed for anxiety 30 tablet 0    naproxen (NAPROSYN) 500 mg tablet Take 1 tablet (500 mg total) by mouth 2 (two) times a day with meals (Patient taking differently: Take 500 mg by mouth 2 (two) times a day with meals) 30 tablet 0    sertraline (ZOLOFT) 100 mg tablet TAKE 1.5 TABLETS (150MG TOTAL) BY MOUTH DAILY 135 tablet 1    Acetaminophen (TYLENOL PO) Take by mouth (Patient not taking: Reported on 12/6/2023)      [facility] Aspirin Low Dose 81 MG EC tablet TAKE 1 TABLET BY MOUTH 2 TIMES A DAY. 180 tablet 1    ibuprofen (MOTRIN) 600 mg tablet Take 1 tablet (600 mg total) by mouth every 6 (six) hours as needed for mild pain, moderate pain or fever (Patient not taking: Reported on 12/6/2023) 30 tablet 0     No current facility-administered medications for this visit.         Rita Kincaid PA-C  5/7/2024 8:28 AM  [facility]

## 2024-05-07 ENCOUNTER — TELEPHONE (OUTPATIENT)
Dept: PSYCHIATRY | Facility: CLINIC | Age: 39
End: 2024-05-07

## 2024-05-07 DIAGNOSIS — E55.9 VITAMIN D DEFICIENCY: Primary | ICD-10-CM

## 2024-05-07 PROBLEM — G47.19 EXCESSIVE DAYTIME SLEEPINESS: Status: ACTIVE | Noted: 2024-05-07

## 2024-05-07 RX ORDER — MULTIVIT-MIN/IRON/FOLIC ACID/K 18-600-40
2000 CAPSULE ORAL DAILY
Qty: 90 CAPSULE | Refills: 3 | Status: SHIPPED | OUTPATIENT
Start: 2024-05-07

## 2024-05-07 RX ORDER — ERGOCALCIFEROL 1.25 MG/1
50000 CAPSULE ORAL WEEKLY
Qty: 12 CAPSULE | Refills: 0 | Status: SHIPPED | OUTPATIENT
Start: 2024-05-07 | End: 2024-07-24

## 2024-05-07 NOTE — ASSESSMENT & PLAN NOTE
Check labs as ordered as well as sleep study.  If neither are contributory, recommend discussing possible correlation with her change in Zoloft dosing last month.

## 2024-05-07 NOTE — ASSESSMENT & PLAN NOTE
Check labs as ordered.  Reports recently getting a treadmill.  Regular exercise and balanced diet.    BMI Counseling: Body mass index is 39.65 kg/m². The BMI is above normal. Nutrition recommendations include 3-5 servings of fruits/vegetables daily. Exercise recommendations include exercising 3-5 times per week.

## 2024-05-07 NOTE — TELEPHONE ENCOUNTER
Patient is calling regarding cancelling an appointment.    Date/Time: 5/9/2024 at 9 am virtually    Reason: can't make it    Patient was rescheduled: YES [] NO [x]  If yes, when was Patient reschedule for: n/a    Patient requesting call back to reschedule: YES [x] NO []

## 2024-05-14 ENCOUNTER — EVALUATION (OUTPATIENT)
Dept: PHYSICAL THERAPY | Facility: REHABILITATION | Age: 39
End: 2024-05-14
Payer: COMMERCIAL

## 2024-05-14 DIAGNOSIS — M25.362 PATELLAR INSTABILITY OF LEFT KNEE: ICD-10-CM

## 2024-05-14 DIAGNOSIS — M22.02 RECURRENT DISLOCATION OF LEFT PATELLA: ICD-10-CM

## 2024-05-14 DIAGNOSIS — Z98.890 S/P LEFT KNEE SURGERY: Primary | ICD-10-CM

## 2024-05-14 PROCEDURE — 97140 MANUAL THERAPY 1/> REGIONS: CPT | Performed by: PHYSICAL THERAPIST

## 2024-05-14 PROCEDURE — 97110 THERAPEUTIC EXERCISES: CPT | Performed by: PHYSICAL THERAPIST

## 2024-05-14 NOTE — PROGRESS NOTES
PT Discharge    Today's date: 2024  Patient name: Libertad Espinal  : 1985  MRN: [id number]  Referring provider: Ester Calderon,*  Dx:   Encounter Diagnosis     ICD-10-CM    1. S/P left knee surgery  Z98.890       2. Patellar instability of left knee  M25.362       3. Recurrent dislocation of left patella  M22.02           Start Time: 730  Stop Time: 805  Total time in clinic (min): 35 minutes    Assessment  Assessment details: Patient is a 38 y.o. female that presents status post surgical arthroscopy of the left knee with lateral release and open MPFL reconstruction with allograft on 10/31/2023.  Patient reports 85% improvement with skilled physical therapy services.  FOTO improved by 40 points to 77/100.  Patient reports improvement with ADLS such as bathing and dressing, ambulation, negotiation of stairs, standing, transfers, childcare, and housework.  Patient reports continued difficulty with going down stairs at times, but this is improving.  Patient has made good progress towards goals established for physical therapy.  Patient would benefit from [facility] for continued strengthening.  Discharged to [facility] at this time.         Impairments: abnormal muscle firing, impaired physical strength and pain with function  Understanding of Dx/Px/POC: good  Goals  Impairment:  1.  Patient will reports 50% reduction in pain in 8 weeks to maximize function.-MET  2.  Patient will improve strength to 4/5 in all planes to maximize function.-MET  3.  Patient will improve ROM to WFL in 8 weeks to maximize function.-MET    Functional:  1. Patient will improve FOTO by 37 points to 74/100 in 12 weeks to maximize function.-MET  2. Patient will be independent with HEP in 8 weeks to maximize function.-MET  3. Patient will report no difficulty with ambulation in 12 weeks to maximize function.- MET  4. Patient will report no difficulty with negotiation of stairs in 12 weeks to maximize function.- MET  5. Patient will  report no difficulty with ADLS in 12 weeks to maximize function.- MET          Plan  Plan details: Patient will be discharged to [facility] at this time.   Planned modality interventions: cryotherapy  Planned therapy interventions: manual therapy, neuromuscular re-education, patient education, strengthening, stretching, therapeutic activities, therapeutic exercise, home exercise program, functional ROM exercises, flexibility, balance/weight bearing training, abdominal trunk stabilization, activity modification and kinesiology taping  Treatment plan discussed with: patient        Subjective Evaluation    History of Present Illness  Date of surgery: 10/31/2023  Mechanism of injury: surgery  Mechanism of injury: Patient presents status post surgical arthroscopy of the left knee with lateral release and open MPFL reconstruction with allograft.  Patient reports a chronic history of bilateral knee pain back to childhood.  Patient had bene having a locking sensation in her left knee over the last several years which had been getting more frequent.  Patient is currently limited with ADLS such as bathing and dressing, ambulation, negotiation of stairs, standing, transfers, childcare, and housework.  Patient notes her knee feels more unstable over the last few days.   Patient Goals  Patient goals for therapy: increased strength, decreased pain, increased motion and independence with ADLs/IADLs    Pain  At best pain ratin  At worst pain rating: 3  Location: medial L knee  Quality: sharp  Progression: improved    Treatments  Current treatment: physical therapy        Objective     Observations   Left Knee   Negative for drainage, effusion and trophic changes.     Palpation   Left   No palpable tenderness to the distal biceps femoris, distal semimembranosus, distal semitendinosus, lateral gastrocnemius, medial gastrocnemius, rectus femoris, vastus lateralis and vastus medialis.     Tenderness   Left Knee   Tenderness in the medial  "joint line. No tenderness in the fibular head, lateral joint line, patellar tendon and pes anserinus.     Passive Range of Motion   Left Knee   Flexion: 140 degrees   Extension: WFL    Right Knee   Flexion: 139 degrees   Extension: WFL    Strength/Myotome Testing     Left Hip   Planes of Motion   Flexion: 5  Abduction: 4+    Right Hip   Planes of Motion   Flexion: 5  Abduction: 4    Left Knee   Flexion: 4+  Extension: 4+  Quadriceps contraction: fair    Right Knee   Flexion: 5  Extension: 4+  Quadriceps contraction: good    Left Ankle/Foot   Dorsiflexion: 5    Additional Strength Details  Mild quad lag with SLR  SL heel raise 20x gayathri    Ambulation   Weight-Bearing Status   Weight-Bearing Status (Left): weight-bearing as tolerated     Ambulation: Stairs   Ascend stairs: independent  Pattern: reciprocal  Railings: without rails  Descend stairs: independent  Pattern: reciprocal  Railings: without rails    Observational Gait   Gait: within functional limits     Functional Assessment        Single Leg Stance   Left: 30 seconds  Right: 30 seconds    Precautions: follow protocol         Manuals 4/30 5/14 2/6 2/14 2/27 3/26 4/16   Light patellar mobility 5 min  5 min 5 min         measurements/ RE   15 min      20 min 15 min 15 min    patellar taping k-tape 5 min           5 min                     Neuro Re-Ed                 Quad set 10\"x10 no roll  10\"x10 no roll 10\"x10 no roll     10\" 10x no roll   Prone quad set 5\"x10   5\"x10 5\"x10         Retro rocking    5\"x15 5\"x15   5\" 10x     TENS/NMES Education                  lateral step up/down with eccentric control x10 L   10x L      5x L                                           Ther Ex                 Recumbent bike 10 min bike on 10 min bike on 10 min bike on 10 min bike on 10 min bike on  8 min bike on 10 min bike on   Heel slide flexion    x20 w/ PT x20 w/ PT x20 w/ PT       SAQ 5\"x10 blue roll  5\"x15 w/ PT 5\"x15 w/ PT    5\" 10x blue roll     Ankle pump               " "  LLLD knee extension stretch                                   S/l hip abd    5\"x15 L 5\"x15 L         SLR 5\"x10 L   5\" 10x L    x5 w/ PT  5\" 12x  5\" 10x L  5\" 10x L                     Ther Activity                                                     Gait Training                                                     Modalities                 Ice PRN              np                        "

## 2024-05-15 ENCOUNTER — OFFICE VISIT (OUTPATIENT)
Dept: OBGYN CLINIC | Facility: MEDICAL CENTER | Age: 39
End: 2024-05-15
Payer: COMMERCIAL

## 2024-05-15 VITALS
SYSTOLIC BLOOD PRESSURE: 116 MMHG | HEART RATE: 83 BPM | WEIGHT: 218 LBS | BODY MASS INDEX: 40.12 KG/M2 | HEIGHT: 62 IN | DIASTOLIC BLOOD PRESSURE: 77 MMHG

## 2024-05-15 DIAGNOSIS — Z98.890 S/P ARTHROSCOPIC SURGERY OF LEFT KNEE: Primary | ICD-10-CM

## 2024-05-15 PROCEDURE — 99213 OFFICE O/P EST LOW 20 MIN: CPT | Performed by: ORTHOPAEDIC SURGERY

## 2024-05-15 NOTE — PROGRESS NOTES
Ortho Sports Medicine Knee Follow Up Visit     Assesment:     38 y.o. female left knee  with open MPFL reconstruction using allograft and lateral release, DOS: 10/31/2023      Plan:    Post - Operative  treatment:     Continue with the strengthening of the quadriceps and VMO, which will help with descending stairs/declines.   OTC NSAIDS prn for pain  Topical Voltaren gel 1% as needed  No further imaging or treatment at this time    Imaging:    All imaging from today was reviewed by myself and explained to the patient.       Injection:    No Injection planned at this time.      Surgery:     No surgery is recommended at this point, continue with conservative treatment plan as noted.    Follow up:    Return if symptoms worsen or fail to improve.        Chief Complaint   Patient presents with    Left Knee - Follow-up       History of Present Illness:    The patient is returns for follow up of left knee with lateral release and open MPFL reconstruction with allograft on 10/31/2023.  Since the prior visit, She reports significant improvement. She has had some 'crunching' under the knee cap when she goes downstairs. Her therapist feels he's may be fatiguing out.   Pain is located anterior, medial along the healed incision due to sensitivity.     Pain is improved by rest, ice, NSAIDS, and physical therapy.  Minimal Pain is aggravated by stairs and squatting.    Symptoms include crunching.         Knee Surgical History:   Left knee with open MPFL reconstruction using allograft and lateral release, DOS: 10/31/2023      Past Medical, Social and Family History:  Past Medical History:   Diagnosis Date    Abnormal Pap smear of cervix     Anxiety     Bipolar disorder (HCC)     COVID-19 01/12/2022    10/27/23 Per pt had 3 separate times - last year being the last episode    Depression     Eczema     Exposure to chlamydia     Resolved 06/09/17    Knee pain, left     Migraine without aura and without status migrainosus, not  intractable 10/11/2019    Obesity     Post traumatic stress disorder 2021    Postpartum depression 2018    TMJ (dislocation of temporomandibular joint)     causes snoring    Manoj-Parkinson-White (WPW) syndrome      Past Surgical History:   Procedure Laterality Date    ABLATION OF DYSRHYTHMIC FOCUS      WPW ablation age 16    COLONOSCOPY      COLPOSCOPY      DISTAL PATELLAR REALIGNMENT Left 10/31/2023    Procedure: OPEN MPFL RECONSTRUCTION WITH ALLOGRAFT;  Surgeon: iWlbert Michael DO;  Location:  MAIN OR;  Service: Orthopedics    INDUCED       OTHER SURGICAL HISTORY      catheter ablation- WPW age 17    AL ARTHROSCOPY KNEE LATERAL RELEASE Left 10/31/2023    Procedure: RELEASE RETINACULAR;  Surgeon: Wilbert Michael DO;  Location:  MAIN OR;  Service: Orthopedics    TONSILLECTOMY      WISDOM TOOTH EXTRACTION       Allergies   Allergen Reactions    Codeine Other (See Comments)     dry heaves    Sulfa Antibiotics Hives and Rash     Per pt as achild    Erythromycin Hives     Per as a child     Current Outpatient Medications on File Prior to Visit   Medication Sig Dispense Refill    Drospirenone 4 MG TABS Take 1 tablet by mouth in the morning 84 tablet 3    ergocalciferol (VITAMIN D2) 50,000 units Take 1 capsule (50,000 Units total) by mouth once a week for 12 doses Then transition to vitamin D 2000 units daily for maintenance. 12 capsule 0    lamoTRIgine (LaMICtal) 200 MG tablet TAKE 1 TABLET (200 MG TOTAL) BY MOUTH DAILY AT BEDTIME 90 tablet 1    LORazepam (ATIVAN) 1 mg tablet Take 1 tablet (1 mg total) by mouth every 6 (six) hours as needed for anxiety 30 tablet 0    naproxen (NAPROSYN) 500 mg tablet Take 1 tablet (500 mg total) by mouth 2 (two) times a day with meals (Patient taking differently: Take 500 mg by mouth 2 (two) times a day with meals) 30 tablet 0    sertraline (ZOLOFT) 100 mg tablet TAKE 1.5 TABLETS (150MG TOTAL) BY MOUTH DAILY 135 tablet 1    Vitamin D, Cholecalciferol, 50 MCG  (2000 UT) CAPS Take 2,000 Units by mouth daily Start after completing 12 weeks of ergocalciferol. 90 capsule 3    Acetaminophen (TYLENOL PO) Take by mouth (Patient not taking: Reported on 12/6/2023)      [facility] Aspirin Low Dose 81 MG EC tablet TAKE 1 TABLET BY MOUTH 2 TIMES A DAY. 180 tablet 1    ibuprofen (MOTRIN) 600 mg tablet Take 1 tablet (600 mg total) by mouth every 6 (six) hours as needed for mild pain, moderate pain or fever (Patient not taking: Reported on 12/6/2023) 30 tablet 0     No current facility-administered medications on file prior to visit.     Social History     Socioeconomic History    Marital status: Single     Spouse name: Not on file    Number of children: 2    Years of education: Not on file    Highest education level: Some college, no degree   Occupational History    Occupation: works in retail   Tobacco Use    Smoking status: Never    Smokeless tobacco: Never    Tobacco comments:     Never a smoker or use of any tobacco products per pt    Vaping Use    Vaping status: Never Used   Substance and Sexual Activity    Alcohol use: Yes     Comment: Occasional/special occasions    Drug use: No     Comment: Denies any drug use per pt    Sexual activity: Yes     Partners: Male     Birth control/protection: Condom Male, OCP     Comment: Denies any chest pain or shortness of breath with activity   Other Topics Concern    Not on file   Social History Narrative    Education: high school graduate and some college    Learning Disabilities: none    Marital History: single    Children: 1 daughter, 1 son    Living Arrangement: lives in home with boyfriend, 2 children and boyfriend's 2 children    Occupational History: works part time at [employer]    Functioning Relationships: boyfriend is supportive    Legal History: none     History: None         Family planning    IUD contraception     Social Determinants of Health     Financial Resource Strain: Medium Risk (2/4/2021)    Overall Financial  "Resource Strain (CARDIA)     Difficulty of Paying Living Expenses: Somewhat hard   Food Insecurity: Food Insecurity Present (2/4/2021)    Hunger Vital Sign     Worried About Running Out of Food in the Last Year: Sometimes true     Ran Out of Food in the Last Year: Sometimes true   Transportation Needs: Unmet Transportation Needs (2/4/2021)    PRAPARE - Transportation     Lack of Transportation (Medical): Yes     Lack of Transportation (Non-Medical): Yes   Physical Activity: Inactive (11/11/2019)    Exercise Vital Sign     Days of Exercise per Week: 0 days     Minutes of Exercise per Session: 0 min   Stress: Stress Concern Present (11/11/2019)    [employer] - Occupational Stress Questionnaire     Feeling of Stress : Rather much   Social Connections: Socially Isolated (11/11/2019)    Social Connection and Isolation Panel [NHANES]     Frequency of Communication with Friends and Family: Once a week     Frequency of Social Gatherings with Friends and Family: Once a week     Attends Protestant Services: Never     Active Member of Clubs or Organizations: No     Attends Club or Organization Meetings: Never     Marital Status: Never    Intimate Partner Violence: Not At Risk (11/11/2019)    Humiliation, Afraid, Rape, and Kick questionnaire     Fear of Current or Ex-Partner: No     Emotionally Abused: No     Physically Abused: No     Sexually Abused: No   Housing Stability: Not on file         I have reviewed the past medical, surgical, social and family history, medications and allergies as documented in the EMR.    Review of systems: ROS is negative other than that noted in the HPI.  Constitutional: Negative for fatigue and fever.      Physical Exam:    Blood pressure 116/77, pulse 83, height 5' 2\" (1.575 m), weight 98.9 kg (218 lb), not currently breastfeeding.    General/Constitutional: NAD, well developed, well nourished  HENT: Normocephalic, atraumatic  CV: Intact distal pulses, " regular rate  Resp: No respiratory distress or labored breathing  GI: Soft and non-tender   Lymphatic: No lymphadenopathy palpated  Neuro: Alert and Oriented x 3, no focal deficits  Psych: Normal mood, normal affect, normal judgement, normal behavior  Skin: Warm, dry, no rashes, no erythema      Knee Exam (focused):               RIGHT LEFT   ROM:   0-130 0-130   Palpation: Effusion negative negative     MJL tenderness Negative Negative     LJL tenderness Negative Negative   Meniscus: Johanne Negative Negative    Apley's Compression Negative Negative   Instability: Varus stable stable     Valgus stable stable   Special Tests: Lachman Negative Negative     Posterior drawer Negative Negative     Anterior drawer Negative Negative     Pivot shift not tested not tested     Dial not tested not tested   Patella: Palpation no tenderness medial facet ttp and no tenderness     Mobility 1/4 1/4     Apprehension Negative Negative   Other: Single leg 1/4 squat not tested not tested           LE NV Exam: +2 DP/PT pulses bilaterally  Sensation intact to light touch L2-S1 bilaterally    No calf tenderness to palpation bilaterally      Knee Imaging    No imaging was performed today      Scribe Attestation      I,:  Marely Avelar am acting as a scribe while in the presence of the attending physician.:       I,:  Wilbert Michael DO personally performed the services described in this documentation    as scribed in my presence.:

## 2024-05-19 LAB
APOB+LDLR+PCSK9 GENE MUT ANL BLD/T: NOT DETECTED
BRCA1+BRCA2 DEL+DUP + FULL MUT ANL BLD/T: NOT DETECTED
MLH1+MSH2+MSH6+PMS2 GN DEL+DUP+FUL M: NOT DETECTED

## 2024-05-29 ENCOUNTER — TELEMEDICINE (OUTPATIENT)
Dept: PSYCHIATRY | Facility: CLINIC | Age: 39
End: 2024-05-29
Payer: COMMERCIAL

## 2024-05-29 DIAGNOSIS — F41.1 GAD (GENERALIZED ANXIETY DISORDER): Chronic | ICD-10-CM

## 2024-05-29 DIAGNOSIS — F31.76 BIPOLAR I DISORDER, MOST RECENT EPISODE DEPRESSED, IN FULL REMISSION (HCC): Primary | ICD-10-CM

## 2024-05-29 PROCEDURE — 99212 OFFICE O/P EST SF 10 MIN: CPT | Performed by: PSYCHIATRY & NEUROLOGY

## 2024-05-29 NOTE — PSYCH
MEDICATION MANAGEMENT NOTE    PSYCHIATRIC VIRTUAL VISIT        [facility]      Name and Date of Birth:  Libertad Espinal 38 y.o. 1985 MRN: [id number]    Psychiatric Virtual Visit:    Verification of patient location: Patient is located in the state that I hold an active license: [place]    REQUIRED DOCUMENTATION:     Provider located at [facility] at [address] [place] [address].  TeleMed provider: Chip Sutton MD  Patient was identified by name and date of birth. Libertad Espinal was informed that this is a telemedicine visit and that the visit is being conducted through the Epic Embedded platform. She agrees to proceed..  My office door was closed. No one else was in the room.  She acknowledged consent and understanding of privacy and security of the video platform. The patient has agreed to participate and understands they can discontinue the visit at any time. Patient is aware this is a billable service.         This note was shared with patient.    I spent 15 minutes directly with the patient during this visit        Review Of Systems:     Mood Euthymic   Thought Content Normal   General Sleep Disturbances   Physical symptoms As Noted in HPI       Laboratory Results: No results found for this or any previous visit.    Subjective:    All in all, Libertad has been stable.  She denies any mood swings or crying spells/irritability or anger.  Her sleep pattern remains interrupted with frequent awakenings and also snoring.  She has a scheduled to have home sleep studies for screening purposes.  Appetite is adequate.      Objective:   Overall stable and in remission    Mental status:  Appearance calm and cooperative , adequate hygiene and grooming, and good eye contact    Mood Euthymic   Affect affect appropriate    Speech Normal rate and Normal volume   Thought Processes coherent/organized   Hallucinations Denies  hallucinations and does not appear to be responding to internal stimuli   Thought Content Does not verbalize delusional material   Abnormal Thoughts no suicidal thoughts  and no homicidal thoughts    Orientation A+O x 3      Diagnosis ICD-10-CM Associated Orders   1. Bipolar I disorder, most recent episode depressed, in full remission ([facility])  F31.76       2. SHELLY (generalized anxiety disorder)  F41.1          Treatment Recommendations- Risks Benefits    We will continue with same doses of Lamictal and sertraline.  She seldom needs to take lorazepam.  She will continue with psychotherapy.  Follow-up in 3 months  Risks, Benefits And Possible Side Effects Of Medications:  Risks, benefits, and possible side effects of medications explained to patient and patient verbalizes understanding    VIRTUAL VISIT DISCLAIMER    Libertad Espinal verbally agrees to participate in Virtual Care Services. Pt is aware that Virtual Care Services could be limited without vital signs or the ability to perform a full hands-on physical exam. Libertad Espinal understands she or the provider may request at any time to terminate the video visit and request the patient to seek care or treatment in person.     Chip Sutton MD 05/29/24

## 2024-05-29 NOTE — PSYCH
Libertad Espnial 1985 [id number]     The patient was seen for continuing care and pharmacotherapy.        ROS:    Current Mental Status Evaluation:  Appearance:  {FSAPP:09240}   Behavior:  {FSBEH:70794}   Mood:  {mood:87344}   Affect: {desc; affect:52402}   Speech: {FSSPEECH:89679}   Thought Process:  {thought process:96953}   Thought Content:  {FSTHOGHTCONT:00114}   Perceptual Disturbances: {FSHALLUC:03512}   Risk Potential: {FSRISK:56585}   Orientation:        No diagnosis found.       Current Outpatient Medications   Medication Instructions    Acetaminophen (TYLENOL PO) Take by mouth    [facility] Aspirin Low Dose 81 mg, Oral, 2 times daily    Drospirenone 4 MG TABS 1 tablet, Oral, Daily    ergocalciferol (VITAMIN D2) 50,000 Units, Oral, Weekly, Then transition to vitamin D 2000 units daily for maintenance.    ibuprofen (MOTRIN) 600 mg, Oral, Every 6 hours PRN    lamoTRIgine (LAMICTAL) 200 mg, Oral, Daily at bedtime    LORazepam (ATIVAN) 1 mg, Oral, Every 6 hours PRN    naproxen (NAPROSYN) 500 mg, Oral, 2 times daily with meals    sertraline (ZOLOFT) 100 mg tablet TAKE 1.5 TABLETS (150MG TOTAL) BY MOUTH DAILY    Vitamin D (Cholecalciferol) 2,000 Units, Oral, Daily, Start after completing 12 weeks of ergocalciferol.          Treatment Recommendations- Risks Benefits      Risks, Benefits And Possible Side Effects Of Medications:  {PSYCH RISK, BENEFITS AND POSSIBLE SIDE EFFECTS (Optional):18012}    VIRTUAL VISIT DISCLAIMER    Libertad Letty Teddy verbally agrees to participate in Virtual Care Services. Pt is aware that Virtual Care Services could be limited without vital signs or the ability to perform a full hands-on physical exam. Libertad Espinal understands she or the provider may request at any time to terminate the video visit and request the patient to seek care or treatment in person.     Chip Sutton MD 05/29/24

## 2024-05-30 ENCOUNTER — TELEPHONE (OUTPATIENT)
Dept: PSYCHIATRY | Facility: CLINIC | Age: 39
End: 2024-05-30

## 2024-05-31 ENCOUNTER — NURSE TRIAGE (OUTPATIENT)
Age: 39
End: 2024-05-31

## 2024-05-31 DIAGNOSIS — I49.3 PVC'S (PREMATURE VENTRICULAR CONTRACTIONS): Primary | ICD-10-CM

## 2024-05-31 DIAGNOSIS — I47.11 INAPPROPRIATE SINUS TACHYCARDIA: ICD-10-CM

## 2024-05-31 RX ORDER — METOPROLOL SUCCINATE 25 MG/1
12.5 TABLET, EXTENDED RELEASE ORAL 2 TIMES DAILY
Qty: 90 TABLET | Refills: 3 | Status: SHIPPED | OUTPATIENT
Start: 2024-05-31

## 2024-05-31 NOTE — TELEPHONE ENCOUNTER
"Pt called stating that she has been having a skipped beat like feeling for about 2 days consistently. She states that she feels when she is talking she has to take breaks, she feels lightheaded as well. She said the feeling feels like someone scared her. Pt wanted to be seen today however there are no available appointments. Pt does not wish to go to ED but if provider feels she should she will. Pt open to coming in for a nurse visit to have and EKG done.     Please advise       Answer Assessment - Initial Assessment Questions  1. DESCRIPTION: \"Please describe your heart rate or heartbeat that you are having\" (e.g., fast/slow, regular/irregular, skipped or extra beats, \"palpitations\")      Feels like heart is skipping a beat , when talking feels she needs to take a break , feels lightheaded as well, feels like someone scared her  2. ONSET: \"When did it start?\" (Minutes, hours or days)       Started yesterday   3. DURATION: \"How long does it last\" (e.g., seconds, minutes, hours)      All day-hours  4. PATTERN \"Does it come and go, or has it been constant since it started?\"  \"Does it get worse with exertion?\"   \"Are you feeling it now?\"      Constant   6. HEART RATE: \"Can you tell me your heart rate?\" \"How many beats in 15 seconds?\"  (Note: not all patients can do this)        92  7. RECURRENT SYMPTOM: \"Have you ever had this before?\" If Yes, ask: \"When was the last time?\" and \"What happened that time?\"       Yes, has happened for 3 days consistently in the past  8. CAUSE: \"What do you think is causing the palpitations?\"      Does not feel her symptoms are palpitations   9. CARDIAC HISTORY: \"Do you have any history of heart disease?\" (e.g., heart attack, angina, bypass surgery, angioplasty, arrhythmia)       PVCs, ablation in the past  10. OTHER SYMPTOMS: \"Do you have any other symptoms?\" (e.g., dizziness, chest pain, sweating, difficulty breathing)        Lightheaded,   11. PREGNANCY: \"Is there any chance you are " "pregnant?\" \"When was your last menstrual period?\"        Does not think so    Protocols used: Heart Rate and Heartbeat Questions-ADULT-[place]    "

## 2024-05-31 NOTE — TELEPHONE ENCOUNTER
Please call patient.  Lets try metoprolol succinate 12.5 mg twice a day.  This should settle down her heart situation.  She can call back if there is any ongoing issue.  Use caution with caffeine and EtOH.  Thank you.    Relayed message as given per Dr. Ga, Libertad agrees with trying metoprolol. I asked that she call us if she has any ongoing issues or concerns.    Libertad is aware and understood.

## 2024-06-18 ENCOUNTER — PATIENT MESSAGE (OUTPATIENT)
Dept: FAMILY MEDICINE CLINIC | Facility: CLINIC | Age: 39
End: 2024-06-18

## 2024-06-27 ENCOUNTER — TELEMEDICINE (OUTPATIENT)
Dept: BEHAVIORAL/MENTAL HEALTH CLINIC | Facility: CLINIC | Age: 39
End: 2024-06-27
Payer: COMMERCIAL

## 2024-06-27 DIAGNOSIS — F41.1 GAD (GENERALIZED ANXIETY DISORDER): Chronic | ICD-10-CM

## 2024-06-27 DIAGNOSIS — F31.76 BIPOLAR I DISORDER, MOST RECENT EPISODE DEPRESSED, IN FULL REMISSION (HCC): Primary | ICD-10-CM

## 2024-06-27 DIAGNOSIS — F43.10 POST TRAUMATIC STRESS DISORDER: ICD-10-CM

## 2024-06-27 PROCEDURE — 90837 PSYTX W PT 60 MINUTES: CPT | Performed by: SOCIAL WORKER

## 2024-07-01 NOTE — PSYCH
Virtual Regular Visit    Verification of patient location:    Patient is located at Home in the following state in which I hold an active license [place]    Assessment/Plan:    Problem List Items Addressed This Visit          Behavioral Health    SHELLY (generalized anxiety disorder) (Chronic)    Bipolar I disorder, most recent episode depressed, in full remission (HCC) - Primary    Post traumatic stress disorder       Goals addressed in session: Goal 1          Reason for visit is   Chief Complaint   Patient presents with    Virtual Regular Visit          Encounter provider CHARLIE Hutchinson      Recent Visits  Date Type Provider Dept   06/27/24 Telemedicine CHARLIE Hutchinson [facility] Therapist [place]   Showing recent visits within past 7 days and meeting all other requirements  Future Appointments  No visits were found meeting these conditions.  Showing future appointments within next 150 days and meeting all other requirements       The patient was identified by name and date of birth. Libertad Espinal was informed that this is a telemedicine visit and that the visit is being conducted throughthe Epic Embedded platform. She agrees to proceed..  My office door was closed. No one else was in the room.  She acknowledged consent and understanding of privacy and security of the video platform. The patient has agreed to participate and understands they can discontinue the visit at any time.    Patient is aware this is a billable service.     Subjective  Libertad Espinal is a 38 y.o. female .      HPI     Past Medical History:   Diagnosis Date    Abnormal Pap smear of cervix     Anxiety     Bipolar disorder (HCC)     COVID-19 01/12/2022    10/27/23 Per pt had 3 separate times - last year being the last episode    Depression     Eczema     Exposure to chlamydia     Resolved 06/09/17    Knee pain, left     Migraine without aura and without status migrainosus, not intractable 10/11/2019    Obesity      Post traumatic stress disorder 2021    Postpartum depression 2018    TMJ (dislocation of temporomandibular joint)     causes snoring    Manoj-Parkinson-White (WPW) syndrome        Past Surgical History:   Procedure Laterality Date    ABLATION OF DYSRHYTHMIC FOCUS      WPW ablation age 16    COLONOSCOPY      COLPOSCOPY      DISTAL PATELLAR REALIGNMENT Left 10/31/2023    Procedure: OPEN MPFL RECONSTRUCTION WITH ALLOGRAFT;  Surgeon: Wilbert Michael DO;  Location:  MAIN OR;  Service: Orthopedics    INDUCED       OTHER SURGICAL HISTORY      catheter ablation- WPW age 17    CT ARTHROSCOPY KNEE LATERAL RELEASE Left 10/31/2023    Procedure: RELEASE RETINACULAR;  Surgeon: Wilbert Michael DO;  Location:  MAIN OR;  Service: Orthopedics    TONSILLECTOMY      WISDOM TOOTH EXTRACTION         Current Outpatient Medications   Medication Sig Dispense Refill    Acetaminophen (TYLENOL PO) Take by mouth (Patient not taking: Reported on 2023)      [employer] Aspirin Low Dose 81 MG EC tablet TAKE 1 TABLET BY MOUTH 2 TIMES A DAY. 180 tablet 1    Drospirenone 4 MG TABS Take 1 tablet by mouth in the morning 84 tablet 3    ergocalciferol (VITAMIN D2) 50,000 units Take 1 capsule (50,000 Units total) by mouth once a week for 12 doses Then transition to vitamin D 2000 units daily for maintenance. 12 capsule 0    ibuprofen (MOTRIN) 600 mg tablet Take 1 tablet (600 mg total) by mouth every 6 (six) hours as needed for mild pain, moderate pain or fever (Patient not taking: Reported on 2023) 30 tablet 0    lamoTRIgine (LaMICtal) 200 MG tablet TAKE 1 TABLET (200 MG TOTAL) BY MOUTH DAILY AT BEDTIME 90 tablet 1    LORazepam (ATIVAN) 1 mg tablet Take 1 tablet (1 mg total) by mouth every 6 (six) hours as needed for anxiety 30 tablet 0    metoprolol succinate (TOPROL-XL) 25 mg 24 hr tablet Take 0.5 tablets (12.5 mg total) by mouth 2 (two) times a day 90 tablet 3    naproxen (NAPROSYN) 500 mg tablet Take 1 tablet (500 mg total)  "by mouth 2 (two) times a day with meals (Patient taking differently: Take 500 mg by mouth 2 (two) times a day with meals) 30 tablet 0    sertraline (ZOLOFT) 100 mg tablet TAKE 1.5 TABLETS (150MG TOTAL) BY MOUTH DAILY 135 tablet 1    Vitamin D, Cholecalciferol, 50 MCG (2000 UT) CAPS Take 2,000 Units by mouth daily Start after completing 12 weeks of ergocalciferol. 90 capsule 3     No current facility-administered medications for this visit.        Allergies   Allergen Reactions    Codeine Other (See Comments)     dry heaves    Sulfa Antibiotics Hives and Rash     Per pt as achild    Erythromycin Hives     Per as a child       Review of Systems    Video Exam    There were no vitals filed for this visit.    Physical Exam     Behavioral Health Psychotherapy Progress Note    Psychotherapy Provided: Individual Psychotherapy     1. Bipolar I disorder, most recent episode depressed, in full remission ([facility])        2. SHELLY (generalized anxiety disorder)        3. Post traumatic stress disorder            Goals addressed in session: Goal 1     DATA: Met with Libertad for our scheduled individual session. Libertad requested an update for her FMLA/Accommodations report. She states that she feels that the accommodations are able to remain the same as in the previous update. This clinician used Socratic questioning to discuss her condition and the use of the Accommodations time. She states that she feels she would be more able to participate in work on \"bad\" days if she did not have to interact with the community as much. She states that, when she is feeling an increase in anxiety, she often becomes tearful and is not able to engage with the customers. Overall, Libertad states that she feels her moods are somewhat low. She denies suicidal/homicidal ideation. She states that most of the stress she is feeling is related to her boyfriend's mood. She states that he remains somewhat distant fro her, and she finds herself wondering if he wnts " "to be in this relationship. This clinician continues to encourage Libertad to invite him into a therapy session. Libertad is able to identify her own ambivalence about the relationship; however, she does consistently report that she wants to maintain and move forward with her present relationship.     During this session, this clinician used the following therapeutic modalities: Client-centered Therapy, Dialectical Behavior Therapy, Mindfulness-based Strategies, Motivational Interviewing, Solution-Focused Therapy, and Supportive Psychotherapy    Substance Abuse was not addressed during this session. If the client is diagnosed with a co-occurring substance use disorder, please indicate any changes in the frequency or amount of use: n/a. Stage of change for addressing substance use diagnoses: No substance use/Not applicable    ASSESSMENT:  Libertad Espinal presents with a Euthymic/ normal mood.     her affect is Normal range and intensity, which is congruent, with her mood and the content of the session. The client has not made progress on their goals since our last session.    Libertad Espinal presents with a minimal risk of suicide, minimal risk of self-harm, and minimal risk of harm to others.    For any risk assessment that surpasses a \"low\" rating, a safety plan must be developed.    A safety plan was indicated: yes  If yes, describe in detail n/a    PLAN: Between sessions, Libertad Espinal will continue to work on developing and using her distress management skils-- to maintain her mood regulation with her family members. Libertad will continue to talk with her boss about her work-related needs and ways that she can be most effective in her job. At the next session, the therapist will use Client-centered Therapy, Dialectical Behavior Therapy, Mindfulness-based Strategies, Motivational Interviewing, Solution-Focused Therapy, and Supportive Psychotherapy to address her mood regulation and relationship " concerns.    Behavioral Health Treatment Plan and Discharge Planning: Libertad Letty Teddy is aware of and agrees to continue to work on their treatment plan. They have identified and are working toward their discharge goals. yes    Visit start and stop times:    06/27/24  Start Time: 0903  Stop Time: 0959  Total Visit Time: 56 minutes

## 2024-07-11 ENCOUNTER — TELEMEDICINE (OUTPATIENT)
Dept: BEHAVIORAL/MENTAL HEALTH CLINIC | Facility: CLINIC | Age: 39
End: 2024-07-11
Payer: COMMERCIAL

## 2024-07-11 DIAGNOSIS — F43.10 POST TRAUMATIC STRESS DISORDER: ICD-10-CM

## 2024-07-11 DIAGNOSIS — F41.1 GAD (GENERALIZED ANXIETY DISORDER): Chronic | ICD-10-CM

## 2024-07-11 DIAGNOSIS — F31.76 BIPOLAR I DISORDER, MOST RECENT EPISODE DEPRESSED, IN FULL REMISSION (HCC): Primary | ICD-10-CM

## 2024-07-11 PROCEDURE — 90837 PSYTX W PT 60 MINUTES: CPT | Performed by: SOCIAL WORKER
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
## 2024-07-17 ENCOUNTER — TELEMEDICINE (OUTPATIENT)
Dept: BEHAVIORAL/MENTAL HEALTH CLINIC | Facility: CLINIC | Age: 39
End: 2024-07-17
Payer: COMMERCIAL

## 2024-07-17 DIAGNOSIS — F43.10 POST TRAUMATIC STRESS DISORDER: ICD-10-CM

## 2024-07-17 DIAGNOSIS — F31.76 BIPOLAR I DISORDER, MOST RECENT EPISODE DEPRESSED, IN FULL REMISSION (HCC): Primary | ICD-10-CM

## 2024-07-17 DIAGNOSIS — F41.1 GAD (GENERALIZED ANXIETY DISORDER): Chronic | ICD-10-CM

## 2024-07-17 PROCEDURE — 90837 PSYTX W PT 60 MINUTES: CPT | Performed by: SOCIAL WORKER

## 2024-07-17 NOTE — PSYCH
Virtual Regular Visit    Verification of patient location:    Patient is located at Home in the following state in which I hold an active license [place]      Assessment/Plan:    Problem List Items Addressed This Visit          Behavioral Health    SHELLY (generalized anxiety disorder) (Chronic)    Bipolar I disorder, most recent episode depressed, in full remission (HCC) - Primary    Post traumatic stress disorder       Goals addressed in session: Goal 1          Reason for visit is   Chief Complaint   Patient presents with    Virtual Regular Visit          Encounter provider CHARLIE Hutchinson      Recent Visits  Date Type Provider Dept   07/17/24 Telemedicine CHARLIE Hutchinson [facility] Therapist [place]   Showing recent visits within past 7 days and meeting all other requirements  Future Appointments  No visits were found meeting these conditions.  Showing future appointments within next 150 days and meeting all other requirements       The patient was identified by name and date of birth. Libertad Espinal was informed that this is a telemedicine visit and that the visit is being conducted throughthe Epic Embedded platform. She agrees to proceed..  My office door was closed. No one else was in the room.  She acknowledged consent and understanding of privacy and security of the video platform. The patient has agreed to participate and understands they can discontinue the visit at any time.    Patient is aware this is a billable service.     Subjective  Libertad Espinal is a 38 y.o. female.      HPI     Past Medical History:   Diagnosis Date    Abnormal Pap smear of cervix     Anxiety     Bipolar disorder (HCC)     COVID-19 01/12/2022    10/27/23 Per pt had 3 separate times - last year being the last episode    Depression     Eczema     Exposure to chlamydia     Resolved 06/09/17    Knee pain, left     Migraine without aura and without status migrainosus, not intractable 10/11/2019    Obesity      Post traumatic stress disorder 2021    Postpartum depression 2018    TMJ (dislocation of temporomandibular joint)     causes snoring    Manoj-Parkinson-White (WPW) syndrome        Past Surgical History:   Procedure Laterality Date    ABLATION OF DYSRHYTHMIC FOCUS      WPW ablation age 16    COLONOSCOPY      COLPOSCOPY      DISTAL PATELLAR REALIGNMENT Left 10/31/2023    Procedure: OPEN MPFL RECONSTRUCTION WITH ALLOGRAFT;  Surgeon: Wilbert Michael DO;  Location:  MAIN OR;  Service: Orthopedics    INDUCED       OTHER SURGICAL HISTORY      catheter ablation- WPW age 17    ND ARTHROSCOPY KNEE LATERAL RELEASE Left 10/31/2023    Procedure: RELEASE RETINACULAR;  Surgeon: Wilbert Michael DO;  Location:  MAIN OR;  Service: Orthopedics    TONSILLECTOMY      WISDOM TOOTH EXTRACTION         Current Outpatient Medications   Medication Sig Dispense Refill    Acetaminophen (TYLENOL PO) Take by mouth (Patient not taking: Reported on 2023)      [employer] Aspirin Low Dose 81 MG EC tablet TAKE 1 TABLET BY MOUTH 2 TIMES A DAY. 180 tablet 1    Drospirenone 4 MG TABS Take 1 tablet by mouth in the morning 84 tablet 3    ergocalciferol (VITAMIN D2) 50,000 units Take 1 capsule (50,000 Units total) by mouth once a week for 12 doses Then transition to vitamin D 2000 units daily for maintenance. 12 capsule 0    ibuprofen (MOTRIN) 600 mg tablet Take 1 tablet (600 mg total) by mouth every 6 (six) hours as needed for mild pain, moderate pain or fever (Patient not taking: Reported on 2023) 30 tablet 0    lamoTRIgine (LaMICtal) 200 MG tablet TAKE 1 TABLET (200 MG TOTAL) BY MOUTH DAILY AT BEDTIME 90 tablet 1    LORazepam (ATIVAN) 1 mg tablet Take 1 tablet (1 mg total) by mouth every 6 (six) hours as needed for anxiety 30 tablet 0    metoprolol succinate (TOPROL-XL) 25 mg 24 hr tablet Take 0.5 tablets (12.5 mg total) by mouth 2 (two) times a day 90 tablet 3    naproxen (NAPROSYN) 500 mg tablet Take 1 tablet (500 mg total)  by mouth 2 (two) times a day with meals (Patient taking differently: Take 500 mg by mouth 2 (two) times a day with meals) 30 tablet 0    sertraline (ZOLOFT) 100 mg tablet TAKE 1.5 TABLETS (150MG TOTAL) BY MOUTH DAILY 135 tablet 1    Vitamin D, Cholecalciferol, 50 MCG (2000 UT) CAPS Take 2,000 Units by mouth daily Start after completing 12 weeks of ergocalciferol. 90 capsule 3     No current facility-administered medications for this visit.        Allergies   Allergen Reactions    Codeine Other (See Comments)     dry heaves    Sulfa Antibiotics Hives and Rash     Per pt as achild    Erythromycin Hives     Per as a child       Review of Systems    Video Exam    There were no vitals filed for this visit.    Physical Exam     Behavioral Health Psychotherapy Progress Note    Psychotherapy Provided: Individual Psychotherapy     1. Bipolar I disorder, most recent episode depressed, in full remission ([facility])        2. SHELLY (generalized anxiety disorder)        3. Post traumatic stress disorder            Goals addressed in session: Goal 1     DATA: Met with Libertad for her scheduled individual session. Libertad had previously written an email to me, to inquire about taking a consecutive leave from work, due to her level of anxiety. We discussed the possibility of her taking this type of leave and how she would use the time to improve her overall mental health. We spoke about the possibility of her attending the [facility]. I encouraged her to consider this option. She discussed her ambivalence-- due to both financial constraints and her concerns of how it might impact her children. We discussed her childhood trauma related to her mother's mental health symptoms and treatment. Libertad discussed her relationship with her SO. She states that she continues to question their relationship and how she wants to approach her future. She states that, at this point, he continues to express a willingness to attend therapy. Libertad is willing for me  "to make a referral to [facility]. She will consider the financial implications and will make a decision prior to completing a request for continuous leave from work.    During this session, this clinician used the following therapeutic modalities: Client-centered Therapy, Dialectical Behavior Therapy, Mindfulness-based Strategies, Motivational Interviewing, Solution-Focused Therapy, and Supportive Psychotherapy    Substance Abuse was not addressed during this session. If the client is diagnosed with a co-occurring substance use disorder, please indicate any changes in the frequency or amount of use: n/a. Stage of change for addressing substance use diagnoses: No substance use/Not applicable    ASSESSMENT:  Libertad Espinal presents with a Dysthymic mood.     her affect is Normal range and intensity, which is congruent, with her mood and the content of the session. The client has not made progress on their goals.    Libertad Espinal presents with a minimal risk of suicide, minimal risk of self-harm, and minimal risk of harm to others.    For any risk assessment that surpasses a \"low\" rating, a safety plan must be developed.    A safety plan was indicated: no  If yes, describe in detail n/a    PLAN: Between sessions, Libertad Espinal will continue to monitor her moods. She will consider attending [facility], to address her symptoms in a more structured setting. At the next session, the therapist will use Client-centered Therapy, Dialectical Behavior Therapy, Mindfulness-based Strategies, Motivational Interviewing, Solution-Focused Therapy, and Supportive Psychotherapy to address her mood regulation and relationship concerns. In addition, this clinician will make a referral to [facility], and Libertad will consider this option.     Behavioral Health Treatment Plan and Discharge Planning: Libertad Espinal is aware of and agrees to continue to work on their treatment plan. They have identified and are working toward their " discharge goals. yes    Visit start and stop times:    07/17/24  Start Time: 0837  Stop Time: 0930  Total Visit Time: 53 minutes

## 2024-07-18 ENCOUNTER — TELEPHONE (OUTPATIENT)
Dept: PSYCHIATRY | Facility: CLINIC | Age: 39
End: 2024-07-18

## 2024-07-18 NOTE — TELEPHONE ENCOUNTER
Patient called office regarding an disability form that she needs filled out by provider. Form was placed in  providers mailbox. Patient would also like an call update regarding situation. Patient also asked if they need to be seen by provider.

## 2024-07-19 NOTE — TELEPHONE ENCOUNTER
Patient called office asking if she needs sooner appointment. Writer informed patient the message would be sent again to clarify.

## 2024-07-22 NOTE — TELEPHONE ENCOUNTER
Called and spoke with patient to schedule appointment with provider (Chip Sutton). Scheduled for 7/25 @ 4:30pm, in person.

## 2024-07-25 ENCOUNTER — TELEPHONE (OUTPATIENT)
Dept: PSYCHIATRY | Facility: CLINIC | Age: 39
End: 2024-07-25

## 2024-07-25 ENCOUNTER — OFFICE VISIT (OUTPATIENT)
Dept: PSYCHIATRY | Facility: CLINIC | Age: 39
End: 2024-07-25
Payer: COMMERCIAL

## 2024-07-25 DIAGNOSIS — F31.81 BIPOLAR II DISORDER (HCC): Primary | ICD-10-CM

## 2024-07-25 PROCEDURE — 99214 OFFICE O/P EST MOD 30 MIN: CPT | Performed by: PSYCHIATRY & NEUROLOGY

## 2024-07-25 RX ORDER — LURASIDONE HYDROCHLORIDE 20 MG/1
20 TABLET, FILM COATED ORAL
Qty: 30 TABLET | Refills: 1 | Status: SHIPPED | OUTPATIENT
Start: 2024-07-25

## 2024-07-25 RX ORDER — SERTRALINE HYDROCHLORIDE 100 MG/1
150 TABLET, FILM COATED ORAL DAILY
Qty: 135 TABLET | Refills: 1 | Status: SHIPPED | OUTPATIENT
Start: 2024-07-25

## 2024-07-25 RX ORDER — LAMOTRIGINE 200 MG/1
200 TABLET ORAL
Qty: 90 TABLET | Refills: 1 | Status: SHIPPED | OUTPATIENT
Start: 2024-07-25

## 2024-07-25 NOTE — PSYCH
Libertad Espinal 1985 [id number]     The patient was seen for continuing care and pharmacotherapy.She is experiencing depressive symptoms including frequent crying spells and inability to compose herself, severely impaired concentration, lack of energy or motivation as well as hopelessness but no suicidal ideation.  She has not been able to function at work and is currently attending [facility].  Her relationship with her boyfriend of 7 years is tense and conflicted.  She has been compliant with her therapy sessions as well as pharmacotherapy.        ROS:  As above  Current Mental Status Evaluation:  Appearance:  Adequate hygiene and grooming and Good eye contact   Behavior:  Cooperative and Psychomotor Retardation   Mood:  Depressed and Anxious   Affect: blunted and constricted   Speech: Soft and Normal rate   Thought Process:  Goal directed and coherent   Thought Content:  Does not verbalize delusional material   Perceptual Disturbances: Denies hallucinations and does not appear to be responding to internal stimuli   Risk Potential: No suicidal or homicidal ideation and Feel hopeless    Orientation:         Diagnosis ICD-10-CM Associated Orders   1. Bipolar II disorder ([facility])  F31.81 lurasidone (LATUDA) 20 mg tablet     sertraline (ZOLOFT) 100 mg tablet     lamoTRIgine (LaMICtal) 200 MG tablet             Current Outpatient Medications   Medication Instructions    Acetaminophen (TYLENOL PO) Take by mouth    [employer] Aspirin Low Dose 81 mg, Oral, 2 times daily    Drospirenone 4 MG TABS 1 tablet, Oral, Daily    ergocalciferol (VITAMIN D2) 50,000 Units, Oral, Weekly, Then transition to vitamin D 2000 units daily for maintenance.    ibuprofen (MOTRIN) 600 mg, Oral, Every 6 hours PRN    lamoTRIgine (LAMICTAL) 200 mg, Oral, Daily at bedtime    LORazepam (ATIVAN) 1 mg, Oral, Every 6 hours PRN    lurasidone (LATUDA) 20 mg, Oral, Daily with breakfast    metoprolol succinate (TOPROL-XL) 12.5 mg, Oral, 2 times daily     naproxen (NAPROSYN) 500 mg, Oral, 2 times daily with meals    sertraline (ZOLOFT) 150 mg, Oral, Daily    Vitamin D (Cholecalciferol) 2,000 Units, Oral, Daily, Start after completing 12 weeks of ergocalciferol.          Treatment Recommendations- Risks Benefits    The patient will be maintained on sertraline and lamotrigine the same.  Latuda 20 mg daily will be added for the purpose of augmentation.  The patient was placed on short-term disability due to her inability to function effectively.  We also need to observe her response to pharmacologic intervention.  She will continue to attend [facility]  Risks, Benefits And Possible Side Effects Of Medications:  Risks, benefits, and possible side effects of medications explained to patient and patient verbalizes understanding    VIRTUAL VISIT DISCLAIMER    Libertad Espinal verbally agrees to participate in Virtual Care Services. Pt is aware that Virtual Care Services could be limited without vital signs or the ability to perform a full hands-on physical exam. Libertad Espinal understands she or the provider may request at any time to terminate the video visit and request the patient to seek care or treatment in person.     Chip Sutton MD 07/25/24

## 2024-07-25 NOTE — TELEPHONE ENCOUNTER
Called and spoke to patient to request they come in sooner, per provider request. Patient agreed to 1:30pm.

## 2024-07-28 ENCOUNTER — HOSPITAL ENCOUNTER (OUTPATIENT)
Dept: SLEEP CENTER | Facility: CLINIC | Age: 39
Discharge: HOME | End: 2024-07-28
Payer: COMMERCIAL

## 2024-07-28 DIAGNOSIS — G47.19 EXCESSIVE DAYTIME SLEEPINESS: ICD-10-CM

## 2024-07-28 PROCEDURE — G0399 HOME SLEEP TEST/TYPE 3 PORTA: HCPCS

## 2024-07-29 ENCOUNTER — TELEMEDICINE (OUTPATIENT)
Dept: PSYCHIATRY | Facility: CLINIC | Age: 39
End: 2024-07-29
Payer: COMMERCIAL

## 2024-07-29 ENCOUNTER — TELEMEDICINE (OUTPATIENT)
Dept: PSYCHOLOGY | Facility: CLINIC | Age: 39
End: 2024-07-29
Payer: COMMERCIAL

## 2024-07-29 DIAGNOSIS — F41.1 GAD (GENERALIZED ANXIETY DISORDER): ICD-10-CM

## 2024-07-29 DIAGNOSIS — F31.81 BIPOLAR II DISORDER (HCC): Primary | ICD-10-CM

## 2024-07-29 DIAGNOSIS — F41.1 GAD (GENERALIZED ANXIETY DISORDER): Chronic | ICD-10-CM

## 2024-07-29 PROCEDURE — 90837 PSYTX W PT 60 MINUTES: CPT

## 2024-07-29 PROCEDURE — 90791 PSYCH DIAGNOSTIC EVALUATION: CPT

## 2024-07-29 PROCEDURE — 90792 PSYCH DIAG EVAL W/MED SRVCS: CPT | Performed by: STUDENT IN AN ORGANIZED HEALTH CARE EDUCATION/TRAINING PROGRAM

## 2024-07-29 PROCEDURE — G0410 GRP PSYCH PARTIAL HOSP 45-50: HCPCS

## 2024-07-29 NOTE — PSYCH
Visit Time    Visit Start Time: 1215  Visit Stop Time: 1315  Total Visit Duration: 60 minutes      Subjective:     Patient ID: Libertad Espinal is a 38 y.o. female.     [facility] Clinical Progress Notes      Specialized Services Documentation  Therapist must complete separate progress note for each specific clinical activity in which the individual participated during the day.      Group Psychotherapy - Anger Management   Libertda Espinal participated actively in a psychotherapy group focused on Anger Management. Today group members focused on the warning signs of their anger cycle of anger, evaluating each aspect of the cycle, and apply personal experience to the cycle. Group members also were to complete a personal anger thermometer to track the stages of their anger development. The goal of today was for group members to participate in groups discussion on the cycle of anger, warning signs, and coping techniques. As well as evaluate coping techniques they could utilize to cope with anger and stop the cycle. Members evaluated their anger triggers and identified ways they struggle to work through anger on top of ways they cope. This writer encouraged members to openly share and integrate coping skills into their daily routine. Libertad Espinal made good efforts towards progress goals which were displayed through participation, notetaking, and engagement in topic.    Tx Plan Objective: 1.1,1.2,1.4 Therapist: Ashley Costenbader MA LMT      This group was facilitated virtually in a private office using HIPAA Compliant and Approved [employer] Teams.  Libertad Espinal consented to the use of tele-video modality of treatment.
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
## 2024-07-29 NOTE — PSYCH
Visit Time    Visit Start Time: 1045  Visit Stop Time: 1145  Total Visit Duration:  60 minutes    Subjective:     Patient ID: Libertad Espinal is a 38 y.o.     [facility] Clinical Progress Notes      Specialized Services Documentation  Therapist must complete separate progress note for each specific clinical activity in which the individual participated during the day.       Group Psychotherapy  This group was facilitated virtually in a private office using HIPAA Compliant and Approved [employer] Teams. Libertad Espinal consented to the use of tele-video modality of treatment  ([id number]) Libertad Espinal attended group- Wellness Recovery Action Plan. Today, members focused on completing WRAP's toolbox then following specific sections and encouraged to fill in coping tools from their toolbox for planned responses:   Daily Plan   Identification of triggers and stressors  Early warning signs  When things are breaking down and or getting worse    Group members shared pieces of information from these sections and identified their importance. Writer encouraged the members of group to continue utilizing the packet to develop plans inside and outside of program. Some effort towards progress of goals which were displayed through participation and engagement in topic.  Treatment Plan Objectives: 1.1, 1.2  Therapist: Sulma BUTTS RN

## 2024-07-29 NOTE — PSYCH
[facility] Insurance Authorization for Treatment      Subjective:     Patient ID: Libertad Espinal is a 38 y.o. female.    Phone call placed to [employer]   Phone number: [phone]  Tax ID and/or NPI used Tax ID [id number]  Location: [address] [place], [place] [address]  Spoke to Terry Marroquin  Code Used for Authorization: [id number] &  ([facility]. [employer]ALL/Cigna/UHC/UUMPC/Aetna/UMR)  15 Days Requested 07/29/24 through 8/16/24  Level of Care PHP    Pending Authorization # [id number]    Clinical Faxed yes    Awaiting further authorization information.     Therapist: ROSALBA Michele, CHANDANA

## 2024-07-29 NOTE — PSYCH
Virtual Regular Visit    Verification of patient location:    Patient is located at Home in the following state in which I hold an active license [place]      Assessment/Plan:    Problem List Items Addressed This Visit          Behavioral Health    SHELLY (generalized anxiety disorder) (Chronic)     Other Visit Diagnoses       Bipolar II disorder (HCC)    -  Primary            Goals addressed in session:           Reason for visit is PHP VIRTUAL GROUP DUE TO VIRTUAL PREFERENCE      Encounter provider [facility] PARTIAL PSYCH PROGRAM      Recent Visits  No visits were found meeting these conditions.  Showing recent visits within past 7 days and meeting all other requirements  Future Appointments  No visits were found meeting these conditions.  Showing future appointments within next 150 days and meeting all other requirements       The patient was identified by name and date of birth. Libertad Espinal was informed that this is a telemedicine visit and that the visit is being conducted throughthe Microsoft Teams platform. She agrees to proceed..  My office door was closed. No one else was in the room.  She acknowledged consent and understanding of privacy and security of the video platform. The patient has agreed to participate and understands they can discontinue the visit at any time.    Patient is aware this is a billable service.     Subjective  Libertad Espinal is a 38 y.o. female  .      HPI     Past Medical History:   Diagnosis Date    Abnormal Pap smear of cervix     Anxiety     Bipolar disorder (HCC)     COVID-19 01/12/2022    10/27/23 Per pt had 3 separate times - last year being the last episode    Depression     Eczema     Exposure to chlamydia     Resolved 06/09/17    Knee pain, left     Migraine without aura and without status migrainosus, not intractable 10/11/2019    Obesity     Post traumatic stress disorder 12/13/2021    Postpartum depression 08/09/2018    TMJ (dislocation of temporomandibular joint)      causes snoring    Manoj-Parkinson-White (WPW) syndrome        Past Surgical History:   Procedure Laterality Date    ABLATION OF DYSRHYTHMIC FOCUS      WPW ablation age 16    COLONOSCOPY      COLPOSCOPY      DISTAL PATELLAR REALIGNMENT Left 10/31/2023    Procedure: OPEN MPFL RECONSTRUCTION WITH ALLOGRAFT;  Surgeon: Wilbert Michael DO;  Location:  MAIN OR;  Service: Orthopedics    INDUCED       OTHER SURGICAL HISTORY      catheter ablation- WPW age 17    MI ARTHROSCOPY KNEE LATERAL RELEASE Left 10/31/2023    Procedure: RELEASE RETINACULAR;  Surgeon: Wilbert Michael DO;  Location:  MAIN OR;  Service: Orthopedics    TONSILLECTOMY      WISDOM TOOTH EXTRACTION         Current Outpatient Medications   Medication Sig Dispense Refill    Drospirenone 4 MG TABS Take 1 tablet by mouth in the morning 84 tablet 3    ergocalciferol (VITAMIN D2) 50,000 units Take 1 capsule (50,000 Units total) by mouth once a week for 12 doses Then transition to vitamin D 2000 units daily for maintenance. 12 capsule 0    lamoTRIgine (LaMICtal) 200 MG tablet Take 1 tablet (200 mg total) by mouth daily at bedtime 90 tablet 1    LORazepam (ATIVAN) 1 mg tablet Take 1 tablet (1 mg total) by mouth every 6 (six) hours as needed for anxiety 30 tablet 0    lurasidone (LATUDA) 20 mg tablet Take 1 tablet (20 mg total) by mouth daily with breakfast 30 tablet 1    naproxen (NAPROSYN) 500 mg tablet Take 1 tablet (500 mg total) by mouth 2 (two) times a day with meals (Patient taking differently: Take 500 mg by mouth 2 (two) times a day with meals) 30 tablet 0    sertraline (ZOLOFT) 100 mg tablet Take 1.5 tablets (150 mg total) by mouth daily 135 tablet 1    Vitamin D, Cholecalciferol, 50 MCG (2000 UT) CAPS Take 2,000 Units by mouth daily Start after completing 12 weeks of ergocalciferol. 90 capsule 3     No current facility-administered medications for this visit.        Allergies   Allergen Reactions    Codeine Other (See Comments)     dry heaves     Sulfa Antibiotics Hives and Rash     Per pt as achild    Erythromycin Hives     Per as a child       Review of Systems    Video Exam    There were no vitals filed for this visit.    Physical Exam     Visit Time  I spent FOUR GROUP HOURS PLUS CASE MANAGEMENT minutes with patient today in which greater than 50% of the time was spent in counseling/coordination of care regarding PHP - SEE NOTES.

## 2024-07-29 NOTE — PSYCH
EDUCATION THERAPY    [id number]    Libertad Espinal was not present for this session    Treatment Plan Objective 1.1, 1.2, 1.3, 1.4 , Therapist: Herman Pierce M.Ed.    GROUP PSYCHOTHERAPY    [id number]    Libertad Espinal participated actively in psychotherapy group.  This was observedConsistent throughout the treatment day. They were engaged in learning related to Wellness Tools. Staff utilized Verbal teaching methods.  Libertad Espinal shared area of learning and set a goal for outside of program to making dinner this evening.  Libertad Espinal was able to share items explored during the day and shared in adding to their WRAP.  Ended session with staff led exercise on self-reflection.  Libertad Espinal demonstrated positive progress toward goal.  Continue group psychotherapy to actively practice learned skills and encourage transfer of knowledge to unstructured time.    Treatment Plan Objective 1.1, 1.2, 1.3, 1.4 , Therapist: Herman Pierce M.Ed    Allied Group Therapy    Subjective:     Patient ID: Libertad Espinal 38 y.o.       This group was facilitated virtually in a private office using HIPAA Compliant and Approved Microsoft Teams.  ([id number])Libertad Espinalwas not present for the psychoeducational group about positive self soothing activities. These activities help to self-soothe an individual and decrease decrease feelings of loneliness,anxiety, and boredom. Positive behavior change is focused toward a specified goal. Group explored the identifying factors that create loneliness,anxiety, and boredom. This process can help them to take care of emotional and intellectual moments of anxiety on a short term and long term basis. The group talked about understanding the meaning of self-soothing in regards to physical, intellectual, and emotional expression, regulation , and recognition, and how it affects themselves and others. Teaching on the emphasis of self monitoring and  relapse, the group explored who they can go to for help was brought up as well. Group was encouraged to ask questions in an open forum at the end of group.Libertad Espinal will continue to engage in psychotherapy to encourage positive self realization.  Treatment Plan Objective 1.1, 1.2, 1.3, 1.4 Therapist: Herman DAWSON Ed.

## 2024-07-29 NOTE — BH TREATMENT PLAN
"Assessment/Plan:      Diagnoses and all orders for this visit:    Bipolar II disorder (HCC)    SHELLY (generalized anxiety disorder)          Subjective:     Patient ID: Libertad Espinal is a 38 y.o. female.    [facility] Treatment Plan   AREAS OF NEED: worsening depression and anxiety as evidenced by difficulty with work, anger, regret ,feeling like a failure, flat affect, poor sleep (not enough and interrupted), low energy, worry, racing thoughts, poor self esteem, poor ADLs, poor appetite, and isolation. Stressors of relationship with boyfriend of 7 years, financial strain, feeling like a failure as a mom, and stress at work.   Date Initiated: 07/29/24    Strengths: \"I am a good support, caring, and thoughtful\"     LONG TERM GOAL:   Date Initiated: 07/29/24  1.0 I will identify 3 signs that my depression and anxiety has improved since starting program.   Target Date: 8/26/24  Completion Date:       SHORT TERM OBJECTIVES:     Date Initiated: 07/29/24  1.1 I will identify 3 sleep hygiene skills I can use to improve my sleep and implement them daily.   Revision Date:   Target Date: 8/7/24  Completion Date:     Date Initiated: 07/29/24  1.2 I will identify a list of daily self care activities that help me to feel better about myself (showering, wearing clean clothes, brushing teeth, etc.) and complete the list daily.   Revision Date:   Target Date: 8/7/24  Completion Date:     Date Initiated: 07/29/24  1.3 I will take medication as prescribed and share questions and concerns if they arise.  Revision Date:  Target Date: 8/7/24  Completion Date:      Date Initiated: 07/29/24  1.4 I will identify 3 ways my supports can assist in my recovery and agree to staff/support contact as indicated.   Revision Date:  Target Date: 8/7/24  Completion Date:           7 DAY REVISION:    Date Initiated:  Revision Date:   Target Date:   Completion Date:        PSYCHIATRY:  Date Initiated: 07/29/24  Medication Management and " Education       Revision Date:   The person(s) responsible for carrying out the plan is Luis Wheat DO; Gretchen Barbour PA-C    NURSING/SYMPTOM EDUCATION:  Date Initiated: 07/29/24  1.1, 1.2. 1.3, 1.4 This RN and/or Therapist will provide wellness/symptoms and skill education groups three to five days weekly to educate Libertad Espinal on signs and symptoms of diagnoses, skills to manage, and medication questions that will be addressed by the treatment team.    Revision date:  The person(s) responsible for carrying out the plan is Sulma Chambers RN, CARYN Hennessy; ADRIANA Stockton ED    PSYCHOLOGY:   Date Initiated: 07/29/24       1.1, 1.2, 1.4 Provide psychotherapy group 5 times per week to allow opportunity for Libertad Espinal  to explore stressors and ways of coping.   Revision Date:   The person(s) responsible for carrying out the plan is CIRO Lee, ISABELLW; Ashley Costenbader, MA LMT    ALLIED THERAPY:   Date Initiated: 07/29/24  1.1,1.2 Engage Libertad Espinal in AT group 3-5 times per week to encourage development and use of wellness tools to decrease symptoms and promote recovery through meaningful activity.  Revision Date:   The person(s) responsible for carrying out the plan is CHANDANA Ramirez; ROSALBA Michele MT-BC    CASE MANAGEMENT:   Date Initiated: 07/29/24      1.0 This  will meet with Libertad Espinal  3-4 times weekly to assess treatment progress, discharge planning, connection to community supports and UR as indicated.  Revision Date:   The person(s) responsible for carrying out the plan is ROSALBA Michele MT-BC    TREATMENT REVIEW/COMMENTS:     DISCHARGE CRITERIA:Identify 3 signs of progress and complete relapse prevention plan.   DISCHARGE PLAN:  Discharge to OP therapy and psychiatry  Estimated Length of Stay: 10 treatment days           Diagnosis and Treatment Plan explained to Libertad Maurer relates understanding diagnosis  and is agreeable to Treatment Plan.         CLIENT COMMENTS / Please share your thoughts, feelings, need and/or experiences regarding your treatment plan with Staff.  Please see follow up note with comments.      Signatures can be found on [facility] Treatment plan consent form.

## 2024-07-29 NOTE — PSYCH
Subjective:     Patient ID: Libertad Espinal is a 38 y.o. female.    [facility] Clinical Progress Notes      Specialized Services Documentation  Therapist must complete separate progress note for each specific clinical activity in which the individual participated during the day.     Case Management Note    ROSALBA Michele, CHANDANA    Current suicide risk : Low     [id number] Met with Libertad Espinal. Reviewed program and given on call number. she completed releases and OP providers/ PCP notified of admission and health care coordination form completed. Completed initial psycho-social evaluation and initial treatment goals discussed.     I,Libertad Espinal,am physically unable to provide a signature; however, I understand the nature of and am in agreement with the documentation presented to me via TEAMS.  I have received a copy through My Chart and/or the [employer] Postal [employer].  I freely give verbal consent.  Name of Document (s): new patient packet, ROIs, Healthcare coordination form.   Witness to verbal consent: ROSALBA Michele MT-BC  Witness to verbal consent: Ramonita Beltrán    Medications changes/added/denied? No     Treatment session number: 1    Individual Case Management Visit provided today? Yes     [facility] follow up physician's orders: See Dr Wheat Note
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
## 2024-07-29 NOTE — PROGRESS NOTES
Home Sleep Study Documentation    HOME STUDY DEVICE: Noxturnal no                                           Faith G3 yes      Pre-Sleep Home Study:    Set-up and instructions performed by: Morteza Espino    Technician performed demonstration for Patient: yes    Return demonstration performed by Patient: yes    Written instructions provided to Patient: yes    Patient signed consent form: yes        Post-Sleep Home Study:    Additional comments by Patient: None    Home Sleep Study Failed:no:    Failure reason: N/A    Reported or Detected: N/A    Scored by: HOOD Josue

## 2024-07-30 ENCOUNTER — TELEMEDICINE (OUTPATIENT)
Dept: PSYCHOLOGY | Facility: CLINIC | Age: 39
End: 2024-07-30
Payer: COMMERCIAL

## 2024-07-30 DIAGNOSIS — F31.76 BIPOLAR I DISORDER, MOST RECENT EPISODE DEPRESSED, IN FULL REMISSION (HCC): Primary | ICD-10-CM

## 2024-07-30 DIAGNOSIS — F41.1 GAD (GENERALIZED ANXIETY DISORDER): Chronic | ICD-10-CM

## 2024-07-30 PROCEDURE — G0410 GRP PSYCH PARTIAL HOSP 45-50: HCPCS

## 2024-07-30 PROCEDURE — G0177 OPPS/PHP; TRAIN & EDUC SERV: HCPCS

## 2024-07-30 PROCEDURE — 90832 PSYTX W PT 30 MINUTES: CPT

## 2024-07-30 NOTE — PSYCH
Visit Time    Visit Start Time: 0930  Visit Stop Time: 1030  Total Visit Duration: 60 minutes    Subjective:     Patient ID: Libertad Espinal is a 38 y.o. female.    [facility] Clinical Progress Notes      Specialized Services Documentation  Therapist must complete separate progress note for each specific clinical activity in which the individual participated during the day.       Group Psychotherapy Life Skills (0930-1030) Libertad Espinal actively engaged in group focused on their personal narrative using the tree of life tool which was facilitated virtually in a private office using HIPAA Compliant and Approved Microsoft Teams. Libertad consented to the use of tele-video modality of treatment and was virtually present for group psychotherapy today. The group watched a STEFFANY talk “The importance of sharing your story” with speaker Jorge Xiao. The group created their own tree of life which represents who they are. They had to write words that answered the questions about each part of the tree which represents them. Group members identified what they learned by doing this activity. Libertad recognized that she needs stability, love and support in order to grow. Libertad created a goal to work towards accomplishing the one area that she wants to grow. Group members discussed why it is important to share their story with at least one person. Libertad continues to make progress towards goals through verbal participation in group; to accomplish long term goals continue to utilize skills learned in programming. Continue with psychotherapy to educate and encourage use of wellness tools. Tx Plan Objective: 1.1,1.2 Therapist: Iliana Ramirez,CIRO, LSW

## 2024-07-30 NOTE — PSYCH
Virtual Regular Visit    Verification of patient location:    Patient is located at Home in the following state in which I hold an active license [place]      Assessment/Plan:    Problem List Items Addressed This Visit          Behavioral Health    SHELLY (generalized anxiety disorder) (Chronic)    Bipolar I disorder, most recent episode depressed, in full remission (HCC) - Primary       Goals addressed in session:           Reason for visit is PHP VIRTUAL GROUP DUE TO VIRTUAL PREFERENCE      Encounter provider  PARTIAL PSYCH PROGRAM      Recent Visits  No visits were found meeting these conditions.  Showing recent visits within past 7 days and meeting all other requirements  Future Appointments  No visits were found meeting these conditions.  Showing future appointments within next 150 days and meeting all other requirements       The patient was identified by name and date of birth. Libertad Espinal was informed that this is a telemedicine visit and that the visit is being conducted throughthe Microsoft Teams platform. She agrees to proceed..  My office door was closed. No one else was in the room.  She acknowledged consent and understanding of privacy and security of the video platform. The patient has agreed to participate and understands they can discontinue the visit at any time.    Patient is aware this is a billable service.     Subjective  Libertad Espinal is a 38 y.o. female .      HPI     Past Medical History:   Diagnosis Date    Abnormal Pap smear of cervix     Anxiety     Bipolar disorder (HCC)     COVID-19 01/12/2022    10/27/23 Per pt had 3 separate times - last year being the last episode    Depression     Eczema     Exposure to chlamydia     Resolved 06/09/17    Knee pain, left     Migraine without aura and without status migrainosus, not intractable 10/11/2019    Obesity     Post traumatic stress disorder 12/13/2021    Postpartum depression 08/09/2018    TMJ (dislocation of temporomandibular  joint)     causes snoring    Manoj-Parkinson-White (WPW) syndrome        Past Surgical History:   Procedure Laterality Date    ABLATION OF DYSRHYTHMIC FOCUS      WPW ablation age 16    COLONOSCOPY      COLPOSCOPY      DISTAL PATELLAR REALIGNMENT Left 10/31/2023    Procedure: OPEN MPFL RECONSTRUCTION WITH ALLOGRAFT;  Surgeon: Wilbert Michael DO;  Location:  MAIN OR;  Service: Orthopedics    INDUCED       OTHER SURGICAL HISTORY      catheter ablation- WPW age 17    [place] ARTHROSCOPY KNEE LATERAL RELEASE Left 10/31/2023    Procedure: RELEASE RETINACULAR;  Surgeon: Wilbert Michael DO;  Location:  MAIN OR;  Service: Orthopedics    TONSILLECTOMY      WISDOM TOOTH EXTRACTION         Current Outpatient Medications   Medication Sig Dispense Refill    Drospirenone 4 MG TABS Take 1 tablet by mouth in the morning 84 tablet 3    ergocalciferol (VITAMIN D2) 50,000 units Take 1 capsule (50,000 Units total) by mouth once a week for 12 doses Then transition to vitamin D 2000 units daily for maintenance. 12 capsule 0    lamoTRIgine (LaMICtal) 200 MG tablet Take 1 tablet (200 mg total) by mouth daily at bedtime 90 tablet 1    LORazepam (ATIVAN) 1 mg tablet Take 1 tablet (1 mg total) by mouth every 6 (six) hours as needed for anxiety 30 tablet 0    lurasidone (LATUDA) 20 mg tablet Take 1 tablet (20 mg total) by mouth daily with breakfast 30 tablet 1    naproxen (NAPROSYN) 500 mg tablet Take 1 tablet (500 mg total) by mouth 2 (two) times a day with meals (Patient taking differently: Take 500 mg by mouth 2 (two) times a day with meals) 30 tablet 0    sertraline (ZOLOFT) 100 mg tablet Take 1.5 tablets (150 mg total) by mouth daily 135 tablet 1    Vitamin D, Cholecalciferol, 50 MCG (2000 UT) CAPS Take 2,000 Units by mouth daily Start after completing 12 weeks of ergocalciferol. 90 capsule 3     No current facility-administered medications for this visit.        Allergies   Allergen Reactions    Codeine Other (See Comments)     dry  heaves    Sulfa Antibiotics Hives and Rash     Per pt as achild    Erythromycin Hives     Per as a child       Review of Systems    Video Exam    There were no vitals filed for this visit.    Physical Exam     Visit Time    I spent FOUR GROUP HOURS PLUS CASE MANAGEMENT minutes with patient today in which greater than 50% of the time was spent in counseling/coordination of care regarding PHP - SEE NOTES.

## 2024-07-30 NOTE — PSYCH
"EDUCATION THERAPY    2910-9990    Libertad Espinal actively shared in morning assessment and goal review. Presented as Receptive related to readiness to learn. Libertad Espinal did present with any barriers to learning, Libertad stated that she had had a \"bad\" night\" the previous night, but declined to explain further. It was encouraged that she can speak one to one with her , if she chose to about the previous night events.  Throughout morning group, Libertad Espinal participated in mindfulness exercise. Libertad Espinal made no progress toward goal, Libertad wanted to make dinner, but her family had ordered food instead. Libertad said she had wanted to gain responsibility.Continue education group to assess willingness to engage, assess transfer of knowledge, and participate in skill development.    Treatment Plan Objective 1.1, 1.2, 1.3, 1.4 , Therapist: Herman Pierce M.Ed.    GROUP PSYCHOTHERAPY    [id number]    Libertad Espinal participated actively in psychotherapy group.  This was observedConsistent throughout the treatment day. They were engaged in learning related to Wellness Tools. Staff utilized Demonstration teaching methods.  Libertad Espinal shared area of learning and set a goal for outside of program to make dinner this evening.  Libertad Espinal was able to share items explored during the day and shared in adding to their WRAP.  Ended session with staff led exercise on mindfulness.  Libertad Espinal demonstrated positive progress toward goal.  Continue group psychotherapy to actively practice learned skills and encourage transfer of knowledge to unstructured time.    Treatment Plan Objective 1.1, 1.2, 1.3, 1.4 , Therapist: Herman Pierce M.Ed      Group Psychotherapy    Group Therapy    Subjective:     Patient ID: Libertad Espinal 38 y.o.       This group was facilitated virtually in a private office using HIPAA Compliant and Approved Microsoft " Teams.  ([id number])  Libertad Espinal actively  engaged in psychoeducational group about online support applications to enhance and enrich the group therapy skills. The group came up with strategies that helped them to use the apps and incorporate these apps into self improvement.  The group talked about understanding the purpose, type, and the timing of when to use these apps. Teaching on emergency situations and who to go to for help was brought up as well. Group was encouraged to ask questions in an open forum at the end of group. Positive progress displayed through engagement in topic, Libertad was interested in researching several of the apps, but expressed concern that she at times becomes too preoccupied with her phone.  Libertad Espinal will continue to engage in psychotherapy to encourage positive self realization.  Treatment Plan Objective 1.1, 1.2, 1.3, 1.4 Therapist: Herman DAWSON Ed.

## 2024-07-30 NOTE — PSYCH
Subjective:     Patient ID: Libertad Espinal is a 38 y.o. female.     [facility] Clinical Progress Notes      Specialized Services Documentation  Therapist must complete separate progress note for each specific clinical activity in which the individual participated during the day.      Group Psychotherapy - Boundaries/Terence    [id number]   Total visit duration: 25 minutes    Libertad Espinal participated actively in a psychotherapy group focused on terence. Libertad was in cm during group. This group was facilitated virtually in a private office using HIPAA compliant and approved [employer] teams. Libertad Espinal consented to the use of tele-video modality of treatment. This writer facilitated a discussion on boundaries, relationships, and communication. Members were encouraged to practice setting boundaries through the completion of the Terence outline both in group and outside of programming. This writer reviewed in detail the Terence acronym and led a group activity that allowed all group members to contribute to a group terence script. Group members were then encouraged to write their personal terence script and share it with the group.Libertad Espinal made good efforts when present towards progress goals which were displayed through participation, notetaking, and engagement in topic. Continue to run daily group psychotherapy to meet treatment needs and encourage Libertad Espinal to practice skills outside of program.      Tx Plan Objective: 1.1,1.2,1.4 Therapist: CARYN Hennessy
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
## 2024-07-31 ENCOUNTER — TELEMEDICINE (OUTPATIENT)
Dept: PSYCHOLOGY | Facility: CLINIC | Age: 39
End: 2024-07-31
Payer: COMMERCIAL

## 2024-07-31 DIAGNOSIS — F41.1 GAD (GENERALIZED ANXIETY DISORDER): Chronic | ICD-10-CM

## 2024-07-31 DIAGNOSIS — F31.81 BIPOLAR II DISORDER (HCC): Primary | ICD-10-CM

## 2024-07-31 PROBLEM — G47.33 OSA (OBSTRUCTIVE SLEEP APNEA): Status: ACTIVE | Noted: 2024-07-31

## 2024-07-31 PROCEDURE — G0410 GRP PSYCH PARTIAL HOSP 45-50: HCPCS

## 2024-07-31 PROCEDURE — G0176 OPPS/PHP;ACTIVITY THERAPY: HCPCS

## 2024-07-31 PROCEDURE — G0177 OPPS/PHP; TRAIN & EDUC SERV: HCPCS

## 2024-07-31 NOTE — PSYCH
Virtual Regular Visit    Verification of patient location:    Patient is located at Home in the following state in which I hold an active license [place]      Assessment/Plan:    Problem List Items Addressed This Visit       SHELLY (generalized anxiety disorder) - Primary (Chronic)       Goals addressed in session:           Reason for visit is PHP VIRTUAL GROUP DUE TO virtual preference of care      Encounter provider  PARTIAL PSYCH PROGRAM      Recent Visits  No visits were found meeting these conditions.  Showing recent visits within past 7 days and meeting all other requirements  Future Appointments  No visits were found meeting these conditions.  Showing future appointments within next 150 days and meeting all other requirements       The patient was identified by name and date of birth. Libertad Espinal was informed that this is a telemedicine visit and that the visit is being conducted throughthe Microsoft Teams platform. She agrees to proceed..  My office door was closed. No one else was in the room.  She acknowledged consent and understanding of privacy and security of the video platform. The patient has agreed to participate and understands they can discontinue the visit at any time.    Patient is aware this is a billable service.     Subjective  Libertad Espinal is a 38 y.o. female  .      HPI     Past Medical History:   Diagnosis Date    Abnormal Pap smear of cervix     Anxiety     Bipolar disorder (HCC)     COVID-19 01/12/2022    10/27/23 Per pt had 3 separate times - last year being the last episode    Depression     Eczema     Exposure to chlamydia     Resolved 06/09/17    Knee pain, left     Migraine without aura and without status migrainosus, not intractable 10/11/2019    Obesity     Post traumatic stress disorder 12/13/2021    Postpartum depression 08/09/2018    TMJ (dislocation of temporomandibular joint)     causes snoring    Manoj-Parkinson-White (WPW) syndrome        Past Surgical  History:   Procedure Laterality Date    ABLATION OF DYSRHYTHMIC FOCUS      WPW ablation age 16    COLONOSCOPY      COLPOSCOPY      DISTAL PATELLAR REALIGNMENT Left 10/31/2023    Procedure: OPEN MPFL RECONSTRUCTION WITH ALLOGRAFT;  Surgeon: Wilbert Michael DO;  Location:  MAIN OR;  Service: Orthopedics    INDUCED       OTHER SURGICAL HISTORY      catheter ablation- WPW age 17    [place] ARTHROSCOPY KNEE LATERAL RELEASE Left 10/31/2023    Procedure: RELEASE RETINACULAR;  Surgeon: Wilbert Michael DO;  Location:  MAIN OR;  Service: Orthopedics    TONSILLECTOMY      WISDOM TOOTH EXTRACTION         Current Outpatient Medications   Medication Sig Dispense Refill    Drospirenone 4 MG TABS Take 1 tablet by mouth in the morning 84 tablet 3    ergocalciferol (VITAMIN D2) 50,000 units Take 1 capsule (50,000 Units total) by mouth once a week for 12 doses Then transition to vitamin D 2000 units daily for maintenance. 12 capsule 0    lamoTRIgine (LaMICtal) 200 MG tablet Take 1 tablet (200 mg total) by mouth daily at bedtime 90 tablet 1    LORazepam (ATIVAN) 1 mg tablet Take 1 tablet (1 mg total) by mouth every 6 (six) hours as needed for anxiety 30 tablet 0    lurasidone (LATUDA) 20 mg tablet Take 1 tablet (20 mg total) by mouth daily with breakfast 30 tablet 1    naproxen (NAPROSYN) 500 mg tablet Take 1 tablet (500 mg total) by mouth 2 (two) times a day with meals (Patient taking differently: Take 500 mg by mouth 2 (two) times a day with meals) 30 tablet 0    sertraline (ZOLOFT) 100 mg tablet Take 1.5 tablets (150 mg total) by mouth daily 135 tablet 1    Vitamin D, Cholecalciferol, 50 MCG (2000 UT) CAPS Take 2,000 Units by mouth daily Start after completing 12 weeks of ergocalciferol. 90 capsule 3     No current facility-administered medications for this visit.        Allergies   Allergen Reactions    Codeine Other (See Comments)     dry heaves    Sulfa Antibiotics Hives and Rash     Per pt as achild    Erythromycin Hives      Per as a child       Review of Systems    Video Exam    There were no vitals filed for this visit.    Physical Exam     I spent FOUR GROUP HOURS PLUS CASE MANAGEMENT minutes with patient today in which greater than 50% of time was spent in counseling/coordination of care regarding PHP - SEE NOTES

## 2024-07-31 NOTE — PSYCH
Visit Time    Visit Start Time: 0830  Visit Stop Time: 0930  Total Visit Duration:  60 minutes    Subjective:     Patient ID: Libertad Espinal is a 38 y.o. female.    [facility] Clinical Progress Notes      Specialized Services Documentation  Therapist must complete separate progress note for each specific clinical activity in which the individual participated during the day.       EDUCATION THERAPY      Libertad Espinal actively shared in morning assessment and goal review. Presented as Receptive related to readiness to learn. Libertad Espinal  did complete goal from last treatment day identifying gaining advocacy and support. Libertad Espinal did not present with any barriers to learning. Throughout morning group, iLbertad Espinal participated in gratitude practice. Liberatd Espinal made Positive progress toward goal. Continue education group to assess willingness to engage, assess transfer of knowledge, and participate in skill development.    Tx Plan Objective: 1.1, 1.2, 1.3, 1.4 , Therapist: Herman Pierce   Visit Time    Visit Start Time: 1330  Visit Stop Time: 1430  Total Visit Duration:  60 minutes    Subjective:     Patient ID: Libertad Espinal is a 38 y.o. female.    [facility] Clinical Progress Notes      Specialized Services Documentation  Therapist must complete separate progress note for each specific clinical activity in which the individual participated during the day.       GROUP PSYCHOTHERAPY    Libertad Espinal participated actively in psychotherapy group.  This was observed Consistent throughout the treatment day. They were engaged in learning related to Wellness Tools. Staff utilized Verbal teaching methods.  Libertad Espinal shared area of learning and set a goal for outside of program to work on sleep hygiene.  Libertad Espinal was able to share items explored during the day and shared in adding to their WRAP.  Ended session with peer  led  exercise on breathing techniques.  Libertad Espinal demonstrated positive progress toward goal.  Continue group psychotherapy to actively practice learned skills and encourage transfer of knowledge to unstructured time.    Tx Plan Objective: 1.1, 1.2, 1.3, 1.4 , Therapist: Herman Pierce     Group Psychotherapy    Subjective:     Patient ID: Libertad Espinal 38 y.o.      This group was facilitated virtually in a private office using HIPAA Compliant and Approved [employer] Teams.  ([id number])Libertad Espinal actively engaged in psychoeducational group about conflict resolution. The skill helps to develop skills to create resolution with self and others with whom one may interact with. Group discovered strategies that help them to develop positive assertiveness skills on a short term and long term basis. The group talked about understanding the purpose, and meaning of conflict resolution in intellectual and emotional expression, regulation , and recognition, and how it affects themselves and others.Teaching on the emphasis of self monitoring and resolution techniques, discussions on how to create positive resolutions through effective assertive tools were discussed. Group was encouraged to ask questions in an open forum at the end of group. Positive progress displayed through engagement in topic, Libertad shared that she has a difficult time distinguishing between aggression and assertive behaviors from others when they are interacting with her.  Libertad Espinal will continue to engage in psychotherapy to encourage positive conflict resolution.  Treatment Plan Objective 1.1, 1.2, 1.3, 1.4 Therapist: Herman DAWSON Ed.

## 2024-07-31 NOTE — PSYCH
Visit Time    Visit Start Time: 0930  Visit Stop Time: 1030  Total Visit Duration:  60 minutes    Subjective:     Patient ID: Libertad Espinal is a 38 y.o. female.    [facility] Clinical Progress Notes      Specialized Services Documentation  Therapist must complete separate progress note for each specific clinical activity in which the individual participated during the day.        Group Psychotherapy - Anxiety  This group was facilitated virtually in a private office using HIPAA Compliant and Approved Microsoft Teams. Libertad Espinal consented to the use of tele-video modality of treatment.  0930-1030 Libertad Espinal was present in psychoeducational group about anxiety. The group followed a slide show presentation on Anxiety and the Brain. Group was educated on:  Signs and symptoms of anxiety   Causes and treatments of anxiety  How the brain processes anxiety  Group members then discussed anxiety causing situations and positive ways to cope with these stressors.   Coping skills discussed were breathing paired with sitting/noticing with our thoughts and emotions exercise, challenging irrational thoughts, progressive muscle relaxation, Dropping anchor, deep breathing, and imagery.    Good progress towards goal noted through participation in group. Continue psychoeducational groups on depression to teach the value of learning about diagnosis and treatments available.     Tx Plan Objective:  1.1, 1.2, 1.3, 1.4 Therapist:  Sulma BUTTS RN

## 2024-07-31 NOTE — PSYCH
"Visit Time    Visit Start Time: 1045  Visit Stop Time: 1145  Total Visit Duration:  60 minutes    Subjective:     Patient ID: Libertad Espinal is a 38 y.o. female.    [facility] Clinical Progress Notes      Specialized Services Documentation  Therapist must complete separate progress note for each specific clinical activity in which the individual participated during the day.     Allied Therapy [id number] This group was facilitated virtually in a private office using HIPAA Compliant and Approved [employer] Teams. Libertad Espinal actively participated in an open process music therapy group regarding music auto-biographies. The group was asked to identify songs that carried meaning for different phases of their lives. The group was then asked to share a song from when they first became aware of their mental health struggles, their present self, and a future vision of themself. The group participated in an open processing of the songs shared. Libertad shared the song \"Love dont change\" which represented her desire to feel the love for her boyfriend that she used to feel. Some effort noted towards treatment goal. Continue AT to encourage the development of self understanding and a hopeful vision of the future. Tx Plan Objective: 1.1,1.2,1.4, Therapist:  ROSALBA Michele MT-BC    Case Management Note    ROSALBA Michele MT-BC    Current suicide risk : Low     No case management requested or scheduled. Aware of next 1:1    Medications changes/added/denied? No    Treatment session number: 3    Individual Case Management Visit provided today? No    [facility] follow up physician's orders: None at this time.     "

## 2024-08-01 ENCOUNTER — APPOINTMENT (OUTPATIENT)
Dept: PSYCHOLOGY | Facility: CLINIC | Age: 39
End: 2024-08-01
Payer: COMMERCIAL

## 2024-08-01 ENCOUNTER — DOCUMENTATION (OUTPATIENT)
Dept: PSYCHOLOGY | Facility: CLINIC | Age: 39
End: 2024-08-01

## 2024-08-01 NOTE — PROGRESS NOTES
Subjective:     Patient ID: Libertad Espinal is a 38 y.o. female.    [facility] Clinical Progress Notes      Specialized Services Documentation  Therapist must complete separate progress note for each specific clinical activity in which the individual participated during the day.     Case Management Note    ROSALBA Michele, [facility]    Current suicide risk : Gigi Maurer emailed this writer informing that she would not be attending program today due to her daughter being sick. Excused absence 1.    Medications changes/added/denied? No    Treatment session number: n/a    Individual Case Management Visit provided today? No    Innovations follow up physician's orders: None at this time.

## 2024-08-02 ENCOUNTER — TELEMEDICINE (OUTPATIENT)
Dept: PSYCHOLOGY | Facility: CLINIC | Age: 39
End: 2024-08-02
Payer: COMMERCIAL

## 2024-08-02 DIAGNOSIS — F31.81 BIPOLAR II DISORDER (HCC): Primary | ICD-10-CM

## 2024-08-02 DIAGNOSIS — F41.1 GAD (GENERALIZED ANXIETY DISORDER): Chronic | ICD-10-CM

## 2024-08-02 DIAGNOSIS — G47.33 OSA (OBSTRUCTIVE SLEEP APNEA): Primary | ICD-10-CM

## 2024-08-02 PROCEDURE — G0177 OPPS/PHP; TRAIN & EDUC SERV: HCPCS

## 2024-08-02 PROCEDURE — G0410 GRP PSYCH PARTIAL HOSP 45-50: HCPCS

## 2024-08-02 PROCEDURE — 90832 PSYTX W PT 30 MINUTES: CPT

## 2024-08-02 NOTE — PSYCH
Visit Time    Visit Start Time: 0830  Visit Stop Time: 0930  Total Visit Duration:  60 minutes    Subjective:     Patient ID: Libertad Espinal is a 38 y.o. female.    [facility] Clinical Progress Notes      Specialized Services Documentation  Therapist must complete separate progress note for each specific clinical activity in which the individual participated during the day.       EDUCATION THERAPY      Libertad Espinal actively shared in morning assessment and goal review. Presented as Receptive related to readiness to learn. Libertad Espinal  did complete goal from last treatment day identifying, she cooked dinner gaining responsibility. Libertad Espinal did not present with any barriers to learning. Throughout morning group, Libertad Espinal participated in mindfulness exercise. Libertad Espinal made positive progress toward goal. Continue education group to assess willingness to engage, assess transfer of knowledge, and participate in skill development.    Tx Plan Objective: 1.1, 1.2, 1.3, 1.4 , Therapist: Herman Pierce  Visit Time    Visit Start Time: 1330  Visit Stop Time: 1430  Total Visit Duration:  60 minutes    Subjective:     Patient ID: Libertad Espinal is a 38 y.o. female.    [facility] Clinical Progress Notes      Specialized Services Documentation  Therapist must complete separate progress note for each specific clinical activity in which the individual participated during the day.       GROUP PSYCHOTHERAPY    Libertad Espinal participated actively in psychotherapy group.  This was observed Consistent throughout the treatment day. They were engaged in learning related to Wellness Tools. Staff utilized Demonstration teaching methods.  Libertad Espinal shared area of learning and set a goal for outside of program to work on her sleep regulation and not use her cell phone in bed.  Libertad Espinal was able to share items explored during the day and  shared in adding to their WRAP.  Ended session with peer  led exercise on breathing techniques.  Libertad Espinal demonstrated positive progress toward goal.  Continue group psychotherapy to actively practice learned skills and encourage transfer of knowledge to unstructured time.    Tx Plan Objective: 1.1, 1.2, 1.3, 1.4 , Therapist: Herman Pierce  Group Psychotherapy    Visit Start Time: (930)  Visit Stop Time: (1030)  Total Visit Duration:  60 minutes      Subjective:     Patient ID: Libertad Espinal 38 y.o.      This group was facilitated virtually in a private office using HIPAA Compliant and Approved [employer] Teams.  Libertad Espinal actively engaged in psychoeducational group about challenging automatic negative thoughts negative thoughts that are perceived globally, external factors that influence an individuals thoughts and behaviors.(ANTs).The skill helps to identify negative thoughts and cognitive distortions. The outcome being that negative thoughts are challenged in the thought process and regulation of emotion. Group discovered strategies that help them to manage negative thoughts and rethink the negative thoughts when faced with a negative challenge. The group talked about understanding the purpose of challenging negative thoughts on a personal and social level and how it affects themselves and others.. Teaching on the emphasis of self monitoring and self-care, the group focus is geared how togo to for help when the negative thoughts become overly intrusive. Group was encouraged to ask questions in an open forum at the end of session. Positive progress displayed through engagement in topic, Libertad did not contribute frequewntly during the group, but was observed taking nates and was actively engaged as a listener in the conversation. Libertad Espinal will continue to engage in psychotherapy to encourage positive self realization.  Treatment Plan Objective 1.1, 1.2, 1.3, 1.4  Therapist: Herman DAWSON Ed.

## 2024-08-02 NOTE — PSYCH
Visit Time    Visit Start Time: 1045  Visit Stop Time: 1145  Total Visit Duration:  60 minutes    Subjective:     Patient ID: Libertad Espinal is a 38 y.o. female.    [facility] Clinical Progress Notes      Specialized Services Documentation  Therapist must complete separate progress note for each specific clinical activity in which the individual participated during the day.     GROUP PSYCHOTHERAPY  ([id number]) Group was facilitated virtually in a private office using HIPAA Compliant and Approved [email or web address] Teams. Libertad Espinal consented to the use of tele-video modality of treatment and was virtually present for group psychotherapy today.  she attentively listened to [facility] Krystal share her life story as she co-led this session.  Group encouraged power of learning about self, accepting illness and personal responsibility in recovery.  Community resources reviewed in addition to personal resources like the affirmations.  Progress toward goals noted.  Continue psychotherapy to encourage self -awareness and healthy engagement of supports.    TX Plan Objectives: 1.1, 1.2, 1.4   Therapist: Sulma BUTTS RN

## 2024-08-02 NOTE — PSYCH
Subjective:     Patient ID: Libertad Espinal is a 38 y.o. female.     [facility] Clinical Progress Notes      Specialized Services Documentation  Therapist must complete separate progress note for each specific clinical activity in which the individual participated during the day.       Group Psychotherapy - Open processing    [id number]   Total Visit Duration: 60 minutes    Libertad Espinal participated minimally in a psychotherapy group focused on open processing. This group was facilitated virtually in a private office using HIPAA compliant and approved [employer] teams. Libertad Espinal consented to the use of tele-video modality of treatment.  This writer let an open processing discussion on setting boundaries with terence and self sabotaging behvaiors. Group members had the opportunity to reflect on the topics ans share their experience and develop their skills. This writer encouraged the group members to use the supplemental materials inside and outside of the program. Libertad Espinal made minimal efforts towards progress goals which were displayed through participation and engagement in topic. Libertad Espinal did not participate or appear to take notes.  Continue to run daily group psychotherapy to meet treatment needs and encourage Libertad Espinal to practice skills outside of program.      Tx Plan Objective: 1.1,1.2,1.4 Therapist: CARYN Hennessy

## 2024-08-02 NOTE — PSYCH
Subjective:     Patient ID: Libertad Espinal is a 38 y.o. female.    [facility] Clinical Progress Notes      Specialized Services Documentation  Therapist must complete separate progress note for each specific clinical activity in which the individual participated during the day.     Case Management Note    ROSALBA Michele, MT-BC    Current suicide risk : Low     Medications changes/added/denied? No    Treatment session number: 4    Individual Case Management Visit provided today? Yes     Innovations follow up physician's orders: None at this time.       Individual Psychotherapy    Time: 3482-9626    DAP:    Data - Met with Libertad for individual therapy/case management. She stated she was doing well and felt better than she did during her last individual meeting. She stated that the anger management groups and information on communication has been effective. She stated that she struggles to validate herself and finds herself being susceptible to the criticism of others. Discussed techniques for self validating. She stated her and her boyfriend have been having arguments related to poor communication. DOUGLAS and other communication techniques discussed and homework given to talk with her boyfriend about her need for reassurance using effective communication.     Assessment - Libertad shows an interest and investment in her wellness and applying skills from groups. She has good insight into her weaknesses and was open to suggestions on how she can address them.     Plan  - Continue work towards treatment goals. Use communication skills in conversation with her boyfriend over the weekend.     Tx plan Objectives: 1.0c    ROSALBA Michele, MT-BC

## 2024-08-02 NOTE — PSYCH
Virtual Regular Visit    Verification of patient location:    Patient is located at Home in the following state in which I hold an active license [place]      Assessment/Plan:    Problem List Items Addressed This Visit       SHELLY (generalized anxiety disorder) (Chronic)    Bipolar II disorder (HCC) - Primary       Goals addressed in session:           Reason for visit is PHP VIRTUAL GROUP DUE TO virtual preference of care      Encounter provider  PARTIAL PSYCH PROGRAM      Recent Visits  No visits were found meeting these conditions.  Showing recent visits within past 7 days and meeting all other requirements  Future Appointments  No visits were found meeting these conditions.  Showing future appointments within next 150 days and meeting all other requirements       The patient was identified by name and date of birth. Libertad Espinal was informed that this is a telemedicine visit and that the visit is being conducted throughthe Microsoft Teams platform. She agrees to proceed..  My office door was closed. No one else was in the room.  She acknowledged consent and understanding of privacy and security of the video platform. The patient has agreed to participate and understands they can discontinue the visit at any time.    Patient is aware this is a billable service.     Subjective  Libertad Espinal is a 38 y.o. female  .      HPI     Past Medical History:   Diagnosis Date    Abnormal Pap smear of cervix     Anxiety     Bipolar disorder (HCC)     COVID-19 01/12/2022    10/27/23 Per pt had 3 separate times - last year being the last episode    Depression     Eczema     Exposure to chlamydia     Resolved 06/09/17    Knee pain, left     Migraine without aura and without status migrainosus, not intractable 10/11/2019    Obesity     Post traumatic stress disorder 12/13/2021    Postpartum depression 08/09/2018    TMJ (dislocation of temporomandibular joint)     causes snoring    Manoj-Parkinson-White (WPW)  syndrome        Past Surgical History:   Procedure Laterality Date    ABLATION OF DYSRHYTHMIC FOCUS      WPW ablation age 16    COLONOSCOPY      COLPOSCOPY      DISTAL PATELLAR REALIGNMENT Left 10/31/2023    Procedure: OPEN MPFL RECONSTRUCTION WITH ALLOGRAFT;  Surgeon: Wilbert Michael DO;  Location:  MAIN OR;  Service: Orthopedics    INDUCED       OTHER SURGICAL HISTORY      catheter ablation- WPW age 17    MT ARTHROSCOPY KNEE LATERAL RELEASE Left 10/31/2023    Procedure: RELEASE RETINACULAR;  Surgeon: Wilbert Michael DO;  Location:  MAIN OR;  Service: Orthopedics    TONSILLECTOMY      WISDOM TOOTH EXTRACTION         Current Outpatient Medications   Medication Sig Dispense Refill    Drospirenone 4 MG TABS Take 1 tablet by mouth in the morning 84 tablet 3    ergocalciferol (VITAMIN D2) 50,000 units Take 1 capsule (50,000 Units total) by mouth once a week for 12 doses Then transition to vitamin D 2000 units daily for maintenance. 12 capsule 0    lamoTRIgine (LaMICtal) 200 MG tablet Take 1 tablet (200 mg total) by mouth daily at bedtime 90 tablet 1    LORazepam (ATIVAN) 1 mg tablet Take 1 tablet (1 mg total) by mouth every 6 (six) hours as needed for anxiety 30 tablet 0    lurasidone (LATUDA) 20 mg tablet Take 1 tablet (20 mg total) by mouth daily with breakfast 30 tablet 1    naproxen (NAPROSYN) 500 mg tablet Take 1 tablet (500 mg total) by mouth 2 (two) times a day with meals (Patient taking differently: Take 500 mg by mouth 2 (two) times a day with meals) 30 tablet 0    sertraline (ZOLOFT) 100 mg tablet Take 1.5 tablets (150 mg total) by mouth daily 135 tablet 1    Vitamin D, Cholecalciferol, 50 MCG (2000 UT) CAPS Take 2,000 Units by mouth daily Start after completing 12 weeks of ergocalciferol. 90 capsule 3     No current facility-administered medications for this visit.        Allergies   Allergen Reactions    Codeine Other (See Comments)     dry heaves    Sulfa Antibiotics Hives and Rash     Per pt as  achthania    Erythromycin Hives     Per as a child       Review of Systems    Video Exam    There were no vitals filed for this visit.    Physical Exam     I spent FOUR GROUP HOURS PLUS CASE MANAGEMENT minutes with patient today in which greater than 50% of time was spent in counseling/coordination of care regarding PHP - SEE NOTES

## 2024-08-05 ENCOUNTER — APPOINTMENT (OUTPATIENT)
Dept: PSYCHOLOGY | Facility: CLINIC | Age: 39
End: 2024-08-05
Payer: COMMERCIAL

## 2024-08-05 ENCOUNTER — DOCUMENTATION (OUTPATIENT)
Dept: PSYCHOLOGY | Facility: CLINIC | Age: 39
End: 2024-08-05

## 2024-08-05 NOTE — PROGRESS NOTES
Subjective:     Patient ID: Libertad Espinal is a 38 y.o. female.    [facility]ations Clinical Progress Notes      Specialized Services Documentation  Therapist must complete separate progress note for each specific clinical activity in which the individual participated during the day.     Case Management Note    ROSALBA Michele, [facility]    Current suicide risk : Gigi Maurer emailed this writer informing that she would not be attending group due to a family concern. 2nd absence.     Medications changes/added/denied? No    Treatment session number: n/a    Individual Case Management Visit provided today? No    Innovations follow up physician's orders: None at this time.

## 2024-08-06 ENCOUNTER — APPOINTMENT (OUTPATIENT)
Dept: PSYCHOLOGY | Facility: CLINIC | Age: 39
End: 2024-08-06
Payer: COMMERCIAL

## 2024-08-06 ENCOUNTER — DOCUMENTATION (OUTPATIENT)
Dept: PSYCHOLOGY | Facility: CLINIC | Age: 39
End: 2024-08-06

## 2024-08-06 DIAGNOSIS — F41.1 GAD (GENERALIZED ANXIETY DISORDER): ICD-10-CM

## 2024-08-06 DIAGNOSIS — F31.81 BIPOLAR II DISORDER (HCC): Primary | ICD-10-CM

## 2024-08-06 NOTE — PROGRESS NOTES
"Assessment/Plan:         Assessment  Diagnoses and all orders for this visit:     Bipolar II disorder (HCC)     SHELLY (generalized anxiety disorder)              Subjective:        Subjective  Patient ID: Libertad Espinal is a 38 y.o. female.     [facility]ations Treatment Plan   AREAS OF NEED: worsening depression and anxiety as evidenced by difficulty with work, anger, regret ,feeling like a failure, flat affect, poor sleep (not enough and interrupted), low energy, worry, racing thoughts, poor self esteem, poor ADLs, poor appetite, and isolation. Stressors of relationship with boyfriend of 7 years, financial strain, feeling like a failure as a mom, and stress at work.   Date Initiated: 07/29/24     Strengths: \"I am a good support, caring, and thoughtful\"       LONG TERM GOAL:   Date Initiated: 07/29/24  1.0 I will identify 3 signs that my depression and anxiety has improved since starting program.   Target Date: 8/26/24  Completion Date: 08/06/24, unplanned discharge per patient request.         SHORT TERM OBJECTIVES:      Date Initiated: 07/29/24  1.1 I will identify 3 sleep hygiene skills I can use to improve my sleep and implement them daily.   Revision Date:   Target Date: 8/7/24  Completion Date: 08/06/24, unplanned discharge per patient request.      Date Initiated: 07/29/24  1.2 I will identify a list of daily self care activities that help me to feel better about myself (showering, wearing clean clothes, brushing teeth, etc.) and complete the list daily.   Revision Date:   Target Date: 8/7/24  Completion Date: 08/06/24, unplanned discharge per patient request.      Date Initiated: 07/29/24  1.3 I will take medication as prescribed and share questions and concerns if they arise.  Revision Date:  Target Date: 8/7/24  Completion Date:  08/06/24, unplanned discharge per patient request.      Date Initiated: 07/29/24  1.4 I will identify 3 ways my supports can assist in my recovery and agree to staff/support " contact as indicated.   Revision Date:  Target Date: 8/7/24  Completion Date:08/06/24, unplanned discharge per patient request.              7 DAY REVISION:     Date Initiated:  Revision Date:   Target Date:   Completion Date:           PSYCHIATRY:  Date Initiated: 07/29/24  Medication Management and Education       Revision Date:   Completion Date:08/06/24, unplanned discharge per patient request.   The person(s) responsible for carrying out the plan is Luis Wheat DO; Gretchen Barbour PA-C     NURSING/SYMPTOM EDUCATION:  Date Initiated: 07/29/24  1.1, 1.2. 1.3, 1.4 This RN and/or Therapist will provide wellness/symptoms and skill education groups three to five days weekly to educate Libertad Espinal on signs and symptoms of diagnoses, skills to manage, and medication questions that will be addressed by the treatment team.    Revision date:  Completion Date:08/06/24, unplanned discharge per patient request.   The person(s) responsible for carrying out the plan is Sulma Chambers RN, CARYN Hennessy; ADRIANA Stockton ED     PSYCHOLOGY:   Date Initiated: 07/29/24       1.1, 1.2, 1.4 Provide psychotherapy group 5 times per week to allow opportunity for Libertad Espinal  to explore stressors and ways of coping.   Revision Date:   Completion Date:08/06/24, unplanned discharge per patient request.   The person(s) responsible for carrying out the plan is CIRO Lee, LSW; Ashley Costenbader, MA LMT     ALLIED THERAPY:   Date Initiated: 07/29/24  1.1,1.2 Engage Libertad Espinal in AT group 3-5 times per week to encourage development and use of wellness tools to decrease symptoms and promote recovery through meaningful activity.  Revision Date:   Completion Date:08/06/24, unplanned discharge per patient request.   The person(s) responsible for carrying out the plan is CHANDANA Ramirez; ROSALBA Michele MT-BC     CASE MANAGEMENT:   Date Initiated: 07/29/24      1.0 This   will meet with Libertad Espinal  3-4 times weekly to assess treatment progress, discharge planning, connection to community supports and UR as indicated.  Revision Date:   Completion Date:08/06/24, unplanned discharge per patient request.   The person(s) responsible for carrying out the plan is ROSALBA Michele, [facility]     TREATMENT REVIEW/COMMENTS:      DISCHARGE CRITERIA:Identify 3 signs of progress and complete relapse prevention plan.   DISCHARGE PLAN:  Discharge to OP therapy and psychiatry  Estimated Length of Stay: 10 treatment days               Diagnosis and Treatment Plan explained to Libertad Maurer relates understanding diagnosis and is agreeable to Treatment Plan.            CLIENT COMMENTS / Please share your thoughts, feelings, need and/or experiences regarding your treatment plan with Staff.  Please see follow up note with comments.        Signatures can be found on [facility] Treatment plan consent form.

## 2024-08-06 NOTE — PROGRESS NOTES
Subjective:     Patient ID: Libertad Espinal is a 38 y.o. female.    [facility] Clinical Progress Notes      Specialized Services Documentation  Therapist must complete separate progress note for each specific clinical activity in which the individual participated during the day.     Case Management Note    ROSALBA Michele MT-BC    Current suicide risk : Low     [id number] Met with Libertad Espinal. Reviewed relapse prevention plan, aftercare plan, and medication list (copies provided). Libertad Espinal shared improvement through improved mood, better ability to reframe, improved communication, and more willingness to accept responsibility. Denied SI, HI, and psychosis. Aftercare providers to receive summary.     I,Libertad Espinal,am physically unable to provide a signature; however, I understand the nature of and am in agreement with the documentation presented to me via TEAMS.  I have received a copy through My Chart and/or the [employer] [facility].  I freely give verbal consent.  Name of Document (s): safety plan  Witness to verbal consent: ROSALBA Michele MT-BC  Witness to verbal consent: Iliana Ramirez    Medications changes/added/denied? No    Treatment session number: n/a    Individual Case Management Visit provided today? Yes    Innovations follow up physician's orders: Discharge to OP therapy and psychiatry

## 2024-08-06 NOTE — BH CRISIS PLAN
Client Name: Libertad Espinal       Client YOB: 1985    [place] Safety Plan      Creation Date: 4/23/24 Update Date: 4/23/25   Created By: CHARLIE Hutchinson Last Updated By: CHARLIE Hutchinson      Step 1: Warning Signs:   Warning Signs   when I'm actually thinking about doing something   being in an argument with someone can be a trigger/prompting event   isolation   tearfulness            Step 2: Internal Coping Strategies:   Internal Coping Strategies   going for a drive   going in my room to lay down and watch tv   Coloring with daughter   healthy distrations   STOP skill            Step 3: People and social settings that provide distraction:   Name Contact Information   Sit with my children and hug them     Places   go for a drive to a drive thru and sit in the parking lot   outdoors           Step 4: People whom I can ask for help during a crisis:      Name Contact Information    Renata in my cell phone    maybe Saturnino Caldwell in phone      Step 5: Professionals or agencies I can contact during a crisis:      [facility]inican/Agency Name Phone Emergency Contact    Olga Schrader [phone]     Dr. Sutton [phone]       Local Emergency Department Emergency Department Phone Emergency Department Address    [facility]          Crisis Phone Numbers:   Suicide Prevention Lifeline: Call or Text  943 Crisis Text Line: Text HOME to 083-967   Please note: Some [facility] do not have a separate number for Child/Adolescent specific crisis. If your county is not listed under Child/Adolescent, please call the adult number for your county      Adult Crisis Numbers: Child/Adolescent Crisis Numbers   [facility]: [phone] [facility]: [phone]   [facility]: [phone] [facility]: [phone]   [facility]: [phone] [place]: [phone]   [facility]: [phone] [place] [facility]: [phone]   [place]  [facility]: [phone]   [facility]: [phone]   [facility]: [phone]   [facility]: [phone] (daytime) [phone] (after hours, weekends, holidays)      Step 6: Making the environment safer (plan for lethal means safety):   Plan: No guns or weapons.   Has regular medications...only one month at a time.   If she felt she was in danger, she will ask Saturnino to hold them for her.      Optional: What is most important to me and worth living for?   My children, my self and my future     [place] Safety Plan. Lulu Hayward and Jean Bellamy. Used with permission of the authors.
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
## 2024-08-07 ENCOUNTER — APPOINTMENT (OUTPATIENT)
Dept: PSYCHOLOGY | Facility: CLINIC | Age: 39
End: 2024-08-07
Payer: COMMERCIAL

## 2024-08-08 ENCOUNTER — APPOINTMENT (OUTPATIENT)
Dept: PSYCHOLOGY | Facility: CLINIC | Age: 39
End: 2024-08-08
Payer: COMMERCIAL

## 2024-08-09 ENCOUNTER — APPOINTMENT (OUTPATIENT)
Dept: PSYCHOLOGY | Facility: CLINIC | Age: 39
End: 2024-08-09
Payer: COMMERCIAL

## 2024-08-13 ENCOUNTER — DOCUMENTATION (OUTPATIENT)
Dept: PSYCHOLOGY | Facility: CLINIC | Age: 39
End: 2024-08-13

## 2024-08-13 NOTE — PROGRESS NOTES
[facility] Follow Up Action    This writer attempted to speak with Libertad Espinal today - 7 days post discharge from [facility].   Reviewed crisis number on message and requested return call.    Ashtyn George

## 2024-08-18 DIAGNOSIS — E55.9 VITAMIN D DEFICIENCY: Primary | ICD-10-CM

## 2024-08-22 ENCOUNTER — TELEPHONE (OUTPATIENT)
Age: 39
End: 2024-08-22

## 2024-08-22 NOTE — TELEPHONE ENCOUNTER
Patient called and stated paperwork for her leave extension was sent over to the office, patient stated her appt was rescheduled and now she needs to extend her leave until 9/2/2024

## 2024-08-23 ENCOUNTER — TELEPHONE (OUTPATIENT)
Dept: PSYCHIATRY | Facility: CLINIC | Age: 39
End: 2024-08-23

## 2024-08-23 NOTE — TELEPHONE ENCOUNTER
A medical records request was received 8/23/24 from [facility]. It requested last office visit notes from Dr Sutton and included a short questionnaire.     This writer spoke to patient and informed her of need to sign an AIDAN. Gave her office hours and let her know a pre-filled AIDAN would be ready at .

## 2024-08-26 ENCOUNTER — TELEPHONE (OUTPATIENT)
Age: 39
End: 2024-08-26

## 2024-08-26 ENCOUNTER — OFFICE VISIT (OUTPATIENT)
Dept: SLEEP CENTER | Facility: CLINIC | Age: 39
End: 2024-08-26
Payer: COMMERCIAL

## 2024-08-26 ENCOUNTER — TELEPHONE (OUTPATIENT)
Dept: SLEEP CENTER | Facility: CLINIC | Age: 39
End: 2024-08-26

## 2024-08-26 VITALS
BODY MASS INDEX: 41.04 KG/M2 | SYSTOLIC BLOOD PRESSURE: 114 MMHG | HEART RATE: 101 BPM | DIASTOLIC BLOOD PRESSURE: 60 MMHG | OXYGEN SATURATION: 97 % | WEIGHT: 223 LBS | HEIGHT: 62 IN

## 2024-08-26 DIAGNOSIS — G47.33 OSA (OBSTRUCTIVE SLEEP APNEA): Primary | ICD-10-CM

## 2024-08-26 DIAGNOSIS — F51.04 CHRONIC INSOMNIA: ICD-10-CM

## 2024-08-26 DIAGNOSIS — E66.01 CLASS 3 SEVERE OBESITY DUE TO EXCESS CALORIES WITH BODY MASS INDEX (BMI) OF 40.0 TO 44.9 IN ADULT, UNSPECIFIED WHETHER SERIOUS COMORBIDITY PRESENT (HCC): ICD-10-CM

## 2024-08-26 PROCEDURE — 99204 OFFICE O/P NEW MOD 45 MIN: CPT

## 2024-08-26 NOTE — TELEPHONE ENCOUNTER
Patient called in to confirm if their upcoming talk therapy appointment tomorrow (8/27/24 @9am) was in person or virtual.    Writer confirmed the appointment was marked as in person. Writer confirmed patient had the office address.

## 2024-08-26 NOTE — PATIENT INSTRUCTIONS
PAP Therapy Initiation    I will have written a prescription for auto-CPAP 5-15 cmH2O with a nasal pillows mask.  You will ultimately receive your machine and regular supplies from a DME (durable medical equipment) company.  After your appointment with the DME for the initial set up you will need to schedule a follow-up appointment in the sleep office, this appointment should be 31-90 days after you receive the device.  You should be eligible for new supplies approximately every 3-6 months, depending on your insurance coverage.  If your mask doesn't fit well, please call the medical equipment company to schedule a formal mask fitting.  Insurance requires regular usage and periodic office follow ups for PAP therapy, to continue to cover supplies.  Insurance requires a follow-up in the office within 90 days of starting your new PAP device.  To schedule your follow up visit in the sleep office you can call: central scheduling ([phone]) or sleep office ([phone]).  To schedule the appointment with the DME you will need to contact them directly.    Sleep Hygiene  TIPS FOR A BETTER NIGHT OF SLEEP BEFORE GETTING INTO BED:  -Establish a regular routine for bedtime.  -Create a positive sleep environment.  -Relax before getting into bed.  -Avoid alcohol, smoking, caffeine for at least a few hours before bedtime.  -Do not go to bed unless you are sleepy.    WHILE IN BED:  -Turn your clock around (or cover it) and use your alarm if needed.  If you can't fall asleep in 20 minutes (based on your internal sense of time), get out of bed and do something relaxing or boring (reading, listening to music, etc). Return to bed only when sleepy.  -Use your bed only for sleep.    IN THE MORNING AND DURING THE DAYTIME:  -Wake up at the same time every morning, even on weekends.  -Avoid naps during the day.  -Avoid caffeinated beverages and food in the evening.  -Exercise regularly but not within 4 hours of  bedtime.        CMS/Insurance Requirements    Your insurance requires a face-to-face follow up visit within a 31-90 day period after starting CPAP.  Your insurance requires compliance with CPAP, which is at least 4 hours per night for 70% of the time. This must be done over a 30 day period and must occur within the initial 31-90 day period after starting CPAP.  Your insurance also requires at least yearly follow ups to continue to pay for CPAP supplies.    PAP Supply Guidelines    Below are the guidelines for reordering your supplies. You will be responsible for your deductible, co payments, and out of pocket expenses.    Item Frequency   Nasal Mask (no headgear) 1 every 3 months   Nasal Mask Cushion 1 every 2 weeks   Full Face Mask (no headgear) 1 every 3 months   Full Face Mask Cushion 1 every month   Nasal Pillows 1 every 2 weeks   Headgear 1 every 6 months   hin Strap 1 every 6 months   james 1 every 3 months   Filters: Reusable 1 every 6 months   Filters: Disposable 1 every 2 weeks   Humidifier Chamber(disposable) 1 every 6 months       About Your PAP Therapy    Continuous Positive Airway Pressure/Bilevel Therapy  You have been prescribed Positive Airway Pressure (PAP) therapy to treat sleep apnea. The most common type of sleep apnea is obstructive sleep apnea (CLARISSE), a condition in which the upper airway collapses during sleep. This obstruction keeps air from getting into your lungs. The prescribed PAP machine (CPAP or Bilevel or ASV) will smoothly blow air into your airway to prevent it from closing. The machine will use a mask to deliver the air through your nose and/or mouth. These devices are available in various sizes and styles.    It will take some time for you to get used to the new equipment and it may take a while for you to begin to feel the benefits of PAP therapy. Please be patient. If you are having problems adjusting to your machine, please contact us for help.    Beginning your PAP  "Therapy  Assembly:  Place your PAP machine on a level surface near your bed.  To prevent injury, do not place the machine higher than your head.  Keep the machine at least 12 inches away from anything that may block the vents.  Plug the machine into a properly grounded electrical outlet. It is better to avoid using an extension cord. If necessary, use a heavy-duty one.  If you are NOT using a humidifier with you PAP equipment:  Attach one end of your 6-foot tubing to the PAP unit outlet and the other end to your mask. (If your mask does not have a built-in exhalation port, a special exhalation valve should be used between the mask and the 6-foot tubing.)  Attach the headgear to your mask.  If you are using a humidifier with your PAP equipment:  Fill the humidifier with DISTILLED water to the maximum fill line.  Attach the humidifier to the PAP unit's outlet as instructed by the respiratory therapist with your home care company. Attach one end of your 6-foot tubing to the humidifier outlet and the other end to your mask. (If your mask does not have a built-in exhalation port, a special exhalation valve should be used between the mask and the 6-foot tubing.)  If you have been prescribed oxygen to be used with the PAP machine, you will be instructed on how to attach your oxygen tubing. Always turn off the oxygen tank before turning off your PAP machine.    Getting Started:  Wash your face and apply the mask and headgear as instructed by the sleep technologist or respiratory therapist. The mask should fit snugly to prevent air leaks, but not so tight as to cause skin irritation or discomfort.  Turn the PAP power switch to the ON position. You should feel air blowing through the mask. Breathe normally and adjust the mask gently if you feel an air leak.  If there are no leaks, activate the \"ramp\" feature on your PAP machine. The ramp allows a lower pressure to be delivered for a designated period of time (usually 5 to 45 " minutes) to allow you to relax and  fall asleep more easily. The pressure will then gradually increase to the prescribed pressure that controls your apnea.  Remember to turn OFF your PAP unit when it is not in use.    Care and Maintenance    Headgear should be washed as needed. Daily inspection and weekly washings are recommended. Do not disassemble the straps. Machine wash in warm water, making sure to attach Velcro hooks and tabs before washing. Line dry or machine dry on a low setting.  Masks should be washed every day. Daily inspection is recommended. Leave the mask and tubing attached. Gently wash the mask with a soft cloth using warm water and mild detergent, concentrating on the mask cushion flaps. DO NOT use alcohol or bleach. Rinse thoroughly and air dry.  Tubing and headgear should be washed weekly. Daily inspection is recommended. Wash in warm water and mild detergent and rinse thoroughly. Hook the tubing to the machine and blow until dry.  Humidifier should be washed daily and filled with DISTILLED water before use. Wash with warm water and mild detergent. Disinfect weekly by soaking with a solution of 1 part white vinegar and 3 parts water for 30 minutes. Rinse thoroughly and air dry.  Disposable filters should be replaced once a month. Wash reusable foam filters with warm water and mild detergent at least once a month. Rinse thoroughly and dry with paper towels.  Avoid  that contain fragrance or conditioners, as these will leave a residue.  NEVER iron any soft goods.    Troubleshooting    If the machine fails to turn on:  Make sure that the PAP machine is plugged into a grounded outlet.  Make sure that the ON/OFF switch is in the ON position.  Make sure that the wall outlet has power.    If there is no pressure coming from your machine:  Make sure that the inlet filter is clean and unblocked.  Make sure that the cooling fan is unblocked and that air is flowing freely.  Make sure that all tubing  is securely connected.    If your PAP machine still fails to operate, please call your DME for assistance.    What You Should Know About Insurance    There are many different insurance policies and coverage for PAP equipment will vary. Find out what your coverage provides and what your responsibilities are as a consumer. PAP therapy equipment is considered Durable Medical Equipment (DME). Here are a few questions to ask your insurance provider:  What benefits do I have for DME? Do I have a deductible and/or co pay for DME?  Is there a repair or replacement plan for durable medical equipment?  How often can I receive a replacement mask, tubing, headgear and filters?  Do I have to demonstrate that I am using my PAP device?  o How do I do that?    Important Information    It is very important to keep your PAP equipment and supplies clean. The life of the supplies depends on the care they receive. Under normal circumstances, expect the mask and headgear to last 9-12 months. Refer to the owner's manual for more information.    Important Safety Reminders    Keep equipment free from obstruction.  Use your PAP equipment as directed.  DO NOT try to adjust your pressure setting.  DO NOT block the exhalation port or valve.  Keep filters clean to prevent overheating.  If a humidifier is being used, place it at a level lower than your head.  If using a heated humidifier, allow unit to cool before cleaning or refilling.  Follow safety guidelines regarding oxygen equipment.DO NOT operate multiple electrical devices from one outlet.  If you have a medical emergency, contact the Emergency Medical Services.

## 2024-08-26 NOTE — PROGRESS NOTES
[facility]  Sleep Medicine New Patient Evaluation    PATIENT NAME: Libertad Espinal  DATE OF SERVICE: August 26, 2024    CONSULTING PROVIDER:  GERARDO Naidu   [address]  [place] [id number]    ASSESSMENT/PLAN:  Libertad Espinal is a 38 y.o. female with PMHx of CLARISSE, WPW s/p ablation, bipolar disorder, anxiety, migraines and obesity who presents for sleep evaluation.    CLARISSE (Obstructive Sleep Apnea)  -The patient has a history of mild CLARISSE diagnosed on HSAT in 7/2024 (BUSTER 5.2, supine BUSTER 5.1, O2 nat 88%, TST <90% 0.8 min), she is here today to discuss potential treatments.  She has symptoms out of proportion to the severity of the sleep apnea  on her study although home studies can underestimate the severity of sleep apnea especially in the setting of known insomnia.  Positional therapy does not seem to be an option based on results of sleep study and the patient has a history of TMJ so MAD is not recommended.  - Discussed with the patient the possible diagnosis, causes and conditions associated with SDB along with potential treatments.  She has decided to proceed with PAP therapy.  - Will start APAP 5-15 cmH2O with a nasal pillows mask, and informed the patient they can change out their mask for free within the first 30 days of having their machine.  - Explained the importance of keeping the machine clean, and that they should be eligible for refills of supplies every 3-6 months depending on insurance.  We also discussed the insurance compliance requirements.  - Encouraged healthy lifestyle with adequate sleep (7-9 hours per night), healthy balanced diet and routine exercise.  Explained the importance of avoiding driving while drowsy.  - Follow-up 31 to 90 days after receiving device.  -     Ambulatory Referral to Sleep Medicine  -     CPAP Auto New DME    Chronic Sleep-Onset, Maintenance and Terminal Insomnia  - The patient reports difficulty falling  and staying asleep.  Because of the patient's insomnia is likely multifactorial with untreated sleep apnea, psychophysiologic factors and behavioral contributors such as lack of stimulus control, frequent clock watching and using her phone in bed  - Current medication regimen includes: None  - Discussed the importance of stimulus control and sleep hygiene.  Recommend she leave the bed at night when she is unable to sleep and perform non-stimulating activities until sleepy again.  We also discussed that her total time in bed is long and it appears she is usually falling asleep around midnight which would give her an 8-hour window in bed.  Recommend not getting into bed until she is truly sleepy and closer to midnight.  - Avoid the bed/bedroom during the day time.  - Recommend she move her clock/phone across the room to avoid using social media in bed and checking the time frequently.    Class 3 Obesity  - Discussed the role obesity plays in sleep apnea, weight loss is recommended.  She is amenable to referral to weight management.  -     Ambulatory Referral to Weight Management; Future    Thank you for allowing me to participate in the care of your patient.  If there are any questions regarding evaluation please feel free to reach out.   ________________________________________________________________________________________________    HPI: Libertad Espinal is a 38 y.o. female with PMHx of CLARISSE, WPW s/p ablation, bipolar disorder, anxiety, migraines and obesity who presents for sleep evaluation.  Sleep-Related History: Recent HSAT in 7/2024 which showed mild CLARISSE, she has not yet been on treatment.    The patient was referred by her PCP due to a recent HSAT which showed mild CLARISSE.  She reports that she feels very sleepy during the day and has been told that she snores loudly at night.  She denies any witnessed apneas or choking/gasping in sleep, but often wakes up with headaches and dry mouth.  She generally never  feels refreshed upon getting up in the morning.  She denies any symptoms consistent with SP, HH, cataplexy, RLS or parasomnias.  She does note insomnia that has been longstanding.    Insomnia Checklist  Timing: onset, maintenance, and terminal  Current Medications: None  Prior Medications: melatonin (not beneficial)  Difficulty falling back to sleep: Yes, sometimes can take up to 1 hour to fall back to sleep    Anxiety/Rumination: Yes, notes racing thoughts at night, generally about whatever is occurring in her life at the time    Pre-Bed Routine: not consistent  Awake in bed > 20 min: Yes, will remain in the bed the entire time when trying to fall asleep    In bed during the day: No   Screen use in bed: Yes, scrolls through social media on her phone often    Exercise within 4 hours of bed: No   Consistent meal times: No  Late night snacking: Yes   Caffeine: Yes, 2 cups of coffee in the morning, in the afternoon will drink 1 soda or iced tea, latest is right before bed.  Clock Watching: Yes, does increase anxiety when she checks the time    Other Information: She reports she is generally just fatigued when laying down at night, not sleepy.  She is currently following with psychiatry.    ESS: Total score: 18/24  Greater or equal to 10 is positive for excessive daytime sleepiness  Pertinent Meds: None    Sleep-Wake Schedule:  She is self-described as a night person.  Bedtime: 2230  Wake Time: 0800  Difficulty Falling Asleep: Yes, 1-2 hours every night  Avg Number of Awakenings: 5-6x a night  Cause of Awakenings: snoring, nightmares and unclear  Weekend Sleep Schedule: 3042-6587  Naps: denies intentional napping, may unintentionally doze daily for up to 1 hours    Avg TST per 24 hours: 6 hours    Sleep-Related Details:  Preferred Sleep Position: side(s)  SDB Symptoms: snoring, multiple awakenings, morning headaches, dry mouth/nose, and waking unrefreshed  Bruxism: clenches at night, reports history of TMJ  Nocturnal  GERD: No  Nocturnal Palpitations: No  Nocturnal Anxiety or Rumination: Yes, see above  Sleep-Related Hallucinations: No  Sleep Paralysis: No  Cataplexy: No    She denies having an urge to move the legs in the evening (when resting) nor uncomfortable and/or unpleasant sensations in the legs. She has not been told that she kicks her legs during sleep.  Reports was treated for RLS when starting metoprolol for her arrhythmia, but it resolved when the medication was stopped.    She denies any parasomnia activity.  Occasionally mumbles in her sleep per her partner.    Wake-Related Details  Daytime Sleepiness: Yes, notes significant daytime sleepiness.    Work Schedule: office work, daytime hours  Drowsy Driving: Yes, no issues on daily drive (20-25 min max, generally WFH).  Will start feeling drowsy with drives > 30 min, she denies ever actually falling asleep while driving.  Caffeine: Yes, see above  Alcohol Use: Yes, social use  Substance Use: Yes, will use medical marijuana occasionally to help with sleep  Tobacco Use: No, denies vaping/e-cigs, cigars and chewing tobacco.  Weight Change: she has gained ~15lbs over the last 2-3 months    She does have difficulty with memory and concentration.    Past Treatments:  None    Prior Sleep Studies:  HSAT -- 7/28/2024 (Wt 216.0 lbs)  BUSTER - 5.2  Sup BUSTER - 5.1  O2 Sebastian - 88.0%  TST < 90% - 0.8 min    Other Relevant Labs and Studies:  None    Past Medical History:   Diagnosis Date    Abnormal Pap smear of cervix     Anxiety     Bipolar disorder (HCC)     COVID-19 01/12/2022    10/27/23 Per pt had 3 separate times - last year being the last episode    Depression     Eczema     Exposure to chlamydia     Resolved 06/09/17    Knee pain, left     Migraine without aura and without status migrainosus, not intractable 10/11/2019    Obesity     Post traumatic stress disorder 12/13/2021    Postpartum depression 08/09/2018    TMJ (dislocation of temporomandibular joint)     causes snoring     Manoj-Parkinson-White (WPW) syndrome      Past Surgical History:   Procedure Laterality Date    ABLATION OF DYSRHYTHMIC FOCUS      WPW ablation age 16    COLONOSCOPY      COLPOSCOPY      DISTAL PATELLAR REALIGNMENT Left 10/31/2023    Procedure: OPEN MPFL RECONSTRUCTION WITH ALLOGRAFT;  Surgeon: Wilbert Michael DO;  Location:  MAIN OR;  Service: Orthopedics    INDUCED       OTHER SURGICAL HISTORY      catheter ablation- WPW age 17    AK ARTHROSCOPY KNEE LATERAL RELEASE Left 10/31/2023    Procedure: RELEASE RETINACULAR;  Surgeon: Wilbert Michael DO;  Location:  MAIN OR;  Service: Orthopedics    TONSILLECTOMY      WISDOM TOOTH EXTRACTION       Patient Active Problem List   Diagnosis    Bipolar I disorder, most recent episode depressed, in full remission (HCC)    Obesity, Class II, BMI 35-39.9    Abnormal Papanicolaou smear of cervix with positive human papilloma virus (HPV) test    Migraine with aura and without status migrainosus, not intractable    SHELLY (generalized anxiety disorder)    Other insomnia    Long-term use of high-risk medication    Family history of breast cancer    Patellofemoral disorder of left knee    Congenital fusion of carpal bone    History of radiofrequency ablation procedure for W-P-W    PVC's (premature ventricular contractions)    Post traumatic stress disorder    Dyslipidemia    Enlarged lymph node in neck    Inappropriate sinus tachycardia    Recurrent dislocation of left patella    Patellar instability of left knee    Excessive daytime sleepiness    CLARISSE (obstructive sleep apnea)    Bipolar II disorder ([facility])     Allergies as of 2024 - Reviewed 2024   Allergen Reaction Noted    Codeine Other (See Comments)     Sulfa antibiotics Hives and Rash     Erythromycin Hives 2012     REVIEW OF SYSTEMS:  Review of Systems  10-point system review completed, all of which are negative except as mentioned above.    CURRENT MEDICATIONS:  Current Outpatient Medications    Medication Instructions    Drospirenone 4 MG TABS 1 tablet, Oral, Daily    ergocalciferol (VITAMIN D2) 50,000 Units, Oral, Weekly, Then transition to vitamin D 2000 units daily for maintenance.    lamoTRIgine (LAMICTAL) 200 mg, Oral, Daily at bedtime    LORazepam (ATIVAN) 1 mg, Oral, Every 6 hours PRN    lurasidone (LATUDA) 20 mg, Oral, Daily with breakfast    naproxen (NAPROSYN) 500 mg, Oral, 2 times daily with meals    sertraline (ZOLOFT) 150 mg, Oral, Daily    Vitamin D (Cholecalciferol) 2,000 Units, Oral, Daily, Start after completing 12 weeks of ergocalciferol.     SOCIAL HISTORY:  Social History     Tobacco Use    Smoking status: Never    Smokeless tobacco: Never    Tobacco comments:     Never a smoker or use of any tobacco products per pt    Vaping Use    Vaping status: Never Used   Substance Use Topics    Alcohol use: Yes     Comment: Occasional/special occasions    Drug use: No     Comment: Denies any drug use per pt     FAMILY HISTORY:  Family History   Problem Relation Age of Onset    Bipolar disorder Mother     Hypertension Mother     Hyperlipidemia Mother     Suicide Attempts Mother     Colon polyps Mother     Factor V Leiden deficiency Mother     Glucose-6-phos deficiency Father     Hyperlipidemia Father     Hypertension Father     Diabetes Father     Anxiety disorder Brother     Factor V Leiden deficiency Brother     Mental illness Brother     Drug abuse Brother     No Known Problems Daughter     Anemia Son     Other Son         Gilbert's    Breast cancer Maternal Grandmother         dx approx age early 40's    Colon polyps Maternal Grandfather     Heart disease Maternal Grandfather     Crohn's disease Paternal Grandmother     Prostate cancer Paternal Grandfather     Pancreatic cancer Paternal Grandfather     Breast cancer Maternal Aunt 40    Colon cancer Paternal Aunt     Factor V Leiden deficiency Cousin     Thyroid disease Neg Hx     Stroke Neg Hx     Ovarian cancer Neg Hx     Anesthesia problems  "Neg Hx      Family History of Sleep Disorders: mother and father have CLARISSE    PHYSICAL EXAMINATION:  Vital Signs:  /60 (BP Location: Left arm, Patient Position: Sitting, Cuff Size: Large)   Pulse 101   Ht 5' 2\" (1.575 m)   Wt 101 kg (223 lb)   SpO2 97%   BMI 40.79 kg/m²   Body mass index is 40.79 kg/m².    Constitutional: NAD, well appearing, obese   Mental Status: AAOx3  Neck Circumference: Neck Circumference: 14.25 inches  Skin: Warm, dry, no rashes noted   Eyes: PERRL, normal conjunctiva  ENT: Nasal congestion absent, nasal valve incompetence absent.  Posterior Airspace:   Hoyos Tongue Position: 4  Retrognathia: absent  Overbite: present  High Arched Palate: absent  Tongue Scalloping/Ridging: present  Tonsils: absent   Uvula: normal  Chest: tachycardia, regular rhythm, +S1/S2, CTA B/L, no W/R/R, no M/R/G   Abdomen: Soft, NT/ND  Extremities: No digital clubbing or pedal edema    I have spent a total time of 45 minutes on 08/26/24 in caring for this patient including Instructions for management, Patient and family education, Importance of tx compliance, Counseling / Coordination of care, Documenting in the medical record, Reviewing / ordering tests, medicine, procedures  , and Obtaining or reviewing history  .    Mary Garcia MD  Pulmonary-Critical Care and Sleep Medicine  08/26/24    Portions of the record may have been created with voice recognition software. Occasional wrong word or \"sound a like\" substitutions may have occurred due to the inherent limitations of voice recognition software. Please read the chart carefully and recognize, using context, where substitutions have occurred.   "

## 2024-08-27 ENCOUNTER — TELEMEDICINE (OUTPATIENT)
Dept: BEHAVIORAL/MENTAL HEALTH CLINIC | Facility: CLINIC | Age: 39
End: 2024-08-27
Payer: COMMERCIAL

## 2024-08-27 DIAGNOSIS — F43.10 POST TRAUMATIC STRESS DISORDER: ICD-10-CM

## 2024-08-27 DIAGNOSIS — F31.76 BIPOLAR I DISORDER, MOST RECENT EPISODE DEPRESSED, IN FULL REMISSION (HCC): Primary | ICD-10-CM

## 2024-08-27 DIAGNOSIS — F41.1 GAD (GENERALIZED ANXIETY DISORDER): Chronic | ICD-10-CM

## 2024-08-27 PROCEDURE — 90834 PSYTX W PT 45 MINUTES: CPT | Performed by: SOCIAL WORKER

## 2024-08-27 NOTE — PSYCH
Virtual Regular Visit    Verification of patient location:    Patient is located at Home in the following state in which I hold an active license [place]      Assessment/Plan:    Problem List Items Addressed This Visit          Behavioral Health    SHELLY (generalized anxiety disorder) (Chronic)    Bipolar I disorder, most recent episode depressed, in full remission (HCC) - Primary    Post traumatic stress disorder       Goals addressed in session: Goal 1          Reason for visit is   Chief Complaint   Patient presents with    Virtual Regular Visit          Encounter provider CHARLIE Hutchinson      Recent Visits  No visits were found meeting these conditions.  Showing recent visits within past 7 days and meeting all other requirements  Today's Visits  Date Type Provider Dept   08/27/24 Telemedicine CHARLIE Hutchinson [facility] Therapist [place]   Showing today's visits and meeting all other requirements  Future Appointments  No visits were found meeting these conditions.  Showing future appointments within next 150 days and meeting all other requirements       The patient was identified by name and date of birth. Libertad Espinal was informed that this is a telemedicine visit and that the visit is being conducted throughthe Epic Embedded platform. She agrees to proceed..  My office door was closed. No one else was in the room.  She acknowledged consent and understanding of privacy and security of the video platform. The patient has agreed to participate and understands they can discontinue the visit at any time.    Patient is aware this is a billable service.     Subjective  Libertad Espinal is a 38 y.o. female.      HPI     Past Medical History:   Diagnosis Date    Abnormal Pap smear of cervix     Anxiety     Bipolar disorder (HCC)     COVID-19 01/12/2022    10/27/23 Per pt had 3 separate times - last year being the last episode    Depression     Eczema     Exposure to chlamydia     Resolved  18    Knee pain, left     Migraine without aura and without status migrainosus, not intractable 10/11/2019    Obesity     Post traumatic stress disorder 2021    Postpartum depression 2018    TMJ (dislocation of temporomandibular joint)     causes snoring    Manoj-Parkinson-White (WPW) syndrome        Past Surgical History:   Procedure Laterality Date    ABLATION OF DYSRHYTHMIC FOCUS      WPW ablation age 16    COLONOSCOPY      COLPOSCOPY      DISTAL PATELLAR REALIGNMENT Left 10/31/2023    Procedure: OPEN MPFL RECONSTRUCTION WITH ALLOGRAFT;  Surgeon: Wilbert Michael DO;  Location:  MAIN OR;  Service: Orthopedics    INDUCED       OTHER SURGICAL HISTORY      catheter ablation- WPW age 17    AK ARTHROSCOPY KNEE LATERAL RELEASE Left 10/31/2023    Procedure: RELEASE RETINACULAR;  Surgeon: Wilbert Michael DO;  Location:  MAIN OR;  Service: Orthopedics    TONSILLECTOMY      WISDOM TOOTH EXTRACTION         Current Outpatient Medications   Medication Sig Dispense Refill    Drospirenone 4 MG TABS Take 1 tablet by mouth in the morning 84 tablet 3    ergocalciferol (VITAMIN D2) 50,000 units Take 1 capsule (50,000 Units total) by mouth once a week for 12 doses Then transition to vitamin D 2000 units daily for maintenance. (Patient not taking: Reported on 2024) 12 capsule 0    lamoTRIgine (LaMICtal) 200 MG tablet Take 1 tablet (200 mg total) by mouth daily at bedtime 90 tablet 1    LORazepam (ATIVAN) 1 mg tablet Take 1 tablet (1 mg total) by mouth every 6 (six) hours as needed for anxiety 30 tablet 0    lurasidone (LATUDA) 20 mg tablet Take 1 tablet (20 mg total) by mouth daily with breakfast 30 tablet 1    naproxen (NAPROSYN) 500 mg tablet Take 1 tablet (500 mg total) by mouth 2 (two) times a day with meals (Patient taking differently: Take 500 mg by mouth 2 (two) times a day with meals) 30 tablet 0    sertraline (ZOLOFT) 100 mg tablet Take 1.5 tablets (150 mg total) by mouth daily 135 tablet 1     Vitamin D, Cholecalciferol, 50 MCG (2000 UT) CAPS Take 2,000 Units by mouth daily Start after completing 12 weeks of ergocalciferol. 90 capsule 3     No current facility-administered medications for this visit.        Allergies   Allergen Reactions    Codeine Other (See Comments)     dry heaves    Sulfa Antibiotics Hives and Rash     Per pt as achild    Erythromycin Hives     Per as a child       Review of Systems    Video Exam    There were no vitals filed for this visit.    Physical Exam     Behavioral Health Psychotherapy Progress Note    Psychotherapy Provided: Individual Psychotherapy     1. Bipolar I disorder, most recent episode depressed, in full remission ([facility])        2. SHELLY (generalized anxiety disorder)        3. Post traumatic stress disorder            Goals addressed in session: Goal 1     DATA: Met with Libertad for her scheduled individual session. She states that she participated in the PHP for one week. She reports that the program was very positive for her. She indicates that she learned more about her emotions and ways that she can practice managing them. Libertad states that she feels that she has been very wrapped up in taking care of her children-- as yesterday was their first day of school-- and she feels that she needs some additional time off from work, to practice her skills and better manage her anxiety. She is requesting an additional two weeks off from work. I informed her that she will need to discuss this with Dr. Sutton for his approval; however, I am supportive of this decision--as long as Dr. Sutton also is in agreement. Either way, Libertad and I will meet for a session next week, to discuss her progress toward her goals. We will also update her treatment plan to ensure that we address her current goals and reflect the issues she worked on in her PHP experience. Libertad states that she has been working on addressing her anger, and she would like to focus on her anxiety and her  "tendency to avoid her own anxious thoughts. She states that she is working on not engaging in control battles (she used an example related to her daughter), and she wants to continue to learn more skills to help in this area. She reports that she and her SO have been communicating a bit better as well. We will meet again in one week, for a follow up session.     During this session, this clinician used the following therapeutic modalities: Client-centered Therapy, Dialectical Behavior Therapy, Mindfulness-based Strategies, Motivational Interviewing, Solution-Focused Therapy, and Supportive Psychotherapy    Substance Abuse was not addressed during this session. If the client is diagnosed with a co-occurring substance use disorder, please indicate any changes in the frequency or amount of use: n/a. Stage of change for addressing substance use diagnoses: No substance use/Not applicable    ASSESSMENT:  Libertad Espinal presents with a Euthymic/ normal mood.     her affect is Normal range and intensity, which is congruent, with her mood and the content of the session. The client has made progress on their goals.    Libertad Espinal presents with a minimal risk of suicide, minimal risk of self-harm, and minimal risk of harm to others.    For any risk assessment that surpasses a \"low\" rating, a safety plan must be developed.    A safety plan was indicated: no  If yes, describe in detail n/a    PLAN: Between sessions, Libertad Espinal will continue to practice mindfulness-based skills. She will develop a \"thermometer\" regarding her anxiety and will bring it to her next session. At the next session, the therapist will use Client-centered Therapy, Dialectical Behavior Therapy, Mindfulness-based Strategies, Motivational Interviewing, Solution-Focused Therapy, and Supportive Psychotherapy to address her mood regulation and relationship concerns.    Behavioral Health Treatment Plan and Discharge Planning: Libertad" Letty Espinal is aware of and agrees to continue to work on their treatment plan. They have identified and are working toward their discharge goals. yes    Visit start and stop times:    08/27/24  Start Time: 0910  Stop Time: 0957  Total Visit Time: 47 minutes

## 2024-08-27 NOTE — TELEPHONE ENCOUNTER
Patient called in to switch their talk therapy appointment today (8/27.24 @9am) to a virtual appointment via [employer].    Writer switched appointment type and notified the therapist.

## 2024-08-28 ENCOUNTER — DOCUMENTATION (OUTPATIENT)
Dept: PSYCHOLOGY | Facility: CLINIC | Age: 39
End: 2024-08-28

## 2024-08-28 ENCOUNTER — TELEMEDICINE (OUTPATIENT)
Dept: FAMILY MEDICINE CLINIC | Facility: CLINIC | Age: 39
End: 2024-08-28
Payer: COMMERCIAL

## 2024-08-28 ENCOUNTER — PATIENT MESSAGE (OUTPATIENT)
Dept: FAMILY MEDICINE CLINIC | Facility: CLINIC | Age: 39
End: 2024-08-28

## 2024-08-28 DIAGNOSIS — R09.81 SINUS CONGESTION: Primary | ICD-10-CM

## 2024-08-28 PROCEDURE — 99213 OFFICE O/P EST LOW 20 MIN: CPT | Performed by: FAMILY MEDICINE

## 2024-08-28 RX ORDER — PSEUDOEPHEDRINE HCL 30 MG
60 TABLET ORAL EVERY 6 HOURS PRN
Qty: 20 TABLET | Refills: 0 | Status: SHIPPED | OUTPATIENT
Start: 2024-08-28

## 2024-08-28 NOTE — PROGRESS NOTES
Virtual Regular Visit  Name: Libertad Espinal      : 1985      MRN: [id number]  Encounter Provider: Néstor Christiansen MD  Encounter Date: 2024   Encounter department: [facility]    Verification of patient location:    Patient is located at Home in the following state in which I hold an active license [place]    Assessment & Plan   1. Sinus congestion    Symptoms consistent with viral illness, sudafed for symptom relief, follow up if not improving.     Encounter provider Néstor Christiansen MD    The patient was identified by name and date of birth. Libertad Espinal was informed that this is a telemedicine visit and that the visit is being conducted through the Epic Embedded platform. She agrees to proceed..  My office door was closed. No one else was in the room.  She acknowledged consent and understanding of privacy and security of the video platform. The patient has agreed to participate and understands they can discontinue the visit at any time.    Patient is aware this is a billable service.     History of Present Illness     HPI    38 year old for worsening congestion for 6 days, has been mucinex and cold and flu medicaiton without relief.    Has been aftebrile.    Other family members withsimilar illness    Did COVID test at home and was negative.      Review of Systems   Constitutional:  Negative for activity change and appetite change.   HENT:  Positive for congestion, rhinorrhea, sinus pressure and sinus pain.        Objective     There were no vitals taken for this visit.  Physical Exam  Constitutional:       Appearance: Normal appearance.   Neurological:      Mental Status: She is alert.         Visit Time  Total Visit Duration: 10 minutes

## 2024-08-28 NOTE — PATIENT COMMUNICATION
8/28 8:40am: Spoke w/PT, scheduled for a Virtual Visit today, 8/28/24 at 11:40am w/PCP. Per PT:  will get her a Covid test from [facility], and she will do the test while on the Virtual visit.

## 2024-08-28 NOTE — PROGRESS NOTES
[facility] Suicide Follow Up [facility]    This writer spoke with Libertad Espinal today - 7 days post discharge from [facility].   Reviewed crisis information.  Libertad Espinal reports not taking some medication. If no, counseled to take medication as prescribed.  Libertad Espinal reports awareness of aftercare appointments.    Ashtyn George

## 2024-08-29 ENCOUNTER — TELEPHONE (OUTPATIENT)
Age: 39
End: 2024-08-29

## 2024-08-29 ENCOUNTER — TELEMEDICINE (OUTPATIENT)
Dept: PSYCHIATRY | Facility: CLINIC | Age: 39
End: 2024-08-29
Payer: COMMERCIAL

## 2024-08-29 DIAGNOSIS — F31.81 BIPOLAR II DISORDER (HCC): ICD-10-CM

## 2024-08-29 PROCEDURE — 99213 OFFICE O/P EST LOW 20 MIN: CPT | Performed by: PSYCHIATRY & NEUROLOGY

## 2024-08-29 RX ORDER — LURASIDONE HYDROCHLORIDE 40 MG/1
40 TABLET, FILM COATED ORAL
Qty: 30 TABLET | Refills: 1 | Status: SHIPPED | OUTPATIENT
Start: 2024-08-29

## 2024-08-29 NOTE — TELEPHONE ENCOUNTER
Pt called to get her appt for 8/29/24 at 10:30am switched to a virtual visit due to being sick.  Pt has [employer] and will connect with provider through [employer]. Writer switched appt and checked it in.

## 2024-08-29 NOTE — PSYCH
MEDICATION MANAGEMENT NOTE    PSYCHIATRIC VIRTUAL VISIT        [facility]      Name and Date of Birth:  Libertad Espinal 38 y.o. 1985 MRN: [id number]    Psychiatric Virtual Visit:    Verification of patient location: Patient is located in the state that I hold an active license: [place]    REQUIRED DOCUMENTATION:     Provider located at [facility] at [address] [place] [address].  TeleMed provider: Chip Sutton MD  Patient was identified by name and date of birth. Libertad Espinal was informed that this is a telemedicine visit and that the visit is being conducted through the Epic Embedded platform. She agrees to proceed..  My office door was closed. No one else was in the room.  She acknowledged consent and understanding of privacy and security of the video platform. The patient has agreed to participate and understands they can discontinue the visit at any time. Patient is aware this is a billable service.         This note was shared with patient.    I spent 17 minutes directly with the patient during this visit        Review Of Systems:     Mood Anxious and Depressed   Thought Content Normal   General Changes in energy level, Changes in interest, and As HPI   Physical symptoms As Noted in HPI       Laboratory Results: No results found for this or any previous visit.    Subjective:    She reports persistence of intense anxiety and depressive symptoms.  She continues to have impaired concentration.  She does attend to her children's needs.  She completed the treatment course at [facility].  She reports that in the past couple of weeks, she was frequently forgetting to take sertraline and decided to stop it last week and has not noticed any changes.  She believes that it was not doing much for her anyway.  Given her history of bipolar disorder it is a better idea to focus on Latuda in combination with lamotrigine.  Her  job as a  requires intact concentration, stable mood and lack of irritability and ability to compose self and at this time she does not appear to be stable enough to return to work particularly since we are making changes in her medications.      Objective:       Mental status:  Appearance calm and cooperative  and good eye contact    Mood Depressed and Anxious   Affect affect was blunted and affect was constricted   Speech Normal rate and Normal volume   Thought Processes coherent/organized   Hallucinations Denies hallucinations and does not appear to be responding to internal stimuli   Thought Content Does not verbalize delusional material   Abnormal Thoughts no suicidal thoughts    Orientation A+O x 3       Assessment/Plan:       Diagnoses and all orders for this visit:    Bipolar II disorder (HCC)  -     lurasidone (LATUDA) 40 mg tablet; Take 1 tablet (40 mg total) by mouth daily with breakfast        1. Bipolar II disorder (HCC)        Treatment Recommendations- Risks Benefits    The patient was advised regarding potential SSRI withdrawal symptoms mainly anxiety and GI related.  Latuda will be increased to 40 mg daily.  She will be maintained on lamotrigine 200 mg daily.  Follow-up in 2 weeks  Risks, Benefits And Possible Side Effects Of Medications:  Risks, benefits, and possible side effects of medications explained to patient and patient verbalizes understanding    VIRTUAL VISIT DISCLAIMER    Libertad Espinal verbally agrees to participate in Virtual Care Services. Pt is aware that Virtual Care Services could be limited without vital signs or the ability to perform a full hands-on physical exam. Libertad Espinal understands she or the provider may request at any time to terminate the video visit and request the patient to seek care or treatment in person.     Chip Sutton MD 08/29/24

## 2024-08-30 ENCOUNTER — TELEPHONE (OUTPATIENT)
Dept: PSYCHIATRY | Facility: CLINIC | Age: 39
End: 2024-08-30

## 2024-08-30 LAB
DME PARACHUTE DELIVERY DATE REQUESTED: NORMAL
DME PARACHUTE ITEM DESCRIPTION: NORMAL
DME PARACHUTE ORDER STATUS: NORMAL
DME PARACHUTE SUPPLIER NAME: NORMAL
DME PARACHUTE SUPPLIER PHONE: NORMAL

## 2024-08-30 NOTE — TELEPHONE ENCOUNTER
Called and left message for patient to return a call to [phone] and schedule 2 week follow up with provider (Chip Sutton). Please schedule upon return call. Thank you.

## 2024-09-03 ENCOUNTER — SOCIAL WORK (OUTPATIENT)
Dept: BEHAVIORAL/MENTAL HEALTH CLINIC | Facility: CLINIC | Age: 39
End: 2024-09-03
Payer: COMMERCIAL

## 2024-09-03 DIAGNOSIS — F43.10 POST TRAUMATIC STRESS DISORDER: ICD-10-CM

## 2024-09-03 DIAGNOSIS — F41.1 GAD (GENERALIZED ANXIETY DISORDER): Chronic | ICD-10-CM

## 2024-09-03 DIAGNOSIS — F31.81 BIPOLAR II DISORDER (HCC): Primary | ICD-10-CM

## 2024-09-03 PROCEDURE — 90837 PSYTX W PT 60 MINUTES: CPT | Performed by: SOCIAL WORKER

## 2024-09-03 NOTE — TELEPHONE ENCOUNTER
Scheduled f/u appt with provider. First available was 9/16. Pt said her first day back is also 9/16 but wasn't sure if she should go to work or wait for the sign off. Writer explained I would reach out to provider for clarification.     LVM for PT to inform her the provider wants to see her and evaluate to return to work. Provider will sign off on the appt for return

## 2024-09-03 NOTE — PSYCH
"Behavioral Health Psychotherapy Progress Note    Psychotherapy Provided: Individual Psychotherapy     1. Bipolar II disorder (HCC)        2. SHELLY (generalized anxiety disorder)        3. Post traumatic stress disorder            Goals addressed in session: Goal 1     DATA: Met with Libertad for her scheduled individual session. She states, \"I think I messed up my meds. I stopped my sertraline.\" She states that she met with Dr. Sutton, and he increased her Latuda. She reports that her moods are very irritable and she verbalized some symptoms of depression. She states that she has been feeling a great deal of frustration when dealing with her children-- which is not typical for her. In addition, she endorses a significant increase in anxiety-- which was also visible in the session by her inability to stop body movements (moving her legs or shaking her feet). Libertad states that she has had some suicidal ideation. This clinician spoke with her in more detail, and she denies any plan or intent. She states that she feels that perhaps she should not have stopped her sertraline. She will discuss this with Dr. Sutton. This clinician sent a message to Dr. Sutton to inform him of her desire to restart the sertraline.We discussed the skills that Libertad learned while she was in the PHP. She states that she is trying to practice the skills, but she is having more difficulty with managing her moods. We agreed that she will have a session next week, to check in on her overall mental health status. She acknowledged understanding that she can reach out to this clinician between sessions if she needs additional support.     During this session, this clinician used the following therapeutic modalities: Client-centered Therapy, Dialectical Behavior Therapy, Mindfulness-based Strategies, Motivational Interviewing, Solution-Focused Therapy, and Supportive Psychotherapy    Substance Abuse was not addressed during this session. If the " "client is diagnosed with a co-occurring substance use disorder, please indicate any changes in the frequency or amount of use: n/a. Stage of change for addressing substance use diagnoses: No substance use/Not applicable    ASSESSMENT:  Libertad Espinal presents with a Anxious and Dysthymic mood.     her affect is Normal range and intensity, which is congruent, with her mood and the content of the session. The client has not made progress on their goals.    Libertad Espinal presents with a minimal risk of suicide, minimal risk of self-harm, and minimal risk of harm to others.    For any risk assessment that surpasses a \"low\" rating, a safety plan must be developed.    A safety plan was indicated: no  If yes, describe in detail n/a    PLAN: Between sessions, Libertad Espinal will continue to monitor her moods. She will await Dr. Sutton's response regarding her medications. If needed, Libertad will reach out for additional support. She will attend another session (virtually) next week, to reassess her overall MH status. At the next session, the therapist will use Client-centered Therapy, Dialectical Behavior Therapy, Mindfulness-based Strategies, Motivational Interviewing, Solution-Focused Therapy, and Supportive Psychotherapy to address her mood regulation and relationships.    Behavioral Health Treatment Plan and Discharge Planning: Libertad Espinal is aware of and agrees to continue to work on their treatment plan. They have identified and are working toward their discharge goals. yes    Visit start and stop times:    09/03/24  Start Time: 0904  Stop Time: 1000  Total Visit Time: 56 minutes  "

## 2024-09-04 NOTE — TELEPHONE ENCOUNTER
Writer rec a call from pt regarding extension on paperwork for leave from work. Pt would like a call back due to not rec letter yet.

## 2024-09-05 NOTE — TELEPHONE ENCOUNTER
Called and spoke to patient about scheduling sooner appointment. Patient is unable to come in sooner due to conflicts In personal schedule.     During this phone conversation, patient mentioned that her  is requesting notes from her last appointment with Dr. Sutton and Radhika Schrader.  also requesting letter from Dr. Sutton stating her next appointment is 9/16 and return to work date is dependent upon review in that next appointment. Message about letter sent to provider, records request sent to .     : [facility] - Disability   P: [phone]  F: [phone]  Email: [email or web address]

## 2024-09-09 ENCOUNTER — DOCUMENTATION (OUTPATIENT)
Dept: BEHAVIORAL/MENTAL HEALTH CLINIC | Facility: CLINIC | Age: 39
End: 2024-09-09

## 2024-09-09 ENCOUNTER — TELEPHONE (OUTPATIENT)
Dept: PSYCHOLOGY | Facility: CLINIC | Age: 39
End: 2024-09-09

## 2024-09-10 ENCOUNTER — TELEPHONE (OUTPATIENT)
Age: 39
End: 2024-09-10

## 2024-09-10 NOTE — TELEPHONE ENCOUNTER
Libertad's employer sent over a fax with a form to be filled out for medical leave. They want more information to extend the medical leave. She provided employer with the note from Dr Sutton that it would be pending her next appt date of 9/16/24 with Dr Sutton. Libertad found out today that the employer is pushing for a deadline date of 9/12/24. Libertad wanted to give a heads up and apologize to Dr Sutton since they are giving such short notice of this deadline.

## 2024-09-12 ENCOUNTER — TELEPHONE (OUTPATIENT)
Dept: BEHAVIORAL/MENTAL HEALTH CLINIC | Facility: CLINIC | Age: 39
End: 2024-09-12

## 2024-09-12 NOTE — TELEPHONE ENCOUNTER
T/C to follow up with Libertad. I was not able to have a session with her, due to scheduling conflicts. She states that she continues to feel an increase in anxiety, irritability, and depression. She states that she is not sure if the symptoms are related to not taking the zoloft for a while or if she is having a reaction from the Latuda. She reports that she has some fleeting passive suicidal ideation; however, she adamantly states that she has no plan or intent. Libertad states that she is scheduled to see Dr. Sutton on Monday. Her SO has the entire day planned to celebrate her birthday tomorrow.

## 2024-09-16 ENCOUNTER — OFFICE VISIT (OUTPATIENT)
Dept: PSYCHIATRY | Facility: CLINIC | Age: 39
End: 2024-09-16
Payer: COMMERCIAL

## 2024-09-16 DIAGNOSIS — F31.81 BIPOLAR II DISORDER (HCC): Primary | ICD-10-CM

## 2024-09-16 PROCEDURE — 99213 OFFICE O/P EST LOW 20 MIN: CPT | Performed by: PSYCHIATRY & NEUROLOGY

## 2024-09-16 RX ORDER — LURASIDONE HYDROCHLORIDE 80 MG/1
80 TABLET, FILM COATED ORAL
Qty: 30 TABLET | Refills: 0 | Status: SHIPPED | OUTPATIENT
Start: 2024-09-16

## 2024-09-16 RX ORDER — LURASIDONE HYDROCHLORIDE 60 MG/1
60 TABLET, FILM COATED ORAL
Qty: 7 TABLET | Refills: 0 | Status: SHIPPED | OUTPATIENT
Start: 2024-09-16
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
## 2024-09-16 NOTE — PSYCH
Libertad Espinal 1985 [id number]     The patient was seen for continuing care and pharmacotherapy.  She does not report any improvement of her mood and in fact she feels worse.  She is constantly angry and becomes easily tearful.  There are no stressors in her life.  Her appetite has been variable with periods of not eating at all alternated by binge eating.  She sleeps poorly.  Denies current suicidal thoughts.  She has tolerated Latuda 40 mg daily.  Her concentration remains poor.  She appeared to have psychomotor retardation during the session.        ROS:  As HPI  Current Mental Status Evaluation:  Appearance:  Adequate hygiene and grooming and Good eye contact   Behavior:  Psychomotor Retardation   Mood:  Depressed, Anxious, and Angry   Affect: constricted and Tearful   Speech: Soft and Normal rate   Thought Process:  Goal directed and coherent   Thought Content:  Does not verbalize delusional material   Perceptual Disturbances: Denies hallucinations and does not appear to be responding to internal stimuli   Risk Potential: No suicidal or homicidal ideation   Orientation:         Diagnosis ICD-10-CM Associated Orders   1. Bipolar II disorder ([facility])  F31.81 lurasidone 60 MG TABS     lurasidone (LATUDA) 80 mg tablet             Current Outpatient Medications   Medication Instructions    Drospirenone 4 MG TABS 1 tablet, Oral, Daily    ergocalciferol (VITAMIN D2) 50,000 Units, Oral, Weekly, Then transition to vitamin D 2000 units daily for maintenance.    lamoTRIgine (LAMICTAL) 200 mg, Oral, Daily at bedtime    LORazepam (ATIVAN) 1 mg, Oral, Every 6 hours PRN    lurasidone (LATUDA) 80 mg, Oral, Daily with breakfast    Lurasidone HCl 60 mg, Oral, Daily with breakfast    naproxen (NAPROSYN) 500 mg, Oral, 2 times daily with meals    pseudoephedrine (SUDAFED) 60 mg, Oral, Every 6 hours PRN    Vitamin D (Cholecalciferol) 2,000 Units, Oral, Daily, Start after completing 12 weeks of ergocalciferol.           Treatment Recommendations- Risks Benefits    We will increase Latuda to 60 mg daily for 1 week and then to 80 mg daily.  Continue with lamotrigine.  The patient was instructed to use lorazepam 1 mg 3 times daily as needed to control her anxiety and anger.  She will start PHP as of tomorrow  Risks, Benefits And Possible Side Effects Of Medications:  Risks, benefits, and possible side effects of medications explained to patient and patient verbalizes understanding    VIRTUAL VISIT DISCLAIMER    Libertad Espinal verbally agrees to participate in Virtual Care Services. Pt is aware that Virtual Care Services could be limited without vital signs or the ability to perform a full hands-on physical exam. Libertad Espinal understands she or the provider may request at any time to terminate the video visit and request the patient to seek care or treatment in person.     Chip Sutton MD 09/16/24

## 2024-09-17 ENCOUNTER — TELEMEDICINE (OUTPATIENT)
Dept: PSYCHOLOGY | Facility: CLINIC | Age: 39
End: 2024-09-17
Payer: COMMERCIAL

## 2024-09-17 ENCOUNTER — TELEMEDICINE (OUTPATIENT)
Dept: PSYCHIATRY | Facility: CLINIC | Age: 39
End: 2024-09-17
Payer: COMMERCIAL

## 2024-09-17 DIAGNOSIS — F31.81 BIPOLAR II DISORDER (HCC): ICD-10-CM

## 2024-09-17 DIAGNOSIS — F41.1 GAD (GENERALIZED ANXIETY DISORDER): Primary | Chronic | ICD-10-CM

## 2024-09-17 PROCEDURE — 90792 PSYCH DIAG EVAL W/MED SRVCS: CPT | Performed by: PSYCHIATRY & NEUROLOGY

## 2024-09-17 PROCEDURE — 90791 PSYCH DIAGNOSTIC EVALUATION: CPT

## 2024-09-17 PROCEDURE — 90834 PSYTX W PT 45 MINUTES: CPT

## 2024-09-17 PROCEDURE — G0410 GRP PSYCH PARTIAL HOSP 45-50: HCPCS

## 2024-09-17 NOTE — PSYCH
"Visit Time    Visit Start Time: 0930  Visit Stop Time: 1030  Total Visit Duration:  20 minutes    Subjective:     Patient ID: Libertad Espinal is a 39 y.o. female.    [facility] Clinical Progress Notes      Specialized Services Documentation  Therapist must complete separate progress note for each specific clinical activity in which the individual participated during the day.     Allied Therapy 0930-1030 This group was facilitated virtually in a private office using HIPAA Compliant and Approved [employer] Teams. Libertad Espinal arrived late, due to attending intake, to music therapy group regarding grief and loss. The group participated in a discussion about their own experiences with grief and loss. The group then participated in a jax discussion on the song \"[employer]\". The group read and discussed handouts regarding the stages of grief and general facts about grief. The group participated in a jax discussion on \"Because of You\". Libertad did not share due to arriving late. Some effort noted towards treatment goal. Continue AT to encourage the development and proactive use of coping techniques.  Tx Plan Objective: n/a, Therapist:  Cecilio Toro, ROSALBA, MT-BC  "

## 2024-09-17 NOTE — PSYCH
Visit Time    Visit Start Time: 0930  Visit Stop Time: 1010  Total Visit Duration: 40 minutes    Subjective:     Patient ID: Libertad Espinal is a 39 y.o. female.    [facility] Clinical Progress Notes      Specialized Services Documentation  Therapist must complete separate progress note for each specific clinical activity in which the individual participated during the day.       Case Management Note    Sulma BUTTS RN    Current suicide risk : Low        Met with Libertad Espinal virtually in a private office using HIPAA Compliant and Approved Microsoft Teams.  Libertad Espinal consented to the use of tele-video modality of treatment.   Reviewed program, program guidelines, initial paperwork reviewed: Consent for Treatment, PHP handbook, HIPPA, General Consent, Client Bill of Rights, and Smoking/Drug and Alcohol Policy. Release of Information obtained for emergency contact - boyfriend Saturnino Causey and PCP and Health Care Coordination Form. Libertad Espinal has hard copies of all paperwork and verbally gave consent. Reviewed and given on call number. PCP notified of admission and health care coordination form sent. Completed initial psycho-social evaluation and initial treatment goals discussed.    I,Libertad Espinal,am physically unable to provide a signature; however, I understand the nature of and am in agreement with the documentation presented to me via TEAMS.  I have received a copy through My Chart and/or the [employer].  I freely give verbal consent.  Name of Document (s) onsent for Treatment, PHP handbook, HIPPA, General Consent, Client Bill of Rights, and Smoking/Drug and Alcohol Policy. Release of Information obtained for emergency contact - boyfriend Saturnino Causey and PCP and Health Care Coordination Form. :  Witness to verbal consent: Sulma Chambers RN  Witness to verbal consent: Ramonita Beltrán         Medications changes/added/denied? Yes   - See Dr. Joseph Ruiz note    Treatment session number: Day 1     Individual Case Management Visit provided today? yes    [facility] follow up physician's orders: Admit to PHP - See Dr. Joseph Ruiz note

## 2024-09-17 NOTE — PSYCH
Subjective:     Patient ID: Libertad Espinal is a 39 y.o. female.     [facility] Clinical Progress Notes      Specialized Services Documentation  Therapist must complete separate progress note for each specific clinical activity in which the individual participated during the day.      Group Psychotherapy - Chair yoga  [id number] Libertad Espinal participated actively in a psychotherapy group focused on the benefits of chair yoga.  This writer led a discussion on correlation between mental health and chair yoga. Group members discussed their personal struggles with their wellness and what they would like to add to their daily routines. This writer had group member participate in a 30 minute guided chair yoga routine.  This writer encouraged the group members to partake in group discussion and utilize supplemental materials inside and outside of the program.  Libertad Espinal made good efforts towards progress goals which were displayed through participation in the discussion, note taking, and engagement in topic. Libertad Espinal shared  struggles to stay sat during the yoga exercise.  . Continue to run daily group psychotherapy to meet treatment needs and encourage Libertad Espinal to practice skills outside of program.    Tx Plan Objective: 1.1,1.2,1.4 Therapist: Ashley Costenbader MA LMT       This group was facilitated virtually in a private office using HIPAA Compliant and Approved Microsoft Teams.  Libertad Espinal consented to the use of tele-video modality of treatment.

## 2024-09-17 NOTE — PSYCH
Virtual Regular Visit    Verification of patient location:    Patient is located at Home in the following state in which I hold an active license [place]      Assessment/Plan:    Problem List Items Addressed This Visit       Bipolar II disorder (HCC) - Primary       Goals addressed in session: PHP VIRTUAL GROUP DUE TO virtual preference of care           Reason for visit is No chief complaint on file.       Encounter provider  PARTIAL PSYCH PROGRAM      Recent Visits  No visits were found meeting these conditions.  Showing recent visits within past 7 days and meeting all other requirements  Future Appointments  No visits were found meeting these conditions.  Showing future appointments within next 150 days and meeting all other requirements       The patient was identified by name and date of birth. Libertad Espinal was informed that this is a telemedicine visit and that the visit is being conducted throughthe Microsoft Teams platform. She agrees to proceed..  My office door was closed. No one else was in the room.  She acknowledged consent and understanding of privacy and security of the video platform. The patient has agreed to participate and understands they can discontinue the visit at any time.    Patient is aware this is a billable service.     Subjective  Libertad Espinal is a 39 y.o. female I spent FIVE GROUP HOURS PLUS CASE MANAGEMENT minutes with patient today in which greater than 50% of time was spent in counseling/coordination of care regarding PHP - SEE NOTES  .      HPI     Past Medical History:   Diagnosis Date    Abnormal Pap smear of cervix     Anxiety     Bipolar disorder (HCC)     Bipolar I disorder, most recent episode depressed, in full remission (HCC) 07/19/2016    COVID-19 01/12/2022    10/27/23 Per pt had 3 separate times - last year being the last episode    Depression     Eczema     Exposure to chlamydia     Resolved 06/09/17    Knee pain, left     Migraine without aura and  without status migrainosus, not intractable 10/11/2019    Obesity     Post traumatic stress disorder 2021    Postpartum depression 2018    TMJ (dislocation of temporomandibular joint)     causes snoring    Manoj-Parkinson-White (WPW) syndrome        Past Surgical History:   Procedure Laterality Date    ABLATION OF DYSRHYTHMIC FOCUS      WPW ablation age 16    COLONOSCOPY      COLPOSCOPY      DISTAL PATELLAR REALIGNMENT Left 10/31/2023    Procedure: OPEN MPFL RECONSTRUCTION WITH ALLOGRAFT;  Surgeon: Wilbert Michael DO;  Location:  MAIN OR;  Service: Orthopedics    INDUCED       OTHER SURGICAL HISTORY      catheter ablation- WPW age 17    IL ARTHROSCOPY KNEE LATERAL RELEASE Left 10/31/2023    Procedure: RELEASE RETINACULAR;  Surgeon: Wilbert Michael DO;  Location:  MAIN OR;  Service: Orthopedics    TONSILLECTOMY      WISDOM TOOTH EXTRACTION         Current Outpatient Medications   Medication Sig Dispense Refill    Drospirenone 4 MG TABS Take 1 tablet by mouth in the morning 84 tablet 3    lamoTRIgine (LaMICtal) 200 MG tablet Take 1 tablet (200 mg total) by mouth daily at bedtime 90 tablet 1    LORazepam (ATIVAN) 1 mg tablet Take 1 tablet (1 mg total) by mouth every 6 (six) hours as needed for anxiety 30 tablet 0    lurasidone (LATUDA) 80 mg tablet Take 1 tablet (80 mg total) by mouth daily with breakfast 30 tablet 0    lurasidone 60 MG TABS Take 1 tablet (60 mg total) by mouth daily with breakfast 7 tablet 0    naproxen (NAPROSYN) 500 mg tablet Take 1 tablet (500 mg total) by mouth 2 (two) times a day with meals (Patient taking differently: Take 500 mg by mouth 2 (two) times a day with meals) 30 tablet 0    Vitamin D, Cholecalciferol, 50 MCG (2000 UT) CAPS Take 2,000 Units by mouth daily Start after completing 12 weeks of ergocalciferol. 90 capsule 3     No current facility-administered medications for this visit.        Allergies   Allergen Reactions    Codeine Other (See Comments)     dry  heaves    Sulfa Antibiotics Hives and Rash     Per pt as achild    Erythromycin Hives     Per as a child       Review of Systems    Video Exam    There were no vitals filed for this visit.    Physical Exam     Visit Time    Visit Start Time: 830  Visit Stop Time: 1430  Total Visit Duration:  300 minutes

## 2024-09-17 NOTE — PSYCH
[facility] Insurance Authorization for Treatment      Call Start Time: 1412  Call Stop Time: 1416  Total Visit Duration:  4 minutes    Subjective:     Patient ID: Libertad Espinal is a 39 y.o. female.    Phone call placed to [employer]  Phone number: [phone]  Tax ID and/or NPI used NPI [id number] ([employer])  Location: [address] [place] [address]  Spoke to Ed  Code Used for Authorization: none requested  Level of Care PHP      Clinical Faxed yes, to [phone]      Therapist: Sulma Chambers, RN, BSN

## 2024-09-17 NOTE — PSYCH
Subjective:     Patient ID: Libertad Espinal is a 39 y.o. female.    [facility] Clinical Progress Notes      Specialized Services Documentation  Therapist must complete separate progress note for each specific clinical activity in which the individual participated during the day.     Visit Time    Visit Start Time: 0830  Visit Stop Time: 0930  Total Visit Duration: 0 minutes      EDUCATION THERAPY    [id number]    Libertad Espinal was not present due to intake.    Tx Plan Objective: 1.1,1.2, Therapist: MIROSLAVA Lee       Visit Time    Visit Start Time: 1330  Visit Stop Time: 1430  Total Visit Duration: 60 minutes      GROUP PSYCHOTHERAPY    [id number]    Libertad Espinal participated actively in psychotherapy group.  This was observedConsistent throughout the treatment day. They were engaged in learning related to Illness, Medication, Aftercare, and Wellness Tools. Staff utilized Verbal, Written, A/V, and Demonstration teaching methods.  Libertad Espinal shared area of learning and set a goal for outside of program to decrease irritability and use breathing techniques.  Libertad Espinal was able to share items explored during the day and reviewed support groups and resources available .  Ended session with staff/peer led exercise on 3 breathing techniques.  Libertad Espinal demonstrated good progress toward goal.  Continue group psychotherapy to actively practice learned skills and encourage transfer of knowledge to unstructured time.    Tx Plan Objective: 1.1,1.2,1.3,1.4 Therapist: MIROSLAVA Lee

## 2024-09-17 NOTE — BH TREATMENT PLAN
"    Assessment/Plan:      Diagnoses and all orders for this visit:    SHELLY (generalized anxiety disorder)    Bipolar II disorder (HCC)          Subjective:     Patient ID: Libertad Espinal is a 39 y.o. female.    [facility] Treatment Plan   AREAS OF NEED: Anxiety and bipolar II as evidenced by sadness, hopelessness, low energy, low motivation, decreased interest, excessive guilt, overeating, challenges completing ADL's, difficulty with decision making, poor concentration, ruminations, negative thoughts, irritability, angry outburst, agitation, passive death wishes and suicidal ideations without a plan or intent due to relationship, work, and financial stressors.  Date Initiated: 09/17/24    Strengths: \"multitasking, being supportive\"     LONG TERM GOAL:   Date Initiated: 09/17/24  1.0  I will identify 3 signs that my depression and anxiety has improved since starting program.   Target Date: 10/15/2024  Completion Date:       SHORT TERM OBJECTIVES:     Date Initiated: 09/17/24  1.1 I will learn and practice daily a new coping technique to improve my day-to-day functioning.  Revision Date:   Target Date: 09/26/2024  Completion Date:     Date Initiated: 09/17/24  1.2 I will learn two new self motivation skills and implement into my daily routine in order to improve my depression  Revision Date:   Target Date: 09/26/2024  Completion Date:    Date Initiated: 09/17/24  1.3 I will take medications as prescribed and share questions and concerns if arise.    Revision Date:  Target Date: 09/26/2024  Completion Date:     Date Initiated: 09/17/24  1.4 I will identify 3 ways my supports can assist in my recovery and agree to staff/support contact as indicated.    Revision Date:  Target Date: 09/26/2024  Completion Date:          7 DAY REVISION:    Date Initiated:  Revision Date:   Target Date:   Completion Date:      PSYCHIATRY:  Date Initiated:  09/17/24  Medication Management and Education       Revision Date:       The " person(s) responsible for carrying out the plan is Luis Wheat DO; Gretchen Barbour PA-C    NURSING/SYMPTOM EDUCATION:   Date Initiated: 09/17/24  1.1, 1.2. 1.3, 1.4 This RN/Or Therapist will provide wellness/symptoms and skill education groups three to five days weekly to educate Libertad Espinal on signs and symptoms of diagnoses, skills to manage, and medication questions that will be addressed by the treatment team.    Revision date:  The person(s) responsible for carrying out the plan is BECKIE Walsh RN; Herman DAWSON Ed; Ashtyn RUBIN    PSYCHOLOGY:   Date Initiated: 09/17/24       1.1, 1.2, 1.4 Provide psychotherapy group 5 times per week to allow opportunity for Libertad Espinal  to explore stressors and ways of coping.   Revision Date:   The person(s) responsible for carrying out the plan is Iliana Ramirez MSW,LSW; Usha Cotto MA LMT    ALLIED THERAPY:   Date Initiated: 09/17/24  1.1,1.2 Engage Libertad Espinal in AT group 5 times weekly to encourage development and use of wellness tools to decrease symptoms and promote recovery through meaningful activity.  Revision Date:       The person(s) responsible for carrying out the plan is BRIAN RamirezBC; Cecilio TORRESBC    CASE MANAGEMENT:   Date Initiated: 09/17/24      1.0 This  will meet with Libertad Espinal  3-4 times weekly to assess treatment progress, discharge planning, connection to community supports and UR as indicated.  Revision Date:   The person(s) responsible for carrying out the plan is Sulma BUTTS RN    TREATMENT REVIEW/COMMENTS:     DISCHARGE CRITERIA: Identify 3 signs of progress and complete relapse prevention plan.    DISCHARGE PLAN: Connect with identified outpatient providers.   Estimated Length of Stay: 10 treatment days       Diagnosis and Treatment Plan explained to Libertad Maurer relates understanding diagnosis and is agreeable to Treatment Plan.          CLIENT COMMENTS / Please share your thoughts, feelings, need and/or experiences regarding your treatment plan with Staff.  Please see follow up note with comments.      Signatures can be found on [facility] Treatment plan consent form.
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
## 2024-09-17 NOTE — PSYCH
Group Psychotherapy      Visit Start Time: (1045)  Visit Stop Time: (1145)  Total Visit Duration:  60 minutes    Subjective:     Patient ID: Libertad Espinal 39 y.o.    This group was facilitated virtually in a private office using HIPAA Compliant and Approved [employer] Teams.  Libertad Espinal actively engaged in psychoeducational group about motivation. The skill helps to explore purpose of motivation and the limiting beliefs that hold back motivation The group discovered strategies that help them to develop motivation and the potential barriers on a short term and long term basis. The group talked about understanding the purpose, and meaning of motivation in intellectual and emotional expression, regulation , and recognition, and how it affects themselves and others. Teaching on the emphasis of self monitoring , suggestive solutions, verbal and non-verbal communication, keeping commitments, and strategies were discusses to reduce limited motivation.  Group was encouraged to ask questions in an open forum at the end of group. Adequate progress displayed through engagement in topic,Libertad was an active listener during the group sesson . Libertad Espinal will continue to engage in psychotherapy to encourage positive self realization.  Treatment Plan Objective 1.1, 1.2, 1.3, 1.4 Therapist: Herman DAWSON Ed.

## 2024-09-18 ENCOUNTER — DOCUMENTATION (OUTPATIENT)
Dept: PSYCHOLOGY | Facility: CLINIC | Age: 39
End: 2024-09-18

## 2024-09-18 ENCOUNTER — TELEPHONE (OUTPATIENT)
Age: 39
End: 2024-09-18

## 2024-09-18 ENCOUNTER — APPOINTMENT (OUTPATIENT)
Dept: PSYCHOLOGY | Facility: CLINIC | Age: 39
End: 2024-09-18
Payer: COMMERCIAL

## 2024-09-18 NOTE — PROGRESS NOTES
[employer] Insurance Authorization for Treatment          Subjective:     Patient ID: Libertad Espinal is a 39 y.o. female.    Phone call placed to [employer]" on 09/17/2024 at 1412  Phone number: [phone]  Tax ID and/or NPI used NPI [id number] ([employer] [place])  Location: [address] [place] [address]  Spoke to Ed- instructions received to fax clinical.   Code Used for Authorization: none requested  Level of Care PHP      Clinical Faxed yes, to [phone]    Today, 09/18/2024 received fax from [employer]                         14 treatment days authorized from 9/17/2024 through   2/17/24.                                                   Auth number [id number]                           Therapist: Sulma Chambers, RN, BSN

## 2024-09-18 NOTE — PSYCH
Visit Time    Visit Start Time: 0930  Visit Stop Time: 1030  Total Visit Duration: *** minutes    Subjective:     Patient ID: Libertad Espinal is a 39 y.o. female.    [facility] Clinical Progress Notes      Specialized Services Documentation  Therapist must complete separate progress note for each specific clinical activity in which the individual participated during the day.     Allied Therapy 0930-1030 This group was facilitated virtually in a private office using HIPAA Compliant and Approved [employer] Teams. Libertad Espinal actively participated in an open process music therapy group regarding music auto-biographies. The group was asked to identify songs that carried meaning for different phases of their lives. The group was then asked to share a song from when they first became aware of their mental health struggles, their present self, and a future vision of themself. The group participated in an open processing of the songs shared. Libertad shared the song *** which represented ***. *** effort noted towards treatment goal. Continue AT to encourage the development of self understanding and a hopeful vision of the future. Tx Plan Objective: 1.1,1.2,1.4, Therapist:  Cecilio Toro, ROSALBA, MT-BC

## 2024-09-18 NOTE — PSYCH
Visit Time    Visit Start Time: 0830  Visit Stop Time: 0930  Total Visit Duration: {Psych Total Visit Time:15398}    Subjective:     Patient ID: Libertad Espinal is a 39 y.o. female.    [facility] Clinical Progress Notes      Specialized Services Documentation  Therapist must complete separate progress note for each specific clinical activity in which the individual participated during the day.       EDUCATION THERAPY      Libertad Espinal {SL AMB PSYCH ACTIVELY:07234} shared in morning assessment and goal review. Presented as {Readiness to Learnin} related to readiness to learn. Libertad Espinal  {DID/DID NOT:00375} complete goal from last treatment day identifying gaining {HEARS:21543}. Libertad Espinal {DID/DID NOT:87632} present with any barriers to learning. Throughout morning group, Libertad Espinal participated in {morning assessmemt experiences:61071}. Libertad Espinal made *** progress toward goal. Continue education group to assess willingness to engage, assess transfer of knowledge, and participate in skill development.    Tx Plan Objective: 1.1, 1.2, 1.3, 1.4 , Therapist: Herman Pierce    Visit Time    Visit Start Time: 1330  Visit Stop Time: 1430  Total Visit Duration: {Psych Total Visit Time:94121}    Subjective:     Patient ID: Libertad Espinal is a 39 y.o. female.    [facility] Clinical Progress Notes      Specialized Services Documentation  Therapist must complete separate progress note for each specific clinical activity in which the individual participated during the day.       GROUP PSYCHOTHERAPY    Libertad Espinal participated {SL AMB PSYCH ACTIVELY:48492} in psychotherapy group.  This was observed {consistent/inconsistent pain:3794863927} throughout the treatment day. They were engaged in learning related to {Education Completed:29949}. Staff utilized {Teaching Method:19700} teaching methods.  Libertad Espinal shared area of learning and set  a goal for outside of program to ***.  Libertad Espinal was able to share items explored during the day and shared in adding to their WRAP.  Ended session with {staff/peer led:62139} led exercise ***.  Libertad Espinal demonstrated *** progress toward goal.  Continue group psychotherapy to actively practice learned skills and encourage transfer of knowledge to unstructured time.    Tx Plan Objective: 1.1, 1.2, 1.3, 1.4 , Therapist: Herman Pierce    Group Psychotherapy      Visit Start Time: (1215)  Visit Stop Time: (5425)  Total Visit Duration: {Psych Total Visit Time:92431}  Group Therapy    Subjective:     Patient ID: Libertad Espinal 39 y.o.       This group was facilitated virtually in a private office using HIPAA Compliant and Approved [employer] Teams.  Libertad Espinal actively engaged in psychoeducational group about coping with loneliness. The skill helps to identify way to decrease feeling of loneliness and behavior change focused toward a specified goal. Group explored the identifying factors that create loneliness, this process can help them to take care of emotional and intellectual moments of loneliness on a short term and long term basis. The group talked about understanding the meaning of loneliness in regards to physical, intellectual, and emotional expression, regulation , and recognition, and how it affects themselves and others. Teaching on the emphasis of self monitoring and relapse, the group explored who they can go to for help was brought up as well. Group was encouraged to ask questions in an open forum at the end of group. *** progress displayed through engagement in topic.Libertad Espinal will continue to engage in psychotherapy to encourage positive self realization.  Treatment Plan Objective 1.1, 1.2, 1.3, 1.4 Therapist: Herman DAWSON Ed.

## 2024-09-18 NOTE — PROGRESS NOTES
Subjective:     Patient ID: Libertad Espinal is a 39 y.o. female.    [facility] Clinical Progress Notes      Specialized Services Documentation  Therapist must complete separate progress note for each specific clinical activity in which the individual participated during the day.       Case Management Note    Sulma Chambers RN, BSN    Current suicide risk : Low     Spoke with Libertad Espinal at 0830 today, she states feeling angry and does not want to have any outbursts during group. Encouraged Libertad to come into group as it could be beneficial. Libertad did not commit either way. Libertad did not attend group today. Excused absence day #1.     Medications changes/added/denied? No    Treatment session number: N/A    Individual Case Management Visit provided today? Yes     [facility] follow up physician's orders: No new orders

## 2024-09-18 NOTE — TELEPHONE ENCOUNTER
Katiuska from [employer] called in requesting a call back from the germainens provider or grey to discuss the PHP program more in detail that the doctor referred the patient to.    The best telephone number to reach back out is [phone]

## 2024-09-18 NOTE — PSYCH
Visit Time    Visit Start Time: 1045  Visit Stop Time: 1145  Total Visit Duration: 60 minutes    Subjective:     Patient ID: Libertad Espinal is a 39 y.o. female.    [facility] Clinical Progress Notes      Specialized Services Documentation  Therapist must complete separate progress note for each specific clinical activity in which the individual participated during the day.       Group Psychotherapy Life Skills ([id number])  Libertad Espinal actively engaged in group focused on core beliefs which was facilitated virtually in a private office using HIPAA Compliant and Approved [employer] Teams. Libertad consented to the use of tele-video modality of treatment and was virtually present for group psychotherapy today. Group members watched a short video, Healing Your Negative Core Beliefs. Group members learned and share about their core beliefs. The group discussed how core beliefs influence their thoughts and behaviors. During the activity the group learned about cognitive reframing and explored ways to reshape their negative core beliefs into positive beliefs. Group members wrote one of their negative core beliefs and had to provide three pieces of evidence that prove the negative core belief untrue. The group learned how to re-frame their irrational thoughts and turn them into positive thoughts. Libertad shared how they reframed their thought. Discussed thought records and how to use them. Group members were asked to challenge one negative thought per day. Libertad continues to make progress towards goals through verbal participation in group; to accomplish long term goals continue to utilize skills learned in programming. Continue with psychotherapy to educate and encourage use of wellness tools. Tx Plan Objective: 1.1,1.2 Therapist: Iliana Ramirez,MSW, LSW

## 2024-09-19 ENCOUNTER — TELEMEDICINE (OUTPATIENT)
Dept: BEHAVIORAL/MENTAL HEALTH CLINIC | Facility: CLINIC | Age: 39
End: 2024-09-19
Payer: COMMERCIAL

## 2024-09-19 ENCOUNTER — APPOINTMENT (OUTPATIENT)
Dept: PSYCHOLOGY | Facility: CLINIC | Age: 39
End: 2024-09-19
Payer: COMMERCIAL

## 2024-09-19 ENCOUNTER — DOCUMENTATION (OUTPATIENT)
Dept: PSYCHOLOGY | Facility: CLINIC | Age: 39
End: 2024-09-19

## 2024-09-19 ENCOUNTER — DOCUMENTATION (OUTPATIENT)
Dept: PSYCHIATRY | Facility: CLINIC | Age: 39
End: 2024-09-19

## 2024-09-19 DIAGNOSIS — F41.1 GAD (GENERALIZED ANXIETY DISORDER): Chronic | ICD-10-CM

## 2024-09-19 DIAGNOSIS — F31.81 BIPOLAR II DISORDER (HCC): Primary | ICD-10-CM

## 2024-09-19 DIAGNOSIS — F43.10 POST TRAUMATIC STRESS DISORDER: ICD-10-CM

## 2024-09-19 PROCEDURE — 90837 PSYTX W PT 60 MINUTES: CPT | Performed by: SOCIAL WORKER

## 2024-09-19 NOTE — PROGRESS NOTES
Visit Time    Visit Start Time: 0915  Visit Stop Time: 0930  Total Visit Duration:  15 minutes    Subjective:     Patient ID: Libertad Espinal is a 39 y.o. female.    [facility] Clinical Progress Notes      Specialized Services Documentation  Therapist must complete separate progress note for each specific clinical activity in which the individual participated during the day.       Case Management Note  This case management session was facilitated in a private office.  Sulma BUTTS RN    Current suicide risk : Low     Met with Libertad Espinal as she was a no call no show for PHP groups today. Libertad reported that she had an argument with her step children's mother. States she is very angry and unable to be in group because she doesn't want to hear anything from anyone in group, states she is a mess right now and wants to talk with her individual outpatient therapist. Libertad was reminded of attendance policy and encouraged to attend groups. . Worked on TIP skill with Libertad. Libertad states she is going to obtain an outpatient therapy appointment for today. Unable to review treatment plan with Libertad today due to absence.   According to  in [employer] Libertad has set up an outpatient therapist appointment for today.    Medications changes/added/denied? No    Treatment session number: N/A    Individual Case Management Visit provided today? Yes     [facility] follow up physician's orders: No new orders

## 2024-09-19 NOTE — PSYCH
Group Psychotherapy      Visit Start Time: (1215)  Visit Stop Time: (1315)  Total Visit Duration: {Psych Total Visit Time:85763}    Subjective:     Patient ID: Libertad Espinal 39 y.o.     [facility] Clinical Progress Notes    This group was facilitated virtually in a private office using HIPAA Compliant and Approved Microsoft Teams.  Libertad Espinal actively engaged in psychoeducational group about distress tolerances. The skills introduced help to maintain and regulate emotional fear and distress. Group discovered strategies that help them to manage emotional fear and distress on a short term and long term basis. The group talked about understanding the purpose, and meaning of emotional fear and distress in intellectual and emotional expression, regulation , and recognition, and how it affects themselves and others. Teaching on the skill process of ACCEPTS and DBT self-care, members of the group are able to go  for help or how to manage situations were discussed. Group was encouraged to ask questions in an open forum at the end of group. *** progress displayed through engagement in topic. Libertad Espinal will continue to engage in psychotherapy to encourage positive self realization.  Treatment Plan Objective 1.1, 1.2, 1.3, 1.4 Therapist: Herman DAWSON Ed.

## 2024-09-19 NOTE — TELEPHONE ENCOUNTER
Spoke with Katiuska. She said she needs to know the end date for the PHP. I was unable to find it in her chart. Forwarding to provider. Clinical will follow up as advised.

## 2024-09-20 ENCOUNTER — DOCUMENTATION (OUTPATIENT)
Dept: PSYCHOLOGY | Facility: CLINIC | Age: 39
End: 2024-09-20

## 2024-09-20 ENCOUNTER — TELEPHONE (OUTPATIENT)
Age: 39
End: 2024-09-20

## 2024-09-20 ENCOUNTER — APPOINTMENT (OUTPATIENT)
Dept: PSYCHOLOGY | Facility: CLINIC | Age: 39
End: 2024-09-20
Payer: COMMERCIAL

## 2024-09-20 LAB

## 2024-09-20 NOTE — PSYCH
Visit Time    Visit Start Time: 0830  Visit Stop Time: 0930  Total Visit Duration: {Psych Total Visit Time:30359}    Subjective:     Patient ID: Libertad Espinal is a 39 y.o. female.    [facility] Clinical Progress Notes      Specialized Services Documentation  Therapist must complete separate progress note for each specific clinical activity in which the individual participated during the day.       EDUCATION THERAPY      Libertad Espinal {SL AMB PSYCH ACTIVELY:70603} shared in morning assessment and goal review. Presented as {Readiness to Learnin} related to readiness to learn. Libertad Espinal  {DID/DID NOT:91959} complete goal from last treatment day identifying gaining {HEARS:89067}. Libertad Espinal {DID/DID NOT:29393} present with any barriers to learning. Throughout morning group, Libertad Espinal participated in {morning assessmemt experiences:88386}. Libertad Espinal made *** progress toward goal. Continue education group to assess willingness to engage, assess transfer of knowledge, and participate in skill development.    Tx Plan Objective: 1.1, 1.2, 1.3, 1.4 , Therapist: Herman Pierce    Visit Time    Visit Start Time: 1330  Visit Stop Time: 1430  Total Visit Duration: {Psych Total Visit Time:11208}    Subjective:     Patient ID: Libertad Espinal is a 39 y.o. female.    [facility] Clinical Progress Notes      Specialized Services Documentation  Therapist must complete separate progress note for each specific clinical activity in which the individual participated during the day.       GROUP PSYCHOTHERAPY    Libertad Espinal participated {SL AMB PSYCH ACTIVELY:75000} in psychotherapy group.  This was observed {consistent/inconsistent pain:0431456888} throughout the treatment day. They were engaged in learning related to {Education Completed:11651}. Staff utilized {Teaching Method:21034} teaching methods.  Libertad Espinal shared area of learning and set  a goal for outside of program to ***.  Libertad Espinal was able to share items explored during the day and shared in adding to their WRAP.  Ended session with {staff/peer led:65860} led exercise ***.  Libertad Espinal demonstrated *** progress toward goal.  Continue group psychotherapy to actively practice learned skills and encourage transfer of knowledge to unstructured time.    Tx Plan Objective: 1.1, 1.2, 1.3, 1.4 , Therapist: Herman Puckett Therapy:      Visit Start Time: (1215)  Visit Stop Time: (4985)  Total Visit Duration: {Psych Total Visit Time:40372}    Subjective:     Patient ID: Libertad Espinal 39 y.o.   This group was facilitated virtually in a private office using HIPAA Compliant and Approved Microsoft [email or web address] consented to the use of tele-video modality of treatment.  Libertad Espinal actively engaged in psychoeducational group about the PLEASE skill. The skill helps to maintain and regulate emotional expression/regulation. Group discovered strategies that help them to take care of physical and emotional well being on a short term and long term basis. The group talked about understanding the purpose, and meaning of self care in regards to physical, intellectual, and emotional expression, regulation , and recognition, and how it affects themselves and others.. Teaching on the emphasis of self monitoring and self-care, who the group can go to for help was brought up as well. Group was encouraged to ask questions in an open forum at the end of group. *** progress displayed through engagement in topic. Pt will continue to engage in psychotherapy to encourage self realization in treatment.  Treatment Plan Objective 1.1, 1.2, 1.3, 1.4 Therapist: Herman DAWSON Ed.

## 2024-09-20 NOTE — TELEPHONE ENCOUNTER
Billie from [employer] contacted the office, requesting the date/time of her upcoming appt with  and the date/time of the last appt with PHP. The writer provided date/time to the appts.

## 2024-09-20 NOTE — PROGRESS NOTES
Subjective:     Patient ID: Libertad Espinal is a 39 y.o. female.    [facility] Clinical Progress Notes      Specialized Services Documentation  Therapist must complete separate progress note for each specific clinical activity in which the individual participated during the day.     Libertad Espinal notified this writer she would not be in groups today but will return on Monday. Libertad Espinal is aware of attendance policy and if misses any additional time will be discharged from [facility]. Treatment plan unable to be reviewed with Libertad due to absence.    Sulma FERNANDEZN RN

## 2024-09-23 ENCOUNTER — TELEMEDICINE (OUTPATIENT)
Dept: PSYCHIATRY | Facility: CLINIC | Age: 39
End: 2024-09-23
Payer: COMMERCIAL

## 2024-09-23 ENCOUNTER — TELEPHONE (OUTPATIENT)
Age: 39
End: 2024-09-23

## 2024-09-23 ENCOUNTER — TELEMEDICINE (OUTPATIENT)
Dept: PSYCHOLOGY | Facility: CLINIC | Age: 39
End: 2024-09-23
Payer: COMMERCIAL

## 2024-09-23 DIAGNOSIS — F31.81 BIPOLAR II DISORDER (HCC): ICD-10-CM

## 2024-09-23 DIAGNOSIS — F41.1 GAD (GENERALIZED ANXIETY DISORDER): Chronic | ICD-10-CM

## 2024-09-23 DIAGNOSIS — F41.1 GAD (GENERALIZED ANXIETY DISORDER): Primary | Chronic | ICD-10-CM

## 2024-09-23 DIAGNOSIS — F31.81 BIPOLAR II DISORDER (HCC): Primary | ICD-10-CM

## 2024-09-23 DIAGNOSIS — F43.10 POST TRAUMATIC STRESS DISORDER: ICD-10-CM

## 2024-09-23 PROCEDURE — G0177 OPPS/PHP; TRAIN & EDUC SERV: HCPCS

## 2024-09-23 PROCEDURE — G0410 GRP PSYCH PARTIAL HOSP 45-50: HCPCS

## 2024-09-23 PROCEDURE — 90832 PSYTX W PT 30 MINUTES: CPT

## 2024-09-23 PROCEDURE — 99213 OFFICE O/P EST LOW 20 MIN: CPT | Performed by: PHYSICIAN ASSISTANT

## 2024-09-23 PROCEDURE — G0176 OPPS/PHP;ACTIVITY THERAPY: HCPCS

## 2024-09-23 NOTE — PSYCH
"Visit Time    Visit Start Time: 1030  Visit Stop Time: 1100  Total Visit Duration:  30 minutes    Subjective:    Patient ID: Libertad Espinal is a 39 y.o. y.o. female.    [facility] Clinical Progress Notes      Specialized Services Documentation  Therapist must complete separate progress note for each specific clinical activity in which the individual participated during the day.     INDIVIDUAL PSYCHOTHERAPY     Libertad Espinal actively shared in individual therapy.   Libertad Espinal identified she picked up her medications and is now taking as ordered. Says she continues to be impulsive with irritability and quick to anger. Says she started with this type of emotional regulation issues after her knee surgery, she was confined to a chair and could not do much, identified with her mother \"telling others what to do and being angry\". States this all affects her children. States she is having stressful dreams for example she is kidnapped or chasing a train. Says she is overeating frequently due to stress. Discussed anger management and groups. Libertad states she knows she has had multiple absences from groups and aware next missed group or group day she will be discharged from [facility]. Discussed activities and skills to better manage irritability and anger. Libertad will make a list of skills to decrease irritability and was assigned as homework. Some progress toward goal identified. Goals for next week include work on emotional regulation.  Next session follow up to include identification of skills. Continue individual psychotherapy in order to better balance bipolar symptoms.     Case management needs addressed: Treatment plan review  I,Libertad Espinal,am physically unable to provide a signature; however, I understand the nature of and am in agreement with the documentation presented to me via TEAMS.  I have received a copy through My Chart and/or the [employer].  I freely give verbal " consent.  Name of Document (s) treatment plan:  Witness to verbal consent: Sulma Chambers RN  Witness to verbal consent: Ramonita Beltrán     Treatment Plan Objectives addressed: 1.1, 1.2,1.3, 1.4    Current suicide risk : Low     Medications changes/added/denied? No, See today's med check note from Gretchen Barbour PA-C.     Treatment session number: 2    Individual Case Management Visit provided today? Yes     [facility] follow up physician's orders: No new orders, See today's med check note from Gretchen Barbour PA-C.         Sulma Chambers

## 2024-09-23 NOTE — TELEPHONE ENCOUNTER
Pt just called for information on her [facility] paperwork and also the intermittent letter and would like a call back.

## 2024-09-23 NOTE — PSYCH
Visit Time    Visit Start Time: 1045  Visit Stop Time: 1145  Total Visit Duration:  45 minutes    Subjective:     Patient ID: Libertad Espinal is a 39 y.o. female.    [facility] Clinical Progress Notes      Specialized Services Documentation  Therapist must complete separate progress note for each specific clinical activity in which the individual participated during the day.       Group Psychotherapy ([id number]) Libertad was verbally engaged and interacted during today's psychoeducation on the DBT acronym A.C.C.E.P.T.S.  This DBT acronym A.C.C.E.P.T.S. outlines seven different techniques for distracting yourself when distressing emotions start to overwhelm our thoughts. Each member participated in reviewing the seven different key techniques and then worked on creating some new ideas that they then shared with the group.  Then TIPP skill was reviewed, going over four different ways we can also manage extreme emotions.  Libertad will continue with life skills and psychotherapy groups.  Some progress made towards treatment. Tx Plan Objective: 1.1, 1.2, 1.4 Therapist:  Maverick Velez MA, N[facility]

## 2024-09-23 NOTE — PSYCH
Virtual Regular Visit    Verification of patient location:    Patient is located at Home in the following state in which I hold an active license [place]      Assessment/Plan:    Problem List Items Addressed This Visit       SHELLY (generalized anxiety disorder) - Primary (Chronic)    Bipolar II disorder (HCC)       Goals addressed in session: PHP VIRTUAL GROUP DUE TO virtual preference of care           Reason for visit is No chief complaint on file.       Encounter provider  PARTIAL PSYCH PROGRAM      Recent Visits  No visits were found meeting these conditions.  Showing recent visits within past 7 days and meeting all other requirements  Future Appointments  No visits were found meeting these conditions.  Showing future appointments within next 150 days and meeting all other requirements       The patient was identified by name and date of birth. Libertad Espinal was informed that this is a telemedicine visit and that the visit is being conducted throughthe Microsoft Teams platform. She agrees to proceed..  My office door was closed. No one else was in the room.  She acknowledged consent and understanding of privacy and security of the video platform. The patient has agreed to participate and understands they can discontinue the visit at any time.    Patient is aware this is a billable service.     Subjective  Libertad Espinal is a 39 y.o. female I spent FIVE GROUP HOURS PLUS CASE MANAGEMENT minutes with patient today in which greater than 50% of time was spent in counseling/coordination of care regarding PHP - SEE NOTES  .      HPI     Past Medical History:   Diagnosis Date    Abnormal Pap smear of cervix     Anxiety     Bipolar disorder (HCC)     Bipolar I disorder, most recent episode depressed, in full remission (HCC) 07/19/2016    COVID-19 01/12/2022    10/27/23 Per pt had 3 separate times - last year being the last episode    Depression     Eczema     Exposure to chlamydia     Resolved 06/09/17     Knee pain, left     Migraine without aura and without status migrainosus, not intractable 10/11/2019    Obesity     Post traumatic stress disorder 2021    Postpartum depression 2018    TMJ (dislocation of temporomandibular joint)     causes snoring    Manoj-Parkinson-White (WPW) syndrome        Past Surgical History:   Procedure Laterality Date    ABLATION OF DYSRHYTHMIC FOCUS      WPW ablation age 16    COLONOSCOPY      COLPOSCOPY      DISTAL PATELLAR REALIGNMENT Left 10/31/2023    Procedure: OPEN MPFL RECONSTRUCTION WITH ALLOGRAFT;  Surgeon: Wilbert Michael DO;  Location:  MAIN OR;  Service: Orthopedics    INDUCED       OTHER SURGICAL HISTORY      catheter ablation- WPW age 17    AK ARTHROSCOPY KNEE LATERAL RELEASE Left 10/31/2023    Procedure: RELEASE RETINACULAR;  Surgeon: Wilbert Michael DO;  Location:  MAIN OR;  Service: Orthopedics    TONSILLECTOMY      WISDOM TOOTH EXTRACTION         Current Outpatient Medications   Medication Sig Dispense Refill    Drospirenone 4 MG TABS Take 1 tablet by mouth in the morning 84 tablet 3    lamoTRIgine (LaMICtal) 200 MG tablet Take 1 tablet (200 mg total) by mouth daily at bedtime 90 tablet 1    LORazepam (ATIVAN) 1 mg tablet Take 1 tablet (1 mg total) by mouth every 6 (six) hours as needed for anxiety 30 tablet 0    lurasidone (LATUDA) 80 mg tablet Take 1 tablet (80 mg total) by mouth daily with breakfast 30 tablet 0    lurasidone 60 MG TABS Take 1 tablet (60 mg total) by mouth daily with breakfast 7 tablet 0    naproxen (NAPROSYN) 500 mg tablet Take 1 tablet (500 mg total) by mouth 2 (two) times a day with meals (Patient taking differently: Take 500 mg by mouth 2 (two) times a day with meals) 30 tablet 0    Vitamin D, Cholecalciferol, 50 MCG (2000 UT) CAPS Take 2,000 Units by mouth daily Start after completing 12 weeks of ergocalciferol. 90 capsule 3     No current facility-administered medications for this visit.        Allergies   Allergen  Reactions    Codeine Other (See Comments)     dry heaves    Sulfa Antibiotics Hives and Rash     Per pt as achild    Erythromycin Hives     Per as a child       Review of Systems    Video Exam    There were no vitals filed for this visit.    Physical Exam     Visit Time    Visit Start Time: 830  Visit Stop Time: 1430  Total Visit Duration:  300 minutes

## 2024-09-23 NOTE — TELEPHONE ENCOUNTER
Billie from [employer] contacted the office in regards to pt's return to work date and discharge date.  Billie expressed that someone had left her a voicemail to call back, however, Writer does not seen any encounter regarding the voicemail.  Billie is receiving conflicting return to work dates and needs confirmation.    Writer will send a message to PHP for further assistance.    Please contact Billie back at [phone] for any further questions or concerns.

## 2024-09-23 NOTE — PSYCH
"         Encounter provider Gretchen Barbour PA-C      Recent Visits  Date Type Provider Dept   09/17/24 Telemedicine Joseph Ruiz III, DO [facility]   Showing recent visits within past 7 days and meeting all other requirements  Today's Visits  Date Type Provider Dept   09/23/24 Telemedicine Grecthen Barbour PA-C [facility]   Showing today's visits and meeting all other requirements  Future Appointments  No visits were found meeting these conditions.  Showing future appointments within next 150 days and meeting all other requirements       The patient was identified by name and date of birth. Libertad Espinal was informed that this is a telemedicine visit and that the visit is being conducted throughthe Microsoft Teams platform. She agrees to proceed..  My office door was closed. No one else was in the room.  She acknowledged consent and understanding of privacy and security of the video platform. The patient has agreed to participate and understands they can discontinue the visit at any time.    Patient is aware this is a billable service.         Progress Note - [facility] [place] [facility]  Libertad Espinal 39 y.o. female MRN: [id number]   This note was not shared with the patient due to this is a psychotherapy note    Progress at partial program: unchanged.      Chart reviewed and discussed in treatment team. Libertad reports missing 3 days last week- \"I was struggling a lot.  Met with my therapist (OP).  I increased my med (Latuda) to where its supposed to be.  I started in now\".  Last seen by Dr Ruiz 9/17 at which time latuda titration started by OP Dr Sutton continued, lamictal continued.  Libertad currently on 60 mg latuda.   States chief symptoms irritabilitty, verbal aggressiveness/impulsiveness, passive SI and disrupted sleep.   States she would never try to end her life as her mother attempted suicide and she should not do that to her 4 " children.  Says sleep is disrupted by vivid dreams and CLARISSE- has appt to get fitted for CPAP.  Denies tremor, involuntary movements, rash, fever, oral lesions.           ROS:   As above otherwise negative    Active Ambulatory Problems     Diagnosis Date Noted    Obesity, Class II, BMI 35-39.9 06/08/2017    Abnormal Papanicolaou smear of cervix with positive human papilloma virus (HPV) test 01/31/2018    Migraine with aura and without status migrainosus, not intractable 10/11/2019    SHELLY (generalized anxiety disorder) 11/11/2019    Other insomnia 11/11/2019    Long-term use of high-risk medication 11/11/2019    Family history of breast cancer 11/19/2019    Patellofemoral disorder of left knee 01/05/2021    Congenital fusion of carpal bone 06/12/2021    History of radiofrequency ablation procedure for W-P-W 06/15/2021    PVC's (premature ventricular contractions) 08/06/2021    Post traumatic stress disorder 12/13/2021    Dyslipidemia 03/18/2022    Enlarged lymph node in neck 04/25/2022    Inappropriate sinus tachycardia 04/23/2023    Recurrent dislocation of left patella 08/25/2023    Patellar instability of left knee 08/25/2023    Excessive daytime sleepiness 05/07/2024    CLARISSE (obstructive sleep apnea) 07/31/2024    Bipolar II disorder ([facility]) 07/31/2024     Resolved Ambulatory Problems     Diagnosis Date Noted    Bipolar I disorder, most recent episode depressed, in full remission ([facility]) 07/19/2016    Dermatitis 05/29/2013    Manoj-Parkinson-White (WPW) syndrome 06/03/2016    Second trimester pregnancy 01/31/2018    Abnormal fetal heart rate 02/14/2018    Obesity affecting pregnancy, antepartum, third trimester 02/16/2018    Low lying placenta nos or without hemorrhage, second trimester 02/16/2018    Fetal arrhythmia affecting pregnancy, antepartum 02/16/2018    Pap smear abnormality of cervix with LGSIL 12/08/2016    24 weeks gestation of pregnancy 02/22/2018    Fetal movement present 04/25/2018    29 weeks gestation  of pregnancy 2018    31 weeks gestation of pregnancy 2018    39 weeks gestation of pregnancy 2018    Premature rupture of membranes 2018    Normal spontaneous vaginal delivery 2018     (spontaneous vaginal delivery) 2018    Left wrist pain 2019    Left wrist tendonitis 2019    Obsessive-compulsive disorder, unspecified 2019    Visit for genetic screening 2019    Chest wall pain, chronic 01/10/2020    Tinea pedis of left foot 01/10/2020    COVID-19 2022     Past Medical History:   Diagnosis Date    Abnormal Pap smear of cervix     Anxiety     Bipolar disorder (HCC)     Depression     Eczema     Exposure to chlamydia     Knee pain, left     Migraine without aura and without status migrainosus, not intractable 10/11/2019    Obesity     Postpartum depression 2018    TMJ (dislocation of temporomandibular joint)      Family History   Problem Relation Age of Onset    Bipolar disorder Mother     Hypertension Mother     Hyperlipidemia Mother     Suicide Attempts Mother     Colon polyps Mother     Factor V Leiden deficiency Mother     Glucose-6-phos deficiency Father     Hyperlipidemia Father     Hypertension Father     Diabetes Father     Anxiety disorder Brother     Factor V Leiden deficiency Brother     Mental illness Brother     Drug abuse Brother     No Known Problems Daughter     Anemia Son     Other Son         Gilbert's    Breast cancer Maternal Grandmother         dx approx age early 40's    Colon polyps Maternal Grandfather     Heart disease Maternal Grandfather     Crohn's disease Paternal Grandmother     Prostate cancer Paternal Grandfather     Pancreatic cancer Paternal Grandfather     Breast cancer Maternal Aunt 40    Colon cancer Paternal Aunt     Factor V Leiden deficiency Cousin     Thyroid disease Neg Hx     Stroke Neg Hx     Ovarian cancer Neg Hx     Anesthesia problems Neg Hx      Social History     Socioeconomic History    Marital  status: Single     Spouse name: Not on file    Number of children: 2    Years of education: Not on file    Highest education level: Some college, no degree   Occupational History    Occupation: works in retail   Tobacco Use    Smoking status: Never    Smokeless tobacco: Never    Tobacco comments:     Never a smoker or use of any tobacco products per pt    Vaping Use    Vaping status: Never Used   Substance and Sexual Activity    Alcohol use: Yes     Comment: Occasional/special occasions    Drug use: No     Comment: Denies any drug use per pt    Sexual activity: Yes     Partners: Male     Birth control/protection: Condom Male, OCP     Comment: Denies any chest pain or shortness of breath with activity   Other Topics Concern    Not on file   Social History Narrative    Education: high school graduate and some college    Learning Disabilities: none    Marital History: single    Children: 1 daughter, 1 son    Living Arrangement: lives in home with boyfriend, 2 children and boyfriend's 2 children    Occupational History: works part time at [employer] works    Functioning Relationships: boyfriend is supportive    Legal History: none     History: None         Family planning    IUD contraception     Social Determinants of Health     Financial Resource Strain: Medium Risk (2/4/2021)    Overall Financial Resource Strain (CARDIA)     Difficulty of Paying Living Expenses: Somewhat hard   Food Insecurity: Food Insecurity Present (2/4/2021)    Hunger Vital Sign     Worried About Running Out of Food in the Last Year: Sometimes true     Ran Out of Food in the Last Year: Sometimes true   Transportation Needs: Unmet Transportation Needs (2/4/2021)    PRAPARE - Transportation     Lack of Transportation (Medical): Yes     Lack of Transportation (Non-Medical): Yes   Physical Activity: Inactive (11/11/2019)    Exercise Vital Sign     Days of Exercise per Week: 0 days     Minutes of Exercise per Session: 0 min   Stress: Stress  Concern Present (11/11/2019)    [facility] - Occupational Stress Questionnaire     Feeling of Stress : Rather much   Social Connections: Unknown (6/18/2024)    Received from [email or web address]     How often do you feel lonely or isolated from those around you? (Adult - for ages 18 years and over): Not on file   Intimate Partner Violence: Not At Risk (11/11/2019)    Humiliation, Afraid, Rape, and Kick questionnaire     Fear of Current or Ex-Partner: No     Emotionally Abused: No     Physically Abused: No     Sexually Abused: No   Housing Stability: Not on file     Allergies   Allergen Reactions    Codeine Other (See Comments)     dry heaves    Sulfa Antibiotics Hives and Rash     Per pt as achild    Erythromycin Hives     Per as a child          Mental Status Evaluation:    Appearance:  age appropriate   Behavior:  cooperative   Speech:  normal rate and volume   Mood:  low   Affect:  constricted   Thought Process:  goal directed   Associations: intact associations   Thought Content:  no overt delusions   Perceptual Disturbances: none   Risk Potential: Suicidal ideation - Yes, passive death wish  Homicidal ideation - None   Sensorium:  oriented to person, place, and time/date   Memory:  recent and remote memory grossly intact   Consciousness:  alert and awake   Attention: attention span and concentration are age appropriate   Insight:  intact   Judgment: intact   Gait/Station: unable to assess   Motor Activity: unable to assess today due to virtual visit       Laboratory results: I have personally reviewed all pertinent laboratory/tests results.      Assessment   Diagnoses and all orders for this visit:    Bipolar II disorder (HCC)    SHELLY (generalized anxiety disorder)    Post traumatic stress disorder       Current Outpatient Medications   Medication Sig Dispense Refill    Drospirenone 4 MG TABS Take 1 tablet by mouth in the morning 84 tablet 3    lamoTRIgine (LaMICtal) 200  MG tablet Take 1 tablet (200 mg total) by mouth daily at bedtime 90 tablet 1    LORazepam (ATIVAN) 1 mg tablet Take 1 tablet (1 mg total) by mouth every 6 (six) hours as needed for anxiety 30 tablet 0    lurasidone (LATUDA) 80 mg tablet Take 1 tablet (80 mg total) by mouth daily with breakfast 30 tablet 0    lurasidone 60 MG TABS Take 1 tablet (60 mg total) by mouth daily with breakfast 7 tablet 0    naproxen (NAPROSYN) 500 mg tablet Take 1 tablet (500 mg total) by mouth 2 (two) times a day with meals (Patient taking differently: Take 500 mg by mouth 2 (two) times a day with meals) 30 tablet 0    Vitamin D, Cholecalciferol, 50 MCG (2000 UT) CAPS Take 2,000 Units by mouth daily Start after completing 12 weeks of ergocalciferol. 90 capsule 3     No current facility-administered medications for this visit.         Plan    Planned medication and treatment changes:  No med change.  Cont meds per OP psychiatrist- Latuda titration to 80 mg, lamictal 200 mg/d.     All current active medications have been reviewed.  Continue treatment with group therapy and partial program      Risks / Benefits of Treatment:    Risks, benefits, and possible side effects of medications explained to patient and patient verbalizes understanding and agrees to medications.     Counseling / Coordination of Care:    Patient's progress discussed with staff in treatment team meeting.  Medications, treatment progress and treatment plan reviewed with patient.    Gretchen Barbour PA-C 09/23/24

## 2024-09-23 NOTE — PSYCH
Visit Time    Visit Start Time: 0830  Visit Stop Time: 0930  Total Visit Duration:  60 minutes    Subjective:     Patient ID: Libertad Espinal is a 39 y.o. female.    [facility] Progress Notes      Specialized Services Documentation  Therapist must complete separate progress note for each specific clinical activity in which the individual participated during the day.       EDUCATION THERAPY      Libertad Espinal actively shared in morning assessment and goal review. Presented as Receptive related to readiness to learn. Libertad Espinal  did complete goal from last treatment day identifying she cleaned her home, gaining responsibility. Libertad Espinal did not present with any barriers to learning. Throughout morning group, Libertad Espinal participated in mindfulness exercise and gratitude practice. Libertad Espinal made positive progress toward goal. Continue education group to assess willingness to engage, assess transfer of knowledge, and participate in skill development.    Tx Plan Objective: 1.1, 1.2, 1.3, 1.4 , Therapist: Herman Pierce    Visit Time    Visit Start Time: 1330  Visit Stop Time: 1430  Total Visit Duration:  60 minutes    Subjective:     Patient ID: Libertad Espinal is a 39 y.o. female.    [facility] Progress Notes      Specialized Services Documentation  Therapist must complete separate progress note for each specific clinical activity in which the individual participated during the day.       GROUP PSYCHOTHERAPY    Libertad Espinal participated actively in psychotherapy group.  This was observed Consistent throughout the treatment day. They were engaged in learning related to Wellness Tools. Staff utilized Verbal and Demonstration teaching methods.  Libertad Espinal shared area of learning and set a goal for outside of program to work on her personal interactions with her family.  Libertad Espinal was able to share items  explored during the day and shared in adding to their WRAP.  Ended session with peer  led exercise on breathing techniques.  Libertad Espinal demonstrated positive progress toward goal.  Continue group psychotherapy to actively practice learned skills and encourage transfer of knowledge to unstructured time.    Tx Plan Objective: 1.1, 1.2, 1.3, 1.4 , Therapist: Herman Pierce    Group Psychotherapy    Visit Start Time: (1215)  Visit Stop Time: (1315)  Total Visit Duration:  60 minutes      Subjective:     Patient ID: Libertad Espinal 39 y.o.      This group was facilitated virtually in a private office using HIPAA Compliant and Approved [employer] Teams.  Libertad Espinal actively engaged in psychoeducational group about challenging automatic negative thoughts negative thoughts that are perceived globally, external factors that influence an individuals thoughts and behaviors.(ANTs).The skill helps to identify negative thoughts and cognitive distortions. The outcome being that negative thoughts are challenged in the thought process and regulation of emotion. Group discovered strategies that help them to manage negative thoughts and rethink the negative thoughts when faced with a negative challenge. The group talked about understanding the purpose of challenging negative thoughts on a personal and social level and how it affects themselves and others.. Teaching on the emphasis of self monitoring and self-care, the group focus is geared how togo to for help when the negative thoughts become overly intrusive. Group was encouraged to ask questions in an open forum at the end of session. Positive progress displayed through engagement in topic, Libertad was consistently engaged in the group discussion Libertad Espinal will continue to engage in psychotherapy to encourage positive self realization.  Treatment Plan Objective 1.1, 1.2, 1.3, 1.4 Therapist: Herman DAWSON Ed.

## 2024-09-23 NOTE — PSYCH
Subjective:   Patient ID: Libertad Espinal is a 39 y.o. female.    [facility] Progress Notes      Specialized Services Documentation  Therapist must complete separate progress note for each specific clinical activity in which the individual participated during the day.     Allied Therapy   Visit Start Time: 0930  Visit Stop Time: 1030  Total Visit Duration: 60 minutes  Group was facilitated virtually in a private office using HIPAA compliant and approved [employer] Teams. Libertad Espinal consented to the use of tele-video modality of treatment and was virtually present for group allied therapy.  Libertad Espinal sporadically shared in MTH group focused on understanding the six stages of change. Libertad appeared distracted when she was on camera, appeared to be making food in her kitchen. Group members learned about the five Rs that can keep them from making a change. Libertad identified reluctance as something that held them back. Group engaged in a jax analysis of “Rehab” by Symone Reyes and “Grow as you Go” by Ras Pacheco and discussed the stage of change relevant to each song. Libertad did not share the stage of change they are currently in. Minimal effort noted toward treatment goal. Continue AT to encourage the development, understanding and education on stages of change.  Tx Plan Objective: 1.1,1.2,1.4, Therapist:  CHANDANA Joe

## 2024-09-24 ENCOUNTER — TELEMEDICINE (OUTPATIENT)
Dept: PSYCHOLOGY | Facility: CLINIC | Age: 39
End: 2024-09-24
Payer: COMMERCIAL

## 2024-09-24 DIAGNOSIS — F31.81 BIPOLAR II DISORDER (HCC): ICD-10-CM

## 2024-09-24 DIAGNOSIS — F41.1 GAD (GENERALIZED ANXIETY DISORDER): Primary | Chronic | ICD-10-CM

## 2024-09-24 PROCEDURE — G0176 OPPS/PHP;ACTIVITY THERAPY: HCPCS

## 2024-09-24 PROCEDURE — G0410 GRP PSYCH PARTIAL HOSP 45-50: HCPCS

## 2024-09-24 PROCEDURE — G0177 OPPS/PHP; TRAIN & EDUC SERV: HCPCS

## 2024-09-24 NOTE — PSYCH
"Visit Time    Visit Start Time: 0930  Visit Stop Time: 1030  Total Visit Duration:  60 minutes    Subjective:     Patient ID: Libertad Espinal is a 39 y.o. female.    [facility] Progress Notes      Specialized Services Documentation  Therapist must complete separate progress note for each specific clinical activity in which the individual participated during the day.     Allied Therapy 0930-1030 This group was facilitated virtually in a private office using HIPAA Compliant and Approved [email or web address] Teams. Libertad Espinal actively participated in music therapy group regarding supports. The group participated in a discussion around supports in their lives and areas where they need support. The group participated in a jax discussion on the song \"Bridge Over Trou[place] Water\". The group read and discussed a handout on supports. The group listened to \"You've Got a Friend\" and identified the types of support demonstrated within the song. Libertad did not share but appeared attentive. Minimal effort noted towards treatment goal. Continue AT to encourage the development and proactive use of supports.  Tx Plan Objective: 1.1,1.2,1.4, Therapist:  ROSALBA Michele, MT-BC  "

## 2024-09-24 NOTE — PSYCH
Group Psychotherapy      Visit Start Time: (1045)  Visit Stop Time: (1145)  Total Visit Duration:  60 minutes    Subjective:     Patient ID: Libertad Espinal 39 y.o.      This group was facilitated virtually in a private office using HIPAA Compliant and Approved [employer] Teams.  Libertad Espinal  engaged in psychoeducational group about conflict resolution. The skill helps to develop skills to create resolution with self and others with whom one may interact with. Group discovered strategies that help them to develop positive assertiveness skills on a short term and long term basis. The group talked about understanding the purpose, and meaning of conflict resolution in intellectual and emotional expression, regulation , and recognition, and how it affects themselves and others.Teaching on the emphasis of self monitoring and resolution techniques, discussions on how to create positive resolutions through effective assertive tools were discussed. Group was encouraged to ask questions in an open forum at the end of group. Adequate progress displayed through engagement in topic, Libertad gilles parmar engaged listening to the group discussion during this session.  Libertad Espinal will continue to engage in psychotherapy to encourage positive conflict resolution.  Treatment Plan Objective 1.1, 1.2, 1.3, 1.4 Therapist: Herman DAWSON Ed.

## 2024-09-24 NOTE — PSYCH
Virtual Regular Visit    Verification of patient location:    Patient is located at Home in the following state in which I hold an active license [place]      Assessment/Plan:    Problem List Items Addressed This Visit       SHELLY (generalized anxiety disorder) - Primary (Chronic)    Bipolar II disorder (HCC)       Goals addressed in session: PHP VIRTUAL GROUP DUE TO virtual preference of care           Reason for visit is No chief complaint on file.       Encounter provider  PARTIAL PSYCH PROGRAM      Recent Visits  No visits were found meeting these conditions.  Showing recent visits within past 7 days and meeting all other requirements  Future Appointments  No visits were found meeting these conditions.  Showing future appointments within next 150 days and meeting all other requirements       The patient was identified by name and date of birth. Libertad Espinal was informed that this is a telemedicine visit and that the visit is being conducted throughthe Microsoft Teams platform. She agrees to proceed..  My office door was closed. No one else was in the room.  She acknowledged consent and understanding of privacy and security of the video platform. The patient has agreed to participate and understands they can discontinue the visit at any time.    Patient is aware this is a billable service.     Subjective  Libertad Espinal is a 39 y.o. female I spent FIVE GROUP HOURS PLUS CASE MANAGEMENT minutes with patient today in which greater than 50% of time was spent in counseling/coordination of care regarding PHP - SEE NOTES  .      HPI     Past Medical History:   Diagnosis Date    Abnormal Pap smear of cervix     Anxiety     Bipolar disorder (HCC)     Bipolar I disorder, most recent episode depressed, in full remission (HCC) 07/19/2016    COVID-19 01/12/2022    10/27/23 Per pt had 3 separate times - last year being the last episode    Depression     Eczema     Exposure to chlamydia     Resolved 06/09/17     Knee pain, left     Migraine without aura and without status migrainosus, not intractable 10/11/2019    Obesity     Post traumatic stress disorder 2021    Postpartum depression 2018    TMJ (dislocation of temporomandibular joint)     causes snoring    Manoj-Parkinson-White (WPW) syndrome        Past Surgical History:   Procedure Laterality Date    ABLATION OF DYSRHYTHMIC FOCUS      WPW ablation age 16    COLONOSCOPY      COLPOSCOPY      DISTAL PATELLAR REALIGNMENT Left 10/31/2023    Procedure: OPEN MPFL RECONSTRUCTION WITH ALLOGRAFT;  Surgeon: Wilbert Michael DO;  Location:  MAIN OR;  Service: Orthopedics    INDUCED       OTHER SURGICAL HISTORY      catheter ablation- WPW age 17    NC ARTHROSCOPY KNEE LATERAL RELEASE Left 10/31/2023    Procedure: RELEASE RETINACULAR;  Surgeon: Wilbert Michael DO;  Location:  MAIN OR;  Service: Orthopedics    TONSILLECTOMY      WISDOM TOOTH EXTRACTION         Current Outpatient Medications   Medication Sig Dispense Refill    Drospirenone 4 MG TABS Take 1 tablet by mouth in the morning 84 tablet 3    lamoTRIgine (LaMICtal) 200 MG tablet Take 1 tablet (200 mg total) by mouth daily at bedtime 90 tablet 1    LORazepam (ATIVAN) 1 mg tablet Take 1 tablet (1 mg total) by mouth every 6 (six) hours as needed for anxiety 30 tablet 0    lurasidone (LATUDA) 80 mg tablet Take 1 tablet (80 mg total) by mouth daily with breakfast 30 tablet 0    lurasidone 60 MG TABS Take 1 tablet (60 mg total) by mouth daily with breakfast 7 tablet 0    naproxen (NAPROSYN) 500 mg tablet Take 1 tablet (500 mg total) by mouth 2 (two) times a day with meals (Patient taking differently: Take 500 mg by mouth 2 (two) times a day with meals) 30 tablet 0    Vitamin D, Cholecalciferol, 50 MCG (2000 UT) CAPS Take 2,000 Units by mouth daily Start after completing 12 weeks of ergocalciferol. 90 capsule 3     No current facility-administered medications for this visit.        Allergies   Allergen  Reactions    Codeine Other (See Comments)     dry heaves    Sulfa Antibiotics Hives and Rash     Per pt as achild    Erythromycin Hives     Per as a child       Review of Systems    Video Exam    There were no vitals filed for this visit.    Physical Exam     Visit Time    Visit Start Time: 830  Visit Stop Time: 1430  Total Visit Duration:  300 minutes

## 2024-09-24 NOTE — PSYCH
"Visit Time    Visit Start Time: 1215  Visit Stop Time: 1315  Total Visit Duration: 60 minutes     Subjective:     Patient ID: Libertad Espinal is a 39 y.o. female.    [facility] Clinical Progress Notes      Specialized Services Documentation  Therapist must complete separate progress note for each specific clinical activity in which the individual participated during the day.       Group Psychotherapy  This group was facilitated virtually in a private office using HIPAA Compliant and Approved Microsoft Teams.  Libertad Espinal consented to the use of tele-video modality of treatment.  ([id number]) Libertad Espinal actively participated in psychoeducation group this afternoon which focused on sleep hygiene.  Group then identified sleep hygiene habits that are currently effective and habits they wish to incorporate into their night time routine to promote sleep hygiene.  This writer explained the importance of quality sleep in relation to wellness.  Sleep diary and additional tips on sleep hygiene were discussed.  Then, a video titled \"How to Improve Your Sleep\" was viewed. Some progress toward goal observed.  Continue psychoeducation group to increase awareness of good sleeping habits to promote wellness.      Tx Plan Objectives: 1.1, 1.2      Sulma BUTTS RN      Case Management Note    Sulma BUTTS RN    Current suicide risk : Low     A case management session is not scheduled today with Libertad Espinal ; additionally, they did not request a CM meeting.  Libertad Espinal is aware of next scheduled 1:1.    Medications changes/added/denied? No    Treatment session number: 3    Individual Case Management Visit provided today? No    [facility] follow up physician's orders: None at this time      "

## 2024-09-24 NOTE — PSYCH
Visit Time    Visit Start Time: 0830  Visit Stop Time: 0930  Total Visit Duration:  60 minutes    Subjective:     Patient ID: Libertad Espinal is a 39 y.o. female.    [facility] Clinical Progress Notes      Specialized Services Documentation  Therapist must complete separate progress note for each specific clinical activity in which the individual participated during the day.       EDUCATION THERAPY      Libertad Espinal actively shared in morning assessment and goal review. Presented as Receptive related to readiness to learn. Libertad Espinal  did complete goal from last treatment day identifying gaining responsibility. Libertad Espinal did not present with any barriers to learning. Throughout morning group, Libertad Espinal participated in mindfulness exercise and movement experience. Libertad Espinal made fair progress toward goal. Continue education group to assess willingness to engage, assess transfer of knowledge, and participate in skill development.    Tx Plan Objective: 1.1,1.2, Therapist: Ashley Costenbader, MA LMT     Visit Time    Visit Start Time: 1330  Visit Stop Time: 1430  Total Visit Duration:  45 minutes    Subjective:     Patient ID: Libertad Espinal is a 39 y.o. female.    [facility] Clinical Progress Notes      Specialized Services Documentation  Therapist must complete separate progress note for each specific clinical activity in which the individual participated during the day.       GROUP PSYCHOTHERAPY    Libertad Espinal participated with prompts in psychotherapy group.  This was observed Consistent throughout the treatment day. They were engaged in learning related to Wellness Tools. Staff utilized Verbal, Written, A/V, and Demonstration teaching methods.  Libertad Espinal shared area of learning and set a goal for outside of program to develop a sleep routine.  Libertad Espinal was able to share items explored during the day and shared  in adding to their WRAP.  Ended session with staff led exercise guided mindfulness.  Libertad Espinal demonstrated fair progress toward goal.  Continue group psychotherapy to actively practice learned skills and encourage transfer of knowledge to unstructured time.    Tx Plan Objective: 1.1,1.2, Therapist: Ashley Costenbader, MA LMT
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
## 2024-09-25 ENCOUNTER — TELEMEDICINE (OUTPATIENT)
Dept: PSYCHOLOGY | Facility: CLINIC | Age: 39
End: 2024-09-25
Payer: COMMERCIAL

## 2024-09-25 DIAGNOSIS — F41.1 GAD (GENERALIZED ANXIETY DISORDER): Primary | Chronic | ICD-10-CM

## 2024-09-25 DIAGNOSIS — F31.81 BIPOLAR II DISORDER (HCC): ICD-10-CM

## 2024-09-25 NOTE — PSYCH
Subjective:     Patient ID: Libertad Espinal is a 39 y.o. female.    [facility] Clinical Progress Notes      Specialized Services Documentation  Therapist must complete separate progress note for each specific clinical activity in which the individual participated during the day.     Case Management Note    Sulma BUTTS RN    Current suicide risk : Low     A case management session is not scheduled today with Libertad Espinal ; additionally, they did not request a CM meeting.  Libertad Espinal is aware of next scheduled 1:1. Libertad requested to leave at 1100 today due needing to  her children as there was an early dismissal of school.    Medications changes/added/denied? No    Treatment session number: 4    Individual Case Management Visit provided today? No    [facility] follow up physician's orders: None at this time

## 2024-09-25 NOTE — PSYCH
"Visit Time    Visit Start Time: 0930  Visit Stop Time: 1030  Total Visit Duration:  60 minutes    Subjective:     Patient ID: Libertad Espinal is a 39 y.o. female.    [facility] Clinical Progress Notes      Specialized Services Documentation  Therapist must complete separate progress note for each specific clinical activity in which the individual participated during the day.     Allied Therapy 0930-1030 This group was facilitated virtually in a private office using HIPAA Compliant and Approved [employer] Teams. Libertad Espinal actively participated in music therapy group regarding writing letters to one's past self. The group discussed past events/points of concern in their lives they felt the need to address. The group listened to and discussed the song \"Letter to Me\". The group then discussed and worked on letter to self worksheet. The group then listened to and discussed the song \"Starting Over\", and discussed an area they hoped to grow following writing their letter to self. Libertad identified forgiving her past self as something they would like to see in their future self. Some effort noted towards treatment goal. Continue AT to encourage healthy self reflection. Tx Plan Objective: 1.1,1.2,1.4, Therapist:  ROSALBA Michele, MT-BC  "

## 2024-09-25 NOTE — PSYCH
Virtual Regular Visit    Verification of patient location:    Patient is located at Home in the following state in which I hold an active license [place]      Assessment/Plan:    Problem List Items Addressed This Visit       SHELLY (generalized anxiety disorder) - Primary (Chronic)    Bipolar II disorder (HCC)       Goals addressed in session:           Reason for visit is PHP VIRTUAL GROUP DUE TO virtual preference of care        Encounter provider  PARTIAL PSYCH PROGRAM      Recent Visits  No visits were found meeting these conditions.  Showing recent visits within past 7 days and meeting all other requirements  Future Appointments  No visits were found meeting these conditions.  Showing future appointments within next 150 days and meeting all other requirements       The patient was identified by name and date of birth. Libertad Espinal was informed that this is a telemedicine visit and that the visit is being conducted throughthe Microsoft Teams platform. She agrees to proceed..  My office door was closed. No one else was in the room.  She acknowledged consent and understanding of privacy and security of the video platform. The patient has agreed to participate and understands they can discontinue the visit at any time.    Patient is aware this is a billable service.     Subjective  Libertad Espinla is a 39 y.o. female  .      HPI     Past Medical History:   Diagnosis Date    Abnormal Pap smear of cervix     Anxiety     Bipolar disorder (HCC)     Bipolar I disorder, most recent episode depressed, in full remission (HCC) 07/19/2016    COVID-19 01/12/2022    10/27/23 Per pt had 3 separate times - last year being the last episode    Depression     Eczema     Exposure to chlamydia     Resolved 06/09/17    Knee pain, left     Migraine without aura and without status migrainosus, not intractable 10/11/2019    Obesity     Post traumatic stress disorder 12/13/2021    Postpartum depression 08/09/2018    TMJ  (dislocation of temporomandibular joint)     causes snoring    Manoj-Parkinson-White (WPW) syndrome        Past Surgical History:   Procedure Laterality Date    ABLATION OF DYSRHYTHMIC FOCUS      WPW ablation age 16    COLONOSCOPY      COLPOSCOPY      DISTAL PATELLAR REALIGNMENT Left 10/31/2023    Procedure: OPEN MPFL RECONSTRUCTION WITH ALLOGRAFT;  Surgeon: Wilbert Michael DO;  Location:  MAIN OR;  Service: Orthopedics    INDUCED       OTHER SURGICAL HISTORY      catheter ablation- WPW age 17    [place] ARTHROSCOPY KNEE LATERAL RELEASE Left 10/31/2023    Procedure: RELEASE RETINACULAR;  Surgeon: Wilbert Michael DO;  Location:  MAIN OR;  Service: Orthopedics    TONSILLECTOMY      WISDOM TOOTH EXTRACTION         Current Outpatient Medications   Medication Sig Dispense Refill    Drospirenone 4 MG TABS Take 1 tablet by mouth in the morning 84 tablet 3    lamoTRIgine (LaMICtal) 200 MG tablet Take 1 tablet (200 mg total) by mouth daily at bedtime 90 tablet 1    LORazepam (ATIVAN) 1 mg tablet Take 1 tablet (1 mg total) by mouth every 6 (six) hours as needed for anxiety 30 tablet 0    lurasidone (LATUDA) 80 mg tablet Take 1 tablet (80 mg total) by mouth daily with breakfast 30 tablet 0    lurasidone 60 MG TABS Take 1 tablet (60 mg total) by mouth daily with breakfast 7 tablet 0    naproxen (NAPROSYN) 500 mg tablet Take 1 tablet (500 mg total) by mouth 2 (two) times a day with meals (Patient taking differently: Take 500 mg by mouth 2 (two) times a day with meals) 30 tablet 0    Vitamin D, Cholecalciferol, 50 MCG (2000 UT) CAPS Take 2,000 Units by mouth daily Start after completing 12 weeks of ergocalciferol. 90 capsule 3     No current facility-administered medications for this visit.        Allergies   Allergen Reactions    Codeine Other (See Comments)     dry heaves    Sulfa Antibiotics Hives and Rash     Per pt as achild    Erythromycin Hives     Per as a child       Review of Systems    Video Exam    There were  no vitals filed for this visit.    Physical Exam     Visit Time    I spent FOUR GROUP HOURS PLUS CASE MANAGEMENT minutes with patient today in which greater than 50% of time was spent in counseling/coordination of care regarding PHP - SEE NOTES

## 2024-09-25 NOTE — PSYCH
Visit Time    Visit Start Time: 1045  Visit Stop Time: 1145  Total Visit Duration: 5 minutes    Subjective:     Patient ID: Libertad Espinal is a 39 y.o. female.    [facility] Clinical Progress Notes      Specialized Services Documentation  Therapist must complete separate progress note for each specific clinical activity in which the individual participated during the day.       Group Psychotherapy Life Skills ([id number]) Libertad Espinal was only present for 5 minutes of the open processing group which was facilitated virtually in a private office using HIPAA Compliant and Approved [employer] Teams. Libertad consented to the use of tele-video modality of treatment and was virtually present for group psychotherapy today. is aware that Libertad had to leave group early.   Tx Plan Objective: 1.1,1.2 Therapist: Iliana Ramirez,MSW, LSW

## 2024-09-25 NOTE — TELEPHONE ENCOUNTER
Patient called office again in regards to FMLA paperwork and wanted to speak to the office. Writer tried to warm transfer call but call ended. Please contact patient to disucss.

## 2024-09-25 NOTE — PSYCH
Visit Time    Visit Start Time: 0830  Visit Stop Time: 0930  Total Visit Duration:  50 minutes    Subjective:     Patient ID: Libertad Espinal is a 39 y.o. female.    [facility] Clinical Progress Notes      Specialized Services Documentation  Therapist must complete separate progress note for each specific clinical activity in which the individual participated during the day.       EDUCATION THERAPY      Libertad Espinal with prompts shared in morning assessment and goal review. Presented as Receptive related to readiness to learn. Libertad Espinal  did not complete goal from last treatment day identifying she is trying to work on her evening schedule to prepare for bed, gaining hope. Libertad Espinal did not present with any barriers to learning. Throughout morning group, Libertad Espinal participated in mindfulness exercise and movement experience. Libertad Espinal made adequate progress toward goal. Continue education group to assess willingness to engage, assess transfer of knowledge, and participate in skill development.    Tx Plan Objective: 1.1, 1.2, 1.3, 1.4 , Therapist: Herman Pierce    Visit Time    Visit Start Time: 1330  Visit Stop Time: 1430  Total Visit Duration:  0 minutes    Subjective:     Patient ID: Libertad Espinal is a 39 y.o. female.    [facility] Clinical Progress Notes      Specialized Services Documentation  Therapist must complete separate progress note for each specific clinical activity in which the individual participated during the day.       GROUP PSYCHOTHERAPY    Libertad Espinal was not present for this session  Tx Plan Objective: 1.1, 1.2, 1.3, 1.4 , Therapist: Herman Pierce    Group Psychotherapy        Visit Start Time: (1215)  Visit Stop Time: (1315)  Total Visit Duration:  0 minutes  Subjective:     Patient ID: Libertad Espinal 39 y.o.    This group was facilitated virtually in a private office using HIPAA compliant and approved  microsoft teams  Libertad Espinal was not present for this session. The skills introduced help to maintain and regulate emotions and create healthy non-conventional coping skills. Group discovered strategies that help them to manage emotional stress and create unconventional coping strategies that will help emotional regulation on a short term basis.The group talked about understanding the purpose, and meaning of emotional regulation and the importance of emotional expression, regulation , and recognition, and how it affects themselves and others. Teaching on the skill process of unconventional coping strategies and DBT self-care, members of the group are able to manage short term situations with varied direct tangible coping stratiges. Group was encouraged to ask questions in an open forum at the end of group. Libertad Espinal will continue to engage in psychotherapy to encourage positive self realization.  Treatment Plan Objective 1.1, 1.2, 1.3, 1.4 Therapist: Herman DAWSON Ed.

## 2024-09-26 ENCOUNTER — TELEMEDICINE (OUTPATIENT)
Dept: PSYCHOLOGY | Facility: CLINIC | Age: 39
End: 2024-09-26
Payer: COMMERCIAL

## 2024-09-26 DIAGNOSIS — F41.1 GAD (GENERALIZED ANXIETY DISORDER): Primary | Chronic | ICD-10-CM

## 2024-09-26 DIAGNOSIS — F31.81 BIPOLAR II DISORDER (HCC): ICD-10-CM

## 2024-09-26 PROCEDURE — G0177 OPPS/PHP; TRAIN & EDUC SERV: HCPCS

## 2024-09-26 PROCEDURE — 90832 PSYTX W PT 30 MINUTES: CPT

## 2024-09-26 PROCEDURE — G0176 OPPS/PHP;ACTIVITY THERAPY: HCPCS

## 2024-09-26 PROCEDURE — G0410 GRP PSYCH PARTIAL HOSP 45-50: HCPCS

## 2024-09-26 NOTE — BH TREATMENT PLAN
"Assessment/Plan:   Diagnoses and all orders for this visit:     SHELLY (generalized anxiety disorder)  Bipolar II disorder (HCC)       Subjective  Patient ID: Libertad Espinal is a 39 y.o. female.     [facility] Treatment Plan   AREAS OF NEED: Anxiety and bipolar II as evidenced by sadness, hopelessness, low energy, low motivation, decreased interest, excessive guilt, overeating, challenges completing ADL's, difficulty with decision making, poor concentration, ruminations, negative thoughts, irritability, angry outburst, agitation, passive death wishes and suicidal ideations without a plan or intent due to relationship, work, and financial stressors.  Date Initiated: 09/17/24     Strengths: \"multitasking, being supportive\"       LONG TERM GOAL:   Date Initiated: 09/17/24  1.0  I will identify 3 signs that my depression and anxiety has improved since starting program.   Target Date: 10/15/2024  Completion Date:         SHORT TERM OBJECTIVES:   Date Initiated: 09/17/24  1.1 I will learn and practice daily a new coping technique to improve my day-to-day functioning.  Revision Date: 09/26/24 continue  Target Date: 09/26/2024  Completion Date:      Date Initiated: 09/17/24  1.2 I will learn two new self motivation skills and implement into my daily routine in order to improve my depression  Revision Date: 09/26/24 continue  Target Date: 09/26/2024  Completion Date:     Date Initiated: 09/17/24  1.3 I will take medications as prescribed and share questions and concerns if arise.    Revision Date: 09/26/24 continue  Target Date: 09/26/2024  Completion Date:      Date Initiated: 09/17/24  1.4 I will identify 3 ways my supports can assist in my recovery and agree to staff/support contact as indicated.    Revision Date: 09/26/24 continue  Target Date: 09/26/2024  Completion Date:            7 DAY REVISION:     Date Initiated: 09/26/24   1.5 I will shower every two days and brush teeth at least once daily over the next week " to help improve my depressive symptoms.   Revision Date:   Target Date: 10/07/2024  Completion Date:        PSYCHIATRY:  Date Initiated:  09/17/24  Medication Management and Education       Revision Date: 09/26/24       1.3 Continue medication management        The person(s) responsible for carrying out the plan is Luis Wheat DO; Gretchen Barbour PA-C     NURSING/SYMPTOM EDUCATION:   Date Initiated: 09/17/24  1.1, 1.2. 1.3, 1.4 This RN/Or Therapist will provide wellness/symptoms and skill education groups three to five days weekly to educate Libertad Espinal on signs and symptoms of diagnoses, skills to manage, and medication questions that will be addressed by the treatment team.    Revision date: 09/26/24  1.1,1.2,1.3,1.4,1.5 Continue to encourage Libertad Espinal to participate in wellness/symptoms and skills education groups daily to learn about symptoms, coping strategies and warning signs to promote relapse prevention.    The person(s) responsible for carrying out the plan is BECKIE Walsh RN; Herman DAWSON Ed     PSYCHOLOGY:   Date Initiated: 09/17/24       1.1, 1.2, 1.4 Provide psychotherapy group 5 times per week to allow opportunity for Libertad Espinal  to explore stressors and ways of coping.   Revision Date: 09/26/24   1.1,1.2,1.4,1.5  Continue to provide psychotherapy group daily to Libertad Espinal and encourage sharing of stressors, skills and positive change.    The person(s) responsible for carrying out the plan is CIRO Lee,LSW; Usha Cotto MA, LMT     ALLIED THERAPY:   Date Initiated: 09/17/24  1.1,1.2 Engage Libertad Espinal in AT group 5 times weekly to encourage development and use of wellness tools to decrease symptoms and promote recovery through meaningful activity.  Revision Date: 09/26/24   1.1,1.2,1.5 Continue to engage Libertad Espinal to participate in AT group to practice wellness tools within program and transfer to home sharing  successes and barriers through healthy task involvement.        The person(s) responsible for carrying out the plan is BRIAN RamirezBC; Cecilio ELLINGTON     CASE MANAGEMENT:   Date Initiated: 09/17/24      1.0 This  will meet with Libertad Espinal  3-4 times weekly to assess treatment progress, discharge planning, connection to community supports and UR as indicated.  Revision Date: 09/26/24   1.0 Continue to meet with Libertad Espinal 3-4 times weekly to assess growth, work toward goals, continued treatment needs, dc planning and use of supports.    The person(s) responsible for carrying out the plan is Sulma BUTTS RN     TREATMENT REVIEW/COMMENTS:      DISCHARGE CRITERIA: Identify 3 signs of progress and complete relapse prevention plan.    DISCHARGE PLAN: Connect with identified outpatient providers.   Estimated Length of Stay: 10 treatment days        Diagnosis and Treatment Plan explained to Libertad Maurer relates understanding diagnosis and is agreeable to Treatment Plan.            CLIENT COMMENTS / Please share your thoughts, feelings, need and/or experiences regarding your treatment plan with Staff.  Please see follow up note with comments.        Signatures can be found on [facility] Treatment plan consent form.

## 2024-09-26 NOTE — PSYCH
Virtual Regular Visit    Verification of patient location:    Patient is located at Home in the following state in which I hold an active license [place]      Assessment/Plan:    Problem List Items Addressed This Visit       SHELLY (generalized anxiety disorder) - Primary (Chronic)    Bipolar II disorder (HCC)       Goals addressed in session: PHP VIRTUAL GROUP DUE TO virtual preference of care           Reason for visit is No chief complaint on file.       Encounter provider  PARTIAL PSYCH PROGRAM      Recent Visits  No visits were found meeting these conditions.  Showing recent visits within past 7 days and meeting all other requirements  Future Appointments  No visits were found meeting these conditions.  Showing future appointments within next 150 days and meeting all other requirements       The patient was identified by name and date of birth. Libertad Espinal was informed that this is a telemedicine visit and that the visit is being conducted throughthe Microsoft Teams platform. She agrees to proceed..  My office door was closed. No one else was in the room.  She acknowledged consent and understanding of privacy and security of the video platform. The patient has agreed to participate and understands they can discontinue the visit at any time.    Patient is aware this is a billable service.     Subjective  Libertad Espinal is a 39 y.o. female I spent FIVE GROUP HOURS PLUS CASE MANAGEMENT minutes with patient today in which greater than 50% of time was spent in counseling/coordination of care regarding PHP - SEE NOTES  .      HPI     Past Medical History:   Diagnosis Date    Abnormal Pap smear of cervix     Anxiety     Bipolar disorder (HCC)     Bipolar I disorder, most recent episode depressed, in full remission (HCC) 07/19/2016    COVID-19 01/12/2022    10/27/23 Per pt had 3 separate times - last year being the last episode    Depression     Eczema     Exposure to chlamydia     Resolved 06/09/17     Knee pain, left     Migraine without aura and without status migrainosus, not intractable 10/11/2019    Obesity     Post traumatic stress disorder 2021    Postpartum depression 2018    TMJ (dislocation of temporomandibular joint)     causes snoring    Manoj-Parkinson-White (WPW) syndrome        Past Surgical History:   Procedure Laterality Date    ABLATION OF DYSRHYTHMIC FOCUS      WPW ablation age 16    COLONOSCOPY      COLPOSCOPY      DISTAL PATELLAR REALIGNMENT Left 10/31/2023    Procedure: OPEN MPFL RECONSTRUCTION WITH ALLOGRAFT;  Surgeon: Wilbert Michael DO;  Location:  MAIN OR;  Service: Orthopedics    INDUCED       OTHER SURGICAL HISTORY      catheter ablation- WPW age 17    MT ARTHROSCOPY KNEE LATERAL RELEASE Left 10/31/2023    Procedure: RELEASE RETINACULAR;  Surgeon: Wilbert Michael DO;  Location:  MAIN OR;  Service: Orthopedics    TONSILLECTOMY      WISDOM TOOTH EXTRACTION         Current Outpatient Medications   Medication Sig Dispense Refill    Drospirenone 4 MG TABS Take 1 tablet by mouth in the morning 84 tablet 3    lamoTRIgine (LaMICtal) 200 MG tablet Take 1 tablet (200 mg total) by mouth daily at bedtime 90 tablet 1    LORazepam (ATIVAN) 1 mg tablet Take 1 tablet (1 mg total) by mouth every 6 (six) hours as needed for anxiety 30 tablet 0    lurasidone (LATUDA) 80 mg tablet Take 1 tablet (80 mg total) by mouth daily with breakfast 30 tablet 0    lurasidone 60 MG TABS Take 1 tablet (60 mg total) by mouth daily with breakfast 7 tablet 0    naproxen (NAPROSYN) 500 mg tablet Take 1 tablet (500 mg total) by mouth 2 (two) times a day with meals (Patient taking differently: Take 500 mg by mouth 2 (two) times a day with meals) 30 tablet 0    Vitamin D, Cholecalciferol, 50 MCG (2000 UT) CAPS Take 2,000 Units by mouth daily Start after completing 12 weeks of ergocalciferol. 90 capsule 3     No current facility-administered medications for this visit.        Allergies   Allergen  Reactions    Codeine Other (See Comments)     dry heaves    Sulfa Antibiotics Hives and Rash     Per pt as achild    Erythromycin Hives     Per as a child       Review of Systems    Video Exam    There were no vitals filed for this visit.    Physical Exam     Visit Time    Visit Start Time: 830  Visit Stop Time: 1430  Total Visit Duration:  300 minutes

## 2024-09-26 NOTE — PSYCH
"Subjective:   Patient ID: Libertad Espinal is a 39 y.o. female.    [facility] Clinical Progress Notes      Specialized Services Documentation  Therapist must complete separate progress note for each specific clinical activity in which the individual participated during the day.     Allied Therapy   Visit Start Time: 0930  Visit Stop Time: 1030  Total Visit Duration: 60 minutes  Group was facilitated virtually in a private office using HIPAA compliant and approved [employer] Teams. Libertad Espinal consented to the use of tele-video modality of treatment and was virtually present for group allied therapy.  Libertad Espinal actively shared in MTH group focused on perfectionism. Therapist started the group with a discussion of the song \"Surface Pressure\". Group was asked the question, \"in what ways do we let the pressure build up in our lives?\" Libertad was observed to be engaged in a jax analysis of “Perfect” by Lilia. Therapist discussed what perfectionism is, how it can affect us, and how it can motivate us. Therapist then led group in active music making and gave instructions to find a \"found sound\" to make music on while giving each group member a chance to solo. Group listened to “Let it Be” and focused on the lyrics. Libertad shared identifying triggers and using opposite action as a way to break the cycle of rumination. Good effort noted toward treatment goal. Continue AT to encourage development and understanding of perfectionism.   Tx Plan Objective: 1.1,1.2,1.4, Therapist:  CHANDANA Joe  "

## 2024-09-26 NOTE — PSYCH
Visit Time    Visit Start Time: 1045  Visit Stop Time: 1145  Total Visit Duration:  60 minutes    Subjective:     Patient ID: Libertad Espinal is a 39 y.o. female.    [facility] Clinical Progress Notes      Specialized Services Documentation  Therapist must complete separate progress note for each specific clinical activity in which the individual participated during the day.     Allied Therapy [id number] This group was facilitated virtually in a private office using HIPAA Compliant and Approved [employer] Teams. Libertad Espinal participated in music therapy group regarding mindfulness. The group participated in a name that tune exercise as an example of mindfulness. The group then read and discussed materials on mindfulness as well as areas they could use mindfulness in their current lives. The group then read and discussed a handout on a music mindfulness exercise. When prompted, Libertad did not share but appeared attentive. Minimal effort noted towards treatment goal. Continue AT to encourage the development and proactive use of mindfulness coping tools. Tx Plan Objective: 1.1,1.2,1.4, Therapist:  ROSALBA Michele, CHANDANA

## 2024-09-26 NOTE — PSYCH
Visit Time    Visit Start Time: 1215  Visit Stop Time: 1315  Total Visit Duration: 60 minutes     Subjective:    Patient ID: Libertad Espinal is a 39 y.o. y.o. female.    [facility] Clinical Progress Notes      Specialized Services Documentation  Therapist must complete separate progress note for each specific clinical activity in which the individual participated during the day.       Group Psychotherapy   This group was facilitated virtually in a private office using HIPAA Compliant and Approved Microsoft Teams. Libertad Espinal consented to the use of tele-video modality of treatment.  [id number] Libertad Espinal was present in psychoeducational group about Depression & Bipolar. The group followed Depression & Bipolar resource/exercise sheets and the video Dark Glasses & Kaleidoscopes. Group was educated on:  Signs and symptoms of Depression & Bipolar as well as different types of Bipolar.   Diagnosis criteria   Causes and treatments of Depression & Bipolar   Management skills for Depression & Bipolar  Discussed various coping skills    Question and answer time allowed. Good progress towards goal noted through participation in group. Continue psychoeducational groups on anxiety and Bipolar to teach the value of learning about diagnosis and treatments available.     Tx Plan Objective:  1.1, 1.2, 1.3, 1.4 Therapist:  Sulma BUTTS RN        Visit Time    Visit Start Time: 1110  Visit Stop Time: 1140  Total Visit Duration: 30 minutes    INDIVIDUAL PSYCHOTHERAPY     Libertad Espinal actively shared in individual therapy.   Libertad Espinal identified she is feeling less irritable or angry over the past two days, feeling more open, listen and participate in group then previous days. Says she is using STOP and other DBT skills that she finds are minimizing her symptoms. Reports she continues to struggle doing her ADL's brushing teeth every few days and showering twice a week.  Discussed motivational skills, setting a routine, and goal setting. Libertad feels it reasonable to set goals to shower every two days and brush teeth at least once a day. Libertad will begin with showering tonight which was assigned as homework and in session practiced goal setting. Some progress toward goal identified.  Next session follow up to include review of schedule, ADL completion, and continued goal setting. Continue individual psychotherapy in order to stabilize mood.     Case management needs addressed: Reviewed treatment plan.  I,Libertad Espinal,am physically unable to provide a signature; however, I understand the nature of and am in agreement with the documentation presented to me via TEAMS.  I have received a copy through My Chart and/or the [employer] Postal [employer].  I freely give verbal consent.  Name of Document (s) treatment plan:  Witness to verbal consent: Sulma Chambers RN  Witness to verbal consent: Ramonita Beltrán     Treatment Plan Objectives addressed: 1.1, 1.2, 1.3, 1.4, 1.5    Current suicide risk : Low     Medications changes/added/denied? No    Treatment session number: 5    Individual Case Management Visit provided today? Yes     [facility] follow up physician's orders: No new orders        Sulma Chambers

## 2024-09-26 NOTE — TELEPHONE ENCOUNTER
Spoke with Libertad over the phone.  She continues to attend [facility].  No change in medications have been made.  She reports some improvement of her symptoms.  Her next appointment with this writer is going to be September 10 after which a decision will be made to release her to return to work

## 2024-09-26 NOTE — PSYCH
Visit Time    Visit Start Time: 0830  Visit Stop Time: 0930  Total Visit Duration:  35 minutes    Subjective:     Patient ID: Libertad Espinal is a 39 y.o. female.    [facility] Clinical Progress Notes      Specialized Services Documentation  Therapist must complete separate progress note for each specific clinical activity in which the individual participated during the day.       EDUCATION THERAPY      Libertad Espinal quietly shared in morning assessment and goal review. Presented as Receptive related to readiness to learn. Libertad Espinal  did complete goal from last treatment day identifying she went to the grocery store, gaining responsibility. Libertad Espinal did not present with any barriers to learning. Throughout morning group, Libertad Espinal participated in mindfulness exercise and movement experience. Libertad Espinal made positive progress toward goal. Continue education group to assess willingness to engage, assess transfer of knowledge, and participate in skill development.    Tx Plan Objective: 1.1, 1.2, 1.3, 1.4 , Therapist: Herman Pierce    Visit Time    Visit Start Time: 1330  Visit Stop Time: 1430  Total Visit Duration:  60 minutes    Subjective:     Patient ID: Libertad Espinal is a 39 y.o. female.    [facility] Clinical Progress Notes      Specialized Services Documentation  Therapist must complete separate progress note for each specific clinical activity in which the individual participated during the day.       GROUP PSYCHOTHERAPY    Libertad Espinal participated actively in psychotherapy group.  This was observed Consistent throughout the treatment day. They were engaged in learning related to Wellness Tools. Staff utilized Verbal and Demonstration teaching methods.  Libertad Espinal shared area of learning and set a goal for outside of program to take a shower.  Libertad Espinal was able to share items explored during the day and shared  in adding to their WRAP.  Ended session with peer  led exercise on grounding techniques.  Libertad Espinal demonstrated positive progress toward goal.  Continue group psychotherapy to actively practice learned skills and encourage transfer of knowledge to unstructured time.    Tx Plan Objective: 1.1, 1.2, 1.3, 1.4 , Therapist: Herman Pierce    Group Psychotherapy      Visit Start Time: (930)  Visit Stop Time: (1030)  Total Visit Duration:  60 minutes    Subjective:     Patient ID: Libertad Espinal 39 y.o.     This group was facilitated virtually in a private office using HIPAA Compliant and Approved [employer] Teams.      Libertad Espinal participated actively in a psychotherapy group focused on setting boundaries. The group focused on the interpersonal effectiveness pillar of DBT; specifically, looking at the DOUGLAS acronym. Participants followed a step by step breakdown of the DOUGLAS process and were encouraged to engage in open discussion throughout. Group members were given a DOUGLAS practice worksheet and time to practice. Libertad Espinal took notes and filled out his practice sheet. Continue to note progress towards goals. Continue with psychotherapy to encourage further development of interpersonal effectiveness skills.  Tx Plan Objective 1.1, 1.2, 1.4 Therapist: JUAN JOSE Pierce

## 2024-09-26 NOTE — TELEPHONE ENCOUNTER
Patient called office again in regards to [place] paperwork. Patient is stating since paperwork is still not complete for her work that she is requesting return to work date on 10/14/24 now since patient has appointments.

## 2024-09-27 ENCOUNTER — TELEPHONE (OUTPATIENT)
Age: 39
End: 2024-09-27

## 2024-09-27 ENCOUNTER — TELEMEDICINE (OUTPATIENT)
Dept: PSYCHOLOGY | Facility: CLINIC | Age: 39
End: 2024-09-27
Payer: COMMERCIAL

## 2024-09-27 ENCOUNTER — OFFICE VISIT (OUTPATIENT)
Dept: FAMILY MEDICINE CLINIC | Facility: CLINIC | Age: 39
End: 2024-09-27
Payer: COMMERCIAL

## 2024-09-27 VITALS
RESPIRATION RATE: 16 BRPM | HEART RATE: 90 BPM | OXYGEN SATURATION: 98 % | DIASTOLIC BLOOD PRESSURE: 82 MMHG | BODY MASS INDEX: 41.7 KG/M2 | TEMPERATURE: 98.2 F | WEIGHT: 226.6 LBS | SYSTOLIC BLOOD PRESSURE: 118 MMHG | HEIGHT: 62 IN

## 2024-09-27 DIAGNOSIS — G43.811 OTHER MIGRAINE WITH STATUS MIGRAINOSUS, INTRACTABLE: Primary | ICD-10-CM

## 2024-09-27 DIAGNOSIS — F31.81 BIPOLAR II DISORDER (HCC): Primary | ICD-10-CM

## 2024-09-27 DIAGNOSIS — F41.1 GAD (GENERALIZED ANXIETY DISORDER): Chronic | ICD-10-CM

## 2024-09-27 PROCEDURE — 99214 OFFICE O/P EST MOD 30 MIN: CPT | Performed by: FAMILY MEDICINE

## 2024-09-27 PROCEDURE — G0176 OPPS/PHP;ACTIVITY THERAPY: HCPCS

## 2024-09-27 PROCEDURE — G0410 GRP PSYCH PARTIAL HOSP 45-50: HCPCS

## 2024-09-27 PROCEDURE — G0177 OPPS/PHP; TRAIN & EDUC SERV: HCPCS

## 2024-09-27 RX ORDER — KETOROLAC TROMETHAMINE 30 MG/ML
60 INJECTION, SOLUTION INTRAMUSCULAR; INTRAVENOUS ONCE
Status: COMPLETED | OUTPATIENT
Start: 2024-09-27 | End: 2024-09-27

## 2024-09-27 RX ADMIN — KETOROLAC TROMETHAMINE 60 MG: 30 INJECTION, SOLUTION INTRAMUSCULAR; INTRAVENOUS at 13:15

## 2024-09-27 NOTE — TELEPHONE ENCOUNTER
Pt called reporting she has had a migraine since yesterday.  She states she took an Imitrex last night before bed, but it did not help.  She wanted to know if she could come in to the office for an injection, or if she should go to the ED for that.  Please advise.

## 2024-09-27 NOTE — TELEPHONE ENCOUNTER
9/27 10:30am: Spoke w/PT, scheduled her for today, 9/27 at 1pm w/PCP for evaluation of her migraine, and possible Tramadol injection.

## 2024-09-27 NOTE — PROGRESS NOTES
"Ambulatory Visit  Name: Libertad Espinal      : 1985      MRN: [id number]  Encounter Provider: Néstor Christiansen MD  Encounter Date: 2024   Encounter department: [facility]    Assessment & Plan  Other migraine with status migrainosus, intractable    Similar to previous migraine, toradol given with partial relief, patient will call if not resolved after resting at home.  Orders:    ketorolac (TORADOL) 60 mg/2 mL IM injection 60 mg       History of Present Illness     Headache      39 yaer old female presenting for migraine.    Has about 2 migraine per month usually resolved with triptan,    Has had current migraine for one day and was not resolved.    Has used toradol in past with relief of sympotms      Review of Systems   Constitutional:  Negative for activity change and appetite change.   Respiratory:  Negative for apnea and chest tightness.    Cardiovascular:  Negative for palpitations.   Gastrointestinal:  Negative for abdominal distention and abdominal pain.   Musculoskeletal:  Negative for arthralgias and back pain.   Neurological:  Positive for headaches.           Objective     /82 (BP Location: Left arm, Patient Position: Sitting, Cuff Size: Large)   Pulse 90   Temp 98.2 °F (36.8 °C) (Temporal)   Resp 16   Ht 5' 2\" (1.575 m)   Wt 103 kg (226 lb 9.6 oz)   LMP 2024   SpO2 98%   BMI 41.45 kg/m²     Physical Exam  Constitutional:       Appearance: Normal appearance.   Cardiovascular:      Rate and Rhythm: Normal rate and regular rhythm.      Pulses: Normal pulses.   Pulmonary:      Effort: Pulmonary effort is normal.   Musculoskeletal:         General: Normal range of motion.   Neurological:      General: No focal deficit present.      Mental Status: She is alert and oriented to person, place, and time. Mental status is at baseline.      Cranial Nerves: No cranial nerve deficit.      Motor: No weakness.      Gait: Gait normal.         "

## 2024-09-27 NOTE — PSYCH
Virtual Regular Visit    Verification of patient location:    Patient is located at Home in the following state in which I hold an active license [place]      Assessment/Plan:    Problem List Items Addressed This Visit       SHELLY (generalized anxiety disorder) (Chronic)    Bipolar II disorder (HCC) - Primary       Goals addressed in session: PHP VIRTUAL GROUP DUE TO virtual preference of care           Reason for visit is No chief complaint on file.       Encounter provider  PARTIAL PSYCH PROGRAM      Recent Visits  No visits were found meeting these conditions.  Showing recent visits within past 7 days and meeting all other requirements  Today's Visits  Date Type Provider Dept   09/27/24 Office Visit Néstor Christiansen MD [facility]   Showing today's visits and meeting all other requirements  Future Appointments  No visits were found meeting these conditions.  Showing future appointments within next 150 days and meeting all other requirements       The patient was identified by name and date of birth. Libertad Espinal was informed that this is a telemedicine visit and that the visit is being conducted throughthe Microsoft Teams platform. She agrees to proceed..  My office door was closed. No one else was in the room.  She acknowledged consent and understanding of privacy and security of the video platform. The patient has agreed to participate and understands they can discontinue the visit at any time.    Patient is aware this is a billable service.     Subjective  Libertad Espinal is a 39 y.o. female I spent FIVE GROUP HOURS PLUS CASE MANAGEMENT minutes with patient today in which greater than 50% of time was spent in counseling/coordination of care regarding PHP - SEE NOTES  .      HPI     Past Medical History:   Diagnosis Date    Abnormal Pap smear of cervix     Anxiety     Bipolar disorder (HCC)     Bipolar I disorder, most recent episode depressed, in full remission (HCC)  2016    COVID-19 2022    10/27/23 Per pt had 3 separate times - last year being the last episode    Depression     Eczema     Exposure to chlamydia     Resolved 18    Knee pain, left     Migraine without aura and without status migrainosus, not intractable 10/11/2019    Obesity     Post traumatic stress disorder 2021    Postpartum depression 2018    TMJ (dislocation of temporomandibular joint)     causes snoring    Manoj-Parkinson-White (WPW) syndrome        Past Surgical History:   Procedure Laterality Date    ABLATION OF DYSRHYTHMIC FOCUS      WPW ablation age 16    COLONOSCOPY      COLPOSCOPY      DISTAL PATELLAR REALIGNMENT Left 10/31/2023    Procedure: OPEN MPFL RECONSTRUCTION WITH ALLOGRAFT;  Surgeon: Wilbert Michael DO;  Location:  MAIN OR;  Service: Orthopedics    INDUCED       OTHER SURGICAL HISTORY      catheter ablation- WPW age 17    PA ARTHROSCOPY KNEE LATERAL RELEASE Left 10/31/2023    Procedure: RELEASE RETINACULAR;  Surgeon: Wilbert Michael DO;  Location:  MAIN OR;  Service: Orthopedics    TONSILLECTOMY      WISDOM TOOTH EXTRACTION         Current Outpatient Medications   Medication Sig Dispense Refill    Drospirenone 4 MG TABS Take 1 tablet by mouth in the morning 84 tablet 3    lamoTRIgine (LaMICtal) 200 MG tablet Take 1 tablet (200 mg total) by mouth daily at bedtime 90 tablet 1    LORazepam (ATIVAN) 1 mg tablet Take 1 tablet (1 mg total) by mouth every 6 (six) hours as needed for anxiety 30 tablet 0    lurasidone (LATUDA) 80 mg tablet Take 1 tablet (80 mg total) by mouth daily with breakfast 30 tablet 0    lurasidone 60 MG TABS Take 1 tablet (60 mg total) by mouth daily with breakfast (Patient not taking: Reported on 2024) 7 tablet 0    naproxen (NAPROSYN) 500 mg tablet Take 1 tablet (500 mg total) by mouth 2 (two) times a day with meals (Patient taking differently: Take 500 mg by mouth 2 (two) times a day with meals) 30 tablet 0    Vitamin D,  Cholecalciferol, 50 MCG (2000 UT) CAPS Take 2,000 Units by mouth daily Start after completing 12 weeks of ergocalciferol. 90 capsule 3     No current facility-administered medications for this visit.        Allergies   Allergen Reactions    Codeine Other (See Comments)     dry heaves    Sulfa Antibiotics Hives and Rash     Per pt as achild    Erythromycin Hives     Per as a child       Review of Systems    Video Exam    There were no vitals filed for this visit.    Physical Exam     Visit Time    Visit Start Time: 830  Visit Stop Time: 1430  Total Visit Duration:  300 minutes

## 2024-09-27 NOTE — PSYCH
Visit Time    Visit Start Time: 0830  Visit Stop Time: 0930  Total Visit Duration:  60 minutes    Subjective:     Patient ID: Libertad Espinal is a 39 y.o. female.    [facility] Clinical Progress Notes      Specialized Services Documentation  Therapist must complete separate progress note for each specific clinical activity in which the individual participated during the day.       EDUCATION THERAPY      Libertad Espinal quietly shared in morning assessment and goal review. Presented as Receptive related to readiness to learn. Libertad Espinal  did not complete goal from last treatment day identifying she did not shower, but was wanting to gain hope and responsibility. Libertad Espinal did present with any barriers to learning, stating she felt tht she was in a low place and not motivated to work on her self-care. Throughout morning group, Libertad Espinal participated in mindfulness exercise and gratitude practice. Libertad Espinal made limited progress toward goal. Continue education group to assess willingness to engage, assess transfer of knowledge, and participate in skill development.    Tx Plan Objective: 1.1, 1.2, 1.3, 1.4 , Therapist: Herman Pierce    Visit Time    Visit Start Time: 1330  Visit Stop Time: 1430  Total Visit Duration:  0 minutes    Subjective:     Patient ID: Libertad Espinal is a 39 y.o. female.    [facility] Clinical Progress Notes      Specialized Services Documentation  Therapist must complete separate progress note for each specific clinical activity in which the individual participated during the day.       GROUP PSYCHOTHERAPY    Libertad Espinal was not present for this session  Tx Plan Objective: 1.1, 1.2, 1.3, 1.4 , Therapist: Herman Pierce    Group Psychotherapy      Visit Start Time: (1215)  Visit Stop Time: (1315)  Total Visit Duration:  0 minutes    Subjective:     Patient ID: Libertad Espinal 39 y.o.     This group was facilitated  virtually in a private office using HIPAA Compliant and Approved [employer] Teams Libertad Espinal consented to the use of tele-video modality of treatment.   Libertad was not present for this session. The group engaged in the wellness assessment, which evaluates progress on several different areas of wellness/wellbeing: physical, emotional, cognitive, personal development, social, spiritual, and activities of daily living. Clients rated their progress and discussed areas that need work. By completing and discussing areas of progress and challenges, members are connected and reminded that, in their mental health struggle, they are not alone. Topics of discussion revolved around positive experiences within each area of wellness as well as the challenging aspects to wellness within their past week.  Continue psychotherapy and life skill groups to encourage further exploration of needs, personal awareness, and wellness tools.

## 2024-09-27 NOTE — PSYCH
Visit Time    Visit Start Time: 0930  Visit Stop Time: 1030  Total Visit Duration: 60 minutes    Subjective:     Patient ID: Libertad Espinal is a 39 y.o. female.    [facility] Clinical Progress Notes      Specialized Services Documentation  Therapist must complete separate progress note for each specific clinical activity in which the individual participated during the day.          Group Psychotherapy - Anxiety  This group was facilitated virtually in a private office using HIPAA Compliant and Approved Microsoft Teams. Libertad Espinal consented to the use of tele-video modality of treatment.  Libertad Espinal was present in psychoeducational group about anxiety. The group followed a slide show presentation on Anxiety and the Brain. Group was educated on:  Signs and symptoms of anxiety   Causes and treatments of anxiety  How the brain processes anxiety  Group members then discussed anxiety causing situations and positive ways to cope with these stressors.   Coping skills discussed were breathing paired with sitting/noticing with our thoughts and emotions exercise, challenging irrational thoughts, progressive muscle relaxation, Dropping anchor, deep breathing, and imagery.    Good progress towards goal noted through participation in group. Continue psychoeducational groups on depression to teach the value of learning about diagnosis and treatments available.     Tx Plan Objective:  1.1, 1.2, 1.3, 1.4 Therapist:  Sulma BUTTS RN      Case Management Note    Sulma BUTTS RN    Current suicide risk : Low     A case management session is not scheduled today with Libertad Espinal ; additionally, they did not request a CM meeting.  Libertad Espinal is aware of next scheduled 1:1. Libertad Espinal left groups at 1145 today due to migraine and had scheduled a MD appointment for same.     Medications changes/added/denied? No    Treatment session number:  6    Individual Case Management Visit provided today? No    [facility]ations follow up physician's orders: None at this time

## 2024-09-30 ENCOUNTER — DOCUMENTATION (OUTPATIENT)
Dept: PSYCHOLOGY | Facility: CLINIC | Age: 39
End: 2024-09-30

## 2024-09-30 ENCOUNTER — APPOINTMENT (OUTPATIENT)
Dept: PSYCHOLOGY | Facility: CLINIC | Age: 39
End: 2024-09-30
Payer: COMMERCIAL

## 2024-09-30 DIAGNOSIS — F31.81 BIPOLAR II DISORDER (HCC): ICD-10-CM

## 2024-09-30 DIAGNOSIS — F41.1 GAD (GENERALIZED ANXIETY DISORDER): Primary | Chronic | ICD-10-CM

## 2024-09-30 PROCEDURE — G0410 GRP PSYCH PARTIAL HOSP 45-50: HCPCS

## 2024-09-30 PROCEDURE — G0177 OPPS/PHP; TRAIN & EDUC SERV: HCPCS

## 2024-09-30 PROCEDURE — 90832 PSYTX W PT 30 MINUTES: CPT

## 2024-09-30 NOTE — BH CRISIS PLAN
Client Name: Libertad Espinal       Client YOB: 1985    [place] Safety Plan      Creation Date: 4/23/24 Update Date: 9/30/24   Created By: CHARLIE Hutchinson Last Updated By: Sulma Chambers      Step 1: Warning Signs:   Warning Signs   when I'm actually thinking about doing something   being in an argument with someone can be a trigger/prompting event   isolation   tearfulness   anger            Step 2: Internal Coping Strategies:   Internal Coping Strategies   going for a drive   going in my room to lay down and watch tv   Coloring with daughter   healthy distrations   STOP skill   aggression vs. assertiveness review            Step 3: People and social settings that provide distraction:   Name Contact Information   Sit with my children and hug them    Set up a dinner or outing with closest friends phone numbers in cell    Places   go for a drive to a drive thru and sit in the parking lot   outdoors           Step 4: People whom I can ask for help during a crisis:      Name Contact Information    Renata in my cell phone    maybe Saturnino Caldwell in phone      Step 5: Professionals or agencies I can contact during a crisis:      [facility]inican/Agency Name Phone Emergency Contact    Olga Schrader [phone]     Dr. Sutton [phone]       Local Emergency Department Emergency Department Phone Emergency Department Address    [facility]          Crisis Phone Numbers:   Suicide Prevention Lifeline: Call or Text  370 Crisis Text Line: Text HOME to 601-621   Please note: Some [facility] do not have a separate number for Child/Adolescent specific crisis. If your county is not listed under Child/Adolescent, please call the adult number for your county      Adult Crisis Numbers: Child/Adolescent Crisis Numbers   [facility]: [phone] [facility]: [phone]   [facility]: [phone] [facility]: [phone]   [facility]: [phone]  [place]: [phone]   [facility]: [phone] [place]: [phone]   [place]: [phone]   [facility]: [phone]   [facility]: [phone]   [facility]: [phone] (daytime) [phone] (after hours, weekends, holidays)      Step 6: Making the environment safer (plan for lethal means safety):   Plan: No guns or weapons.   Has regular medications...only one month at a time.   If she felt she was in danger, she will ask Saturnino to hold them for her.      Optional: What is most important to me and worth living for?   My children, my self and my future     [place] Safety Plan. Lulu Hayward and Jean Bellamy. Used with permission of the authors.

## 2024-09-30 NOTE — PROGRESS NOTES
Visit Time    Visit Start Time: 0900  Visit Stop Time: 0920  Total Visit Duration:  20 minutes    Subjective:     Patient ID: Libertad Espinal is a 39 y.o. female.    [facility] Clinical Progress Notes      Specialized Services Documentation  Therapist must complete separate progress note for each specific clinical activity in which the individual participated during the day.       Case Management Note    Sulma BUTTS RN    Current suicide risk : Low     Libertad Espinal is discharged today due to attendance. Discussed discharge due to attendance, states she has been feeling less angry, feels its due to medication effectiveness. Reports she will follow up with outpatient providers. Appointments are scheduled for psycho therapist October 8th at 9 AM and psychiatrist October 10th, 2024 at 1030 AM. Denies SI/HI or thoughts of self injurious behaviors.  Sept 18, 19, 20, 2024 off due to argument with children's step mother. Was angry.   Sept 25, 2024 left groups at 1100- children had early dismissal.  Sept 27, 2024 left group at 1145 due to migraine.  Sept 30, 2024 missed medication check/assessment due to needing to drop her children off at school.     CM reviewed Crisis Plan, outpatient follow up appointments with therapist and psychiatrist, and crisis information with Libertad Espinal.  See discharge summary for treatment details. Aftercare providers to receive discharge summary.      Medications changes/added/denied? No    Treatment session number: N/A    Individual Case Management Visit provided today? Yes     [facility] follow up physician's orders: Discharge to outpatient care. Dr. Luis Wheat

## 2024-09-30 NOTE — PROGRESS NOTES
"Assessment/Plan:   Diagnoses and all orders for this visit:     SHELLY (generalized anxiety disorder)  Bipolar II disorder (HCC)        Subjective  Patient ID: Libertad Espinal is a 39 y.o. female.     [facility] Treatment Plan   AREAS OF NEED: Anxiety and bipolar II as evidenced by sadness, hopelessness, low energy, low motivation, decreased interest, excessive guilt, overeating, challenges completing ADL's, difficulty with decision making, poor concentration, ruminations, negative thoughts, irritability, angry outburst, agitation, passive death wishes and suicidal ideations without a plan or intent due to relationship, work, and financial stressors.  Date Initiated: 09/17/24     Strengths: \"multitasking, being supportive\"       LONG TERM GOAL:   Date Initiated: 09/17/24  1.0  I will identify 3 signs that my depression and anxiety has improved since starting program.   Target Date: 10/15/2024  Completion Date: discharged 09/30/24         SHORT TERM OBJECTIVES:   Date Initiated: 09/17/24  1.1 I will learn and practice daily a new coping technique to improve my day-to-day functioning.  Revision Date: 09/26/24 continue  Target Date: 09/26/2024  Completion Date: discharged 09/30/24      Date Initiated: 09/17/24  1.2 I will learn two new self motivation skills and implement into my daily routine in order to improve my depression  Revision Date: 09/26/24 continue  Target Date: 09/26/2024  Completion Date: discharged 09/30/24      Date Initiated: 09/17/24  1.3 I will take medications as prescribed and share questions and concerns if arise.    Revision Date: 09/26/24 continue  Target Date: 09/26/2024  Completion Date: discharged 09/30/24      Date Initiated: 09/17/24  1.4 I will identify 3 ways my supports can assist in my recovery and agree to staff/support contact as indicated.    Revision Date: 09/26/24 continue  Target Date: 09/26/2024  Completion Date: discharged 09/30/24             7 DAY REVISION:     Date " Initiated: 09/26/24   1.5 I will shower every two days and brush teeth at least once daily over the next week to help improve my depressive symptoms.   Revision Date:   Target Date: 10/07/2024  Completion Date: discharged 09/30/24         PSYCHIATRY:  Date Initiated:  09/17/24  Medication Management and Education       Revision Date: 09/26/24 discharged 09/30/24       1.3 Continue medication management        The person(s) responsible for carrying out the plan is Luis Wheat DO; Gretchen Barbour PA-C     NURSING/SYMPTOM EDUCATION:   Date Initiated: 09/17/24  1.1, 1.2. 1.3, 1.4 This RN/Or Therapist will provide wellness/symptoms and skill education groups three to five days weekly to educate Libertad Espinal on signs and symptoms of diagnoses, skills to manage, and medication questions that will be addressed by the treatment team.    Revision date: 09/26/24 discharged 09/30/24   1.1,1.2,1.3,1.4,1.5 Continue to encourage Libertad Espinal to participate in wellness/symptoms and skills education groups daily to learn about symptoms, coping strategies and warning signs to promote relapse prevention.    The person(s) responsible for carrying out the plan is BECKIE Walsh RN; Herman DAWSON Ed     PSYCHOLOGY:   Date Initiated: 09/17/24       1.1, 1.2, 1.4 Provide psychotherapy group 5 times per week to allow opportunity for Libertad Espinal  to explore stressors and ways of coping.   Revision Date: 09/26/24 discharged 09/30/24   1.1,1.2,1.4,1.5  Continue to provide psychotherapy group daily to Libertad Espinal and encourage sharing of stressors, skills and positive change.    The person(s) responsible for carrying out the plan is Iliana Ramirez MSW,LSW; Usha Cotto MA LMT     ALLIED THERAPY:   Date Initiated: 09/17/24  1.1,1.2 Engage Libertad Espinal in AT group 5 times weekly to encourage development and use of wellness tools to decrease symptoms and promote recovery through  meaningful activity.  Revision Date: 09/26/24 discharged 09/30/24   1.1,1.2,1.5 Continue to engage Libertad Espinal to participate in AT group to practice wellness tools within program and transfer to home sharing successes and barriers through healthy task involvement.        The person(s) responsible for carrying out the plan is BRIAN RamirezBC; Cecilio ELLINGTON     CASE MANAGEMENT:   Date Initiated: 09/17/24      1.0 This  will meet with Libertad Espinal  3-4 times weekly to assess treatment progress, discharge planning, connection to community supports and UR as indicated.  Revision Date: 09/26/24 discharged 09/30/24   1.0 Continue to meet with Libertad Espinal 3-4 times weekly to assess growth, work toward goals, continued treatment needs, dc planning and use of supports.    The person(s) responsible for carrying out the plan is Sulma BUTTS RN     TREATMENT REVIEW/COMMENTS:      DISCHARGE CRITERIA: Identify 3 signs of progress and complete relapse prevention plan.    DISCHARGE PLAN: Connect with identified outpatient providers.   Estimated Length of Stay: 10 treatment days

## 2024-09-30 NOTE — PSYCH
"Visit Time    Visit Start Time: 1045  Visit Stop Time: 1145  Total Visit Duration: 60 minutes ***    Subjective:     Patient ID: Libertad Espinal is a 39 y.o. female.    [facility] Clinical Progress Notes      Specialized Services Documentation  Therapist must complete separate progress note for each specific clinical activity in which the individual participated during the day.     Group Psychotherapy  This group was facilitated virtually in a private office using HIPAA Compliant and Approved Microsoft Teams.  Libertad Espinal  consented to the use of tele-video modality of treatment.  Libertad Espinal actively *** shared in psychoeducational group which focused on nutrition to improve physical and mental wellbeing. Topics included:  How our diet affects our mental health  Mental health benefits of healthy eating  Barriers to eating healthy  Ways to overcome barriers  Macro and Micro nutrients   Appropriate serving sizes for all food groups as well as benefits to eating for health       The group then viewed a STEFFANY talk video titled \"The Brain Intervention at the End of our Makinen\" by Dr Elijah Lucio. They then discussed ideas on how to improve on their nutrition.  *** progress toward goal noted.  Continue psychoeducation to educate on the importance of making nutrition a priority.  Tx Plan Objectives: 1.1,1.2    Therapist: Sulma BUTTS       Visit Time    Visit Start Time: ***  Visit Stop Time: ***  Total Visit Duration: {Psych Total Visit Time:85838}    Subjective:    Patient ID: Libertad Espinal is a 39 y.o. y.o. female.    [facility] Clinical Progress Notes      Specialized Services Documentation  Therapist must complete separate progress note for each specific clinical activity in which the individual participated during the day.     INDIVIDUAL PSYCHOTHERAPY     Libertad Espinal actively shared in individual therapy.   Libertad Saenz Teddy identified ***.  This writer " utilized *** interventions.  *** assigned as homework and in session practiced ***. *** progress toward goal identified. Met with Libertad Espinal to discuss weekend plans and supports.  Libertad Espinal identified ***.  Goals for next week include ***.  Next session follow up to include ***. Continue individual psychotherapy in order to ***.     Case management needs addressed: ***    Treatment Plan Objectives addressed: ***    Current suicide risk : Low     Medications changes/added/denied? {YES (DEF)/NO:31677}    Treatment session number: {[id number]}    Individual Case Management Visit provided today? {YES (DEF)/NO:38347}    [facility]ations follow up physician's orders: ***        Sulma Chambers

## 2024-09-30 NOTE — PROGRESS NOTES
"  Subjective:     Patient ID: Libertad Espinal is a 39 y.o. female.    [facility] Discharge Summary:   Admission Date: 09/17/2024  Patient was referred by Radhika Schrader psychotherapist  Discharge Date: 09/30/24   Was this a routine discharge? No, discharged due to lack of attendance.   Diagnosis: Axis I:   1. SHELLY (generalized anxiety disorder)        2. Bipolar II disorder (HCC)           Treating Physician: Dr Joseph Ruiz, Dr Luis Wheat  Treatment Complications: Lack of adherence of attendance policy. Absences as follows:   Sept 18, 19, 20, 2024 off due to argument with children's step mother. Was angry.   Sept 25, 2024 left groups at 1100- children had early dismissal.  Sept 27, 2024 left group at 1145 due to migraine.  Sept 30, 2024 missed medication check/assessment due to needing to drop her children off at school.    Presenting Need: Per Dr Joseph Ruiz \"Libertad Espinal is a 39 y.o. female with a history of Bipolar Disorder type II and Generalized Anxiety Disorder who was referred by Madeleine Schrader (Therapist) and who also sees Dr. Sutton because of ongoing struggles and worsening irritability.  She was at the partial hospitalization program back in July.  However she had to discharge promptly due to personal time conflict.  She is here again because she felt the program was helpful in the short amount of time that she was here last time and wanted to reinvest herself. She has been struggling progressively more since PHP discharge  with worsening irritability and depression, anxiety.  She denies any symptoms of hypomania presently otherwise but does have a standing diagnosis of bipolar disorder type II.  Unclear when last episode was based off of evaluation and questioning.     Started latuda and stopped zoloft at the same time. She notes irritability, anxiety. 'it is heightened by a thousand'. Stopped zoloft a week after starting latuda. She stopped zoloft because she kept forgetting it and that " "she felt it was the long term goal. She also has not taken latuda for several days now.  Thus there have been significant pharmacological factors at play potentially due to nonadherence to treatment recommendations.  She was agreeable to resuming Latuda as had been the plan with her psychiatrist.  She notes that she needs the benefit of partial hospitalization program to support her in her coping skills development and to better manage her stressors because she is tired of 'snapping' at people.     Stressors: financial, work, juggling her kids\"     Per this writer: Libertad Espinal is a 39 year old who was referred to [facility] by her therapist Olga Schrader LCSW for worsening depression, anxiety, mood instability, increased irritability and problems with concentration. Libertad reports she lives with her boyfriend and 4 children, currently on leave from work. Libertad states she has been struggling with sadness, hopelessness, low energy, low motivation, decreased interest, excessive guilt, overeating, challenges completing ADL's, difficulty with decision making, poor concentration, ruminations, negative thoughts, irritability, angry outburst, agitation, passive death wishes and suicidal ideations without a plan or intent. Denies current thoughts of suicide or homicide, denies plan or intent stating \"even though I have thoughts of suicide I would never do it\". Reports children as protective factors. Denies self injurious behaviors. Libertad stated stressors of her relationship with her boyfriend of 7 years, financial strain, and stress at work. She stated that she feels like her boyfriend is a good father but a terrible partner.  Libertad stated she feels like her mental health has been getting worse in recently. States she overeats due to stress and is not showering or brushing her teeth for at least 4 days in between. Libertad stated she would like to be able to learn and utilize coping skills on a more " "consistent basis. During interview Libertad's affect was flat and somewhat irritable. PHQ-9 on intake was 21.     Per Libertad Espinal \"I've been irritable and having angry outbursts, all I want to do is sleep but I can't sleep. \"     Strengths per Libertad Espinal \"multi-tasking, being supportive\"      Course of treatment includes:    OT Evaluation, group counseling, medication management, individual case management, allied therapy, psychoeducation, and psychiatric evaluation.    Treatment Progress: Libertad Espinal attended 6 [facility] treatment days in which she challenged negative thoughts, engaging in healthy coping strategies and learned ways to manage symptoms of anxiety and bipolar II.  Libertad was a passive participant in program. Libertad worked on individual work of anger. Libertad actively worked on suggestions and practiced coping skills. Libertad was more aware of triggers and behaviors. Libertad was open to learning about her diagnosis distress tolerance skills, thought stopping techniques, breathing techniques, mindfulness, meditation, and tapping . Libertad was able to identify personal issues and able to problem solve options. Libertad shared improvement through feeling less depressed and anxious, and she stated \"I'm feeling less irritable and angry, I'm not lashing out at other right away, I think the medications are working\". Libertad identified minimal increase in motivation and ADL's. Libertad Espinal enjoys using the coping skills of going for a drive, laying down and watching tv, coloring with her daughter, STOP skill and reviewing aggression vs. Assertiveness skills. Denied SI, HI, and psychosis. Aftercare providers to receive summary.      Aftercare recommendations include:   Psychiatry Appointment 10/10/2024 at 1030 AM.   Chip Sutton MD   [address] [place] [id number]  Ph: [phone]         Fax: [phone]     Therapy Appointment 10/08/2024 " at 9 AM.   Olga Schrader, LCSW  [address] [facility] [address]  Ph: [phone]         Fax: [phone]     PCP Follow up as needed  Rita Kincaid PA-C  [address] [facility]  [address]  [facility] [address]   Phone: [phone]   Fax: [phone]      Discharge Medications include:  Current Outpatient Medications:     Drospirenone 4 MG TABS, Take 1 tablet by mouth in the morning, Disp: 84 tablet, Rfl: 3    lamoTRIgine (LaMICtal) 200 MG tablet, Take 1 tablet (200 mg total) by mouth daily at bedtime, Disp: 90 tablet, Rfl: 1    LORazepam (ATIVAN) 1 mg tablet, Take 1 tablet (1 mg total) by mouth every 6 (six) hours as needed for anxiety, Disp: 30 tablet, Rfl: 0    lurasidone (LATUDA) 80 mg tablet, Take 1 tablet (80 mg total) by mouth daily with breakfast, Disp: 30 tablet, Rfl: 0    lurasidone 60 MG TABS, Take 1 tablet (60 mg total) by mouth daily with breakfast (Patient not taking: Reported on 9/27/2024), Disp: 7 tablet, Rfl: 0    naproxen (NAPROSYN) 500 mg tablet, Take 1 tablet (500 mg total) by mouth 2 (two) times a day with meals (Patient taking differently: Take 500 mg by mouth 2 (two) times a day with meals), Disp: 30 tablet, Rfl: 0    Vitamin D, Cholecalciferol, 50 MCG (2000 UT) CAPS, Take 2,000 Units by mouth daily Start after completing 12 weeks of ergocalciferol., Disp: 90 capsule, Rfl: 3

## 2024-10-01 NOTE — PROGRESS NOTES
PT Re-Evaluation     Today's date: 2024  Patient name: Libertad Espinal  : 1985  MRN: [id number]  Referring provider: Ester Calderon,*  Dx:   Encounter Diagnosis     ICD-10-CM    1. S/P left knee surgery  Z98.890       2. Patellar instability of left knee  M25.362       3. Recurrent dislocation of left patella  M22.02           Start Time: 0735  Stop Time: 0815  Total time in clinic (min): 40 minutes    Assessment  Assessment details: Patient is a 38 y.o. female that presents status post surgical arthroscopy of the left knee with lateral release and open MPFL reconstruction with allograft on 10/31/2023.  Patient reports 85% improvement with skilled physical therapy services.  FOTO improved by 32 points to 69/100.  Patient reports improvement with ADLS such as bathing and dressing, ambulation, negotiation of stairs, standing, transfers, childcare, and housework.  Patient reports continued difficulty with kneeling/crawling on her knee, but this is improving.  Patient has made good progress towards goals established for physical therapy.  Patient would benefit from continued skilled physical therapy services for continued strengthening.          Impairments: abnormal muscle firing, impaired physical strength and pain with function  Understanding of Dx/Px/POC: good  Goals  Impairment:  1.  Patient will reports 50% reduction in pain in 8 weeks to maximize function.-MET  2.  Patient will improve strength to 4/5 in all planes to maximize function.-PARTIALLY MET  3.  Patient will improve ROM to WFL in 8 weeks to maximize function.-MET    Functional:  1. Patient will improve FOTO by 37 points to 74/100 in 12 weeks to maximize function.-PARTIALLY MET  2. Patient will be independent with HEP in 8 weeks to maximize function.-MET  3. Patient will report no difficulty with ambulation in 12 weeks to maximize function.- MET  4. Patient will report no difficulty with negotiation of stairs in 12 weeks to  maximize function.- MET  5. Patient will report no difficulty with ADLS in 12 weeks to maximize function.- MET          Plan  Plan details: Patient will be a RE in 4 weeks.   Patient would benefit from: skilled physical therapy  Planned modality interventions: cryotherapy  Planned therapy interventions: manual therapy, neuromuscular re-education, patient education, strengthening, stretching, therapeutic activities, therapeutic exercise, home exercise program, functional ROM exercises, flexibility, balance/weight bearing training, abdominal trunk stabilization, activity modification and kinesiology taping  Frequency: 2x month  Duration in visits: 3  Duration in weeks: 4  Treatment plan discussed with: patient        Subjective Evaluation    History of Present Illness  Date of surgery: 10/31/2023  Mechanism of injury: surgery  Mechanism of injury: Patient presents status post surgical arthroscopy of the left knee with lateral release and open MPFL reconstruction with allograft.  Patient reports a chronic history of bilateral knee pain back to childhood.  Patient had bene having a locking sensation in her left knee over the last several years which had been getting more frequent.  Patient is currently limited with ADLS such as bathing and dressing, ambulation, negotiation of stairs, standing, transfers, childcare, and housework.  Patient notes her knee feels more unstable over the last few days.   Patient Goals  Patient goals for therapy: increased strength, decreased pain, increased motion and independence with ADLs/IADLs    Pain  At best pain ratin  At worst pain ratin  Location: medial L knee  Quality: sharp  Progression: improved    Treatments  Current treatment: physical therapy        Objective     Observations   Left Knee   Negative for drainage, effusion and trophic changes.     Palpation   Left   No palpable tenderness to the distal biceps femoris, distal semimembranosus, distal semitendinosus, lateral  "gastrocnemius, medial gastrocnemius, rectus femoris, vastus lateralis and vastus medialis.     Tenderness   Left Knee   Tenderness in the lateral joint line and medial joint line. No tenderness in the fibular head, patellar tendon and pes anserinus.     Passive Range of Motion   Left Knee   Flexion: 140 degrees   Extension: WFL    Right Knee   Flexion: 139 degrees   Extension: WFL    Strength/Myotome Testing     Left Hip   Planes of Motion   Flexion: 5  Abduction: 4+    Right Hip   Planes of Motion   Flexion: 4+  Abduction: 4    Left Knee   Flexion: 5  Extension: 4+  Quadriceps contraction: fair    Right Knee   Flexion: 5  Extension: 4+  Quadriceps contraction: good    Left Ankle/Foot   Dorsiflexion: 5    Additional Strength Details  Mild quad lag with SLR  SL heel raise 20x gayathri    Ambulation   Weight-Bearing Status   Weight-Bearing Status (Left): weight-bearing as tolerated     Ambulation: Stairs   Ascend stairs: independent  Pattern: reciprocal  Railings: without rails  Descend stairs: independent  Pattern: reciprocal  Railings: without rails    Observational Gait   Gait: within functional limits     Functional Assessment        Single Leg Stance   Left: 30 seconds  Right: 30 seconds      Precautions: follow protocol         Manuals 1/25 1/30 2/6 2/14 2/27 3/26 4/16   Light patellar mobility  5 min 5 min 5 min 5 min      measurements/ RE          20 min 15 min 15 min    patellar taping k-tape             5 min                    Neuro Re-Ed                Quad set 10\"x10 no roll 10\"x10 no roll 10\"x10 no roll 10\"x10 no roll    10\" 10x no roll   Prone quad set     5\"x10 5\"x10       Retro rocking  5\"x10  5\"x15 5\"x15 5\"x15  5\" 10x    TENS/NMES Education                 lateral step up/down with eccentric control          5x L                                           Ther Ex                 Recumbent bike 10 min bike on 10 min bike on 10 min bike on 10 min bike on 10 min bike on  8 min bike on 10 min bike on   Heel " "slide flexion x20 w/ PT x20 w/ PT x20 w/ PT x20 w/ PT x20 w/ PT      SAQ 5\"x15 w/ PT 5\"x15 w/ PT 5\"x15 w/ PT 5\"x15 w/ PT   5\" 10x blue roll    Ankle pump                LLLD knee extension stretch                S/l hip abd 5\"x15 L 5\"x15 L 5\"x15 L 5\"x15 L       SLR        x5 w/ PT  5\" 12x  5\" 10x L  5\" 10x L                     Ther Activity                                                     Gait Training                                                     Modalities                 Ice PRN              np                  " oral

## 2024-10-02 ENCOUNTER — TELEMEDICINE (OUTPATIENT)
Dept: BEHAVIORAL/MENTAL HEALTH CLINIC | Facility: CLINIC | Age: 39
End: 2024-10-02
Payer: COMMERCIAL

## 2024-10-02 ENCOUNTER — TELEPHONE (OUTPATIENT)
Age: 39
End: 2024-10-02

## 2024-10-02 DIAGNOSIS — F41.1 GAD (GENERALIZED ANXIETY DISORDER): Chronic | ICD-10-CM

## 2024-10-02 DIAGNOSIS — F43.10 POST TRAUMATIC STRESS DISORDER: ICD-10-CM

## 2024-10-02 DIAGNOSIS — F31.81 BIPOLAR II DISORDER (HCC): Primary | ICD-10-CM

## 2024-10-02 PROCEDURE — 90837 PSYTX W PT 60 MINUTES: CPT | Performed by: SOCIAL WORKER

## 2024-10-04 LAB

## 2024-10-06 ENCOUNTER — NURSE TRIAGE (OUTPATIENT)
Dept: OTHER | Facility: OTHER | Age: 39
End: 2024-10-06

## 2024-10-06 NOTE — TELEPHONE ENCOUNTER
"Reason for Disposition   Hives or itching    Answer Assessment - Initial Assessment Questions  1. APPEARANCE of RASH: \"Describe the rash.\" (e.g., spots, blisters, raised areas, skin peeling, scaly)      Splatchy patches on legs  2. SIZE: \"How big are the spots?\" (e.g., tip of pen, eraser, coin; inches, centimeters)      1-3 inches  3. LOCATION: \"Where is the rash located?\"      legs  4. COLOR: \"What color is the rash?\" (Note: It is difficult to assess rash color in people with darker-colored skin. When this situation occurs, simply ask the caller to describe what they see.)      red  5. ONSET: \"When did the rash begin?\"      Noticed this morning  6. FEVER: \"Do you have a fever?\" If Yes, ask: \"What is your temperature, how was it measured, and when did it start?\"      denies  7. ITCHING: \"Does the rash itch?\" If Yes, ask: \"How bad is the itch?\" (Scale 1-10; or mild, moderate, severe)      itchy  8. CAUSE: \"What do you think is causing the rash?\"      Worried its medication caused  9. NEW MEDICINES: \"What new medicines are you taking?\" (e.g., name of antibiotic) \"When did you start taking this medication?\".      Latuda increased a few weeks ago  10. OTHER SYMPTOMS: \"Do you have any other symptoms?\" (e.g., sore throat, fever, joint pain)        denies  11. PREGNANCY: \"Is there any chance you are pregnant?\" \"When was your last menstrual period?\"        Denies    Pt concerned she is having a reaction to the latuda or lamictal. Pt denies trouble breathing, vomiting or sensation that throat is closing.    Protocols used: Rash - Widespread On Drugs-Adult-AH    "

## 2024-10-07 ENCOUNTER — TELEPHONE (OUTPATIENT)
Age: 39
End: 2024-10-07

## 2024-10-07 ENCOUNTER — DOCUMENTATION (OUTPATIENT)
Dept: PSYCHOLOGY | Facility: CLINIC | Age: 39
End: 2024-10-07

## 2024-10-07 NOTE — PROGRESS NOTES
[facility] Follow Up Action    1456 This writer spoke with Libertad Espinal today - 7 days post discharge from [facility].   Reviewed crisis information.  Libertad Espinal reports taking medication. If no, counseled to take medication as prescribed.  Libertad Espinal reports awareness of aftercare appointments.    Cecilio Toro

## 2024-10-07 NOTE — TELEPHONE ENCOUNTER
Patient called office requesting to speak to nurse in regards to having a reaction to medication. Patient is unsure if it is Latuda or Lamictal. Writer transfer call to nurse for assistance.

## 2024-10-07 NOTE — TELEPHONE ENCOUNTER
Spoke with medical.  She reports that she has a pruritic rash and rash which is started on on her arms but now is spreading to other parts of her body.  She stopped Lamictal 2 days ago.  She was instructed to see her primary care physician for the rash and in the meantime not to start Lamictal.  She has an appointment with this writer on Thursday

## 2024-10-07 NOTE — TELEPHONE ENCOUNTER
Spoke with Libertad. She said she has been taking Allegra for her rash and now she doesn't itch as bad. She said she takes her Latuda with dinner and as soon as she takes it she feel super agitated, restless, and like she is crawling out of her skin. She can't sleep. She said when she was at [facility] they told her to take it with dinner because she was initially just taking it at bedtime. She said she didn't take it last night and felt completely fine.     In regards to her rash, she has been on lamictal 200 mg since April 2023. I told her to contact the office again if the rash begins to grow or spread. Forwarding to provider for review. Clinical will follow up as advised.

## 2024-10-10 ENCOUNTER — OFFICE VISIT (OUTPATIENT)
Dept: PSYCHIATRY | Facility: CLINIC | Age: 39
End: 2024-10-10
Payer: COMMERCIAL

## 2024-10-10 DIAGNOSIS — F32.A MOOD DISORDER OF DEPRESSED TYPE: Primary | ICD-10-CM

## 2024-10-10 DIAGNOSIS — F41.1 GAD (GENERALIZED ANXIETY DISORDER): ICD-10-CM

## 2024-10-10 PROCEDURE — 99213 OFFICE O/P EST LOW 20 MIN: CPT | Performed by: PSYCHIATRY & NEUROLOGY

## 2024-10-10 RX ORDER — LORAZEPAM 1 MG/1
1 TABLET ORAL EVERY 6 HOURS PRN
Qty: 60 TABLET | Refills: 0 | Status: SHIPPED | OUTPATIENT
Start: 2024-10-10

## 2024-10-10 RX ORDER — ARIPIPRAZOLE 5 MG/1
5 TABLET ORAL DAILY
Qty: 30 TABLET | Refills: 1 | Status: SHIPPED | OUTPATIENT
Start: 2024-10-10

## 2024-10-10 NOTE — PSYCH
Libertad Letty Teddy 1985 [id number]     The patient was seen for continuing care and pharmacotherapy.  She completed a course of PHP and reports some benefits from it.  Latuda makes her feel like crawling out of her skin which is consistent with akathisia.She stopped taking it. She continues to have good days and bad days.A major stressor in her life is her relationship with her significant other.  She is quite ambivalent about him and now they have both decided to start couples therapy.  She has not been taking Lamictal since it was not helping her.  I reviewed her history with her again.  There does not appear to be a history of hypomania or rosalie and therefore diagnosis of bipolar disorder does not seem to be justified.  She had responded to sertraline in the past        ROS:  As above  Current Mental Status Evaluation:  Appearance:  Adequate hygiene and grooming and Good eye contact   Behavior:  Cooperative   Mood:  Depressed and Anxious   Affect: appropriate   Speech: Normal volume and Normal rate   Thought Process:  Goal directed and coherent   Thought Content:  Does not verbalize delusional material   Perceptual Disturbances: Denies hallucinations and does not appear to be responding to internal stimuli   Risk Potential: No suicidal or homicidal ideation   Orientation:        No diagnosis found.       Current Outpatient Medications   Medication Instructions    Drospirenone 4 MG TABS 1 tablet, Oral, Daily    lamoTRIgine (LAMICTAL) 200 mg, Oral, Daily at bedtime    LORazepam (ATIVAN) 1 mg, Oral, Every 6 hours PRN    lurasidone (LATUDA) 80 mg, Oral, Daily with breakfast    Lurasidone HCl 60 mg, Oral, Daily with breakfast    naproxen (NAPROSYN) 500 mg, Oral, 2 times daily with meals    Vitamin D (Cholecalciferol) 2,000 Units, Oral, Daily, Start after completing 12 weeks of ergocalciferol.          Treatment Recommendations- Risks Benefits    We will discontinue Lamictal as well as lamotrigine.  She will  be started on Zoloft 50 mg daily as well as aripiprazole 5 mg daily.  Follow-up in 2 weeks  Risks, Benefits And Possible Side Effects Of Medications:  Risks, benefits, and possible side effects of medications explained to patient and patient verbalizes understanding    VIRTUAL VISIT DISCLAIMER    Libertad Espinal verbally agrees to participate in Virtual Care Services. Pt is aware that Virtual Care Services could be limited without vital signs or the ability to perform a full hands-on physical exam. Libertad Espinal understands she or the provider may request at any time to terminate the video visit and request the patient to seek care or treatment in person.     Chip Sutton MD 10/10/24

## 2024-10-11 ENCOUNTER — TELEPHONE (OUTPATIENT)
Dept: PSYCHIATRY | Facility: CLINIC | Age: 39
End: 2024-10-11

## 2024-10-11 NOTE — TELEPHONE ENCOUNTER
Medical records request was received 10/11/24 from [employer]. The date range is 7/1/24 through present.    Spoke to patient letting her know of her need to sign AIDAN for medical records requested. Suggested she contact [employer] and have them send new authorization sheet for the future This writer will leave a pre-filled AIDAN at the  for review and signature when she can come in to sign.

## 2024-10-11 NOTE — TELEPHONE ENCOUNTER
[employer] paperwork was received and and scan in the chart.      Will be placed in provider mail bin for completion.     Once form is completed, staff will fax it to Billie Montilla in [phone] then scan.      Please follow up

## 2024-10-22 ENCOUNTER — SOCIAL WORK (OUTPATIENT)
Dept: BEHAVIORAL/MENTAL HEALTH CLINIC | Facility: CLINIC | Age: 39
End: 2024-10-22
Payer: COMMERCIAL

## 2024-10-22 DIAGNOSIS — F31.81 BIPOLAR II DISORDER (HCC): Primary | ICD-10-CM

## 2024-10-22 DIAGNOSIS — F41.1 GAD (GENERALIZED ANXIETY DISORDER): Chronic | ICD-10-CM

## 2024-10-22 DIAGNOSIS — F43.10 POST TRAUMATIC STRESS DISORDER: ICD-10-CM

## 2024-10-22 PROCEDURE — 90837 PSYTX W PT 60 MINUTES: CPT | Performed by: SOCIAL WORKER

## 2024-10-31 NOTE — PSYCH
Behavioral Health Psychotherapy Progress Note    Psychotherapy Provided: Individual Psychotherapy     1. Bipolar II disorder (HCC)        2. SHELLY (generalized anxiety disorder)        3. Post traumatic stress disorder            Goals addressed in session: Goal 1     DATA: Met with Libertad for her scheduled individual session. Libertad states that she has met with Dr. Sutton and has made some medication adjustments. Since making these adjustments, she identifies that her moods are leveling out and she is feeling much more control over her emotions. She states that she is considering her options for returning to work, but she is not yet feeling confident about returning. She states that there continues to be significant turmoil in her relationship with Saturnino. She states that he is struggling with his own mental health concerns, and he is on the wait list for a therapist. She has also made a referral for couples therapy and hopes that they are able to begin this process in the near future.     During this session, this clinician used the following therapeutic modalities: Client-centered Therapy, Dialectical Behavior Therapy, Mindfulness-based Strategies, Motivational Interviewing, Solution-Focused Therapy, and Supportive Psychotherapy    Substance Abuse was not addressed during this session. If the client is diagnosed with a co-occurring substance use disorder, please indicate any changes in the frequency or amount of use: n/a. Stage of change for addressing substance use diagnoses: No substance use/Not applicable    ASSESSMENT:  Libertad Espinal presents with a Euthymic/ normal mood.     her affect is Normal range and intensity, which is congruent, with her mood and the content of the session. The client has made progress on their goals-- as she has made medication changes that have significantly improved her mood regulation.    Libertad Espinal presents with a minimal risk of suicide, minimal risk of  "self-harm, and minimal risk of harm to others.    For any risk assessment that surpasses a \"low\" rating, a safety plan must be developed.    A safety plan was indicated: no  If yes, describe in detail n/a    PLAN: Between sessions, Libertad Espinal will continue to monitor her moods. She will consider returning to work, as this will help her to return to a routine. At the next session, the therapist will use Client-centered Therapy, Dialectical Behavior Therapy, Mindfulness-based Strategies, Motivational Interviewing, Solution-Focused Therapy, and Supportive Psychotherapy to address her mood regulation, distress tolerance, relationship concerns, and anxiety about returning to work.    Behavioral Health Treatment Plan and Discharge Planning: Libertad Espinal is aware of and agrees to continue to work on their treatment plan. They have identified and are working toward their discharge goals. yes    Visit start and stop times:    10/22/24  Start Time: 1603  Stop Time: 1700  Total Visit Time: 57 minutes  "

## 2024-10-31 NOTE — BH TREATMENT PLAN
"Outpatient Behavioral Health Psychotherapy Treatment Plan    Libertad Espinal  1985     Date of Initial Psychotherapy Assessment: 10/03/2019   Date of Current Treatment Plan: 10/22/24  Treatment Plan Target Date: 02/19/2025  Treatment Plan Expiration Date: 04/20/2025    Diagnosis:   1. Bipolar II disorder (HCC)        2. SHELLY (generalized anxiety disorder)        3. Post traumatic stress disorder            Area(s) of Need: mood regulation and relationships    Long Term Goal 1 (in the client's own words): \"I want to have healthy relationships and continue to maintain my moods. I want to be able to maintain my job and increase my independence.\"     Stage of Change: Action    Target Date for completion: 10/22/2025     Anticipated therapeutic modalities: client-centered; mindfulness-based; dbt-informed; motivational interviewing; solution-focused     People identified to complete this goal: Libertad (client); Olga Schrader (clinician); Dr. Sutton (psychiatrist)      Objective 1: (identify the means of measuring success in meeting the objective): Libertad will meet with the psychiatrist for scheduled medication management sessions and take her medications, as prescribed. She will maintain a minimum of 95% medication adherence.   Update 10/22/24: Libertad has had some recent medication changes. She recently had a period of emotional dysregulation. She has now started on a new medication regimen and seems to be doing better. Libertad continues to endorse a positive rapport with her psychiatrist. She is meeting with him regularly and indicates that her medication adherence has improved since starting the new regimen. She will continue to maintain medication adherence and will report any concerns related to her moods or her medications.       Objective 2: (identify the means of measuring success in meeting the objective): Libertad will participate in therapy sessions, a minimum of once monthly. We will use the " above-mentioned modalities to address mood-regulation and relationship concerns.    Update 10/22/24: Libertad continues to actively participate in therapy sessions-- approximately every three weeks. Libertad has recently participated in the [facility] program. She has resumed outpatient therapy and will attend a minimum of monthly and will request additional sessions as needed. She has been requesting additional sessions very appropriately when she is struggling with mood regulation.        Objective 3: (identify the means of measuring success in meeting the objective): Libertad will learn a minimum of 3 techniques to help her with setting and maintaining limits with others-- e.g., DBT Interpersonal Effectiveness Skills. She will be able to verbalize, during sessions, what her personal limits are and what her personal goals are.    Update 10/22/24: Libertad continues to struggle with maintaining appropriate limits/boundaries with various people in her life. She is currently struggling with her relationship with her SO. In addition, she continues to care for his children from a previous relationship, and she has had some recent altercations with the mother of the children. We will continue to discuss use of effective communication skills to manage conflicts and emotional situations in a manner that will achieve better overall outcomes.       Objective 4: (identify the means of measuring success in meeting the objective): When appropriate, Libertad will include family members in therapy sessions, to practice her assertiveness, discuss her emotions, and set limits with her family members.    Update 10/22/24: Libertad is currently seeking couples counseling. This objective will remain on the treatment plan until she has obtained and started couples counseling. She continues to express wanting to work on improving their relationship.      Objective 5: (identify the means of measuring success in meeting the objective): Libertad  will engage with her friends and other supports a minimum of one time per month.    Update 10/22/24: Liberatd has not yet achieved this objective. We will continue to work on developing her belief that she deserves to have some time to herself. She will continue to strive for a monthly social activity (outside of family activities) to increase her social network.      I am currently under the care of a [facility] psychiatric provider: yes    My [facility] psychiatric provider is: Dr. Sutton    I am currently taking psychiatric medications: Yes, as prescribed    I feel that I will be ready for discharge from mental health care when I reach the following (measurable goal/objective): I would have better management of my emotions.     For children and adults who have a legal guardian:   Has there been any change to custody orders and/or guardianship status? NA. If yes, attach updated documentation.    Crisis plan was not updated at this session. It will be due at the next treatment plan update.     Behavioral Health Treatment Plan [facility]: Diagnosis and Treatment Plan explained to Libertad Zaratele Letty Espinal acknowledges an understanding of their diagnosis. Libertad Letty Espinal agrees to this treatment plan.    I have been offered a copy of this Treatment Plan. Yes    Libertad Espinal, 1985, actively participated in the review and update of this treatment plan during an in-person session; however, it was not transcribed into the [facility] EHR until a later date. Libertad provided verbal consent for the treatment plan at the time of the session and agreed to have the treatment plan sent to her via [employer]. She will review and sign the treatment plan in [employer]. If she has any addendums or corrections that need to be made, she will inform this clinician prior to signing the final document.

## 2024-11-04 ENCOUNTER — TELEMEDICINE (OUTPATIENT)
Dept: BEHAVIORAL/MENTAL HEALTH CLINIC | Facility: CLINIC | Age: 39
End: 2024-11-04
Payer: COMMERCIAL

## 2024-11-04 DIAGNOSIS — F43.10 POST TRAUMATIC STRESS DISORDER: ICD-10-CM

## 2024-11-04 DIAGNOSIS — F31.81 BIPOLAR II DISORDER (HCC): Primary | ICD-10-CM

## 2024-11-04 DIAGNOSIS — F41.1 GAD (GENERALIZED ANXIETY DISORDER): Chronic | ICD-10-CM

## 2024-11-04 PROCEDURE — 90837 PSYTX W PT 60 MINUTES: CPT | Performed by: SOCIAL WORKER

## 2024-11-04 NOTE — PSYCH
Virtual Regular Visit    Verification of patient location:    Patient is located at Home in the following state in which I hold an active license [place]      Assessment/Plan:    Problem List Items Addressed This Visit          Behavioral Health    SHELLY (generalized anxiety disorder) (Chronic)    Post traumatic stress disorder    Bipolar II disorder (HCC) - Primary       Goals addressed in session: Goal 1          Reason for visit is   Chief Complaint   Patient presents with    Virtual Regular Visit          Encounter provider CHARLIE Hutchinson      Recent Visits  No visits were found meeting these conditions.  Showing recent visits within past 7 days and meeting all other requirements  Today's Visits  Date Type Provider Dept   11/04/24 Telemedicine CHARLIE Hutchinson [facility] Therapist [place]   Showing today's visits and meeting all other requirements  Future Appointments  No visits were found meeting these conditions.  Showing future appointments within next 150 days and meeting all other requirements       The patient was identified by name and date of birth. Libertad Espinal was informed that this is a telemedicine visit and that the visit is being conducted throughthe Epic Embedded platform. She agrees to proceed..  My office door was closed. No one else was in the room.  She acknowledged consent and understanding of privacy and security of the video platform. The patient has agreed to participate and understands they can discontinue the visit at any time.    Patient is aware this is a billable service.     Subjective  Libertad Espinal is a 39 y.o. female.      HPI     Past Medical History:   Diagnosis Date    Abnormal Pap smear of cervix     Anxiety     Bipolar disorder (HCC)     Bipolar I disorder, most recent episode depressed, in full remission (HCC) 07/19/2016    COVID-19 01/12/2022    10/27/23 Per pt had 3 separate times - last year being the last episode    Depression     Eczema      Exposure to chlamydia     Resolved 18    Knee pain, left     Migraine without aura and without status migrainosus, not intractable 10/11/2019    Obesity     Post traumatic stress disorder 2021    Postpartum depression 2018    TMJ (dislocation of temporomandibular joint)     causes snoring    Manoj-Parkinson-White (WPW) syndrome        Past Surgical History:   Procedure Laterality Date    ABLATION OF DYSRHYTHMIC FOCUS      WPW ablation age 16    COLONOSCOPY      COLPOSCOPY      DISTAL PATELLAR REALIGNMENT Left 10/31/2023    Procedure: OPEN MPFL RECONSTRUCTION WITH ALLOGRAFT;  Surgeon: Wilbert Michael DO;  Location:  MAIN OR;  Service: Orthopedics    INDUCED       OTHER SURGICAL HISTORY      catheter ablation- WPW age 17    MN ARTHROSCOPY KNEE LATERAL RELEASE Left 10/31/2023    Procedure: RELEASE RETINACULAR;  Surgeon: Wilbert Michael DO;  Location:  MAIN OR;  Service: Orthopedics    TONSILLECTOMY      WISDOM TOOTH EXTRACTION         Current Outpatient Medications   Medication Sig Dispense Refill    ARIPiprazole (ABILIFY) 5 mg tablet Take 1 tablet (5 mg total) by mouth daily 30 tablet 1    Drospirenone 4 MG TABS Take 1 tablet by mouth in the morning 84 tablet 3    lamoTRIgine (LaMICtal) 200 MG tablet Take 1 tablet (200 mg total) by mouth daily at bedtime 90 tablet 1    LORazepam (ATIVAN) 1 mg tablet Take 1 tablet (1 mg total) by mouth every 6 (six) hours as needed for anxiety 60 tablet 0    naproxen (NAPROSYN) 500 mg tablet Take 1 tablet (500 mg total) by mouth 2 (two) times a day with meals (Patient taking differently: Take 500 mg by mouth 2 (two) times a day with meals) 30 tablet 0    sertraline (Zoloft) 50 mg tablet Take 1 tablet (50 mg total) by mouth daily 30 tablet 1    Vitamin D, Cholecalciferol, 50 MCG (2000 UT) CAPS Take 2,000 Units by mouth daily Start after completing 12 weeks of ergocalciferol. 90 capsule 3     No current facility-administered medications for this visit.       "  Allergies   Allergen Reactions    Codeine Other (See Comments)     dry heaves    Sulfa Antibiotics Hives and Rash     Per pt as achild    Erythromycin Hives     Per as a child       Review of Systems    Video Exam    There were no vitals filed for this visit.    Physical Exam     Behavioral Health Psychotherapy Progress Note    Psychotherapy Provided: Individual Psychotherapy     1. Bipolar II disorder (HCC)        2. SHELLY (generalized anxiety disorder)        3. Post traumatic stress disorder            Goals addressed in session: Goal 1     DATA: Met with Libertad for her scheduled individual session.She states that her mood is significantly improved; however, she continues to have significant anxiety. She states that she is impacted by the anxiety a couple times per day, every day. She states that the anxiety significantly interferes with her daily activities. She states that she believes that her primary trigger for anxiety is financial stress. She will be returning back to work in about two weeks, and she believes this will help to alleviate the financial burden. She states that her relationship with Saturnino has improved significantly. She states that she feels that her mood stability has led to an improvement in their ability to communicate. She talked about her negative feelings about her appearance. She states that she sees herself as being unattractive. She states that she needs to lose weight and improve her \"aesthetic routine.\" She states that she is doing better with showering (once every three days, compared with once per week when she was depressed). We discussed barriers to improving her self-care plan. Libertad committed to taking a shower later today (after the girls go to bed). She also committed to doing her facial routine after her shower. She states that, at this point, she does not feel healthy. She states that she would need to lose weight and start to exercise. She states that, in the past, she " "has used exercise videos with some success. We discussed how to fit this routine into her schedule-- both now and after she returns to work. We also discussed activities that bring her enjoyment, and she mentioned that she enjoys crafting-- e.g., painting. At this point, she does not have any materials at home to engage in this activity. We discussed some ways that she could increase her ability to participate in activities that bring her narciso.     During this session, this clinician used the following therapeutic modalities: Client-centered Therapy, Dialectical Behavior Therapy, Mindfulness-based Strategies, Motivational Interviewing, Solution-Focused Therapy, and Supportive Psychotherapy    Substance Abuse was not addressed during this session. If the client is diagnosed with a co-occurring substance use disorder, please indicate any changes in the frequency or amount of use: n/a. Stage of change for addressing substance use diagnoses: No substance use/Not applicable    ASSESSMENT:  Libertad Espinal presents with a Euthymic/ normal mood.     her affect is Normal range and intensity, which is congruent, with her mood and the content of the session. The client has made progress on their goals.    Libertad Espinal presents with a minimal risk of suicide, minimal risk of self-harm, and minimal risk of harm to others.    For any risk assessment that surpasses a \"low\" rating, a safety plan must be developed.    A safety plan was indicated: no  If yes, describe in detail n/a    PLAN: Between sessions, Libertad Espinal will create a daily schedule for herself. She will focus on self-care-- including diet, exercise, and engaging in activities that bring her narciso. At the next session, the therapist will use Client-centered Therapy, Dialectical Behavior Therapy, Mindfulness-based Strategies, Motivational Interviewing, Solution-Focused Therapy, and Supportive Psychotherapy to address her mood regulation, overall " health, and relationship concerns.    Behavioral Health Treatment Plan and Discharge Planning: Libertad Letyt Teddy is aware of and agrees to continue to work on their treatment plan. They have identified and are working toward their discharge goals. yes    Visit start and stop times:    11/04/24  Start Time: 0901  Stop Time: 0954  Total Visit Time: 53 minutes

## 2024-11-06 ENCOUNTER — OFFICE VISIT (OUTPATIENT)
Dept: OBGYN CLINIC | Facility: MEDICAL CENTER | Age: 39
End: 2024-11-06
Payer: COMMERCIAL

## 2024-11-06 VITALS
DIASTOLIC BLOOD PRESSURE: 70 MMHG | WEIGHT: 228.1 LBS | BODY MASS INDEX: 41.97 KG/M2 | SYSTOLIC BLOOD PRESSURE: 100 MMHG | HEIGHT: 62 IN

## 2024-11-06 DIAGNOSIS — Z32.02 NEGATIVE PREGNANCY TEST: ICD-10-CM

## 2024-11-06 DIAGNOSIS — N92.6 IRREGULAR MENSES: ICD-10-CM

## 2024-11-06 DIAGNOSIS — Z01.411 ENCNTR FOR GYN EXAM (GENERAL) (ROUTINE) W ABNORMAL FINDINGS: Primary | ICD-10-CM

## 2024-11-06 DIAGNOSIS — F32.A MOOD DISORDER OF DEPRESSED TYPE: ICD-10-CM

## 2024-11-06 DIAGNOSIS — Z30.014 ENCOUNTER FOR INITIAL PRESCRIPTION OF INTRAUTERINE CONTRACEPTIVE DEVICE (IUD): ICD-10-CM

## 2024-11-06 LAB — SL AMB POCT URINE HCG: NORMAL

## 2024-11-06 PROCEDURE — 99395 PREV VISIT EST AGE 18-39: CPT | Performed by: OBSTETRICS & GYNECOLOGY

## 2024-11-06 PROCEDURE — 99213 OFFICE O/P EST LOW 20 MIN: CPT | Performed by: OBSTETRICS & GYNECOLOGY

## 2024-11-06 PROCEDURE — 81025 URINE PREGNANCY TEST: CPT | Performed by: OBSTETRICS & GYNECOLOGY

## 2024-11-06 RX ORDER — ARIPIPRAZOLE 5 MG/1
5 TABLET ORAL DAILY
Qty: 90 TABLET | Refills: 1 | Status: SHIPPED | OUTPATIENT
Start: 2024-11-06 | End: 2024-11-14 | Stop reason: SDUPTHER

## 2024-11-06 NOTE — PATIENT INSTRUCTIONS
"Patient Education     Intrauterine devices (IUDs)   The Basics   Written by the doctors and editors at [facility]   What is an intrauterine device? -- An intrauterine device (\"IUD\") is a type of birth control. It is a small, T-shaped device. A doctor or nurse puts an IUD in your uterus by going through your vagina and cervix (figure 1).  IUDs are made of flexible plastic and have 2 thin plastic strings that hang out of the cervix. They are very small, a little more than 1 inch (2.5 cm) in width and length.  An IUD is one of the safest, most effective methods for preventing pregnancy. It is a good choice for people, including teens, who do not want to get pregnant for at least 1 year. An IUD can also be used to prevent pregnancy if it is put in within 5 days after you have unprotected sex. This is known as \"emergency contraception.\"  You can also use IUDs for reasons other than birth control. For example, 1 type of IUD can be used to treat heavy, painful periods.  What are the different types of IUDs? -- There are 2 categories of IUDs available in the . One type contains copper. The other type contains a hormone called \"levonorgestrel\" (figure 2):   Copper IUD - There is only 1 copper-containing IUD (brand name: Paragard). It can stay in your uterus for 10 years, or longer for some people, to prevent pregnancy. Some people who use it get heavier or longer periods than they had before getting the IUD.   Hormonal IUDs - There are 4 hormone-containing IUDs (brand names: Mirena, Liletta, Kyleena, Nahomi) (figure 2). Depending on which of these you have, it can stay in your uterus for up to 8, 5, or 3 years. Many people who use hormonal IUDs have lighter, less painful periods than they had before getting the IUD. Some people stop getting a period at all, but this is not harmful. After having the IUD removed, periods usually return to normal within a month or 2.  Other types of IUDs are also available outside of the " "[place].  What are the benefits of using an IUD? -- The benefits of using an IUD include:   IUDs are very effective. Fewer than 1 in 100 people who use an IUD get pregnant during the first year of use.   You do not have to remember to do anything or take any birth control medicines on a regular basis.   IUDs have few side effects.   IUDs do not contain estrogen, a hormone that some people can't or don't want to take.   If you decide you want to get pregnant, you can have the IUD taken out.   If you use an IUD for several years, it costs less overall than many other types of birth control. That's because there are no costs after you have it inserted.   There is evidence that using an IUD lowers your risk of getting cervical cancer.  What are the downsides of using an IUD? -- The downsides of using an IUD include:   Unlike condoms, an IUD does not protect you against infections that you can catch during sex. These are called \"sexually transmitted infections\" (\"STIs\") or \"sexually transmitted diseases.\" But you and your partner(s) can use condoms to prevent spreading infections.   There is a small chance that the IUD will come out during your period. If this happens, you will need to get a new IUD. If you see your IUD in your underwear, on your pad, or in the toilet, call your doctor or nurse.   The initial cost is higher than the cost of other methods. But, there are no more costs after it is inserted.   Only a doctor or nurse can insert or remove an IUD.  You should not get an IUD if you recently had an infection that spread to your uterus and other nearby organs. This is called a \"pelvic infection.\" STIs such as chlamydia and gonorrhea can cause pelvic infections.  Which type of IUD is best for me? -- Your nurse or doctor can talk to you about the options and help you choose the right IUD for you.  A copper IUD might be a good choice if you:   Want or need to avoid hormones   Want to avoid big changes in your period, " "such as not having any periods or bleeding or spotting between periods   Want birth control for up to 10 years, or possibly longer  A hormonal IUD might be a good choice if you:   Have heavy, painful periods. These IUDs can make your periods lighter and less painful.   Have pelvic pain from a condition called \"endometriosis.\" These IUDs might help reduce the pain.   Want birth control for up to 8 years, depending on which device you choose  Does it hurt to have an IUD put in? -- You will likely feel some discomfort and slight cramping after the nurse or doctor puts the IUD in your uterus. People who have never given birth might feel more discomfort than people who have. The cramps generally go away within a day or 2. Over-the-counter pain medicines like ibuprofen (sample brand names: Advil, Motrin) or naproxen (sample brand name: Aleve) can help cramps go away faster.  After the IUD is in place, you should not be able to feel it.  Should I see a doctor or nurse? -- If you have an IUD, see your doctor or nurse right away if:   You have bad pain in your lower belly.   Your period is late or very different from normal.   You cannot feel the string of the IUD or if the string seems shorter than usual.   You think your IUD might have moved or fallen out.   You had sex with someone who has or might have an STI, or you think you have an STI.   You have an unexplained fever.  All topics are updated as new evidence becomes available and our peer review process is complete.  This topic retrieved from [employer] on: Feb 26, 2024.  Topic 53376 Version 21.0  Release: 32.2.4 - C32.56  © 2024 UpToDate, Inc. and/or its affiliates. All rights reserved.  figure 1: Female reproductive anatomy     The internal organs that make up the female reproductive system are located in the lower belly. These include the uterus, fallopian tubes, ovaries, cervix, and vagina.  The uterus has an inner lining, called the \"endometrium,\" and a thicker " "outer layer, called the \"myometrium.\"  Graphic 40855 Version 8.0  figure 2: IUDs     This picture shows 2 types of IUDs. There are different IUDs available. They are placed inside the uterus to help prevent pregnancy. Some hormonal IUDs can help reduce menstrual bleeding. The copper IUD can actually make menstrual bleeding heavier.  Graphic 27933 Version 15.0  Consumer Information Use and Disclaimer   Disclaimer: This generalized information is a limited summary of diagnosis, treatment, and/or medication information. It is not meant to be comprehensive and should be used as a tool to help the user understand and/or assess potential diagnostic and treatment options. It does NOT include all information about conditions, treatments, medications, side effects, or risks that may apply to a specific patient. It is not intended to be medical advice or a substitute for the medical advice, diagnosis, or treatment of a health care provider based on the health care provider's examination and assessment of a patient's specific and unique circumstances. Patients must speak with a health care provider for complete information about their health, medical questions, and treatment options, including any risks or benefits regarding use of medications. This information does not endorse any treatments or medications as safe, effective, or approved for treating a specific patient. UpToDate, Inc. and its affiliates disclaim any warranty or liability relating to this information or the use thereof.The use of this information is governed by the Terms of Use, available at https://www.[employer].com/en/know/clinical-effectiveness-terms. 2024© UpToDate, Inc. and its affiliates and/or licensors. All rights reserved.  Copyright   © 2024 UpToDate, Inc. and/or its affiliates. All rights reserved.    "

## 2024-11-06 NOTE — PROGRESS NOTES
ASSESSMENT & PLAN: Libertad was seen today for gynecologic exam.    Diagnoses and all orders for this visit:    Encntr for gyn exam (general) (routine) w abnormal findings    Negative pregnancy test  -     POCT urine HCG    Irregular menses    Encounter for initial prescription of intrauterine contraceptive device (IUD)          1.  Routine well woman exam done today  2.  Pap and HPV:  Patient's pap is current.  Pap and cotesting was not done today.  Current ASCCP Guidelines reviewed.   3.  Patient has had her Gardasil vaccination.  Recommendations reviewed.  4.  The following were reviewed in today's visit: adequate intake of calcium and vitamin D, exercise, and healthy diet.  5.  F/u in 1 year for next routine GYN exam.  6.  Birth control options reviewed.  Patient may be interested in the ParaGard IUD since it is nonhormonal.  Patient given pamphlet.  Patient may return for insertion.  Urine pregnancy negative.    CC:  Annual Gynecologic Examination    HPI: Libertad Espinal is a 39 y.o.  who presents for annual gynecologic examination.  She has the following concerns:  wants to discuss birth control.  Menses are irregular on Slynd  Pt is concerned that hormones are affecting her mood.  Pt has had to change her psychiatric medications multiple times over the past 6 months.  Wants to discuss nonhormonal options.  Pr reports she had infections with the Mirena IUD.  FH of breast and cervical cancer.        Health Maintenance:    Patient reports her health to be poor.  She does have weight concerns.  She is starting to think about exercise.  Pt has cardio type videos with lifting.  Yet  She does wear her seatbelt routinely.    She does perform irregular monthly self breast exams.    She does feels safe at home.   Patients does not follow a special diet.  She gets 1 servings of dairy or calcium rich foods a day.    Patient Active Problem List   Diagnosis    Obesity, Class II, BMI 35-39.9    Abnormal  Papanicolaou smear of cervix with positive human papilloma virus (HPV) test    Migraine with aura and without status migrainosus, not intractable    SHELLY (generalized anxiety disorder)    Other insomnia    Long-term use of high-risk medication    Family history of breast cancer    Patellofemoral disorder of left knee    Congenital fusion of carpal bone    History of radiofrequency ablation procedure for W-P-W    PVC's (premature ventricular contractions)    Post traumatic stress disorder    Dyslipidemia    Enlarged lymph node in neck    Inappropriate sinus tachycardia (HCC)    Recurrent dislocation of left patella    Patellar instability of left knee    Excessive daytime sleepiness    CLARISSE (obstructive sleep apnea)    Bipolar II disorder (HCC)       Past Medical History:   Diagnosis Date    Abnormal Pap smear of cervix     Anxiety     Bipolar disorder (HCC)     Bipolar I disorder, most recent episode depressed, in full remission (HCC) 2016    COVID-19 2022    10/27/23 Per pt had 3 separate times - last year being the last episode    Depression     Eczema     Exposure to chlamydia     Resolved 18    Knee pain, left     Migraine without aura and without status migrainosus, not intractable 10/11/2019    Obesity     Post traumatic stress disorder 2021    Postpartum depression 2018    TMJ (dislocation of temporomandibular joint)     causes snoring    Manoj-Parkinson-White (WPW) syndrome        Past Surgical History:   Procedure Laterality Date    ABLATION OF DYSRHYTHMIC FOCUS      WPW ablation age 16    COLONOSCOPY      COLPOSCOPY      DISTAL PATELLAR REALIGNMENT Left 10/31/2023    Procedure: OPEN MPFL RECONSTRUCTION WITH ALLOGRAFT;  Surgeon: Wilbert Michael DO;  Location:  MAIN OR;  Service: Orthopedics    INDUCED       OTHER SURGICAL HISTORY      catheter ablation- WPW age 17    SC ARTHROSCOPY KNEE LATERAL RELEASE Left 10/31/2023    Procedure: RELEASE RETINACULAR;  Surgeon: Wilbert  DO Fernanda;  Location:  MAIN OR;  Service: Orthopedics    TONSILLECTOMY      WISDOM TOOTH EXTRACTION         Past OB/Gyn History:  Pt does have menstrual issues.  Had menstrual issues as a teen.  Has always been on birth control since..    History of sexually transmitted infection: Yes.  HPV.   History of abnormal pap smears: Yes.  Last pap 2022 nl and neg HPV  Patient is currently sexually active.  heterosexual.   The current method of family planning is oral progesterone-only contraceptive.    Family History   Problem Relation Age of Onset    Bipolar disorder Mother     Hypertension Mother     Hyperlipidemia Mother     Suicide Attempts Mother     Colon polyps Mother     Factor V Leiden deficiency Mother     Glucose-6-phos deficiency Father     Hyperlipidemia Father     Hypertension Father     Diabetes Father     Anxiety disorder Brother     Factor V Leiden deficiency Brother     Mental illness Brother     Drug abuse Brother     No Known Problems Daughter     Anemia Son     Other Son         Gilbert's    Breast cancer Maternal Grandmother         dx approx age early 40's    Colon polyps Maternal Grandfather     Heart disease Maternal Grandfather     Crohn's disease Paternal Grandmother     Prostate cancer Paternal Grandfather     Pancreatic cancer Paternal Grandfather     Breast cancer Maternal Aunt 40    Colon cancer Paternal Aunt     Factor V Leiden deficiency Cousin     Thyroid disease Neg Hx     Stroke Neg Hx     Ovarian cancer Neg Hx     Anesthesia problems Neg Hx        Social History:  Social History     Socioeconomic History    Marital status: Single     Spouse name: Not on file    Number of children: 2    Years of education: Not on file    Highest education level: Some college, no degree   Occupational History    Occupation: works in retail   Tobacco Use    Smoking status: Never    Smokeless tobacco: Never    Tobacco comments:     Never a smoker or use of any tobacco products per pt    Vaping Use     Vaping status: Never Used   Substance and Sexual Activity    Alcohol use: Yes     Comment: Occasional    Drug use: No     Comment: Denies any drug use per pt    Sexual activity: Yes     Partners: Male     Birth control/protection: Condom Male     Comment: Denies any chest pain or shortness of breath with activity   Other Topics Concern    Not on file   Social History Narrative    Education: high school graduate and some college    Learning Disabilities: none    Marital History: single    Children: 1 daughter, 1 son    Living Arrangement: lives in home with boyfriend, 2 children and boyfriend's 2 children    Occupational History: works part time at [employer]    Functioning Relationships: boyfriend is supportive    Legal History: none     History: None         Family planning    IUD contraception     Social Determinants of Health     Financial Resource Strain: Medium Risk (2/4/2021)    Overall Financial Resource Strain (CARDIA)     Difficulty of Paying Living Expenses: Somewhat hard   Food Insecurity: Food Insecurity Present (2/4/2021)    Hunger Vital Sign     Worried About Running Out of Food in the Last Year: Sometimes true     Ran Out of Food in the Last Year: Sometimes true   Transportation Needs: Unmet Transportation Needs (2/4/2021)    PRAPARE - Transportation     Lack of Transportation (Medical): Yes     Lack of Transportation (Non-Medical): Yes   Physical Activity: Inactive (11/11/2019)    Exercise Vital Sign     Days of Exercise per Week: 0 days     Minutes of Exercise per Session: 0 min   Stress: Stress Concern Present (11/11/2019)    [place] [facility] [employer] - Occupational Stress Questionnaire     Feeling of Stress : Rather much   Social Connections: Unknown (6/18/2024)    Received from [employer]    Social [employer]     How often do you feel lonely or isolated from those around you? (Adult - for ages 18 years and over): Not on file   Intimate Partner Violence: Not At Risk  (11/11/2019)    Humiliation, Afraid, Rape, and Kick questionnaire     Fear of Current or Ex-Partner: No     Emotionally Abused: No     Physically Abused: No     Sexually Abused: No   Housing Stability: Not on file     Presently lives with boyfriend and 4 children.  Patient is currently employed remote, customer service.  Allergies   Allergen Reactions    Codeine Other (See Comments)     dry heaves    Sulfa Antibiotics Hives and Rash     Per pt as achild    Erythromycin Hives     Per as a child       Current Outpatient Medications:     Drospirenone 4 MG TABS, Take 1 tablet by mouth in the morning, Disp: 84 tablet, Rfl: 3    LORazepam (ATIVAN) 1 mg tablet, Take 1 tablet (1 mg total) by mouth every 6 (six) hours as needed for anxiety, Disp: 60 tablet, Rfl: 0    naproxen (NAPROSYN) 500 mg tablet, Take 1 tablet (500 mg total) by mouth 2 (two) times a day with meals (Patient taking differently: Take 500 mg by mouth 2 (two) times a day with meals), Disp: 30 tablet, Rfl: 0    Vitamin D, Cholecalciferol, 50 MCG (2000 UT) CAPS, Take 2,000 Units by mouth daily Start after completing 12 weeks of ergocalciferol., Disp: 90 capsule, Rfl: 3    ARIPiprazole (ABILIFY) 5 mg tablet, TAKE 1 TABLET (5 MG TOTAL) BY MOUTH DAILY., Disp: 90 tablet, Rfl: 1    sertraline (ZOLOFT) 50 mg tablet, TAKE 1 TABLET BY MOUTH EVERY DAY, Disp: 90 tablet, Rfl: 1      Review of Systems  Constitutional :no fever, feels well, no tiredness, recent weight gain.  ENT: no ear ache, no loss of hearing, no nosebleeds or nasal discharge, no sore throat or hoarseness.  Cardiovascular: no complaints of slow or fast heart beat, no chest pain, no palpitations, no leg claudication or lower extremity edema.  Respiratory: no complaints of shortness of shortness of breath, no GARCIA  Breasts:no complaints of breast pain, breast lump, or nipple discharge  Gastrointestinal: no complaints of abdominal pain, constipation, nausea, vomiting, or diarrhea or bloody  "stools  Genitourinary : no complaints of dysuria, incontinence, pelvic pain, no dysmenorrhea, vaginal discharge, +abnormal vaginal bleeding and as noted in HPI.  Musculoskeletal: no complaints of arthralgia, no myalgia, no joint swelling or stiffness, no limb pain or swelling.  Integumentary: no complaints of skin rash or lesion, itching or dry skin  Neurological: no complaints of headache, no confusion, no numbness or tingling, no dizziness or fainting    Physical Exam:   /70   Ht 5' 2\" (1.575 m)   Wt 103 kg (228 lb 1.6 oz)   LMP 09/30/2024   BMI 41.72 kg/m²     General: appears stated age, cooperative, alert normal mood and affect   Psychiatric oriented to person, place and time.  Mood and affect normal   Neck: normal, supple,trachea midline, no masses.  Thyroid: normal, no thyromegaly   Heart: regular rate and rhythm, S1, S2 normal, no murmur, click, rub or gallop   Lungs: clear to auscultation bilaterally, no increased work of breathing or signs of respiratory distress   Breasts: normal, no dimpling or skin changes noted   Abdomen: soft, non-tender, without masses or organomegaly   Vulva: normal , no lesions   Vagina: normal , no lesions or dryness   Urethra: normal   Urethal meatus normal   Bladder Normal, soft, non-tender and no prolapse or masses appreciated   Cervix: normal, no palpable masses    Uterus: normal , non-tender, not enlarged, no palpable masses   Adnexa: normal, non-tender without fullness or masses   Lymphatic Palpation of lymph nodes in neck, axilla, groin and/or other locations: no lymphadenopathy or masses noted   Skin Normal skin turgor and no rashes.  Palpation of skin and subcutaneous tissue normal.      "

## 2024-11-14 ENCOUNTER — TELEMEDICINE (OUTPATIENT)
Dept: PSYCHIATRY | Facility: CLINIC | Age: 39
End: 2024-11-14
Payer: COMMERCIAL

## 2024-11-14 ENCOUNTER — TELEPHONE (OUTPATIENT)
Age: 39
End: 2024-11-14

## 2024-11-14 DIAGNOSIS — F41.1 GAD (GENERALIZED ANXIETY DISORDER): ICD-10-CM

## 2024-11-14 DIAGNOSIS — F32.A MOOD DISORDER OF DEPRESSED TYPE: ICD-10-CM

## 2024-11-14 PROCEDURE — 99212 OFFICE O/P EST SF 10 MIN: CPT | Performed by: PSYCHIATRY & NEUROLOGY

## 2024-11-14 RX ORDER — LORAZEPAM 1 MG/1
1 TABLET ORAL EVERY 6 HOURS PRN
Qty: 15 TABLET | Refills: 0 | Status: SHIPPED | OUTPATIENT
Start: 2024-11-14

## 2024-11-14 RX ORDER — ARIPIPRAZOLE 5 MG/1
5 TABLET ORAL DAILY
Qty: 90 TABLET | Refills: 1 | Status: SHIPPED | OUTPATIENT
Start: 2024-11-14

## 2024-11-14 NOTE — PSYCH
MEDICATION MANAGEMENT NOTE    PSYCHIATRIC VIRTUAL VISIT        [facility]      Name and Date of Birth:  Libertad Espinal 39 y.o. 1985 MRN: [id number]    Psychiatric Virtual Visit:    Verification of patient location: Patient is located in the state that I hold an active license: [place]    REQUIRED DOCUMENTATION:     Provider located at [facility] at [address] [place] [address].  TeleMed provider: Chip Sutton MD  Patient was identified by name and date of birth. Libertad Espinal was informed that this is a telemedicine visit and that the visit is being conducted through the Epic Embedded platform. She agrees to proceed..  My office door was closed. The patient was notified the following individuals were present in the room Dr. Weir.  She acknowledged consent and understanding of privacy and security of the video platform. The patient has agreed to participate and understands they can discontinue the visit at any time. Patient is aware this is a billable service.         This note was shared with patient.    I spent 10 minutes directly with the patient during this visit        Review Of Systems:     Mood Euthymic   Thought Content Normal   General As HPI   Physical symptoms No physical complaints       Laboratory Results: No results found for this or any previous visit.    Subjective:  The patient reports improvement of her mood on her current regimen of medications.  She is scheduled to return to work in a few days and feels positive about that.  She has also been using CPAP machine and has been sleeping better.  She seldom uses lorazepam.  No specific side effects with medications.        Objective:   Stable and improved    Mental status:  Appearance calm and cooperative  and good eye contact    Mood Euthymic   Affect affect was broad and affect appropriate    Speech Normal rate and Normal volume   Thought  Processes coherent/organized   Hallucinations Denies hallucinations and does not appear to be responding to internal stimuli   Thought Content Does not verbalize delusional material   Abnormal Thoughts no suicidal thoughts    Orientation A+O x 3       Assessment/Plan:       Diagnoses and all orders for this visit:    SHELLY (generalized anxiety disorder)    Mood disorder of depressed type        1. SHELLY (generalized anxiety disorder)    2. Mood disorder of depressed type        Treatment Recommendations- Risks Benefits    Continue with aripiprazole 5 mg daily and sertraline 50 mg daily.  Follow-up in 6 weeks  Risks, Benefits And Possible Side Effects Of Medications:  Risks, benefits, and possible side effects of medications explained to patient and patient verbalizes understanding    VIRTUAL VISIT DISCLAIMER    Libertad Espinal verbally agrees to participate in Virtual Care Services. Pt is aware that Virtual Care Services could be limited without vital signs or the ability to perform a full hands-on physical exam. Libertad Espinal understands she or the provider may request at any time to terminate the video visit and request the patient to seek care or treatment in person.     Chip Sutton MD 11/14/24

## 2024-11-14 NOTE — TELEPHONE ENCOUNTER
Pt called to get her appt for 11/14/24 at 10:30 switched to a virtual visit due to N/A.  Pt has [employer] and will connect with provider through [employer]. Writer switched appt and checked it in.

## 2024-11-17 DIAGNOSIS — Z30.41 ENCOUNTER FOR SURVEILLANCE OF CONTRACEPTIVE PILLS: ICD-10-CM

## 2024-11-26 ENCOUNTER — SOCIAL WORK (OUTPATIENT)
Dept: BEHAVIORAL/MENTAL HEALTH CLINIC | Facility: CLINIC | Age: 39
End: 2024-11-26
Payer: COMMERCIAL

## 2024-11-26 DIAGNOSIS — F41.1 GAD (GENERALIZED ANXIETY DISORDER): Chronic | ICD-10-CM

## 2024-11-26 DIAGNOSIS — F43.10 POST TRAUMATIC STRESS DISORDER: ICD-10-CM

## 2024-11-26 DIAGNOSIS — F31.81 BIPOLAR II DISORDER (HCC): Primary | ICD-10-CM

## 2024-11-26 PROCEDURE — 90837 PSYTX W PT 60 MINUTES: CPT | Performed by: SOCIAL WORKER

## 2024-11-26 NOTE — PSYCH
"Behavioral Health Psychotherapy Progress Note    Psychotherapy Provided: Individual Psychotherapy     1. Bipolar II disorder (HCC)        2. SHELLY (generalized anxiety disorder)        3. Post traumatic stress disorder            Goals addressed in session: Goal 1     DATA: Met with Libertad for her scheduled individual session. She states that she is under a lot of stress at the present. She states that financial stress is the biggest issue for her. She states that she and her SO do not have money for their rent or for [place] presents. 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 She also reports that Saturnino will be going out on short term disability to have  procedure on his back. She states that \"he is holding on by a thread\" because of the financial stress, as well. Libertad discussed some issues from her past that are weighing on her. We spent some time talking about some of these issues and how they impact her moods.    During this session, this clinician used the following therapeutic modalities: Client-centered Therapy, Dialectical Behavior Therapy, Mindfulness-based Strategies, Motivational Interviewing, Solution-Focused Therapy, and Supportive Psychotherapy    Substance Abuse was not addressed during this session. If the client is diagnosed with a co-occurring substance use disorder, please indicate any changes in the frequency or amount of use: n/a. Stage of change for addressing substance use diagnoses: No substance use/Not applicable    ASSESSMENT:  Libertad Espinal presents with a Dysthymic mood.     her affect is Normal range and intensity, which is congruent, with her mood and the content of the session. The client has not made progress on their goals since the previous.    Libertad Espinal presents with a minimal risk of suicide, minimal risk of self-harm, and minimal risk of harm to others.    For any risk assessment that surpasses a \"low\" rating, a safety plan must " be developed.    A safety plan was indicated: no  If yes, describe in detail: n/a    PLAN: Between sessions, Libertad Espinal will work with her SO to develop a plan for paying their bills. She agreed to make some phone calls to try to get assistance with postponing bill payment during December. At the next session, the therapist will use Client-centered Therapy, Dialectical Behavior Therapy, Mindfulness-based Strategies, Motivational Interviewing, Solution-Focused Therapy, and Supportive Psychotherapy to address her mood regulation, managing stressors, and managing relationships.    Behavioral Health Treatment Plan and Discharge Planning: Libertad Espinal is aware of and agrees to continue to work on their treatment plan. They have identified and are working toward their discharge goals. yes    Visit start and stop times:    11/26/24  Start Time: 1202  Stop Time: 1258  Total Visit Time: 56 minutes

## 2024-12-09 LAB

## 2024-12-10 ENCOUNTER — TELEPHONE (OUTPATIENT)
Dept: PSYCHIATRY | Facility: CLINIC | Age: 39
End: 2024-12-10

## 2024-12-10 NOTE — TELEPHONE ENCOUNTER
Spoke to patient today and informed her that she would need to sign AIDAN's for [employer] medical records requests received.    Informed her pre-filled AIDAN's would be available at  for review and signature.

## 2024-12-12 NOTE — TELEPHONE ENCOUNTER
Spoke to patient today as a reminder of need for AIDAN to be signed to attempt to get records to [employer] as per request.    Patient informed me that she just spoke to [employer] and electronically signed their authorization. This writer gave fax number of  and [facility] central fax to patient to request [employer] send in.authorization.

## 2024-12-13 ENCOUNTER — TELEPHONE (OUTPATIENT)
Dept: PSYCHIATRY | Facility: CLINIC | Age: 39
End: 2024-12-13

## 2024-12-13 NOTE — TELEPHONE ENCOUNTER
A medical records request was received 12/13/24 from [facility]. The date range is October, 2024 to present.    Paperwork will be sent/placed in mailbox for Radhika S for review.

## 2024-12-17 ENCOUNTER — SOCIAL WORK (OUTPATIENT)
Dept: BEHAVIORAL/MENTAL HEALTH CLINIC | Facility: CLINIC | Age: 39
End: 2024-12-17
Payer: COMMERCIAL

## 2024-12-17 DIAGNOSIS — F43.10 POST TRAUMATIC STRESS DISORDER: ICD-10-CM

## 2024-12-17 DIAGNOSIS — F41.1 GAD (GENERALIZED ANXIETY DISORDER): Chronic | ICD-10-CM

## 2024-12-17 DIAGNOSIS — F31.81 BIPOLAR II DISORDER (HCC): Primary | ICD-10-CM

## 2024-12-17 PROCEDURE — 90837 PSYTX W PT 60 MINUTES: CPT | Performed by: SOCIAL WORKER

## 2024-12-20 NOTE — TELEPHONE ENCOUNTER
Pt requesting update on the medical records request to be sent to [employer]. Writer informed that the AIDAN was received on 12/13/24. Pt asking for return call on update please.

## 2024-12-27 ENCOUNTER — NURSE TRIAGE (OUTPATIENT)
Age: 39
End: 2024-12-27

## 2024-12-27 NOTE — TELEPHONE ENCOUNTER
"Patient calling in she has yuly appt in early Jan for and IUD insertion, pt stating tht she hasn't had her period since October, pt is unsure of the exact date. Pt stating her periods were irregular on the birth control pills. Pt stopped the birth control pills since beginning of November.  Pt is having unprotected sexual intercourse, pt stating that there could be a chance of pregnancy, pt hasn't taken a pregnancy test, pt is advised to take a pregnancy test with first morning urine and call back if positive, pt verbalized understanding.  Pt denies stress, rapid weight loss, excessive exercise.           Reason for Disposition   Pregnancy suspected or possible    Answer Assessment - Initial Assessment Questions  1. LMP:  \"When did your last menstrual period begin?\"      October 2024- unsure of the exact date.     3. REGULARITY: \"How regular are your periods?\"      Irregular   4. PREGNANCY: \"Is there any chance you are pregnant?\" (e.g., unprotected intercourse, missed birth control pill, broken condom) \"Have you used a home pregnancy test?\"      Pt is having unprotected sexual intercourse, pt stating that there could be a chance of pregnancy, pt hasn't taken a pregnancy test, pt is advised to take a pregnancy test with first morning urine and call back if positive.     6. BIRTH CONTROL PILLS: \"Are you taking birth control pills, or have you stopped recently?\"      Stopped in November 8. CAUSE: \"What do you think caused the missed period?\" (e.g., stress, rapid weight loss, excessive exercise)      Pt denies stress, rapid weight loss, excessive exercise.   9. OTHER SYMPTOMS: \"Do you have any other symptoms?\" (e.g., abdomen pain)      Pt denies other symptoms    Protocols used: Menstrual Period - Missed or Late-Adult-[place]    "

## 2024-12-30 ENCOUNTER — TELEMEDICINE (OUTPATIENT)
Dept: PSYCHIATRY | Facility: CLINIC | Age: 39
End: 2024-12-30
Payer: COMMERCIAL

## 2024-12-30 ENCOUNTER — TELEPHONE (OUTPATIENT)
Age: 39
End: 2024-12-30

## 2024-12-30 DIAGNOSIS — F32.A MOOD DISORDER OF DEPRESSED TYPE: ICD-10-CM

## 2024-12-30 PROCEDURE — 99212 OFFICE O/P EST SF 10 MIN: CPT | Performed by: PSYCHIATRY & NEUROLOGY

## 2024-12-30 NOTE — PSYCH
MEDICATION MANAGEMENT NOTE    PSYCHIATRIC VIRTUAL VISIT        [facility]      Name and Date of Birth:  Libertad Espinal 39 y.o. 1985 MRN: [id number]    Psychiatric Virtual Visit:    Verification of patient location: Patient is located in the state that I hold an active license: [place]    REQUIRED DOCUMENTATION:     Provider located at [facility] at [address] [place] [address].  TeleMed provider: Chip Sutton MD  Patient was identified by name and date of birth. Libertad Espinal was informed that this is a telemedicine visit and that the visit is being conducted through the Epic Embedded platform. She agrees to proceed..  My office door was closed. No one else was in the room.  She acknowledged consent and understanding of privacy and security of the video platform. The patient has agreed to participate and understands they can discontinue the visit at any time. Patient is aware this is a billable service.         This note was shared with patient.    I spent 12 minutes directly with the patient during this visit        Review Of Systems:     Mood Anxious and Depressed   Thought Content Normal   General As HPI   Physical symptoms No physical complaints       Laboratory Results: No results found. However, due to the size of the patient record, not all encounters were searched. Please check Results Review for a complete set of results.    Subjective:  The patient was seen for continuing care and pharmacotherapy.  She reports that after returning to work she had severe anxiety attacks and is again on medical leave .episodes of anxiety are not as severe as panic attacks she was having before but caused significant distress.  She is also reporting frequent crying spells and she often has trouble composing herself.  No new stressors in her life        Objective:   Symptomatic and not in remission    Mental  status:  Appearance calm and cooperative  and adequate hygiene and grooming   Mood Depressed and Anxious   Affect affect was constricted   Speech Normal rate and Normal volume   Thought Processes coherent/organized   Hallucinations Denies hallucinations and does not appear to be responding to internal stimuli   Thought Content Does not verbalize delusional material   Abnormal Thoughts no suicidal thoughts    Orientation A+O x 3       Assessment/Plan:       Diagnoses and all orders for this visit:    Mood disorder of depressed type  -     sertraline (ZOLOFT) 50 mg tablet; Take 2 tablets (100 mg total) by mouth daily        1. Mood disorder of depressed type        Treatment Recommendations- Risks Benefits      Risks, Benefits And Possible Side Effects Of Medications:  Risks, benefits, and possible side effects of medications explained to patient and patient verbalizes understanding    VIRTUAL VISIT DISCLAIMER    Libertad Espinal verbally agrees to participate in Virtual Care Services. Pt is aware that Virtual Care Services could be limited without vital signs or the ability to perform a full hands-on physical exam. Libertad Espinal understands she or the provider may request at any time to terminate the video visit and request the patient to seek care or treatment in person.     Chip Sutton MD 12/30/24

## 2024-12-30 NOTE — TELEPHONE ENCOUNTER
Pt called in and thought she had an in person appt with Dr Sutton and was requesting a Virtual appt. Writer informed pt that her appt is Virtual for 10 am with Dr Sutton.

## 2024-12-30 NOTE — PSYCH
Behavioral Health Psychotherapy Progress Note    Psychotherapy Provided: Individual Psychotherapy     1. Bipolar II disorder (HCC)        2. SHELLY (generalized anxiety disorder)        3. Post traumatic stress disorder            Goals addressed in session: Goal 1     DATA: Met with Libertad for her scheduled individual session. We reviewed the documentation she needs for her FMLA request. She states that she recognizes that she was not yet able to maintain emotion regulation when she was engaged in work activities. 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 We discussed what her focus will be during this leave of absence. She states that she is agreeable to finding time in her day to practice mindfulness, so she can gain more control over her emotions. We discussed the potential difficulty of maintaining her emotion regulation when working from home-- which is where she feels the majority of her stress. She states that she does like working from home, because of the ability for her to manage her children's schedules without having to rely on someone else to provide childcare. We discussed pros/cons of working outside of the home. She will consider her options. She states that Saturnino's schedule will be changing in the near future. She states that they continue to work on improving their communication.     During this session, this clinician used the following therapeutic modalities: Client-centered Therapy, Dialectical Behavior Therapy, Mindfulness-based Strategies, Motivational Interviewing, Solution-Focused Therapy, and Supportive Psychotherapy    Substance Abuse was not addressed during this session. If the client is diagnosed with a co-occurring substance use disorder, please indicate any changes in the frequency or amount of use: n/a. Stage of change for addressing substance use diagnoses: No substance use/Not  "applicable    ASSESSMENT:  Libertad Espinal presents with a Anxious and Dysthymic mood.     her affect is Normal range and intensity, which is congruent, with her mood and the content of the session. The client has not made progress on their goals since her prior session.    Libertad Espinal presents with a minimal risk of suicide, minimal risk of self-harm, and minimal risk of harm to others.    For any risk assessment that surpasses a \"low\" rating, a safety plan must be developed.    A safety plan was indicated: no  If yes, describe in detail n/a    PLAN: Between sessions, Libertad Espinal will continue to work on practicing emotion regulation skills-- beginning with mindfulness practice. She will consider options for her work environment. At the next session, the therapist will use Client-centered Therapy, Cognitive Behavioral Therapy, Dialectical Behavior Therapy, Mindfulness-based Strategies, Motivational Interviewing, Solution-Focused Therapy, and Supportive Psychotherapy to address her mood regulation and relationship concerns.    Behavioral Health Treatment Plan and Discharge Planning: Libertad Espinal is aware of and agrees to continue to work on their treatment plan. They have identified and are working toward their discharge goals. yes    Depression Follow-up Plan Completed: Yes. Continue with therapy sessions. Continue with medication adherence and medication management with Dr. Sutton.    Visit start and stop times:    12/17/24  Start Time: 0904  Stop Time: 1000  Total Visit Time: 56 minutes  "

## 2024-12-31 ENCOUNTER — TELEPHONE (OUTPATIENT)
Dept: PSYCHIATRY | Facility: CLINIC | Age: 39
End: 2024-12-31

## 2024-12-31 NOTE — TELEPHONE ENCOUNTER
Called and left message for patient to return a call to [phone] and schedule a follow up with provider (Chip Sutton). Please schedule upon return call. Thank you.

## 2025-01-02 NOTE — TELEPHONE ENCOUNTER
Patient requesting that front office call her back to schedule her f/u with Dr. Sutton. She stated that they are able to get her a f/u in a month.

## 2025-01-03 ENCOUNTER — TELEPHONE (OUTPATIENT)
Age: 40
End: 2025-01-03

## 2025-01-03 NOTE — TELEPHONE ENCOUNTER
Colin called in for an update on the forms [employer] sent over for completion on 12/17.    Writer researched and found that forms have been completed and faxed back on the 12/17 date.    Patient is stating that they spoke with [employer] and they do not have them.    Patient will have [employer] refax them.    Writer gave Nicole fax number.

## 2025-01-03 NOTE — TELEPHONE ENCOUNTER
Pt called to verify if fax was received at the [place] office from [facility]. This is a form for her leave of employment. Writer did not see the form in . Writer called the office and the staff gave her the fax number [phone] to send it to. Writer informed client to have them fax to this number which goes directly to the office.

## 2025-01-06 ENCOUNTER — TELEPHONE (OUTPATIENT)
Dept: PSYCHIATRY | Facility: CLINIC | Age: 40
End: 2025-01-06

## 2025-01-06 NOTE — TELEPHONE ENCOUNTER
Patient called in to check the status of the Short Term Disability forms being completed and sent to [employer].    Writer researched and found that the forms completed by patients therapist and sent to [employer] with received fax recipes.    Patient states that the forms for Short Term Disability need to be completed by their  Provider not their therapist according to [employer] and sent to [employer].    Writer stated that the provider and office staff will be made aware of the request.

## 2025-01-07 ENCOUNTER — PROCEDURE VISIT (OUTPATIENT)
Dept: OBGYN CLINIC | Facility: MEDICAL CENTER | Age: 40
End: 2025-01-07
Payer: COMMERCIAL

## 2025-01-07 ENCOUNTER — TELEPHONE (OUTPATIENT)
Dept: PSYCHIATRY | Facility: CLINIC | Age: 40
End: 2025-01-07

## 2025-01-07 VITALS
BODY MASS INDEX: 43.71 KG/M2 | HEIGHT: 62 IN | SYSTOLIC BLOOD PRESSURE: 115 MMHG | WEIGHT: 237.5 LBS | DIASTOLIC BLOOD PRESSURE: 74 MMHG

## 2025-01-07 DIAGNOSIS — Z01.812 PRE-PROCEDURE LAB EXAM: Primary | ICD-10-CM

## 2025-01-07 DIAGNOSIS — Z30.430 ENCOUNTER FOR INSERTION OF PARAGARD IUD: ICD-10-CM

## 2025-01-07 PROCEDURE — 81025 URINE PREGNANCY TEST: CPT | Performed by: OBSTETRICS & GYNECOLOGY

## 2025-01-07 PROCEDURE — 58300 INSERT INTRAUTERINE DEVICE: CPT | Performed by: OBSTETRICS & GYNECOLOGY

## 2025-01-07 RX ORDER — COPPER 313.4 MG/1
1 INTRAUTERINE DEVICE INTRAUTERINE
Status: COMPLETED | OUTPATIENT
Start: 2025-01-07 | End: 2025-01-07

## 2025-01-07 RX ADMIN — COPPER 1 EACH: 313.4 INTRAUTERINE DEVICE INTRAUTERINE at 13:52

## 2025-01-07 NOTE — PATIENT INSTRUCTIONS
"Patient Education     IUD insertion   The Basics   Written by the doctors and editors at [facility]   What is an intrauterine device? -- An intrauterine device (\"IUD\") is a type of birth control. It is a small, T-shaped device that goes in your uterus. A doctor or nurse inserts an IUD through your vagina and cervix (figure 1).  There are 2 categories of IUDs available in the  (figure 2):   Copper IUD - This contains copper.   Hormonal IUDs - These contain a hormone called \"levonorgestrel.\"  An IUD is one of the safest, most effective methods for preventing pregnancy. It can also be used to prevent pregnancy if it is put in within 5 days after unprotected sex. This is known as \"emergency contraception.\"  Some people also use IUDs for reasons other than birth control. For example, 1 type of IUD can be used to treat heavy, painful periods.  How do I prepare for IUD insertion? -- You can get an IUD at any time in your menstrual cycle, even during your period.  Before the procedure, your doctor or nurse will talk to you about the IUD you want to get. They can answer any questions you have. They will also:   Ask about your \"health history\" - This involves asking you questions about any health problems you have or had in the past, past surgeries, and any medicines you take. People who have abnormal vaginal bleeding, a recent pelvic infection, or some types of cancer generally should not get an IUD.   Have you sign a consent form   Make sure that you are not pregnant - If there is any chance that you could be pregnant, they will do a pregnancy test to make sure.   Offer you testing for sexually transmitted infections (\"STIs\") - You should not get an IUD if you recently had a pelvic infection. STIs such as chlamydia and gonorrhea can cause pelvic infections. If you do have an STI, your doctor or nurse can recommend treatment.   Talk to you about what to expect - IUD insertion only takes a few minutes, but it can cause some " "pain or discomfort. People who have never given birth might feel more discomfort than people who have. If you are worried about pain, your doctor or nurse might give you the option of taking medicine before the procedure. You might want to ask about other ways to reduce discomfort. For example, some people find it helps to do breathing exercises or listen to music during the insertion.  What happens during insertion? -- When it is time for the procedure:   You will lie on an exam table with your knees bent.   The doctor will do a pelvic exam. This involves checking your vagina, cervix, and uterus.   The doctor will use a special tool to hold the walls of your vagina open. They will clean the cervix to prevent infection.   The doctor will insert a device to hold your cervix. They will use a different tool to measure the size of your uterus. This is to make sure that the IUD will fit.   The doctor will insert a tube that contains the folded IUD. When the IUD reaches the uterus, it is released. Then, the doctor will remove the tube and other tools, leaving the IUD in place.   When the IUD is in, it will have strings that hang out of the cervix. The doctor will cut the strings to be around 1 inch (2.5 cm) long.   IUD insertion only takes a few minutes.  What happens after insertion? -- After the procedure:   You can go home right away. You can return to your normal activities as soon as you feel ready.   You might notice a small amount of \"spotting\" or light bleeding. This can last for several days.   You might feel some discomfort and slight cramping. This usually goes away within a day or 2. Over-the-counter pain medicines like ibuprofen (sample brand names: Advil, Motrin) or naproxen (sample brand name: Aleve) can help.  What are the risks of IUD insertion? -- Your doctor will talk to you about all of the possible risks and answer your questions. While most IUD insertions are quick and easy, possible risks include:   " "Bleeding   Perforation, which means injury to the uterus   Infection   Dizziness or passing out  What else do I need to know? -- After getting an IUD:   Depending on which IUD you have, you might need to use another type of birth control for 1 week after insertion. Ask your doctor or nurse if you're not sure about this.   Your doctor or nurse will tell you if you should come back for a follow-up visit.   Some doctors recommend checking your IUD strings once in a while, but this is not usually needed. You might be able to feel them by putting a finger in your vagina. Sometimes, they are hard to feel because they curve up around the cervix.  When should I call the doctor? -- Call for advice right away if you:   Get a fever higher than 100.5°F (38°C)   Have severe pain in your lower belly   Feel dizzy or pass out   Have vaginal bleeding that is heavier than a normal period   Have bad-smelling discharge from your vagina   Think that your IUD has moved or come out   Cannot feel your IUD strings anymore   Think that you could be pregnant  All topics are updated as new evidence becomes available and our peer review process is complete.  This topic retrieved from [employer] on: Feb 26, 2024.  Topic 013643 Version 2.0  Release: 32.2.4 - C32.56  © 2024 UpToDate, Inc. and/or its affiliates. All rights reserved.  figure 1: Female reproductive anatomy     The internal organs that make up the female reproductive system are located in the lower belly. These include the uterus, fallopian tubes, ovaries, cervix, and vagina.  The uterus has an inner lining, called the \"endometrium,\" and a thicker outer layer, called the \"myometrium.\"  Graphic 62381 Version 8.0  figure 2: IUDs     This picture shows 2 types of IUDs. There are different IUDs available. They are placed inside the uterus to help prevent pregnancy. Some hormonal IUDs can help reduce menstrual bleeding. The copper IUD can actually make menstrual bleeding heavier.  Graphic " 70420 Version 15.0  Consumer Information Use and Disclaimer   Disclaimer: This generalized information is a limited summary of diagnosis, treatment, and/or medication information. It is not meant to be comprehensive and should be used as a tool to help the user understand and/or assess potential diagnostic and treatment options. It does NOT include all information about conditions, treatments, medications, side effects, or risks that may apply to a specific patient. It is not intended to be medical advice or a substitute for the medical advice, diagnosis, or treatment of a health care provider based on the health care provider's examination and assessment of a patient's specific and unique circumstances. Patients must speak with a health care provider for complete information about their health, medical questions, and treatment options, including any risks or benefits regarding use of medications. This information does not endorse any treatments or medications as safe, effective, or approved for treating a specific patient. UpToDate, Inc. and its affiliates disclaim any warranty or liability relating to this information or the use thereof.The use of this information is governed by the Terms of Use, available at https://www.woltersLuxoftuwer.com/en/know/clinical-effectiveness-terms. 2024© UpToDate, Inc. and its affiliates and/or licensors. All rights reserved.  Copyright   © 2024 UpToDate, Inc. and/or its affiliates. All rights reserved.

## 2025-01-07 NOTE — PROGRESS NOTES
IUD Procedure    Date/Time: 1/7/2025 1:52 PM    Performed by: Alecia Palacios MD  Authorized by: Alecia Palacios MD    Written consent obtained?: Yes    Risks and benefits: Risks, benefits and alternatives were discussed    Consent given by:  Patient  Time Out:     Time out: Immediately prior to the procedure a time out was called      Time out performed at:  1/7/2025 1:42 PM  Patient states understanding of procedure being performed: Yes    Patient's understanding of procedure matches consent: Yes    Procedure consent matches procedure scheduled: Yes    Relevant documents present and verified: Yes    Test results available and properly labeled: Yes    Patient identity confirmed:  Verbally with patient  Select procedure: IUD insertion    IUD Insertion:     Negative urine pregnancy test: yes      Cervix cleaned and prepped: yes      Speculum placed in vagina: yes      Allis applied to cervix: yes      IUD inserted with no complications: yes      Strings trimmed: yes      Uterus sounded: yes      Uterus sound depth (cm):  7    IUD type:  1 each intrauterine copper  Post-procedure:     Patient tolerated procedure well: yes      Patient will follow up after next period: yes

## 2025-01-07 NOTE — TELEPHONE ENCOUNTER
A medical records request was received 1/7/24 from [employer]. It requests last office visit notes from patient's appointments. Claim # [id number].    A short term disability questionnaire was also received. Paperwork placed in Dr Sutton's mailbox

## 2025-01-08 NOTE — TELEPHONE ENCOUNTER
Called and spoke to patient and relayed message from Dr Sutton. Was also asked to request another form from [facility] for the provider to complete since the previous one was used.     Once a new form is received it will be sent to provider.

## 2025-01-09 ENCOUNTER — TELEPHONE (OUTPATIENT)
Age: 40
End: 2025-01-09

## 2025-01-09 NOTE — TELEPHONE ENCOUNTER
Patient called in requesting their previous talk therapy, office notes be sent to [employer] for their short term disability.    Patient states the notes would be from the 12/24/24 office visit.    Short term disability, [employer], [phone]    Patient also states they were informed that [employer] has faxed over a form this morning to be completed, 1/9/25

## 2025-01-13 ENCOUNTER — TELEPHONE (OUTPATIENT)
Dept: BARIATRICS | Facility: CLINIC | Age: 40
End: 2025-01-13

## 2025-01-14 ENCOUNTER — HOSPITAL ENCOUNTER (OUTPATIENT)
Dept: MAMMOGRAPHY | Facility: MEDICAL CENTER | Age: 40
Discharge: HOME | End: 2025-01-14
Payer: COMMERCIAL

## 2025-01-14 VITALS — WEIGHT: 238.1 LBS | BODY MASS INDEX: 43.82 KG/M2 | HEIGHT: 62 IN

## 2025-01-14 DIAGNOSIS — Z12.31 BREAST CANCER SCREENING BY MAMMOGRAM: ICD-10-CM

## 2025-01-14 DIAGNOSIS — Z80.3 FAMILY HISTORY OF BREAST CANCER: ICD-10-CM

## 2025-01-14 PROCEDURE — 77067 SCR MAMMO BI INCL CAD: CPT

## 2025-01-14 PROCEDURE — 77063 BREAST TOMOSYNTHESIS BI: CPT

## 2025-01-15 DIAGNOSIS — F32.A MOOD DISORDER OF DEPRESSED TYPE: ICD-10-CM

## 2025-01-21 ENCOUNTER — TELEPHONE (OUTPATIENT)
Age: 40
End: 2025-01-21

## 2025-01-21 NOTE — TELEPHONE ENCOUNTER
Pt asked if she can be called back by nurse/provider to go over Mammo results. Informed pt provider has to look over them first before a nurse is allowed to go over them with her. Please call pt back when able too.

## 2025-01-22 NOTE — TELEPHONE ENCOUNTER
Called [facility] to follow up with mammogram report not yet received. Spoke with charles Lerma who will reach out to her manager to have report read.     Spoke with patient to make her aware.

## 2025-01-24 ENCOUNTER — RESULTS FOLLOW-UP (OUTPATIENT)
Dept: OBGYN CLINIC | Facility: CLINIC | Age: 40
End: 2025-01-24

## 2025-01-28 ENCOUNTER — TELEMEDICINE (OUTPATIENT)
Dept: BEHAVIORAL/MENTAL HEALTH CLINIC | Facility: CLINIC | Age: 40
End: 2025-01-28
Payer: COMMERCIAL

## 2025-01-28 ENCOUNTER — TELEPHONE (OUTPATIENT)
Age: 40
End: 2025-01-28

## 2025-01-28 DIAGNOSIS — F43.10 POST TRAUMATIC STRESS DISORDER: ICD-10-CM

## 2025-01-28 DIAGNOSIS — F31.81 BIPOLAR II DISORDER (HCC): Primary | ICD-10-CM

## 2025-01-28 DIAGNOSIS — F41.1 GAD (GENERALIZED ANXIETY DISORDER): Chronic | ICD-10-CM

## 2025-01-28 PROCEDURE — 90837 PSYTX W PT 60 MINUTES: CPT | Performed by: SOCIAL WORKER

## 2025-01-28 NOTE — TELEPHONE ENCOUNTER
Pt called to get her appt for 1/28/2025 at 9 am switched to a virtual visit due to wont make it to the office in time..  Pt has [employer] and will connect with provider through [employer]. Writer switched appt and checked it in.

## 2025-01-28 NOTE — PSYCH
Virtual Regular Visit    Verification of patient location:    Patient is located at Home in the following state in which I hold an active license [place]    Assessment/Plan:    Problem List Items Addressed This Visit          Behavioral Health    SHELLY (generalized anxiety disorder) (Chronic)    Post traumatic stress disorder    Bipolar II disorder (HCC) - Primary     Goals addressed in session: Goal 1     Depression Follow-up Plan Completed: Yes    Reason for visit is   Chief Complaint   Patient presents with    Virtual Regular Visit     Encounter provider CHARLIE Hutchinson    Recent Visits  Date Type Provider Dept   01/28/25 Telemedicine CHARLIE Hutchinson [facility] Therapist [place]   Showing recent visits within past 7 days and meeting all other requirements  Future Appointments  No visits were found meeting these conditions.  Showing future appointments within next 150 days and meeting all other requirements     The patient was identified by name and date of birth. Libertad Espinal was informed that this is a telemedicine visit and that the visit is being conducted throughthe Epic Embedded platform. She agrees to proceed..  My office door was closed. No one else was in the room.  She acknowledged consent and understanding of privacy and security of the video platform. The patient has agreed to participate and understands they can discontinue the visit at any time.    Patient is aware this is a billable service.     Subjective  Libertad Espinal is a 39 y.o. female.    HPI     Past Medical History:   Diagnosis Date    Abnormal Pap smear of cervix     Anxiety     Bipolar disorder (HCC)     Bipolar I disorder, most recent episode depressed, in full remission (HCC) 07/19/2016    COVID-19 01/12/2022    10/27/23 Per pt had 3 separate times - last year being the last episode    Depression     Eczema     Exposure to chlamydia     Resolved 06/09/17    Knee pain, left     Migraine without aura and  without status migrainosus, not intractable 10/11/2019    Obesity     Post traumatic stress disorder 2021    Postpartum depression 2018    TMJ (dislocation of temporomandibular joint)     causes snoring    Manoj-Parkinson-White (WPW) syndrome        Past Surgical History:   Procedure Laterality Date    ABLATION OF DYSRHYTHMIC FOCUS      WPW ablation age 16    COLONOSCOPY      COLPOSCOPY      DISTAL PATELLAR REALIGNMENT Left 10/31/2023    Procedure: OPEN MPFL RECONSTRUCTION WITH ALLOGRAFT;  Surgeon: Wilbert Michael DO;  Location:  MAIN OR;  Service: Orthopedics    INDUCED       OTHER SURGICAL HISTORY      catheter ablation- WPW age 17    VA ARTHROSCOPY KNEE LATERAL RELEASE Left 10/31/2023    Procedure: RELEASE RETINACULAR;  Surgeon: Wilbert Michael DO;  Location:  MAIN OR;  Service: Orthopedics    TONSILLECTOMY      WISDOM TOOTH EXTRACTION         Current Outpatient Medications   Medication Sig Dispense Refill    ARIPiprazole (ABILIFY) 5 mg tablet Take 1 tablet (5 mg total) by mouth daily 90 tablet 1    Drospirenone 4 MG TABS Take 1 tablet by mouth in the morning (Patient not taking: Reported on 2025) 84 tablet 0    LORazepam (ATIVAN) 1 mg tablet Take 1 tablet (1 mg total) by mouth every 6 (six) hours as needed for anxiety 15 tablet 0    naproxen (NAPROSYN) 500 mg tablet Take 1 tablet (500 mg total) by mouth 2 (two) times a day with meals 30 tablet 0    oseltamivir (TAMIFLU) 75 mg capsule Take 1 capsule (75 mg total) by mouth every 12 (twelve) hours for 5 days 10 capsule 0    sertraline (ZOLOFT) 50 mg tablet TAKE 2 TABLETS BY MOUTH EVERY  tablet 1    Vitamin D, Cholecalciferol, 50 MCG (2000 UT) CAPS Take 2,000 Units by mouth daily Start after completing 12 weeks of ergocalciferol. (Patient not taking: Reported on 2025) 90 capsule 3     No current facility-administered medications for this visit.        Allergies   Allergen Reactions    Codeine Other (See Comments)     dry heaves     Sulfa Antibiotics Hives and Rash     Per pt as achild    Erythromycin Hives     Per as a child       Review of Systems    Video Exam    There were no vitals filed for this visit.    Physical Exam     Behavioral Health Psychotherapy Progress Note    Psychotherapy Provided: Individual Psychotherapy     1. Bipolar II disorder (HCC)        2. SHELLY (generalized anxiety disorder)        3. Post traumatic stress disorder            Goals addressed in session: Goal 1     DATA: Met with Libertad for her scheduled individual session. She called the office and requested that this session be changed to a virtual session, due to a scheduling conflict. She states that she heard from the HR department that her job might not be available to her if she does not return this Friday. She will be talking to HR to ask about this, as she is currently on FMLA protection. We discussed what makes her feel confident and competent. She continues to have daily episodes of tearfulness. She states that this usually occurs when other people are around, but they might not be saying things that necessarily trigger her. She states that she often feels overwhelmed by trying to meet the needs of various people all at the same time. We discussed the impossibility of trying to make others happy. She states that she is unable to identify what makes her happy, as she is usually focused on others. She is agreeable to taking 1/2 hour each day to spend time on herself-- to try to think about things in her life that bring her narciso or might bring her narciso. Since she really does not have any idea about what makes her happy, she is agreeable to thinking about this and making a list-- to include both realistic and unrealistic ideas of what might bring her narciso. We will discuss some of these ideas at her next session.     During this session, this clinician used the following therapeutic modalities: Client-centered Therapy, Dialectical Behavior Therapy,  "Mindfulness-based Strategies, Motivational Interviewing, Solution-Focused Therapy, and Supportive Psychotherapy    Substance Abuse was not addressed during this session. If the client is diagnosed with a co-occurring substance use disorder, please indicate any changes in the frequency or amount of use: n/a. Stage of change for addressing substance use diagnoses: No substance use/Not applicable    ASSESSMENT:  Libertad Espinal presents with a Euthymic/ normal mood.     her affect is Normal range and intensity, which is congruent, with her mood and the content of the session. The client has made progress on their goals.    Libertad Espinal presents with a minimal risk of suicide, minimal risk of self-harm, and minimal risk of harm to others.    For any risk assessment that surpasses a \"low\" rating, a safety plan must be developed.    A safety plan was indicated: no  If yes, describe in detail n/a    PLAN: Between sessions, Libertad Espinal will continue to monitor her moods. She will speak with the  at her company. She will start to develop a list of things that might bring her narciso. At the next session, the therapist will use Client-centered Therapy, Dialectical Behavior Therapy, Mindfulness-based Strategies, Motivational Interviewing, Solution-Focused Therapy, and Supportive Psychotherapy to address her mood regulation and relationship concerns.    Behavioral Health Treatment Plan and Discharge Planning: Libertad Espinal is aware of and agrees to continue to work on their treatment plan. They have identified and are working toward their discharge goals. yes    Depression Follow-up Plan Completed: Yes. Continue with therapy. Continue with medication management/adherence.     Visit start and stop times:    01/28/25  Start Time: 0902  Stop Time: 0958  Total Visit Time: 56 minutes        "

## 2025-01-29 ENCOUNTER — OFFICE VISIT (OUTPATIENT)
Dept: URGENT CARE | Facility: CLINIC | Age: 40
End: 2025-01-29
Payer: COMMERCIAL

## 2025-01-29 VITALS
DIASTOLIC BLOOD PRESSURE: 84 MMHG | SYSTOLIC BLOOD PRESSURE: 126 MMHG | OXYGEN SATURATION: 97 % | RESPIRATION RATE: 18 BRPM | HEART RATE: 94 BPM | TEMPERATURE: 98.1 F

## 2025-01-29 DIAGNOSIS — B97.89 VIRAL RESPIRATORY ILLNESS: Primary | ICD-10-CM

## 2025-01-29 DIAGNOSIS — J98.8 VIRAL RESPIRATORY ILLNESS: Primary | ICD-10-CM

## 2025-01-29 LAB
SARS-COV-2 AG UPPER RESP QL IA: NEGATIVE
VALID CONTROL: NORMAL

## 2025-01-29 PROCEDURE — 87811 SARS-COV-2 COVID19 W/OPTIC: CPT | Performed by: PHYSICIAN ASSISTANT

## 2025-01-29 PROCEDURE — 87636 SARSCOV2 & INF A&B AMP PRB: CPT | Performed by: PHYSICIAN ASSISTANT

## 2025-01-29 PROCEDURE — G0382 LEV 3 HOSP TYPE B ED VISIT: HCPCS | Performed by: PHYSICIAN ASSISTANT

## 2025-01-29 RX ORDER — OSELTAMIVIR PHOSPHATE 75 MG/1
75 CAPSULE ORAL EVERY 12 HOURS SCHEDULED
Qty: 10 CAPSULE | Refills: 0 | Status: SHIPPED | OUTPATIENT
Start: 2025-01-29 | End: 2025-02-03

## 2025-01-29 NOTE — PATIENT INSTRUCTIONS
Rapid COVID test is negative.    COVID/influenza PCR testing done.  Those results take approximately 24 to 48 hours to return.  Provider will cover for influenza with Tamiflu.  If this is early influenza, starting on antiinfluenza medication can shorten course of illness.    If influenza testing is negative you may discontinue Tamiflu.    There are multiple circulating viruses that can all cause similar symptoms.      Viral illnesses that can cause your symptoms can include (but are not limited to) enteroviruses, influenzas, Covid, non Covid coronaviruses, human metapneumoviruses, RSV, adenoviruses, rhinoviruses.      Many viral respiratory illnesses  can present similarly.  Most are self-limiting - meaning patients will get better on their own with time.  Antibiotics do not help viral illnesses.       Most upper respiratory symptoms start to improve after 7-12 days but may take a few weeks to completely resolve.        As with any respiratory illness, transmission precautions  are strongly advised.  Masking.  Isolating.  Hand washing.  Frequent cleaning of common use surfaces.      Follow up with your PCP office if not improving over next 7-10 days.  If you want to be proactive, you may contact your PCP office now to schedule follow up for near future.    If you feel like your are severely worsening or have significant weakness, chest pain, shortness of breath proceed to ER for immediate further evaluation.  ---------------------------------------------------------------------------------------------------------------------------------------------------------------------------------------------------------------------------------------------------------------    Strongly encourage getting plenty of rest over the next few days.  Increase your hydration.    Vaporizer by bedside may also be helpful.        Symptom Relief Suggestions:    Options if sore throat or hoarseness:              * Warm salt water gargles every  1-2 hours while awake        * Throat lozenges, Tylenol, voice rest, warm tea with honey.    Options if sinus pressure, nasal congestion, runny nose, and / or post nasal drip:            * Clearing your sinuses in a nice steamy shower may be helpful, especially first thing after waking.       * Nasal saline rinses every 1-2 hours while awake may also help decrease nasal congestion, drainage.       * Afrin nasal spray if significant nasal congestion at bedtime may use .  (Do not use for over 3 days however.)       * Decongestant / expectorant such as Mucinex D 12 hour 1/2 to 1 tablet as needed with full glass of fluids may help decrease pressure and drainage.    Options if ear pressure, discomfort:         * Decongestant may be helpful.       * Flonase nasal spray.       * Warm compresses against ear(s) for comfort.    Options if  cough:          * Warm tea with honey or a teaspoon of honey periodically throughout day and / or before bed.       * Plain Mucinex (an expectorant to help keep mucus thin so you can clear it easier) or Mucinex DM (expectorant / cough suppressant) to help decrease cough if it is bothering your sleep.        Other night time cough medication options include Delsym, Robitussin DM, NyQuil.         * Propping with an extra pillow or two may be helpful.       * Keep water by your bedside to sip on as needed.       * Cough drops.       * Vaporizer by bedside.       * Getting in steamy shower or bathroom.  Cool air may also soothe coughing spasm.      ----------------------------------------------------------------------------------------------------------------------------------------------------------------------------------------------------------------------------------------------------------------      Although bothersome, mucous is not necessarily a bad thing.  Production of mucous is the body's way of trying to capture and flush irritants from mucosal surfaces.  Yellow or green mucous does  not necessarily mean you have a bacterial infection.   Mucous will become more discolored over time, especially first thing in the morning, as your body's immune system  floods the mucosal surfaces with white bloods cells to try and help fight  infection.  This white blood cell debride can also cause mucous to be discolored.  Again, using nasal saline spray frequently may help soothe and keep mucous flowing out versus getting dried, thickened and / or stuck leading to more sinus pain and pressure.     If you have a cough, please realize that a cough is not necessarily a bad thing.  It is a part of your body's protection mechanism to help keep your airways clear.  Phlegm may be more discolored in the morning.  Please note that discolored phlegm does not necessarily mean a bacterial infection.

## 2025-01-29 NOTE — PROGRESS NOTES
[facility]    NAME: Libertad Espinal is a 39 y.o. female  : 1985    MRN: [id number]  DATE: 2025  TIME: 6:04 PM    Assessment and Plan   Viral respiratory illness [J98.8, B97.89]  1. Viral respiratory illness  Poct Covid 19 Rapid Antigen Test    oseltamivir (TAMIFLU) 75 mg capsule    Covid/Flu- Office Collect Normal          Patient Instructions     Patient Instructions   Rapid COVID test is negative.    COVID/influenza PCR testing done.  Those results take approximately 24 to 48 hours to return.  Provider will cover for influenza with Tamiflu.  If this is early influenza, starting on antiinfluenza medication can shorten course of illness.    If influenza testing is negative you may discontinue Tamiflu.    There are multiple circulating viruses that can all cause similar symptoms.      Viral illnesses that can cause your symptoms can include (but are not limited to) enteroviruses, influenzas, Covid, non Covid coronaviruses, human metapneumoviruses, RSV, adenoviruses, rhinoviruses.      Many viral respiratory illnesses  can present similarly.  Most are self-limiting - meaning patients will get better on their own with time.  Antibiotics do not help viral illnesses.       Most upper respiratory symptoms start to improve after 7-12 days but may take a few weeks to completely resolve.        As with any respiratory illness, transmission precautions  are strongly advised.  Masking.  Isolating.  Hand washing.  Frequent cleaning of common use surfaces.      Follow up with your PCP office if not improving over next 7-10 days.  If you want to be proactive, you may contact your PCP office now to schedule follow up for near future.    If you feel like your are severely worsening or have significant weakness, chest pain, shortness of breath proceed to ER for immediate further  evaluation.  ---------------------------------------------------------------------------------------------------------------------------------------------------------------------------------------------------------------------------------------------------------------    Strongly encourage getting plenty of rest over the next few days.  Increase your hydration.    Vaporizer by bedside may also be helpful.        Symptom Relief Suggestions:    Options if sore throat or hoarseness:              * Warm salt water gargles every 1-2 hours while awake        * Throat lozenges, Tylenol, voice rest, warm tea with honey.    Options if sinus pressure, nasal congestion, runny nose, and / or post nasal drip:            * Clearing your sinuses in a nice steamy shower may be helpful, especially first thing after waking.       * Nasal saline rinses every 1-2 hours while awake may also help decrease nasal congestion, drainage.       * Afrin nasal spray if significant nasal congestion at bedtime may use .  (Do not use for over 3 days however.)       * Decongestant / expectorant such as Mucinex D 12 hour 1/2 to 1 tablet as needed with full glass of fluids may help decrease pressure and drainage.    Options if ear pressure, discomfort:         * Decongestant may be helpful.       * Flonase nasal spray.       * Warm compresses against ear(s) for comfort.    Options if  cough:          * Warm tea with honey or a teaspoon of honey periodically throughout day and / or before bed.       * Plain Mucinex (an expectorant to help keep mucus thin so you can clear it easier) or Mucinex DM (expectorant / cough suppressant) to help decrease cough if it is bothering your sleep.        Other night time cough medication options include Delsym, Robitussin DM, NyQuil.         * Propping with an extra pillow or two may be helpful.       * Keep water by your bedside to sip on as needed.       * Cough drops.       * Vaporizer by bedside.       * Getting  in steamy shower or bathroom.  Cool air may also soothe coughing spasm.      ----------------------------------------------------------------------------------------------------------------------------------------------------------------------------------------------------------------------------------------------------------------      Although bothersome, mucous is not necessarily a bad thing.  Production of mucous is the body's way of trying to capture and flush irritants from mucosal surfaces.  Yellow or green mucous does not necessarily mean you have a bacterial infection.   Mucous will become more discolored over time, especially first thing in the morning, as your body's immune system  floods the mucosal surfaces with white bloods cells to try and help fight  infection.  This white blood cell debride can also cause mucous to be discolored.  Again, using nasal saline spray frequently may help soothe and keep mucous flowing out versus getting dried, thickened and / or stuck leading to more sinus pain and pressure.     If you have a cough, please realize that a cough is not necessarily a bad thing.  It is a part of your body's protection mechanism to help keep your airways clear.  Phlegm may be more discolored in the morning.  Please note that discolored phlegm does not necessarily mean a bacterial infection.                     Chief Complaint     Chief Complaint   Patient presents with    Cold Like Symptoms     Started yesterday with sinus pressure, congestion, headaches, body aches, fatigued, feels hot-cold. No coughing.        History of Present Illness   Libertad Espinal presents to the clinic c/o  39-year-old female comes in with sinus pain pressure congestion headache body aches and pains and feeling hot and cold.    Started: Yesterday with stuffy nose and headache.  Has also been sneezing.  No cough.  Has felt feverish and very tired and she has been sleeping a lot.  Has also started with  diarrhea.    Modifying factors: Vicks cold and flu without much relief.  Known Exposures: None known but she says she has 4 school-aged children.  None of them have been sick.  Recent travel:  No.  Hx asthma:  No.  Hx pneumonia:  No.  Smoker: No.    No history of influenza vaccine or COVID-vaccine in this last year.        Review of Systems   Review of Systems   Constitutional:  Positive for activity change, appetite change, chills, fatigue and fever. Negative for diaphoresis.   HENT:  Positive for congestion, postnasal drip, rhinorrhea, sinus pressure, sinus pain and sore throat. Negative for ear discharge and ear pain.    Eyes: Negative.    Respiratory:  Negative for cough, chest tightness, shortness of breath and wheezing.    Cardiovascular: Negative.    Musculoskeletal:  Positive for myalgias.   Neurological:  Positive for headaches.   Hematological: Negative.        Current Medications     Long-Term Medications   Medication Sig Dispense Refill    ARIPiprazole (ABILIFY) 5 mg tablet Take 1 tablet (5 mg total) by mouth daily 90 tablet 1    LORazepam (ATIVAN) 1 mg tablet Take 1 tablet (1 mg total) by mouth every 6 (six) hours as needed for anxiety 15 tablet 0    naproxen (NAPROSYN) 500 mg tablet Take 1 tablet (500 mg total) by mouth 2 (two) times a day with meals 30 tablet 0    sertraline (ZOLOFT) 50 mg tablet TAKE 2 TABLETS BY MOUTH EVERY  tablet 1       Current Allergies     Allergies as of 01/29/2025 - Reviewed 01/29/2025   Allergen Reaction Noted    Codeine Other (See Comments)     Sulfa antibiotics Hives and Rash     Erythromycin Hives 04/21/2012          The following portions of the patient's history were reviewed and updated as appropriate: allergies, current medications, past family history, past medical history, past social history, past surgical history and problem list.  Past Medical History:   Diagnosis Date    Abnormal Pap smear of cervix     Anxiety     Bipolar disorder (HCC)     Bipolar I  disorder, most recent episode depressed, in full remission (HCC) 2016    COVID-19 2022    10/27/23 Per pt had 3 separate times - last year being the last episode    Depression     Eczema     Exposure to chlamydia     Resolved 18    Knee pain, left     Migraine without aura and without status migrainosus, not intractable 10/11/2019    Obesity     Post traumatic stress disorder 2021    Postpartum depression 2018    TMJ (dislocation of temporomandibular joint)     causes snoring    Manoj-Parkinson-White (WPW) syndrome      Past Surgical History:   Procedure Laterality Date    ABLATION OF DYSRHYTHMIC FOCUS      WPW ablation age 16    COLONOSCOPY      COLPOSCOPY      DISTAL PATELLAR REALIGNMENT Left 10/31/2023    Procedure: OPEN MPFL RECONSTRUCTION WITH ALLOGRAFT;  Surgeon: Wilbert Michael DO;  Location:  MAIN OR;  Service: Orthopedics    INDUCED       OTHER SURGICAL HISTORY      catheter ablation- WPW age 17    FL ARTHROSCOPY KNEE LATERAL RELEASE Left 10/31/2023    Procedure: RELEASE RETINACULAR;  Surgeon: Wilbert Michael DO;  Location:  MAIN OR;  Service: Orthopedics    TONSILLECTOMY      WISDOM TOOTH EXTRACTION       Family History   Problem Relation Age of Onset    Bipolar disorder Mother     Hypertension Mother     Hyperlipidemia Mother     Suicide Attempts Mother     Colon polyps Mother     Factor V Leiden deficiency Mother     Glucose-6-phos deficiency Father     Hyperlipidemia Father     Hypertension Father     Diabetes Father     No Known Problems Daughter     Breast cancer Maternal Grandmother         dx approx age early 40's    Colon polyps Maternal Grandfather     Heart disease Maternal Grandfather     Crohn's disease Paternal Grandmother     Prostate cancer Paternal Grandfather     Pancreatic cancer Paternal Grandfather     Anxiety disorder Brother     Factor V Leiden deficiency Brother     Mental illness Brother     Drug abuse Brother     Anemia Son     Breast  cancer Maternal Aunt 40    Colon cancer Paternal Aunt     Factor V Leiden deficiency Cousin     Thyroid disease Neg Hx     Stroke Neg Hx     Ovarian cancer Neg Hx     Anesthesia problems Neg Hx        Objective   /84   Pulse 94   Temp 98.1 °F (36.7 °C) (Tympanic)   Resp 18   LMP 01/15/2025 (Approximate)   SpO2 97%   Patient's last menstrual period was 01/15/2025 (approximate).       Physical Exam     Physical Exam  Vitals and nursing note reviewed.   Constitutional:       General: She is not in acute distress.     Appearance: She is well-developed. She is ill-appearing. She is not toxic-appearing or diaphoretic.      Comments: Appears mildly ill but in no acute distress.  No trismus or conversational dyspnea.   HENT:      Head: Normocephalic and atraumatic.      Right Ear: Tympanic membrane, ear canal and external ear normal.      Left Ear: Tympanic membrane, ear canal and external ear normal.      Nose: Congestion present. No rhinorrhea.      Mouth/Throat:      Mouth: Mucous membranes are moist.      Pharynx: No oropharyngeal exudate or posterior oropharyngeal erythema.      Comments: Cobblestoning posterior pharynx   Eyes:      General:         Right eye: No discharge.         Left eye: No discharge.      Conjunctiva/sclera: Conjunctivae normal.      Pupils: Pupils are equal, round, and reactive to light.   Cardiovascular:      Rate and Rhythm: Normal rate and regular rhythm.      Heart sounds: Normal heart sounds. No murmur heard.     No friction rub. No gallop.   Pulmonary:      Effort: Pulmonary effort is normal. No respiratory distress.      Breath sounds: Normal breath sounds. No stridor. No wheezing, rhonchi or rales.   Musculoskeletal:      Cervical back: Normal range of motion and neck supple. No rigidity or tenderness.   Lymphadenopathy:      Cervical: No cervical adenopathy.   Skin:     General: Skin is warm and dry.      Coloration: Skin is not jaundiced or pale.      Findings: No rash.    Neurological:      General: No focal deficit present.      Mental Status: She is alert and oriented to person, place, and time.   Psychiatric:         Mood and Affect: Mood normal.         Behavior: Behavior normal.

## 2025-02-04 ENCOUNTER — TELEPHONE (OUTPATIENT)
Dept: PSYCHIATRY | Facility: CLINIC | Age: 40
End: 2025-02-04

## 2025-02-04 NOTE — TELEPHONE ENCOUNTER
A medical records request was received (via [email or web address]) 2/3/25 from [email or web address]. It requests 3 most recent office notes.    Placed paperwork in Dr Sutton's office.     Claim number [id number].

## 2025-02-05 NOTE — TELEPHONE ENCOUNTER
Patient called in inquiring about medical record request.     Writer relay previous writers notes.     Patient shared ins company needs paperwork by the end of week 2/3/2025.

## 2025-02-11 ENCOUNTER — SOCIAL WORK (OUTPATIENT)
Dept: BEHAVIORAL/MENTAL HEALTH CLINIC | Facility: CLINIC | Age: 40
End: 2025-02-11
Payer: COMMERCIAL

## 2025-02-11 DIAGNOSIS — F31.81 BIPOLAR II DISORDER (HCC): Primary | ICD-10-CM

## 2025-02-11 DIAGNOSIS — F41.1 GAD (GENERALIZED ANXIETY DISORDER): Chronic | ICD-10-CM

## 2025-02-11 DIAGNOSIS — F43.10 POST TRAUMATIC STRESS DISORDER: ICD-10-CM

## 2025-02-11 PROCEDURE — 90837 PSYTX W PT 60 MINUTES: CPT | Performed by: SOCIAL WORKER

## 2025-02-11 NOTE — TELEPHONE ENCOUNTER
This writer spoke to patient and let her know that the latest medical records request had been processed on 2/5/25 as charted.     This writer also informed her that there was not any additional forms sent with the last request for records from [facility]-as it would have been part of the encounter summary. This writer also spoke to Dr Camacho who indicated that her had sent in any paperwork he had been given.    Patient acknowledged understanding.

## 2025-02-11 NOTE — TELEPHONE ENCOUNTER
Patient contact the office requesting an update on her paperwork that needs to be filled out. MR number was provided to patient to call for possible assistance. Writer also informed patient the message would be sent to the office to see if there is an update. Please return call back to patient.

## 2025-02-11 NOTE — PSYCH
"Behavioral Health Psychotherapy Progress Note    Psychotherapy Provided: Individual Psychotherapy     1. Bipolar II disorder (HCC)        2. SHELLY (generalized anxiety disorder)        3. Post traumatic stress disorder            Goals addressed in session: Goal 1     DATA: Met with Libertad for her scheduled session. She discussed some recent stressors. She states that her boyfriend's ex- will be temporarily be moving in with them, due to being evicted from her apartment. She will be moving in with her two children. We discussed setting some boundaries in advance of her moving in-- regarding time limits and the care of the children, as well as her taking care of her autistic son. Libertad talked about her application for long-term disability. She states that she wants to be able to get through a day without experiencing anxiety and tearfulness throughout the day. She states that she is finding it difficult to get motivated. We discussed mindfulness practice and the technique of \"noting\". We discussed setting a plan to start a meditation schedule. She states that the themes that impact her anxiety are 1) money, 2) Jamar's future plans, 3) future options for housing, etc. We discussed how meditation and focusing on mindfulness of the present can help her with managing her anxiety by focusing on creating plans where she can focus on things she can \"control\" and decreasing focus on areas where she has far less control.     During this session, this clinician used the following therapeutic modalities: Client-centered Therapy, Dialectical Behavior Therapy, Mindfulness-based Strategies, Motivational Interviewing, Solution-Focused Therapy, and Supportive Psychotherapy    Substance Abuse was not addressed during this session. If the client is diagnosed with a co-occurring substance use disorder, please indicate any changes in the frequency or amount of use: n/a. Stage of change for addressing substance use diagnoses: No substance " "use/Not applicable    ASSESSMENT:  Libertad Espinal presents with a Euthymic/ normal mood.     her affect is Normal range and intensity, which is congruent, with her mood and the content of the session. The client has made progress on their goals.    Libertad Espinal presents with a minimal risk of suicide, minimal risk of self-harm, and minimal risk of harm to others.    For any risk assessment that surpasses a \"low\" rating, a safety plan must be developed.    A safety plan was indicated: no  If yes, describe in detail n/a    PLAN: Between sessions, Libertad Espinal will continue to monitor her moods. She will talk with her boyfriend about limit-setting with his ex- prior to her moving into their home. Libertad will work on setting up a mindful meditation practice and setting limits for herself. At the next session, the therapist will use Client-centered Therapy, Dialectical Behavior Therapy, Mindfulness-based Strategies, Motivational Interviewing, Solution-Focused Therapy, and Supportive Psychotherapy to address her mood regulation, anxiety management, and relationship concerns.    Behavioral Health Treatment Plan and Discharge Planning: Libertad Espinal is aware of and agrees to continue to work on their treatment plan. They have identified and are working toward their discharge goals. yes    Depression Follow-up Plan Completed: Yes. Continue with therapy. Continue with medication management and adherence.     Visit start and stop times:    02/11/25  Start Time: 0905  "

## 2025-02-13 ENCOUNTER — OFFICE VISIT (OUTPATIENT)
Age: 40
End: 2025-02-13
Payer: COMMERCIAL

## 2025-02-13 VITALS
SYSTOLIC BLOOD PRESSURE: 124 MMHG | WEIGHT: 237.4 LBS | HEIGHT: 63 IN | DIASTOLIC BLOOD PRESSURE: 62 MMHG | TEMPERATURE: 97.8 F | BODY MASS INDEX: 42.06 KG/M2 | HEART RATE: 81 BPM

## 2025-02-13 DIAGNOSIS — E66.813 CLASS 3 SEVERE OBESITY DUE TO EXCESS CALORIES WITH BODY MASS INDEX (BMI) OF 40.0 TO 44.9 IN ADULT, UNSPECIFIED WHETHER SERIOUS COMORBIDITY PRESENT (HCC): ICD-10-CM

## 2025-02-13 DIAGNOSIS — E66.01 CLASS 3 SEVERE OBESITY DUE TO EXCESS CALORIES WITH BODY MASS INDEX (BMI) OF 40.0 TO 44.9 IN ADULT, UNSPECIFIED WHETHER SERIOUS COMORBIDITY PRESENT (HCC): ICD-10-CM

## 2025-02-13 DIAGNOSIS — E66.01 CLASS 3 SEVERE OBESITY DUE TO EXCESS CALORIES WITH BODY MASS INDEX (BMI) OF 40.0 TO 44.9 IN ADULT, UNSPECIFIED WHETHER SERIOUS COMORBIDITY PRESENT (HCC): Primary | ICD-10-CM

## 2025-02-13 DIAGNOSIS — E78.5 DYSLIPIDEMIA: ICD-10-CM

## 2025-02-13 DIAGNOSIS — E66.813 CLASS 3 SEVERE OBESITY DUE TO EXCESS CALORIES WITH BODY MASS INDEX (BMI) OF 40.0 TO 44.9 IN ADULT, UNSPECIFIED WHETHER SERIOUS COMORBIDITY PRESENT (HCC): Primary | ICD-10-CM

## 2025-02-13 DIAGNOSIS — G47.33 OSA (OBSTRUCTIVE SLEEP APNEA): ICD-10-CM

## 2025-02-13 PROCEDURE — 99204 OFFICE O/P NEW MOD 45 MIN: CPT | Performed by: PHYSICIAN ASSISTANT

## 2025-02-13 RX ORDER — TIRZEPATIDE 2.5 MG/.5ML
2.5 INJECTION, SOLUTION SUBCUTANEOUS WEEKLY
Qty: 2 ML | Refills: 0 | Status: SHIPPED | OUTPATIENT
Start: 2025-02-13 | End: 2025-03-13

## 2025-02-13 RX ORDER — PHENTERMINE HYDROCHLORIDE 15 MG/1
CAPSULE ORAL
Qty: 1 CAPSULE | Refills: 0 | OUTPATIENT
Start: 2025-02-13

## 2025-02-13 NOTE — PROGRESS NOTES
Assessment/Plan:  Libertad was seen today for consult.    Diagnoses and all orders for this visit:    Class 3 severe obesity due to excess calories with body mass index (BMI) of 40.0 to 44.9 in adult, unspecified whether serious comorbidity present (HCC)  -     Ambulatory Referral to Weight Management  -     tirzepatide (Zepbound) 2.5 mg/0.5 mL auto-injector; Inject 0.5 mL (2.5 mg total) under the skin once a week for 28 days         - Discussed options of HealthyCORE-Intensive Lifestyle Intervention Program, Very Low Calorie Diet-VLCD, and Conservative Program and the role of weight loss medications.  - Explained the importance of making lifestyle changes first before starting anti-obesity medications.  - Patient should demonstrate lifestyle changes first before anti-obesity medication initiated.   - Patient is interested in pursuing Conservative Program  - Initial weight loss goal of 5-10% weight loss for improved health as studies have shown this is where we see the greatest impact on improving health and decreasing risk of obesity related conditions.  - Weight loss can improve patient's co-morbid conditions and/or prevent weight-related complications.  - Labs reviewed: As below.      General Recommendations:  Nutrition:  Eat breakfast daily.  Do not skip meals.     Food log (ie.) [email or web address], sparkpeople.com, loseit.com, calorieking.com, etc.    Practice mindful eating.  Be sure to set aside time to eat, eat slowly, and savor your food.    Hydration:    At least 64oz of water daily.  No sugar sweetened beverages.  No juice (eat the fruit instead).    Exercise:  Studies have shown that the ideal exercise goal is somewhere between 150 to 300 minutes of moderate intensity exercise a week.  Start with exercising 10 minutes every other day and gradually increase physical activity with a goal of at least 150 minutes of moderate intensity exercise a week, divided over at least 3 days a week.  An example of this  would be exercising 30 minutes a day, 5 days a week.  Resistance training can increase muscle mass and increase our resting metabolic rate.   FULL BODY resistance training is recommended 2-3 times a week.  Do not do this on consecutive days to allow for muscle recovery.    Aim for a bare minimum 5000 steps, even on days you do not exercise.    Monitoring:   Weigh yourself daily.  If this causes undue stress, then just weigh yourself once a week.  Weigh yourself the same time of the day with the same amount of clothing on.  Preferably this should be done after waking up, before you eat, and with no clothing or minimal clothing on.    Calorie goal:  4411-9508 kirill/day (Provided with meal plan to follow).    Return visit:    Calorie tracking/deficit  Physical activity goals  AOM  Contraception: IUD (paragaurd)   Not a candidate for phentermine d/t past cardiac history  Patient is interested in GLP1 RA  - Patient denies personal history of pancreatitis. Patient also denies personal and family history of thyroid cancer and multiple endocrine neoplasia type 2 (MEN 2 tumor).   Discussed zepbound. Use and side effect profile reviewed  RTC: 4 months      ZEPBOUND  I have sent Zebound to your pharmacy. The prior authorization process will been done through our prior authorization team and can take up to 3-4 weeks to process through the insurance.     - Start Zepbound 2.5 mg subcutaneously weekly. After you have taken the second pen, please give me an update, as we will likely increase the dose the next month if you are tolerating it well.    - Side effects of Zepbound include nausea, vomiting, diarrhea, or constipation. Keep an eye on your heart rate while on Zepbound. If you resting heart rate is greater than 100 beats per minutes, please notify me. If you develop severe abdominal pain, stop Zepbound and go to the emergency room, as that could be a sign of pancreatitis.     - Please notify me if you have surgery, upper  endoscopy, or colonoscopy scheduled, as we typically hold Zepbound for one week prior to the procedure.     - Zepbound can reduce the effectiveness of oral hormonal birth control (birth control pills). Recommend a barrier backup method such as condoms to prevent pregnancy.          Total time spent reviewing chart, interviewing patient, examining patient, discussing plan, answering all questions, and documentin min.       ______________________________________________________________________        Subjective:   Chief Complaint   Patient presents with    Consult     Mwm consult       HPI: Libertad Espinal  is a 39 y.o. female with history of PVCs, CLARISSE, PTSD, bipolar disorder, dyslipidemia, and excess weight, here to pursue weight loss management.  Previous notes and records have been reviewed.    Bipolar disorder - abilify, lorazepam, zoloft  Mild CLARISSE - following with pulmonology. Using CPAP.     WPW - s/p cardiac ablation when she was 18 yo, occasinonaly palpitations. Follows closely with cardiology     TSH WNL    2 kids (17, 11, 14, 6)   Prekids - 140 lbs (22)  Weight after second pregnancy 180 lbs (6 years ago)    Over the past 6 years weight has previously been progressing.  She had been on lamictal for mood stability and that contributed to weight gain. Abilify has been doing a stable mood.     Diets - will have some success and then rebound. Tried carnivore diet and calorie counting (1300 kirill/day). Difficult to maintain due to appetite.    Exercise - currently none. No physical restriction. History of knee surgery.     8 years ago she tried injection for weight loss. Daiy injeciton - saxenda. Worked well.     HPI  Wt Readings from Last 20 Encounters:   25 108 kg (237 lb 6.4 oz)   25 108 kg (238 lb 1.6 oz)   25 108 kg (237 lb 8 oz)   24 103 kg (228 lb 1.6 oz)   24 103 kg (226 lb 9.6 oz)   24 101 kg (223 lb)   05/15/24 98.9 kg (218 lb)   24 98.3 kg (216 lb 12.8  "oz)   03/20/24 98 kg (216 lb)   01/24/24 98.9 kg (218 lb)   12/06/23 97.1 kg (214 lb)   10/04/23 96.4 kg (212 lb 7.2 oz)   10/02/23 96.2 kg (212 lb)   08/25/23 96.2 kg (212 lb)   08/07/23 96.4 kg (212 lb 8 oz)   07/28/23 96.3 kg (212 lb 3.2 oz)   03/28/23 98.9 kg (218 lb)   01/20/23 97 kg (213 lb 12.8 oz)   12/19/22 97.1 kg (214 lb)   09/01/22 95.7 kg (211 lb)            Food logging:  B: anaya or sheets - Romanian toast and hashbrowns. Bagel.   S: chips   L: leftovers   S: chips   D: protein (chicken) + veggie (sometimes - frozen blends)+ starch (spaghetti + meatsauce)  S: sweets       Dining out:  Hydration: Water    SF soda - 1-2/day    Coffee - 2 cups    Alcohol: 2-3 drinks/month     Sleep:  \" A lot\" still tired. Using CPAP intermittently      Occupation: Out on leave       Past Medical History:   Diagnosis Date    Abnormal Pap smear of cervix     Anxiety     Bipolar disorder (HCC)     Bipolar I disorder, most recent episode depressed, in full remission (HCC) 07/19/2016    COVID-19 01/12/2022    10/27/23 Per pt had 3 separate times - last year being the last episode    Depression     Eczema     Exposure to chlamydia     Resolved 06/09/17    Knee pain, left     Migraine without aura and without status migrainosus, not intractable 10/11/2019    Obesity     Post traumatic stress disorder 12/13/2021    Postpartum depression 08/09/2018    Sleep apnea     TMJ (dislocation of temporomandibular joint)     causes snoring    Manoj-Parkinson-White (WPW) syndrome      Patient denies personal and family history of  pancreatitis, thyroid cancer, MEN-2 tumors.  Denies any hx of glaucoma, seizures, kidney stones, gallstones.  Denies Hx of CAD, PAD, palpitations, arrhythmia.   Denies uncontrolled anxiety or depression, suicidal behavior or thinking , insomnia or sleep disturbance.     Past Surgical History:   Procedure Laterality Date    ABLATION OF DYSRHYTHMIC FOCUS      WPW ablation age 16    COLONOSCOPY      COLPOSCOPY      " "DISTAL PATELLAR REALIGNMENT Left 10/31/2023    Procedure: OPEN MPFL RECONSTRUCTION WITH ALLOGRAFT;  Surgeon: Wilbert Michael DO;  Location:  MAIN OR;  Service: Orthopedics    INDUCED       OTHER SURGICAL HISTORY      catheter ablation- WPW age 17    OK ARTHROSCOPY KNEE LATERAL RELEASE Left 10/31/2023    Procedure: RELEASE RETINACULAR;  Surgeon: Wilbert Michael DO;  Location:  MAIN OR;  Service: Orthopedics    TONSILLECTOMY      WISDOM TOOTH EXTRACTION       The following portions of the patient's history were reviewed and updated as appropriate: allergies, current medications, past family history, past medical history, past social history, past surgical history, and problem list.    Review Of Systems:  Review of Systems   Constitutional:  Negative for fatigue and fever.   HENT:  Negative for sore throat, trouble swallowing and voice change.    Respiratory:  Negative for shortness of breath.    Cardiovascular:  Negative for chest pain.   Gastrointestinal:  Negative for abdominal pain, constipation, diarrhea, nausea and vomiting.   Endocrine: Negative for cold intolerance and heat intolerance.   Genitourinary:  Negative for difficulty urinating.   Musculoskeletal:  Negative for arthralgias and back pain.   Psychiatric/Behavioral:  Negative for suicidal ideas. The patient is not nervous/anxious.    All other systems reviewed and are negative.      Objective:  /62 (Patient Position: Sitting, Cuff Size: Standard)   Pulse 81   Temp 97.8 °F (36.6 °C) (Tympanic)   Ht 5' 2.6\" (1.59 m)   Wt 108 kg (237 lb 6.4 oz)   LMP 01/15/2025 (Approximate)   BMI 42.60 kg/m²   Physical Exam  Vitals and nursing note reviewed.   Constitutional:       General: She is not in acute distress.     Appearance: Normal appearance. She is obese. She is not ill-appearing, toxic-appearing or diaphoretic.   HENT:      Head: Normocephalic and atraumatic.   Eyes:      General:         Right eye: No discharge.         Left eye: No " discharge.      Conjunctiva/sclera: Conjunctivae normal.   Pulmonary:      Effort: Pulmonary effort is normal. No respiratory distress.   Musculoskeletal:         General: Normal range of motion.      Cervical back: Normal range of motion. No rigidity.      Right lower leg: No edema.      Left lower leg: No edema.   Skin:     Coloration: Skin is not pale.      Findings: No erythema or rash.   Neurological:      General: No focal deficit present.      Mental Status: She is alert.   Psychiatric:         Mood and Affect: Mood normal.         Labs and Imaging  Recent labs and imaging have been personally reviewed.  Lab Results   Component Value Date    WBC 8.56 05/06/2024    HGB 15.2 05/06/2024    HCT 45.9 05/06/2024    MCV 90 05/06/2024     05/06/2024     Lab Results   Component Value Date     08/11/2014    SODIUM 139 05/06/2024    K 3.9 05/06/2024     05/06/2024    CO2 28 05/06/2024    ANIONGAP 12 08/11/2014    AGAP 8 05/06/2024    BUN 9 05/06/2024    CREATININE 0.71 05/06/2024    GLUC 68 04/03/2023    GLUF 74 05/06/2024    CALCIUM 8.9 05/06/2024    AST 19 05/06/2024    ALT 23 05/06/2024    ALKPHOS 52 05/06/2024    PROT 7.9 08/11/2014    TP 7.2 05/06/2024    BILITOT 0.4 08/11/2014    TBILI 0.48 05/06/2024    EGFR 109 05/06/2024     Lab Results   Component Value Date    HGBA1C 5.2 02/19/2020     Lab Results   Component Value Date    BGA4PZXXFQGC 2.619 05/06/2024     Lab Results   Component Value Date    CHOLESTEROL 213 (H) 05/06/2024     Lab Results   Component Value Date    HDL 46 (L) 05/06/2024     Lab Results   Component Value Date    TRIG 166 (H) 05/06/2024     Lab Results   Component Value Date    LDLCALC 134 (H) 05/06/2024

## 2025-02-18 ENCOUNTER — TELEPHONE (OUTPATIENT)
Dept: BARIATRICS | Facility: CLINIC | Age: 40
End: 2025-02-18

## 2025-02-18 NOTE — TELEPHONE ENCOUNTER
PA for Zepbound 2.5 mg SUBMITTED to [facility]    via    [x]CMM-KEY: [id number]  []Surescripts-Case ID #   []Availity-Auth ID # NDC #   []Faxed to plan   []Other website   []Phone call Case ID #     []PA sent as URGENT    All office notes, labs and other pertaining documents and studies sent. Clinical questions answered. Awaiting determination from insurance company.     Turnaround time for your insurance to make a decision on your Prior Authorization can take 7-21 business days.

## 2025-02-18 NOTE — TELEPHONE ENCOUNTER
PA for Zepbound 2.5 mg APPROVED     Date(s) approved 2/18/2025-8/18/2025    Case #    Patient advised by          []MyChart Message  [x]Phone call   []LMOM  []L/M to call office as no active Communication consent on file  []Unable to leave detailed message as VM not approved on Communication consent       Pharmacy advised by    []Fax  [x]Phone call    Specialty Pharmacy    []     Approval letter scanned into Media No

## 2025-02-20 ENCOUNTER — OFFICE VISIT (OUTPATIENT)
Dept: OBGYN CLINIC | Facility: MEDICAL CENTER | Age: 40
End: 2025-02-20
Payer: COMMERCIAL

## 2025-02-20 VITALS
DIASTOLIC BLOOD PRESSURE: 68 MMHG | BODY MASS INDEX: 44.35 KG/M2 | HEIGHT: 62 IN | SYSTOLIC BLOOD PRESSURE: 106 MMHG | WEIGHT: 241 LBS

## 2025-02-20 DIAGNOSIS — Z30.431 IUD CHECK UP: Primary | ICD-10-CM

## 2025-02-20 PROCEDURE — 99212 OFFICE O/P EST SF 10 MIN: CPT | Performed by: NURSE PRACTITIONER

## 2025-02-20 RX ORDER — COPPER 313.4 MG/1
1 INTRAUTERINE DEVICE INTRAUTERINE
COMMUNITY

## 2025-02-20 NOTE — PROGRESS NOTES
"Name: Libertad Espinal      : 1985      MRN: [id number]  Encounter Provider: BRYANT Dc  Encounter Date: 2025   Encounter department: [facility]  :  Assessment & Plan  IUD check up  Reviewed with patient ParaGard IUD is effective for 10 years.  Common side effects including heavy/painful menses reviewed.  Written information provided  Signs and symptoms to report reviewed         RTO annual exam   History of Present Illness   HPI  Libertad Espinal is a 39 y.o. female who presents here for ParaGard IUD check. LMP 2/10/25.  IUD inserted 25.  Last menstrual cycle was heavier and more crampy.  [place] without partner complaint.  Is satisfied and desired to continue    Last pap smear 12/15/21 ASCUS/ HR HPV (-)   Completed gardasil   Last Mammogram   25 resulted extremely dense breast tissue/BI-RADS 1  History obtained from: patient    Review of Systems   Constitutional:  Negative for chills and fever.   Respiratory: Negative.     Cardiovascular: Negative.    Genitourinary: Negative.      Medical History Reviewed by provider this encounter:  Allergies  Problems     .     Objective   /68   Ht 5' 2\" (1.575 m)   Wt 109 kg (241 lb)   LMP 02/10/2025 (Approximate)   BMI 44.08 kg/m²      Physical Exam  Vitals reviewed. Exam conducted with a chaperone present.   Constitutional:       Appearance: Normal appearance.   Genitourinary:     General: Normal vulva.      Exam position: Lithotomy position.      Labia:         Right: No rash, tenderness, lesion or injury.         Left: No rash, tenderness, lesion or injury.       Vagina: Normal.            Comments: ParaGard IUD string easily visualized on speculum exam approximately 3 cm  Neurological:      Mental Status: She is alert and oriented to person, place, and time.   Psychiatric:         Mood and Affect: Mood normal.         Behavior: Behavior normal.           "

## 2025-03-11 ENCOUNTER — OFFICE VISIT (OUTPATIENT)
Dept: URGENT CARE | Facility: CLINIC | Age: 40
End: 2025-03-11
Payer: COMMERCIAL

## 2025-03-11 VITALS
RESPIRATION RATE: 17 BRPM | HEART RATE: 83 BPM | SYSTOLIC BLOOD PRESSURE: 122 MMHG | OXYGEN SATURATION: 99 % | TEMPERATURE: 97.5 F | DIASTOLIC BLOOD PRESSURE: 82 MMHG

## 2025-03-11 DIAGNOSIS — J06.9 ACUTE URI: Primary | ICD-10-CM

## 2025-03-11 LAB
SARS-COV-2 AG UPPER RESP QL IA: NEGATIVE
VALID CONTROL: NORMAL

## 2025-03-11 PROCEDURE — G0382 LEV 3 HOSP TYPE B ED VISIT: HCPCS | Performed by: PHYSICIAN ASSISTANT

## 2025-03-11 PROCEDURE — 87636 SARSCOV2 & INF A&B AMP PRB: CPT | Performed by: PHYSICIAN ASSISTANT

## 2025-03-11 PROCEDURE — 87811 SARS-COV-2 COVID19 W/OPTIC: CPT | Performed by: PHYSICIAN ASSISTANT

## 2025-03-11 NOTE — PATIENT INSTRUCTIONS
Rapid COVID test is negative in office.  COVID/influenza testing initiated.  Results take approximately 24 to 48 hours to return.    If tests have been performed at [facility], our office will contact you with results IF changes need to be made to the care plan discussed with you at the visit.  You can [facility] full [facility] on [facility].    -------------------------------------------------------  Many viral respiratory illnesses can present similarly (see below for names of common viruses).  Most are self-limiting - meaning patients will get better on their own with time.  Antibiotics do not help viral illnesses.     Viral illnesses that can cause your symptoms may include (but are not limited to) enteroviruses, influenzas, Covid, non Covid coronaviruses, human metapneumoviruses, RSV, adenoviruses, rhinoviruses.    ------------------------------------------------------------------------------    Most upper respiratory symptoms start to improve after 7-12 days but may take a few weeks to completely resolve.        As with any respiratory illness, transmission precautions  are strongly advised.  Masking.  Isolating.  Hand washing.  Frequent cleaning of common use surfaces.      Follow up with your PCP office if not improving over next 7-10 days.  If you want to be proactive, you may contact your PCP office now to schedule follow up for near future.    If you feel like you are worsening with severe weakness, chest pain, shortness of breath proceed to ER for immediate further evaluation.  ---------------------------------------------------------------------------------------------------------------------------------------------------------------------------------------------------------------------------------------------------------------    Strongly encourage getting plenty of rest over the next few days.  Increase your hydration.    Vaporizer by bedside may also be helpful.        Symptom Relief  Suggestions:    Options if sore throat or hoarseness:              * Warm salt water gargles every 1-2 hours while awake        * Throat lozenges, Tylenol, voice rest, warm tea with honey.    Options if sinus pressure, nasal congestion, runny nose, and / or post nasal drip:            * Clearing your sinuses in a nice steamy shower may be helpful, especially first thing after waking.       * Nasal saline rinses every 1-2 hours while awake may also help decrease nasal congestion, drainage.       * Afrin nasal spray if significant nasal congestion at bedtime may use .  (Do not use for over 3 days however.)       * Decongestant / expectorant such as Mucinex D 12 hour 1/2 to 1 tablet as needed with full glass of fluids may help decrease pressure and drainage.    Options if ear pressure, discomfort:         * Decongestant may be helpful.       * Flonase nasal spray.       * Warm compresses against ear(s) for comfort.    Options if  cough:          * Warm tea with honey or a teaspoon of honey periodically throughout day and / or before bed.       * Plain Mucinex (an expectorant to help keep mucus thin so you can clear it easier) or Mucinex DM (expectorant / cough suppressant) to help decrease cough if it is bothering your sleep.        Other night time cough medication options include Delsym, Robitussin DM, NyQuil.         * Propping with an extra pillow or two may be helpful.       * Keep water by your bedside to sip on as needed.       * Cough drops.       * Vaporizer by bedside.       * Getting in steamy shower or bathroom.  Cool air may also soothe coughing spasm.      ----------------------------------------------------------------------------------------------------------------------------------------------------------------------------------------------------------------------------------------------------------------      Although bothersome, mucous is not necessarily a bad thing.  Production of mucous is the  body's way of trying to capture and flush irritants from mucosal surfaces.      NOTE ON COLOR OF MUCOUS:  Yellow or green mucous does not necessarily mean you have a bacterial infection.   Mucous will become more discolored over time, especially first thing in the morning, as your body's immune system  floods the mucosal surfaces with white bloods cells to try and help fight  infection.  This white blood cell debride can also cause mucous to be discolored.  Again, using nasal saline spray frequently may help soothe and keep mucous flowing out versus getting dried, thickened and / or stuck leading to more sinus pain and pressure.     If you have a cough, please realize that a cough is not necessarily a bad thing.  It is a part of your body's protection mechanism to help keep your airways clear.  Phlegm may be more discolored in the morning.  Please note that discolored phlegm does not necessarily mean a bacterial infection.      -----------------------------------------------------------------------------------------------------------------------------------------------------------------------------------------------------------------------------------------------------------------

## 2025-03-11 NOTE — PROGRESS NOTES
[facility]    NAME: Libertad Espinal is a 39 y.o. female  : 1985    MRN: [id number]  DATE: 2025  TIME: 9:52 AM    Assessment and Plan   Acute URI [J06.9]  1. Acute URI  Poct Covid 19 Rapid Antigen Test    Covid/Flu- Office Collect Normal        Non point-of-care labs ordered: Yes.      Prescription drug management:      External data review:    Problems that influence decision making: Bipolar.  Provider does not want to prescribe any prednisone at this time as could exacerbate underlying condition.    Patient Instructions     Patient Instructions   Rapid COVID test is negative in office.  COVID/influenza testing initiated.  Results take approximately 24 to 48 hours to return.    If tests have been performed at [facility], our office will contact you with results IF changes need to be made to the care plan discussed with you at the visit.  [facility].    -------------------------------------------------------  Many viral respiratory illnesses can present similarly (see below for names of common viruses).  Most are self-limiting - meaning patients will get better on their own with time.  Antibiotics do not help viral illnesses.     Viral illnesses that can cause your symptoms may include (but are not limited to) enteroviruses, influenzas, Covid, non Covid coronaviruses, human metapneumoviruses, RSV, adenoviruses, rhinoviruses.    ------------------------------------------------------------------------------    Most upper respiratory symptoms start to improve after 7-12 days but may take a few weeks to completely resolve.        As with any respiratory illness, transmission precautions  are strongly advised.  Masking.  Isolating.  Hand washing.  Frequent cleaning of common use surfaces.      Follow up with your PCP office if not improving over next 7-10 days.  If you want to be proactive, you may contact your PCP office now to schedule follow up  for near future.    If you feel like you are worsening with severe weakness, chest pain, shortness of breath proceed to ER for immediate further evaluation.  ---------------------------------------------------------------------------------------------------------------------------------------------------------------------------------------------------------------------------------------------------------------    Strongly encourage getting plenty of rest over the next few days.  Increase your hydration.    Vaporizer by bedside may also be helpful.        Symptom Relief Suggestions:    Options if sore throat or hoarseness:              * Warm salt water gargles every 1-2 hours while awake        * Throat lozenges, Tylenol, voice rest, warm tea with honey.    Options if sinus pressure, nasal congestion, runny nose, and / or post nasal drip:            * Clearing your sinuses in a nice steamy shower may be helpful, especially first thing after waking.       * Nasal saline rinses every 1-2 hours while awake may also help decrease nasal congestion, drainage.       * Afrin nasal spray if significant nasal congestion at bedtime may use .  (Do not use for over 3 days however.)       * Decongestant / expectorant such as Mucinex D 12 hour 1/2 to 1 tablet as needed with full glass of fluids may help decrease pressure and drainage.    Options if ear pressure, discomfort:         * Decongestant may be helpful.       * Flonase nasal spray.       * Warm compresses against ear(s) for comfort.    Options if  cough:          * Warm tea with honey or a teaspoon of honey periodically throughout day and / or before bed.       * Plain Mucinex (an expectorant to help keep mucus thin so you can clear it easier) or Mucinex DM (expectorant / cough suppressant) to help decrease cough if it is bothering your sleep.        Other night time cough medication options include Delsym, Robitussin DM, NyQuil.         * Propping with an extra pillow or  two may be helpful.       * Keep water by your bedside to sip on as needed.       * Cough drops.       * Vaporizer by bedside.       * Getting in steamy shower or bathroom.  Cool air may also soothe coughing spasm.      ----------------------------------------------------------------------------------------------------------------------------------------------------------------------------------------------------------------------------------------------------------------      Although bothersome, mucous is not necessarily a bad thing.  Production of mucous is the body's way of trying to capture and flush irritants from mucosal surfaces.      NOTE ON COLOR OF MUCOUS:  Yellow or green mucous does not necessarily mean you have a bacterial infection.   Mucous will become more discolored over time, especially first thing in the morning, as your body's immune system  floods the mucosal surfaces with white bloods cells to try and help fight  infection.  This white blood cell debride can also cause mucous to be discolored.  Again, using nasal saline spray frequently may help soothe and keep mucous flowing out versus getting dried, thickened and / or stuck leading to more sinus pain and pressure.     If you have a cough, please realize that a cough is not necessarily a bad thing.  It is a part of your body's protection mechanism to help keep your airways clear.  Phlegm may be more discolored in the morning.  Please note that discolored phlegm does not necessarily mean a bacterial infection.      -----------------------------------------------------------------------------------------------------------------------------------------------------------------------------------------------------------------------------------------------------------------             Chief Complaint     Chief Complaint   Patient presents with    Cold Like Symptoms     Chest congestion, coughing, nasal congestion. Denies fevers. S/s started 2 days  ago. Pts has been around sick contacts. Not taking any otc meds.        History of Present Illness   Libertad Espinal presents to the clinic c/o  39-year-old female comes in with cough and nasal congestion.    Started: 2 days ago.  Associated signs and symptoms: First noted nasal congestion.  She has also had some postnasal drip and sore throat and cough is worsened over the last day.  Feels discomfort in her chest.  With coughing spells feels a little wheezy.  Modifying factors: Nothing.  Came here.  Known Exposures: No but says 2 of her kids now have similar symptoms.  Recent travel:  No.  Hx asthma:  No.  Hx pneumonia:  No.  Smoker: No.    Patient requesting COVID rapid test.  If negative wants COVID/influenza testing.  States she has 4 children at home and she is also supposed to be helping at a volunteer school event this week and is concerned about spreading illness to others.        Review of Systems   Review of Systems   Constitutional:  Positive for fatigue. Negative for activity change, appetite change, chills, diaphoresis and fever.   HENT:  Positive for congestion, postnasal drip, rhinorrhea and sore throat. Negative for ear discharge, ear pain, sinus pressure, sinus pain and sneezing.    Eyes: Negative.    Respiratory:  Positive for cough. Negative for chest tightness, shortness of breath and wheezing.    Cardiovascular:  Positive for chest pain.        Chest discomfort with cough and afterwards   Hematological: Negative.        Current Medications     Long-Term Medications   Medication Sig Dispense Refill    ARIPiprazole (ABILIFY) 5 mg tablet Take 1 tablet (5 mg total) by mouth daily 90 tablet 1    LORazepam (ATIVAN) 1 mg tablet Take 1 tablet (1 mg total) by mouth every 6 (six) hours as needed for anxiety 15 tablet 0    naproxen (NAPROSYN) 500 mg tablet Take 1 tablet (500 mg total) by mouth 2 (two) times a day with meals 30 tablet 0    sertraline (ZOLOFT) 50 mg tablet TAKE 2 TABLETS BY MOUTH EVERY   tablet 1       Current Allergies     Allergies as of 2025 - Reviewed 2025   Allergen Reaction Noted    Codeine Other (See Comments)     Sulfa antibiotics Hives and Rash     Erythromycin Hives 2012          The following portions of the patient's history were reviewed and updated as appropriate: allergies, current medications, past family history, past medical history, past social history, past surgical history and problem list.  Past Medical History:   Diagnosis Date    Abnormal Pap smear of cervix     Anxiety     Bipolar disorder ([facility])     Bipolar I disorder, most recent episode depressed, in full remission ([facility]) 2016    BRCA1 negative     BRCA2 negative     COVID-19 2022    10/27/23 Per pt had 3 separate times - last year being the last episode    Depression     Eczema     Exposure to chlamydia     Resolved 18    Knee pain, left     Migraine without aura and without status migrainosus, not intractable 10/11/2019    Obesity     Post traumatic stress disorder 2021    Postpartum depression 2018    Sleep apnea     TMJ (dislocation of temporomandibular joint)     causes snoring    Manoj-Parkinson-White (WPW) syndrome      Past Surgical History:   Procedure Laterality Date    ABLATION OF DYSRHYTHMIC FOCUS      WPW ablation age 16    COLONOSCOPY      COLPOSCOPY      DISTAL PATELLAR REALIGNMENT Left 10/31/2023    Procedure: OPEN MPFL RECONSTRUCTION WITH ALLOGRAFT;  Surgeon: Wilbert Michael DO;  Location:  MAIN OR;  Service: Orthopedics    INDUCED       OTHER SURGICAL HISTORY      catheter ablation- WPW age 17    MO ARTHROSCOPY KNEE LATERAL RELEASE Left 10/31/2023    Procedure: RELEASE RETINACULAR;  Surgeon: Wilbert Michael DO;  Location:  MAIN OR;  Service: Orthopedics    TONSILLECTOMY      WISDOM TOOTH EXTRACTION       Family History   Problem Relation Age of Onset    Bipolar disorder Mother     Hypertension Mother     Hyperlipidemia Mother     Suicide  Attempts Mother     Colon polyps Mother     Factor V Leiden deficiency Mother     BRCA1 Negative Mother     Glucose-6-phos deficiency Father     Hyperlipidemia Father     Hypertension Father     Diabetes Father     No Known Problems Daughter     Breast cancer Maternal Grandmother         dx approx age early 40's    Colon polyps Maternal Grandfather     Heart disease Maternal Grandfather     Crohn's disease Paternal Grandmother     Prostate cancer Paternal Grandfather     Pancreatic cancer Paternal Grandfather     Anxiety disorder Brother     Factor V Leiden deficiency Brother     Mental illness Brother     Drug abuse Brother     Anemia Son     Breast cancer Maternal Aunt 40    Colon cancer Paternal Aunt     Factor V Leiden deficiency Cousin     Thyroid disease Neg Hx     Stroke Neg Hx     Ovarian cancer Neg Hx     Anesthesia problems Neg Hx        Objective   /82   Pulse 83   Temp 97.5 °F (36.4 °C) (Tympanic)   Resp 17   LMP 03/03/2025 (Approximate)   SpO2 99%   Patient's last menstrual period was 03/03/2025 (approximate).       Physical Exam     Physical Exam  Vitals and nursing note reviewed.   Constitutional:       General: She is not in acute distress.     Appearance: She is well-developed. She is ill-appearing. She is not toxic-appearing or diaphoretic.      Comments: Appears mildly ill but in no acute distress.  No trismus or conversational dyspnea.   HENT:      Head: Normocephalic and atraumatic.      Right Ear: Tympanic membrane, ear canal and external ear normal.      Left Ear: Tympanic membrane, ear canal and external ear normal.      Nose: Congestion present. No rhinorrhea.      Mouth/Throat:      Mouth: Mucous membranes are moist.      Pharynx: No oropharyngeal exudate or posterior oropharyngeal erythema.      Comments: Cobblestoning posterior pharynx   Eyes:      General:         Right eye: No discharge.         Left eye: No discharge.      Conjunctiva/sclera: Conjunctivae normal.       Pupils: Pupils are equal, round, and reactive to light.   Cardiovascular:      Rate and Rhythm: Normal rate and regular rhythm.      Heart sounds: Normal heart sounds. No murmur heard.     No friction rub. No gallop.   Pulmonary:      Effort: Pulmonary effort is normal. No respiratory distress.      Breath sounds: Normal breath sounds. No stridor. No wheezing, rhonchi or rales.   Musculoskeletal:      Cervical back: Normal range of motion and neck supple. No rigidity or tenderness.   Lymphadenopathy:      Cervical: No cervical adenopathy.   Skin:     General: Skin is warm and dry.      Coloration: Skin is not jaundiced or pale.      Findings: No rash.   Neurological:      General: No focal deficit present.      Mental Status: She is alert and oriented to person, place, and time.   Psychiatric:         Mood and Affect: Mood normal.         Behavior: Behavior normal.

## 2025-03-13 DIAGNOSIS — E66.01 CLASS 3 SEVERE OBESITY DUE TO EXCESS CALORIES WITH BODY MASS INDEX (BMI) OF 40.0 TO 44.9 IN ADULT, UNSPECIFIED WHETHER SERIOUS COMORBIDITY PRESENT (HCC): Primary | ICD-10-CM

## 2025-03-13 DIAGNOSIS — E66.813 CLASS 3 SEVERE OBESITY DUE TO EXCESS CALORIES WITH BODY MASS INDEX (BMI) OF 40.0 TO 44.9 IN ADULT, UNSPECIFIED WHETHER SERIOUS COMORBIDITY PRESENT (HCC): Primary | ICD-10-CM

## 2025-03-13 RX ORDER — TIRZEPATIDE 5 MG/.5ML
5 INJECTION, SOLUTION SUBCUTANEOUS WEEKLY
Qty: 2 ML | Refills: 3 | Status: SHIPPED | OUTPATIENT
Start: 2025-03-13 | End: 2025-07-03

## 2025-03-14 ENCOUNTER — APPOINTMENT (OUTPATIENT)
Dept: LAB | Facility: CLINIC | Age: 40
End: 2025-03-14
Payer: COMMERCIAL

## 2025-03-14 DIAGNOSIS — E55.9 VITAMIN D DEFICIENCY: ICD-10-CM

## 2025-03-14 LAB — 25(OH)D3 SERPL-MCNC: 18.4 NG/ML (ref 30–100)

## 2025-03-14 PROCEDURE — 82306 VITAMIN D 25 HYDROXY: CPT

## 2025-03-14 PROCEDURE — 36415 COLL VENOUS BLD VENIPUNCTURE: CPT

## 2025-03-17 ENCOUNTER — RESULTS FOLLOW-UP (OUTPATIENT)
Dept: FAMILY MEDICINE CLINIC | Facility: CLINIC | Age: 40
End: 2025-03-17

## 2025-03-17 DIAGNOSIS — E55.9 VITAMIN D DEFICIENCY: Primary | ICD-10-CM

## 2025-03-17 RX ORDER — ERGOCALCIFEROL 1.25 MG/1
50000 CAPSULE, LIQUID FILLED ORAL WEEKLY
Qty: 12 CAPSULE | Refills: 0 | Status: SHIPPED | OUTPATIENT
Start: 2025-03-17

## 2025-03-25 ENCOUNTER — TELEMEDICINE (OUTPATIENT)
Dept: BEHAVIORAL/MENTAL HEALTH CLINIC | Facility: CLINIC | Age: 40
End: 2025-03-25
Payer: COMMERCIAL

## 2025-03-25 ENCOUNTER — TELEPHONE (OUTPATIENT)
Age: 40
End: 2025-03-25

## 2025-03-25 DIAGNOSIS — F31.81 BIPOLAR II DISORDER (HCC): Primary | ICD-10-CM

## 2025-03-25 DIAGNOSIS — F43.10 POST TRAUMATIC STRESS DISORDER: ICD-10-CM

## 2025-03-25 DIAGNOSIS — F41.1 GAD (GENERALIZED ANXIETY DISORDER): Chronic | ICD-10-CM

## 2025-03-25 PROCEDURE — 90832 PSYTX W PT 30 MINUTES: CPT | Performed by: SOCIAL WORKER

## 2025-03-25 NOTE — TELEPHONE ENCOUNTER
Pt called to get her appt for 3/25/2025 at 9 am switched to a virtual visit due to pt is sick.  Pt has [employer] and will connect with provider through [employer]. Writer switched appt and checked it in.

## 2025-03-25 NOTE — PSYCH
Virtual Regular VisitName: Libertad Espinal      : 1985      MRN: [id number]  Encounter Provider: CHARLIE Hutchinson  Encounter Date: 3/25/2025   Encounter department: [facility] THERAPIST [place]  :  Assessment & Plan  Bipolar II disorder (HCC)         SHELLY (generalized anxiety disorder)         Post traumatic stress disorder         Goals addressed in session: Goal 1     DATA: Met with Libertad for her individual session. Libertad requested to change this session to a virtual session due to feeling ill. She was not able to maintain for the full session, due to needing to sleep. She was able to make use of the time she was able to spend in the session. She states that things are going well in her relationship, and she feels that her SO is being very supportive of her. She states that she has applied for SSA disability. We discussed the SSA application process and the probability of having to file an appeal and potential of having to get a SSA  to assist with the case.     During this session, this clinician used the following therapeutic modalities: Client-centered Therapy, Dialectical Behavior Therapy, Mindfulness-based Strategies, Motivational Interviewing, Solution-Focused Therapy, and Supportive Psychotherapy    Substance Abuse was not addressed during this session. If the client is diagnosed with a co-occurring substance use disorder, please indicate any changes in the frequency or amount of use: not applicable. Stage of change for addressing substance use diagnoses: No substance use/Not applicable    ASSESSMENT:  Libertad presents with a Euthymic/ normal mood. Libertad's affect is Normal range and intensity, which is congruent, with their mood and the content of the session. The client has made progress on their goals as evidenced by her recognition of her emotions.     Libertad presents with a minimal risk of suicide, minimal risk of self-harm, and minimal risk  "of harm to others.    For any risk assessment that surpasses a \"low\" rating, a safety plan must be developed.    A safety plan was indicated: no  If yes, describe in detail: n/a    PLAN: Between sessions, Libertad will keep track of her moods on a daily basis. At the next session, the therapist will use Client-centered Therapy, Dialectical Behavior Therapy, Mindfulness-based Strategies, Motivational Interviewing, Solution-Focused Therapy, and Supportive Psychotherapy to address her mood regulation and relationship concerns.    Behavioral Health Treatment Plan [facility]: Diagnosis and Treatment Plan explained to Libertad, Libertad relates understanding diagnosis and is agreeable to Treatment Plan. Yes     Depression Follow-up Plan Completed: Yes     Reason for visit is   Chief Complaint   Patient presents with    Virtual Regular Visit      Recent Visits  Date Type Provider Dept   03/25/25 Telemedicine CHARLIE Hutchinson [facility] Therapist [place]   Showing recent visits within past 7 days and meeting all other requirements  Future Appointments  No visits were found meeting these conditions.  Showing future appointments within next 150 days and meeting all other requirements     History of Present Illness     HPI    Past Medical History   Past Medical History:   Diagnosis Date    Abnormal Pap smear of cervix     Anxiety     Bipolar disorder (HCC)     Bipolar I disorder, most recent episode depressed, in full remission (HCC) 07/19/2016    BRCA1 negative     BRCA2 negative     COVID-19 01/12/2022    10/27/23 Per pt had 3 separate times - last year being the last episode    Depression     Eczema     Exposure to chlamydia     Resolved 06/09/17    Knee pain, left     Migraine without aura and without status migrainosus, not intractable 10/11/2019    Obesity     Post traumatic stress disorder 12/13/2021    Postpartum depression 08/09/2018    Sleep apnea     TMJ (dislocation of temporomandibular joint)     causes " snoring    Manoj-Parkinson-White (WPW) syndrome      Past Surgical History:   Procedure Laterality Date    ABLATION OF DYSRHYTHMIC FOCUS      WPW ablation age 16    COLONOSCOPY      COLPOSCOPY      DISTAL PATELLAR REALIGNMENT Left 10/31/2023    Procedure: OPEN MPFL RECONSTRUCTION WITH ALLOGRAFT;  Surgeon: Wilbert Michael DO;  Location:  MAIN OR;  Service: Orthopedics    INDUCED       OTHER SURGICAL HISTORY      catheter ablation- WPW age 17    RI ARTHROSCOPY KNEE LATERAL RELEASE Left 10/31/2023    Procedure: RELEASE RETINACULAR;  Surgeon: Wilbert Michael DO;  Location:  MAIN OR;  Service: Orthopedics    TONSILLECTOMY      WISDOM TOOTH EXTRACTION       Current Outpatient Medications   Medication Instructions    ARIPiprazole (ABILIFY) 5 mg, Oral, Daily    cefdinir (OMNICEF) 300 mg, Oral, Every 12 hours scheduled    ergocalciferol (VITAMIN D2) 50,000 Units, Oral, Weekly    intrauterine copper (PARAGARD) IUD 1 each, Once every 10 years - IUD    LORazepam (ATIVAN) 1 mg, Oral, Every 6 hours PRN    naproxen (NAPROSYN) 500 mg, Oral, 2 times daily with meals    sertraline (ZOLOFT) 100 mg, Oral, Daily    Zepbound 5 mg, Subcutaneous, Weekly     Allergies   Allergen Reactions    Codeine Other (See Comments)     dry heaves    Sulfa Antibiotics Hives and Rash     Per pt as achild    Erythromycin Hives     Per as a child       Objective   LMP 2025 (Approximate)     Video Exam  Physical Exam     Administrative Statements   Encounter provider CHARLIE Hutchinson    The Patient is located at Home and in the following state in which I hold an active license [place].    The patient was identified by name and date of birth. Libertad Espinal was informed that this is a telemedicine visit and that the visit is being conducted through the Epic Embedded platform. She agrees to proceed..  My office door was closed. No one else was in the room.  She acknowledged consent and understanding of privacy and security of the video  platform. The patient has agreed to participate and understands they can discontinue the visit at any time.    Visit Time  Start Time: 0908  Stop Time: 0928  Total Visit Time: 20 minutes

## 2025-03-26 ENCOUNTER — OFFICE VISIT (OUTPATIENT)
Dept: URGENT CARE | Facility: CLINIC | Age: 40
End: 2025-03-26
Payer: COMMERCIAL

## 2025-03-26 VITALS
HEIGHT: 62 IN | SYSTOLIC BLOOD PRESSURE: 108 MMHG | OXYGEN SATURATION: 94 % | WEIGHT: 234 LBS | TEMPERATURE: 97.8 F | BODY MASS INDEX: 43.06 KG/M2 | RESPIRATION RATE: 18 BRPM | DIASTOLIC BLOOD PRESSURE: 82 MMHG | HEART RATE: 92 BPM

## 2025-03-26 DIAGNOSIS — J01.90 ACUTE SINUSITIS, RECURRENCE NOT SPECIFIED, UNSPECIFIED LOCATION: Primary | ICD-10-CM

## 2025-03-26 PROCEDURE — G0382 LEV 3 HOSP TYPE B ED VISIT: HCPCS | Performed by: PHYSICIAN ASSISTANT

## 2025-03-26 PROCEDURE — 87636 SARSCOV2 & INF A&B AMP PRB: CPT | Performed by: PHYSICIAN ASSISTANT

## 2025-03-26 RX ORDER — CEFDINIR 300 MG/1
300 CAPSULE ORAL EVERY 12 HOURS SCHEDULED
Qty: 14 CAPSULE | Refills: 0 | Status: SHIPPED | OUTPATIENT
Start: 2025-03-26 | End: 2025-03-28

## 2025-03-26 NOTE — PATIENT INSTRUCTIONS
COVID/influenza testing initiated.  Results take approximately 24 to 48 hours to return.    (PLEASE NOTE:  If tests have been performed at [facility], our office will contact you with results IF changes need to be made to the care plan discussed with you at the visit.  You can review your full results on [facility].)    In light of your recent respiratory illness, you may have developed residual bacterial infection.  Will cover with antibiotic.    Take antibiotic as instructed.  May continue DayQuil/NyQuil for comfort as needed.  May also do some nasal saline flushes to help clear sinuses.    Stay well-hydrated.    If frequent recurrent illnesses may need to follow-up with primary care for further evaluation.

## 2025-03-26 NOTE — PROGRESS NOTES
[facility]    NAME: Libertad Espinal is a 39 y.o. female  : 1985    MRN: [id number]  DATE: 2025  TIME: 9:28 AM    Assessment and Plan   Acute sinusitis, recurrence not specified, unspecified location [J01.90]  1. Acute sinusitis, recurrence not specified, unspecified location  cefdinir (OMNICEF) 300 mg capsule    Covid/Flu- Office Collect Normal          Patient Instructions     Patient Instructions   COVID/influenza testing initiated.  Results take approximately 24 to 48 hours to return.    (PLEASE NOTE:  If tests have been performed at [facility], our office will contact you with results IF changes need to be made to the care plan discussed with you at the visit.  [facility]    In light of your recent respiratory illness, you may have developed residual bacterial infection.  Will cover with antibiotic.    Take antibiotic as instructed.  May continue DayQuil/NyQuil for comfort as needed.  May also do some nasal saline flushes to help clear sinuses.    Stay well-hydrated.    If frequent recurrent illnesses may need to follow-up with primary care for further evaluation.    Chief Complaint     Chief Complaint   Patient presents with    Cold Like Symptoms     Symptoms started . Nasal congestion, sore throat, cough, runny nose, fever (highest 100). 3rd time sick in a couple months. States she's taking dayquil and Nyquil.        History of Present Illness   Libertad Espinal presents to the clinic c/o  Patient comes in with nasal congestion sore throat cough and fever.    Started:  night.  Associated signs and symptoms: Nasal congestion runny nose sneezing postnasal drip sore throat.  Fever with Tmax 100.  Modifying factors: DayQuil, NyQuil.  Known Exposures: Volunteers in school setting.  Recent travel:  No.  Hx asthma:  No.  Hx pneumonia:  No.  Smoker: No.        Review of Systems   Review of Systems   Constitutional:  Positive  for activity change, appetite change, fatigue and fever. Negative for chills and diaphoresis.   HENT:  Positive for congestion, postnasal drip, rhinorrhea, sinus pressure, sinus pain and sore throat. Negative for ear discharge and ear pain.    Eyes: Negative.    Respiratory:  Positive for cough. Negative for chest tightness, shortness of breath and wheezing.    Cardiovascular: Negative.    Hematological: Negative.        Current Medications     Long-Term Medications   Medication Sig Dispense Refill    ARIPiprazole (ABILIFY) 5 mg tablet Take 1 tablet (5 mg total) by mouth daily 90 tablet 1    ergocalciferol (VITAMIN D2) 50,000 units Take 1 capsule (50,000 Units total) by mouth once a week 12 capsule 0    LORazepam (ATIVAN) 1 mg tablet Take 1 tablet (1 mg total) by mouth every 6 (six) hours as needed for anxiety 15 tablet 0    naproxen (NAPROSYN) 500 mg tablet Take 1 tablet (500 mg total) by mouth 2 (two) times a day with meals 30 tablet 0    sertraline (ZOLOFT) 50 mg tablet TAKE 2 TABLETS BY MOUTH EVERY  tablet 1       Current Allergies     Allergies as of 03/26/2025 - Reviewed 03/26/2025   Allergen Reaction Noted    Codeine Other (See Comments)     Sulfa antibiotics Hives and Rash     Erythromycin Hives 04/21/2012          The following portions of the patient's history were reviewed and updated as appropriate: allergies, current medications, past family history, past medical history, past social history, past surgical history and problem list.  Past Medical History:   Diagnosis Date    Abnormal Pap smear of cervix     Anxiety     Bipolar disorder ([facility])     Bipolar I disorder, most recent episode depressed, in full remission ([facility]) 07/19/2016    BRCA1 negative     BRCA2 negative     COVID-19 01/12/2022    10/27/23 Per pt had 3 separate times - last year being the last episode    Depression     Eczema     Exposure to chlamydia     Resolved 06/09/17    Knee pain, left     Migraine without aura and without status  migrainosus, not intractable 10/11/2019    Obesity     Post traumatic stress disorder 2021    Postpartum depression 2018    Sleep apnea     TMJ (dislocation of temporomandibular joint)     causes snoring    Manoj-Parkinson-White (WPW) syndrome      Past Surgical History:   Procedure Laterality Date    ABLATION OF DYSRHYTHMIC FOCUS      WPW ablation age 16    COLONOSCOPY      COLPOSCOPY      DISTAL PATELLAR REALIGNMENT Left 10/31/2023    Procedure: OPEN MPFL RECONSTRUCTION WITH ALLOGRAFT;  Surgeon: Wilbert Michael DO;  Location:  MAIN OR;  Service: Orthopedics    INDUCED       OTHER SURGICAL HISTORY      catheter ablation- WPW age 17    LA ARTHROSCOPY KNEE LATERAL RELEASE Left 10/31/2023    Procedure: RELEASE RETINACULAR;  Surgeon: Wilbert Michael DO;  Location:  MAIN OR;  Service: Orthopedics    TONSILLECTOMY      WISDOM TOOTH EXTRACTION       Family History   Problem Relation Age of Onset    Bipolar disorder Mother     Hypertension Mother     Hyperlipidemia Mother     Suicide Attempts Mother     Colon polyps Mother     Factor V Leiden deficiency Mother     BRCA1 Negative Mother     Glucose-6-phos deficiency Father     Hyperlipidemia Father     Hypertension Father     Diabetes Father     No Known Problems Daughter     Breast cancer Maternal Grandmother         dx approx age early 40's    Colon polyps Maternal Grandfather     Heart disease Maternal Grandfather     Crohn's disease Paternal Grandmother     Prostate cancer Paternal Grandfather     Pancreatic cancer Paternal Grandfather     Anxiety disorder Brother     Factor V Leiden deficiency Brother     Mental illness Brother     Drug abuse Brother     Anemia Son     Breast cancer Maternal Aunt 40    Colon cancer Paternal Aunt     Factor V Leiden deficiency Cousin     Thyroid disease Neg Hx     Stroke Neg Hx     Ovarian cancer Neg Hx     Anesthesia problems Neg Hx        Objective   /82   Pulse 92   Temp 97.8 °F (36.6 °C) (Oral)    "Resp 18   Ht 5' 2\" (1.575 m)   Wt 106 kg (234 lb)   LMP 03/03/2025 (Approximate)   SpO2 94%   BMI 42.80 kg/m²   Patient's last menstrual period was 03/03/2025 (approximate).       Physical Exam     Physical Exam  Vitals and nursing note reviewed.   Constitutional:       General: She is not in acute distress.     Appearance: She is well-developed. She is ill-appearing. She is not toxic-appearing or diaphoretic.      Comments: Appears mildly ill but in no acute distress.  No trismus or conversational dyspnea.   HENT:      Head: Normocephalic and atraumatic.      Right Ear: Tympanic membrane, ear canal and external ear normal.      Left Ear: Tympanic membrane, ear canal and external ear normal.      Nose: Congestion present. No rhinorrhea.      Mouth/Throat:      Mouth: Mucous membranes are moist.      Pharynx: No oropharyngeal exudate or posterior oropharyngeal erythema.      Comments: Cobblestoning posterior pharynx   Eyes:      General:         Right eye: No discharge.         Left eye: No discharge.      Conjunctiva/sclera: Conjunctivae normal.      Pupils: Pupils are equal, round, and reactive to light.   Cardiovascular:      Rate and Rhythm: Normal rate and regular rhythm.      Heart sounds: Normal heart sounds. No murmur heard.     No friction rub. No gallop.   Pulmonary:      Effort: Pulmonary effort is normal. No respiratory distress.      Breath sounds: Normal breath sounds. No stridor. No wheezing, rhonchi or rales.   Musculoskeletal:      Cervical back: Normal range of motion and neck supple. No rigidity or tenderness.   Lymphadenopathy:      Cervical: No cervical adenopathy.   Skin:     General: Skin is warm and dry.      Coloration: Skin is not jaundiced or pale.      Findings: No rash.   Neurological:      General: No focal deficit present.      Mental Status: She is alert and oriented to person, place, and time.   Psychiatric:         Mood and Affect: Mood normal.         Behavior: Behavior normal. "

## 2025-03-27 ENCOUNTER — RESULTS FOLLOW-UP (OUTPATIENT)
Dept: URGENT CARE | Facility: CLINIC | Age: 40
End: 2025-03-27

## 2025-03-28 ENCOUNTER — OFFICE VISIT (OUTPATIENT)
Dept: FAMILY MEDICINE CLINIC | Facility: CLINIC | Age: 40
End: 2025-03-28
Payer: COMMERCIAL

## 2025-03-28 VITALS
TEMPERATURE: 97 F | DIASTOLIC BLOOD PRESSURE: 64 MMHG | BODY MASS INDEX: 42.8 KG/M2 | OXYGEN SATURATION: 96 % | SYSTOLIC BLOOD PRESSURE: 110 MMHG | WEIGHT: 234 LBS

## 2025-03-28 DIAGNOSIS — J01.90 ACUTE SINUSITIS, RECURRENCE NOT SPECIFIED, UNSPECIFIED LOCATION: Primary | ICD-10-CM

## 2025-03-28 DIAGNOSIS — J40 BRONCHITIS: ICD-10-CM

## 2025-03-28 PROCEDURE — 99213 OFFICE O/P EST LOW 20 MIN: CPT | Performed by: PHYSICIAN ASSISTANT

## 2025-03-28 RX ORDER — BENZONATATE 200 MG/1
200 CAPSULE ORAL 3 TIMES DAILY PRN
Qty: 20 CAPSULE | Refills: 0 | Status: SHIPPED | OUTPATIENT
Start: 2025-03-28

## 2025-03-28 RX ORDER — BECLOMETHASONE DIPROPIONATE HFA 80 UG/1
2 AEROSOL, METERED RESPIRATORY (INHALATION) 2 TIMES DAILY
Qty: 10.6 G | Refills: 0 | Status: SHIPPED | OUTPATIENT
Start: 2025-03-28

## 2025-03-28 RX ORDER — ALBUTEROL SULFATE 90 UG/1
2 INHALANT RESPIRATORY (INHALATION) EVERY 4 HOURS PRN
Qty: 8.5 G | Refills: 0 | Status: SHIPPED | OUTPATIENT
Start: 2025-03-28
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
## 2025-04-01 ENCOUNTER — TELEPHONE (OUTPATIENT)
Age: 40
End: 2025-04-01

## 2025-04-01 NOTE — TELEPHONE ENCOUNTER
Patient is calling regarding cancelling an appointment.    Date/Time: 4/1/2025 10am    Reason:     Patient was rescheduled: YES [] NO [x]  If yes, when was Patient reschedule for:     Patient requesting call back to reschedule: YES [] NO [x]

## 2025-04-10 ENCOUNTER — TELEPHONE (OUTPATIENT)
Age: 40
End: 2025-04-10

## 2025-04-10 NOTE — TELEPHONE ENCOUNTER
"Patient calling requesting to speak with provider about her LTD being denied.     Patient states her LTD provider stated that according to the doctor chart note dated 12/30/24. \"The patient does not appear to be bipolar.    Patient is looking for clarification as she is looking to submit an appeal to the LTD provider.    Patient is requesting a callback to address her concerns about the physician statements that were sent over to LTD.  "

## 2025-04-14 DIAGNOSIS — F31.81 BIPOLAR II DISORDER (HCC): Primary | ICD-10-CM

## 2025-04-14 NOTE — TELEPHONE ENCOUNTER
Called Libertad and informed her she does have a diagnosis of bipolar disorder type II and he can discuss this with her at next appointment and provide any necessary letter stating such so that she can appeal.

## 2025-04-14 NOTE — TELEPHONE ENCOUNTER
Patient called back to say that she was still waiting to see if she could possibly get a letter clarifying her Bipolar diagnosis for her Long Term Disability.

## 2025-04-15 ENCOUNTER — SOCIAL WORK (OUTPATIENT)
Dept: BEHAVIORAL/MENTAL HEALTH CLINIC | Facility: CLINIC | Age: 40
End: 2025-04-15
Payer: COMMERCIAL

## 2025-04-15 DIAGNOSIS — F43.10 POST TRAUMATIC STRESS DISORDER: ICD-10-CM

## 2025-04-15 DIAGNOSIS — F41.1 GAD (GENERALIZED ANXIETY DISORDER): Chronic | ICD-10-CM

## 2025-04-15 DIAGNOSIS — F31.81 BIPOLAR II DISORDER (HCC): Primary | ICD-10-CM

## 2025-04-15 PROCEDURE — 90837 PSYTX W PT 60 MINUTES: CPT | Performed by: SOCIAL WORKER

## 2025-04-15 NOTE — BH TREATMENT PLAN
"Outpatient Behavioral Health Psychotherapy Treatment Plan    Libertad Espinal  1985     Date of Initial Psychotherapy Assessment: 10/03/2019   Date of Current Treatment Plan: 04/15/2025  Treatment Plan Target Date: 08/13/2025  Treatment Plan Expiration Date: 10/12/2025    Diagnosis:   1. Bipolar II disorder (HCC)        2. SHELLY (generalized anxiety disorder)        3. Post traumatic stress disorder            Area(s) of Need: mood regulation and relationships    Long Term Goal 1 (in the client's own words): \"I want to have healthy relationships and continue to maintain my moods. I want to be able to work again and be more independent.\"    Stage of Change: Action    Target Date for completion: 04/15/2026     Anticipated therapeutic modalities: client-centered; mindfulness-based; dbt-informed; motivational interviewing; solution-focused     People identified to complete this goal: Libertad (client); Olga Schrader (clinician); Dr. Sutton (psychiatrist)      Objective 1: (identify the means of measuring success in meeting the objective): Libertad will meet with the psychiatrist for scheduled medication management sessions and take her medications, as prescribed. She will maintain a minimum of 95% medication adherence.   Update 04/15/2025: Libertad reports that she misses some medications on weekends, when her schedule is different. She states that she might need a medication change. She will be meeting with Dr. Sutton next week-- and will discuss her current mood dysregulation with her.       Objective 2: (identify the means of measuring success in meeting the objective): Libertad will participate in therapy sessions, a minimum of once monthly. We will use the above-mentioned modalities to address mood-regulation and relationship concerns.    Update 04/15/2025: Libertad continues to actively participate in therapy sessions-- approximately every three weeks. We will continue to work on mood regulation and anger " management.         Objective 3: (identify the means of measuring success in meeting the objective): Libertad will learn a minimum of 3 techniques to help her with setting and maintaining limits with others-- e.g., DBT Interpersonal Effectiveness Skills. She will be able to verbalize, during sessions, what her personal limits are and what her personal goals are.    Update 04/15/2025: Libertad continues to struggle with maintaining appropriate limits/boundaries with various people in her life. She continues to work with her partner on setting limits with his ex- with regard to setting limits with the children. She states that Nehemias is staying with them Sunday through Wednesday) with the other children. She states that it is working out, for the time being.       Objective 4: (identify the means of measuring success in meeting the objective): When appropriate, Libertad will include family members in therapy sessions, to practice her assertiveness, discuss her emotions, and set limits with her family members.    Update 04/15/2025: Libertad is currently seeking couples counseling. This objective will remain on the treatment plan until she has obtained and started couples counseling. She continues to express wanting to work on improving their relationship. Libertad has been talking with her mother a bit more-- approximately weekly. She states that, at this point, her relationship with her mother has been improving. She states that she plans to see her mother in June and will get together with her and the children. Her mother has been helping out with financial needs of the children.      Objective 5: (identify the means of measuring success in meeting the objective): Libertad will engage with her friends and other supports a minimum of one time per month.    Update 04/15/2025: Libertad has not yet achieved this objective. Libertad states that she has been talking to one of her friends weekly. She states that she has not been motivated  to get together with her; however, she plans to do so today.       I am currently under the care of a [facility] psychiatric provider: yes    My [facility] psychiatric provider is: Dr. Sutton    I am currently taking psychiatric medications: Yes, as prescribed    I feel that I will be ready for discharge from mental health care when I reach the following (measurable goal/objective): I would have better management of my emotions.     For children and adults who have a legal guardian:   Has there been any change to custody orders and/or guardianship status? NA. If yes, attach updated documentation.    Crisis plan was not updated at this session. It will be due at the next treatment plan update.     Behavioral Health Treatment Plan [facility]: Diagnosis and Treatment Plan explained to Libertad Espinal acknowledges an understanding of their diagnosis. Libertad Espinal agrees to this treatment plan.    I have been offered a copy of this Treatment Plan. Yes

## 2025-04-15 NOTE — PSYCH
Behavioral Health Psychotherapy Progress Note    Psychotherapy Provided: Individual Psychotherapy     1. Bipolar II disorder (HCC)        2. SHELLY (generalized anxiety disorder)        3. Post traumatic stress disorder            Goals addressed in session: Goal 1     DATA: Met with Libertad for her scheduled individual session. She states that she has been struggling to motivate herself to participate in daily activities. She states that she is not showering. She is eating three times per day. She is taking Zepbound for weightloss and is feeling good about this; however, she finds that food does not bring her the sense of satisfaction that it used to bring her. She states that she is continuing to care for the children, with assistance of her partner. She states that they have had some conflicts. He continues to working daily. She is currently not working. Her position was terminated, and she is appealing the denial of her long-term disability. Libertad has an appointment with Dr. Sutton next week, and she will be discussing this with him. She feels that the medications are helping to stabilize her moods-- to the extent that she is not having as much difficulty managing her anger. She states that she feels that her current difficulty is in managing her depression. She states that she is feelings a lack of motivation. She scored a 14 on the PHQ-9 and an 11 on the SHELLY-7. She is unsure if this might be related to her lack of employment and the situation with the disability is frustrating. She has been having some contact with her mother, and she opened up with her mother. Libertad participating in reviewing and updating her treatment plan. She states that she wants to be able to manage her anxiety and find a new job. She states that she wants to be able to continue to work on balance in her life.      During this session, this clinician used the following therapeutic modalities: Client-centered Therapy, Dialectical Behavior  "Therapy, Mindfulness-based Strategies, Motivational Interviewing, Solution-Focused Therapy, and Supportive Psychotherapy    Substance Abuse was not addressed during this session. If the client is diagnosed with a co-occurring substance use disorder, please indicate any changes in the frequency or amount of use: n/a. Stage of change for addressing substance use diagnoses: No substance use/Not applicable    ASSESSMENT:  Libertad Espinal presents with a Dysthymic mood.     her affect is Normal range and intensity, which is congruent, with her mood and the content of the session. The client has not made progress on their goals.    Libertad Espinal presents with a minimal risk of suicide, minimal risk of self-harm, and minimal risk of harm to others.    For any risk assessment that surpasses a \"low\" rating, a safety plan must be developed.    A safety plan was indicated: no  If yes, describe in detail n/a    PLAN: Between sessions, Libertad Espinal will continue to monitor her moods. She will have dinner with a friend tonight. She will call if she needs additional support between sessions. She will come in for her next session next week. At the next session, the therapist will use Client-centered Therapy, Dialectical Behavior Therapy, Mindfulness-based Strategies, Motivational Interviewing, Solution-Focused Therapy, and Supportive Psychotherapy to address her mood regulation and relationship concerns.    Behavioral Health Treatment Plan and Discharge Planning: Libertad Espinal is aware of and agrees to continue to work on their treatment plan. They have identified and are working toward their discharge goals. yes    Depression Follow-up Plan Completed: Yes. Continue with therapy sessions and medication management.      Visit start and stop times:    04/15/25  Start Time: 0906  Stop Time: 1000  Total Visit Time: 54 minutes  "

## 2025-04-15 NOTE — BH CRISIS PLAN
Client Name: Libertad Espinal       Client YOB: 1985    [place] Safety Plan      Creation Date: 4/15/25 Update Date: 4/15/26   Created By: CHARLIE Hutchinson Last Updated By: CHARLIE Hutchinson      Step 1: Warning Signs:   Warning Signs   when I'm actually thinking about doing something   being in an argument with someone can be a trigger/prompting event   isolation   tearfulness   anger            Step 2: Internal Coping Strategies:   Internal Coping Strategies   going for a drive   going in my room to lay down and watch tv   Coloring with daughter   healthy distrations   STOP skill   aggression vs. assertiveness review            Step 3: People and social settings that provide distraction:   Name Contact Information   Sit with my children and hug them    Set up a dinner or outing with closest friends phone numbers in cell    Places   go for a drive to a drive thru and sit in the parking lot   outdoors           Step 4: People whom I can ask for help during a crisis:      Name Contact Information    Renata in my cell phone    maybe Saturnino Caldwell in phone      Step 5: Professionals or agencies I can contact during a crisis:      Clinican/Agency Name Phone Emergency Contact    Olga Schrader [phone]     Dr. Sutton [phone]       Local Emergency Department Emergency Department Phone Emergency Department Address    [facility]          Crisis Phone Numbers:   Suicide Prevention Lifeline: Call or Text  392 Crisis Text Line: Text HOME to 675-490   Please note: Some [facility] do not have a separate number for Child/Adolescent specific crisis. If your county is not listed under Child/Adolescent, please call the adult number for your county      Adult Crisis Numbers: Child/Adolescent Crisis Numbers   [facility]: [phone] [facility]: [phone]   [facility]: [phone] [facility]: [phone]   [facility]: [phone] [place]: [phone]   [facility]: [phone] [place]: [phone]   [place]: [phone]   [facility]: [phone]   [facility]: [phone]   [facility]: [phone] (daytime) [phone] (after hours, weekends, holidays)      Step 6: Making the environment safer (plan for lethal means safety):   Plan: No guns or weapons.   Has regular medications...only one month at a time.   If she felt she was in danger, she will ask Saturnino to hold them for her.      Optional: What is most important to me and worth living for?   My children, my self and my future     [place] Safety Plan. Lulu Hayward and Jean Bellamy. Used with permission of the authors.

## 2025-04-22 ENCOUNTER — SOCIAL WORK (OUTPATIENT)
Dept: BEHAVIORAL/MENTAL HEALTH CLINIC | Facility: CLINIC | Age: 40
End: 2025-04-22
Payer: COMMERCIAL

## 2025-04-22 DIAGNOSIS — F43.10 POST TRAUMATIC STRESS DISORDER: ICD-10-CM

## 2025-04-22 DIAGNOSIS — F41.1 GAD (GENERALIZED ANXIETY DISORDER): Chronic | ICD-10-CM

## 2025-04-22 DIAGNOSIS — F31.81 BIPOLAR II DISORDER (HCC): Primary | ICD-10-CM

## 2025-04-22 PROCEDURE — 90837 PSYTX W PT 60 MINUTES: CPT | Performed by: SOCIAL WORKER

## 2025-04-22 NOTE — PSYCH
Behavioral Health Psychotherapy Progress Note    Psychotherapy Provided: Individual Psychotherapy     1. Bipolar II disorder (HCC)        2. SHELLY (generalized anxiety disorder)        3. Post traumatic stress disorder            Goals addressed in session: Goal 1     DATA: Met with Libertad for her scheduled individual session. She states that she went out with her friend last week, as she had made the commitment. She talked about her mood and feels that it has improved, and she is feeling that her mood is becoming more elevated. We discussed ways that she can prevent herself from engaging in hypomanic behaviors-- e.g., overspending-- by gaining the support of her SO. She states that she will talk with Saturnino about her urges to spend money. She states that she is interested in getting a job to assist with the household finances. We discussed the types of jobs she would be interested in. She states that she has some interest in becoming an . We discussed a possible referral to [facility]-- through a referral to our . She will let me know if this is something she would be interested in. Libertad states that she talked with Saturnino about her mother's upcoming visit. She states that he has some ambivalence about the kids spending time with her mother. Libertad states that she feels that her relationship with her mother is significantly improved, and she is determined to have the children spend some time with her. She will continue to have more conversations about this with Saturnino.     During this session, this clinician used the following therapeutic modalities: Client-centered Therapy, Dialectical Behavior Therapy, Mindfulness-based Strategies, Motivational Interviewing, Solution-Focused Therapy, and Supportive Psychotherapy    Substance Abuse was not addressed during this session. If the client is diagnosed with a co-occurring substance use disorder, please indicate any changes in the frequency or amount  "of use: n/a. Stage of change for addressing substance use diagnoses: No substance use/Not applicable    ASSESSMENT:  Libertad Espinal presents with a Euthymic/ normal mood.     her affect is Normal range and intensity, which is congruent, with her mood and the content of the session. The client has made progress on their goals.    Libertad Espinal presents with a minimal risk of suicide, minimal risk of self-harm, and minimal risk of harm to others.    For any risk assessment that surpasses a \"low\" rating, a safety plan must be developed.    A safety plan was indicated: no  If yes, describe in detail n/a    PLAN: Between sessions, Libertad Espinal will continue to monitor her moods. She will work with her SO to set up some protective plans to avoid overspending. She will explore [facility] and let me know if she is interested in a referral to case management. At the next session, the therapist will use Client-centered Therapy, Dialectical Behavior Therapy, Mindfulness-based Strategies, Motivational Interviewing, Solution-Focused Therapy, and Supportive Psychotherapy to address her mood regulation and relationship concerns.    Behavioral Health Treatment Plan and Discharge Planning: Libertad Espinal is aware of and agrees to continue to work on their treatment plan. They have identified and are working toward their discharge goals. yes    Depression Follow-up Plan Completed: Yes. Continue with therapy sessions and medication management.     Visit start and stop times:    04/22/25  Start Time: 0911  Stop Time: 1008  Total Visit Time: 57 minutes  "

## 2025-04-23 ENCOUNTER — OFFICE VISIT (OUTPATIENT)
Dept: PSYCHIATRY | Facility: CLINIC | Age: 40
End: 2025-04-23
Payer: COMMERCIAL

## 2025-04-23 ENCOUNTER — TELEPHONE (OUTPATIENT)
Dept: PSYCHIATRY | Facility: CLINIC | Age: 40
End: 2025-04-23

## 2025-04-23 DIAGNOSIS — F32.A MOOD DISORDER OF DEPRESSED TYPE: ICD-10-CM

## 2025-04-23 DIAGNOSIS — F41.1 GAD (GENERALIZED ANXIETY DISORDER): ICD-10-CM

## 2025-04-23 DIAGNOSIS — F31.81 BIPOLAR II DISORDER (HCC): Primary | ICD-10-CM

## 2025-04-23 PROCEDURE — 99213 OFFICE O/P EST LOW 20 MIN: CPT | Performed by: PSYCHIATRY & NEUROLOGY

## 2025-04-23 RX ORDER — SERTRALINE HYDROCHLORIDE 100 MG/1
100 TABLET, FILM COATED ORAL DAILY
Qty: 30 TABLET | Refills: 2 | Status: SHIPPED | OUTPATIENT
Start: 2025-04-23

## 2025-04-23 RX ORDER — LORAZEPAM 1 MG/1
1 TABLET ORAL EVERY 6 HOURS PRN
Qty: 15 TABLET | Refills: 2 | Status: SHIPPED | OUTPATIENT
Start: 2025-04-23

## 2025-04-23 RX ORDER — ARIPIPRAZOLE 10 MG/1
10 TABLET ORAL DAILY
Qty: 30 TABLET | Refills: 2 | Status: SHIPPED | OUTPATIENT
Start: 2025-04-23

## 2025-04-23 NOTE — PSYCH
Libertad Letty Teddy 1985 [id number]     The patient was seen for continuing care and pharmacotherapy.  She reports that up until recently, she was depressed and unmotivated and quite tired but over the past 2 days, she has had a surge of energy and wants to do things and has an upbeat mood which is different than her baseline.  She has not had grandiose ideas or impulsivity.  There has been no change in her home environment.  She gets a lot of help from the family attending to the needs of her children.  2 of them are in their upper teens and do not need much from her anyway.  She is tolerating her medications.        ROS:  As HPI  Current Mental Status Evaluation:  Appearance:  Adequate hygiene and grooming and Good eye contact   Behavior:  Calm   Mood:  Slightly elevated   Affect: appropriate   Speech: Normal volume   Thought Process:  Goal directed and coherent   Thought Content:  Does not verbalize delusional material   Perceptual Disturbances: Denies hallucinations and does not appear to be responding to internal stimuli   Risk Potential: No suicidal or homicidal ideation   Orientation:         Diagnosis ICD-10-CM Associated Orders   1. Bipolar II disorder (HCC)  F31.81       2. Mood disorder of depressed type  F32.A ARIPiprazole (ABILIFY) 10 mg tablet     sertraline (ZOLOFT) 100 mg tablet     LORazepam (ATIVAN) 1 mg tablet      3. SHELLY (generalized anxiety disorder)  F41.1 LORazepam (ATIVAN) 1 mg tablet             Current Outpatient Medications   Medication Instructions    albuterol (ProAir HFA) 90 mcg/act inhaler 2 puffs, Inhalation, Every 4 hours PRN    ARIPiprazole (ABILIFY) 10 mg, Oral, Daily    beclomethasone (Qvar RediHaler) 80 MCG/ACT inhaler 2 puffs, Inhalation, 2 times daily, Rinse mouth after use.    benzonatate (TESSALON) 200 mg, Oral, 3 times daily PRN    ergocalciferol (VITAMIN D2) 50,000 Units, Oral, Weekly    intrauterine copper (PARAGARD) IUD 1 each, Once every 10 years - IUD     LORazepam (ATIVAN) 1 mg, Oral, Every 6 hours PRN    naproxen (NAPROSYN) 500 mg, Oral, 2 times daily with meals    sertraline (ZOLOFT) 100 mg, Oral, Daily    Zepbound 5 mg, Subcutaneous, Weekly          Treatment Recommendations- Risks Benefits    We will increase aripiprazole to 10 mg.  Follow-up in 2 months  Risks, Benefits And Possible Side Effects Of Medications:  Risks, benefits, and possible side effects of medications explained to patient and patient verbalizes understanding    VIRTUAL VISIT DISCLAIMER    Libertad Espinal verbally agrees to participate in Virtual Care Services. Pt is aware that Virtual Care Services could be limited without vital signs or the ability to perform a full hands-on physical exam. Libertad Espinal understands she or the provider may request at any time to terminate the video visit and request the patient to seek care or treatment in person.     Chip Sutton MD 04/23/25

## 2025-05-13 ENCOUNTER — TELEMEDICINE (OUTPATIENT)
Dept: BEHAVIORAL/MENTAL HEALTH CLINIC | Facility: CLINIC | Age: 40
End: 2025-05-13
Payer: COMMERCIAL

## 2025-05-13 ENCOUNTER — TELEPHONE (OUTPATIENT)
Age: 40
End: 2025-05-13

## 2025-05-13 DIAGNOSIS — F31.81 BIPOLAR II DISORDER (HCC): Primary | ICD-10-CM

## 2025-05-13 DIAGNOSIS — F41.1 GAD (GENERALIZED ANXIETY DISORDER): Chronic | ICD-10-CM

## 2025-05-13 DIAGNOSIS — F43.10 POST TRAUMATIC STRESS DISORDER: ICD-10-CM

## 2025-05-13 PROCEDURE — 90837 PSYTX W PT 60 MINUTES: CPT | Performed by: SOCIAL WORKER

## 2025-05-13 NOTE — PSYCH
"Virtual Regular VisitName: Libertad Espinal      : 1985      MRN: [id number]  Encounter Provider: CHARLIE Hutchinson  Encounter Date: 2025   Encounter department: [facility] [place]  :  Assessment & Plan  Bipolar II disorder (HCC)     SHELLY (generalized anxiety disorder)       Post traumatic stress disorder         Goals addressed in session: Goal 1     DATA: Met with Libertad for her scheduled individual session. She states that her mood continues to be \"a little down.\" She states that she was approved for long-term disability from her previous employer. She has received the back pay from the disability, which has helped their financial situation. She states that they will be back-dating her insurance coverage, but she has been unable to get her Zepbound for the past couple doses. At this point, she has been able to get her mental health medications. She is hoping that she will be able to get her mental health medications. She is supposed to be hearing from the insurance company this week. We discussed some potential alternatives, in case she is not able to access her insurance this week, so she is able to maintain her medication adherence. Libertad states that she is frustrated with her partner, as he was mean to hear last week and this week is acting as if nothing happened. She states that she feels that he does not treat her as well as he used to, and she feels that she avoids communicating with him, because she fears that he will treat her badly. We discussed the use of DBT-informed skills (e.g., DOUGLAS). This clinician will send her copies of the DOUGLAS worksheets and handouts, so she can develop a script prior to having a conversation with him.     During this session, this clinician used the following therapeutic modalities: Client-centered Therapy, Dialectical Behavior Therapy, Mindfulness-based Strategies, Motivational Interviewing, Solution-Focused " "Therapy, and Supportive Psychotherapy    Substance Abuse was not addressed during this session. If the client is diagnosed with a co-occurring substance use disorder, please indicate any changes in the frequency or amount of use: n/a. Stage of change for addressing substance use diagnoses: No substance use/Not applicable    ASSESSMENT:  Libertad presents with a Euthymic/ normal mood. Libertad's affect is Normal range and intensity, which is congruent, with their mood and the content of the session. The client has made progress on their goals as evidenced by her willingness to engage in use of her DBT-informed skills..    Libertad presents with a minimal risk of suicide, minimal risk of self-harm, and minimal risk of harm to others.    For any risk assessment that surpasses a \"low\" rating, a safety plan must be developed.    A safety plan was indicated: no  If yes, describe in detail n/a    PLAN: Between sessions, Libertad will continue to monitor her moods. She will continue to work on using her effective communication skills with her . She will also continue to encourage him to engage in a couple's session with her. At the next session, the therapist will use Client-centered Therapy, Dialectical Behavior Therapy, Mindfulness-based Strategies, Motivational Interviewing, Solution-Focused Therapy, and Supportive Psychotherapy to address her mood regulation and relationship concerns.    Behavioral Health Treatment Plan [facility]: Diagnosis and Treatment Plan explained to Libertad, Libertad relates understanding diagnosis and is agreeable to Treatment Plan. Yes     Depression Follow-up Plan Completed: Yes     Reason for visit is   Chief Complaint   Patient presents with    Virtual Regular Visit        Recent Visits  No visits were found meeting these conditions.  Showing recent visits within past 7 days and meeting all other requirements  Today's Visits  Date Type Provider Dept   05/13/25 Telephone CHARLIE Hutchinson [facility] " Psychiatry Pod   25 Telemedicine [facility] [place]   Showing today's visits and meeting all other requirements  Future Appointments  No visits were found meeting these conditions.  Showing future appointments within next 150 days and meeting all other requirements     History of Present Illness     HPI    Past Medical History   Past Medical History:   Diagnosis Date    Abnormal Pap smear of cervix     Anxiety     Bipolar disorder (HCC)     Bipolar I disorder, most recent episode depressed, in full remission (HCC) 2016    BRCA1 negative     BRCA2 negative     COVID-19 2022    10/27/23 Per pt had 3 separate times - last year being the last episode    Depression     Eczema     Exposure to chlamydia     Resolved 18    Knee pain, left     Migraine without aura and without status migrainosus, not intractable 10/11/2019    Obesity     Post traumatic stress disorder 2021    Postpartum depression 2018    Sleep apnea     TMJ (dislocation of temporomandibular joint)     causes snoring    Manoj-Parkinson-White (WPW) syndrome      Past Surgical History:   Procedure Laterality Date    ABLATION OF DYSRHYTHMIC FOCUS      WPW ablation age 16    COLONOSCOPY      COLPOSCOPY      DISTAL PATELLAR REALIGNMENT Left 10/31/2023    Procedure: OPEN MPFL RECONSTRUCTION WITH ALLOGRAFT;  Surgeon: Wilbert Michael DO;  Location:  MAIN OR;  Service: Orthopedics    INDUCED       OTHER SURGICAL HISTORY      catheter ablation- WPW age 17    ND ARTHROSCOPY KNEE LATERAL RELEASE Left 10/31/2023    Procedure: RELEASE RETINACULAR;  Surgeon: Wilbert Michael DO;  Location:  MAIN OR;  Service: Orthopedics    TONSILLECTOMY      WISDOM TOOTH EXTRACTION       Current Outpatient Medications   Medication Instructions    albuterol (ProAir HFA) 90 mcg/act inhaler 2 puffs, Inhalation, Every 4 hours PRN    ARIPiprazole (ABILIFY) 10 mg, Oral, Daily    beclomethasone (Qvar  RediHaler) 80 MCG/ACT inhaler 2 puffs, Inhalation, 2 times daily, Rinse mouth after use.    benzonatate (TESSALON) 200 mg, Oral, 3 times daily PRN    ergocalciferol (VITAMIN D2) 50,000 Units, Oral, Weekly    intrauterine copper (PARAGARD) IUD 1 each, Once every 10 years - IUD    LORazepam (ATIVAN) 1 mg, Oral, Every 6 hours PRN    naproxen (NAPROSYN) 500 mg, Oral, 2 times daily with meals    sertraline (ZOLOFT) 100 mg, Oral, Daily    Zepbound 5 mg, Subcutaneous, Weekly     Allergies   Allergen Reactions    Codeine Other (See Comments)     dry heaves    Sulfa Antibiotics Hives and Rash     Per pt as achild    Erythromycin Hives     Per as a child       Objective   LMP 04/25/2025     Video Exam  Physical Exam     Administrative Statements   Encounter provider CHARLIE Hutchinson    The Patient is located at Home and in the following state in which I hold an active license [place].    The patient was identified by name and date of birth. Libertad Espinal was informed that this is a telemedicine visit and that the visit is being conducted through the Epic Embedded platform. She agrees to proceed..  My office door was closed. No one else was in the room.  She acknowledged consent and understanding of privacy and security of the video platform. The patient has agreed to participate and understands they can discontinue the visit at any time.      Visit Time  Start Time: 0904  Stop Time: 1001  Total Visit Time: 57 minutes

## 2025-05-13 NOTE — TELEPHONE ENCOUNTER
The patient called to change the appointment to virtual for 5.13.25 @ 9:00am.  -Forms up to date  -Writer notified the provider via secure chat of the same day change.The clerical department was updated.

## 2025-05-19 ENCOUNTER — TELEPHONE (OUTPATIENT)
Age: 40
End: 2025-05-19

## 2025-05-19 ENCOUNTER — TELEPHONE (OUTPATIENT)
Dept: BARIATRICS | Facility: CLINIC | Age: 40
End: 2025-05-19

## 2025-05-19 NOTE — TELEPHONE ENCOUNTER
[place] for Zepbound 5mg was [place] to [place] Medicaid    via    [x]CMM-KEY: [id number]  []Surescripts-Case ID #   []Availity-Auth ID # NDC #   []Faxed to plan   []Other website   []Phone call Case ID #     [][place] sent as URGENT    All office notes, labs and other pertaining documents and studies sent. Clinical questions answered. Awaiting determination from insurance company.     Turnaround time for your insurance to make a decision on your Prior Authorization can take 7-21 business days.

## 2025-05-19 NOTE — TELEPHONE ENCOUNTER
Patient called to give insurance information. She has not gotten her insurance card yet but she has medical assistance, and they told her to use her access card - [facility]# [id number]

## 2025-05-20 ENCOUNTER — TELEPHONE (OUTPATIENT)
Age: 40
End: 2025-05-20

## 2025-05-20 NOTE — TELEPHONE ENCOUNTER
[place] for aepbound was [place]     Date(s) approved 5/19/25-8/18/25    Case #    Patient advised by          [x]MyChart Message  []Phone call   []LMOM  []L/M to call office as no active Communication consent on file  []Unable to leave detailed message as VM not approved on Communication consent       Pharmacy advised by    []Fax  [x]Phone call  []Secure Chat    Specialty Pharmacy    []     Approval letter scanned into Media Yes

## 2025-05-20 NOTE — TELEPHONE ENCOUNTER
Contacted patient off of Talk Therapy  (Couples) wait list to verify needs of services in attempts to update list with patient preferences. LVM for patient to contact intake dept  in regards to updating wait list.

## 2025-05-20 NOTE — TELEPHONE ENCOUNTER
Pt called back to say still interested in Couples Therapy with S/O pref female/loc- ami/suzanne/ issues need to be discussed

## 2025-05-27 ENCOUNTER — SOCIAL WORK (OUTPATIENT)
Dept: BEHAVIORAL/MENTAL HEALTH CLINIC | Facility: CLINIC | Age: 40
End: 2025-05-27
Payer: COMMERCIAL

## 2025-05-27 DIAGNOSIS — F43.10 POST TRAUMATIC STRESS DISORDER: ICD-10-CM

## 2025-05-27 DIAGNOSIS — F31.81 BIPOLAR II DISORDER (HCC): Primary | ICD-10-CM

## 2025-05-27 DIAGNOSIS — F41.1 GAD (GENERALIZED ANXIETY DISORDER): Chronic | ICD-10-CM

## 2025-05-27 PROCEDURE — 90837 PSYTX W PT 60 MINUTES: CPT | Performed by: SOCIAL WORKER

## 2025-06-02 NOTE — PSYCH
Behavioral Health Psychotherapy Progress Note    Psychotherapy Provided: Individual Psychotherapy     1. Bipolar II disorder (HCC)        2. SHELLY (generalized anxiety disorder)        3. Post traumatic stress disorder            Goals addressed in session: Goal 1     DATA: Met with Libertad for her scheduled individual session. Libertad discussed her plans for her visit with her mother. She states she has some anxiety about the visit, because she knows that Saturnino is not in agreement with the children seeing her mother and spending time with her. She states that she does not know what to do if her mother starts to speak negatively in front of the children. We spent some time practicing some ways that she can respond to her mother, so she can address the situation appropriately, in the moment, to shut down the conversation. She expressed some apprehension about being able to shut down the conversation, because she is fearful of starting an argument with her mother and also fearful that Saturnino would be upset if he found out that her mother brought up any negative discussions. We spent some time discussing her relationship with her SO. She states that despite his frequent refusal to attend therapy, when she recently received a call about whether or not she wanted to remain on the wait list for couple's therapy and if she preferred a male or female therapist, he stated that he would want a female therapist for couples therapy. She states she was surprised that he responded to this with an answer to that question at all, and she takes this as a positive sign that he might attend couple's therapy when their name reaches the top of the list.   During this session, this clinician used the following therapeutic modalities: Client-centered Therapy, Dialectical Behavior Therapy, Mindfulness-based Strategies, Motivational Interviewing, Solution-Focused Therapy, and Supportive Psychotherapy    Substance Abuse was not addressed  "during this session. If the client is diagnosed with a co-occurring substance use disorder, please indicate any changes in the frequency or amount of use: n/a. Stage of change for addressing substance use diagnoses: No substance use/Not applicable    ASSESSMENT:  Libertad Espinal presents with a Euthymic/ normal mood.     her affect is Normal range and intensity, which is congruent, with her mood and the content of the session. The client has made progress on their goals.    Libertad Espinal presents with a minimal risk of suicide, minimal risk of self-harm, and minimal risk of harm to others.    For any risk assessment that surpasses a \"low\" rating, a safety plan must be developed.    A safety plan was indicated: no  If yes, describe in detail n/a    PLAN: Between sessions, Libertad Espinal will continue to monitor her moods. She will spend some time with her mother over the next week. She will practice maintaining boundaries and limits with her mother when engaging in conversations. At the next session, the therapist will use Client-centered Therapy, Mindfulness-based Strategies, Motivational Interviewing, Solution-Focused Therapy, and Supportive Psychotherapy to address her mood regulation and relationship concerns.    Behavioral Health Treatment Plan and Discharge Planning: Libertad Espinal is aware of and agrees to continue to work on their treatment plan. They have identified and are working toward their discharge goals. yes    Depression Follow-up Plan Completed: Yes. Continued psychotherapy. Continued medication management.     Visit start and stop times:    05/27/25  Start Time: 0907  Stop Time: 1000  Total Visit Time: 53 minutes  "

## 2025-06-05 ENCOUNTER — TELEPHONE (OUTPATIENT)
Dept: PSYCHIATRY | Facility: CLINIC | Age: 40
End: 2025-06-05

## 2025-06-05 NOTE — TELEPHONE ENCOUNTER
Letter sent via [employer] and to address on file to inform patient that current medication management provider will be transitioning to an all virtual platform. Due to patient's insurance coverage, patient must be transferred to an in office provider. Office number provided to contact if still interested in services.

## 2025-06-05 NOTE — LETTER
06/05/25       Libertad Espinal   [address] [facility] [address]       Dear Libertad Espinal:    This letter is to inform you that Chip Sutton MD is moving to an all virtual platform. Due to your insurance coverage, your care with Chip Sutton MD at [facility] must be terminated.    If you wish to return to [facility], you will need to have a transfer of care appointment with a new provider. Please call [phone] to schedule a new psychiatric intake if you are interested in doing so.      Please follow-up with your primary care provider for continual care.  When you have scheduled an appointment with another agency, please feel free to complete a release of information so that your records can be transferred to your new provider prior to your first appointment.  This will aid with the continuity of your care.      Sincerely,           [facility]        We will continue to provide psychotropic medications and/or emergency counseling as deemed appropriate by clinical staff for 45 days from receipt of this letter.                For a referral to another agency, we would suggest that you contact your primary health care provider or insurance company.   Please see Provider's List of agencies below:      Outpatient Mental Health Adult  [facility]  [address].  Danish GARCIA.  [phone]   Sekou Napoles MD to [address] [place] [address].  Nicole GARCIA. [phone]   [address] [place] Rivera. [phone]   Ronda Parks MD [address] [place], [place] [address]Valdez barbosa. [phone]   Glynn Knight MD, [address] , [place] Rivera. [phone]   Outpatient Mental Health Children, Adolescents and Family  [facility] [address]: [address] [address] [phone]  [facility] [address]  [address] [address]  and [place] [id number], [address]    [phone]  [facility] (14 and over)  [address] RADHA Peng  [phone]  Outpatient Mental Health [place] Speaking  [facility] [address] [place], [place] [id number] [phone]  [facility]:   [place] [address] Phone: [phone]  [facility] Outpatient:[place]ard Jackie, [address] [place] [address] New Admissions [phone] Local [phone]  [facility]:   [facility] :[address] [place], [place] [address] Phone: [phone]  [facility] Outpatient: [address] [place] [address] Phone: New Admissions [phone] / Local Office [phone]  Drug & Alcohol Services:  [facility]:   [phone] or [phone]  [facility]:  [phone] or [phone]  [facility]:  [phone]  [facility] [place]:  [phone]  [facility]: [phone]    [facility]:   [facility]:  [address] [place] [place] [address]  Phone: [phone]  [facility] CRISIS NUMBERS:    [place]:  [phone]    [place]: [phone] or [phone]    [place] / [place]: [phone]    [place]: [phone]    [place]: [phone]    [place]: [phone] ([phone])    [place]: [phone]    National Suicide Prevention Hotline:  1-810.577.4342 (TALK)

## 2025-06-09 ENCOUNTER — TELEPHONE (OUTPATIENT)
Age: 40
End: 2025-06-09

## 2025-06-09 NOTE — TELEPHONE ENCOUNTER
Patient of Karen calling about her Zepbound 5mg  States she is tolerating without side effects.  She has 1 injection remaining.  She does have refills at her pharmacy and she would like to continue on this dose at this time.  Current weight is 233.    She is scheduled for office visit on 6/16.    Patient will contact pharmacy for refill and return call with updates/additional questions.  #[phone]

## 2025-06-09 NOTE — TELEPHONE ENCOUNTER
Patient returned call in regards to letter. Writer explained why they are unable to see provider once they are fully remote due to the 4-wall policy patient showed verbal understanding. Writer attempted to warm transfer patient to support services but was unable to. Writer made patient aware a message will be sent to dept and someone will reach back out to them at their earliest convenience. Patient showed verbal understanding.

## 2025-06-09 NOTE — TELEPHONE ENCOUNTER
Called and spoke with patient regarding a JOSE. Confirmed refills currently available at preferred pharmacy. Patient stated after next appt 7/9 (which was canceled) that her long term disability was to be reviewed. Patient asked if provider was still able to complete letter if needed. Writer confirmed he would as he is her current provider.    Patient open to Lee Ann Rivera or [place]. No Gender preference. Added to list for JOSE.

## 2025-06-11 NOTE — TELEPHONE ENCOUNTER
Called and left message for patient to inform Dr. Sutton has a cancellation on 6/23 1130AM in office and asked patient to return call to inform if she can make appt to go over disability paperwork. Writer scheduled appt for pt at this time. This will help avoid a letter being needed and patient being able to see provider before he leaves and is all virtual.

## 2025-06-11 NOTE — TELEPHONE ENCOUNTER
Pt ret call from a message that was left for an appt and needed to clarify appt date and time. Pt is fine with that appt.

## 2025-06-11 NOTE — TELEPHONE ENCOUNTER
The patient called today to request a letter be provided for her in regards to the below:    Insurance company has requested written verification that the patient will be transitioning to another provider, and this process does take time. If a time frame for the JOSE process for [facility] is available, please advise in letter for insurance purposes.     If a new AIDAN is needed, there is presently not one on file for 2025.   There is an AIDAN on the patients chart from 7.2024.    Patient was advised to follow up with her portal, and or email for the letter from [facility].    If this letter can be emailed, please send via email as well.    Thank you.

## 2025-06-12 DIAGNOSIS — F32.A MOOD DISORDER OF DEPRESSED TYPE: ICD-10-CM

## 2025-06-16 ENCOUNTER — OFFICE VISIT (OUTPATIENT)
Age: 40
End: 2025-06-16
Payer: COMMERCIAL

## 2025-06-16 VITALS
BODY MASS INDEX: 43.13 KG/M2 | DIASTOLIC BLOOD PRESSURE: 74 MMHG | HEIGHT: 62 IN | WEIGHT: 234.4 LBS | SYSTOLIC BLOOD PRESSURE: 106 MMHG | TEMPERATURE: 97.8 F

## 2025-06-16 DIAGNOSIS — E78.5 DYSLIPIDEMIA: ICD-10-CM

## 2025-06-16 DIAGNOSIS — G47.33 OSA (OBSTRUCTIVE SLEEP APNEA): ICD-10-CM

## 2025-06-16 DIAGNOSIS — E66.813 CLASS 3 SEVERE OBESITY DUE TO EXCESS CALORIES WITH BODY MASS INDEX (BMI) OF 40.0 TO 44.9 IN ADULT, UNSPECIFIED WHETHER SERIOUS COMORBIDITY PRESENT: Primary | ICD-10-CM

## 2025-06-16 PROCEDURE — 99213 OFFICE O/P EST LOW 20 MIN: CPT | Performed by: PHYSICIAN ASSISTANT

## 2025-06-16 RX ORDER — SERTRALINE HYDROCHLORIDE 100 MG/1
100 TABLET, FILM COATED ORAL DAILY
Qty: 90 TABLET | Refills: 1 | Status: SHIPPED | OUTPATIENT
Start: 2025-06-16

## 2025-06-16 RX ORDER — TIRZEPATIDE 5 MG/.5ML
5 INJECTION, SOLUTION SUBCUTANEOUS WEEKLY
Qty: 2 ML | Refills: 3 | Status: SHIPPED | OUTPATIENT
Start: 2025-06-16 | End: 2025-10-06

## 2025-06-16 RX ORDER — ARIPIPRAZOLE 10 MG/1
10 TABLET ORAL DAILY
Qty: 90 TABLET | Refills: 1 | Status: SHIPPED | OUTPATIENT
Start: 2025-06-16

## 2025-06-16 NOTE — PROGRESS NOTES
Assessment/Plan:  Libertad was seen today for follow-up.    Diagnoses and all orders for this visit:    Class 3 severe obesity due to excess calories with body mass index (BMI) of 40.0 to 44.9 in adult, unspecified whether serious comorbidity present  -     tirzepatide (Zepbound) 5 mg/0.5 mL auto-injector; Inject 0.5 mL (5 mg total) under the skin once a week    CLARISSE (obstructive sleep apnea)    Dyslipidemia         Started zepbound 2/2025  Current dose: 5mg  Start weight: 241 lbs (2/20/25)  Current weight: 234 lbs (6/16/25)  Change: -7 lbs    - Weight not at goal  - Patient is interested in Conservative Program  - Labs reviewed: As below.    General Recommendations:  Nutrition:  Eat breakfast daily.  Do not skip meals.     Food log (ie.) www.myfitnesspal.com, sparkpeople.com, loseit.com, calorieking.com, etc.    Practice mindful eating.  Be sure to set aside time to eat, eat slowly, and savor your food.    Hydration:    At least 64oz of water daily.  No sugar sweetened beverages.  No juice (eat the fruit instead).    Exercise:  Studies have shown that the ideal exercise goal is somewhere between 150 to 300 minutes of moderate intensity exercise a week.  Start with exercising 10 minutes every other day and gradually increase physical activity with a goal of at least 150 minutes of moderate intensity exercise a week, divided over at least 3 days a week.  An example of this would be exercising 30 minutes a day, 5 days a week.  Resistance training can increase muscle mass and increase our resting metabolic rate.   FULL BODY resistance training is recommended 2-3 times a week.  Do not do this on consecutive days to allow for muscle recovery.    Aim for a bare minimum 5000 steps, even on days you do not exercise.    Monitoring:   Weigh yourself daily.  If this causes undue stress, then just weigh yourself once a week.  Weigh yourself the same time of the day with the same amount of clothing on.  Preferably this should be  done after waking up, before you eat, and with no clothing or minimal clothing on.    Calorie goal:  8761-4534 kirill/day (Provided with meal plan to follow).    Return visit:    Calorie tracking/deficit - try protein shake for breakfast instead of skipping  Physical activity goals - postprandial walks reviewed  AOM  Contraception: IUD (paragaurd)   Not a candidate for phentermine d/t past cardiac history  Continue zepbound 5mg  - Patient denies personal history of pancreatitis. Patient also denies personal and family history of thyroid cancer and multiple endocrine neoplasia type 2 (MEN 2 tumor).   Discussed zepbound. Use and side effect profile reviewed  RTC: 4 months       ______________________________________________________________________        Subjective:   Chief Complaint   Patient presents with    Follow-up     mwm 4 month follow up---Waist: 45.3in       HPI: Libertad Espinal  is a 39 y.o. female with history of PVCs, CLARISSE, PTSD, bipolar disorder, dyslipidemia, and excess weight, here to pursue weight loss management.  Previous notes and records have been reviewed.    Bipolar disorder - abilify, lorazepam, zoloft  Mild CLARISSE - following with pulmonology. Using CPAP.     WPW - s/p cardiac ablation when she was 18 yo, occasinonaly palpitations. Follows closely with cardiology     TSH WNL    2 kids (17, 11, 14, 6)   Prekids - 140 lbs (22)  Weight after second pregnancy 180 lbs (6 years ago)    Over the past 6 years weight has previously been progressing.  She had been on lamictal for mood stability and that contributed to weight gain. Abilify has been doing a stable mood.     Diets - will have some success and then rebound. Tried carnivore diet and calorie counting (1300 kirill/day). Difficult to maintain due to appetite.    Exercise - currently none. No physical restriction. History of knee surgery.     8 years ago she tried injection for weight loss. Daiy injeciton - saxenda. Worked well.     Started zepbound  "2/2025  Current dose: 5mg  Start weight: 241 lbs (2/20/25)  Current weight: 234 lbs (6/16/25)  Change: -7 lbs    Was off for 2 months d/t insurance change. Just completing her first month of 5mg.  No nausea, vomiting, constipation, diarrhea, nor abdominal pain. Good appetite suppression and early satiety.  Skipping breakfast currently.  Working on walking more.     HPI  Wt Readings from Last 20 Encounters:   06/16/25 106 kg (234 lb 6.4 oz)   03/28/25 106 kg (234 lb)   03/26/25 106 kg (234 lb)   02/20/25 109 kg (241 lb)   02/13/25 108 kg (237 lb 6.4 oz)   01/14/25 108 kg (238 lb 1.6 oz)   01/07/25 108 kg (237 lb 8 oz)   11/06/24 103 kg (228 lb 1.6 oz)   09/27/24 103 kg (226 lb 9.6 oz)   08/26/24 101 kg (223 lb)   05/15/24 98.9 kg (218 lb)   05/06/24 98.3 kg (216 lb 12.8 oz)   03/20/24 98 kg (216 lb)   01/24/24 98.9 kg (218 lb)   12/06/23 97.1 kg (214 lb)   10/04/23 96.4 kg (212 lb 7.2 oz)   10/02/23 96.2 kg (212 lb)   08/25/23 96.2 kg (212 lb)   08/07/23 96.4 kg (212 lb 8 oz)   07/28/23 96.3 kg (212 lb 3.2 oz)            Food logging:  B: anaya or sheets - Arabic toast and hashbrowns. Bagel.   S: chips   L: leftovers   S: chips   D: protein (chicken) + veggie (sometimes - frozen blends)+ starch (spaghetti + meatsauce)  S: sweets       Dining out:  Hydration: Water    SF soda - 1-2/day    Coffee - 2 cups    Alcohol: 2-3 drinks/month     Sleep:  \" A lot\" still tired. Using CPAP intermittently      Occupation: Out on leave       Past Medical History:   Diagnosis Date    Abnormal Pap smear of cervix     Anxiety     Bipolar disorder (HCC)     Bipolar I disorder, most recent episode depressed, in full remission (HCC) 07/19/2016    BRCA1 negative     BRCA2 negative     COVID-19 01/12/2022    10/27/23 Per pt had 3 separate times - last year being the last episode    Depression     Eczema     Exposure to chlamydia     Resolved 06/09/17    Knee pain, left     Migraine without aura and without status migrainosus, not intractable " 10/11/2019    Obesity     Post traumatic stress disorder 2021    Postpartum depression 2018    Sleep apnea     TMJ (dislocation of temporomandibular joint)     causes snoring    Manoj-Parkinson-White (WPW) syndrome      Patient denies personal and family history of  pancreatitis, thyroid cancer, MEN-2 tumors.  Denies any hx of glaucoma, seizures, kidney stones, gallstones.  Denies Hx of CAD, PAD, palpitations, arrhythmia.   Denies uncontrolled anxiety or depression, suicidal behavior or thinking , insomnia or sleep disturbance.     Past Surgical History:   Procedure Laterality Date    ABLATION OF DYSRHYTHMIC FOCUS      WPW ablation age 16    CARDIAC SURGERY      Ablation    COLONOSCOPY      COLPOSCOPY      DISTAL PATELLAR REALIGNMENT Left 10/31/2023    Procedure: OPEN MPFL RECONSTRUCTION WITH ALLOGRAFT;  Surgeon: Wilbert Michael DO;  Location:  MAIN OR;  Service: Orthopedics    INDUCED       OTHER SURGICAL HISTORY      catheter ablation- WPW age 17    [place] ARTHROSCOPY KNEE LATERAL RELEASE Left 10/31/2023    Procedure: RELEASE RETINACULAR;  Surgeon: Wilbert Michael DO;  Location:  MAIN OR;  Service: Orthopedics    TONSILLECTOMY      WISDOM TOOTH EXTRACTION       The following portions of the patient's history were reviewed and updated as appropriate: allergies, current medications, past family history, past medical history, past social history, past surgical history, and problem list.    Review Of Systems:  Review of Systems   Constitutional:  Negative for fatigue and fever.   HENT:  Negative for sore throat, trouble swallowing and voice change.    Respiratory:  Negative for shortness of breath.    Cardiovascular:  Negative for chest pain.   Gastrointestinal:  Negative for abdominal pain, constipation, diarrhea, nausea and vomiting.   Endocrine: Negative for cold intolerance and heat intolerance.   Genitourinary:  Negative for difficulty urinating.   Musculoskeletal:  Negative for arthralgias  "and back pain.   Psychiatric/Behavioral:  Negative for suicidal ideas. The patient is not nervous/anxious.    All other systems reviewed and are negative.      Objective:  /74   Temp 97.8 °F (36.6 °C) (Tympanic)   Ht 5' 2\" (1.575 m)   Wt 106 kg (234 lb 6.4 oz)   BMI 42.87 kg/m²   Physical Exam  Vitals and nursing note reviewed.   Constitutional:       General: She is not in acute distress.     Appearance: Normal appearance. She is obese. She is not ill-appearing, toxic-appearing or diaphoretic.   HENT:      Head: Normocephalic and atraumatic.     Eyes:      General:         Right eye: No discharge.         Left eye: No discharge.      Conjunctiva/sclera: Conjunctivae normal.     Pulmonary:      Effort: Pulmonary effort is normal. No respiratory distress.     Musculoskeletal:         General: Normal range of motion.      Cervical back: Normal range of motion. No rigidity.      Right lower leg: No edema.      Left lower leg: No edema.     Skin:     Coloration: Skin is not pale.      Findings: No erythema or rash.     Neurological:      General: No focal deficit present.      Mental Status: She is alert.     Psychiatric:         Mood and Affect: Mood normal.         Labs and Imaging  Recent labs and imaging have been personally reviewed.  Lab Results   Component Value Date    WBC 8.56 05/06/2024    HGB 15.2 05/06/2024    HCT 45.9 05/06/2024    MCV 90 05/06/2024     05/06/2024     Lab Results   Component Value Date     08/11/2014    SODIUM 139 05/06/2024    K 3.9 05/06/2024     05/06/2024    CO2 28 05/06/2024    ANIONGAP 12 08/11/2014    AGAP 8 05/06/2024    BUN 9 05/06/2024    CREATININE 0.71 05/06/2024    GLUC 68 04/03/2023    GLUF 74 05/06/2024    CALCIUM 8.9 05/06/2024    AST 19 05/06/2024    ALT 23 05/06/2024    ALKPHOS 52 05/06/2024    PROT 7.9 08/11/2014    TP 7.2 05/06/2024    BILITOT 0.4 08/11/2014    TBILI 0.48 05/06/2024    EGFR 109 05/06/2024     Lab Results   Component Value " Date    HGBA1C 5.2 02/19/2020     Lab Results   Component Value Date    OSN6SGVKYWBM 2.619 05/06/2024     Lab Results   Component Value Date    CHOLESTEROL 213 (H) 05/06/2024     Lab Results   Component Value Date    HDL 46 (L) 05/06/2024     Lab Results   Component Value Date    TRIG 166 (H) 05/06/2024     Lab Results   Component Value Date    LDLCALC 134 (H) 05/06/2024

## 2025-06-17 ENCOUNTER — SOCIAL WORK (OUTPATIENT)
Dept: BEHAVIORAL/MENTAL HEALTH CLINIC | Facility: CLINIC | Age: 40
End: 2025-06-17
Payer: COMMERCIAL

## 2025-06-17 DIAGNOSIS — F43.10 POST TRAUMATIC STRESS DISORDER: ICD-10-CM

## 2025-06-17 DIAGNOSIS — F31.81 BIPOLAR II DISORDER (HCC): Primary | ICD-10-CM

## 2025-06-17 DIAGNOSIS — F41.1 GAD (GENERALIZED ANXIETY DISORDER): Chronic | ICD-10-CM

## 2025-06-17 PROCEDURE — 90837 PSYTX W PT 60 MINUTES: CPT | Performed by: SOCIAL WORKER

## 2025-06-17 NOTE — PSYCH
Behavioral Health Psychotherapy Progress Note    Psychotherapy Provided: Individual Psychotherapy     1. Bipolar II disorder (HCC)        2. SHELLY (generalized anxiety disorder)        3. Post traumatic stress disorder            Goals addressed in session: Goal 1     DATA: Met with Libertad for her scheduled individual session. Libertad states that she had a good visit with her mother. She states that her mother had a disagreement with her sister, due to Libertad's aunt's behaviors, and her mother set appropriate boundaries and limits with her. Libertad states that Saturnino was ok with how the visit with Libertad's mother went and has no issue with the children seeing her again. Libertad states that she feels that her mother's behaviors were very appropriate and that if, in the future, she had any issues with her, she might be better able to set limits with her. Libertad states that she and Saturnino have been getting along a bit better recently. She states that her mood is somewhat improved. She is planning for the summer with the children.     During this session, this clinician used the following therapeutic modalities: Client-centered Therapy, Dialectical Behavior Therapy, Mindfulness-based Strategies, Motivational Interviewing, Solution-Focused Therapy, and Supportive Psychotherapy    Substance Abuse was not addressed during this session. If the client is diagnosed with a co-occurring substance use disorder, please indicate any changes in the frequency or amount of use: n/a. Stage of change for addressing substance use diagnoses: No substance use/Not applicable    ASSESSMENT:  Libertad Espinal presents with a Euthymic/ normal mood.     her affect is Normal range and intensity, which is congruent, with her mood and the content of the session. The client has made progress on their goals.    Libertad Espinal presents with a minimal risk of suicide, minimal risk of self-harm, and minimal risk of harm to others.    For any  Reason for Encounter New Injury    Subjective:       Chief Complaint: Андрей Aleman is a 19 y.o. male student at [employer] who had concerns including Pain of the Right Foot.    Handedness: right-handed  Sport played: football      Level: high school      Position:       Pain        ROS              Objective:       General: Андрей is well-developed, well-nourished, appears stated age, in no acute distress, alert and oriented to time, place and person.     AT Session          Assessment:     Status: AT - Cleared to Exert    Date Seen:  02/28/2025    Date of Injury:  02/27/2025    Date Out:  n/a    Date Cleared:  n/a        Treatment/Rehab/Maintenance:           Plan:       1. Play as tolerated  2. Physician Referral: no  3. ED Referral:no  4. Parent/Guardian Notified: No  5. All questions were answered, ath. will contact me for questions or concerns in  the interim.  6.         Eligible to use School Insurance: No, school does not have insurance plan                   "risk assessment that surpasses a \"low\" rating, a safety plan must be developed.    A safety plan was indicated: no  If yes, describe in detail n/a    PLAN: Between sessions, Libertad Espinal will continue to monitor her moods. She will maintain appropriate limits/boundaries with family members. At the next session, the therapist will use Client-centered Therapy, Dialectical Behavior Therapy, Mindfulness-based Strategies, Motivational Interviewing, Solution-Focused Therapy, and Supportive Psychotherapy to address her mood regulation and relationship concerns.    Behavioral Health Treatment Plan and Discharge Planning: Libertad Espinal is aware of and agrees to continue to work on their treatment plan. They have identified and are working toward their discharge goals. yes    Depression Follow-up Plan Completed: No    Visit start and stop times:    06/17/25  Start Time: 0904  Stop Time: 0958  Total Visit Time: 54 minutes  "

## 2025-06-18 PROBLEM — Z30.431 IUD CHECK UP: Status: ACTIVE | Noted: 2025-06-18

## 2025-06-18 NOTE — PROGRESS NOTES
Name: Libertad Espinal      : 1985      MRN: [id number]  Encounter Provider: BRYANT Dc  Encounter Date: 2025   Encounter department: [facility]  :  Assessment & Plan  Well woman exam with routine gynecological exam   All questions answered.  Await pap smear results.  Breast self exam technique reviewed and patient encouraged to perform self-exam monthly.  Contraception: ParaGard IUD.  Diagnosis explained in detail, including differential.  Dietary diary.  Discussed healthy lifestyle modifications.  Educational material distributed.  Follow up in 1 year.  Follow up as needed.  Mammogram.  Sono-mammogram.  Thin prep Pap smear.  Breast awareness reviewed  Encouraged healthy diet, exercise and lifestyle  Encouraged follow-up with PCP as needed  Increase depression symptoms.  Patient encouraged to follow-up with PCP may need increase in Zoloft while on Zepbound.  Reviewed with patient is a common side effect of Zepbound.  Orders:    Liquid-based pap, screening    Extremely dense tissue of both breasts on mammography    Orders:    Mammo screening bilateral w 3d and cad; Future    US breast screening bilateral complete (ABUS); Future    Breast cancer screening by mammogram    Orders:    Mammo screening bilateral w 3d and cad; Future    Encounter for breast cancer screening using non-mammogram modality    Orders:    US breast screening bilateral complete (ABUS); Future    IUD check up  Paragard IUD inserted 25         Skin tag of vulva  Removed without difficulty, too small to send to pathology.  Patient tolerated well  Declined anesthesia       Menorrhagia with regular cycle  Currently using IUD, ParaGard for contraception.  Aware common side effect of ParaGard IUD is painful heavy menses   Reviewed recommendation for NSAIDs-Aleve 2 tablets with breakfast and dinner starting day prior to menses through heavy days.  If improving symptoms can order  naproxen.  Signs and symptoms to report reviewed       Will call / [employer] message with results  VBI-    BMI Counseling: Body mass index is 42.67 kg/m². The BMI is above normal. Nutrition recommendations include 3-5 servings of fruits/vegetables daily. Exercise recommendations include exercising 3-5 times per week.  Continue follow with weight management as scheduled    RTO 1 year for annual exam     History of Present Illness   HPI  Libertad Espinal is a 39 y.o. female who presents for annual well woman exam.  Last Pap smear 12/19/22 NILM/ HR HPV(-).  Last mammogram 1/14/25 extremely dense breast tissue/ birads 1. Periods are regular every 28-30 days, lasting 7 days. No intermenstrual bleeding, spotting, or discharge.      Patient is concerned with left labial skin tag.  Is interested in removal.  Patient states she has had it for many years      Has noticed increase in depression. Symptoms coincide with Zepbound use.  Currently using Abilify, Zoloft and Ativan as needed    Current contraception: Paragard IUD inserted 1/7/25  History of abnormal Pap smear: yes   Family history of uterine or ovarian cancer: no  Regular self breast exam: no  History of abnormal mammogram: no  Family history of breast cancer: yes - maternal grandmother & maternal aunt  History of abnormal lipids: no  Gardasil vaccine: NA       History obtained from: patient    Review of Systems   Constitutional:  Negative for chills and fever.   Respiratory: Negative.     Cardiovascular: Negative.    Genitourinary: Negative.      Medical History Reviewed by provider this encounter:  Tobacco  Allergies  Meds  Problems  Surg Hx  Fam Hx  Soc Hx    .     Objective   /64   Wt 106 kg (233 lb 4.8 oz)   LMP 06/11/2025   BMI 42.67 kg/m²      Physical Exam    General:   alert and oriented, in no acute distress, alert, appears stated age, and cooperative   Heart: regular rate and rhythm, S1, S2 normal, no murmur, click, rub or gallop   Lungs:  clear to auscultation bilaterally   Abdomen: soft, non-tender, without masses or organomegaly   Vulva: normal, Bartholin's, Urethra, Marl[place]'s normal, < 2mm skin tag removed per patient request.  Declined anesthesia.  Specimen too small to send to pathology   Vagina: normal mucosa, normal discharge, no palpable nodules   Cervix: no bleeding following Pap, no cervical motion tenderness, and no lesions   Uterus: non-tender, difficult to assess due to body habitus   Adnexa: Nontender, difficult to assess due to body habitus   Breast: breasts appear normal, no suspicious masses, no skin or nipple changes or axillary nodes.      Menstrual History:  OB History          3    Para   2    Term   2       0    AB   1    Living   2         SAB   0    IAB   1    Ectopic   0    Multiple   0    Live Births   2           Obstetric Comments   Menarche at age 12               Menarche age: 12  Patient's last menstrual period was 2025.  Period Cycle (Days):  (monthly)  Period Duration (Days): 7  Period Pattern: Regular  Menstrual Flow: Moderate, Heavy (heavy for 5 days)  Menstrual Control: Tampon, Maxi pad  Menstrual Control Change Freq (Hours): every 2 hours  Dysmenorrhea: (!) Mild (no OTC)  Dysmenorrhea Symptoms: Cramping, Diarrhea, Headache

## 2025-06-19 ENCOUNTER — TELEPHONE (OUTPATIENT)
Dept: PSYCHIATRY | Facility: CLINIC | Age: 40
End: 2025-06-19

## 2025-06-19 NOTE — TELEPHONE ENCOUNTER
Called PT regarding canceling appt from 6/23/25 PT has medicaid and now can not be seen by Dr Sutton since he is going virtual only. PT was aware of the [facility]. Writer sent info to [facility] ladies

## 2025-06-20 ENCOUNTER — ANNUAL EXAM (OUTPATIENT)
Dept: OBGYN CLINIC | Facility: MEDICAL CENTER | Age: 40
End: 2025-06-20
Payer: COMMERCIAL

## 2025-06-20 VITALS — DIASTOLIC BLOOD PRESSURE: 64 MMHG | BODY MASS INDEX: 42.67 KG/M2 | SYSTOLIC BLOOD PRESSURE: 100 MMHG | WEIGHT: 233.3 LBS

## 2025-06-20 DIAGNOSIS — N92.0 MENORRHAGIA WITH REGULAR CYCLE: ICD-10-CM

## 2025-06-20 DIAGNOSIS — Z30.431 IUD CHECK UP: ICD-10-CM

## 2025-06-20 DIAGNOSIS — Z12.31 BREAST CANCER SCREENING BY MAMMOGRAM: ICD-10-CM

## 2025-06-20 DIAGNOSIS — Z01.419 WELL WOMAN EXAM WITH ROUTINE GYNECOLOGICAL EXAM: Primary | ICD-10-CM

## 2025-06-20 DIAGNOSIS — Z12.39 ENCOUNTER FOR BREAST CANCER SCREENING USING NON-MAMMOGRAM MODALITY: ICD-10-CM

## 2025-06-20 DIAGNOSIS — N90.89 SKIN TAG OF VULVA: ICD-10-CM

## 2025-06-20 DIAGNOSIS — R92.343 EXTREMELY DENSE TISSUE OF BOTH BREASTS ON MAMMOGRAPHY: ICD-10-CM

## 2025-06-20 PROCEDURE — 99395 PREV VISIT EST AGE 18-39: CPT | Performed by: NURSE PRACTITIONER

## 2025-06-20 PROCEDURE — G0145 SCR C/V CYTO,THINLAYER,RESCR: HCPCS | Performed by: PATHOLOGY

## 2025-06-20 PROCEDURE — G0476 HPV COMBO ASSAY CA SCREEN: HCPCS | Performed by: NURSE PRACTITIONER

## 2025-06-27 ENCOUNTER — TELEPHONE (OUTPATIENT)
Age: 40
End: 2025-06-27

## 2025-06-27 ENCOUNTER — RESULTS FOLLOW-UP (OUTPATIENT)
Dept: OBGYN CLINIC | Facility: MEDICAL CENTER | Age: 40
End: 2025-06-27

## 2025-06-27 LAB
LAB AP GYN PRIMARY INTERPRETATION: ABNORMAL
Lab: ABNORMAL
PATH INTERP SPEC-IMP: ABNORMAL

## 2025-06-27 PROCEDURE — G0124 SCREEN C/V THIN LAYER BY MD: HCPCS | Performed by: PATHOLOGY

## 2025-06-27 NOTE — TELEPHONE ENCOUNTER
Patient calling for review of Pap results. Advised results finalized 1004 today. Please allow for 72h for plan of care. Patient verbalized understanding.

## 2025-07-01 ENCOUNTER — OFFICE VISIT (OUTPATIENT)
Dept: PSYCHIATRY | Facility: CLINIC | Age: 40
End: 2025-07-01
Payer: COMMERCIAL

## 2025-07-01 DIAGNOSIS — F41.1 GENERALIZED ANXIETY DISORDER: ICD-10-CM

## 2025-07-01 DIAGNOSIS — F31.81 BIPOLAR II DISORDER (HCC): Primary | ICD-10-CM

## 2025-07-01 PROCEDURE — 90792 PSYCH DIAG EVAL W/MED SRVCS: CPT | Performed by: STUDENT IN AN ORGANIZED HEALTH CARE EDUCATION/TRAINING PROGRAM

## 2025-07-01 RX ORDER — GABAPENTIN 100 MG/1
100 CAPSULE ORAL 3 TIMES DAILY
Qty: 90 CAPSULE | Refills: 1 | Status: SHIPPED | OUTPATIENT
Start: 2025-07-01 | End: 2025-08-30

## 2025-07-01 RX ORDER — ARIPIPRAZOLE 5 MG/1
5 TABLET ORAL DAILY
Qty: 30 TABLET | Refills: 1 | Status: SHIPPED | OUTPATIENT
Start: 2025-07-01 | End: 2025-08-30
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
## 2025-07-01 NOTE — TELEPHONE ENCOUNTER
Patient called again to schedule Colposcopy.  Please call patient back to see if procedure can be scheduled before September.

## 2025-07-01 NOTE — ASSESSMENT & PLAN NOTE
Orders:    ARIPiprazole (ABILIFY) 5 mg tablet; Take 1 tablet (5 mg total) by mouth daily  Decrease Abilify dose to 5 mg daily for mood stabilization  Dose decreased due to concern for akathisia at present dose  Follow up in 1 month, if decompensation or no improvement, consider changing mood stabilizers at that time

## 2025-07-02 ENCOUNTER — PATIENT MESSAGE (OUTPATIENT)
Dept: OBGYN CLINIC | Facility: MEDICAL CENTER | Age: 40
End: 2025-07-02

## 2025-07-02 NOTE — TELEPHONE ENCOUNTER
Patient calling back with request to speak with  regarding difficulty getting through to office, having been on hold for an hour with no answer, and nobody returning her calls. Patient needs a colpo and has been waiting on a call from the clerical team to schedule as first available appointment is September. Attempted to warm transfer to  office but there was no answer then line disconnected.     Message to manager and clerical team for follow up.

## 2025-07-02 NOTE — TELEPHONE ENCOUNTER
Patient returned a call after call with other CTS has been disconnected. This RN attempted to warm transfer to practice clerical, however no answer. RN apologized to patient. Advised will route to clerical team.

## 2025-07-02 NOTE — RESULT ENCOUNTER NOTE
Called patient back and let her know I have an appt for her Colposcopy for 07/10 in our [place] office at 1pm, I did apologize to patient that there was some phone issues, but I was so glad I got a hold of her to schedule her appt.  Patient was so nice and thankful I called her back to schedule, what a sweet patient we have.

## 2025-07-03 ENCOUNTER — PATIENT MESSAGE (OUTPATIENT)
Age: 40
End: 2025-07-03

## 2025-07-03 ENCOUNTER — TELEPHONE (OUTPATIENT)
Age: 40
End: 2025-07-03

## 2025-07-03 NOTE — TELEPHONE ENCOUNTER
PA for Zepbound was SUBMITTED to [employer]     via    [x]CMM-KEY: [id number]  []Surescripts-Case ID #   []Availity-Auth ID # NDC #   []Faxed to plan   []Other website   []Phone call Case ID #     []PA sent as URGENT    All office notes, labs and other pertaining documents and studies sent. Clinical questions answered. Awaiting determination from insurance company.     Turnaround time for your insurance to make a decision on your Prior Authorization can take 7-21 business days.

## 2025-07-03 NOTE — TELEPHONE ENCOUNTER
PA for Zepbound was APPROVED     Date(s) approved 7/3/25-1/3/25    Case #    Patient advised by          [x]Zapointhart Message  []Phone call   []LMOM  []L/M to call office as no active Communication consent on file  []Unable to leave detailed message as VM not approved on Communication consent       Pharmacy advised by    []Fax  [x]Phone call  []Secure Chat    Specialty Pharmacy    []     Approval letter scanned into Media Yes

## 2025-07-08 ENCOUNTER — TELEMEDICINE (OUTPATIENT)
Dept: BEHAVIORAL/MENTAL HEALTH CLINIC | Facility: CLINIC | Age: 40
End: 2025-07-08
Payer: COMMERCIAL

## 2025-07-08 ENCOUNTER — TELEPHONE (OUTPATIENT)
Age: 40
End: 2025-07-08

## 2025-07-08 DIAGNOSIS — F41.1 GAD (GENERALIZED ANXIETY DISORDER): Chronic | ICD-10-CM

## 2025-07-08 DIAGNOSIS — F43.10 POST TRAUMATIC STRESS DISORDER: ICD-10-CM

## 2025-07-08 DIAGNOSIS — F31.81 BIPOLAR II DISORDER (HCC): Primary | ICD-10-CM

## 2025-07-08 PROCEDURE — 90837 PSYTX W PT 60 MINUTES: CPT | Performed by: SOCIAL WORKER

## 2025-07-08 NOTE — TELEPHONE ENCOUNTER
Pt called to get her appt for 7/8/25 at 10am switched to a virtual visit. Writer changed appointment to Virtual.

## 2025-07-10 ENCOUNTER — PROCEDURE VISIT (OUTPATIENT)
Age: 40
End: 2025-07-10
Payer: COMMERCIAL

## 2025-07-10 VITALS
WEIGHT: 243 LBS | DIASTOLIC BLOOD PRESSURE: 80 MMHG | HEIGHT: 62 IN | SYSTOLIC BLOOD PRESSURE: 122 MMHG | BODY MASS INDEX: 44.72 KG/M2

## 2025-07-10 DIAGNOSIS — R87.611 PAP SMEAR OF CERVIX WITH ASCUS, CANNOT EXCLUDE HGSIL: Primary | ICD-10-CM

## 2025-07-10 PROCEDURE — 57454 BX/CURETT OF CERVIX W/SCOPE: CPT | Performed by: OBSTETRICS & GYNECOLOGY

## 2025-07-10 PROCEDURE — 88344 IMHCHEM/IMCYTCHM EA MLT ANTB: CPT | Performed by: SPECIALIST

## 2025-07-10 PROCEDURE — 88305 TISSUE EXAM BY PATHOLOGIST: CPT | Performed by: SPECIALIST

## 2025-07-10 NOTE — PROGRESS NOTES
Colposcopy    Date/Time: 7/10/2025 1:00 PM    Performed by: Nithya Santana MD  Authorized by: Nithya Santana MD    Other Assisting Provider: No    Verbal consent obtained?: Yes    Written consent obtained?: Yes    Risks and benefits: Risks, benefits and alternatives were discussed    Consent given by:  Patient  Time Out:     Time out: Immediately prior to the procedure a time out was called    Patient states understanding of procedure being performed: Yes    Patient's understanding of procedure matches consent: Yes    Procedure consent matches procedure scheduled: Yes    Relevant documents present and verified: Yes    Patient identity confirmed:  Verbally with patient  Pre-procedure:     Negative urine pregnancy test: yes      Prepped with: acetic acid    Indication:     Indication:  ASC-H  Procedure:     Procedure: Colposcopy w/ cervical biopsy and ECC      [place] speculum was placed in the vagina: yes      Under colposcopic examination the transition zone was seen in entirety: no      Endocervix was curetted using a Kevorkian curette: yes      Cervical biopsy performed with a cervical biopsy punch: yes      Monsel's solution was applied: yes      Specimen(s) to pathology: yes    Post-procedure:     Findings: Bleeding and White epithelium      Impression: Low grade cervical dysplasia      Patient tolerance of procedure:  Tolerated well, no immediate complications  Comments:      IUD strings present   ECC , cervical biopsy at  4 o clock  and  11  oclock    Patient  on  menses at time of  colposcopy

## 2025-07-14 ENCOUNTER — NURSE TRIAGE (OUTPATIENT)
Age: 40
End: 2025-07-14

## 2025-07-14 NOTE — TELEPHONE ENCOUNTER
"REASON FOR CONVERSATION: Vaginal Bleeding    SYMPTOMS: Patient had colpo 7/10. LMP started on 7/6 and bleeding is continuing through today. Also having mild cramping. States that menses does not typically last this long, concerned bleeding is associated with colpo. Saturating 1 super tampon every 3-4 hours. Some small clots noted. Described as normal menstrual flow.    OTHER HEALTH INFORMATION: N/A    PROTOCOL DISPOSITION: Discuss with Provider and Call Back Patient (overriding See Within 2 Weeks in Office)    CARE ADVICE PROVIDED: Continue to monitor bleeding, call back if becomes heavy saturating 1 pad/hr or passing large clots or if feeling lightheaded, dizzy.     PRACTICE FOLLOW-UP: Routing to provider for review and recommendations.       Reason for Disposition   Periods with > 6 soaked pads or tampons per day    Answer Assessment - Initial Assessment Questions  1. BLEEDING SEVERITY: \"Describe the bleeding that you are having.\" \"How much bleeding is there?\"       Saturating 1 super tampon every 3-4 hours. Describes bleeding as normal menstrual flow  2. ONSET: \"When did the bleeding begin?\" \"Is it continuing now?\"      LMP started 7/6. Patient had colpo 7/10  3. MENSTRUAL PERIOD: \"When was the last normal menstrual period?\" \"How is this different than your period?\"      Lasting longer than typical menses  4. REGULARITY: \"How regular are your periods?\"      regular  5. ABDOMEN PAIN: \"Do you have any pain?\" \"How bad is the pain?\"  (e.g., Scale 0-10; none, mild, moderate, or severe)      Mild abdominal cramping  10. CAUSE: \"What do you think is causing the bleeding?\" (e.g., recent gyn surgery, recent gyn procedure; known bleeding disorder, cervical cancer, polycystic ovarian disease, fibroids)          Had colpo 7/10, unsure if this is causing the ongoing bleeding  12. OTHER SYMPTOMS: \"What other symptoms are you having with the bleeding?\" (e.g., passed tissue, vaginal discharge, fever, menstrual-type cramps)       "  A few small clots passed    Protocols used: Vaginal Bleeding - Abnormal-Adult-[place]

## 2025-07-14 NOTE — TELEPHONE ENCOUNTER
Return call to malik Maurer Dr. recommendations.  Patient in agreement to continue to monitor and call back if bleeding persists.  Understands if develops heavy bleeding- soaking super/overnite pad every hour x 2 or severe pelvic pain to be evaluated in ED.

## 2025-07-16 ENCOUNTER — TELEPHONE (OUTPATIENT)
Age: 40
End: 2025-07-16

## 2025-07-16 DIAGNOSIS — E66.813 CLASS 3 SEVERE OBESITY DUE TO EXCESS CALORIES WITH BODY MASS INDEX (BMI) OF 40.0 TO 44.9 IN ADULT, UNSPECIFIED WHETHER SERIOUS COMORBIDITY PRESENT: Primary | ICD-10-CM

## 2025-07-16 RX ORDER — TIRZEPATIDE 7.5 MG/.5ML
7.5 INJECTION, SOLUTION SUBCUTANEOUS WEEKLY
Qty: 2 ML | Refills: 1 | Status: SHIPPED | OUTPATIENT
Start: 2025-07-16 | End: 2025-09-10

## 2025-07-16 NOTE — TELEPHONE ENCOUNTER
Called pt back and she was calling on behalf on her significant other. There is a note in his chart regarding the conversation and it was routed to Rita, who is on vacation this week and this was explained to pt who stated that was fine.

## 2025-07-16 NOTE — TELEPHONE ENCOUNTER
Incoming call from patient, inquiring if pathology results have been received. Advised that they are still in process. Typically take 7-10 days. Patient verbalized understanding.

## 2025-07-16 NOTE — TELEPHONE ENCOUNTER
Patient asking to speak to clinical team int he office or [place] regarding an issue. Patient would not elaborate Warm transfer to the office unavailable. Please call back patient.

## 2025-07-17 PROCEDURE — 88344 IMHCHEM/IMCYTCHM EA MLT ANTB: CPT | Performed by: SPECIALIST

## 2025-07-17 PROCEDURE — 88305 TISSUE EXAM BY PATHOLOGIST: CPT | Performed by: SPECIALIST

## 2025-07-17 NOTE — TELEPHONE ENCOUNTER
Patient called stating that she received her results from her colpo through MyChart Please follow up with the patient when results come in on our end

## 2025-07-17 NOTE — ASSESSMENT & PLAN NOTE
Orders:    Mammo screening bilateral w 3d and cad; Future    US breast screening bilateral complete (ABUS); Future    
Paragard IUD inserted 1/7/25         
Yes

## 2025-07-21 ENCOUNTER — TELEPHONE (OUTPATIENT)
Age: 40
End: 2025-07-21

## 2025-07-21 ENCOUNTER — TELEPHONE (OUTPATIENT)
Dept: PSYCHIATRY | Facility: CLINIC | Age: 40
End: 2025-07-21

## 2025-07-21 ENCOUNTER — TELEMEDICINE (OUTPATIENT)
Dept: OBGYN CLINIC | Facility: CLINIC | Age: 40
End: 2025-07-21
Payer: COMMERCIAL

## 2025-07-21 DIAGNOSIS — D06.9 HIGH GRADE SQUAMOUS INTRAEPITHELIAL LESION (HGSIL), GRADE 3 CIN, ON BIOPSY OF CERVIX: Primary | ICD-10-CM

## 2025-07-21 PROCEDURE — 99212 OFFICE O/P EST SF 10 MIN: CPT | Performed by: OBSTETRICS & GYNECOLOGY

## 2025-07-21 NOTE — PROGRESS NOTES
Virtual Regular Visit  Name: Libertad Espinal      : 1985      MRN: [id number]  Encounter Provider: Nithya Santana MD  Encounter Date: 2025   Encounter department: [facility]  :  Assessment & Plan  High grade squamous intraepithelial lesion (HGSIL), grade 3 CED, on biopsy of cervix         Final Diagnosis   A. Cervix, Endocervical, ECC:  -  Fragments of benign endocervical tissue with acute and chronic inflammation.       B. Cervix, Endocervical, 4oclock:  -  Mild cytologic atypia. See comment.       C. Cervix, Endocervical, 11oclok:  -  High grade squamous intraepithelial lesion (HSIL)/ (CED-3). See comment.      Today  we reviewed her  Colposcopy  results showing  CED 3    We discussed  ASCCP recommendation to proceed with LEEP  We discussed nature of procedure as well as  possible risks  of  procedure   Aware this procedure  is both  diagnostic and can be  therapeutic   Verbalized understanding  / gave verbal consent  for  surgery as this was a  virtual  visit   All questions answered       History of Present Illness     Follow  up from colposcopy  showing  CED 3       Review of Systems   Constitutional: Negative.    HENT: Negative.     Eyes: Negative.    Respiratory: Negative.     Cardiovascular: Negative.    Genitourinary: Negative.    Neurological: Negative.        Objective   LMP 2025 (Exact Date)     Physical Exam  Vitals reviewed.   Constitutional:       Appearance: Normal appearance.   HENT:      Head: Normocephalic and atraumatic.      Nose: Nose normal.     Eyes:      Conjunctiva/sclera: Conjunctivae normal.     Pulmonary:      Effort: Pulmonary effort is normal.     Neurological:      General: No focal deficit present.      Mental Status: She is alert and oriented to person, place, and time.     Psychiatric:         Mood and Affect: Mood normal.         Behavior: Behavior normal.       Administrative Statements   Encounter provider  Nithya Santana MD    The Patient is located at Home and in the following state in which I hold an active license [place].    The patient was identified by name and date of birth. Libertad Espinal was informed that this is a telemedicine visit and that the visit is being conducted through the Epic Embedded platform. She agrees to proceed..  My office door was closed. No one else was in the room.  She acknowledged consent and understanding of privacy and security of the video platform. The patient has agreed to participate and understands they can discontinue the visit at any time.    I have spent a total time of 10 minutes in caring for this patient on the day of the visit/encounter including Impressions and Counseling / Coordination of care, not including the time spent for establishing the audio/video connection.

## 2025-07-21 NOTE — TELEPHONE ENCOUNTER
Talked to provider there is no openings in the next 2 weeks with providers call schedule. Provider is aware and sated that this case can be looked at upon Suzan's return. LM cell phone for patient that Suzan will call her upon her return from vacation to schedule procedure.

## 2025-07-21 NOTE — TELEPHONE ENCOUNTER
A medical record request was received 7/21/25 from [employer]. The date range is 6/1/25-Present. An Attending Physician Behavioral Health Statement accompanied the request.    Paperwork was placed in [facility] mailbox to be forwarded to Dr Blevins.

## 2025-07-21 NOTE — ASSESSMENT & PLAN NOTE
Final Diagnosis   A. Cervix, Endocervical, ECC:  -  Fragments of benign endocervical tissue with acute and chronic inflammation.       B. Cervix, Endocervical, 4oclock:  -  Mild cytologic atypia. See comment.       C. Cervix, Endocervical, 11oclok:  -  High grade squamous intraepithelial lesion (HSIL)/ (CED-3). See comment.

## 2025-07-21 NOTE — TELEPHONE ENCOUNTER
Patient returning a call to schedule procedure.warm transfer to [place], as Suzan is YANN, unsuccessful. Please call patient back.

## 2025-07-22 ENCOUNTER — TELEMEDICINE (OUTPATIENT)
Dept: BEHAVIORAL/MENTAL HEALTH CLINIC | Facility: CLINIC | Age: 40
End: 2025-07-22
Payer: COMMERCIAL

## 2025-07-22 DIAGNOSIS — F41.1 GAD (GENERALIZED ANXIETY DISORDER): Chronic | ICD-10-CM

## 2025-07-22 DIAGNOSIS — F43.10 POST TRAUMATIC STRESS DISORDER: ICD-10-CM

## 2025-07-22 DIAGNOSIS — F31.81 BIPOLAR II DISORDER (HCC): Primary | ICD-10-CM

## 2025-07-22 PROCEDURE — 90837 PSYTX W PT 60 MINUTES: CPT | Performed by: SOCIAL WORKER

## 2025-07-25 ENCOUNTER — OFFICE VISIT (OUTPATIENT)
Dept: FAMILY MEDICINE CLINIC | Facility: CLINIC | Age: 40
End: 2025-07-25
Payer: COMMERCIAL

## 2025-07-25 ENCOUNTER — APPOINTMENT (OUTPATIENT)
Dept: RADIOLOGY | Facility: MEDICAL CENTER | Age: 40
End: 2025-07-25
Payer: COMMERCIAL

## 2025-07-25 VITALS
WEIGHT: 230.4 LBS | HEART RATE: 95 BPM | OXYGEN SATURATION: 98 % | DIASTOLIC BLOOD PRESSURE: 64 MMHG | HEIGHT: 62 IN | BODY MASS INDEX: 42.4 KG/M2 | SYSTOLIC BLOOD PRESSURE: 108 MMHG

## 2025-07-25 DIAGNOSIS — F31.81 BIPOLAR II DISORDER (HCC): ICD-10-CM

## 2025-07-25 DIAGNOSIS — Z13.1 DIABETES MELLITUS SCREENING: ICD-10-CM

## 2025-07-25 DIAGNOSIS — E78.5 DYSLIPIDEMIA: ICD-10-CM

## 2025-07-25 DIAGNOSIS — Z00.00 ANNUAL PHYSICAL EXAM: Primary | ICD-10-CM

## 2025-07-25 DIAGNOSIS — G47.33 OSA (OBSTRUCTIVE SLEEP APNEA): ICD-10-CM

## 2025-07-25 DIAGNOSIS — F41.1 GAD (GENERALIZED ANXIETY DISORDER): Chronic | ICD-10-CM

## 2025-07-25 DIAGNOSIS — G43.109 MIGRAINE WITH AURA AND WITHOUT STATUS MIGRAINOSUS, NOT INTRACTABLE: ICD-10-CM

## 2025-07-25 DIAGNOSIS — E55.9 VITAMIN D DEFICIENCY: ICD-10-CM

## 2025-07-25 DIAGNOSIS — M79.672 PAIN OF LEFT HEEL: ICD-10-CM

## 2025-07-25 PROCEDURE — 99395 PREV VISIT EST AGE 18-39: CPT | Performed by: PHYSICIAN ASSISTANT

## 2025-07-25 PROCEDURE — 73630 X-RAY EXAM OF FOOT: CPT

## 2025-07-25 PROCEDURE — 99214 OFFICE O/P EST MOD 30 MIN: CPT | Performed by: PHYSICIAN ASSISTANT

## 2025-07-25 RX ORDER — MULTIVIT-MIN/IRON/FOLIC ACID/K 18-600-40
2000 CAPSULE ORAL DAILY
Qty: 90 CAPSULE | Refills: 3 | Status: SHIPPED | OUTPATIENT
Start: 2025-07-25

## 2025-07-26 DIAGNOSIS — F31.81 BIPOLAR II DISORDER (HCC): ICD-10-CM

## 2025-07-29 ENCOUNTER — TELEPHONE (OUTPATIENT)
Dept: PSYCHIATRY | Facility: CLINIC | Age: 40
End: 2025-07-29

## 2025-07-29 ENCOUNTER — TELEPHONE (OUTPATIENT)
Dept: OBGYN CLINIC | Facility: MEDICAL CENTER | Age: 40
End: 2025-07-29

## 2025-07-29 RX ORDER — ARIPIPRAZOLE 5 MG/1
5 TABLET ORAL DAILY
Qty: 30 TABLET | Refills: 1 | Status: SHIPPED | OUTPATIENT
Start: 2025-07-29 | End: 2025-09-27

## 2025-08-05 ENCOUNTER — PATIENT MESSAGE (OUTPATIENT)
Dept: GYNECOLOGY | Facility: CLINIC | Age: 40
End: 2025-08-05

## 2025-08-06 ENCOUNTER — OFFICE VISIT (OUTPATIENT)
Dept: PSYCHIATRY | Facility: CLINIC | Age: 40
End: 2025-08-06
Payer: COMMERCIAL

## 2025-08-06 DIAGNOSIS — F39 MOOD INSOMNIA (HCC): ICD-10-CM

## 2025-08-06 DIAGNOSIS — F41.1 GENERALIZED ANXIETY DISORDER: ICD-10-CM

## 2025-08-06 DIAGNOSIS — F51.05 MOOD INSOMNIA (HCC): ICD-10-CM

## 2025-08-06 DIAGNOSIS — F31.81 BIPOLAR 2 DISORDER, MAJOR DEPRESSIVE EPISODE (HCC): Primary | ICD-10-CM

## 2025-08-06 DIAGNOSIS — F43.10 PTSD (POST-TRAUMATIC STRESS DISORDER): ICD-10-CM

## 2025-08-06 PROCEDURE — 99214 OFFICE O/P EST MOD 30 MIN: CPT

## 2025-08-06 RX ORDER — GABAPENTIN 300 MG/1
300 CAPSULE ORAL
Qty: 30 CAPSULE | Refills: 1 | Status: SHIPPED | OUTPATIENT
Start: 2025-08-06 | End: 2025-10-05

## 2025-08-06 RX ORDER — GABAPENTIN 100 MG/1
100 CAPSULE ORAL DAILY
Start: 2025-08-06 | End: 2025-10-05

## 2025-08-06 RX ORDER — SERTRALINE HYDROCHLORIDE 100 MG/1
150 TABLET, FILM COATED ORAL DAILY
Start: 2025-08-06

## 2025-08-08 ENCOUNTER — TELEPHONE (OUTPATIENT)
Dept: PSYCHIATRY | Facility: CLINIC | Age: 40
End: 2025-08-08

## 2025-08-18 ENCOUNTER — APPOINTMENT (OUTPATIENT)
Dept: LAB | Facility: CLINIC | Age: 40
End: 2025-08-18
Payer: COMMERCIAL

## 2025-08-18 ENCOUNTER — APPOINTMENT (OUTPATIENT)
Dept: LAB | Facility: MEDICAL CENTER | Age: 40
End: 2025-08-18
Payer: COMMERCIAL

## 2025-08-18 ENCOUNTER — TELEPHONE (OUTPATIENT)
Dept: OBGYN CLINIC | Facility: CLINIC | Age: 40
End: 2025-08-18

## 2025-08-18 DIAGNOSIS — E78.5 DYSLIPIDEMIA: ICD-10-CM

## 2025-08-18 DIAGNOSIS — Z13.1 DIABETES MELLITUS SCREENING: ICD-10-CM

## 2025-08-18 DIAGNOSIS — G43.109 MIGRAINE WITH AURA AND WITHOUT STATUS MIGRAINOSUS, NOT INTRACTABLE: ICD-10-CM

## 2025-08-18 DIAGNOSIS — G47.33 OSA (OBSTRUCTIVE SLEEP APNEA): ICD-10-CM

## 2025-08-18 DIAGNOSIS — Z01.818 PRE-OP TESTING: ICD-10-CM

## 2025-08-18 LAB
ALBUMIN SERPL BCG-MCNC: 4.1 G/DL (ref 3.5–5)
ALP SERPL-CCNC: 62 U/L (ref 34–104)
ALT SERPL W P-5'-P-CCNC: 24 U/L (ref 7–52)
ANION GAP SERPL CALCULATED.3IONS-SCNC: 7 MMOL/L (ref 4–13)
AST SERPL W P-5'-P-CCNC: 17 U/L (ref 13–39)
ATRIAL RATE: 93 BPM
BASOPHILS # BLD AUTO: 0.1 THOUSANDS/ÂΜL (ref 0–0.1)
BASOPHILS NFR BLD AUTO: 1 % (ref 0–1)
BILIRUB SERPL-MCNC: 0.37 MG/DL (ref 0.2–1)
BUN SERPL-MCNC: 11 MG/DL (ref 5–25)
CALCIUM SERPL-MCNC: 9.4 MG/DL (ref 8.4–10.2)
CHLORIDE SERPL-SCNC: 104 MMOL/L (ref 96–108)
CHOLEST SERPL-MCNC: 216 MG/DL (ref ?–200)
CO2 SERPL-SCNC: 27 MMOL/L (ref 21–32)
CREAT SERPL-MCNC: 0.69 MG/DL (ref 0.6–1.3)
EOSINOPHIL # BLD AUTO: 0.22 THOUSAND/ÂΜL (ref 0–0.61)
EOSINOPHIL NFR BLD AUTO: 3 % (ref 0–6)
ERYTHROCYTE [DISTWIDTH] IN BLOOD BY AUTOMATED COUNT: 12.9 % (ref 11.6–15.1)
GFR SERPL CREATININE-BSD FRML MDRD: 110 ML/MIN/1.73SQ M
GLUCOSE P FAST SERPL-MCNC: 94 MG/DL (ref 65–99)
HCT VFR BLD AUTO: 45.1 % (ref 34.8–46.1)
HDLC SERPL-MCNC: 48 MG/DL
HGB BLD-MCNC: 14.6 G/DL (ref 11.5–15.4)
IMM GRANULOCYTES # BLD AUTO: 0.03 THOUSAND/UL (ref 0–0.2)
IMM GRANULOCYTES NFR BLD AUTO: 0 % (ref 0–2)
LDLC SERPL CALC-MCNC: 131 MG/DL (ref 0–100)
LYMPHOCYTES # BLD AUTO: 3.23 THOUSANDS/ÂΜL (ref 0.6–4.47)
LYMPHOCYTES NFR BLD AUTO: 37 % (ref 14–44)
MCH RBC QN AUTO: 28.9 PG (ref 26.8–34.3)
MCHC RBC AUTO-ENTMCNC: 32.4 G/DL (ref 31.4–37.4)
MCV RBC AUTO: 89 FL (ref 82–98)
MONOCYTES # BLD AUTO: 0.67 THOUSAND/ÂΜL (ref 0.17–1.22)
MONOCYTES NFR BLD AUTO: 8 % (ref 4–12)
NEUTROPHILS # BLD AUTO: 4.51 THOUSANDS/ÂΜL (ref 1.85–7.62)
NEUTS SEG NFR BLD AUTO: 51 % (ref 43–75)
NRBC BLD AUTO-RTO: 0 /100 WBCS
P AXIS: -4 DEGREES
PLATELET # BLD AUTO: 354 THOUSANDS/UL (ref 149–390)
PMV BLD AUTO: 10.2 FL (ref 8.9–12.7)
POTASSIUM SERPL-SCNC: 4 MMOL/L (ref 3.5–5.3)
PR INTERVAL: 132 MS
PROT SERPL-MCNC: 7.2 G/DL (ref 6.4–8.4)
QRS AXIS: 33 DEGREES
QRSD INTERVAL: 84 MS
QT INTERVAL: 336 MS
QTC INTERVAL: 417 MS
RBC # BLD AUTO: 5.06 MILLION/UL (ref 3.81–5.12)
SODIUM SERPL-SCNC: 138 MMOL/L (ref 135–147)
T WAVE AXIS: 34 DEGREES
TRIGL SERPL-MCNC: 183 MG/DL (ref ?–150)
TSH SERPL DL<=0.05 MIU/L-ACNC: 2.81 UIU/ML (ref 0.45–4.5)
VENTRICULAR RATE: 93 BPM
WBC # BLD AUTO: 8.76 THOUSAND/UL (ref 4.31–10.16)

## 2025-08-18 PROCEDURE — 36415 COLL VENOUS BLD VENIPUNCTURE: CPT

## 2025-08-18 PROCEDURE — 80061 LIPID PANEL: CPT

## 2025-08-18 PROCEDURE — 80053 COMPREHEN METABOLIC PANEL: CPT

## 2025-08-18 PROCEDURE — 85025 COMPLETE CBC W/AUTO DIFF WBC: CPT

## 2025-08-18 PROCEDURE — 93010 ELECTROCARDIOGRAM REPORT: CPT | Performed by: INTERNAL MEDICINE

## 2025-08-18 PROCEDURE — 84443 ASSAY THYROID STIM HORMONE: CPT

## 2025-08-19 ENCOUNTER — ANESTHESIA EVENT (OUTPATIENT)
Dept: PERIOP | Facility: HOSPITAL | Age: 40
End: 2025-08-19
Payer: COMMERCIAL

## 2025-08-19 ENCOUNTER — TELEPHONE (OUTPATIENT)
Age: 40
End: 2025-08-19

## 2025-08-22 ENCOUNTER — ANESTHESIA (OUTPATIENT)
Dept: PERIOP | Facility: HOSPITAL | Age: 40
End: 2025-08-22
Payer: COMMERCIAL

## 2025-08-22 PROBLEM — Z98.890 STATUS POST LEEP (LOOP ELECTROSURGICAL EXCISION PROCEDURE) OF CERVIX: Status: ACTIVE | Noted: 2025-08-22

## 2025-08-22 RX ORDER — LIDOCAINE HCL/PF 100 MG/5ML
SYRINGE (ML) INJECTION AS NEEDED
Status: DISCONTINUED | OUTPATIENT
Start: 2025-08-22 | End: 2025-08-22

## 2025-08-22 RX ORDER — EPHEDRINE SULFATE 50 MG/ML
INJECTION INTRAVENOUS AS NEEDED
Status: DISCONTINUED | OUTPATIENT
Start: 2025-08-22 | End: 2025-08-22

## 2025-08-22 RX ORDER — MIDAZOLAM HYDROCHLORIDE 2 MG/2ML
INJECTION, SOLUTION INTRAMUSCULAR; INTRAVENOUS AS NEEDED
Status: DISCONTINUED | OUTPATIENT
Start: 2025-08-22 | End: 2025-08-22

## 2025-08-22 RX ORDER — KETOROLAC TROMETHAMINE 30 MG/ML
INJECTION, SOLUTION INTRAMUSCULAR; INTRAVENOUS AS NEEDED
Status: DISCONTINUED | OUTPATIENT
Start: 2025-08-22 | End: 2025-08-22

## 2025-08-22 RX ORDER — PROPOFOL 10 MG/ML
INJECTION, EMULSION INTRAVENOUS AS NEEDED
Status: DISCONTINUED | OUTPATIENT
Start: 2025-08-22 | End: 2025-08-22

## 2025-08-22 RX ORDER — DEXAMETHASONE SODIUM PHOSPHATE 4 MG/ML
INJECTION, SOLUTION INTRA-ARTICULAR; INTRALESIONAL; INTRAMUSCULAR; INTRAVENOUS; SOFT TISSUE AS NEEDED
Status: DISCONTINUED | OUTPATIENT
Start: 2025-08-22 | End: 2025-08-22

## 2025-08-22 RX ORDER — ONDANSETRON 2 MG/ML
INJECTION INTRAMUSCULAR; INTRAVENOUS AS NEEDED
Status: DISCONTINUED | OUTPATIENT
Start: 2025-08-22 | End: 2025-08-22

## 2025-08-22 RX ORDER — FENTANYL CITRATE 50 UG/ML
INJECTION, SOLUTION INTRAMUSCULAR; INTRAVENOUS AS NEEDED
Status: DISCONTINUED | OUTPATIENT
Start: 2025-08-22 | End: 2025-08-22

## 2025-08-22 RX ADMIN — LIDOCAINE HYDROCHLORIDE 100 MG: 20 INJECTION INTRAVENOUS at 12:33

## 2025-08-22 RX ADMIN — FENTANYL CITRATE 50 MCG: 50 INJECTION INTRAMUSCULAR; INTRAVENOUS at 12:23

## 2025-08-22 RX ADMIN — ONDANSETRON 4 MG: 2 INJECTION INTRAMUSCULAR; INTRAVENOUS at 12:21

## 2025-08-22 RX ADMIN — LIDOCAINE HYDROCHLORIDE 100 MG: 20 INJECTION INTRAVENOUS at 12:21

## 2025-08-22 RX ADMIN — SODIUM CHLORIDE, SODIUM LACTATE, POTASSIUM CHLORIDE, AND CALCIUM CHLORIDE: .6; .31; .03; .02 INJECTION, SOLUTION INTRAVENOUS at 13:04

## 2025-08-22 RX ADMIN — MIDAZOLAM HYDROCHLORIDE 2 MG: 1 INJECTION, SOLUTION INTRAMUSCULAR; INTRAVENOUS at 12:16

## 2025-08-22 RX ADMIN — KETOROLAC TROMETHAMINE 30 MG: 30 INJECTION, SOLUTION INTRAMUSCULAR; INTRAVENOUS at 13:15

## 2025-08-22 RX ADMIN — FENTANYL CITRATE 50 MCG: 50 INJECTION INTRAMUSCULAR; INTRAVENOUS at 12:44

## 2025-08-22 RX ADMIN — DEXAMETHASONE SODIUM PHOSPHATE 10 MG: 4 INJECTION, SOLUTION INTRA-ARTICULAR; INTRALESIONAL; INTRAMUSCULAR; INTRAVENOUS; SOFT TISSUE at 12:21

## 2025-08-22 RX ADMIN — EPHEDRINE SULFATE 5 MG: 50 INJECTION INTRAVENOUS at 12:37

## 2025-08-22 RX ADMIN — PROPOFOL 200 MG: 10 INJECTION, EMULSION INTRAVENOUS at 12:21

## (undated) DEVICE — DISPOSABLE OR TOWEL: Brand: CARDINAL HEALTH

## (undated) DEVICE — 4-PORT MANIFOLD: Brand: NEPTUNE 2

## (undated) DEVICE — BULB SYRINGE,IRRIGATION WITH PROTECTIVE CAP: Brand: DOVER

## (undated) DEVICE — DRAPE SHEET THREE QUARTER

## (undated) DEVICE — BOWL: 16OZ PEELPOUCH 75/CS 16/PLT: Brand: MEDEGEN MEDICAL PRODUCTS, LLC

## (undated) DEVICE — CUFF TOURNIQUET 34 X 4 IN QUICK CONNECT DISP 1BLA

## (undated) DEVICE — GUIDE PIN 1.1MM NITINOL BLUNT

## (undated) DEVICE — SCD SEQUENTIAL COMPRESSION COMFORT SLEEVE MEDIUM KNEE LENGTH: Brand: KENDALL SCD

## (undated) DEVICE — STIRRUP STRAP ADULT DISP

## (undated) DEVICE — SUT VICRYL 2-0 SH 27 IN UNDYED J417H

## (undated) DEVICE — GAUZE SPONGES,16 PLY: Brand: CURITY

## (undated) DEVICE — ASTOUND STANDARD SURGICAL GOWN, XL: Brand: CONVERTORS

## (undated) DEVICE — GUIDE PIN 2.4 X 435MM W/SUT EYE

## (undated) DEVICE — CHLORAPREP HI-LITE 26ML ORANGE

## (undated) DEVICE — EXTREMITY DRAPE W/ARMBOARD COVERS: Brand: CONVERTORS

## (undated) DEVICE — Device

## (undated) DEVICE — SUTURETAPE FIBERLOOP W/NDL WHITE/BL AR-7534

## (undated) DEVICE — ACE WRAP 6 IN UNSTERILE

## (undated) DEVICE — SURGICAL GOWN, XL SMARTSLEEVE: Brand: CONVERTORS

## (undated) DEVICE — ABDOMINAL PAD: Brand: DERMACEA

## (undated) DEVICE — GLOVE SRG LF STRL BGL SKNSNS 6.5 PF

## (undated) DEVICE — KIT DISP 3MM PLLA SHOULDER

## (undated) DEVICE — WEBRIL 6 IN UNSTERILE

## (undated) DEVICE — CUFF TOURNIQUET 30 X 4 IN QUICK CONNECT DISP 1BLA

## (undated) DEVICE — STERILE POLYISOPRENE POWDER-FREE SURGICAL GLOVES WITH EMOLLIENT COATING: Brand: PROTEXIS

## (undated) DEVICE — 3M™ IOBAN™ 2 ANTIMICROBIAL INCISE DRAPE 6650EZ: Brand: IOBAN™ 2

## (undated) DEVICE — BETHLEHEM UNIVERSAL  ARTHRO PK: Brand: CARDINAL HEALTH

## (undated) DEVICE — STOCKINETTE,IMPERVIOUS,12X48,STERILE: Brand: MEDLINE

## (undated) DEVICE — SPONGE LAP 18 X 18 IN STRL RFD

## (undated) DEVICE — GLOVE INDICATOR PI UNDERGLOVE SZ 7 BLUE

## (undated) DEVICE — GLOVE SRG LF STRL BGL SKNSNS 8.5 PF

## (undated) DEVICE — NEPTUNE E-SEP SMOKE EVACUATION PENCIL, COATED, 70MM BLADE, PUSH BUTTON SWITCH: Brand: NEPTUNE E-SEP

## (undated) DEVICE — INTENDED FOR TISSUE SEPARATION, AND OTHER PROCEDURES THAT REQUIRE A SHARP SURGICAL BLADE TO PUNCTURE OR CUT.: Brand: BARD-PARKER ® SAFETYLOCK CARBON RIB-BACK BLADES

## (undated) DEVICE — MEDI-VAC YANK SUCT HNDL W/TPRD BULBOUS TIP: Brand: CARDINAL HEALTH

## (undated) DEVICE — LIGHT GLOVE GREEN

## (undated) DEVICE — PADDING CAST 6IN COTTON STRL

## (undated) DEVICE — OCCLUSIVE GAUZE STRIP,3% BISMUTH TRIBROMOPHENATE IN PETROLATUM BLEND: Brand: XEROFORM

## (undated) DEVICE — ARTHROSCOPY FLOOR MAT

## (undated) DEVICE — INTENDED FOR TISSUE SEPARATION, AND OTHER PROCEDURES THAT REQUIRE A SHARP SURGICAL BLADE TO PUNCTURE OR CUT.: Brand: BARD-PARKER SAFETY BLADES SIZE 15, STERILE

## (undated) DEVICE — SUT MONOCRYL 4-0 PS-2 27 IN Y426H

## (undated) DEVICE — 3M™ STERI-DRAPE™ U-DRAPE 1015: Brand: STERI-DRAPE™

## (undated) DEVICE — VAPR HOOK ELECTRODE 3.5MM HOOK ELECTRODE WITH INTEGRATED HANDPIECE: Brand: VAPR

## (undated) DEVICE — 3M™ STERI-STRIP™ REINFORCED ADHESIVE SKIN CLOSURES, R1547, 1/2 IN X 4 IN (12 MM X 100 MM), 6 STRIPS/ENVELOPE: Brand: 3M™ STERI-STRIP™

## (undated) DEVICE — TUBING ARTHROSCOPIC WAVE  MAIN PUMP